# Patient Record
Sex: MALE | Race: WHITE | NOT HISPANIC OR LATINO | Employment: OTHER | URBAN - METROPOLITAN AREA
[De-identification: names, ages, dates, MRNs, and addresses within clinical notes are randomized per-mention and may not be internally consistent; named-entity substitution may affect disease eponyms.]

---

## 2017-10-02 RX ORDER — METFORMIN HYDROCHLORIDE 750 MG/1
TABLET, EXTENDED RELEASE ORAL
Qty: 180 TABLET | Refills: 0 | Status: SHIPPED | OUTPATIENT
Start: 2017-10-02 | End: 2017-12-28 | Stop reason: SDUPTHER

## 2017-10-02 RX ORDER — CLOPIDOGREL BISULFATE 75 MG/1
TABLET ORAL
Qty: 90 TABLET | Refills: 0 | Status: SHIPPED | OUTPATIENT
Start: 2017-10-02 | End: 2017-12-28 | Stop reason: SDUPTHER

## 2017-10-02 RX ORDER — METOPROLOL TARTRATE 25 MG/1
TABLET, FILM COATED ORAL
Qty: 180 TABLET | Refills: 0 | Status: SHIPPED | OUTPATIENT
Start: 2017-10-02 | End: 2017-12-28 | Stop reason: SDUPTHER

## 2017-10-02 RX ORDER — ATORVASTATIN CALCIUM 10 MG/1
TABLET, FILM COATED ORAL
Qty: 90 TABLET | Refills: 0 | Status: SHIPPED | OUTPATIENT
Start: 2017-10-02 | End: 2017-12-28 | Stop reason: SDUPTHER

## 2017-10-02 RX ORDER — LOSARTAN POTASSIUM AND HYDROCHLOROTHIAZIDE 12.5; 1 MG/1; MG/1
TABLET ORAL
Qty: 90 TABLET | Refills: 0 | Status: SHIPPED | OUTPATIENT
Start: 2017-10-02 | End: 2017-12-28 | Stop reason: SDUPTHER

## 2017-10-11 RX ORDER — CITALOPRAM 40 MG/1
TABLET, FILM COATED ORAL
Qty: 90 TABLET | Refills: 0 | Status: SHIPPED | OUTPATIENT
Start: 2017-10-11 | End: 2017-12-28 | Stop reason: SDUPTHER

## 2017-11-16 ENCOUNTER — TELEPHONE (OUTPATIENT)
Dept: PRIMARY CARE CLINIC | Facility: CLINIC | Age: 57
End: 2017-11-16

## 2017-11-16 PROBLEM — I25.810 CORONARY ARTERY DISEASE INVOLVING AUTOLOGOUS VEIN CORONARY BYPASS GRAFT WITHOUT ANGINA PECTORIS: Chronic | Status: ACTIVE | Noted: 2017-11-16

## 2017-11-16 PROBLEM — F32.9 MAJOR DEPRESSIVE DISORDER: Chronic | Status: ACTIVE | Noted: 2017-11-16

## 2017-11-16 PROBLEM — I10 ESSENTIAL HYPERTENSION: Chronic | Status: ACTIVE | Noted: 2017-11-16

## 2017-11-16 PROBLEM — C18.1 CANCER OF APPENDIX: Chronic | Status: ACTIVE | Noted: 2017-11-16

## 2017-11-16 PROBLEM — E11.9 NON-INSULIN DEPENDENT TYPE 2 DIABETES MELLITUS: Chronic | Status: ACTIVE | Noted: 2017-11-16

## 2017-11-16 PROBLEM — E78.00 HYPERCHOLESTEREMIA: Chronic | Status: ACTIVE | Noted: 2017-11-16

## 2017-11-16 NOTE — TELEPHONE ENCOUNTER
Received an IM from Adelita Linder in the call center stating patient found another doctor to see today.

## 2017-11-16 NOTE — TELEPHONE ENCOUNTER
----- Message from Rodrigo Beltrán sent at 11/16/2017  8:56 AM CST -----  Contact: Wife - Rachel Jefferson  States that the patient may have the Shingles and he has been diagnosed with cancer of the appendix, but he is off the chemo for right now.  But he is on a medication, steroids.   The patient would like to be seen today.  Can you please call Rachel back at [phone].  Thank you

## 2017-11-16 NOTE — TELEPHONE ENCOUNTER
Received IM from Adelita Linder in call center stating patient found another doctor to see today.  (dr Tellez)

## 2017-11-16 NOTE — TELEPHONE ENCOUNTER
----- Message from Ryan Hall sent at 11/16/2017 12:07 PM CST -----  Contact: Wife, Rachel Ramos want to speak with a nurse regaridng scheduling appointment today for possible shingles please call back at [phone]

## 2017-11-21 ENCOUNTER — ANESTHESIA (OUTPATIENT)
Dept: SURGERY | Facility: HOSPITAL | Age: 57
DRG: 327 | End: 2017-11-21
Payer: COMMERCIAL

## 2017-11-21 ENCOUNTER — HOSPITAL ENCOUNTER (INPATIENT)
Facility: HOSPITAL | Age: 57
LOS: 8 days | Discharge: HOME HEALTH | DRG: 327 | End: 2017-11-29
Attending: SURGERY | Admitting: SURGERY
Payer: COMMERCIAL

## 2017-11-21 ENCOUNTER — SURGERY (OUTPATIENT)
Age: 57
End: 2017-11-21

## 2017-11-21 ENCOUNTER — ANESTHESIA EVENT (OUTPATIENT)
Dept: SURGERY | Facility: HOSPITAL | Age: 57
DRG: 327 | End: 2017-11-21
Payer: COMMERCIAL

## 2017-11-21 DIAGNOSIS — K66.8 FREE INTRAPERITONEAL AIR: ICD-10-CM

## 2017-11-21 DIAGNOSIS — K66.8 FREE INTRAPERITONEAL AIR: Primary | ICD-10-CM

## 2017-11-21 DIAGNOSIS — I51.7 CARDIOMEGALY: ICD-10-CM

## 2017-11-21 DIAGNOSIS — Z95.1 HX OF CABG: ICD-10-CM

## 2017-11-21 LAB
ABO + RH BLD: NORMAL
BLD GP AB SCN CELLS X3 SERPL QL: NORMAL

## 2017-11-21 PROCEDURE — D9220A PRA ANESTHESIA: Mod: CRNA,,, | Performed by: NURSE ANESTHETIST, CERTIFIED REGISTERED

## 2017-11-21 PROCEDURE — 96365 THER/PROPH/DIAG IV INF INIT: CPT

## 2017-11-21 PROCEDURE — 49020 DRAINAGE ABDOM ABSCESS OPEN: CPT | Mod: ,,, | Performed by: SURGERY

## 2017-11-21 PROCEDURE — 99285 EMERGENCY DEPT VISIT HI MDM: CPT | Mod: 25

## 2017-11-21 PROCEDURE — 43830 GSTRST OPEN WO CONSTJ TUBE: CPT | Mod: 59,,, | Performed by: SURGERY

## 2017-11-21 PROCEDURE — S0030 INJECTION, METRONIDAZOLE: HCPCS | Performed by: SURGERY

## 2017-11-21 PROCEDURE — 63600175 PHARM REV CODE 636 W HCPCS: Performed by: NURSE ANESTHETIST, CERTIFIED REGISTERED

## 2017-11-21 PROCEDURE — 99291 CRITICAL CARE FIRST HOUR: CPT | Mod: ,,, | Performed by: EMERGENCY MEDICINE

## 2017-11-21 PROCEDURE — 37000008 HC ANESTHESIA 1ST 15 MINUTES: Performed by: SURGERY

## 2017-11-21 PROCEDURE — 85025 COMPLETE CBC W/AUTO DIFF WBC: CPT | Mod: 91

## 2017-11-21 PROCEDURE — 0D1B0Z4 BYPASS ILEUM TO CUTANEOUS, OPEN APPROACH: ICD-10-PCS | Performed by: SURGERY

## 2017-11-21 PROCEDURE — 20000000 HC ICU ROOM

## 2017-11-21 PROCEDURE — 86850 RBC ANTIBODY SCREEN: CPT | Mod: 91

## 2017-11-21 PROCEDURE — 87075 CULTR BACTERIA EXCEPT BLOOD: CPT

## 2017-11-21 PROCEDURE — 0DH60UZ INSERTION OF FEEDING DEVICE INTO STOMACH, OPEN APPROACH: ICD-10-PCS | Performed by: SURGERY

## 2017-11-21 PROCEDURE — 87186 SC STD MICRODIL/AGAR DIL: CPT

## 2017-11-21 PROCEDURE — 96376 TX/PRO/DX INJ SAME DRUG ADON: CPT

## 2017-11-21 PROCEDURE — 63600175 PHARM REV CODE 636 W HCPCS: Performed by: SURGERY

## 2017-11-21 PROCEDURE — 87102 FUNGUS ISOLATION CULTURE: CPT

## 2017-11-21 PROCEDURE — 85730 THROMBOPLASTIN TIME PARTIAL: CPT | Mod: 91

## 2017-11-21 PROCEDURE — 86920 COMPATIBILITY TEST SPIN: CPT

## 2017-11-21 PROCEDURE — 99223 1ST HOSP IP/OBS HIGH 75: CPT | Mod: 57,,, | Performed by: SURGERY

## 2017-11-21 PROCEDURE — 83605 ASSAY OF LACTIC ACID: CPT | Mod: 91

## 2017-11-21 PROCEDURE — S0030 INJECTION, METRONIDAZOLE: HCPCS | Performed by: EMERGENCY MEDICINE

## 2017-11-21 PROCEDURE — D9220A PRA ANESTHESIA: Mod: ANES,,, | Performed by: ANESTHESIOLOGY

## 2017-11-21 PROCEDURE — 94002 VENT MGMT INPAT INIT DAY: CPT

## 2017-11-21 PROCEDURE — 80053 COMPREHEN METABOLIC PANEL: CPT | Mod: 91

## 2017-11-21 PROCEDURE — 93005 ELECTROCARDIOGRAM TRACING: CPT

## 2017-11-21 PROCEDURE — 97605 NEG PRS WND THER DME<=50SQCM: CPT | Mod: ,,, | Performed by: SURGERY

## 2017-11-21 PROCEDURE — 96375 TX/PRO/DX INJ NEW DRUG ADDON: CPT

## 2017-11-21 PROCEDURE — 87070 CULTURE OTHR SPECIMN AEROBIC: CPT

## 2017-11-21 PROCEDURE — 85610 PROTHROMBIN TIME: CPT | Mod: 91

## 2017-11-21 PROCEDURE — 83735 ASSAY OF MAGNESIUM: CPT | Mod: 91

## 2017-11-21 PROCEDURE — 63600175 PHARM REV CODE 636 W HCPCS: Performed by: EMERGENCY MEDICINE

## 2017-11-21 PROCEDURE — 84145 PROCALCITONIN (PCT): CPT | Mod: 91

## 2017-11-21 PROCEDURE — 36000708 HC OR TIME LEV III 1ST 15 MIN: Performed by: SURGERY

## 2017-11-21 PROCEDURE — 36000709 HC OR TIME LEV III EA ADD 15 MIN: Performed by: SURGERY

## 2017-11-21 PROCEDURE — 44310 ILEOSTOMY/JEJUNOSTOMY: CPT | Mod: 51,,, | Performed by: SURGERY

## 2017-11-21 PROCEDURE — 12000002 HC ACUTE/MED SURGE SEMI-PRIVATE ROOM

## 2017-11-21 PROCEDURE — 86900 BLOOD TYPING SEROLOGIC ABO: CPT | Mod: 91

## 2017-11-21 PROCEDURE — C1729 CATH, DRAINAGE: HCPCS | Performed by: SURGERY

## 2017-11-21 PROCEDURE — P9045 ALBUMIN (HUMAN), 5%, 250 ML: HCPCS | Performed by: NURSE ANESTHETIST, CERTIFIED REGISTERED

## 2017-11-21 PROCEDURE — 25000003 PHARM REV CODE 250: Performed by: SURGERY

## 2017-11-21 PROCEDURE — 87077 CULTURE AEROBIC IDENTIFY: CPT

## 2017-11-21 PROCEDURE — 84100 ASSAY OF PHOSPHORUS: CPT | Mod: 91

## 2017-11-21 PROCEDURE — 25000003 PHARM REV CODE 250: Performed by: NURSE ANESTHETIST, CERTIFIED REGISTERED

## 2017-11-21 PROCEDURE — 36620 INSERTION CATHETER ARTERY: CPT | Mod: 59,,, | Performed by: ANESTHESIOLOGY

## 2017-11-21 PROCEDURE — 87205 SMEAR GRAM STAIN: CPT

## 2017-11-21 PROCEDURE — 96360 HYDRATION IV INFUSION INIT: CPT | Mod: 59

## 2017-11-21 PROCEDURE — 25000003 PHARM REV CODE 250: Performed by: EMERGENCY MEDICINE

## 2017-11-21 PROCEDURE — 87076 CULTURE ANAEROBE IDENT EACH: CPT

## 2017-11-21 PROCEDURE — 37000009 HC ANESTHESIA EA ADD 15 MINS: Performed by: SURGERY

## 2017-11-21 RX ORDER — METRONIDAZOLE 500 MG/100ML
500 INJECTION, SOLUTION INTRAVENOUS
Status: DISCONTINUED | OUTPATIENT
Start: 2017-11-21 | End: 2017-11-22

## 2017-11-21 RX ORDER — SODIUM BICARBONATE 1 MEQ/ML
SYRINGE (ML) INTRAVENOUS
Status: DISCONTINUED | OUTPATIENT
Start: 2017-11-21 | End: 2017-11-21

## 2017-11-21 RX ORDER — FENTANYL CITRATE 50 UG/ML
INJECTION, SOLUTION INTRAMUSCULAR; INTRAVENOUS
Status: DISCONTINUED | OUTPATIENT
Start: 2017-11-21 | End: 2017-11-21

## 2017-11-21 RX ORDER — LIDOCAINE HCL/PF 100 MG/5ML
SYRINGE (ML) INTRAVENOUS
Status: DISCONTINUED | OUTPATIENT
Start: 2017-11-21 | End: 2017-11-21

## 2017-11-21 RX ORDER — ACETAMINOPHEN 10 MG/ML
INJECTION, SOLUTION INTRAVENOUS
Status: DISCONTINUED | OUTPATIENT
Start: 2017-11-21 | End: 2017-11-21

## 2017-11-21 RX ORDER — ONDANSETRON 2 MG/ML
INJECTION INTRAMUSCULAR; INTRAVENOUS
Status: DISPENSED
Start: 2017-11-21 | End: 2017-11-22

## 2017-11-21 RX ORDER — HYDROMORPHONE HYDROCHLORIDE 1 MG/ML
1 INJECTION, SOLUTION INTRAMUSCULAR; INTRAVENOUS; SUBCUTANEOUS
Status: COMPLETED | OUTPATIENT
Start: 2017-11-21 | End: 2017-11-21

## 2017-11-21 RX ORDER — PHENYLEPHRINE HYDROCHLORIDE 10 MG/ML
INJECTION INTRAVENOUS
Status: DISCONTINUED | OUTPATIENT
Start: 2017-11-21 | End: 2017-11-21

## 2017-11-21 RX ORDER — ONDANSETRON 2 MG/ML
INJECTION INTRAMUSCULAR; INTRAVENOUS
Status: DISCONTINUED | OUTPATIENT
Start: 2017-11-21 | End: 2017-11-21

## 2017-11-21 RX ORDER — GLUCAGON 1 MG
1 KIT INJECTION
Status: DISCONTINUED | OUTPATIENT
Start: 2017-11-22 | End: 2017-11-29 | Stop reason: HOSPADM

## 2017-11-21 RX ORDER — CHLORHEXIDINE GLUCONATE ORAL RINSE 1.2 MG/ML
15 SOLUTION DENTAL 2 TIMES DAILY
Status: DISCONTINUED | OUTPATIENT
Start: 2017-11-22 | End: 2017-11-22

## 2017-11-21 RX ORDER — PROPOFOL 10 MG/ML
VIAL (ML) INTRAVENOUS
Status: DISCONTINUED | OUTPATIENT
Start: 2017-11-21 | End: 2017-11-21

## 2017-11-21 RX ORDER — ROCURONIUM BROMIDE 10 MG/ML
INJECTION, SOLUTION INTRAVENOUS
Status: DISCONTINUED | OUTPATIENT
Start: 2017-11-21 | End: 2017-11-21

## 2017-11-21 RX ORDER — ONDANSETRON 4 MG/1
4 TABLET, ORALLY DISINTEGRATING ORAL
Status: DISCONTINUED | OUTPATIENT
Start: 2017-11-21 | End: 2017-11-21

## 2017-11-21 RX ORDER — ONDANSETRON 2 MG/ML
4 INJECTION INTRAMUSCULAR; INTRAVENOUS
Status: COMPLETED | OUTPATIENT
Start: 2017-11-21 | End: 2017-11-21

## 2017-11-21 RX ORDER — MAGNESIUM SULFATE HEPTAHYDRATE 40 MG/ML
4 INJECTION, SOLUTION INTRAVENOUS
Status: DISCONTINUED | OUTPATIENT
Start: 2017-11-22 | End: 2017-11-24

## 2017-11-21 RX ORDER — METRONIDAZOLE 500 MG/100ML
500 INJECTION, SOLUTION INTRAVENOUS
Status: DISCONTINUED | OUTPATIENT
Start: 2017-11-22 | End: 2017-11-25

## 2017-11-21 RX ORDER — MIDAZOLAM HYDROCHLORIDE 1 MG/ML
INJECTION, SOLUTION INTRAMUSCULAR; INTRAVENOUS
Status: DISCONTINUED | OUTPATIENT
Start: 2017-11-21 | End: 2017-11-21

## 2017-11-21 RX ORDER — PROPOFOL 10 MG/ML
5 INJECTION, EMULSION INTRAVENOUS CONTINUOUS
Status: DISCONTINUED | OUTPATIENT
Start: 2017-11-22 | End: 2017-11-22

## 2017-11-21 RX ORDER — ALBUMIN HUMAN 50 G/1000ML
SOLUTION INTRAVENOUS CONTINUOUS PRN
Status: DISCONTINUED | OUTPATIENT
Start: 2017-11-21 | End: 2017-11-21

## 2017-11-21 RX ORDER — SUCCINYLCHOLINE CHLORIDE 20 MG/ML
INJECTION INTRAMUSCULAR; INTRAVENOUS
Status: DISCONTINUED | OUTPATIENT
Start: 2017-11-21 | End: 2017-11-21

## 2017-11-21 RX ORDER — SODIUM CHLORIDE, SODIUM LACTATE, POTASSIUM CHLORIDE, CALCIUM CHLORIDE 600; 310; 30; 20 MG/100ML; MG/100ML; MG/100ML; MG/100ML
INJECTION, SOLUTION INTRAVENOUS CONTINUOUS
Status: DISCONTINUED | OUTPATIENT
Start: 2017-11-22 | End: 2017-11-23

## 2017-11-21 RX ORDER — HYDROMORPHONE HYDROCHLORIDE 2 MG/ML
INJECTION, SOLUTION INTRAMUSCULAR; INTRAVENOUS; SUBCUTANEOUS
Status: DISCONTINUED | OUTPATIENT
Start: 2017-11-21 | End: 2017-11-21

## 2017-11-21 RX ORDER — FAMOTIDINE 10 MG/ML
20 INJECTION INTRAVENOUS 2 TIMES DAILY
Status: DISCONTINUED | OUTPATIENT
Start: 2017-11-22 | End: 2017-11-22

## 2017-11-21 RX ORDER — ONDANSETRON 2 MG/ML
4 INJECTION INTRAMUSCULAR; INTRAVENOUS ONCE
Status: COMPLETED | OUTPATIENT
Start: 2017-11-21 | End: 2017-11-21

## 2017-11-21 RX ORDER — METRONIDAZOLE 500 MG/100ML
500 INJECTION, SOLUTION INTRAVENOUS
Status: COMPLETED | OUTPATIENT
Start: 2017-11-21 | End: 2017-11-21

## 2017-11-21 RX ORDER — VASOPRESSIN 20 [USP'U]/ML
INJECTION, SOLUTION INTRAMUSCULAR; SUBCUTANEOUS
Status: DISCONTINUED | OUTPATIENT
Start: 2017-11-21 | End: 2017-11-21

## 2017-11-21 RX ORDER — INSULIN ASPART 100 [IU]/ML
0-5 INJECTION, SOLUTION INTRAVENOUS; SUBCUTANEOUS EVERY 6 HOURS PRN
Status: DISCONTINUED | OUTPATIENT
Start: 2017-11-22 | End: 2017-11-29 | Stop reason: HOSPADM

## 2017-11-21 RX ORDER — MAGNESIUM SULFATE HEPTAHYDRATE 40 MG/ML
2 INJECTION, SOLUTION INTRAVENOUS
Status: DISCONTINUED | OUTPATIENT
Start: 2017-11-22 | End: 2017-11-24

## 2017-11-21 RX ORDER — FENTANYL CITRAT/DEXTROSE 5%/PF 100 MCG/10
25 PATIENT CONTROLLED ANALGESIA SYRINGE INTRAVENOUS CONTINUOUS
Status: DISCONTINUED | OUTPATIENT
Start: 2017-11-22 | End: 2017-11-22

## 2017-11-21 RX ADMIN — MIDAZOLAM HYDROCHLORIDE 1 MG: 1 INJECTION, SOLUTION INTRAMUSCULAR; INTRAVENOUS at 11:11

## 2017-11-21 RX ADMIN — SODIUM CHLORIDE, SODIUM LACTATE, POTASSIUM CHLORIDE, AND CALCIUM CHLORIDE 1000 ML: .6; .31; .03; .02 INJECTION, SOLUTION INTRAVENOUS at 07:11

## 2017-11-21 RX ADMIN — PHENYLEPHRINE HYDROCHLORIDE 300 MCG: 10 INJECTION INTRAVENOUS at 09:11

## 2017-11-21 RX ADMIN — ONDANSETRON 4 MG: 2 INJECTION INTRAMUSCULAR; INTRAVENOUS at 07:11

## 2017-11-21 RX ADMIN — ALBUMIN (HUMAN): 12.5 SOLUTION INTRAVENOUS at 10:11

## 2017-11-21 RX ADMIN — LIDOCAINE HYDROCHLORIDE 60 MG: 20 INJECTION, SOLUTION INTRAVENOUS at 09:11

## 2017-11-21 RX ADMIN — METRONIDAZOLE 500 MG: 500 INJECTION, SOLUTION INTRAVENOUS at 07:11

## 2017-11-21 RX ADMIN — CEFTRIAXONE 2 G: 2 INJECTION, SOLUTION INTRAVENOUS at 09:11

## 2017-11-21 RX ADMIN — VASOPRESSIN 2 UNITS: 20 INJECTION INTRAVENOUS at 10:11

## 2017-11-21 RX ADMIN — CALCIUM CHLORIDE 1 G: 100 INJECTION, SOLUTION INTRAVENOUS at 10:11

## 2017-11-21 RX ADMIN — ROCURONIUM BROMIDE 30 MG: 10 INJECTION, SOLUTION INTRAVENOUS at 09:11

## 2017-11-21 RX ADMIN — PHENYLEPHRINE HYDROCHLORIDE 200 MCG: 10 INJECTION INTRAVENOUS at 09:11

## 2017-11-21 RX ADMIN — FENTANYL CITRATE 50 MCG: 50 INJECTION, SOLUTION INTRAMUSCULAR; INTRAVENOUS at 09:11

## 2017-11-21 RX ADMIN — SUCCINYLCHOLINE CHLORIDE 140 MG: 20 INJECTION, SOLUTION INTRAMUSCULAR; INTRAVENOUS at 09:11

## 2017-11-21 RX ADMIN — MIDAZOLAM HYDROCHLORIDE 1 MG: 1 INJECTION, SOLUTION INTRAMUSCULAR; INTRAVENOUS at 09:11

## 2017-11-21 RX ADMIN — PROPOFOL 100 MG: 10 INJECTION, EMULSION INTRAVENOUS at 09:11

## 2017-11-21 RX ADMIN — SODIUM CHLORIDE, SODIUM GLUCONATE, SODIUM ACETATE, POTASSIUM CHLORIDE, MAGNESIUM CHLORIDE, SODIUM PHOSPHATE, DIBASIC, AND POTASSIUM PHOSPHATE: .53; .5; .37; .037; .03; .012; .00082 INJECTION, SOLUTION INTRAVENOUS at 09:11

## 2017-11-21 RX ADMIN — VASOPRESSIN 2 UNITS: 20 INJECTION INTRAVENOUS at 09:11

## 2017-11-21 RX ADMIN — ACETAMINOPHEN 1000 MG: 10 INJECTION, SOLUTION INTRAVENOUS at 10:11

## 2017-11-21 RX ADMIN — SODIUM CHLORIDE, SODIUM GLUCONATE, SODIUM ACETATE, POTASSIUM CHLORIDE, MAGNESIUM CHLORIDE, SODIUM PHOSPHATE, DIBASIC, AND POTASSIUM PHOSPHATE: .53; .5; .37; .037; .03; .012; .00082 INJECTION, SOLUTION INTRAVENOUS at 10:11

## 2017-11-21 RX ADMIN — ROCURONIUM BROMIDE 10 MG: 10 INJECTION, SOLUTION INTRAVENOUS at 09:11

## 2017-11-21 RX ADMIN — ONDANSETRON 4 MG: 2 INJECTION INTRAMUSCULAR; INTRAVENOUS at 11:11

## 2017-11-21 RX ADMIN — PHENYLEPHRINE HYDROCHLORIDE 100 MCG: 10 INJECTION INTRAVENOUS at 09:11

## 2017-11-21 RX ADMIN — VASOPRESSIN 2 UNITS: 20 INJECTION INTRAVENOUS at 11:11

## 2017-11-21 RX ADMIN — SODIUM BICARBONATE 50 MEQ: 84 INJECTION, SOLUTION INTRAVENOUS at 10:11

## 2017-11-21 RX ADMIN — ALBUMIN (HUMAN): 12.5 SOLUTION INTRAVENOUS at 09:11

## 2017-11-21 RX ADMIN — ROCURONIUM BROMIDE 20 MG: 10 INJECTION, SOLUTION INTRAVENOUS at 10:11

## 2017-11-21 RX ADMIN — VASOPRESSIN 4 UNITS: 20 INJECTION INTRAVENOUS at 09:11

## 2017-11-21 RX ADMIN — PHENYLEPHRINE HYDROCHLORIDE 200 MCG: 10 INJECTION INTRAVENOUS at 10:11

## 2017-11-21 RX ADMIN — EPHEDRINE SULFATE 5 MG: 50 INJECTION, SOLUTION INTRAMUSCULAR; INTRAVENOUS; SUBCUTANEOUS at 09:11

## 2017-11-21 RX ADMIN — HYDROMORPHONE HYDROCHLORIDE 1 MG: 1 INJECTION, SOLUTION INTRAMUSCULAR; INTRAVENOUS; SUBCUTANEOUS at 08:11

## 2017-11-21 RX ADMIN — HYDROMORPHONE HYDROCHLORIDE 0.4 MG: 2 INJECTION, SOLUTION INTRAMUSCULAR; INTRAVENOUS; SUBCUTANEOUS at 11:11

## 2017-11-21 RX ADMIN — METRONIDAZOLE 500 MG: 500 INJECTION, SOLUTION INTRAVENOUS at 09:11

## 2017-11-21 RX ADMIN — EPHEDRINE SULFATE 10 MG: 50 INJECTION, SOLUTION INTRAMUSCULAR; INTRAVENOUS; SUBCUTANEOUS at 09:11

## 2017-11-21 RX ADMIN — ONDANSETRON 4 MG: 2 INJECTION INTRAMUSCULAR; INTRAVENOUS at 08:11

## 2017-11-22 PROBLEM — B02.9 HERPES ZOSTER: Status: ACTIVE | Noted: 2017-11-22

## 2017-11-22 LAB
ALBUMIN SERPL BCP-MCNC: 2.3 G/DL
ALBUMIN SERPL BCP-MCNC: 2.4 G/DL
ALBUMIN SERPL BCP-MCNC: 2.4 G/DL
ALBUMIN SERPL BCP-MCNC: 2.5 G/DL
ALBUMIN SERPL BCP-MCNC: 2.5 G/DL
ALLENS TEST: ABNORMAL
ALP SERPL-CCNC: 38 U/L
ALP SERPL-CCNC: 43 U/L
ALP SERPL-CCNC: 45 U/L
ALT SERPL W/O P-5'-P-CCNC: 12 U/L
ALT SERPL W/O P-5'-P-CCNC: 15 U/L
ALT SERPL W/O P-5'-P-CCNC: 16 U/L
ANION GAP SERPL CALC-SCNC: 14 MMOL/L
ANION GAP SERPL CALC-SCNC: 14 MMOL/L
ANION GAP SERPL CALC-SCNC: 8 MMOL/L
ANISOCYTOSIS BLD QL SMEAR: SLIGHT
AORTIC VALVE REGURGITATION: NORMAL
APTT BLDCRRT: 29.3 SEC
AST SERPL-CCNC: 16 U/L
AST SERPL-CCNC: 17 U/L
AST SERPL-CCNC: 20 U/L
AST SERPL-CCNC: 20 U/L
AST SERPL-CCNC: 23 U/L
BASOPHILS # BLD AUTO: 0.01 K/UL
BASOPHILS # BLD AUTO: 0.02 K/UL
BASOPHILS # BLD AUTO: 0.05 K/UL
BASOPHILS NFR BLD: 0.2 %
BASOPHILS NFR BLD: 0.3 %
BASOPHILS NFR BLD: 0.3 %
BASOPHILS NFR BLD: 0.6 %
BASOPHILS NFR BLD: 0.6 %
BILIRUB SERPL-MCNC: 0.8 MG/DL
BILIRUB SERPL-MCNC: 1.3 MG/DL
BILIRUB SERPL-MCNC: 1.6 MG/DL
BUN SERPL-MCNC: 20 MG/DL
BUN SERPL-MCNC: 24 MG/DL
BUN SERPL-MCNC: 24 MG/DL
BUN SERPL-MCNC: 35 MG/DL
BUN SERPL-MCNC: 41 MG/DL
BURR CELLS BLD QL SMEAR: ABNORMAL
CALCIUM SERPL-MCNC: 7.5 MG/DL
CALCIUM SERPL-MCNC: 7.8 MG/DL
CALCIUM SERPL-MCNC: 7.8 MG/DL
CALCIUM SERPL-MCNC: 7.9 MG/DL
CALCIUM SERPL-MCNC: 7.9 MG/DL
CHLORIDE SERPL-SCNC: 103 MMOL/L
CHLORIDE SERPL-SCNC: 104 MMOL/L
CHLORIDE SERPL-SCNC: 104 MMOL/L
CHLORIDE UR-SCNC: 120 MMOL/L
CO2 SERPL-SCNC: 16 MMOL/L
CO2 SERPL-SCNC: 17 MMOL/L
CO2 SERPL-SCNC: 21 MMOL/L
CO2 SERPL-SCNC: 21 MMOL/L
CO2 SERPL-SCNC: 22 MMOL/L
CREAT SERPL-MCNC: 1 MG/DL
CREAT SERPL-MCNC: 1.5 MG/DL
CREAT SERPL-MCNC: 1.8 MG/DL
CREAT UR-MCNC: 73 MG/DL
DELSYS: ABNORMAL
DIFFERENTIAL METHOD: ABNORMAL
EOSINOPHIL # BLD AUTO: 0 K/UL
EOSINOPHIL NFR BLD: 0 %
EOSINOPHIL NFR BLD: 0.2 %
EOSINOPHIL NFR BLD: 0.5 %
EOSINOPHIL NFR BLD: 0.5 %
EOSINOPHIL NFR BLD: 0.8 %
ERYTHROCYTE [DISTWIDTH] IN BLOOD BY AUTOMATED COUNT: 18.5 %
ERYTHROCYTE [DISTWIDTH] IN BLOOD BY AUTOMATED COUNT: 18.6 %
ERYTHROCYTE [DISTWIDTH] IN BLOOD BY AUTOMATED COUNT: 18.6 %
ERYTHROCYTE [DISTWIDTH] IN BLOOD BY AUTOMATED COUNT: 18.7 %
ERYTHROCYTE [DISTWIDTH] IN BLOOD BY AUTOMATED COUNT: 18.7 %
ERYTHROCYTE [SEDIMENTATION RATE] IN BLOOD BY WESTERGREN METHOD: 15 MM/H
EST. GFR  (AFRICAN AMERICAN): 46.6 ML/MIN/1.73 M^2
EST. GFR  (AFRICAN AMERICAN): 58.1 ML/MIN/1.73 M^2
EST. GFR  (AFRICAN AMERICAN): >60 ML/MIN/1.73 M^2
EST. GFR  (NON AFRICAN AMERICAN): 40.3 ML/MIN/1.73 M^2
EST. GFR  (NON AFRICAN AMERICAN): 50.2 ML/MIN/1.73 M^2
EST. GFR  (NON AFRICAN AMERICAN): >60 ML/MIN/1.73 M^2
ESTIMATED PA SYSTOLIC PRESSURE: 33.64
FIO2: 100
FIO2: 100
FIO2: 60
FIO2: 60
FIO2: 80
FIO2: 90
GLUCOSE SERPL-MCNC: 115 MG/DL
GLUCOSE SERPL-MCNC: 129 MG/DL (ref 70–110)
GLUCOSE SERPL-MCNC: 132 MG/DL (ref 70–110)
GLUCOSE SERPL-MCNC: 137 MG/DL
GLUCOSE SERPL-MCNC: 138 MG/DL (ref 70–110)
GLUCOSE SERPL-MCNC: 189 MG/DL
GLUCOSE SERPL-MCNC: 192 MG/DL
GLUCOSE SERPL-MCNC: 192 MG/DL
GRAM STN SPEC: NORMAL
HCO3 UR-SCNC: 15.6 MMOL/L (ref 24–28)
HCO3 UR-SCNC: 17.3 MMOL/L (ref 24–28)
HCO3 UR-SCNC: 17.4 MMOL/L (ref 24–28)
HCO3 UR-SCNC: 18 MMOL/L (ref 24–28)
HCO3 UR-SCNC: 18.1 MMOL/L (ref 24–28)
HCO3 UR-SCNC: 18.7 MMOL/L (ref 24–28)
HCO3 UR-SCNC: 19.7 MMOL/L (ref 24–28)
HCO3 UR-SCNC: 20.4 MMOL/L (ref 24–28)
HCO3 UR-SCNC: 20.7 MMOL/L (ref 24–28)
HCT VFR BLD AUTO: 33.8 %
HCT VFR BLD AUTO: 33.9 %
HCT VFR BLD AUTO: 33.9 %
HCT VFR BLD AUTO: 36 %
HCT VFR BLD AUTO: 36.9 %
HCT VFR BLD CALC: 30 %PCV (ref 36–54)
HCT VFR BLD CALC: 34 %PCV (ref 36–54)
HCT VFR BLD CALC: 35 %PCV (ref 36–54)
HCT VFR BLD CALC: 35 %PCV (ref 36–54)
HGB BLD-MCNC: 11.3 G/DL
HGB BLD-MCNC: 11.3 G/DL
HGB BLD-MCNC: 11.4 G/DL
HGB BLD-MCNC: 11.7 G/DL
HGB BLD-MCNC: 12.1 G/DL
IMM GRANULOCYTES # BLD AUTO: 0.01 K/UL
IMM GRANULOCYTES # BLD AUTO: 0.01 K/UL
IMM GRANULOCYTES # BLD AUTO: 0.03 K/UL
IMM GRANULOCYTES # BLD AUTO: 0.03 K/UL
IMM GRANULOCYTES # BLD AUTO: 0.04 K/UL
IMM GRANULOCYTES NFR BLD AUTO: 0.1 %
IMM GRANULOCYTES NFR BLD AUTO: 0.2 %
IMM GRANULOCYTES NFR BLD AUTO: 0.8 %
IMM GRANULOCYTES NFR BLD AUTO: 0.8 %
IMM GRANULOCYTES NFR BLD AUTO: 1.1 %
INR PPP: 1.2
IP: 20
IT: 0.8
LACTATE SERPL-SCNC: 0.8 MMOL/L
LACTATE SERPL-SCNC: 0.9 MMOL/L
LACTATE SERPL-SCNC: 1.1 MMOL/L
LACTATE SERPL-SCNC: 1.1 MMOL/L
LACTATE SERPL-SCNC: 1.2 MMOL/L
LACTATE SERPL-SCNC: 1.4 MMOL/L
LDH SERPL L TO P-CCNC: 0.8 MMOL/L (ref 0.36–1.25)
LYMPHOCYTES # BLD AUTO: 1.1 K/UL
LYMPHOCYTES # BLD AUTO: 1.6 K/UL
LYMPHOCYTES NFR BLD: 18.9 %
LYMPHOCYTES NFR BLD: 23.9 %
LYMPHOCYTES NFR BLD: 29.9 %
LYMPHOCYTES NFR BLD: 29.9 %
LYMPHOCYTES NFR BLD: 31.8 %
MAGNESIUM SERPL-MCNC: 1.6 MG/DL
MAGNESIUM SERPL-MCNC: 1.7 MG/DL
MAGNESIUM SERPL-MCNC: 1.8 MG/DL
MCH RBC QN AUTO: 26.8 PG
MCH RBC QN AUTO: 27 PG
MCH RBC QN AUTO: 27.1 PG
MCHC RBC AUTO-ENTMCNC: 32.5 G/DL
MCHC RBC AUTO-ENTMCNC: 32.8 G/DL
MCHC RBC AUTO-ENTMCNC: 33.3 G/DL
MCHC RBC AUTO-ENTMCNC: 33.3 G/DL
MCHC RBC AUTO-ENTMCNC: 33.7 G/DL
MCV RBC AUTO: 80 FL
MCV RBC AUTO: 80 FL
MCV RBC AUTO: 81 FL
MCV RBC AUTO: 82 FL
MCV RBC AUTO: 82 FL
MODE: ABNORMAL
MONOCYTES # BLD AUTO: 0.4 K/UL
MONOCYTES # BLD AUTO: 0.6 K/UL
MONOCYTES # BLD AUTO: 0.9 K/UL
MONOCYTES NFR BLD: 10.2 %
MONOCYTES NFR BLD: 10.4 %
MONOCYTES NFR BLD: 10.4 %
MONOCYTES NFR BLD: 11.9 %
MONOCYTES NFR BLD: 12.4 %
NEUTROPHILS # BLD AUTO: 1.9 K/UL
NEUTROPHILS # BLD AUTO: 2.1 K/UL
NEUTROPHILS # BLD AUTO: 2.1 K/UL
NEUTROPHILS # BLD AUTO: 3 K/UL
NEUTROPHILS # BLD AUTO: 5.9 K/UL
NEUTROPHILS NFR BLD: 53.3 %
NEUTROPHILS NFR BLD: 58.1 %
NEUTROPHILS NFR BLD: 58.1 %
NEUTROPHILS NFR BLD: 63.6 %
NEUTROPHILS NFR BLD: 70.2 %
NRBC BLD-RTO: 0 /100 WBC
OVALOCYTES BLD QL SMEAR: ABNORMAL
OVALOCYTES BLD QL SMEAR: ABNORMAL
PCO2 BLDA: 29 MMHG (ref 35–45)
PCO2 BLDA: 36.3 MMHG (ref 35–45)
PCO2 BLDA: 36.6 MMHG (ref 35–45)
PCO2 BLDA: 38 MMHG (ref 35–45)
PCO2 BLDA: 38.9 MMHG (ref 35–45)
PCO2 BLDA: 39.4 MMHG (ref 35–45)
PCO2 BLDA: 41.9 MMHG (ref 35–45)
PCO2 BLDA: 43.4 MMHG (ref 35–45)
PCO2 BLDA: 46.7 MMHG (ref 35–45)
PEEP: 5
PEEP: 5
PEEP: 9
PH SMN: 7.22 [PH] (ref 7.35–7.45)
PH SMN: 7.23 [PH] (ref 7.35–7.45)
PH SMN: 7.23 [PH] (ref 7.35–7.45)
PH SMN: 7.29 [PH] (ref 7.35–7.45)
PH SMN: 7.32 [PH] (ref 7.35–7.45)
PH SMN: 7.34 [PH] (ref 7.35–7.45)
PH SMN: 7.37 [PH] (ref 7.35–7.45)
PHOSPHATE SERPL-MCNC: 2.2 MG/DL
PHOSPHATE SERPL-MCNC: 4.4 MG/DL
PHOSPHATE SERPL-MCNC: 4.8 MG/DL
PLATELET # BLD AUTO: 122 K/UL
PLATELET # BLD AUTO: 77 K/UL
PLATELET # BLD AUTO: 85 K/UL
PLATELET # BLD AUTO: 85 K/UL
PLATELET # BLD AUTO: 95 K/UL
PLATELET BLD QL SMEAR: ABNORMAL
PMV BLD AUTO: 10.1 FL
PMV BLD AUTO: 10.3 FL
PMV BLD AUTO: 9.4 FL
PMV BLD AUTO: 9.6 FL
PMV BLD AUTO: 9.6 FL
PO2 BLDA: 117 MMHG (ref 80–100)
PO2 BLDA: 122 MMHG (ref 80–100)
PO2 BLDA: 162 MMHG (ref 80–100)
PO2 BLDA: 173 MMHG (ref 80–100)
PO2 BLDA: 187 MMHG (ref 80–100)
PO2 BLDA: 347 MMHG (ref 80–100)
PO2 BLDA: 95 MMHG (ref 80–100)
PO2 BLDA: 95 MMHG (ref 80–100)
PO2 BLDA: 97 MMHG (ref 80–100)
POC BE: -10 MMOL/L
POC BE: -5 MMOL/L
POC BE: -6 MMOL/L
POC BE: -8 MMOL/L
POC BE: -8 MMOL/L
POC BE: -9 MMOL/L
POC BE: -9 MMOL/L
POC IONIZED CALCIUM: 0.95 MMOL/L (ref 1.06–1.42)
POC IONIZED CALCIUM: 1.13 MMOL/L (ref 1.06–1.42)
POC IONIZED CALCIUM: 1.13 MMOL/L (ref 1.06–1.42)
POC IONIZED CALCIUM: 1.17 MMOL/L (ref 1.06–1.42)
POC SATURATED O2: 100 % (ref 95–100)
POC SATURATED O2: 96 % (ref 95–100)
POC SATURATED O2: 97 % (ref 95–100)
POC SATURATED O2: 97 % (ref 95–100)
POC SATURATED O2: 98 % (ref 95–100)
POC SATURATED O2: 98 % (ref 95–100)
POC SATURATED O2: 99 % (ref 95–100)
POC TCO2: 16 MMOL/L (ref 23–27)
POC TCO2: 19 MMOL/L (ref 23–27)
POC TCO2: 20 MMOL/L (ref 23–27)
POC TCO2: 21 MMOL/L (ref 23–27)
POC TCO2: 22 MMOL/L (ref 23–27)
POC TCO2: 22 MMOL/L (ref 23–27)
POCT GLUCOSE: 127 MG/DL (ref 70–110)
POCT GLUCOSE: 194 MG/DL (ref 70–110)
POCT GLUCOSE: 225 MG/DL (ref 70–110)
POIKILOCYTOSIS BLD QL SMEAR: SLIGHT
POTASSIUM BLD-SCNC: 3.3 MMOL/L (ref 3.5–5.1)
POTASSIUM BLD-SCNC: 3.4 MMOL/L (ref 3.5–5.1)
POTASSIUM BLD-SCNC: 3.5 MMOL/L (ref 3.5–5.1)
POTASSIUM BLD-SCNC: 3.6 MMOL/L (ref 3.5–5.1)
POTASSIUM SERPL-SCNC: 3.5 MMOL/L
POTASSIUM SERPL-SCNC: 3.6 MMOL/L
POTASSIUM SERPL-SCNC: 3.6 MMOL/L
POTASSIUM SERPL-SCNC: 3.7 MMOL/L
POTASSIUM SERPL-SCNC: 3.8 MMOL/L
PROCALCITONIN SERPL IA-MCNC: 11.71 NG/ML
PROT SERPL-MCNC: 5 G/DL
PROT SERPL-MCNC: 5.1 G/DL
PROT SERPL-MCNC: 5.1 G/DL
PROT SERPL-MCNC: 5.2 G/DL
PROT SERPL-MCNC: 5.2 G/DL
PROTHROMBIN TIME: 12.9 SEC
RBC # BLD AUTO: 4.2 M/UL
RBC # BLD AUTO: 4.22 M/UL
RBC # BLD AUTO: 4.22 M/UL
RBC # BLD AUTO: 4.37 M/UL
RBC # BLD AUTO: 4.48 M/UL
RETIRED EF AND QEF - SEE NOTES: 55 (ref 55–65)
SAMPLE: ABNORMAL
SITE: ABNORMAL
SMUDGE CELLS BLD QL SMEAR: PRESENT
SODIUM BLD-SCNC: 134 MMOL/L (ref 136–145)
SODIUM BLD-SCNC: 135 MMOL/L (ref 136–145)
SODIUM BLD-SCNC: 135 MMOL/L (ref 136–145)
SODIUM BLD-SCNC: 136 MMOL/L (ref 136–145)
SODIUM SERPL-SCNC: 132 MMOL/L
SODIUM SERPL-SCNC: 132 MMOL/L
SODIUM SERPL-SCNC: 134 MMOL/L
SODIUM UR-SCNC: 109 MMOL/L
SP02: 100
SP02: 100
SP02: 99
TRICUSPID VALVE REGURGITATION: NORMAL
UUN UR-MCNC: 783 MG/DL
WBC # BLD AUTO: 3.55 K/UL
WBC # BLD AUTO: 3.64 K/UL
WBC # BLD AUTO: 3.64 K/UL
WBC # BLD AUTO: 4.72 K/UL
WBC # BLD AUTO: 8.35 K/UL

## 2017-11-22 PROCEDURE — 63600175 PHARM REV CODE 636 W HCPCS: Performed by: STUDENT IN AN ORGANIZED HEALTH CARE EDUCATION/TRAINING PROGRAM

## 2017-11-22 PROCEDURE — 84540 ASSAY OF URINE/UREA-N: CPT

## 2017-11-22 PROCEDURE — 27000221 HC OXYGEN, UP TO 24 HOURS

## 2017-11-22 PROCEDURE — 27000190 HC CPAP FULL FACE MASK W/VALVE

## 2017-11-22 PROCEDURE — 02HV33Z INSERTION OF INFUSION DEVICE INTO SUPERIOR VENA CAVA, PERCUTANEOUS APPROACH: ICD-10-PCS | Performed by: SURGERY

## 2017-11-22 PROCEDURE — 94002 VENT MGMT INPAT INIT DAY: CPT

## 2017-11-22 PROCEDURE — 83605 ASSAY OF LACTIC ACID: CPT

## 2017-11-22 PROCEDURE — 93306 TTE W/DOPPLER COMPLETE: CPT | Mod: 26,,, | Performed by: INTERNAL MEDICINE

## 2017-11-22 PROCEDURE — 37799 UNLISTED PX VASCULAR SURGERY: CPT

## 2017-11-22 PROCEDURE — 25000003 PHARM REV CODE 250: Performed by: STUDENT IN AN ORGANIZED HEALTH CARE EDUCATION/TRAINING PROGRAM

## 2017-11-22 PROCEDURE — 84100 ASSAY OF PHOSPHORUS: CPT

## 2017-11-22 PROCEDURE — 20000000 HC ICU ROOM

## 2017-11-22 PROCEDURE — 87300 AG DETECTION POLYVAL IF: CPT

## 2017-11-22 PROCEDURE — S0030 INJECTION, METRONIDAZOLE: HCPCS | Performed by: ANESTHESIOLOGY

## 2017-11-22 PROCEDURE — 93306 TTE W/DOPPLER COMPLETE: CPT

## 2017-11-22 PROCEDURE — 83735 ASSAY OF MAGNESIUM: CPT | Mod: 91

## 2017-11-22 PROCEDURE — 80053 COMPREHEN METABOLIC PANEL: CPT

## 2017-11-22 PROCEDURE — S0028 INJECTION, FAMOTIDINE, 20 MG: HCPCS | Performed by: ANESTHESIOLOGY

## 2017-11-22 PROCEDURE — 25000003 PHARM REV CODE 250

## 2017-11-22 PROCEDURE — 99900035 HC TECH TIME PER 15 MIN (STAT)

## 2017-11-22 PROCEDURE — 94761 N-INVAS EAR/PLS OXIMETRY MLT: CPT

## 2017-11-22 PROCEDURE — 36556 INSERT NON-TUNNEL CV CATH: CPT

## 2017-11-22 PROCEDURE — 94660 CPAP INITIATION&MGMT: CPT

## 2017-11-22 PROCEDURE — 82803 BLOOD GASES ANY COMBINATION: CPT

## 2017-11-22 PROCEDURE — 84300 ASSAY OF URINE SODIUM: CPT

## 2017-11-22 PROCEDURE — 85025 COMPLETE CBC W/AUTO DIFF WBC: CPT | Mod: 91

## 2017-11-22 PROCEDURE — 25000003 PHARM REV CODE 250: Performed by: ANESTHESIOLOGY

## 2017-11-22 PROCEDURE — 94003 VENT MGMT INPAT SUBQ DAY: CPT

## 2017-11-22 PROCEDURE — 63600175 PHARM REV CODE 636 W HCPCS: Performed by: ANESTHESIOLOGY

## 2017-11-22 PROCEDURE — 82436 ASSAY OF URINE CHLORIDE: CPT

## 2017-11-22 PROCEDURE — P9045 ALBUMIN (HUMAN), 5%, 250 ML: HCPCS | Performed by: ANESTHESIOLOGY

## 2017-11-22 PROCEDURE — 87290 VARICELLA ZOSTER AG IF: CPT

## 2017-11-22 PROCEDURE — 82570 ASSAY OF URINE CREATININE: CPT

## 2017-11-22 RX ORDER — HYDROMORPHONE HCL IN 0.9% NACL 6 MG/30 ML
PATIENT CONTROLLED ANALGESIA SYRINGE INTRAVENOUS CONTINUOUS
Status: DISCONTINUED | OUTPATIENT
Start: 2017-11-22 | End: 2017-11-24

## 2017-11-22 RX ORDER — NALOXONE HCL 0.4 MG/ML
0.02 VIAL (ML) INJECTION
Status: DISCONTINUED | OUTPATIENT
Start: 2017-11-22 | End: 2017-11-24

## 2017-11-22 RX ORDER — NOREPINEPHRINE BITARTRATE/D5W 4MG/250ML
0.05 PLASTIC BAG, INJECTION (ML) INTRAVENOUS CONTINUOUS
Status: DISCONTINUED | OUTPATIENT
Start: 2017-11-22 | End: 2017-11-23

## 2017-11-22 RX ORDER — FLUCONAZOLE 2 MG/ML
200 INJECTION, SOLUTION INTRAVENOUS
Status: DISCONTINUED | OUTPATIENT
Start: 2017-11-22 | End: 2017-11-25

## 2017-11-22 RX ORDER — ACYCLOVIR 200 MG/1
400 CAPSULE ORAL
Status: DISCONTINUED | OUTPATIENT
Start: 2017-11-22 | End: 2017-11-22

## 2017-11-22 RX ORDER — NAPROXEN SODIUM 220 MG/1
81 TABLET, FILM COATED ORAL DAILY
Status: DISCONTINUED | OUTPATIENT
Start: 2017-11-23 | End: 2017-11-29 | Stop reason: HOSPADM

## 2017-11-22 RX ORDER — PHENYLEPHRINE HCL IN 0.9% NACL 1 MG/10 ML
SYRINGE (ML) INTRAVENOUS
Status: COMPLETED
Start: 2017-11-22 | End: 2017-11-22

## 2017-11-22 RX ORDER — ALBUMIN HUMAN 50 G/1000ML
500 SOLUTION INTRAVENOUS ONCE
Status: COMPLETED | OUTPATIENT
Start: 2017-11-22 | End: 2017-11-22

## 2017-11-22 RX ORDER — FLUCONAZOLE 2 MG/ML
200 INJECTION, SOLUTION INTRAVENOUS
Status: DISCONTINUED | OUTPATIENT
Start: 2017-11-22 | End: 2017-11-22

## 2017-11-22 RX ORDER — VASOPRESSIN 20 [USP'U]/ML
INJECTION, SOLUTION INTRAMUSCULAR; SUBCUTANEOUS
Status: DISPENSED
Start: 2017-11-22 | End: 2017-11-22

## 2017-11-22 RX ORDER — HEPARIN SODIUM 5000 [USP'U]/ML
5000 INJECTION, SOLUTION INTRAVENOUS; SUBCUTANEOUS EVERY 8 HOURS
Status: DISCONTINUED | OUTPATIENT
Start: 2017-11-22 | End: 2017-11-27

## 2017-11-22 RX ORDER — METOPROLOL TARTRATE 25 MG/1
25 TABLET, FILM COATED ORAL 2 TIMES DAILY
Status: DISCONTINUED | OUTPATIENT
Start: 2017-11-22 | End: 2017-11-29 | Stop reason: HOSPADM

## 2017-11-22 RX ORDER — NOREPINEPHRINE BITARTRATE/D5W 4MG/250ML
PLASTIC BAG, INJECTION (ML) INTRAVENOUS
Status: DISPENSED
Start: 2017-11-22 | End: 2017-11-22

## 2017-11-22 RX ADMIN — ACYCLOVIR SODIUM 710 MG: 50 INJECTION, SOLUTION INTRAVENOUS at 10:11

## 2017-11-22 RX ADMIN — ALBUMIN (HUMAN) 500 ML: 12.5 SOLUTION INTRAVENOUS at 05:11

## 2017-11-22 RX ADMIN — METRONIDAZOLE 500 MG: 500 INJECTION, SOLUTION INTRAVENOUS at 12:11

## 2017-11-22 RX ADMIN — Medication 0.02 MCG/KG/MIN: at 07:11

## 2017-11-22 RX ADMIN — METRONIDAZOLE 500 MG: 500 INJECTION, SOLUTION INTRAVENOUS at 03:11

## 2017-11-22 RX ADMIN — HEPARIN SODIUM 5000 UNITS: 5000 INJECTION, SOLUTION INTRAVENOUS; SUBCUTANEOUS at 10:11

## 2017-11-22 RX ADMIN — POTASSIUM PHOSPHATE, MONOBASIC AND POTASSIUM PHOSPHATE, DIBASIC 20 MMOL: 224; 236 INJECTION, SOLUTION, CONCENTRATE INTRAVENOUS at 06:11

## 2017-11-22 RX ADMIN — CEFTRIAXONE SODIUM 1 G: 1 INJECTION, POWDER, FOR SOLUTION INTRAMUSCULAR; INTRAVENOUS at 05:11

## 2017-11-22 RX ADMIN — METOPROLOL TARTRATE 25 MG: 25 TABLET ORAL at 10:11

## 2017-11-22 RX ADMIN — HYDROCORTISONE SODIUM SUCCINATE 100 MG: 100 INJECTION, POWDER, FOR SOLUTION INTRAMUSCULAR; INTRAVENOUS at 10:11

## 2017-11-22 RX ADMIN — SODIUM CHLORIDE, SODIUM LACTATE, POTASSIUM CHLORIDE, AND CALCIUM CHLORIDE: .6; .31; .03; .02 INJECTION, SOLUTION INTRAVENOUS at 10:11

## 2017-11-22 RX ADMIN — FAMOTIDINE 20 MG: 10 INJECTION, SOLUTION INTRAVENOUS at 09:11

## 2017-11-22 RX ADMIN — INSULIN ASPART 2 UNITS: 100 INJECTION, SOLUTION INTRAVENOUS; SUBCUTANEOUS at 06:11

## 2017-11-22 RX ADMIN — CHLORHEXIDINE GLUCONATE 15 ML: 1.2 RINSE ORAL at 09:11

## 2017-11-22 RX ADMIN — SODIUM CHLORIDE, SODIUM LACTATE, POTASSIUM CHLORIDE, AND CALCIUM CHLORIDE 1000 ML: .6; .31; .03; .02 INJECTION, SOLUTION INTRAVENOUS at 03:11

## 2017-11-22 RX ADMIN — SODIUM CHLORIDE, SODIUM LACTATE, POTASSIUM CHLORIDE, AND CALCIUM CHLORIDE 1000 ML: .6; .31; .03; .02 INJECTION, SOLUTION INTRAVENOUS at 01:11

## 2017-11-22 RX ADMIN — PROPOFOL 10 MCG/KG/MIN: 10 INJECTION, EMULSION INTRAVENOUS at 12:11

## 2017-11-22 RX ADMIN — SODIUM CHLORIDE 1000 ML: 0.9 INJECTION, SOLUTION INTRAVENOUS at 03:11

## 2017-11-22 RX ADMIN — HEPARIN SODIUM 5000 UNITS: 5000 INJECTION, SOLUTION INTRAVENOUS; SUBCUTANEOUS at 02:11

## 2017-11-22 RX ADMIN — FLUCONAZOLE 200 MG: 2 INJECTION, SOLUTION INTRAVENOUS at 09:11

## 2017-11-22 RX ADMIN — ACYCLOVIR SODIUM 710 MG: 50 INJECTION, SOLUTION INTRAVENOUS at 02:11

## 2017-11-22 RX ADMIN — VASOPRESSIN 0.04 UNITS/MIN: 20 INJECTION INTRAVENOUS at 03:11

## 2017-11-22 RX ADMIN — ALBUMIN (HUMAN) 500 ML: 12.5 SOLUTION INTRAVENOUS at 02:11

## 2017-11-22 RX ADMIN — CEFTRIAXONE SODIUM 2 G: 2 INJECTION, POWDER, FOR SOLUTION INTRAMUSCULAR; INTRAVENOUS at 05:11

## 2017-11-22 RX ADMIN — Medication 1 MG: at 01:11

## 2017-11-22 RX ADMIN — Medication 25 MCG/HR: at 12:11

## 2017-11-22 RX ADMIN — Medication 0.1 MCG/KG/MIN: at 01:11

## 2017-11-22 RX ADMIN — METRONIDAZOLE 500 MG: 500 INJECTION, SOLUTION INTRAVENOUS at 09:11

## 2017-11-22 RX ADMIN — SODIUM CHLORIDE, SODIUM LACTATE, POTASSIUM CHLORIDE, AND CALCIUM CHLORIDE: .6; .31; .03; .02 INJECTION, SOLUTION INTRAVENOUS at 12:11

## 2017-11-22 RX ADMIN — Medication: at 02:11

## 2017-11-22 RX ADMIN — SODIUM CHLORIDE, SODIUM LACTATE, POTASSIUM CHLORIDE, AND CALCIUM CHLORIDE 1000 ML: .6; .31; .03; .02 INJECTION, SOLUTION INTRAVENOUS at 02:11

## 2017-11-22 RX ADMIN — SODIUM CHLORIDE, SODIUM LACTATE, POTASSIUM CHLORIDE, AND CALCIUM CHLORIDE 1000 ML: .6; .31; .03; .02 INJECTION, SOLUTION INTRAVENOUS at 04:11

## 2017-11-22 RX ADMIN — MAGNESIUM SULFATE IN WATER 2 G: 40 INJECTION, SOLUTION INTRAVENOUS at 03:11

## 2017-11-22 NOTE — CONSULTS
History & Physical  Surgery      SUBJECTIVE:     Chief Complaint/Reason for Admission: free intraperitoneal air    History of Present Illness: Cristo Jefferson is a 57 y.o. 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  Pt then received adjuvant chemo/XRT with last treatment in March 2017.  He states he developed abdominal pain with associated N/V Saturday.  Pain has steadily worsened since then.  Minimal PO intake.  Hypotensive on arrival to [facility] ED.  Improved with 2L NS administration.  Creatinine 3.1.  Lactate 2.7.  CT abdomen/pelvis with intraabdominal free air.      Current Facility-Administered Medications on File Prior to Encounter   Medication    [COMPLETED] fentaNYL injection 75 mcg    [COMPLETED] fentaNYL injection 75 mcg    [COMPLETED] omnipaque 300 iohexol 30 mL    [COMPLETED] ondansetron injection 4 mg    [COMPLETED] ondansetron injection 4 mg    [COMPLETED] piperacillin-tazobactam 3.375 g in dextrose 5 % 50 mL IVPB (ready to mix system)    [COMPLETED] sodium chloride 0.9% bolus 2,000 mL     Current Outpatient Prescriptions on File Prior to Encounter   Medication Sig    acyclovir (ZOVIRAX) 400 MG tablet Take 1 tablet (400 mg total) by mouth 5 (five) times daily.    aspirin 81 MG Chew Take 81 mg by mouth once daily.    atorvastatin (LIPITOR) 10 MG tablet TAKE 1 TABLET BY MOUTH EVERY DAY    citalopram (CELEXA) 40 MG tablet TAKE 1 TABLET BY MOUTH ONCE DAILY    clopidogrel (PLAVIX) 75 mg tablet TAKE 1 TABLET BY MOUTH EVERY DAY    co-enzyme Q-10 30 mg capsule Take 300 mg by mouth once daily.    fish oil-omega-3 fatty acids 300-1,000 mg capsule Take 2 g by mouth once daily.    gabapentin (NEURONTIN) 100 MG capsule Take 1 capsule (100 mg total) by mouth 3 (three) times daily.    hydrOXYzine pamoate (VISTARIL) 25 MG Cap Take 1 capsule (25 mg total) by mouth every 8 (eight) hours as needed.    losartan-hydrochlorothiazide 100-12.5 mg (HYZAAR) 100-12.5 mg Tab TAKE 1  TABLET BY MOUTH EVERY DAY    meloxicam (MOBIC) 15 MG tablet TK 1 T PO ONCE D    metformin (GLUCOPHAGE-XR) 750 MG 24 hr tablet TAKE 1 TABLET BY MOUTH TWICE DAILY    metoprolol tartrate (LOPRESSOR) 25 MG tablet TAKE 1 TABLET BY MOUTH TWICE DAILY    MULTIVIT WITH MINERALS/LUTEIN (MULTIVITAMIN 50 PLUS ORAL) Take by mouth.    predniSONE (DELTASONE) 5 MG tablet     vitamin D 1000 units Tab Take 2,000 Units by mouth once daily.       Review of patient's allergies indicates:   Allergen Reactions    Morphine        Past Medical History:   Diagnosis Date    Arthritis     Cancer 2016    appendix, completed chemo    Depression     Diabetes mellitus type I     Hyperlipidemia     Hypertension      Past Surgical History:   Procedure Laterality Date    APPENDECTOMY      open heart       RIGHT COLECTOMY  2016     Family History   Problem Relation Age of Onset    Heart disease Mother     Heart disease Father     Cancer Father      prostate    Cancer Sister      breast , lymphoma     Social History   Substance Use Topics    Smoking status: Current Some Day Smoker     Types: Cigarettes, Pipe    Smokeless tobacco: Current User    Alcohol use No        Review of Systems   Constitutional: Positive for fatigue. Negative for chills and fever.   HENT: Negative for congestion and rhinorrhea.    Eyes: Negative for visual disturbance.   Respiratory: Negative for cough and shortness of breath.    Cardiovascular: Negative for chest pain.   Gastrointestinal: Positive for abdominal distention, abdominal pain, nausea and vomiting. Negative for constipation and diarrhea.   Endocrine: Negative for polyuria.   Genitourinary: Negative for dysuria.   Musculoskeletal: Negative for arthralgias and myalgias.   Skin: Negative for wound.   Neurological: Negative for seizures, weakness, light-headedness and headaches.   Psychiatric/Behavioral: Negative for agitation.     OBJECTIVE:     Vital Signs (Most Recent)  Temp: 99.3 °F (37.4 °C)  (11/21/17 2038)  Pulse: 99 (11/21/17 1946)  Resp: 20 (11/21/17 1916)  BP: (!) 111/58 (11/21/17 1946)  SpO2: (!) 87 % (11/21/17 1946)    Physical Exam   Constitutional: He is oriented to person, place, and time. He appears well-developed and well-nourished. No distress.   HENT:   Head: Normocephalic and atraumatic.   Eyes: Conjunctivae and EOM are normal. Pupils are equal, round, and reactive to light.   Neck: Normal range of motion. Neck supple. No thyromegaly present.   Cardiovascular: Normal rate, regular rhythm and normal heart sounds.    Pulmonary/Chest: Effort normal and breath sounds normal. No respiratory distress. He has no wheezes.   Abdominal: Soft. He exhibits distension. There is tenderness. There is guarding.   Diffuse abdominal TTP.  Well healed RLQ surgical scar.  Decreased bowel sounds.   Musculoskeletal: Normal range of motion. He exhibits no edema.   Neurological: He is alert and oriented to person, place, and time.   Skin: Skin is warm and dry.   Psychiatric: He has a normal mood and affect.     Laboratory  CBC:   Recent Labs  Lab 11/21/17  1440   WBC 9.00   RBC 5.18   HGB 14.2   HCT 43.1   *   MCV 83   MCH 27.3   MCHC 32.9     CMP:   Recent Labs  Lab 11/21/17  1440   *   CALCIUM 9.2   ALBUMIN 3.4*   PROT 8.6*   *   K 4.4   CO2 26   CL 94*   BUN 48*   CREATININE 3.1*   ALKPHOS 72   ALT 31   AST 28   BILITOT 1.1     Coagulation:   Recent Labs  Lab 11/21/17  1532   LABPROT 10.7   INR 1.1   APTT 29.9       Diagnostic Results:  CT: Reviewed  Free intraperitoneal air  ASSESSMENT/PLAN:     A/P:  56 yo M with h/o R appendiceal cancer s/p R hemicolectomy and adjuvant chemo/XRT now with free intraperitoneal air likely 2/2 bowel obstruction with perforation    Admit to General Surgery, Dr. Riley  NPO, IV fluids  Rocephin/Flagyl  To OR for ex-lap tonight  The procedure was described in detail to the patient and all questions were answered.  The risks and benefits of the procedure were  discussed and the patient wishes to proceed with surgery.    Joaquín Means  General Surgery, PGY-5  Pager # [phone]

## 2017-11-22 NOTE — PLAN OF CARE
Pt afebrile throughout shift. Pt extubated this AM at 1145 to bipap and pt is currently on 5L n/c with O2 sats=91%. Pt on dilaudid PCA for pain control. Vaso and levo weaned off. Pt received a total of 1L NS bolus for low UO and pressure. Pt free from fall or injury throughout shift. Pt refused bath multiple times throughout shift. Pt and spouse updated on current condition and plan of care. All questions answered with verbalization of understanding.    Skin note: pt seen by derm for shingles. Pt refused turns after education multiple times throughout shift. SCDs applied to pt.

## 2017-11-22 NOTE — PLAN OF CARE
Problem: Patient Care Overview  Goal: Plan of Care Review  Reviewed plan of care with patient and wife. Pt on vent settings: AC 80% FiO2 and 9 of PEEP. Awake and alert, following commands. Pt nods head appropriately. Levo titrated for Map >65. Vaso infusing at 0.04 units/hr. Fentanyl infusing at 50mcg/hr and propofol infusing at 30 mcg/min. LR infusing at 125 mL/hr. Pt received 4L NS and 1L albumin. UO adequate. No new skin breakdown noted. VSS at this time, will continue to monitor.

## 2017-11-22 NOTE — BRIEF OP NOTE
[facility]  Brief Operative Note    SUMMARY     Surgery Date: 11/21/2017     Surgeon(s) and Role:     * Eber Riley MD - Primary     * Reynold Means MD - Resident - Assisting    Pre-op Diagnosis:  Free intraperitoneal air [K66.8]    Post-op Diagnosis:  Post-Op Diagnosis Codes:     * Free intraperitoneal air [K66.8]    Procedure(s) (LRB):  EXPLORATORY-LAPAROTOMY (N/A)  INCISION AND DRAINAGE (I & D)- Intra-Abdominal abcess (N/A)  INCISION AND DRAINAGE (I & D)-Abdominal-Pelvic Abcess (N/A)  Loop Ileostomy (N/A)  INSERTION-TUBE-GASTROSTOMY (Right)    Anesthesia: General    Description of the findings of the procedure: pelvic and interloop abscesses drained.  Small bowel run from ligament of Treitz to ileocolic anastomosis with no abnormalities noted.  Sigmoid colon found to be inflamed and indurated but no definitive perforation or ongoing leak of enteric contents noted.  Diverting loop ileostomy performed.  Two drains placed in the pelvis.  Tube gastrostomy inserted.    Estimated Blood Loss: 150cc         Specimens:   Specimen (12h ago through future)    None

## 2017-11-22 NOTE — ANESTHESIA PREPROCEDURE EVALUATION
11/21/2017  Cristo Jefferson is a 57 y.o., male with Pmhx of HTN, HLD, DM, CAD s/p CABG, depression appendiceal cancer s/p hemicolectomy and chemo found to have perforation who presents for:    Pre-operative evaluation for Procedure(s) (LRB):  EXPLORATORY-LAPAROTOMY (N/A)    NPO since 2pm liquid and yesterday for solids      Diagnostic Studies:  11/21/17 CXR  FINDINGS: Comparison to the previous October 2016 exam.  The heart size is normal .  Port-A-Cath is redemonstrated on the left..  Normal mediastinum.  No pulmonary infiltrate, pleural effusion, congestion or other abnormality.    No  significant bony findings.    CT abdomen pelvis pending    11/21/17 EKG:  Normal sinus rhythm  Possible Left atrial enlargement  Right bundle branch block    Access:   NG tube in R nares       Peripheral IV - Single Lumen 11/21/17 1440 Right Antecubital (Active)   Site Assessment Clean;Dry;Intact;No redness;No swelling 11/21/2017  3:17 PM   Line Status Blood return noted;Flushed 11/21/2017  3:17 PM   Dressing Status Clean;Dry;Intact 11/21/2017  3:17 PM   Dressing Intervention New dressing 11/21/2017  3:17 PM   Number of days: 0            Peripheral IV - Single Lumen 11/21/17 1440 Left Hand (Active)   Site Assessment Clean;Dry;Intact;No redness;No swelling 11/21/2017  3:20 PM   Line Status No blood return;Flushed 11/21/2017  3:20 PM   Dressing Status Clean;Dry;Intact 11/21/2017  3:20 PM   Dressing Intervention New dressing 11/21/2017  3:20 PM   Number of days: 0       Patient Active Problem List   Diagnosis    Non-insulin dependent type 2 diabetes mellitus    Essential hypertension    Major depressive disorder    Coronary artery disease involving autologous vein coronary bypass graft without angina pectoris    Hypercholesteremia    Cancer of appendix       Review of patient's allergies indicates:   Allergen Reactions     Morphine         No current facility-administered medications on file prior to encounter.      Current Outpatient Prescriptions on File Prior to Encounter   Medication Sig Dispense Refill    acyclovir (ZOVIRAX) 400 MG tablet Take 1 tablet (400 mg total) by mouth 5 (five) times daily. 50 tablet 1    aspirin 81 MG Chew Take 81 mg by mouth once daily.      atorvastatin (LIPITOR) 10 MG tablet TAKE 1 TABLET BY MOUTH EVERY DAY 90 tablet 0    citalopram (CELEXA) 40 MG tablet TAKE 1 TABLET BY MOUTH ONCE DAILY 90 tablet 0    clopidogrel (PLAVIX) 75 mg tablet TAKE 1 TABLET BY MOUTH EVERY DAY 90 tablet 0    co-enzyme Q-10 30 mg capsule Take 300 mg by mouth once daily.      fish oil-omega-3 fatty acids 300-1,000 mg capsule Take 2 g by mouth once daily.      gabapentin (NEURONTIN) 100 MG capsule Take 1 capsule (100 mg total) by mouth 3 (three) times daily. 90 capsule 2    hydrOXYzine pamoate (VISTARIL) 25 MG Cap Take 1 capsule (25 mg total) by mouth every 8 (eight) hours as needed. 40 capsule 0    losartan-hydrochlorothiazide 100-12.5 mg (HYZAAR) 100-12.5 mg Tab TAKE 1 TABLET BY MOUTH EVERY DAY 90 tablet 0    meloxicam (MOBIC) 15 MG tablet TK 1 T PO ONCE D  1    metformin (GLUCOPHAGE-XR) 750 MG 24 hr tablet TAKE 1 TABLET BY MOUTH TWICE DAILY 180 tablet 0    metoprolol tartrate (LOPRESSOR) 25 MG tablet TAKE 1 TABLET BY MOUTH TWICE DAILY 180 tablet 0    MULTIVIT WITH MINERALS/LUTEIN (MULTIVITAMIN 50 PLUS ORAL) Take by mouth.      predniSONE (DELTASONE) 5 MG tablet       vitamin D 1000 units Tab Take 2,000 Units by mouth once daily.         Past Surgical History:   Procedure Laterality Date    APPENDECTOMY      open heart       RIGHT COLECTOMY  2016       Social History     Social History    Marital status:      Spouse name: N/A    Number of children: N/A    Years of education: N/A     Occupational History    Not on file.     Social History Main Topics    Smoking status: Current Some Day Smoker      Types: Cigarettes, Pipe    Smokeless tobacco: Current User    Alcohol use No    Drug use: No    Sexual activity: Yes     Partners: Female     Birth control/ protection: None     Other Topics Concern    Not on file     Social History Narrative    No narrative on file         Vital Signs Range (Last 24H):  Temp:  [36.8 °C (98.2 °F)-37 °C (98.6 °F)]   Pulse:  []   Resp:  [18-34]   BP: ()/(35-69)   SpO2:  [87 %-99 %]       CBC:   Recent Labs      11/21/17   1440   WBC  9.00   RBC  5.18   HGB  14.2   HCT  43.1   PLT  139*   MCV  83   MCH  27.3   MCHC  32.9       CMP:   Recent Labs      11/21/17   1440  11/21/17   1532   NA  134*   --    K  4.4   --    CL  94*   --    CO2  26   --    BUN  48*   --    CREATININE  3.1*   --    GLU  126*   --    MG   --   2.6*   PHOS   --   6.6*   CALCIUM  9.2   --    ALBUMIN  3.4*   --    PROT  8.6*   --    ALKPHOS  72   --    ALT  31   --    AST  28   --    BILITOT  1.1   --        INR  Recent Labs      11/21/17   1532   INR  1.1   APTT  29.9       Anesthesia Evaluation    I have reviewed the Patient Summary Reports.     I have reviewed the Medications.     Review of Systems  Anesthesia Hx:  No problems with previous Anesthesia Denies Hx of Anesthetic complications  History of prior surgery of interest to airway management or planning:  Denies Personal Hx of Anesthesia complications.   Social:  Non-Smoker, No Alcohol Use    Hematology/Oncology:  Hematology Normal   Oncology Normal     EENT/Dental:EENT/Dental Normal   Cardiovascular:   Exercise tolerance: good Hypertension CAD  CABG/stent  ECG has been reviewed.    Pulmonary:  Pulmonary Normal    Renal/:  Renal/ Normal     Hepatic/GI:  Hepatic/GI Normal    Musculoskeletal:  Musculoskeletal Normal    Neurological:  Neurology Normal    Endocrine:   Diabetes    Psych:   Psychiatric History          Physical Exam  General:  Well nourished    Airway/Jaw/Neck:  Airway Findings: Mouth Opening: Normal Tongue: Normal  General  Airway Assessment: Adult  Oropharynx Findings: Normal Mallampati: II  TM Distance: Normal, at least 6 cm  Jaw/Neck Findings:  Neck ROM: Normal ROM  Neck Findings: Normal    Eyes/Ears/Nose:  EYES/EARS/NOSE FINDINGS: Normal   Dental:  Dental Findings: Periodontal disease, Severe   Chest/Lungs:  Chest/Lungs Findings: Normal Respiratory Rate     Heart/Vascular:  Heart Findings: Rate: Normal        Mental Status:  Mental Status Findings:  Cooperative, Alert and Oriented         Anesthesia Plan  Type of Anesthesia, risks & benefits discussed:  Anesthesia Type:  general, MAC  Patient's Preference:   Intra-op Monitoring Plan: standard ASA monitors  Intra-op Monitoring Plan Comments:   Post Op Pain Control Plan: per primary service following discharge from PACU  Post Op Pain Control Plan Comments:   Induction:   IV  Beta Blocker:  Patient is on a Beta-Blocker and has received one dose within the past 24 hours (No further documentation required).       Informed Consent: Patient understands risks and agrees with Anesthesia plan.  Questions answered. Anesthesia consent signed with patient.  ASA Score: 3  emergent   Day of Surgery Review of History & Physical:    H&P update referred to the surgeon.         Ready For Surgery From Anesthesia Perspective.

## 2017-11-22 NOTE — H&P
History & Physical  Surgical Intensive Care    SUBJECTIVE:     Chief Complaint/Reason for Admission: Pneumoperitoneum     POD 0 s/p ex lap     History of Present Illness:  Patient is a 57 y.o. male with PMHx DM II, HTN, CAD s/p CABG and appendiceal CA s/p appendectomy and subsequent R hemicolectomy and adjuvant chemo/XRT with last treatment in March 2017 who [place] [facility] [place] with complains of abdominal pain found to be hypotensive on arrival with a Creatinine 3.1,  Lactate 2.7 and  CT abdomen/pelvis which demonstrated intraabdominal free air.    Patient was taken to OR for emergent exlap. Received 2 L crystalloid and 500 albumin . Required bumps intermittently of vasopressin and phenylephrine but did not require any gtts intraop.    Patient arrived to SICU intubated and sedated.     Intraop findings significant for no obvious sign of perforation but brown liquid throughout. Abdomen washed out and loop ileostomy brought up in RLQ. 2 drains left and midline wound vac placed. NGT unable to be passed; surgical G tube done intraop.    PTA Medications   Medication Sig    acyclovir (ZOVIRAX) 400 MG tablet Take 1 tablet (400 mg total) by mouth 5 (five) times daily.    aspirin 81 MG Chew Take 81 mg by mouth once daily.    atorvastatin (LIPITOR) 10 MG tablet TAKE 1 TABLET BY MOUTH EVERY DAY    citalopram (CELEXA) 40 MG tablet TAKE 1 TABLET BY MOUTH ONCE DAILY    clopidogrel (PLAVIX) 75 mg tablet TAKE 1 TABLET BY MOUTH EVERY DAY    co-enzyme Q-10 30 mg capsule Take 300 mg by mouth once daily.    fish oil-omega-3 fatty acids 300-1,000 mg capsule Take 2 g by mouth once daily.    gabapentin (NEURONTIN) 100 MG capsule Take 1 capsule (100 mg total) by mouth 3 (three) times daily.    hydrOXYzine pamoate (VISTARIL) 25 MG Cap Take 1 capsule (25 mg total) by mouth every 8 (eight) hours as needed.    losartan-hydrochlorothiazide 100-12.5 mg (HYZAAR) 100-12.5 mg Tab TAKE 1 TABLET BY MOUTH EVERY DAY    meloxicam  (MOBIC) 15 MG tablet TK 1 T PO ONCE D    metformin (GLUCOPHAGE-XR) 750 MG 24 hr tablet TAKE 1 TABLET BY MOUTH TWICE DAILY    metoprolol tartrate (LOPRESSOR) 25 MG tablet TAKE 1 TABLET BY MOUTH TWICE DAILY    MULTIVIT WITH MINERALS/LUTEIN (MULTIVITAMIN 50 PLUS ORAL) Take by mouth.    predniSONE (DELTASONE) 5 MG tablet     vitamin D 1000 units Tab Take 2,000 Units by mouth once daily.       Review of patient's allergies indicates:   Allergen Reactions    Morphine        Past Medical History:   Diagnosis Date    Arthritis     Cancer 2016    appendix, completed chemo    Depression     Diabetes mellitus type I     Hyperlipidemia     Hypertension      Past Surgical History:   Procedure Laterality Date    APPENDECTOMY      open heart       RIGHT COLECTOMY  2016     Family History   Problem Relation Age of Onset    Heart disease Mother     Heart disease Father     Cancer Father      prostate    Cancer Sister      breast , lymphoma     Social History   Substance Use Topics    Smoking status: Current Some Day Smoker     Types: Cigarettes, Pipe    Smokeless tobacco: Current User    Alcohol use No        Review of Systems:  Review of systems not obtained due to patient factors patient intubated and sedated.    OBJECTIVE:     Vital Signs (Most Recent)  Temp: 99.3 °F (37.4 °C) (11/21/17 2038)  Pulse: 99 (11/21/17 1946)  Resp: 20 (11/21/17 1916)  BP: (!) 111/58 (11/21/17 1946)  SpO2: (!) 87 % (11/21/17 1946)  Ventilator Data (Last 24H):          Hemodynamic Parameters (Last 24H):       Physical Exam:  General: intubated and sedated   HENT: NC/AT.Conjunctivae/corneas clear. PERRL, ETT in place; NGT  Neck: no adenopathy, no carotid bruit, no JVD  Back: symmetric, no curvature. ROM normal. No CVA tenderness.  Lungs: ventilatory breath sounds  CV: RRR, S1 and S2 Normal. No chest wall tenderness  Abdomen: drains x 2 with brown output; abdominal wound vac in place; ostomy pink  Extremities: WWP, intact distal  pulses. no cyanosis or edema  Skin: Skin color, texture, turgor normal. No rashes or lesions  Lymph nodes: Cervical, supraclavicular, and axillary nodes normal.  Neurologic: intubated and sedated       Lines/Drains:       Peripheral IV - Single Lumen 11/21/17 1440 Right Antecubital (Active)   Site Assessment Clean;Dry;Intact;No redness;No swelling 11/21/2017  3:17 PM   Line Status Blood return noted;Flushed 11/21/2017  3:17 PM   Dressing Status Clean;Dry;Intact 11/21/2017  3:17 PM   Dressing Intervention New dressing 11/21/2017  3:17 PM   Number of days: 0            Peripheral IV - Single Lumen 11/21/17 1440 Left Hand (Active)   Site Assessment Clean;Dry;Intact;No redness;No swelling 11/21/2017  3:20 PM   Line Status No blood return;Flushed 11/21/2017  3:20 PM   Dressing Status Clean;Dry;Intact 11/21/2017  3:20 PM   Dressing Intervention New dressing 11/21/2017  3:20 PM   Number of days: 0       Laboratory  Lab Results   Component Value Date    WBC 9.00 11/21/2017    HGB 14.2 11/21/2017    HCT 43.1 11/21/2017    MCV 83 11/21/2017     (L) 11/21/2017     CMP  Sodium   Date Value Ref Range Status   11/21/2017 134 (L) 136 - 144 mmol/L Final     Potassium   Date Value Ref Range Status   11/21/2017 4.4 3.6 - 5.1 mmol/L Final     Chloride   Date Value Ref Range Status   11/21/2017 94 (L) 101 - 111 mmol/L Final     CO2   Date Value Ref Range Status   11/21/2017 26 21 - 27 mmol/L Final     Glucose   Date Value Ref Range Status   11/21/2017 126 (H) 74 - 118 mg/dL Final     BUN, Bld   Date Value Ref Range Status   11/21/2017 48 (H) 8 - 26 mg/dL Final     Creatinine   Date Value Ref Range Status   11/21/2017 3.1 (H) 0.6 - 1.2 mg/dL Final     Calcium   Date Value Ref Range Status   11/21/2017 9.2 8.6 - 10.0 mg/dL Final     Total Protein   Date Value Ref Range Status   11/21/2017 8.6 (H) 6.5 - 8.1 g/dL Final     Albumin   Date Value Ref Range Status   11/21/2017 3.4 (L) 3.5 - 5.0 g/dL Final     Total Bilirubin   Date  Value Ref Range Status   11/21/2017 1.1 mg/dL Final     Comment:     For infants and newborns, interpretation of results should be based  on gestational age, weight and in agreement with clinical  observations.  Premature Infant recommended reference ranges:  Up to 24 hours.............<8.0 mg/dL  Up to 48 hours............<12.0 mg/dL  3-5 days..................<15.0 mg/dL  6-29 days.................<15.0 mg/dL       Alkaline Phosphatase   Date Value Ref Range Status   11/21/2017 72 38 - 126 U/L Final     AST   Date Value Ref Range Status   11/21/2017 28 15 - 41 U/L Final     ALT   Date Value Ref Range Status   11/21/2017 31 17 - 63 U/L Final     Anion Gap   Date Value Ref Range Status   11/21/2017 14 8 - 16 mmol/L Final     eGFR if    Date Value Ref Range Status   11/21/2017 24.1 (A) >60 mL/min/1.73 m^2 Final     eGFR if non    Date Value Ref Range Status   11/21/2017 20.9 (A) >60 mL/min/1.73 m^2 Final     Comment:     Calculation used to obtain the estimated glomerular filtration  rate (eGFR) is the CKD-EPI equation.        ABG  No results for input(s): PH, PO2, PCO2, HCO3, BE in the last 168 hours.      Chest X-Ray:  11/21  Comparison to the previous October 2016 exam.  The heart size is normal .  Port-A-Cath is redemonstrated on the left..  Normal mediastinum.  No pulmonary infiltrate, pleural effusion, congestion or other abnormality.    No  significant bony findings.    Diagnostic Results:  CT Abd/Pel 11/21-intraperitoneal free air     ASSESSMENT/PLAN:     Plan:  Neuro:   - intubated and sedated  - sedation vacations for neuro assessment    Pulmonary:   - intubated  Oxygen Concentration (%):  [100] 100  Resp Rate Total:  [16 br/min] 16 br/min      Recent Labs  Lab 11/22/17  0007   PH 7.227*   PCO2 43.4   PO2 187*   HCO3 18.0*   POCSATURATED 99   BE -10       - wean vent as tolerated; sbt in AM if requirements minimal  - VAP ppx      Cardiac:  - hx of CABG; home meds asa. plavix  and metoprolol   -defer restart of asa and plavix to sx  - requiring vaso and levo on and off while fluid resuscitation underway  - no Echo in system; will obtain in AM   - flotrac and cvp monitoring    -cvp 0   -CI 4.2    Renal:   - BUN/Cr 48/3.1 on arrival; no prior labs available for baseline assessment    - post op 41/1.8  - weaver in place   - urine lytes sent, likely pre renal   - strict Is/Os     Infectious Disease:   - cont flagyl and ceftriaxone per primary team  - cxs sent  - monitor lactic acid q 4   - 2.7-->0.9    Hematology/Oncology:   - H/H 14/43 preop  - post op 12/36  - transfuse as needed     Endocrine:  - hx of DMII on oral agents   - POCT glucose     Fluids/Electrolytes/Nutrition/GI:   - mIVF 125  - replace lytes prn  - npo  - g tube to gravity     Pain Management:   Fentanyl gtt     Dispo:  Admit to sicu  Wean vent, sbt in AM  Central line  Trend lactic acid    Terra Mcnally MD  CA-1

## 2017-11-22 NOTE — H&P
General Surgery    Please see consult note    Joaquín Means  General Surgery, PGY-5  Pager # [phone]

## 2017-11-22 NOTE — HPI
57 y.o. male with PMHx RA (on immunosuppressants), DM II, HTN, CAD s/p CABG and appendiceal CA s/p appendectomy and subsequent R hemicolectomy and adjuvant chemo/XRT with last treatment in March 2017 who was [place] [facility] [place] with complains of abdominal pain found to be hypotensive on arrival with CT abdomen/pelvis demonstrating intraabdominal free air.  Patient is s/p emergent exlap. Intraop findings significant for no obvious sign of perforation but brown liquid throughout. Abdomen washed out and loop ileostomy brought up in RLQ.     Dermatology consulted for rash on R thigh and lower back. Patient's wife reports lesions started as small red flat spots that became small blisters clustered in groups. Associated symptoms including burning, pain, and itching. Rash does not cross midline. Patient was diagnosed with shingles on 11/16 by a primary care physician and given acyclovir 400 mg fives times per day, gabapentin, and hydroxyzine. Wife's patient states he has been taking medications as prescribed until yesterday due to hospitalization.   Pt has a history of chicken pox when he was younger. Denies h/o genital herpes or genital sores. He does report an occasional fever blister on his lips. He has never had shingles previously but his wife reports that he takes immunosuppressant medications for his RA.

## 2017-11-22 NOTE — PROGRESS NOTES
Pt arrived to SICU room 6027 intubated with a 7.5 ETT secured at 23 cm. Pt placed on  with the documented settings. ABG to be drawn. Will continue to monitor.

## 2017-11-22 NOTE — TRANSFER OF CARE
"Anesthesia Transfer of Care Note    Patient: Cristo Jefferson    Procedure(s) Performed: Procedure(s) (LRB):  EXPLORATORY-LAPAROTOMY (N/A)  INCISION AND DRAINAGE (I & D)- Intra-Abdominal abcess (N/A)  INCISION AND DRAINAGE (I & D)-Abdominal-Pelvic Abcess (N/A)  Loop Ileostomy (N/A)  INSERTION-TUBE-GASTROSTOMY (Right)    Patient location: ICU    Anesthesia Type: general    Transport from OR: Upon arrival to PACU/ICU, patient attached to ventilator and auscultated to confirm bilateral breath sounds and adequate TV. Transported from OR intubated on 100% O2 by AMBU with adequate controlled ventilation. Continuous ECG monitoring in transport. Continuous SpO2 monitoring in transport. Continuos invasive BP monitoring in transport    Post pain: adequate analgesia    Post assessment: no apparent anesthetic complications    Post vital signs: stable    Level of consciousness: sedated    Nausea/Vomiting: no nausea/vomiting    Complications: none    Transfer of care protocol was followed      Last vitals:   Visit Vitals  BP (!) 111/58   Pulse 99   Temp 37.4 °C (99.3 °F) (Oral)   Resp 20   Ht 5' 9" (1.753 m)   Wt 100 kg (220 lb 7.4 oz)   SpO2 (!) 87%   BMI 32.56 kg/m²     "

## 2017-11-22 NOTE — CONSULTS
[facility]  Dermatology  Consult Note    Patient Name: Cristo Jefferson  MRN: [id number]  Admission Date: 11/21/2017  Hospital Length of Stay: 1 days  Attending Physician: Eber Riley MD  Primary Care Provider: Vince Perez MD     Inpatient consult to Dermatology  Consult performed by: YOSHI REIS  Consult ordered by: WILLI BURROWS        Subjective:     Principal Problem:Free intraperitoneal air    HPI:  57 y.o. male with PMHx RA (on immunosuppressants), DM II, HTN, CAD s/p CABG and appendiceal CA s/p appendectomy and subsequent R hemicolectomy and adjuvant chemo/XRT with last treatment in March 2017 who was transferred to [facility] [place] 11/21 with complains of abdominal pain found to be hypotensive on arrival with CT abdomen/pelvis demonstrating intraabdominal free air.  Patient is s/p emergent exlap. Intraop findings significant for no obvious sign of perforation but brown liquid throughout. Abdomen washed out and loop ileostomy brought up in RLQ.     Dermatology consulted for rash on R thigh and lower back. Patient's wife reports lesions started as small red flat spots that became small blisters clustered in groups. Associated symptoms including burning, pain, and itching. Rash does not cross midline. Patient was diagnosed with shingles on 11/16 by a primary care physician and given acyclovir 400 mg fives times per day, gabapentin, and hydroxyzine. Wife's patient states he has been taking medications as prescribed until yesterday due to hospitalization.   Pt has a history of chicken pox when he was younger. Denies h/o genital herpes or genital sores. He does report an occasional fever blister on his lips. He has never had shingles previously but his wife reports that he takes immunosuppressant medications for his RA.      Past Medical History:   Diagnosis Date    Arthritis     Cancer 2016    appendix, completed chemo    Depression     Diabetes mellitus type I     Hyperlipidemia      Hypertension        Past Surgical History:   Procedure Laterality Date    APPENDECTOMY      open heart       RIGHT COLECTOMY  2016     Family History     Problem Relation (Age of Onset)    Cancer Father, Sister    Heart disease Mother, Father        Social History Main Topics    Smoking status: Current Some Day Smoker     Types: Cigarettes, Pipe    Smokeless tobacco: Current User    Alcohol use No    Drug use: No    Sexual activity: Yes     Partners: Female     Birth control/ protection: None       Review of patient's allergies indicates:   Allergen Reactions    Morphine        Medications:  Continuous Infusions:   fentanyl 100 mcg/hr (11/22/17 1200)    lactated ringers 125 mL/hr at 11/22/17 1200    norepinephrine bitartrate-D5W Stopped (11/22/17 1100)    propofol Stopped (11/22/17 1000)    vasopressin (PITRESSIN) infusion Stopped (11/22/17 1200)     Scheduled Meds:   acyclovir  400 mg Oral 5x Daily    cefTRIAXone (ROCEPHIN) IVPB  2 g Intravenous Q24H    chlorhexidine  15 mL Mouth/Throat BID    famotidine (PF)  20 mg Intravenous BID    heparin (porcine)  5,000 Units Subcutaneous Q8H    metronidazole  500 mg Intravenous Q8H    norepinephrine bitartrate-D5W        vasopressin         PRN Meds:calcium gluconate IVPB, calcium gluconate IVPB, calcium gluconate IVPB, dextrose 50%, glucagon (human recombinant), insulin aspart, magnesium sulfate IVPB, magnesium sulfate IVPB    Review of Systems   Constitutional: Negative for fever.   HENT: Negative for mouth sores.    Skin: Positive for itching and rash.     Objective:     Vital Signs (Most Recent):  Temp: 98.3 °F (36.8 °C) (11/22/17 1100)  Pulse: 90 (11/22/17 1215)  Resp: (!) 24 (11/22/17 1215)  BP: 121/71 (11/22/17 1200)  SpO2: 100 % (11/22/17 1215) Vital Signs (24h Range):  Temp:  [97.8 °F (36.6 °C)-99.3 °F (37.4 °C)] 98.3 °F (36.8 °C)  Pulse:  [] 90  Resp:  [0-34] 24  SpO2:  [87 %-100 %] 100 %  BP: ()/(35-77) 121/71  Arterial Line  BP: ()/(40-65) 119/60     Weight: 103.6 kg (228 lb 6.3 oz)  Body mass index is 33.73 kg/m².    Physical Exam   Constitutional: He appears well-developed and well-nourished. He is obese.  No distress.   Neurological: He is alert.   Skin:   Areas Examined (abnormalities noted in diagram):   Head / Face Inspection Performed  Neck Inspection Performed  Genitals / Buttocks / Groin Inspection Performed  RUE Inspected  LUE Inspection Performed  RLE Inspected  LLE Inspection Performed           Right thigh     Significant Labs: All pertinent labs within the past 24 hours have been reviewed.    Assessment/Plan:     Herpes zoster    Clinically consistent with herpes zoster  - Performed DFA for VZV and HSV  - Recommend acyclovir 800mg PO 5x/day x 7 days. CrCl >25, so does not need renal dosing. If patient is unable to tolerate PO, consider IV acyclovir.               Thank you for your consult. I will follow-up with patient. Please contact us if you have any additional questions.    Charline Rosas MD  Dermatology  [facility]

## 2017-11-22 NOTE — PROGRESS NOTES
Pt extubated per MD order. Pt extubated to Bipap 10/5 60%. Pt tolerated extubation well. Will conitnue to monitor.

## 2017-11-22 NOTE — ANESTHESIA PROCEDURE NOTES
Arterial    Diagnosis: free air    Patient location during procedure: done in OR  Procedure start time: 11/21/2017 9:49 PM  Timeout: 11/21/2017 9:49 PM  Procedure end time: 11/21/2017 9:49 PM  Staffing  Anesthesiologist: FLORES BLUNT  Performed: anesthesiologist   Anesthesiologist was present at the time of the procedure.  Preanesthetic Checklist  Completed: patient identified, site marked, surgical consent, pre-op evaluation, timeout performed, IV checked, risks and benefits discussed, monitors and equipment checked and anesthesia consent givenArterial  Skin Prep: chlorhexidine gluconate  Local Infiltration: none  Orientation: left  Location: radial  Catheter Size: 20 G  Catheter placement by Anatomical landmarks. Heme positive aspiration all ports.Insertion Attempts: 1  Assessment  Dressing: secured with tape and tegaderm  Patient: Tolerated well

## 2017-11-22 NOTE — ASSESSMENT & PLAN NOTE
Clinically consistent with herpes zoster  - Performed DFA for VZV and HSV  - Recommend acyclovir 800mg PO 5x/day x 7 days. CrCl >25, so does not need renal dosing. If patient is unable to tolerate PO, consider IV acyclovir.

## 2017-11-22 NOTE — SUBJECTIVE & OBJECTIVE
Past Medical History:   Diagnosis Date    Arthritis     Cancer 2016    appendix, completed chemo    Depression     Diabetes mellitus type I     Hyperlipidemia     Hypertension        Past Surgical History:   Procedure Laterality Date    APPENDECTOMY      open heart       RIGHT COLECTOMY  2016     Family History     Problem Relation (Age of Onset)    Cancer Father, Sister    Heart disease Mother, Father        Social History Main Topics    Smoking status: Current Some Day Smoker     Types: Cigarettes, Pipe    Smokeless tobacco: Current User    Alcohol use No    Drug use: No    Sexual activity: Yes     Partners: Female     Birth control/ protection: None       Review of patient's allergies indicates:   Allergen Reactions    Morphine        Medications:  Continuous Infusions:   fentanyl 100 mcg/hr (11/22/17 1200)    lactated ringers 125 mL/hr at 11/22/17 1200    norepinephrine bitartrate-D5W Stopped (11/22/17 1100)    propofol Stopped (11/22/17 1000)    vasopressin (PITRESSIN) infusion Stopped (11/22/17 1200)     Scheduled Meds:   acyclovir  400 mg Oral 5x Daily    cefTRIAXone (ROCEPHIN) IVPB  2 g Intravenous Q24H    chlorhexidine  15 mL Mouth/Throat BID    famotidine (PF)  20 mg Intravenous BID    heparin (porcine)  5,000 Units Subcutaneous Q8H    metronidazole  500 mg Intravenous Q8H    norepinephrine bitartrate-D5W        vasopressin         PRN Meds:calcium gluconate IVPB, calcium gluconate IVPB, calcium gluconate IVPB, dextrose 50%, glucagon (human recombinant), insulin aspart, magnesium sulfate IVPB, magnesium sulfate IVPB    Review of Systems   Constitutional: Negative for fever.   HENT: Negative for mouth sores.    Skin: Positive for itching and rash.     Objective:     Vital Signs (Most Recent):  Temp: 98.3 °F (36.8 °C) (11/22/17 1100)  Pulse: 90 (11/22/17 1215)  Resp: (!) 24 (11/22/17 1215)  BP: 121/71 (11/22/17 1200)  SpO2: 100 % (11/22/17 1215) Vital Signs (24h Range):  Temp:   [97.8 °F (36.6 °C)-99.3 °F (37.4 °C)] 98.3 °F (36.8 °C)  Pulse:  [] 90  Resp:  [0-34] 24  SpO2:  [87 %-100 %] 100 %  BP: ()/(35-77) 121/71  Arterial Line BP: ()/(40-65) 119/60     Weight: 103.6 kg (228 lb 6.3 oz)  Body mass index is 33.73 kg/m².    Physical Exam   Constitutional: He appears well-developed and well-nourished. He is obese.  No distress.   Neurological: He is alert.   Skin:   Areas Examined (abnormalities noted in diagram):   Head / Face Inspection Performed  Neck Inspection Performed  Genitals / Buttocks / Groin Inspection Performed  RUE Inspected  LUE Inspection Performed  RLE Inspected  LLE Inspection Performed           Right thigh     Significant Labs: All pertinent labs within the past 24 hours have been reviewed.

## 2017-11-22 NOTE — ANESTHESIA POSTPROCEDURE EVALUATION
"Anesthesia Post Evaluation    Patient: rCisto Jefferson    Procedure(s) Performed: Procedure(s) (LRB):  EXPLORATORY-LAPAROTOMY (N/A)  INCISION AND DRAINAGE (I & D)- Intra-Abdominal abcess (N/A)  INCISION AND DRAINAGE (I & D)-Abdominal-Pelvic Abcess (N/A)  Loop Ileostomy (N/A)  INSERTION-TUBE-GASTROSTOMY (Right)    Final Anesthesia Type: general  Patient location during evaluation: ICU  Patient participation: No - Unable to Participate, Intubation  Level of consciousness: sedated  Post-procedure vital signs: reviewed and stable  Pain management: adequate  Airway patency: patent  PONV status at discharge: No PONV  Anesthetic complications: no      Cardiovascular status: blood pressure returned to baseline  Respiratory status: ETT and intubated  Hydration status: euvolemic  Follow-up not needed.        Visit Vitals  BP (!) 111/58   Pulse 99   Temp 37.4 °C (99.3 °F) (Oral)   Resp 20   Ht 5' 9" (1.753 m)   Wt 100 kg (220 lb 7.4 oz)   SpO2 (!) 87%   BMI 32.56 kg/m²       Pain/Rebecca Score: Pain Rating Prior to Med Admin: 10 (11/21/2017  8:44 PM)      "

## 2017-11-22 NOTE — ED PROVIDER NOTES
Encounter Date: 11/21/2017    SCRIBE #1 NOTE: I, Sahara Sandra, am scribing for, and in the presence of,  Dr. Gregory. I have scribed the following portions of the note - the Resident attestation.       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 The pain became severe periumbilical. Patient presented to OSH and was found to have perforation in bowel. Patient transferred for surgery consultation at [facility]. He has had labile blood pressures, s/p 2 L NS, has received Zosyn first dose, NPO with NG tube to suction. Currently he complains of pain and nausea. No active emesis at this time. Furthermore, reports subjective fevers during this last week.           Review of patient's allergies indicates:   Allergen Reactions    Morphine      Past Medical History:   Diagnosis Date    Arthritis     Cancer 2016    appendix, completed chemo    Depression     Diabetes mellitus type I     Hyperlipidemia     Hypertension      Past Surgical History:   Procedure Laterality Date    APPENDECTOMY      open heart       RIGHT COLECTOMY  2016     Family History   Problem Relation Age of Onset    Heart disease Mother     Heart disease Father     Cancer Father      prostate    Cancer Sister      breast , lymphoma     Social History   Substance Use Topics    Smoking status: Current Some Day Smoker     Types: Cigarettes, Pipe    Smokeless tobacco: Current User    Alcohol use No     Review of Systems   Constitutional: Positive for appetite change, chills and fever.   HENT: Negative for congestion, dental problem, facial swelling, hearing loss and trouble swallowing.    Eyes: Negative.    Respiratory: Positive for shortness of breath. Negative for cough and chest  tightness.    Cardiovascular: Negative for chest pain and leg swelling.   Gastrointestinal: Positive for abdominal distention, abdominal pain, diarrhea, nausea and vomiting.   Genitourinary: Negative.    Neurological: Negative.    Psychiatric/Behavioral: Negative for agitation, behavioral problems and confusion.       Physical Exam     Initial Vitals [11/21/17 1916]   BP Pulse Resp Temp SpO2   (!) 116/57 100 20 98.2 °F (36.8 °C) 98 %      MAP       76.67         Physical Exam    Nursing note and vitals reviewed.  Constitutional: He appears well-developed and well-nourished. He appears distressed.   Uncomfortable, sick appearing   HENT:   Head: Normocephalic and atraumatic.   Eyes: EOM are normal. Pupils are equal, round, and reactive to light. Right eye exhibits no discharge. Left eye exhibits no discharge.   Neck: Normal range of motion. Neck supple.   Cardiovascular: Normal rate, regular rhythm and normal heart sounds. Exam reveals no friction rub.    No murmur heard.  Pulmonary/Chest: Breath sounds normal. No respiratory distress. He has no wheezes. He has no rales.   Abdominal: He exhibits distension. There is tenderness. There is rebound and guarding.   Musculoskeletal: Normal range of motion. He exhibits no edema or tenderness.   Neurological: He is alert and oriented to person, place, and time. He has normal strength. No cranial nerve deficit or sensory deficit.   Skin: Skin is warm. Capillary refill takes less than 2 seconds. No abscess noted. No erythema.   Psychiatric: He has a normal mood and affect.         ED Course   Procedures  Labs Reviewed - No data to display          Medical Decision Making:   History:   Old Medical Records: I decided to obtain old medical records.       APC / Resident Notes:   [place] MDM;  57 year old M with sig Pmhx of appendiceal abdominal cancer, HTN, C AD with stent placement who presents from OSH with worsening abdominal pain and found to have abdominal  perforation.  Emergently evaluated in the ER due to hypotension and continued pain.    Concern for: Progressing intra-abdominal leak, perforation, abscess formation, hemorrhagic vs more likely septic shock.    Plan: We have consulted surgery emergently. Further IV fluids bolus 1L LR given with improvement. NG to intermittent suction placed. We have increased abx coverage with metronidazole at this time.    Gregory Myers MD  [employer] EM PGY-4  7:50 PM    HOIV Update:  Surgery will admit to operating room directly.          Scribe Attestation:   Scribe #1: I performed the above scribed service and the documentation accurately describes the services I performed. I attest to the accuracy of the note.    Attending Attestation:   Physician Attestation Statement for Resident:  As the supervising MD   Physician Attestation Statement: I have personally seen and examined this patient.   I agree with the above history. -: 57 year old patient presented   As the supervising MD I agree with the above PE.   -: On my interview SBP 80, Hr110 saO2 91%RA     in moderate distress, secondary to pain.  HEENT; NG Tube right nare  Cardio; Tachycardic  Abdomen distended    As the supervising MD I agree with the above treatment, course, plan, and disposition.   -: Labs and imaging studies independently reviewed and interpreted: Nl wbc of 9, elevated creatinine 3.0.  CT abd with free air under diaphragm.    Surgery was consulted. Abx and Morphine were ordered. Further plan for consult team.     Critical care time 31 min    Time spent at the bedside, reviewing test results, discussing the case with staff and/or consult(s), documenting the medical record and time spent with family members discussing treatment and management decisions.    The time involved in the performance of separately reportable procedures was not counted toward critical care time.  I have reviewed the following: old records at this facility.          Physician Attestation for  Scribe:      Comments: I, Dr. Roel Gregory, personally performed the services described in this documentation. All medical record entries made by the scribe were at my direction and in my presence.  I have reviewed the chart and agree that the record reflects my personal performance and is accurate and complete. Roel Gregory MD.  9:06 PM 11/21/2017              ED Course      Clinical Impression:   The primary encounter diagnosis was Free intraperitoneal air. A diagnosis of Free intraperitoneal air was also pertinent to this visit.    Disposition:   Disposition: Admitted  Condition: Critical                        Roel Gregory MD  11/21/17 6507

## 2017-11-22 NOTE — ED NOTES
Pt is AAOx3. Pt is comfortable. Behavior is age appropriate and pleasant. Skin is warm, dry, and intact. Mucous membranes are pink and moist. Airway is patent. Patient has an 18 Italian gastric tube to left nare.  Respirations are full, even, and non labored. Breath sounds are present and clear throughout all lung fields.  Abdomen is distended, and non tender x 4 quads. Normoactive bowel sounds present x 4 quadrants. Pulses are palpable in bilateral radial arteries. Capillary refill is brisk and  < 3 seconds in bilateral fingers. S1, S2 heart tones are present. No neuro deficits noted. Pt's identity confirmed and armband applied. Pt updated on plan of care. Pt awaiting ED physician. No needs verbalized. Pain at this time is 05/10.  Will continue to monitor. Pt requesting water. I explained to pt that he is NPO

## 2017-11-22 NOTE — PLAN OF CARE
Patient is a 57 year old male admitted 11/21/2017 in transfer from [facility] Perforated Bowel found on CT.  Patient to OR (Class A) and underwent Exploratory Laparotomy, Abdominal Washout, Loop Ileostomy, RACHEL Drain X2 placed, WV placement, G tube insertion 11/21/2017.  Patient to ICU postop intubated, on pressors, sedated.  Patient now extubated.  Noted: patient was currently being treated for shingles and has severe RA, on prednisone 5mg X3 daily.  Patient is expected to discharge home with needs to be determined (Medicaid) when medically stable.    Patient lives in a one story home with his wife and 15 year old son.  Patient's wife will drive him home, care for him and obtain anything he needs after discharge.  Patient's wife given Soft Tissue [employer]enerationsEvomail Packet with understanding verbalized.  Will continue to follow for needs.    Vince Perez MD  [address] [facility] [address] / [place] [address] [phone]      [facility] [address] [place] [address] [address] AT [facility] [address] [facility] [place]  [address] [place] [address]  Phone: [phone] Fax: [phone]      Extended Emergency Contact Information  Primary Emergency Contact: JeffersonRachel  Address: [address] [place] [address] of [facility]  Home Phone: [phone]  Mobile Phone: [phone]  Relation: Spouse       11/22/17 1453   Discharge Assessment   Assessment Type Discharge Planning Assessment   Confirmed/corrected address and phone number on facesheet? Yes   Assessment information obtained from? Other  (wife)   Expected Length of Stay (days) 7   Communicated expected length of stay with patient/caregiver yes   Prior to hospitilization cognitive status: Alert/Oriented   Prior to hospitalization functional status: Independent   Current cognitive status: Alert/Oriented   Current Functional Status: Independent;Assistive Equipment;Needs Assistance   Lives With  spouse;child(kavon), dependent   Able to Return to Prior Arrangements yes   Is patient able to care for self after discharge? Yes   Who are your caregiver(s) and their phone number(s)? wife   Patient's perception of discharge disposition home or selfcare   Equipment Currently Used at Home none   Do you have any problems affording any of your prescribed medications? No   Is the patient taking medications as prescribed? yes   Does the patient have transportation home? Yes   Transportation Available family or friend will provide   Discharge Plan A Home with family   Discharge Plan B Home with family;Home Health   Patient/Family In Agreement With Plan yes

## 2017-11-23 LAB
ALBUMIN SERPL BCP-MCNC: 2.1 G/DL
ALBUMIN SERPL BCP-MCNC: 2.3 G/DL
ALP SERPL-CCNC: 46 U/L
ALP SERPL-CCNC: 46 U/L
ALT SERPL W/O P-5'-P-CCNC: 17 U/L
ALT SERPL W/O P-5'-P-CCNC: 18 U/L
ANION GAP SERPL CALC-SCNC: 9 MMOL/L
ANION GAP SERPL CALC-SCNC: 9 MMOL/L
ANISOCYTOSIS BLD QL SMEAR: SLIGHT
ANISOCYTOSIS BLD QL SMEAR: SLIGHT
AST SERPL-CCNC: 27 U/L
AST SERPL-CCNC: 28 U/L
BASOPHILS # BLD AUTO: 0.02 K/UL
BASOPHILS # BLD AUTO: 0.03 K/UL
BASOPHILS NFR BLD: 0.6 %
BASOPHILS NFR BLD: 0.8 %
BILIRUB SERPL-MCNC: 1.5 MG/DL
BILIRUB SERPL-MCNC: 1.5 MG/DL
BUN SERPL-MCNC: 16 MG/DL
BUN SERPL-MCNC: 18 MG/DL
BURR CELLS BLD QL SMEAR: ABNORMAL
BURR CELLS BLD QL SMEAR: ABNORMAL
CALCIUM SERPL-MCNC: 7.7 MG/DL
CALCIUM SERPL-MCNC: 8.2 MG/DL
CHLORIDE SERPL-SCNC: 102 MMOL/L
CHLORIDE SERPL-SCNC: 103 MMOL/L
CO2 SERPL-SCNC: 23 MMOL/L
CO2 SERPL-SCNC: 23 MMOL/L
CREAT SERPL-MCNC: 0.8 MG/DL
CREAT SERPL-MCNC: 0.9 MG/DL
DACRYOCYTES BLD QL SMEAR: ABNORMAL
DIFFERENTIAL METHOD: ABNORMAL
DIFFERENTIAL METHOD: ABNORMAL
DOHLE BOD BLD QL SMEAR: PRESENT
EOSINOPHIL # BLD AUTO: 0 K/UL
EOSINOPHIL # BLD AUTO: 0 K/UL
EOSINOPHIL NFR BLD: 0.3 %
EOSINOPHIL NFR BLD: 0.5 %
ERYTHROCYTE [DISTWIDTH] IN BLOOD BY AUTOMATED COUNT: 18.4 %
ERYTHROCYTE [DISTWIDTH] IN BLOOD BY AUTOMATED COUNT: 18.5 %
EST. GFR  (AFRICAN AMERICAN): >60 ML/MIN/1.73 M^2
EST. GFR  (AFRICAN AMERICAN): >60 ML/MIN/1.73 M^2
EST. GFR  (NON AFRICAN AMERICAN): >60 ML/MIN/1.73 M^2
EST. GFR  (NON AFRICAN AMERICAN): >60 ML/MIN/1.73 M^2
GLUCOSE SERPL-MCNC: 129 MG/DL
GLUCOSE SERPL-MCNC: 133 MG/DL
HCT VFR BLD AUTO: 33.3 %
HCT VFR BLD AUTO: 33.6 %
HGB BLD-MCNC: 10.8 G/DL
HGB BLD-MCNC: 11.1 G/DL
HYPOCHROMIA BLD QL SMEAR: ABNORMAL
IMM GRANULOCYTES # BLD AUTO: 0.09 K/UL
IMM GRANULOCYTES # BLD AUTO: 0.1 K/UL
IMM GRANULOCYTES NFR BLD AUTO: 2.4 %
IMM GRANULOCYTES NFR BLD AUTO: 2.8 %
LACTATE SERPL-SCNC: 1.1 MMOL/L
LYMPHOCYTES # BLD AUTO: 0.6 K/UL
LYMPHOCYTES # BLD AUTO: 0.6 K/UL
LYMPHOCYTES NFR BLD: 15.2 %
LYMPHOCYTES NFR BLD: 16.5 %
MAGNESIUM SERPL-MCNC: 1.9 MG/DL
MAGNESIUM SERPL-MCNC: 2 MG/DL
MCH RBC QN AUTO: 27.2 PG
MCH RBC QN AUTO: 27.5 PG
MCHC RBC AUTO-ENTMCNC: 32.4 G/DL
MCHC RBC AUTO-ENTMCNC: 33 G/DL
MCV RBC AUTO: 83 FL
MCV RBC AUTO: 84 FL
MONOCYTES # BLD AUTO: 0.4 K/UL
MONOCYTES # BLD AUTO: 0.4 K/UL
MONOCYTES NFR BLD: 11.5 %
MONOCYTES NFR BLD: 12.2 %
NEUTROPHILS # BLD AUTO: 2.5 K/UL
NEUTROPHILS # BLD AUTO: 2.6 K/UL
NEUTROPHILS NFR BLD: 68.3 %
NEUTROPHILS NFR BLD: 68.9 %
NRBC BLD-RTO: 0 /100 WBC
NRBC BLD-RTO: 0 /100 WBC
OVALOCYTES BLD QL SMEAR: ABNORMAL
OVALOCYTES BLD QL SMEAR: ABNORMAL
PHOSPHATE SERPL-MCNC: 2.5 MG/DL
PHOSPHATE SERPL-MCNC: 2.9 MG/DL
PLATELET # BLD AUTO: 76 K/UL
PLATELET # BLD AUTO: 85 K/UL
PLATELET BLD QL SMEAR: ABNORMAL
PMV BLD AUTO: 9.5 FL
PMV BLD AUTO: 9.9 FL
POCT GLUCOSE: 112 MG/DL (ref 70–110)
POCT GLUCOSE: 124 MG/DL (ref 70–110)
POCT GLUCOSE: 130 MG/DL (ref 70–110)
POCT GLUCOSE: 137 MG/DL (ref 70–110)
POCT GLUCOSE: 143 MG/DL (ref 70–110)
POCT GLUCOSE: 157 MG/DL (ref 70–110)
POIKILOCYTOSIS BLD QL SMEAR: SLIGHT
POIKILOCYTOSIS BLD QL SMEAR: SLIGHT
POLYCHROMASIA BLD QL SMEAR: ABNORMAL
POTASSIUM SERPL-SCNC: 3.9 MMOL/L
POTASSIUM SERPL-SCNC: 4.2 MMOL/L
PROT SERPL-MCNC: 5.3 G/DL
PROT SERPL-MCNC: 5.4 G/DL
RBC # BLD AUTO: 3.97 M/UL
RBC # BLD AUTO: 4.04 M/UL
SMUDGE CELLS BLD QL SMEAR: PRESENT
SODIUM SERPL-SCNC: 134 MMOL/L
SODIUM SERPL-SCNC: 135 MMOL/L
TOXIC GRANULES BLD QL SMEAR: PRESENT
WBC # BLD AUTO: 3.61 K/UL
WBC # BLD AUTO: 3.75 K/UL

## 2017-11-23 PROCEDURE — 20000000 HC ICU ROOM

## 2017-11-23 PROCEDURE — 83735 ASSAY OF MAGNESIUM: CPT | Mod: 91

## 2017-11-23 PROCEDURE — 25000003 PHARM REV CODE 250: Performed by: STUDENT IN AN ORGANIZED HEALTH CARE EDUCATION/TRAINING PROGRAM

## 2017-11-23 PROCEDURE — 80053 COMPREHEN METABOLIC PANEL: CPT

## 2017-11-23 PROCEDURE — 25000003 PHARM REV CODE 250: Performed by: ANESTHESIOLOGY

## 2017-11-23 PROCEDURE — 63600175 PHARM REV CODE 636 W HCPCS: Performed by: STUDENT IN AN ORGANIZED HEALTH CARE EDUCATION/TRAINING PROGRAM

## 2017-11-23 PROCEDURE — 83735 ASSAY OF MAGNESIUM: CPT

## 2017-11-23 PROCEDURE — 25000242 PHARM REV CODE 250 ALT 637 W/ HCPCS: Performed by: STUDENT IN AN ORGANIZED HEALTH CARE EDUCATION/TRAINING PROGRAM

## 2017-11-23 PROCEDURE — 27000221 HC OXYGEN, UP TO 24 HOURS

## 2017-11-23 PROCEDURE — 84100 ASSAY OF PHOSPHORUS: CPT | Mod: 91

## 2017-11-23 PROCEDURE — 25000003 PHARM REV CODE 250

## 2017-11-23 PROCEDURE — 84100 ASSAY OF PHOSPHORUS: CPT

## 2017-11-23 PROCEDURE — 94640 AIRWAY INHALATION TREATMENT: CPT

## 2017-11-23 PROCEDURE — S0030 INJECTION, METRONIDAZOLE: HCPCS | Performed by: ANESTHESIOLOGY

## 2017-11-23 PROCEDURE — 83605 ASSAY OF LACTIC ACID: CPT

## 2017-11-23 PROCEDURE — 85025 COMPLETE CBC W/AUTO DIFF WBC: CPT

## 2017-11-23 PROCEDURE — 94761 N-INVAS EAR/PLS OXIMETRY MLT: CPT

## 2017-11-23 RX ORDER — ATORVASTATIN CALCIUM 10 MG/1
10 TABLET, FILM COATED ORAL DAILY
Status: DISCONTINUED | OUTPATIENT
Start: 2017-11-23 | End: 2017-11-29 | Stop reason: HOSPADM

## 2017-11-23 RX ORDER — CLOPIDOGREL BISULFATE 75 MG/1
75 TABLET ORAL DAILY
Status: DISCONTINUED | OUTPATIENT
Start: 2017-11-23 | End: 2017-11-29 | Stop reason: HOSPADM

## 2017-11-23 RX ORDER — HYDROCODONE BITARTRATE AND ACETAMINOPHEN 5; 325 MG/1; MG/1
1 TABLET ORAL EVERY 6 HOURS PRN
Status: DISCONTINUED | OUTPATIENT
Start: 2017-11-23 | End: 2017-11-24

## 2017-11-23 RX ORDER — FAMOTIDINE 20 MG/1
20 TABLET, FILM COATED ORAL 2 TIMES DAILY
Status: DISCONTINUED | OUTPATIENT
Start: 2017-11-23 | End: 2017-11-24

## 2017-11-23 RX ORDER — DEXTROSE MONOHYDRATE, SODIUM CHLORIDE, AND POTASSIUM CHLORIDE 50; 1.49; 4.5 G/1000ML; G/1000ML; G/1000ML
INJECTION, SOLUTION INTRAVENOUS CONTINUOUS
Status: DISCONTINUED | OUTPATIENT
Start: 2017-11-23 | End: 2017-11-26

## 2017-11-23 RX ORDER — HYDROXYZINE PAMOATE 25 MG/1
25 CAPSULE ORAL EVERY 8 HOURS PRN
Status: DISCONTINUED | OUTPATIENT
Start: 2017-11-23 | End: 2017-11-29 | Stop reason: HOSPADM

## 2017-11-23 RX ORDER — CITALOPRAM 10 MG/1
40 TABLET ORAL DAILY
Status: DISCONTINUED | OUTPATIENT
Start: 2017-11-23 | End: 2017-11-29 | Stop reason: HOSPADM

## 2017-11-23 RX ORDER — IPRATROPIUM BROMIDE AND ALBUTEROL SULFATE 2.5; .5 MG/3ML; MG/3ML
3 SOLUTION RESPIRATORY (INHALATION) EVERY 4 HOURS PRN
Status: DISCONTINUED | OUTPATIENT
Start: 2017-11-23 | End: 2017-11-29 | Stop reason: HOSPADM

## 2017-11-23 RX ORDER — LOSARTAN POTASSIUM AND HYDROCHLOROTHIAZIDE 12.5; 1 MG/1; MG/1
1 TABLET ORAL DAILY
Status: DISCONTINUED | OUTPATIENT
Start: 2017-11-23 | End: 2017-11-29 | Stop reason: HOSPADM

## 2017-11-23 RX ORDER — GABAPENTIN 100 MG/1
100 CAPSULE ORAL 3 TIMES DAILY
Status: DISCONTINUED | OUTPATIENT
Start: 2017-11-23 | End: 2017-11-29 | Stop reason: HOSPADM

## 2017-11-23 RX ADMIN — ACYCLOVIR SODIUM 710 MG: 50 INJECTION, SOLUTION INTRAVENOUS at 11:11

## 2017-11-23 RX ADMIN — METRONIDAZOLE 500 MG: 500 INJECTION, SOLUTION INTRAVENOUS at 05:11

## 2017-11-23 RX ADMIN — IPRATROPIUM BROMIDE AND ALBUTEROL SULFATE 3 ML: .5; 3 SOLUTION RESPIRATORY (INHALATION) at 08:11

## 2017-11-23 RX ADMIN — Medication: at 08:11

## 2017-11-23 RX ADMIN — FLUCONAZOLE 200 MG: 2 INJECTION, SOLUTION INTRAVENOUS at 08:11

## 2017-11-23 RX ADMIN — DEXTROSE MONOHYDRATE, SODIUM CHLORIDE, AND POTASSIUM CHLORIDE: 50; 4.5; 1.49 INJECTION, SOLUTION INTRAVENOUS at 09:11

## 2017-11-23 RX ADMIN — LOSARTAN POTASSIUM AND HYDROCHLOROTHIAZIDE 1 TABLET: 12.5; 1 TABLET ORAL at 09:11

## 2017-11-23 RX ADMIN — ACYCLOVIR SODIUM 710 MG: 50 INJECTION, SOLUTION INTRAVENOUS at 02:11

## 2017-11-23 RX ADMIN — HEPARIN SODIUM 5000 UNITS: 5000 INJECTION, SOLUTION INTRAVENOUS; SUBCUTANEOUS at 05:11

## 2017-11-23 RX ADMIN — HEPARIN SODIUM 5000 UNITS: 5000 INJECTION, SOLUTION INTRAVENOUS; SUBCUTANEOUS at 02:11

## 2017-11-23 RX ADMIN — ATORVASTATIN CALCIUM 10 MG: 10 TABLET, FILM COATED ORAL at 09:11

## 2017-11-23 RX ADMIN — CLOPIDOGREL 75 MG: 75 TABLET, FILM COATED ORAL at 09:11

## 2017-11-23 RX ADMIN — FAMOTIDINE 20 MG: 20 TABLET, FILM COATED ORAL at 09:11

## 2017-11-23 RX ADMIN — HYDROCORTISONE SODIUM SUCCINATE 100 MG: 100 INJECTION, POWDER, FOR SOLUTION INTRAMUSCULAR; INTRAVENOUS at 09:11

## 2017-11-23 RX ADMIN — HYDROCODONE BITARTRATE AND ACETAMINOPHEN 1 TABLET: 5; 325 TABLET ORAL at 09:11

## 2017-11-23 RX ADMIN — Medication: at 01:11

## 2017-11-23 RX ADMIN — CITALOPRAM HYDROBROMIDE 40 MG: 20 TABLET ORAL at 09:11

## 2017-11-23 RX ADMIN — HYDROCORTISONE SODIUM SUCCINATE 100 MG: 100 INJECTION, POWDER, FOR SOLUTION INTRAMUSCULAR; INTRAVENOUS at 02:11

## 2017-11-23 RX ADMIN — METOPROLOL TARTRATE 25 MG: 25 TABLET ORAL at 09:11

## 2017-11-23 RX ADMIN — METRONIDAZOLE 500 MG: 500 INJECTION, SOLUTION INTRAVENOUS at 02:11

## 2017-11-23 RX ADMIN — GABAPENTIN 100 MG: 100 CAPSULE ORAL at 02:11

## 2017-11-23 RX ADMIN — HEPARIN SODIUM 5000 UNITS: 5000 INJECTION, SOLUTION INTRAVENOUS; SUBCUTANEOUS at 09:11

## 2017-11-23 RX ADMIN — CEFTRIAXONE SODIUM 2 G: 2 INJECTION, POWDER, FOR SOLUTION INTRAMUSCULAR; INTRAVENOUS at 05:11

## 2017-11-23 RX ADMIN — ASPIRIN 81 MG CHEWABLE TABLET 81 MG: 81 TABLET CHEWABLE at 09:11

## 2017-11-23 RX ADMIN — GABAPENTIN 100 MG: 100 CAPSULE ORAL at 09:11

## 2017-11-23 RX ADMIN — METRONIDAZOLE 500 MG: 500 INJECTION, SOLUTION INTRAVENOUS at 08:11

## 2017-11-23 RX ADMIN — HYDROCORTISONE SODIUM SUCCINATE 100 MG: 100 INJECTION, POWDER, FOR SOLUTION INTRAMUSCULAR; INTRAVENOUS at 05:11

## 2017-11-23 RX ADMIN — ACYCLOVIR SODIUM 710 MG: 50 INJECTION, SOLUTION INTRAVENOUS at 08:11

## 2017-11-23 RX ADMIN — IPRATROPIUM BROMIDE AND ALBUTEROL SULFATE 3 ML: .5; 3 SOLUTION RESPIRATORY (INHALATION) at 04:11

## 2017-11-23 NOTE — SUBJECTIVE & OBJECTIVE
Interval History: No acute events overnight. Extubated and doing well. Abdomen feels better able to touch it without significant pain. Denies nausea or emesis. Some gas in ostomy bag.    Medications:  Continuous Infusions:   dextrose 5 % and 0.45 % NaCl with KCl 20 mEq 80 mL/hr at 11/23/17 0926    hydromorphone in 0.9 % NaCl 6 mg/30 ml       Scheduled Meds:   acyclovir  10 mg/kg (Ideal) Intravenous Q8H    aspirin  81 mg Oral Daily    atorvastatin  10 mg Oral Daily    cefTRIAXone (ROCEPHIN) IVPB  2 g Intravenous Q24H    citalopram  40 mg Oral Daily    clopidogrel  75 mg Oral Daily    fluconazole (DIFLUCAN) IVPB  200 mg Intravenous Q24H    gabapentin  100 mg Oral TID    heparin (porcine)  5,000 Units Subcutaneous Q8H    hydrocortisone sodium succinate  100 mg Intravenous Q8H    losartan-hydrochlorothiazide 100-12.5 mg  1 tablet Oral Daily    metoprolol tartrate  25 mg Oral BID    metronidazole  500 mg Intravenous Q8H     PRN Meds:albuterol-ipratropium 2.5mg-0.5mg/3mL, calcium gluconate IVPB, calcium gluconate IVPB, calcium gluconate IVPB, dextrose 50%, glucagon (human recombinant), insulin aspart, magnesium sulfate IVPB, magnesium sulfate IVPB, naloxone     Review of patient's allergies indicates:   Allergen Reactions    Morphine      Objective:     Vital Signs (Most Recent):  Temp: 97.8 °F (36.6 °C) (11/23/17 0700)  Pulse: (!) 115 (11/23/17 0930)  Resp: (!) 29 (11/23/17 0930)  BP: 109/67 (11/23/17 0930)  SpO2: 95 % (11/23/17 0930) Vital Signs (24h Range):  Temp:  [97.8 °F (36.6 °C)-99 °F (37.2 °C)] 97.8 °F (36.6 °C)  Pulse:  [] 115  Resp:  [12-34] 29  SpO2:  [90 %-100 %] 95 %  BP: ()/(54-82) 109/67  Arterial Line BP: ()/(41-61) 109/53     Weight: 103.6 kg (228 lb 6.3 oz)  Body mass index is 33.73 kg/m².    Intake/Output - Last 3 Shifts       11/21 0700 - 11/22 0659 11/22 0700 - 11/23 0659 11/23 0700 - 11/24 0659    I.V. (mL/kg) 5411 (52.2) 4481 (43.3)     Other  85     IV Piggyback  3000 1000     Total Intake(mL/kg) 8411 (81.2) 5566 (53.7)     Urine (mL/kg/hr) 1250 2475 (1) 350 (1.2)    Drains 140 190 (0.1)     Other 20 250 (0.1) 100 (0.3)    Stool  325 (0.1)     Total Output 1410 3240 450    Net +7001 +2326 -450                 Physical Exam   Constitutional: He is oriented to person, place, and time. He appears well-developed and well-nourished. No distress.   HENT:   Head: Normocephalic and atraumatic.   Eyes: EOM are normal. No scleral icterus.   Neck: Normal range of motion. Neck supple.   Cardiovascular: Normal rate and regular rhythm.    Pulmonary/Chest: Effort normal and breath sounds normal.   Abdominal: Soft. He exhibits distension. He exhibits no mass. There is tenderness. There is no guarding.   Skin vac in place; two abdominal drains with serous output. Gtube in place to gravity.    Ostomy is flat, patient with purplish color; gas in bag.    Abdomen is less tender today   Musculoskeletal: Normal range of motion.   Lymphadenopathy:     He has no cervical adenopathy.   Neurological: He is alert and oriented to person, place, and time.   Skin: Skin is warm and dry.   Herpetic lesions noted on R anterior thigh   Psychiatric: He has a normal mood and affect. His behavior is normal.       Significant Labs:  CBC:     Recent Labs  Lab 11/23/17  0611   WBC 3.61*   RBC 3.97*   HGB 10.8*   HCT 33.3*   PLT 85*   MCV 84   MCH 27.2   MCHC 32.4     CMP:     Recent Labs  Lab 11/23/17  0611   *   CALCIUM 8.2*   ALBUMIN 2.1*   PROT 5.3*   *   K 3.9   CO2 23      BUN 16   CREATININE 0.8   ALKPHOS 46*   ALT 17   AST 28   BILITOT 1.5*       Significant Diagnostics:  I have reviewed all pertinent imaging results/findings within the past 24 hours.

## 2017-11-23 NOTE — PROGRESS NOTES
[facility]  General Surgery  Progress Note    Subjective:     History of Present Illness:  Cristo Jefferson is a 57 y.o. 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  Pt then received adjuvant chemo/XRT with last treatment in March 2017.  He states he developed abdominal pain with associated N/V Saturday.  Pain has steadily worsened since then.  Minimal PO intake.  [place]ot[place] [facility] [place]  Improved with 2L NS administration.  Creatinine 3.1.  Lactate 2.7.  CT abdomen/pelvis with intraabdominal free air.    Post-Op Info:  Procedure(s) (LRB):  EXPLORATORY-LAPAROTOMY (N/A)  INCISION AND DRAINAGE (I & D)- Intra-Abdominal abcess (N/A)  INCISION AND DRAINAGE (I & D)-Abdominal-Pelvic Abcess (N/A)  Loop Ileostomy (N/A)  INSERTION-TUBE-GASTROSTOMY (Right)   1 Day Post-Op     Interval History: Went to OR yesterday evening for free air. No area of perforation noted but had significant amount of pus intra-abdominally. Gtube place and abdominal drains placed. Diverting ileostomy done for suspicion of diverticular perforation. Feels well today. Thirsty. No nausea. Abdomen is tender. Complaining of pain.    Medications:  Continuous Infusions:   hydromorphone in 0.9 % NaCl 6 mg/30 ml      lactated ringers 125 mL/hr at 11/22/17 1800    norepinephrine bitartrate-D5W Stopped (11/22/17 1100)    vasopressin (PITRESSIN) infusion Stopped (11/22/17 1200)     Scheduled Meds:   acyclovir  10 mg/kg (Ideal) Intravenous Q8H    [START ON 11/23/2017] aspirin  81 mg Oral Daily    cefTRIAXone (ROCEPHIN) IVPB  2 g Intravenous Q24H    fluconazole (DIFLUCAN) IVPB  200 mg Intravenous Q24H    heparin (porcine)  5,000 Units Subcutaneous Q8H    hydrocortisone sodium succinate  100 mg Intravenous Q8H    metoprolol tartrate  25 mg Oral BID    metronidazole  500 mg Intravenous Q8H    potassium phosphate IVPB  20 mmol Intravenous Once     PRN Meds:calcium gluconate IVPB, calcium  gluconate IVPB, calcium gluconate IVPB, dextrose 50%, glucagon (human recombinant), insulin aspart, magnesium sulfate IVPB, magnesium sulfate IVPB, naloxone     Review of patient's allergies indicates:   Allergen Reactions    Morphine      Objective:     Vital Signs (Most Recent):  Temp: 98.8 °F (37.1 °C) (11/22/17 1500)  Pulse: 98 (11/22/17 1815)  Resp: 18 (11/22/17 1815)  BP: (!) 96/55 (11/22/17 1800)  SpO2: (!) 93 % (11/22/17 1815) Vital Signs (24h Range):  Temp:  [97.8 °F (36.6 °C)-99.3 °F (37.4 °C)] 98.8 °F (37.1 °C)  Pulse:  [] 98  Resp:  [0-30] 18  SpO2:  [87 %-100 %] 93 %  BP: ()/(44-77) 96/55  Arterial Line BP: ()/(40-65) 106/50     Weight: 103.6 kg (228 lb 6.3 oz)  Body mass index is 33.73 kg/m².    Intake/Output - Last 3 Shifts       11/20 0700 - 11/21 0659 11/21 0700 - 11/22 0659 11/22 0700 - 11/23 0659    I.V. (mL/kg)  5411 (52.2) 1706 (16.5)    IV Piggyback  3000 1000    Total Intake(mL/kg)  8411 (81.2) 2706 (26.1)    Urine (mL/kg/hr)  1250 1150 (1)    Drains  140 105 (0.1)    Other  20 250 (0.2)    Stool   125 (0.1)    Total Output   1410 1630    Net   +7001 +1076                 Physical Exam   Constitutional: He is oriented to person, place, and time. He appears well-developed and well-nourished. No distress.   HENT:   Head: Normocephalic and atraumatic.   Eyes: EOM are normal. No scleral icterus.   Neck: Normal range of motion. Neck supple.   Cardiovascular: Normal rate and regular rhythm.    Pulmonary/Chest: Effort normal and breath sounds normal.   Abdominal: Soft. He exhibits distension. He exhibits no mass. There is tenderness. There is no guarding.   Skin vac in place; two abdominal drains with serous output. Gtube in place to gravity.   Musculoskeletal: Normal range of motion.   Lymphadenopathy:     He has no cervical adenopathy.   Neurological: He is alert and oriented to person, place, and time.   Skin: Skin is warm and dry.   Herpetic lesions noted on R anterior thigh    Psychiatric: He has a normal mood and affect. His behavior is normal.       Significant Labs:  CBC:   Recent Labs  Lab 11/22/17  1711   WBC 3.55*   RBC 4.20*   HGB 11.4*   HCT 33.8*   PLT 77*   MCV 81*   MCH 27.1   MCHC 33.7     CMP:   Recent Labs  Lab 11/22/17  1711   *   CALCIUM 7.8*   ALBUMIN 2.3*   PROT 5.1*   *   K 3.7   CO2 22*      BUN 20   CREATININE 1.0   ALKPHOS 45*   ALT 15   AST 23   BILITOT 1.6*       Significant Diagnostics:  I have reviewed all pertinent imaging results/findings within the past 24 hours.    Assessment/Plan:     * Free intraperitoneal air    56 yo M with a history of DM, HTN, HLD and arthritis who presented with free air s/p ex-lap with diverting loop ileostomy and Gtube placement    - Extubated and doing well  - Aggressive respiratory toilet with acappella and IS  - Broad spectrum antibiotics along with diflucan; await intra-op culture results  - PCA for pain control  - Keep NPO; ok to have home meds  - Will have on IV stress dose steroids for now  - Hold plavix  - Continue weaver  - Daily labs  - SCDs, DVT and GI prophylaxis  - PT/OT  - Dispo: continue ICU care        Herpes zoster    - Dermatology consulted; patient started on acyclovir            Kevin Wakefield MD  General Surgery  [facility]

## 2017-11-23 NOTE — SUBJECTIVE & OBJECTIVE
Interval History: Went to OR yesterday evening for free air. No area of perforation noted but had significant amount of pus intra-abdominally. Gtube place and abdominal drains placed. Diverting ileostomy done for suspicion of diverticular perforation. Feels well today. Thirsty. No nausea. Abdomen is tender. Complaining of pain.    Medications:  Continuous Infusions:   hydromorphone in 0.9 % NaCl 6 mg/30 ml      lactated ringers 125 mL/hr at 11/22/17 1800    norepinephrine bitartrate-D5W Stopped (11/22/17 1100)    vasopressin (PITRESSIN) infusion Stopped (11/22/17 1200)     Scheduled Meds:   acyclovir  10 mg/kg (Ideal) Intravenous Q8H    [START ON 11/23/2017] aspirin  81 mg Oral Daily    cefTRIAXone (ROCEPHIN) IVPB  2 g Intravenous Q24H    fluconazole (DIFLUCAN) IVPB  200 mg Intravenous Q24H    heparin (porcine)  5,000 Units Subcutaneous Q8H    hydrocortisone sodium succinate  100 mg Intravenous Q8H    metoprolol tartrate  25 mg Oral BID    metronidazole  500 mg Intravenous Q8H    potassium phosphate IVPB  20 mmol Intravenous Once     PRN Meds:calcium gluconate IVPB, calcium gluconate IVPB, calcium gluconate IVPB, dextrose 50%, glucagon (human recombinant), insulin aspart, magnesium sulfate IVPB, magnesium sulfate IVPB, naloxone     Review of patient's allergies indicates:   Allergen Reactions    Morphine      Objective:     Vital Signs (Most Recent):  Temp: 98.8 °F (37.1 °C) (11/22/17 1500)  Pulse: 98 (11/22/17 1815)  Resp: 18 (11/22/17 1815)  BP: (!) 96/55 (11/22/17 1800)  SpO2: (!) 93 % (11/22/17 1815) Vital Signs (24h Range):  Temp:  [97.8 °F (36.6 °C)-99.3 °F (37.4 °C)] 98.8 °F (37.1 °C)  Pulse:  [] 98  Resp:  [0-30] 18  SpO2:  [87 %-100 %] 93 %  BP: ()/(44-77) 96/55  Arterial Line BP: ()/(40-65) 106/50     Weight: 103.6 kg (228 lb 6.3 oz)  Body mass index is 33.73 kg/m².    Intake/Output - Last 3 Shifts       11/20 0700 - 11/21 0659 11/21 0700 - 11/22 0659 11/22 0700 - 11/23 0659     I.V. (mL/kg)  5411 (52.2) 1706 (16.5)    IV Piggyback  3000 1000    Total Intake(mL/kg)  8411 (81.2) 2706 (26.1)    Urine (mL/kg/hr)  1250 1150 (1)    Drains  140 105 (0.1)    Other  20 250 (0.2)    Stool   125 (0.1)    Total Output   1410 1630    Net   +7001 +1076                 Physical Exam   Constitutional: He is oriented to person, place, and time. He appears well-developed and well-nourished. No distress.   HENT:   Head: Normocephalic and atraumatic.   Eyes: EOM are normal. No scleral icterus.   Neck: Normal range of motion. Neck supple.   Cardiovascular: Normal rate and regular rhythm.    Pulmonary/Chest: Effort normal and breath sounds normal.   Abdominal: Soft. He exhibits distension. He exhibits no mass. There is tenderness. There is no guarding.   Skin vac in place; two abdominal drains with serous output. Gtube in place to gravity.   Musculoskeletal: Normal range of motion.   Lymphadenopathy:     He has no cervical adenopathy.   Neurological: He is alert and oriented to person, place, and time.   Skin: Skin is warm and dry.   Herpetic lesions noted on R anterior thigh   Psychiatric: He has a normal mood and affect. His behavior is normal.       Significant Labs:  CBC:   Recent Labs  Lab 11/22/17  1711   WBC 3.55*   RBC 4.20*   HGB 11.4*   HCT 33.8*   PLT 77*   MCV 81*   MCH 27.1   MCHC 33.7     CMP:   Recent Labs  Lab 11/22/17  1711   *   CALCIUM 7.8*   ALBUMIN 2.3*   PROT 5.1*   *   K 3.7   CO2 22*      BUN 20   CREATININE 1.0   ALKPHOS 45*   ALT 15   AST 23   BILITOT 1.6*       Significant Diagnostics:  I have reviewed all pertinent imaging results/findings within the past 24 hours.

## 2017-11-23 NOTE — HPI
Cristo Jefferson is a 57 y.o. 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  Pt then received adjuvant chemo/XRT with last treatment in March 2017.  He states he developed abdominal pain with associated N/V Saturday.  Pain has steadily worsened since then.  Minimal PO intake.  [place] [facility] [place]  Improved with 2L NS administration.  Creatinine 3.1.  Lactate 2.7.  CT abdomen/pelvis with intraabdominal free air.

## 2017-11-23 NOTE — PROGRESS NOTES
[facility]  General Surgery  Progress Note    Subjective:     History of Present Illness:  Cristo Jefferson is a 57 y.o. 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  Pt then received adjuvant chemo/XRT with last treatment in March 2017.  He states he developed abdominal pain with associated N/V Saturday.  Pain has steadily worsened since then.  Minimal PO intake.  Hypotensive on [place] [facility] [place].  Improved with 2L NS administration.  Creatinine 3.1.  Lactate 2.7.  CT abdomen/pelvis with intraabdominal free air.    Post-Op Info:  Procedure(s) (LRB):  EXPLORATORY-LAPAROTOMY (N/A)  INCISION AND DRAINAGE (I & D)- Intra-Abdominal abcess (N/A)  INCISION AND DRAINAGE (I & D)-Abdominal-Pelvic Abcess (N/A)  Loop Ileostomy (N/A)  INSERTION-TUBE-GASTROSTOMY (Right)   2 Days Post-Op     Interval History: No acute events overnight. Extubated and doing well. Abdomen feels better able to touch it without significant pain. Denies nausea or emesis. Some gas in ostomy bag.    Medications:  Continuous Infusions:   dextrose 5 % and 0.45 % NaCl with KCl 20 mEq 80 mL/hr at 11/23/17 0926    hydromorphone in 0.9 % NaCl 6 mg/30 ml       Scheduled Meds:   acyclovir  10 mg/kg (Ideal) Intravenous Q8H    aspirin  81 mg Oral Daily    atorvastatin  10 mg Oral Daily    cefTRIAXone (ROCEPHIN) IVPB  2 g Intravenous Q24H    citalopram  40 mg Oral Daily    clopidogrel  75 mg Oral Daily    fluconazole (DIFLUCAN) IVPB  200 mg Intravenous Q24H    gabapentin  100 mg Oral TID    heparin (porcine)  5,000 Units Subcutaneous Q8H    hydrocortisone sodium succinate  100 mg Intravenous Q8H    losartan-hydrochlorothiazide 100-12.5 mg  1 tablet Oral Daily    metoprolol tartrate  25 mg Oral BID    metronidazole  500 mg Intravenous Q8H     PRN Meds:albuterol-ipratropium 2.5mg-0.5mg/3mL, calcium gluconate IVPB, calcium gluconate IVPB, calcium gluconate IVPB, dextrose 50%, glucagon (human recombinant),  insulin aspart, magnesium sulfate IVPB, magnesium sulfate IVPB, naloxone     Review of patient's allergies indicates:   Allergen Reactions    Morphine      Objective:     Vital Signs (Most Recent):  Temp: 97.8 °F (36.6 °C) (11/23/17 0700)  Pulse: (!) 115 (11/23/17 0930)  Resp: (!) 29 (11/23/17 0930)  BP: 109/67 (11/23/17 0930)  SpO2: 95 % (11/23/17 0930) Vital Signs (24h Range):  Temp:  [97.8 °F (36.6 °C)-99 °F (37.2 °C)] 97.8 °F (36.6 °C)  Pulse:  [] 115  Resp:  [12-34] 29  SpO2:  [90 %-100 %] 95 %  BP: ()/(54-82) 109/67  Arterial Line BP: ()/(41-61) 109/53     Weight: 103.6 kg (228 lb 6.3 oz)  Body mass index is 33.73 kg/m².    Intake/Output - Last 3 Shifts       11/21 0700 - 11/22 0659 11/22 0700 - 11/23 0659 11/23 0700 - 11/24 0659    I.V. (mL/kg) 5411 (52.2) 4481 (43.3)     Other  85     IV Piggyback 3000 1000     Total Intake(mL/kg) 8411 (81.2) 5566 (53.7)     Urine (mL/kg/hr) 1250 2475 (1) 350 (1.2)    Drains 140 190 (0.1)     Other 20 250 (0.1) 100 (0.3)    Stool  325 (0.1)     Total Output 1410 3240 450    Net +7001 +2326 -450                 Physical Exam   Constitutional: He is oriented to person, place, and time. He appears well-developed and well-nourished. No distress.   HENT:   Head: Normocephalic and atraumatic.   Eyes: EOM are normal. No scleral icterus.   Neck: Normal range of motion. Neck supple.   Cardiovascular: Normal rate and regular rhythm.    Pulmonary/Chest: Effort normal and breath sounds normal.   Abdominal: Soft. He exhibits distension. He exhibits no mass. There is tenderness. There is no guarding.   Skin vac in place; two abdominal drains with serous output. Gtube in place to gravity.    Ostomy is flat, patient with purplish color; gas in bag.    Abdomen is less tender today   Musculoskeletal: Normal range of motion.   Lymphadenopathy:     He has no cervical adenopathy.   Neurological: He is alert and oriented to person, place, and time.   Skin: Skin is warm and dry.    Herpetic lesions noted on R anterior thigh   Psychiatric: He has a normal mood and affect. His behavior is normal.       Significant Labs:  CBC:     Recent Labs  Lab 11/23/17  0611   WBC 3.61*   RBC 3.97*   HGB 10.8*   HCT 33.3*   PLT 85*   MCV 84   MCH 27.2   MCHC 32.4     CMP:     Recent Labs  Lab 11/23/17  0611   *   CALCIUM 8.2*   ALBUMIN 2.1*   PROT 5.3*   *   K 3.9   CO2 23      BUN 16   CREATININE 0.8   ALKPHOS 46*   ALT 17   AST 28   BILITOT 1.5*       Significant Diagnostics:  I have reviewed all pertinent imaging results/findings within the past 24 hours.    Assessment/Plan:     * Free intraperitoneal air    56 yo M with a history of DM, HTN, HLD and arthritis who presented with free air s/p ex-lap with diverting loop ileostomy and Gtube placement    - Discontinue weaver catheter  - Clamp Gtube; unclamp for nausea if prn anti-emetics are not effective  - Sips of clears  - Restart plavix  - Aggressive respiratory toilet with acappella and IS  - Broad spectrum antibiotics along with diflucan; await intra-op culture results  - Continue IV steroids than transition to oral prednisone tomorrow  - Daily labs  - SCDs, DVT and GI prophylaxis  - PT/OT; OOBTC  - Dispo: continue ICU care; likely transfer to floor tomorrow        Herpes zoster    - Dermatology consulted; patient started on IV acyclovir  - Transition to oral acyclovir            Kevin Wakefield MD  General Surgery  [facility]

## 2017-11-23 NOTE — ASSESSMENT & PLAN NOTE
56 yo M with a history of DM, HTN, HLD and arthritis who presented with free air s/p ex-lap with diverting loop ileostomy and Gtube placement    - Discontinue weaver catheter  - Clamp Gtube; unclamp for nausea if prn anti-emetics are not effective  - Sips of clears  - Restart plavix  - Aggressive respiratory toilet with acappella and IS  - Broad spectrum antibiotics along with diflucan; await intra-op culture results  - Continue IV steroids than transition to oral prednisone tomorrow  - Daily labs  - SCDs, DVT and GI prophylaxis  - PT/OT; OOBTC  - Dispo: continue ICU care; likely transfer to floor tomorrow

## 2017-11-23 NOTE — PROGRESS NOTES
Progress Note  Surgical Intensive Care    Admit Date: 11/21/2017  Post-operative Day: 2 Days Post-Op  Hospital Day: 3    SUBJECTIVE:     Follow-up For:  Procedure(s) (LRB):  EXPLORATORY-LAPAROTOMY (N/A)  INCISION AND DRAINAGE (I & D)- Intra-Abdominal abcess (N/A)  INCISION AND DRAINAGE (I & D)-Abdominal-Pelvic Abcess (N/A)  Loop Ileostomy (N/A)  INSERTION-TUBE-GASTROSTOMY (Right)    HPI:  Patient is a 57 y.o. male with PMHx DM II, HTN, CAD s/p CABG and appendiceal CA s/p appendectomy and subsequent R hemicolectomy and adjuvant chemo/XRT with last treatment in March 2017 who was transferred to [facility] 11[place]21 with complains of abdominal pain found to be hypotensive on arrival with a Creatinine 3.1,  Lactate 2.7 and  CT abdomen/pelvis which demonstrated intraabdominal free air.     Patient was taken to OR for emergent exlap. Received 2 L crystalloid and 500 albumin . Required bumps intermittently of vasopressin and phenylephrine but did not require any gtts intraop.     Patient arrived to SICU intubated and sedated.      Intraop findings significant for no obvious sign of perforation but brown liquid throughout. Abdomen washed out and loop ileostomy brought up in RLQ. 2 drains left and midline wound vac placed. NGT unable to be passed; surgical G tube done intraop.    Interval history:  NAEO. Extubated yesterday and oxygenating well on 5L NC. Required duo nebs ON. Pain controlled.      Continuous Infusions:   hydromorphone in 0.9 % NaCl 6 mg/30 ml      lactated ringers 125 mL/hr at 11/23/17 0600    norepinephrine bitartrate-D5W Stopped (11/22/17 2100)    vasopressin (PITRESSIN) infusion Stopped (11/22/17 1200)     Scheduled Meds:   acyclovir  10 mg/kg (Ideal) Intravenous Q8H    aspirin  81 mg Oral Daily    cefTRIAXone (ROCEPHIN) IVPB  2 g Intravenous Q24H    fluconazole (DIFLUCAN) IVPB  200 mg Intravenous Q24H    heparin (porcine)  5,000 Units Subcutaneous Q8H    hydrocortisone sodium succinate  100 mg Intravenous  Q8H    metoprolol tartrate  25 mg Oral BID    metronidazole  500 mg Intravenous Q8H     PRN Meds:albuterol-ipratropium 2.5mg-0.5mg/3mL, calcium gluconate IVPB, calcium gluconate IVPB, calcium gluconate IVPB, dextrose 50%, glucagon (human recombinant), insulin aspart, magnesium sulfate IVPB, magnesium sulfate IVPB, naloxone    Review of patient's allergies indicates:   Allergen Reactions    Morphine        OBJECTIVE:     Vital Signs (Most Recent)  Temp: 98.1 °F (36.7 °C) (11/23/17 0300)  Pulse: 69 (11/23/17 0600)  Resp: 19 (11/23/17 0600)  BP: (!) 102/54 (11/23/17 0600)  SpO2: 95 % (11/23/17 0600)    Vital Signs Range (Last 24H):  Temp:  [97.8 °F (36.6 °C)-99 °F (37.2 °C)]   Pulse:  []   Resp:  [5-34]   BP: ()/(54-82)   SpO2:  [90 %-100 %]   Arterial Line BP: ()/(41-61)     I & O (Last 24H):  Intake/Output Summary (Last 24 hours) at 11/23/17 0646  Last data filed at 11/23/17 0600   Gross per 24 hour   Intake             5566 ml   Output             3240 ml   Net             2326 ml     Ventilator Data (Last 24H):     Vent Mode: Spont  Oxygen Concentration (%):  [60-80] 60  Resp Rate Total:  [11 br/min-25 br/min] 20 br/min  Vt Set:  [0 mL] 0 mL  PEEP/CPAP:  [9 cmH20] 9 cmH20  Pressure Support:  [0 cmH20-10 cmH20] 10 cmH20  Mean Airway Pressure:  [12 jgI37-22 cmH20] 12 cmH20    Hemodynamic Parameters (Last 24H):  CO:  [5.6 L/min-8.6 L/min] 6.3 L/min  CI:  [2.9 L/min/m2-4.4 L/min/m2] 3.2 L/min/m2    Physical Exam:  General: AAOx3, comfortable   HENT: NC/AT.Conjunctivae/corneas clear. PERRL, ETT in place; NGT  Neck: no adenopathy, no carotid bruit, no JVD  Lungs: mild wheeze  CV: RRR, S1 and S2 Normal. No chest wall tenderness  Abdomen: drains x 2 with brown output; abdominal wound vac in place; ostomy pink  Extremities: WWP, intact distal pulses. no cyanosis or edema  Skin: Skin color, texture, turgor normal. No rashes or lesions  Lymph nodes: Cervical, supraclavicular, and axillary nodes  normal.  Neurologic: intact     Wound/Incision:  clean, dry, intact    Lines/Drains:       Percutaneous Central Line Insertion/Assessment - triple lumen  11/21/17 2330 (Active)   Dressing biopatch in place;dressing dry and intact;dressing reinforced 11/22/2017  7:15 PM   Securement secured w/ sutures 11/22/2017  7:15 PM   Additional Site Signs no erythema;no warmth;no edema;no pain;no palpable cord;no streak formation;no drainage 11/22/2017  7:15 PM   Distal Patency/Care infusing 11/22/2017  7:15 PM   Medial Patency/Care infusing 11/22/2017  7:15 PM   Proximal Patency/Care infusing 11/22/2017  7:15 PM   Waveform normal 11/22/2017  7:15 PM   Line Interventions line leveled/zeroed 11/22/2017  7:15 PM   Daily Line Review Performed 11/22/2017  7:15 PM   Number of days: 1            Peripheral IV - Single Lumen 11/21/17 1440 Right Antecubital (Active)   Site Assessment Clean;Dry;Intact;No redness;No swelling 11/22/2017  7:15 PM   Line Status Flushed;Saline locked 11/22/2017  7:15 PM   Dressing Status Clean;Dry;Intact 11/22/2017  7:15 PM   Dressing Intervention New dressing 11/21/2017  3:17 PM   Dressing Change Due 11/25/17 11/22/2017  7:15 PM   Site Change Due 11/25/17 11/22/2017  7:15 PM   Reason Not Rotated Not due 11/22/2017  7:15 PM   Number of days: 1            Peripheral IV - Single Lumen 11/21/17 1440 Left Hand (Active)   Site Assessment Clean;Dry;Intact;No redness;No swelling 11/22/2017  7:15 PM   Line Status Infusing 11/22/2017  7:15 PM   Dressing Status Clean;Dry;Intact 11/22/2017  7:15 PM   Dressing Intervention New dressing 11/21/2017  3:20 PM   Dressing Change Due 11/25/17 11/22/2017  7:15 PM   Site Change Due 11/25/17 11/22/2017  7:15 PM   Reason Not Rotated Not due 11/22/2017  7:15 PM   Number of days: 1            Closed/Suction Drain 11/21/17 2238 Left;Lateral Abdomen Bulb 19 Fr. (Active)   Site Description Healing 11/22/2017  7:15 PM   Dressing Type No dressing 11/22/2017  7:15 PM   Drainage Serous  "11/22/2017  7:15 PM   Status To bulb suction 11/22/2017  7:15 PM   Output (mL) 9 mL 11/23/2017  3:00 AM   Number of days: 1            Closed/Suction Drain 11/21/17 2239 Left;Medial Abdomen Bulb 19 Fr. (Active)   Site Description Healing 11/22/2017  7:15 PM   Dressing Type No dressing 11/22/2017  7:15 PM   Drainage Serous 11/22/2017  7:15 PM   Status To bulb suction 11/22/2017  7:15 PM   Output (mL) 6 mL 11/23/2017  3:00 AM   Number of days: 1            Colostomy 11/21/17 2302 Ascending RLQ (Active)   Stomal Appliance 1 piece 11/22/2017  7:15 PM   Stoma Appearance round;red;flush w/ skin 11/22/2017  7:15 PM   Site Assessment Intact 11/22/2017  7:15 PM   Peristomal Assessment COSMO 11/22/2017  7:15 PM   Stoma Function flatus;thin liquid;bright red;yellow;with mucous threads 11/22/2017  7:15 PM   Output (mL) 200 mL 11/22/2017 11:00 PM   Number of days: 1            Urethral Catheter 11/21/17 2135 Non-latex 16 Fr. (Active)   Site Assessment Clean;Intact 11/22/2017  7:15 PM   Collection Container Urimeter 11/22/2017  7:15 PM   Securement Method secured to top of thigh w/ adhesive device 11/22/2017  7:15 PM   Catheter Care Performed yes 11/22/2017  7:15 PM   Reason for Continuing Urinary Catheterization Post operative 11/22/2017  7:15 PM   CAUTI Prevention Bundle StatLock in place w 1" slack;Intact seal between catheter & drainage tubing;Drainage bag off the floor;Green sheeting clip in use;No dependent loops or kinks;Drainage bag not overfilled (<2/3 full);Drainage bag below bladder 11/22/2017  7:15 PM   Output (mL) 100 mL 11/23/2017  6:00 AM   Number of days: 1            Drain/Device  11/21/17 2244 Right upper abdomen other (see comments) (Active)   Insertion Site clean and dry 11/22/2017  7:15 PM   Drainage Characteristics/Odor brown;clots 11/22/2017  3:00 PM   Drainage Amount none 11/22/2017  7:15 PM   General Intake (mL) 85 11/22/2017 10:00 PM   General Output (mL) 125 11/22/2017  3:00 PM   Number of days: 1 "       Laboratory (Last 24H):  CBC:    Recent Labs  Lab 11/23/17  0611   WBC 3.61*   HGB 10.8*   HCT 33.3*   PLT 85*     CMP:    Recent Labs  Lab 11/23/17  0128   CALCIUM 7.7*   ALBUMIN 2.3*   PROT 5.4*   *   K 4.2   CO2 23      BUN 18   CREATININE 0.9   ALKPHOS 46*   ALT 18   AST 27   BILITOT 1.5*     ABGs:   Recent Labs  Lab 11/22/17  1124   PH 7.365   PCO2 36.3   HCO3 20.7*   POCSATURATED 97   BE -5       Chest X-Ray: I personally reviewed the films and findings are:, air trapping/emphysema, lymphadenopathy: mediastinal, pulmonary edema      ASSESSMENT/PLAN:     Plan:  Neuro:   - no acute issues  - pain control     Pulmonary:   - duo nebs q4 prn  - on 5L NC, wean as tolerated  - IS      Cardiac:  - hx of CABG; home meds asa. plavix and metoprolol              -defer restart of asa and plavix to sx  - currently off pressors  - Echo: nl EF      Renal:   - BUN/Cr WNL, COCO resolved  - weaver in place    - strict Is/Os      Infectious Disease:   - cont flagyl and ceftriaxone per primary team  - leukocytosis @ baseline  - cxs show slight worsening of haziness throughout  - LA WNL  - shingles, cont acyclovir     Hematology/Oncology:   - H/H 14/43 preop  - post op 12/36  - transfuse as needed      Endocrine:  - hx of DMII on oral agents   - POCT glucose      Fluids/Electrolytes/Nutrition/GI:   - mIVF 125  - replace lytes prn  - npo  - g tube to gravity, can clamp for med administration      Pain Management:   Fentanyl gtt      Dispo: SICU mgt, can likely SD to floor today pending primary assessment.    Will d/w staff.    David Sutton, CA-2  SICU

## 2017-11-23 NOTE — OP NOTE
DATE OF PROCEDURE:  11/21/2017    PRIMARY SURGEON:  Eber Riley M.D.    ASSISTANT SURGEON:  Reynold Means M.D. (RES).    PREOPERATIVE DIAGNOSIS:  Perforated hollow viscus with intraabdominal sepsis and   intraperitoneal free air.    POSTOPERATIVE DIAGNOSIS:  Perforated hollow viscus with intraabdominal sepsis   and intraperitoneal free air., which was secondary to suspected perforated   sigmoid diverticulitis.    PROCEDURES PERFORMED:  Exploratory laparotomy, abdominal washout, placement of   two #19 close suction drains around the sigmoid diverticulitis phlegmon;   diverting loop ileostomy in the right lower quadrant; placement of a 20-Iranian   Malecot open gastrostomy tube.    PROCEDURE IN DETAIL:  The patient underwent informed consent.  The history and   physical examination was reviewed and updated.  The patient was brought to the   Operating Room after perioperative resuscitation and broad-spectrum IV   antibiotics were administered.  Garcia catheter was in place.  A nasogastric tube   was placed.  The patient underwent general endotracheal anesthesia.  He was in   a supine position.  A vertical midline incision was made.  The abdomen was   entered.  A large amount of foul smelling fibrinopurulent exudative fluid was   drained.  Intraperitoneal cultures were taken.  The wound was fully opened   superiorly toward the xiphoid and inferiorly toward the suprapubic area to fully   expose the abdomen.  The patient had a history of appendiceal cancer, but there   was no evidence of carcinomatosis or malignant disease.  We were able to run   the small bowel.  The patient had had a prior ileocecectomy and we were able to   run all of the bowel from the terminal ileum at the ileocecectomy site   retrograde to the ligament of Treitz.  There were some interloop inflammatory   adhesions, which were easily taken down manually and with sharp dissection.    There was fibrinous exudate on the bowel wall, but there  was no evidence of   small bowel perforation as the bowel was run multiple times.  The bowel wall   itself was edematous and dilated from likely either obstruction or ileus from   the inflammatory process, but there was no pathology to the small bowel itself.    We inspected the stomach and the pyloric region and there was no evidence of   perforated ulcer.  There was no significant fibrinous exudate in the upper   abdomen.  Down in the pelvis, there was a significant phlegmon in the left lower   quadrant around the sigmoid colon and what appeared to be some longer standing   fibrinous exudate and necrosis of the epiploic appendages and with this   phlegmon, the patient likely had a perforated sigmoid diverticulitis.  There was   no definitive abscess seen on the CT scan and throughout the entire operation,   which was at least a couple of hours, there was no evidence of any gross fecal   spillage or contamination coming from the sigmoid colon.  Because there was no   gross fecal spillage or contamination after we had broken up all the interloop   abscesses of the small bowel as well as in the pelvis and the paracolic gutters   and we drained the upper quadrants subphrenic spaces as well as Morison's pouch,   we elected to not resect any of the sigmoid colon at this point given the   extensive inflammatory phlegmon in the area.  This was drained widely with two   #19 round Atul drain, closed suction drain systems, which were brought out   through left lower abdomen stab incisions.  These were anchored to the skin at   the exit site.  The nasogastric tube could not be positioned into the stomach   and therefore we elected after extensive manipulation by Anesthesia and myself   intraoperatively to try to get the NG tube into the stomach, we elected to   proceed with an open gastrostomy tube.  We used a 20-Croatian Malecot catheter,   which we brought into the abdomen through a right upper quadrant stab incision.    We  made a double pursestring on the anterior gastric wall with 2-0 silk   sutures.  We made an anterior gastrotomy and advanced the 20-Wolof Malecot   catheter so that its tip would be in the dependent portion of the posterior   fundus.  The gastrostomy tube was anchored to the skin with a 0 Prolene suture   and capped with a catheter plug.  The double pursestring on the anterior gastric   wall had been tied to secure the tube at the gastrotomy site.  We did not tack   the anterior gastric wall to the anterior abdominal wall due to mobility issues.    We wrapped the tube with omentum at the site where the tube entered the   stomach at the gastrostomy site.  At this point, we debated whether to close or   perform a diverting loop ileostomy.  Given the patient's presentation with   hypotension at the outside institution as well as history of immunosuppression   as he was on chronic steroid use, I elected to perform a diverting loop   ileostomy approximately 12 inches from the ileocecostomy anastomosis.  We   brought up a loop of terminal ileum.  We made a circular incision in the skin in   the right lower quadrant,  the muscle fibers of the rectus abdominis   muscle after a cruciate incision was made in the fascia to allow 2 fingers to   come in through the peritoneal cavity, we brought this loop up through the   ileostomy opening.  Before we sutured it, we brought the bowel back into the   abdomen.  The small bowel was significantly distended.  We therefore made the   ileotomy at the anticipated loop ileostomy site and we were able to place the   bowel guard suction catheter into the proximal intestinal loop and milk all of   the enteric contents down from the ligament of Treitz down to the ileotomy site   to fully decompress the bowel.  With the bowel decompressed, we then brought the   loop of ileum at the ileotomy site back out through the ileostomy site and this   was temporarily clamped with a Sontag  clamp while we closed the abdomen with   hemostasis assured.  All needle, instrument and sponge counts were correct.  The   abdomen was irrigated one last time.  The fascia was closed with a running   looped #1 PDS suture.  The skin would be left open along with the subcutaneous   tissue to either heal by secondary intention or place a wound VAC system.  We   would place a black sponge wound VAC system at the end of the case along the   midline abdominal wound.  In the meantime, we took a large red rubber catheter   and passed it through the mesentery of the loop ileostomy and cut it so that it   was anchored at either end of the ileostomy skin site to create a bridge over   which the loop ileostomy would pass.  With the catheter anchored to the skin   with nylon suture, we then matured the loop ileostomy.  The proximal lumen was   superior and the distal lumen was inferior.  We created a 3-point fixation   Nilda ileostomy maturing it with interrupted 3-0 Vicryl sutures using   approximately 12 sutures.  We then placed an ileostomy appliance over the stoma   of the loop ileostomy.  Once that was done, the wound VAC black sponge system   was placed on the midline wound using standard technique.  The nasogastric tube   was continued.  Estimated blood loss during the procedure was minimal.  All   needle, instrument and sponge counts were correct.  The patient was turned over   to Anesthesia for transfer to the Intensive Care Unit in a satisfactory   condition.  The patient was not on pressors and tolerated the procedure well.    No specimens were sent other than the intraperitoneal cultures that were taken   at the onset of the case.      JOSE/IN  dd: 11/23/2017 11:50:14 (CST)  td: 11/23/2017 12:47:11 (CST)  Doc ID   #[id number]  Job ID #[id number]    CC:

## 2017-11-23 NOTE — ASSESSMENT & PLAN NOTE
56 yo M with a history of DM, HTN, HLD and arthritis who presented with free air s/p ex-lap with diverting loop ileostomy and Gtube placement    - Extubated and doing well  - Aggressive respiratory toilet with acappella and IS  - Broad spectrum antibiotics along with diflucan; await intra-op culture results  - Keep NPO; ok to have home meds  - Will have on IV stress dose steroids for now  - Hold plavix  - Continue weaver  - Daily labs  - SCDs, DVT and GI prophylaxis  - PT/OT  - Dispo: continue ICU care

## 2017-11-23 NOTE — PLAN OF CARE
Problem: Patient Care Overview  Goal: Plan of Care Review  Outcome: Ongoing (interventions implemented as appropriate)  Pt remains free from falls/skin breakdown this shift. Pt currently AAOx4, follows commands appropriately, and O2 titrated to 3 L per NC; pt tolerated well and maintained O2 sats. LR gtt discontinued and D5 with 1/2 NS and 20 mEq of K started at 80 ml/hr. PCA pump still in use; pt states pain is still present but is being controlled appropriately. Clear liquid diet started today; pt tolerating new diet well. Garcia catheter was removed and pt was able to void spontaneously without any issues. RACHEL drains and wound vac remain intact with minimal output. Plan is for pt to receive transfer orders tomorrow to be stepped down. Will continue to monitor pt closely. See flowsheets for specific details.

## 2017-11-24 PROBLEM — B02.9 HERPES ZOSTER: Status: ACTIVE | Noted: 2017-11-24

## 2017-11-24 PROBLEM — K66.8 FREE INTRAPERITONEAL AIR: Status: ACTIVE | Noted: 2017-11-24

## 2017-11-24 LAB
ALBUMIN SERPL BCP-MCNC: 2.1 G/DL
ALP SERPL-CCNC: 50 U/L
ALT SERPL W/O P-5'-P-CCNC: 31 U/L
ANION GAP SERPL CALC-SCNC: 8 MMOL/L
ANISOCYTOSIS BLD QL SMEAR: SLIGHT
AST SERPL-CCNC: 66 U/L
BACTERIA SPEC AEROBE CULT: NORMAL
BASO STIPL BLD QL SMEAR: ABNORMAL
BASOPHILS # BLD AUTO: ABNORMAL K/UL
BASOPHILS NFR BLD: 0 %
BILIRUB SERPL-MCNC: 1.5 MG/DL
BUN SERPL-MCNC: 14 MG/DL
BURR CELLS BLD QL SMEAR: ABNORMAL
CALCIUM SERPL-MCNC: 8.4 MG/DL
CHLORIDE SERPL-SCNC: 100 MMOL/L
CO2 SERPL-SCNC: 28 MMOL/L
CREAT SERPL-MCNC: 0.7 MG/DL
DFA SPECIMEN: ABNORMAL
DFA SPECIMEN: NORMAL
DIFFERENTIAL METHOD: ABNORMAL
EOSINOPHIL # BLD AUTO: ABNORMAL K/UL
EOSINOPHIL NFR BLD: 0 %
ERYTHROCYTE [DISTWIDTH] IN BLOOD BY AUTOMATED COUNT: 18.4 %
EST. GFR  (AFRICAN AMERICAN): >60 ML/MIN/1.73 M^2
EST. GFR  (NON AFRICAN AMERICAN): >60 ML/MIN/1.73 M^2
GLUCOSE SERPL-MCNC: 160 MG/DL
HCT VFR BLD AUTO: 29.4 %
HGB BLD-MCNC: 9.8 G/DL
HSV AG, DFA: NEGATIVE
IMM GRANULOCYTES # BLD AUTO: ABNORMAL K/UL
IMM GRANULOCYTES NFR BLD AUTO: ABNORMAL %
LYMPHOCYTES # BLD AUTO: ABNORMAL K/UL
LYMPHOCYTES NFR BLD: 15 %
MAGNESIUM SERPL-MCNC: 1.8 MG/DL
MCH RBC QN AUTO: 27.4 PG
MCHC RBC AUTO-ENTMCNC: 33.3 G/DL
MCV RBC AUTO: 82 FL
MONOCYTES # BLD AUTO: ABNORMAL K/UL
MONOCYTES NFR BLD: 5 %
NEUTROPHILS NFR BLD: 79 %
NEUTS BAND NFR BLD MANUAL: 1 %
NRBC BLD-RTO: 0 /100 WBC
PHOSPHATE SERPL-MCNC: 2.1 MG/DL
PLATELET # BLD AUTO: 98 K/UL
PLATELET BLD QL SMEAR: ABNORMAL
PMV BLD AUTO: 10.3 FL
POCT GLUCOSE: 101 MG/DL (ref 70–110)
POCT GLUCOSE: 104 MG/DL (ref 70–110)
POCT GLUCOSE: 129 MG/DL (ref 70–110)
POIKILOCYTOSIS BLD QL SMEAR: SLIGHT
POLYCHROMASIA BLD QL SMEAR: ABNORMAL
POTASSIUM SERPL-SCNC: 3.4 MMOL/L
PROT SERPL-MCNC: 5.5 G/DL
RBC # BLD AUTO: 3.58 M/UL
SODIUM SERPL-SCNC: 136 MMOL/L
TOXIC GRANULES BLD QL SMEAR: PRESENT
VARICELLA ZOSTER VIRUS DFA: POSITIVE
WBC # BLD AUTO: 3.63 K/UL

## 2017-11-24 PROCEDURE — 20600001 HC STEP DOWN PRIVATE ROOM

## 2017-11-24 PROCEDURE — 63600175 PHARM REV CODE 636 W HCPCS: Performed by: STUDENT IN AN ORGANIZED HEALTH CARE EDUCATION/TRAINING PROGRAM

## 2017-11-24 PROCEDURE — 94770 HC EXHALED C02 TEST: CPT

## 2017-11-24 PROCEDURE — 63600175 PHARM REV CODE 636 W HCPCS: Performed by: ANESTHESIOLOGY

## 2017-11-24 PROCEDURE — 27000221 HC OXYGEN, UP TO 24 HOURS

## 2017-11-24 PROCEDURE — 99900035 HC TECH TIME PER 15 MIN (STAT)

## 2017-11-24 PROCEDURE — 25000003 PHARM REV CODE 250: Performed by: STUDENT IN AN ORGANIZED HEALTH CARE EDUCATION/TRAINING PROGRAM

## 2017-11-24 PROCEDURE — S0030 INJECTION, METRONIDAZOLE: HCPCS | Performed by: ANESTHESIOLOGY

## 2017-11-24 PROCEDURE — 94761 N-INVAS EAR/PLS OXIMETRY MLT: CPT

## 2017-11-24 PROCEDURE — 80053 COMPREHEN METABOLIC PANEL: CPT

## 2017-11-24 PROCEDURE — 25000003 PHARM REV CODE 250: Performed by: ANESTHESIOLOGY

## 2017-11-24 PROCEDURE — 83735 ASSAY OF MAGNESIUM: CPT

## 2017-11-24 PROCEDURE — 25000003 PHARM REV CODE 250

## 2017-11-24 PROCEDURE — 84100 ASSAY OF PHOSPHORUS: CPT

## 2017-11-24 RX ORDER — OXYCODONE AND ACETAMINOPHEN 5; 325 MG/1; MG/1
1 TABLET ORAL EVERY 4 HOURS PRN
Status: DISCONTINUED | OUTPATIENT
Start: 2017-11-24 | End: 2017-11-29 | Stop reason: HOSPADM

## 2017-11-24 RX ORDER — POTASSIUM CHLORIDE 20 MEQ/15ML
60 SOLUTION ORAL
Status: DISCONTINUED | OUTPATIENT
Start: 2017-11-24 | End: 2017-11-24

## 2017-11-24 RX ORDER — SODIUM,POTASSIUM PHOSPHATES 280-250MG
2 POWDER IN PACKET (EA) ORAL
Status: DISCONTINUED | OUTPATIENT
Start: 2017-11-24 | End: 2017-11-24

## 2017-11-24 RX ORDER — PANTOPRAZOLE SODIUM 40 MG/1
40 TABLET, DELAYED RELEASE ORAL DAILY
Status: DISCONTINUED | OUTPATIENT
Start: 2017-11-25 | End: 2017-11-29 | Stop reason: HOSPADM

## 2017-11-24 RX ORDER — HYDROMORPHONE HYDROCHLORIDE 1 MG/ML
0.5 INJECTION, SOLUTION INTRAMUSCULAR; INTRAVENOUS; SUBCUTANEOUS
Status: DISCONTINUED | OUTPATIENT
Start: 2017-11-24 | End: 2017-11-29 | Stop reason: HOSPADM

## 2017-11-24 RX ORDER — POTASSIUM CHLORIDE 20 MEQ/15ML
40 SOLUTION ORAL
Status: DISCONTINUED | OUTPATIENT
Start: 2017-11-24 | End: 2017-11-24

## 2017-11-24 RX ORDER — OXYCODONE AND ACETAMINOPHEN 10; 325 MG/1; MG/1
1 TABLET ORAL EVERY 4 HOURS PRN
Status: DISCONTINUED | OUTPATIENT
Start: 2017-11-24 | End: 2017-11-29 | Stop reason: HOSPADM

## 2017-11-24 RX ORDER — ACYCLOVIR 200 MG/1
800 CAPSULE ORAL
Status: DISCONTINUED | OUTPATIENT
Start: 2017-11-24 | End: 2017-11-29 | Stop reason: HOSPADM

## 2017-11-24 RX ORDER — PREDNISONE 5 MG/1
5 TABLET ORAL DAILY
Status: DISCONTINUED | OUTPATIENT
Start: 2017-11-24 | End: 2017-11-29 | Stop reason: HOSPADM

## 2017-11-24 RX ORDER — CIPROFLOXACIN 2 MG/ML
400 INJECTION, SOLUTION INTRAVENOUS
Status: DISCONTINUED | OUTPATIENT
Start: 2017-11-24 | End: 2017-11-25

## 2017-11-24 RX ADMIN — MAGNESIUM SULFATE IN WATER 2 G: 40 INJECTION, SOLUTION INTRAVENOUS at 06:11

## 2017-11-24 RX ADMIN — HEPARIN SODIUM 5000 UNITS: 5000 INJECTION, SOLUTION INTRAVENOUS; SUBCUTANEOUS at 09:11

## 2017-11-24 RX ADMIN — METRONIDAZOLE 500 MG: 500 INJECTION, SOLUTION INTRAVENOUS at 09:11

## 2017-11-24 RX ADMIN — CLOPIDOGREL 75 MG: 75 TABLET, FILM COATED ORAL at 09:11

## 2017-11-24 RX ADMIN — GABAPENTIN 100 MG: 100 CAPSULE ORAL at 01:11

## 2017-11-24 RX ADMIN — GABAPENTIN 100 MG: 100 CAPSULE ORAL at 06:11

## 2017-11-24 RX ADMIN — GABAPENTIN 100 MG: 100 CAPSULE ORAL at 09:11

## 2017-11-24 RX ADMIN — HEPARIN SODIUM 5000 UNITS: 5000 INJECTION, SOLUTION INTRAVENOUS; SUBCUTANEOUS at 05:11

## 2017-11-24 RX ADMIN — METOPROLOL TARTRATE 25 MG: 25 TABLET ORAL at 09:11

## 2017-11-24 RX ADMIN — FAMOTIDINE 20 MG: 20 TABLET, FILM COATED ORAL at 09:11

## 2017-11-24 RX ADMIN — METRONIDAZOLE 500 MG: 500 INJECTION, SOLUTION INTRAVENOUS at 01:11

## 2017-11-24 RX ADMIN — HYDROCORTISONE SODIUM SUCCINATE 100 MG: 100 INJECTION, POWDER, FOR SOLUTION INTRAMUSCULAR; INTRAVENOUS at 05:11

## 2017-11-24 RX ADMIN — ACYCLOVIR 800 MG: 200 CAPSULE ORAL at 05:11

## 2017-11-24 RX ADMIN — FLUCONAZOLE 200 MG: 2 INJECTION, SOLUTION INTRAVENOUS at 09:11

## 2017-11-24 RX ADMIN — OXYCODONE HYDROCHLORIDE AND ACETAMINOPHEN 1 TABLET: 10; 325 TABLET ORAL at 05:11

## 2017-11-24 RX ADMIN — DEXTROSE MONOHYDRATE, SODIUM CHLORIDE, AND POTASSIUM CHLORIDE: 50; 4.5; 1.49 INJECTION, SOLUTION INTRAVENOUS at 12:11

## 2017-11-24 RX ADMIN — ACYCLOVIR 800 MG: 200 CAPSULE ORAL at 01:11

## 2017-11-24 RX ADMIN — ACYCLOVIR SODIUM 710 MG: 50 INJECTION, SOLUTION INTRAVENOUS at 07:11

## 2017-11-24 RX ADMIN — POTASSIUM CHLORIDE 40 MEQ: 20 SOLUTION ORAL at 05:11

## 2017-11-24 RX ADMIN — DEXTROSE MONOHYDRATE, SODIUM CHLORIDE, AND POTASSIUM CHLORIDE: 50; 4.5; 1.49 INJECTION, SOLUTION INTRAVENOUS at 09:11

## 2017-11-24 RX ADMIN — ATORVASTATIN CALCIUM 10 MG: 10 TABLET, FILM COATED ORAL at 09:11

## 2017-11-24 RX ADMIN — METRONIDAZOLE 500 MG: 500 INJECTION, SOLUTION INTRAVENOUS at 05:11

## 2017-11-24 RX ADMIN — LOSARTAN POTASSIUM AND HYDROCHLOROTHIAZIDE 1 TABLET: 12.5; 1 TABLET ORAL at 09:11

## 2017-11-24 RX ADMIN — CITALOPRAM HYDROBROMIDE 40 MG: 20 TABLET ORAL at 09:11

## 2017-11-24 RX ADMIN — ACYCLOVIR 800 MG: 200 CAPSULE ORAL at 09:11

## 2017-11-24 RX ADMIN — PREDNISONE 5 MG: 5 TABLET ORAL at 10:11

## 2017-11-24 RX ADMIN — OXYCODONE HYDROCHLORIDE AND ACETAMINOPHEN 1 TABLET: 10; 325 TABLET ORAL at 09:11

## 2017-11-24 RX ADMIN — POTASSIUM CHLORIDE 40 MEQ: 1.5 SOLUTION ORAL at 10:11

## 2017-11-24 RX ADMIN — HEPARIN SODIUM 5000 UNITS: 5000 INJECTION, SOLUTION INTRAVENOUS; SUBCUTANEOUS at 01:11

## 2017-11-24 RX ADMIN — POTASSIUM & SODIUM PHOSPHATES POWDER PACK 280-160-250 MG 2 PACKET: 280-160-250 PACK at 05:11

## 2017-11-24 RX ADMIN — POTASSIUM & SODIUM PHOSPHATES POWDER PACK 280-160-250 MG 2 PACKET: 280-160-250 PACK at 10:11

## 2017-11-24 RX ADMIN — ASPIRIN 81 MG CHEWABLE TABLET 81 MG: 81 TABLET CHEWABLE at 09:11

## 2017-11-24 RX ADMIN — POTASSIUM CHLORIDE 40 MEQ: 20 SOLUTION ORAL at 10:11

## 2017-11-24 RX ADMIN — CIPROFLOXACIN 400 MG: 2 INJECTION, SOLUTION INTRAVENOUS at 01:11

## 2017-11-24 NOTE — PROGRESS NOTES
[facility]  General Surgery  Progress Note    Subjective:     History of Present Illness:  Cristo Jefferson is a 57 y.o. 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  Pt then received adjuvant chemo/XRT with last treatment in March 2017.  He states he developed abdominal pain with associated N/V Saturday.  Pain has steadily worsened since then.  Minimal PO intake.  Hypotensive on arrival to [facility] ED.  Improved with 2L NS administration.  Creatinine 3.1.  Lactate 2.7.  CT abdomen/pelvis with intraabdominal free air.    Post-Op Info:  Procedure(s) (LRB):  EXPLORATORY-LAPAROTOMY (N/A)  INCISION AND DRAINAGE (I & D)- Intra-Abdominal abcess (N/A)  INCISION AND DRAINAGE (I & D)-Abdominal-Pelvic Abcess (N/A)  Loop Ileostomy (N/A)  INSERTION-TUBE-GASTROSTOMY (Right)   3 Days Post-Op     Interval History: No acute events overnight. Tolerating clears. No nausea or emesis. Voiding without issues. Did not sit up in chair yesterday.    Medications:  Continuous Infusions:   dextrose 5 % and 0.45 % NaCl with KCl 20 mEq 80 mL/hr at 11/24/17 1000    hydromorphone in 0.9 % NaCl 6 mg/30 ml       Scheduled Meds:   acyclovir  10 mg/kg (Ideal) Intravenous Q8H    aspirin  81 mg Oral Daily    atorvastatin  10 mg Oral Daily    ciprofloxacin  400 mg Intravenous Q12H    citalopram  40 mg Oral Daily    clopidogrel  75 mg Oral Daily    famotidine  20 mg Oral BID    fluconazole (DIFLUCAN) IVPB  200 mg Intravenous Q24H    gabapentin  100 mg Oral TID    heparin (porcine)  5,000 Units Subcutaneous Q8H    losartan-hydrochlorothiazide 100-12.5 mg  1 tablet Oral Daily    metoprolol tartrate  25 mg Oral BID    metronidazole  500 mg Intravenous Q8H    predniSONE  5 mg Oral Daily     PRN Meds:albuterol-ipratropium 2.5mg-0.5mg/3mL, calcium gluconate IVPB, calcium gluconate IVPB, calcium gluconate IVPB, dextrose 50%, glucagon (human recombinant), hydrocodone-acetaminophen 5-325mg, hydrOXYzine  pamoate, insulin aspart, magnesium sulfate IVPB, magnesium sulfate IVPB, naloxone, potassium chloride 10%, potassium chloride 10%, potassium chloride 10%, potassium, sodium phosphates, potassium, sodium phosphates, potassium, sodium phosphates     Review of patient's allergies indicates:   Allergen Reactions    Morphine      Objective:     Vital Signs (Most Recent):  Temp: 97.8 °F (36.6 °C) (11/24/17 0800)  Pulse: 61 (11/24/17 1100)  Resp: (!) 23 (11/24/17 1100)  BP: 138/87 (11/24/17 1100)  SpO2: 95 % (11/24/17 1100) Vital Signs (24h Range):  Temp:  [97.8 °F (36.6 °C)-98.1 °F (36.7 °C)] 97.8 °F (36.6 °C)  Pulse:  [50-80] 61  Resp:  [11-60] 23  SpO2:  [87 %-97 %] 95 %  BP: (100-146)/(59-93) 138/87     Weight: 103.6 kg (228 lb 6.3 oz)  Body mass index is 33.73 kg/m².    Intake/Output - Last 3 Shifts       11/22 0700 - 11/23 0659 11/23 0700 - 11/24 0659 11/24 0700 - 11/25 0659    P.O.  120     I.V. (mL/kg) 4481 (43.3) 3550 (34.3) 565 (5.5)    Other 85 75     IV Piggyback 1000      Total Intake(mL/kg) 5566 (53.7) 3745 (36.1) 565 (5.5)    Urine (mL/kg/hr) 2475 (1) 2815 (1.1) 175 (0.4)    Drains 190 (0.1) 44 (0)     Other 250 (0.1) 100 (0)     Stool 325 (0.1) 175 (0.1) 600 (1.2)    Total Output 3240 3134 775    Net +2326 +611 -210                 Physical Exam   Constitutional: He is oriented to person, place, and time. He appears well-developed and well-nourished. No distress.   HENT:   Head: Normocephalic and atraumatic.   Eyes: EOM are normal. No scleral icterus.   Neck: Normal range of motion. Neck supple.   Cardiovascular: Normal rate and regular rhythm.    Pulmonary/Chest: Effort normal and breath sounds normal.   Abdominal: Soft. He exhibits distension. He exhibits no mass. There is tenderness. There is no guarding.   Skin vac in place; two abdominal drains with serous output. Gtube in place to gravity.    Ostomy is flat, patient with purplish color; gas in bag.    Abdomen is less tender today   Musculoskeletal:  Normal range of motion.   Lymphadenopathy:     He has no cervical adenopathy.   Neurological: He is alert and oriented to person, place, and time.   Skin: Skin is warm and dry.   Herpetic lesions noted on R anterior thigh   Psychiatric: He has a normal mood and affect. His behavior is normal.       Significant Labs:  CBC:     Recent Labs  Lab 11/24/17 0257   WBC 3.63*   RBC 3.58*   HGB 9.8*   HCT 29.4*   PLT 98*   MCV 82   MCH 27.4   MCHC 33.3     CMP:     Recent Labs  Lab 11/24/17 0257   *   CALCIUM 8.4*   ALBUMIN 2.1*   PROT 5.5*      K 3.4*   CO2 28      BUN 14   CREATININE 0.7   ALKPHOS 50*   ALT 31   AST 66*   BILITOT 1.5*       Significant Diagnostics:  I have reviewed all pertinent imaging results/findings within the past 24 hours.    Assessment/Plan:     * Free intraperitoneal air    58 yo M with a history of DM, HTN, HLD and arthritis who presented with free air s/p ex-lap with diverting loop ileostomy and Gtube placement    - Advance to full liquid diet  - Clamp Gtube; unclamp for nausea if prn anti-emetics are not effective  - Continue home medications  - Aggressive respiratory toilet with acappella and IS  - Broad spectrum antibiotics along with diflucan; so far cultures with GNR  - Transition to oral prednisone  - Daily labs  - SCDs, DVT and GI prophylaxis  - PT/OT; OOBTC  - Dispo: transfer to floor today        Herpes zoster    - Dermatology consulted; patient started on IV acyclovir  - Transition to oral acyclovir            Kevin Wakefield MD  General Surgery  [facility]

## 2017-11-24 NOTE — SUBJECTIVE & OBJECTIVE
Interval History: No acute events overnight. Tolerating clears. No nausea or emesis. Voiding without issues. Did not sit up in chair yesterday.    Medications:  Continuous Infusions:   dextrose 5 % and 0.45 % NaCl with KCl 20 mEq 80 mL/hr at 11/24/17 1000    hydromorphone in 0.9 % NaCl 6 mg/30 ml       Scheduled Meds:   acyclovir  10 mg/kg (Ideal) Intravenous Q8H    aspirin  81 mg Oral Daily    atorvastatin  10 mg Oral Daily    ciprofloxacin  400 mg Intravenous Q12H    citalopram  40 mg Oral Daily    clopidogrel  75 mg Oral Daily    famotidine  20 mg Oral BID    fluconazole (DIFLUCAN) IVPB  200 mg Intravenous Q24H    gabapentin  100 mg Oral TID    heparin (porcine)  5,000 Units Subcutaneous Q8H    losartan-hydrochlorothiazide 100-12.5 mg  1 tablet Oral Daily    metoprolol tartrate  25 mg Oral BID    metronidazole  500 mg Intravenous Q8H    predniSONE  5 mg Oral Daily     PRN Meds:albuterol-ipratropium 2.5mg-0.5mg/3mL, calcium gluconate IVPB, calcium gluconate IVPB, calcium gluconate IVPB, dextrose 50%, glucagon (human recombinant), hydrocodone-acetaminophen 5-325mg, hydrOXYzine pamoate, insulin aspart, magnesium sulfate IVPB, magnesium sulfate IVPB, naloxone, potassium chloride 10%, potassium chloride 10%, potassium chloride 10%, potassium, sodium phosphates, potassium, sodium phosphates, potassium, sodium phosphates     Review of patient's allergies indicates:   Allergen Reactions    Morphine      Objective:     Vital Signs (Most Recent):  Temp: 97.8 °F (36.6 °C) (11/24/17 0800)  Pulse: 61 (11/24/17 1100)  Resp: (!) 23 (11/24/17 1100)  BP: 138/87 (11/24/17 1100)  SpO2: 95 % (11/24/17 1100) Vital Signs (24h Range):  Temp:  [97.8 °F (36.6 °C)-98.1 °F (36.7 °C)] 97.8 °F (36.6 °C)  Pulse:  [50-80] 61  Resp:  [11-60] 23  SpO2:  [87 %-97 %] 95 %  BP: (100-146)/(59-93) 138/87     Weight: 103.6 kg (228 lb 6.3 oz)  Body mass index is 33.73 kg/m².    Intake/Output - Last 3 Shifts       11/22 0700 - 11/23  0659 11/23 0700 - 11/24 0659 11/24 0700 - 11/25 0659    P.O.  120     I.V. (mL/kg) 4481 (43.3) 3550 (34.3) 565 (5.5)    Other 85 75     IV Piggyback 1000      Total Intake(mL/kg) 5566 (53.7) 3745 (36.1) 565 (5.5)    Urine (mL/kg/hr) 2475 (1) 2815 (1.1) 175 (0.4)    Drains 190 (0.1) 44 (0)     Other 250 (0.1) 100 (0)     Stool 325 (0.1) 175 (0.1) 600 (1.2)    Total Output 3240 3134 775    Net +2326 +611 -210                 Physical Exam   Constitutional: He is oriented to person, place, and time. He appears well-developed and well-nourished. No distress.   HENT:   Head: Normocephalic and atraumatic.   Eyes: EOM are normal. No scleral icterus.   Neck: Normal range of motion. Neck supple.   Cardiovascular: Normal rate and regular rhythm.    Pulmonary/Chest: Effort normal and breath sounds normal.   Abdominal: Soft. He exhibits distension. He exhibits no mass. There is tenderness. There is no guarding.   Skin vac in place; two abdominal drains with serous output. Gtube in place to gravity.    Ostomy is flat, patient with purplish color; gas in bag.    Abdomen is less tender today   Musculoskeletal: Normal range of motion.   Lymphadenopathy:     He has no cervical adenopathy.   Neurological: He is alert and oriented to person, place, and time.   Skin: Skin is warm and dry.   Herpetic lesions noted on R anterior thigh   Psychiatric: He has a normal mood and affect. His behavior is normal.       Significant Labs:  CBC:     Recent Labs  Lab 11/24/17 0257   WBC 3.63*   RBC 3.58*   HGB 9.8*   HCT 29.4*   PLT 98*   MCV 82   MCH 27.4   MCHC 33.3     CMP:     Recent Labs  Lab 11/24/17 0257   *   CALCIUM 8.4*   ALBUMIN 2.1*   PROT 5.5*      K 3.4*   CO2 28      BUN 14   CREATININE 0.7   ALKPHOS 50*   ALT 31   AST 66*   BILITOT 1.5*       Significant Diagnostics:  I have reviewed all pertinent imaging results/findings within the past 24 hours.

## 2017-11-24 NOTE — PROGRESS NOTES
[facility]  Dermatology  Progress Note    Patient Name: Cristo Jefferson  MRN: [id number]  Admission Date: 11/21/2017  Hospital Length of Stay: 3 days  Attending Physician: Eber Riley MD  Primary Care Provider: Vince Perez MD     Subjective:     Principal Problem:Free intraperitoneal air    Interval History: Patient reports his rash is doing better. No other skin complaints.     Medications:  Continuous Infusions:   dextrose 5 % and 0.45 % NaCl with KCl 20 mEq 80 mL/hr at 11/24/17 1000    hydromorphone in 0.9 % NaCl 6 mg/30 ml       Scheduled Meds:   acyclovir  800 mg Oral 5x Daily    aspirin  81 mg Oral Daily    atorvastatin  10 mg Oral Daily    ciprofloxacin  400 mg Intravenous Q12H    citalopram  40 mg Oral Daily    clopidogrel  75 mg Oral Daily    famotidine  20 mg Oral BID    fluconazole (DIFLUCAN) IVPB  200 mg Intravenous Q24H    gabapentin  100 mg Oral TID    heparin (porcine)  5,000 Units Subcutaneous Q8H    losartan-hydrochlorothiazide 100-12.5 mg  1 tablet Oral Daily    metoprolol tartrate  25 mg Oral BID    metronidazole  500 mg Intravenous Q8H    predniSONE  5 mg Oral Daily     PRN Meds:albuterol-ipratropium 2.5mg-0.5mg/3mL, dextrose 50%, glucagon (human recombinant), hydrOXYzine pamoate, insulin aspart, naloxone    Review of Systems   Constitutional: Negative for fever.   Skin: Positive for rash. Negative for itching.     Objective:     Vital Signs (Most Recent):  Temp: 97.5 °F (36.4 °C) (11/24/17 1152)  Pulse: (!) 58 (11/24/17 1152)  Resp: 16 (11/24/17 1152)  BP: 118/76 (11/24/17 1152)  SpO2: (!) 94 % (11/24/17 1152) Vital Signs (24h Range):  Temp:  [97.5 °F (36.4 °C)-98.1 °F (36.7 °C)] 97.5 °F (36.4 °C)  Pulse:  [50-80] 58  Resp:  [11-33] 16  SpO2:  [87 %-97 %] 94 %  BP: (100-138)/(59-93) 118/76     Weight: 103.6 kg (228 lb 6.3 oz)  Body mass index is 33.73 kg/m².    Physical Exam   Constitutional: He appears well-developed and well-nourished. He is obese.   No distress.   Neurological: He is alert.   Skin:   Areas Examined (abnormalities noted in diagram):   Head / Face Inspection Performed  Neck Inspection Performed  RLE Inspected           Significant Labs: All pertinent labs within the past 24 hours have been reviewed.    DFA VZV positive   DFA HSV negative    Assessment/Plan:     Herpes zoster    - DFA for VZV positive, confirming the diagnosis of herpes zoster  - Continue acyclovir 800mg PO 5x/day x 7 days            Thank you for your consult. I will sign off. Please contact us if you have any additional questions.    Charline Rosas MD  Dermatology  [facility]

## 2017-11-24 NOTE — SUBJECTIVE & OBJECTIVE
Subjective:     Principal Problem:Free intraperitoneal air    Interval History: Patient reports his rash is doing better. No other skin complaints.     Medications:  Continuous Infusions:   dextrose 5 % and 0.45 % NaCl with KCl 20 mEq 80 mL/hr at 11/24/17 1000    hydromorphone in 0.9 % NaCl 6 mg/30 ml       Scheduled Meds:   acyclovir  800 mg Oral 5x Daily    aspirin  81 mg Oral Daily    atorvastatin  10 mg Oral Daily    ciprofloxacin  400 mg Intravenous Q12H    citalopram  40 mg Oral Daily    clopidogrel  75 mg Oral Daily    famotidine  20 mg Oral BID    fluconazole (DIFLUCAN) IVPB  200 mg Intravenous Q24H    gabapentin  100 mg Oral TID    heparin (porcine)  5,000 Units Subcutaneous Q8H    losartan-hydrochlorothiazide 100-12.5 mg  1 tablet Oral Daily    metoprolol tartrate  25 mg Oral BID    metronidazole  500 mg Intravenous Q8H    predniSONE  5 mg Oral Daily     PRN Meds:albuterol-ipratropium 2.5mg-0.5mg/3mL, dextrose 50%, glucagon (human recombinant), hydrOXYzine pamoate, insulin aspart, naloxone    Review of Systems   Constitutional: Negative for fever.   Skin: Positive for rash. Negative for itching.     Objective:     Vital Signs (Most Recent):  Temp: 97.5 °F (36.4 °C) (11/24/17 1152)  Pulse: (!) 58 (11/24/17 1152)  Resp: 16 (11/24/17 1152)  BP: 118/76 (11/24/17 1152)  SpO2: (!) 94 % (11/24/17 1152) Vital Signs (24h Range):  Temp:  [97.5 °F (36.4 °C)-98.1 °F (36.7 °C)] 97.5 °F (36.4 °C)  Pulse:  [50-80] 58  Resp:  [11-33] 16  SpO2:  [87 %-97 %] 94 %  BP: (100-138)/(59-93) 118/76     Weight: 103.6 kg (228 lb 6.3 oz)  Body mass index is 33.73 kg/m².    Physical Exam   Constitutional: He appears well-developed and well-nourished. He is obese.  No distress.   Neurological: He is alert.   Skin:   Areas Examined (abnormalities noted in diagram):   Head / Face Inspection Performed  Neck Inspection Performed  RLE Inspected           Significant Labs: All pertinent labs within the past 24 hours have  been reviewed.    DFA VZV positive   DFA HSV negative

## 2017-11-24 NOTE — PLAN OF CARE
Problem: Patient Care Overview  Goal: Individualization & Mutuality  Dx: ex lap with diverting loop ileostomy     Hx: DM, CAD, CABG, HTN, appendiceal CA, hemicolectomy, shingles     11/21: Transferred to ED from [facility] for surgical evaluation; ex lap  11/22 (night shift): transferred to SICU, 4L NS 1L albumin, levo and vaso started  11/22 (day shift): extubated, levo and vaso off, PCA, 1L NS bolus, 2D echo EF 55%    MAP >65  Accucheck Q6             Outcome: Ongoing (interventions implemented as appropriate)  Patient AAOx4. Remains on dilaudid PCA and maint gtts. Complains of abdominal pain. On 5L nasal cannula, sats= 93%. Wound vac to abdomen with no output this shift. RACHEL's to left abdomen, very minimal serous drainage. Tolerating PEG clamped. Tolerating clears and meds PO. Ileostomy with approx 200cc green stool. Patient and wife at bedside updated on plan of care. Possible step down today. See chart and doc flow sheets for more details.

## 2017-11-24 NOTE — PROGRESS NOTES
Progress Note  Surgical Intensive Care    Admit Date: 11/21/2017  Post-operative Day: 3 Days Post-Op  Hospital Day: 4    SUBJECTIVE:     Follow-up For:  Procedure(s) (LRB):  EXPLORATORY-LAPAROTOMY (N/A)  INCISION AND DRAINAGE (I & D)- Intra-Abdominal abcess (N/A)  INCISION AND DRAINAGE (I & D)-Abdominal-Pelvic Abcess (N/A)  Loop Ileostomy (N/A)  INSERTION-TUBE-GASTROSTOMY (Right)    HPI:  Patient is a 57 y.o. male with PMHx DM II, HTN, CAD s/p CABG and appendiceal CA s/p appendectomy and subsequent R hemicolectomy and adjuvant chemo/XRT with last treatment in March 2017 [place] [facility] [place] complains of abdominal pain found to be hypotensive on arrival with a Creatinine 3.1,  Lactate 2.7 and  CT abdomen/pelvis which demonstrated intraabdominal free air.     Patient was taken to OR for emergent exlap. Received 2 L crystalloid and 500 albumin . Required bumps intermittently of vasopressin and phenylephrine but did not require any gtts intraop.     Patient arrived to SICU intubated and sedated.      Intraop findings significant for no obvious sign of perforation but brown liquid throughout. Abdomen washed out and loop ileostomy brought up in RLQ. 2 drains left and midline wound vac placed. NGT unable to be passed; surgical G tube done intraop.    Interval history:  Oxygenating well on 3L NC. Garcia dc'd yesterday. Pain controlled.      Continuous Infusions:   dextrose 5 % and 0.45 % NaCl with KCl 20 mEq 80 mL/hr at 11/24/17 1000    hydromorphone in 0.9 % NaCl 6 mg/30 ml       Scheduled Meds:   acyclovir  10 mg/kg (Ideal) Intravenous Q8H    aspirin  81 mg Oral Daily    atorvastatin  10 mg Oral Daily    ciprofloxacin  400 mg Intravenous Q12H    citalopram  40 mg Oral Daily    clopidogrel  75 mg Oral Daily    famotidine  20 mg Oral BID    fluconazole (DIFLUCAN) IVPB  200 mg Intravenous Q24H    gabapentin  100 mg Oral TID    heparin (porcine)  5,000 Units Subcutaneous Q8H     losartan-hydrochlorothiazide 100-12.5 mg  1 tablet Oral Daily    metoprolol tartrate  25 mg Oral BID    metronidazole  500 mg Intravenous Q8H    predniSONE  5 mg Oral Daily     PRN Meds:albuterol-ipratropium 2.5mg-0.5mg/3mL, calcium gluconate IVPB, calcium gluconate IVPB, calcium gluconate IVPB, dextrose 50%, glucagon (human recombinant), hydrocodone-acetaminophen 5-325mg, hydrOXYzine pamoate, insulin aspart, magnesium sulfate IVPB, magnesium sulfate IVPB, naloxone, potassium chloride 10%, potassium chloride 10%, potassium chloride 10%, potassium, sodium phosphates, potassium, sodium phosphates, potassium, sodium phosphates    Review of patient's allergies indicates:   Allergen Reactions    Morphine        OBJECTIVE:     Vital Signs (Most Recent)  Temp: 97.8 °F (36.6 °C) (11/24/17 0800)  Pulse: 61 (11/24/17 1100)  Resp: (!) 23 (11/24/17 1100)  BP: 138/87 (11/24/17 1100)  SpO2: 95 % (11/24/17 1100)    Vital Signs Range (Last 24H):  Temp:  [97.8 °F (36.6 °C)-98.1 °F (36.7 °C)]   Pulse:  [50-80]   Resp:  [11-60]   BP: (100-146)/(59-93)   SpO2:  [87 %-97 %]     I & O (Last 24H):    Intake/Output Summary (Last 24 hours) at 11/24/17 1142  Last data filed at 11/24/17 1000   Gross per 24 hour   Intake             4310 ml   Output             3259 ml   Net             1051 ml     Ventilator Data (Last 24H):          Hemodynamic Parameters (Last 24H):       Physical Exam:  General: AAOx3, comfortable   HENT: NC/AT.Conjunctivae/corneas clear. PERRL, ETT in place; NGT  Neck: no adenopathy, no carotid bruit, no JVD  Lungs: mild wheeze  CV: RRR, S1 and S2 Normal. No chest wall tenderness  Abdomen: drains x 2 with brown output; abdominal wound vac in place; ostomy pink  Extremities: WWP, intact distal pulses. no cyanosis or edema  Skin: Skin color, texture, turgor normal. No rashes or lesions  Lymph nodes: Cervical, supraclavicular, and axillary nodes normal.  Neurologic: intact     Wound/Incision:  clean, dry,  intact    Lines/Drains:       Percutaneous Central Line Insertion/Assessment - triple lumen  11/21/17 2330 (Active)   Dressing biopatch in place;dressing dry and intact;dressing reinforced 11/22/2017  7:15 PM   Securement secured w/ sutures 11/22/2017  7:15 PM   Additional Site Signs no erythema;no warmth;no edema;no pain;no palpable cord;no streak formation;no drainage 11/22/2017  7:15 PM   Distal Patency/Care infusing 11/22/2017  7:15 PM   Medial Patency/Care infusing 11/22/2017  7:15 PM   Proximal Patency/Care infusing 11/22/2017  7:15 PM   Waveform normal 11/22/2017  7:15 PM   Line Interventions line leveled/zeroed 11/22/2017  7:15 PM   Daily Line Review Performed 11/22/2017  7:15 PM   Number of days: 1            Peripheral IV - Single Lumen 11/21/17 1440 Right Antecubital (Active)   Site Assessment Clean;Dry;Intact;No redness;No swelling 11/22/2017  7:15 PM   Line Status Flushed;Saline locked 11/22/2017  7:15 PM   Dressing Status Clean;Dry;Intact 11/22/2017  7:15 PM   Dressing Intervention New dressing 11/21/2017  3:17 PM   Dressing Change Due 11/25/17 11/22/2017  7:15 PM   Site Change Due 11/25/17 11/22/2017  7:15 PM   Reason Not Rotated Not due 11/22/2017  7:15 PM   Number of days: 1            Peripheral IV - Single Lumen 11/21/17 1440 Left Hand (Active)   Site Assessment Clean;Dry;Intact;No redness;No swelling 11/22/2017  7:15 PM   Line Status Infusing 11/22/2017  7:15 PM   Dressing Status Clean;Dry;Intact 11/22/2017  7:15 PM   Dressing Intervention New dressing 11/21/2017  3:20 PM   Dressing Change Due 11/25/17 11/22/2017  7:15 PM   Site Change Due 11/25/17 11/22/2017  7:15 PM   Reason Not Rotated Not due 11/22/2017  7:15 PM   Number of days: 1            Closed/Suction Drain 11/21/17 2238 Left;Lateral Abdomen Bulb 19 Fr. (Active)   Site Description Healing 11/22/2017  7:15 PM   Dressing Type No dressing 11/22/2017  7:15 PM   Drainage Serous 11/22/2017  7:15 PM   Status To bulb suction 11/22/2017  7:15 PM  "  Output (mL) 9 mL 11/23/2017  3:00 AM   Number of days: 1            Closed/Suction Drain 11/21/17 2239 Left;Medial Abdomen Bulb 19 Fr. (Active)   Site Description Healing 11/22/2017  7:15 PM   Dressing Type No dressing 11/22/2017  7:15 PM   Drainage Serous 11/22/2017  7:15 PM   Status To bulb suction 11/22/2017  7:15 PM   Output (mL) 6 mL 11/23/2017  3:00 AM   Number of days: 1            Colostomy 11/21/17 2302 Ascending RLQ (Active)   Stomal Appliance 1 piece 11/22/2017  7:15 PM   Stoma Appearance round;red;flush w/ skin 11/22/2017  7:15 PM   Site Assessment Intact 11/22/2017  7:15 PM   Peristomal Assessment COSMO 11/22/2017  7:15 PM   Stoma Function flatus;thin liquid;bright red;yellow;with mucous threads 11/22/2017  7:15 PM   Output (mL) 200 mL 11/22/2017 11:00 PM   Number of days: 1            Urethral Catheter 11/21/17 2135 Non-latex 16 Fr. (Active)   Site Assessment Clean;Intact 11/22/2017  7:15 PM   Collection Container Urimeter 11/22/2017  7:15 PM   Securement Method secured to top of thigh w/ adhesive device 11/22/2017  7:15 PM   Catheter Care Performed yes 11/22/2017  7:15 PM   Reason for Continuing Urinary Catheterization Post operative 11/22/2017  7:15 PM   CAUTI Prevention Bundle StatLock in place w 1" slack;Intact seal between catheter & drainage tubing;Drainage bag off the floor;Green sheeting clip in use;No dependent loops or kinks;Drainage bag not overfilled (<2/3 full);Drainage bag below bladder 11/22/2017  7:15 PM   Output (mL) 100 mL 11/23/2017  6:00 AM   Number of days: 1            Drain/Device  11/21/17 2244 Right upper abdomen other (see comments) (Active)   Insertion Site clean and dry 11/22/2017  7:15 PM   Drainage Characteristics/Odor brown;clots 11/22/2017  3:00 PM   Drainage Amount none 11/22/2017  7:15 PM   General Intake (mL) 85 11/22/2017 10:00 PM   General Output (mL) 125 11/22/2017  3:00 PM   Number of days: 1       Laboratory (Last 24H):  CBC:    Recent Labs  Lab 11/24/17  0257 "   WBC 3.63*   HGB 9.8*   HCT 29.4*   PLT 98*     CMP:    Recent Labs  Lab 11/24/17  0257   CALCIUM 8.4*   ALBUMIN 2.1*   PROT 5.5*      K 3.4*   CO2 28      BUN 14   CREATININE 0.7   ALKPHOS 50*   ALT 31   AST 66*   BILITOT 1.5*     ABGs:     Recent Labs  Lab 11/22/17  1124   PH 7.365   PCO2 36.3   HCO3 20.7*   POCSATURATED 97   BE -5       Chest X-Ray: I personally reviewed the films and findings are:, air trapping/emphysema, lymphadenopathy: mediastinal, pulmonary edema      ASSESSMENT/PLAN:     Plan:  Neuro:   - no acute issues  - pain control     Pulmonary:   - duo nebs q4 prn  - on 3L NC, wean as tolerated  - IS      Cardiac:  - hx of CABG; home meds asa. plavix and metoprolol  -defer restart of asa and plavix to sx   - currently off pressors  - Echo: nl EF      Renal:   - BUN/Cr WNL, COCO resolved  - weaver in place    - strict Is/Os      Infectious Disease:   - cont flagyl and ceftriaxone per primary team  - WBC 3.6  - LA WNL  - shingles, cont acyclovir transition to PO today     Hematology/Oncology:   - H/H 14/43 preop  - post op 9.5/29.4  - transfuse as needed      Endocrine:  - hx of DMII on oral agents   - POCT glucose      Fluids/Electrolytes/Nutrition/GI:   - mIVF 80  - replace lytes prn  - npo  - g tube to gravity, can clamp for med administration      Pain Management:   Fentanyl gtt      Dispo: SICU mgt, plan to stepdown to floor today.     Will d/w staff.    Jaswant Head MD  General Surgery [facility]  [phone]

## 2017-11-24 NOTE — ASSESSMENT & PLAN NOTE
56 yo M with a history of DM, HTN, HLD and arthritis who presented with free air s/p ex-lap with diverting loop ileostomy and Gtube placement    - Advance to full liquid diet  - Clamp Gtube; unclamp for nausea if prn anti-emetics are not effective  - Continue home medications  - Aggressive respiratory toilet with acappella and IS  - Broad spectrum antibiotics along with diflucan; so far cultures with GNR  - Transition to oral prednisone  - Daily labs  - SCDs, DVT and GI prophylaxis  - PT/OT; OOBTC  - Dispo: transfer to floor today

## 2017-11-24 NOTE — PLAN OF CARE
Problem: Patient Care Overview  Goal: Plan of Care Review  Outcome: Ongoing (interventions implemented as appropriate)  Plan of care reviewed with pt. Pt AAOx's4, vital signs stable. Vital signs stable. Currently on 2l per nasal canula. Tolerating a full liquid diet well.Ostomy intact. Wound Vac intact. Pt complains of pain unrelieved with prn meds. Bed in low and locked position with call light in reach. No acute events at this time, WCTM.

## 2017-11-24 NOTE — NURSING TRANSFER
Nursing Transfer Note      11/24/2017     Transfer To: [facility] FRom:SICU    Transfer via stretcher    Transfer with  to O2    Transported by RN    Medicines sent:     Chart send with patient: Yes    Notified:     Patient reassessed at:  (11/24/17, 1155)    Upon arrival to floor:

## 2017-11-24 NOTE — ASSESSMENT & PLAN NOTE
- DFA for VZV positive, confirming the diagnosis of herpes zoster  - Continue acyclovir 800mg PO 5x/day x 7 days

## 2017-11-24 NOTE — PROGRESS NOTES
Pt transferred from [id number] to [id number]. Wife notified. Transferred wearing mask, with dilaudid PCA and maintenance fluid. Cardiac monitoring maintained. Pt oriented to room, call bell in reach. VSS.

## 2017-11-25 LAB
ALBUMIN SERPL BCP-MCNC: 1.9 G/DL
ALP SERPL-CCNC: 71 U/L
ALT SERPL W/O P-5'-P-CCNC: 31 U/L
ANION GAP SERPL CALC-SCNC: 6 MMOL/L
AST SERPL-CCNC: 47 U/L
BASOPHILS # BLD AUTO: 0.01 K/UL
BASOPHILS NFR BLD: 0.2 %
BILIRUB SERPL-MCNC: 0.7 MG/DL
BLD PROD TYP BPU: NORMAL
BLD PROD TYP BPU: NORMAL
BLOOD UNIT EXPIRATION DATE: NORMAL
BLOOD UNIT EXPIRATION DATE: NORMAL
BLOOD UNIT TYPE CODE: 9500
BLOOD UNIT TYPE CODE: 9500
BLOOD UNIT TYPE: NORMAL
BLOOD UNIT TYPE: NORMAL
BUN SERPL-MCNC: 14 MG/DL
CALCIUM SERPL-MCNC: 8.2 MG/DL
CHLORIDE SERPL-SCNC: 103 MMOL/L
CO2 SERPL-SCNC: 30 MMOL/L
CODING SYSTEM: NORMAL
CODING SYSTEM: NORMAL
CREAT SERPL-MCNC: 0.7 MG/DL
DIFFERENTIAL METHOD: ABNORMAL
DISPENSE STATUS: NORMAL
DISPENSE STATUS: NORMAL
EOSINOPHIL # BLD AUTO: 0.1 K/UL
EOSINOPHIL NFR BLD: 1.5 %
ERYTHROCYTE [DISTWIDTH] IN BLOOD BY AUTOMATED COUNT: 18.8 %
EST. GFR  (AFRICAN AMERICAN): >60 ML/MIN/1.73 M^2
EST. GFR  (NON AFRICAN AMERICAN): >60 ML/MIN/1.73 M^2
GLUCOSE SERPL-MCNC: 195 MG/DL
HCT VFR BLD AUTO: 32.5 %
HGB BLD-MCNC: 10.7 G/DL
IMM GRANULOCYTES # BLD AUTO: 0.02 K/UL
IMM GRANULOCYTES NFR BLD AUTO: 0.4 %
LYMPHOCYTES # BLD AUTO: 1.5 K/UL
LYMPHOCYTES NFR BLD: 31.9 %
MAGNESIUM SERPL-MCNC: 1.5 MG/DL
MCH RBC QN AUTO: 27.8 PG
MCHC RBC AUTO-ENTMCNC: 32.9 G/DL
MCV RBC AUTO: 84 FL
MONOCYTES # BLD AUTO: 0.4 K/UL
MONOCYTES NFR BLD: 8.5 %
NEUTROPHILS # BLD AUTO: 2.7 K/UL
NEUTROPHILS NFR BLD: 57.5 %
NRBC BLD-RTO: 0 /100 WBC
PHOSPHATE SERPL-MCNC: 1.5 MG/DL
PLATELET # BLD AUTO: 113 K/UL
PMV BLD AUTO: 10.5 FL
POCT GLUCOSE: 124 MG/DL (ref 70–110)
POCT GLUCOSE: 136 MG/DL (ref 70–110)
POCT GLUCOSE: 137 MG/DL (ref 70–110)
POCT GLUCOSE: 149 MG/DL (ref 70–110)
POTASSIUM SERPL-SCNC: 4 MMOL/L
PROT SERPL-MCNC: 5.3 G/DL
RBC # BLD AUTO: 3.85 M/UL
SODIUM SERPL-SCNC: 139 MMOL/L
TRANS ERYTHROCYTES VOL PATIENT: NORMAL ML
TRANS ERYTHROCYTES VOL PATIENT: NORMAL ML
WBC # BLD AUTO: 4.7 K/UL

## 2017-11-25 PROCEDURE — 97530 THERAPEUTIC ACTIVITIES: CPT

## 2017-11-25 PROCEDURE — 20600001 HC STEP DOWN PRIVATE ROOM

## 2017-11-25 PROCEDURE — 25000003 PHARM REV CODE 250: Performed by: ANESTHESIOLOGY

## 2017-11-25 PROCEDURE — 83735 ASSAY OF MAGNESIUM: CPT

## 2017-11-25 PROCEDURE — 85025 COMPLETE CBC W/AUTO DIFF WBC: CPT

## 2017-11-25 PROCEDURE — 25000003 PHARM REV CODE 250: Performed by: STUDENT IN AN ORGANIZED HEALTH CARE EDUCATION/TRAINING PROGRAM

## 2017-11-25 PROCEDURE — G8980 MOBILITY D/C STATUS: HCPCS | Mod: CK

## 2017-11-25 PROCEDURE — 36415 COLL VENOUS BLD VENIPUNCTURE: CPT

## 2017-11-25 PROCEDURE — 80053 COMPREHEN METABOLIC PANEL: CPT

## 2017-11-25 PROCEDURE — 84100 ASSAY OF PHOSPHORUS: CPT

## 2017-11-25 PROCEDURE — 63600175 PHARM REV CODE 636 W HCPCS: Performed by: STUDENT IN AN ORGANIZED HEALTH CARE EDUCATION/TRAINING PROGRAM

## 2017-11-25 PROCEDURE — G8978 MOBILITY CURRENT STATUS: HCPCS | Mod: CK

## 2017-11-25 PROCEDURE — S0030 INJECTION, METRONIDAZOLE: HCPCS | Performed by: ANESTHESIOLOGY

## 2017-11-25 PROCEDURE — 97161 PT EVAL LOW COMPLEX 20 MIN: CPT

## 2017-11-25 PROCEDURE — G8979 MOBILITY GOAL STATUS: HCPCS | Mod: CJ

## 2017-11-25 RX ORDER — METRONIDAZOLE 500 MG/1
500 TABLET ORAL EVERY 8 HOURS
Status: DISCONTINUED | OUTPATIENT
Start: 2017-11-25 | End: 2017-11-29 | Stop reason: HOSPADM

## 2017-11-25 RX ORDER — CIPROFLOXACIN 500 MG/1
500 TABLET ORAL EVERY 12 HOURS
Status: DISCONTINUED | OUTPATIENT
Start: 2017-11-25 | End: 2017-11-29 | Stop reason: HOSPADM

## 2017-11-25 RX ADMIN — ACYCLOVIR 800 MG: 200 CAPSULE ORAL at 02:11

## 2017-11-25 RX ADMIN — CLOPIDOGREL 75 MG: 75 TABLET, FILM COATED ORAL at 10:11

## 2017-11-25 RX ADMIN — HYDROMORPHONE HYDROCHLORIDE 0.5 MG: 1 INJECTION, SOLUTION INTRAMUSCULAR; INTRAVENOUS; SUBCUTANEOUS at 07:11

## 2017-11-25 RX ADMIN — METRONIDAZOLE 500 MG: 500 TABLET ORAL at 10:11

## 2017-11-25 RX ADMIN — GABAPENTIN 100 MG: 100 CAPSULE ORAL at 10:11

## 2017-11-25 RX ADMIN — OXYCODONE HYDROCHLORIDE AND ACETAMINOPHEN 1 TABLET: 10; 325 TABLET ORAL at 08:11

## 2017-11-25 RX ADMIN — PREDNISONE 5 MG: 5 TABLET ORAL at 10:11

## 2017-11-25 RX ADMIN — GABAPENTIN 100 MG: 100 CAPSULE ORAL at 01:11

## 2017-11-25 RX ADMIN — ACYCLOVIR 800 MG: 200 CAPSULE ORAL at 05:11

## 2017-11-25 RX ADMIN — PANTOPRAZOLE SODIUM 40 MG: 40 TABLET, DELAYED RELEASE ORAL at 10:11

## 2017-11-25 RX ADMIN — METOPROLOL TARTRATE 25 MG: 25 TABLET ORAL at 10:11

## 2017-11-25 RX ADMIN — OXYCODONE HYDROCHLORIDE AND ACETAMINOPHEN 1 TABLET: 10; 325 TABLET ORAL at 10:11

## 2017-11-25 RX ADMIN — HYDROMORPHONE HYDROCHLORIDE 0.5 MG: 1 INJECTION, SOLUTION INTRAMUSCULAR; INTRAVENOUS; SUBCUTANEOUS at 10:11

## 2017-11-25 RX ADMIN — HEPARIN SODIUM 5000 UNITS: 5000 INJECTION, SOLUTION INTRAVENOUS; SUBCUTANEOUS at 05:11

## 2017-11-25 RX ADMIN — ACYCLOVIR 800 MG: 200 CAPSULE ORAL at 10:11

## 2017-11-25 RX ADMIN — ATORVASTATIN CALCIUM 10 MG: 10 TABLET, FILM COATED ORAL at 10:11

## 2017-11-25 RX ADMIN — ASPIRIN 81 MG CHEWABLE TABLET 81 MG: 81 TABLET CHEWABLE at 10:11

## 2017-11-25 RX ADMIN — LOSARTAN POTASSIUM AND HYDROCHLOROTHIAZIDE 1 TABLET: 12.5; 1 TABLET ORAL at 10:11

## 2017-11-25 RX ADMIN — OXYCODONE HYDROCHLORIDE AND ACETAMINOPHEN 1 TABLET: 10; 325 TABLET ORAL at 06:11

## 2017-11-25 RX ADMIN — METRONIDAZOLE 500 MG: 500 INJECTION, SOLUTION INTRAVENOUS at 05:11

## 2017-11-25 RX ADMIN — HEPARIN SODIUM 5000 UNITS: 5000 INJECTION, SOLUTION INTRAVENOUS; SUBCUTANEOUS at 01:11

## 2017-11-25 RX ADMIN — METRONIDAZOLE 500 MG: 500 TABLET ORAL at 01:11

## 2017-11-25 RX ADMIN — CIPROFLOXACIN HYDROCHLORIDE 500 MG: 500 TABLET, FILM COATED ORAL at 06:11

## 2017-11-25 RX ADMIN — ACYCLOVIR 800 MG: 200 CAPSULE ORAL at 06:11

## 2017-11-25 RX ADMIN — GABAPENTIN 100 MG: 100 CAPSULE ORAL at 05:11

## 2017-11-25 RX ADMIN — DEXTROSE MONOHYDRATE, SODIUM CHLORIDE, AND POTASSIUM CHLORIDE: 50; 4.5; 1.49 INJECTION, SOLUTION INTRAVENOUS at 11:11

## 2017-11-25 RX ADMIN — HEPARIN SODIUM 5000 UNITS: 5000 INJECTION, SOLUTION INTRAVENOUS; SUBCUTANEOUS at 10:11

## 2017-11-25 RX ADMIN — CITALOPRAM HYDROBROMIDE 40 MG: 20 TABLET ORAL at 10:11

## 2017-11-25 RX ADMIN — OXYCODONE HYDROCHLORIDE AND ACETAMINOPHEN 1 TABLET: 10; 325 TABLET ORAL at 01:11

## 2017-11-25 RX ADMIN — CIPROFLOXACIN 400 MG: 2 INJECTION, SOLUTION INTRAVENOUS at 12:11

## 2017-11-25 RX ADMIN — HYDROMORPHONE HYDROCHLORIDE 0.5 MG: 1 INJECTION, SOLUTION INTRAMUSCULAR; INTRAVENOUS; SUBCUTANEOUS at 04:11

## 2017-11-25 NOTE — PLAN OF CARE
Problem: Physical Therapy Goal  Goal: Physical Therapy Goal  Goals to be met by: 2017     Patient will increase functional independence with mobility by performin. Supine to sit with Stand-by Assistance  2. Sit to supine with Stand-by Assistance  3. Sit to stand transfer with Stand-by Assistance  4. Bed to chair transfer with Stand-by Assistance with/without AD  5. Gait  x 150 feet with Stand-by Assistance with/without AD   6. Ascend/descend 3 stair with right Handrail Stand-by Assistance with/without AD  7. Lower extremity exercise program x15 reps per handout, with independence    Outcome: Ongoing (interventions implemented as appropriate)  Goals set

## 2017-11-25 NOTE — PROGRESS NOTES
[facility]  General Surgery  Progress Note    Subjective:     History of Present Illness:  Cristo Jefferson is a 57 y.o. 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  Pt then received adjuvant chemo/XRT with last treatment in March 2017.  He states he developed abdominal pain with associated N/V Saturday.  Pain has steadily worsened since then.  Minimal PO intake.  Hypotensive on arrival to [facility] [place] ED.  Improved with 2L NS administration.  Creatinine 3.1.  Lactate 2.7.  CT abdomen/pelvis with intraabdominal free air.    Post-Op Info:  Procedure(s) (LRB):  EXPLORATORY-LAPAROTOMY (N/A)  INCISION AND DRAINAGE (I & D)- Intra-Abdominal abcess (N/A)  INCISION AND DRAINAGE (I & D)-Abdominal-Pelvic Abcess (N/A)  Loop Ileostomy (N/A)  INSERTION-TUBE-GASTROSTOMY (Right)   4 Days Post-Op     Interval History: Transferred to floor yesterday and doing well. Tolerating full liquid diet. Having good ostomy output. No nausea or emesis. Drains are clear. Wants to sit up and ambulate today.    Medications:  Continuous Infusions:   dextrose 5 % and 0.45 % NaCl with KCl 20 mEq 80 mL/hr at 11/24/17 2142     Scheduled Meds:   acyclovir  800 mg Oral 5x Daily    aspirin  81 mg Oral Daily    atorvastatin  10 mg Oral Daily    ciprofloxacin  400 mg Intravenous Q12H    citalopram  40 mg Oral Daily    clopidogrel  75 mg Oral Daily    fluconazole (DIFLUCAN) IVPB  200 mg Intravenous Q24H    gabapentin  100 mg Oral TID    heparin (porcine)  5,000 Units Subcutaneous Q8H    losartan-hydrochlorothiazide 100-12.5 mg  1 tablet Oral Daily    metoprolol tartrate  25 mg Oral BID    metronidazole  500 mg Intravenous Q8H    pantoprazole  40 mg Oral Daily    predniSONE  5 mg Oral Daily     PRN Meds:albuterol-ipratropium 2.5mg-0.5mg/3mL, dextrose 50%, glucagon (human recombinant), HYDROmorphone, hydrOXYzine pamoate, insulin aspart, oxyCODONE-acetaminophen, oxyCODONE-acetaminophen     Review of  patient's allergies indicates:   Allergen Reactions    Morphine      Objective:     Vital Signs (Most Recent):  Temp: 97.7 °F (36.5 °C) (11/25/17 0411)  Pulse: 60 (11/25/17 0700)  Resp: 18 (11/25/17 0411)  BP: (!) 141/81 (11/25/17 0551)  SpO2: 95 % (11/25/17 0411) Vital Signs (24h Range):  Temp:  [97 °F (36.1 °C)-97.8 °F (36.6 °C)] 97.7 °F (36.5 °C)  Pulse:  [47-70] 60  Resp:  [16-23] 18  SpO2:  [92 %-95 %] 95 %  BP: (118-158)/(76-90) 141/81     Weight: 103.6 kg (228 lb 6.3 oz)  Body mass index is 33.73 kg/m².    Intake/Output - Last 3 Shifts       11/23 0700 - 11/24 0659 11/24 0700 - 11/25 0659 11/25 0700 - 11/26 0659    P.O. 120 270     I.V. (mL/kg) 3550 (34.3) 2009 (19.4)     Other 75      IV Piggyback  300     Total Intake(mL/kg) 3745 (36.1) 2579 (24.9)     Urine (mL/kg/hr) 2815 (1.1) 1075 (0.4) 500 (1.4)    Drains 44 (0) 5 (0)     Other 100 (0) 50 (0)     Stool 175 (0.1) 1100 (0.4)     Total Output 3134 2230 500    Net +611 +349 -500           Stool Occurrence  0 x           Physical Exam   Constitutional: He is oriented to person, place, and time. He appears well-developed and well-nourished. No distress.   HENT:   Head: Normocephalic and atraumatic.   Cardiovascular: Normal rate and regular rhythm.    Pulmonary/Chest: Effort normal and breath sounds normal.   Abdominal: Soft. He exhibits distension. He exhibits no mass. There is tenderness. There is no guarding.   Skin vac in place; two abdominal drains with serous output. Gtube in place to gravity.    Ostomy bag with bilious output and some succus.    Only tender at incision site.   Musculoskeletal: Normal range of motion.   Neurological: He is alert and oriented to person, place, and time.   Skin: Skin is warm and dry.   Herpetic lesions noted on R anterior thigh   Psychiatric: He has a normal mood and affect. His behavior is normal.       Significant Labs:  CBC:     Recent Labs  Lab 11/25/17  0506   WBC 4.70   RBC 3.85*   HGB 10.7*   HCT 32.5*   *    MCV 84   MCH 27.8   MCHC 32.9     CMP:     Recent Labs  Lab 11/25/17  0506   *   CALCIUM 8.2*   ALBUMIN 1.9*   PROT 5.3*      K 4.0   CO2 30*      BUN 14   CREATININE 0.7   ALKPHOS 71   ALT 31   AST 47*   BILITOT 0.7       Significant Diagnostics:  I have reviewed all pertinent imaging results/findings within the past 24 hours.    Assessment/Plan:     * Free intraperitoneal air    58 yo M with a history of DM, HTN, HLD and arthritis who presented with free air s/p ex-lap with diverting loop ileostomy and Gtube placement    - Advance to ADA diet  - Clamp Gtube; unclamp for nausea if prn anti-emetics are not effective  - Continue home medications  - Aggressive respiratory toilet with acappella and IS  - Cultures growing E coli only sensitive to many antibiotics; will stop diflucan and transition to po antibiotics  - Continue oral prednisone  - Skin vac changed today  - Daily labs  - SCDs, DVT and GI prophylaxis  - PT/OT; OOBTC  - Dispo: likely home with home health early next week        Herpes zoster    - Dermatology signed off; recommend po acyclovir 5x daily for 7 days (day 4)              Kevin Wakefield MD  General Surgery  [facility]

## 2017-11-25 NOTE — SUBJECTIVE & OBJECTIVE
Interval History: Transferred to floor yesterday and doing well. Tolerating full liquid diet. Having good ostomy output. No nausea or emesis. Drains are clear. Wants to sit up and ambulate today.    Medications:  Continuous Infusions:   dextrose 5 % and 0.45 % NaCl with KCl 20 mEq 80 mL/hr at 11/24/17 2142     Scheduled Meds:   acyclovir  800 mg Oral 5x Daily    aspirin  81 mg Oral Daily    atorvastatin  10 mg Oral Daily    ciprofloxacin  400 mg Intravenous Q12H    citalopram  40 mg Oral Daily    clopidogrel  75 mg Oral Daily    fluconazole (DIFLUCAN) IVPB  200 mg Intravenous Q24H    gabapentin  100 mg Oral TID    heparin (porcine)  5,000 Units Subcutaneous Q8H    losartan-hydrochlorothiazide 100-12.5 mg  1 tablet Oral Daily    metoprolol tartrate  25 mg Oral BID    metronidazole  500 mg Intravenous Q8H    pantoprazole  40 mg Oral Daily    predniSONE  5 mg Oral Daily     PRN Meds:albuterol-ipratropium 2.5mg-0.5mg/3mL, dextrose 50%, glucagon (human recombinant), HYDROmorphone, hydrOXYzine pamoate, insulin aspart, oxyCODONE-acetaminophen, oxyCODONE-acetaminophen     Review of patient's allergies indicates:   Allergen Reactions    Morphine      Objective:     Vital Signs (Most Recent):  Temp: 97.7 °F (36.5 °C) (11/25/17 0411)  Pulse: 60 (11/25/17 0700)  Resp: 18 (11/25/17 0411)  BP: (!) 141/81 (11/25/17 0551)  SpO2: 95 % (11/25/17 0411) Vital Signs (24h Range):  Temp:  [97 °F (36.1 °C)-97.8 °F (36.6 °C)] 97.7 °F (36.5 °C)  Pulse:  [47-70] 60  Resp:  [16-23] 18  SpO2:  [92 %-95 %] 95 %  BP: (118-158)/(76-90) 141/81     Weight: 103.6 kg (228 lb 6.3 oz)  Body mass index is 33.73 kg/m².    Intake/Output - Last 3 Shifts       11/23 0700 - 11/24 0659 11/24 0700 - 11/25 0659 11/25 0700 - 11/26 0659    P.O. 120 270     I.V. (mL/kg) 3550 (34.3) 2009 (19.4)     Other 75      IV Piggyback  300     Total Intake(mL/kg) 3745 (36.1) 2579 (24.9)     Urine (mL/kg/hr) 2815 (1.1) 1075 (0.4) 500 (1.4)    Drains 44 (0) 5  (0)     Other 100 (0) 50 (0)     Stool 175 (0.1) 1100 (0.4)     Total Output 3134 2230 500    Net +611 +349 -500           Stool Occurrence  0 x           Physical Exam   Constitutional: He is oriented to person, place, and time. He appears well-developed and well-nourished. No distress.   HENT:   Head: Normocephalic and atraumatic.   Cardiovascular: Normal rate and regular rhythm.    Pulmonary/Chest: Effort normal and breath sounds normal.   Abdominal: Soft. He exhibits distension. He exhibits no mass. There is tenderness. There is no guarding.   Skin vac in place; two abdominal drains with serous output. Gtube in place to gravity.    Ostomy bag with bilious output and some succus.    Only tender at incision site.   Musculoskeletal: Normal range of motion.   Neurological: He is alert and oriented to person, place, and time.   Skin: Skin is warm and dry.   Herpetic lesions noted on R anterior thigh   Psychiatric: He has a normal mood and affect. His behavior is normal.       Significant Labs:  CBC:     Recent Labs  Lab 11/25/17  0506   WBC 4.70   RBC 3.85*   HGB 10.7*   HCT 32.5*   *   MCV 84   MCH 27.8   MCHC 32.9     CMP:     Recent Labs  Lab 11/25/17  0506   *   CALCIUM 8.2*   ALBUMIN 1.9*   PROT 5.3*      K 4.0   CO2 30*      BUN 14   CREATININE 0.7   ALKPHOS 71   ALT 31   AST 47*   BILITOT 0.7       Significant Diagnostics:  I have reviewed all pertinent imaging results/findings within the past 24 hours.

## 2017-11-25 NOTE — PT/OT/SLP EVAL
Physical Therapy  Evaluation    Cristo Jefferson   MRN: [id number]   Admitting Diagnosis: Free intraperitoneal air    PT Received On: 11/25/17  PT Start Time: 1538     PT Stop Time: 1600    PT Total Time (min): 22 min       Billable Minutes:  Evaluation 14 and Therapeutic Activity 8    Diagnosis: Free intraperitoneal air  S/p EXPLORATORY-LAPAROTOMY    Past Medical History:   Diagnosis Date    Arthritis     Cancer 2016    appendix, completed chemo    Depression     Diabetes mellitus type I     Hyperlipidemia     Hypertension       Past Surgical History:   Procedure Laterality Date    APPENDECTOMY      open heart       RIGHT COLECTOMY  2016       Referring physician: Kevin Wakefield MD  Date referred to PT: 11/24/2017    General Precautions: Standard, contact, fall (shingles)  Orthopedic Precautions: N/A   Braces:              Patient History:  Lives With: spouse, child(kavon), dependent (son(15 y.o.))  Living Arrangements: house  Home Accessibility: stairs to enter home  Number of Stairs to Enter Home: 3  Stair Railings at Home: outside, present on right side  Living Environment Comment: Pt.'s spouse available to assist  Equipment Currently Used at Home: crutches      Previous Level of Function:  Ambulation Skills: independent  Transfer Skills: independent  ADL Skills: independent  Work/Leisure Activity: independent    Subjective:  Communicated with nursing prior to session.    Chief Complaint: abd. pain  Patient goals: to go ohme    Pain/Comfort  Pain Rating 1: 8/10  Location - Orientation 1: generalized  Location 1: abdomen  Pain Addressed 1: Pre-medicate for activity, Reposition, Cessation of Activity  Pain Rating Post-Intervention 1: 8/10      Objective:   Patient found with: RACHEL drain, peripheral IV, wound vac, oxygen     Cognitive Exam:  Oriented to: Person, Place, Time and Situation    Follows Commands/attention: Follows multistep  commands  Communication: clear/fluent  Safety awareness/insight to  disability: intact    Physical Exam:  Postural examination/scapula alignment: No postural abnormalities identified    Skin integrity: Visible skin intact  Edema: None noted     Sensation:   Intact    Upper Extremity Range of Motion:  Right Upper Extremity: WFL  Left Upper Extremity: WFL    Upper Extremity Strength:  Right Upper Extremity: WFL  Left Upper Extremity: WFL    Lower Extremity Range of Motion:  Right Lower Extremity: WFL  Left Lower Extremity: WFL    Lower Extremity Strength:  Right Lower Extremity: WFL  Left Lower Extremity: WFL     Fine motor coordination:  Intact    Gross motor coordination: WFL    Functional Mobility:  Bed Mobility:  Rolling/Turning to Left: Minimum assistance  Scooting/Bridging: Contact Guard Assistance  Supine to Sit: Minimum Assistance, With assist of 2    Transfers:  Sit <> Stand Assistance: Contact Guard Assistance  Sit <> Stand Assistive Device: No Assistive Device  Bed <> Chair Technique: Stand Pivot  Bed <> Chair Assistance: Contact Guard Assistance  Bed <> Chair Assistive Device: No Assistive Device    Gait:   Gait Distance: 10' and 80'  Assistance 1: Contact Guard Assistance  Gait Assistive Device:  (IV pole)  Gait Pattern: 3-point gait  Gait Deviation(s): decreased kacey, decreased stride length    Stairs:      Balance:   Static Sit: FAIR+: Able to take MINIMAL challenges from all directions  Dynamic Sit: FAIR+: Maintains balance through MINIMAL excursions of active trunk motion  Static Stand: FAIR+: Takes MINIMAL challenges from all directions  Dynamic stand: FAIR: Needs CONTACT GUARD during gait    Therapeutic Activities and Exercises:  Assisted pt. to/from bathroom, and pt. performed static standing on his own to urinate. Discussed (B) LE therex while seated in chair and PT POC.     AM-PAC 6 CLICK MOBILITY  How much help from another person does this patient currently need?   1 = Unable, Total/Dependent Assistance  2 = A lot, Maximum/Moderate Assistance  3 = A little,  Minimum/Contact Guard/Supervision  4 = None, Modified [place]/Independent    Turning over in bed (including adjusting bedclothes, sheets and blankets)?: 3  Sitting down on and standing up from a chair with arms (e.g., wheelchair, bedside commode, etc.): 3  Moving from lying on back to sitting on the side of the bed?: 3  Moving to and from a bed to a chair (including a wheelchair)?: 3  Need to walk in hospital room?: 3  Climbing 3-5 steps with a railing?: 2  Total Score: 17     AM-PAC Raw Score CMS G-Code Modifier Level of Impairment Assistance   6 % Total / Unable   7 - 9 CM 80 - 100% Maximal Assist   10 - 14 CL 60 - 80% Moderate Assist   15 - 19 CK 40 - 60% Moderate Assist   20 - 22 CJ 20 - 40% Minimal Assist   23 CI 1-20% SBA / CGA   24 CH 0% Independent/ Mod I     Patient left up in chair with all lines intact and call button in reach.    Assessment:   Cristo Jefferson is a 57 y.o. male with a medical diagnosis of Free intraperitoneal air and presents with abd. pain. Pt. cooperative and tolerated treatment fairly well. Pt. would benefit from continued PT to increase strength/endurance and improve functional mobility.    Rehab identified problem list/impairments: Rehab identified problem list/impairments: weakness, impaired endurance, impaired functional mobilty, gait instability, impaired balance, pain    Rehab potential is good.    Activity tolerance: Fair    Discharge recommendations: Discharge Facility/Level Of Care Needs: home health PT     Barriers to discharge: Barriers to Discharge: Inaccessible home environment (3 SHANN[place])    Equipment recommendations: Equipment Needed After Discharge:  (TBD)     GOALS:    Physical Therapy Goals        Problem: Physical Therapy Goal    Goal Priority Disciplines Outcome Goal Variances Interventions   Physical Therapy Goal     PT/OT, PT Ongoing (interventions implemented as appropriate)     Description:  Goals to be met by: 12/9/2017     Patient will increase  functional independence with mobility by performin. Supine to sit with Stand-by Assistance  2. Sit to supine with Stand-by Assistance  3. Sit to stand transfer with Stand-by Assistance  4. Bed to chair transfer with Stand-by Assistance with/without AD  5. Gait  x 150 feet with Stand-by Assistance with/without AD   6. Ascend/descend 3 stair with right Handrail Stand-by Assistance with/without AD  7. Lower extremity exercise program x15 reps per handout, with independence                      PLAN:    Patient to be seen 4 x/week to address the above listed problems via gait training, therapeutic activities, therapeutic exercises  Plan of Care expires: 17  Plan of Care reviewed with: patient, spouse    Functional Assessment Tool Used: AM-PAC  Score: 17  Functional Limitation: Mobility: Walking and moving around  Mobility: Walking and Moving Around Current Status (): CK  Mobility: Walking and Moving Around Goal Status (): XAVIER Shah, PT  2017

## 2017-11-25 NOTE — ASSESSMENT & PLAN NOTE
58 yo M with a history of DM, HTN, HLD and arthritis who presented with free air s/p ex-lap with diverting loop ileostomy and Gtube placement    - Advance to ADA diet  - Clamp Gtube; unclamp for nausea if prn anti-emetics are not effective  - Continue home medications  - Aggressive respiratory toilet with acappella and IS  - Cultures growing E coli only sensitive to many antibiotics; will stop diflucan and transition to po antibiotics  - Continue oral prednisone  - Daily labs  - SCDs, DVT and GI prophylaxis  - PT/OT; OOBTC  - Dispo: likely home with home health early next week

## 2017-11-25 NOTE — PLAN OF CARE
Problem: Patient Care Overview  Goal: Plan of Care Review  Outcome: Ongoing (interventions implemented as appropriate)  POC reviewed with patient and spouse. Pt AAOx4, VSS, Afebrile, SpO2> 92% on 2L NC, NSR - sinus bradycardia on tele. Pt complains of intermittent pain, well controlled with PRN meds. ML with wound vac, to continuous therapy @ 125 mmHg, output monitored. Blood glucose monitored q6h. Left abdominal RACHEL drains x 2 CDI, output monitored. Gtube CDI, clamped, output monitored. Pt tolerating a full liquid diet, no complaints of nausea/vomiting. Pt urinating adequately per urinal, output monitored. Fluids continued. RUQ colostomy CDI, output monitored. Pt up with assist, remains free from falls and injuries, will continue to monitor.

## 2017-11-26 LAB
ALBUMIN SERPL BCP-MCNC: 2 G/DL
ALP SERPL-CCNC: 103 U/L
ALT SERPL W/O P-5'-P-CCNC: 29 U/L
ANION GAP SERPL CALC-SCNC: 10 MMOL/L
AST SERPL-CCNC: 38 U/L
BASOPHILS # BLD AUTO: 0.03 K/UL
BASOPHILS NFR BLD: 0.6 %
BILIRUB SERPL-MCNC: 0.7 MG/DL
BUN SERPL-MCNC: 12 MG/DL
CALCIUM SERPL-MCNC: 8.1 MG/DL
CHLORIDE SERPL-SCNC: 103 MMOL/L
CO2 SERPL-SCNC: 29 MMOL/L
CREAT SERPL-MCNC: 0.7 MG/DL
DIFFERENTIAL METHOD: ABNORMAL
EOSINOPHIL # BLD AUTO: 0.1 K/UL
EOSINOPHIL NFR BLD: 1.9 %
ERYTHROCYTE [DISTWIDTH] IN BLOOD BY AUTOMATED COUNT: 19 %
EST. GFR  (AFRICAN AMERICAN): >60 ML/MIN/1.73 M^2
EST. GFR  (NON AFRICAN AMERICAN): >60 ML/MIN/1.73 M^2
GLUCOSE SERPL-MCNC: 110 MG/DL
HCT VFR BLD AUTO: 35.7 %
HGB BLD-MCNC: 11.3 G/DL
IMM GRANULOCYTES # BLD AUTO: 0.09 K/UL
IMM GRANULOCYTES NFR BLD AUTO: 1.7 %
LYMPHOCYTES # BLD AUTO: 2 K/UL
LYMPHOCYTES NFR BLD: 37.6 %
MAGNESIUM SERPL-MCNC: 1.2 MG/DL
MCH RBC QN AUTO: 27 PG
MCHC RBC AUTO-ENTMCNC: 31.7 G/DL
MCV RBC AUTO: 85 FL
MONOCYTES # BLD AUTO: 0.4 K/UL
MONOCYTES NFR BLD: 8.3 %
NEUTROPHILS # BLD AUTO: 2.7 K/UL
NEUTROPHILS NFR BLD: 49.9 %
NRBC BLD-RTO: 0 /100 WBC
PHOSPHATE SERPL-MCNC: 1.5 MG/DL
PLATELET # BLD AUTO: 137 K/UL
PMV BLD AUTO: 10.2 FL
POCT GLUCOSE: 121 MG/DL (ref 70–110)
POCT GLUCOSE: 122 MG/DL (ref 70–110)
POCT GLUCOSE: 133 MG/DL (ref 70–110)
POTASSIUM SERPL-SCNC: 3.4 MMOL/L
PROT SERPL-MCNC: 5.3 G/DL
RBC # BLD AUTO: 4.19 M/UL
SODIUM SERPL-SCNC: 142 MMOL/L
WBC # BLD AUTO: 5.32 K/UL

## 2017-11-26 PROCEDURE — 25000003 PHARM REV CODE 250: Performed by: STUDENT IN AN ORGANIZED HEALTH CARE EDUCATION/TRAINING PROGRAM

## 2017-11-26 PROCEDURE — 83735 ASSAY OF MAGNESIUM: CPT

## 2017-11-26 PROCEDURE — 63600175 PHARM REV CODE 636 W HCPCS: Performed by: STUDENT IN AN ORGANIZED HEALTH CARE EDUCATION/TRAINING PROGRAM

## 2017-11-26 PROCEDURE — 36415 COLL VENOUS BLD VENIPUNCTURE: CPT

## 2017-11-26 PROCEDURE — 20600001 HC STEP DOWN PRIVATE ROOM

## 2017-11-26 PROCEDURE — 84100 ASSAY OF PHOSPHORUS: CPT

## 2017-11-26 PROCEDURE — 80053 COMPREHEN METABOLIC PANEL: CPT

## 2017-11-26 PROCEDURE — 85025 COMPLETE CBC W/AUTO DIFF WBC: CPT

## 2017-11-26 RX ORDER — LANOLIN ALCOHOL/MO/W.PET/CERES
800 CREAM (GRAM) TOPICAL ONCE
Status: COMPLETED | OUTPATIENT
Start: 2017-11-26 | End: 2017-11-26

## 2017-11-26 RX ADMIN — PANTOPRAZOLE SODIUM 40 MG: 40 TABLET, DELAYED RELEASE ORAL at 08:11

## 2017-11-26 RX ADMIN — CLOPIDOGREL 75 MG: 75 TABLET, FILM COATED ORAL at 08:11

## 2017-11-26 RX ADMIN — CIPROFLOXACIN HYDROCHLORIDE 500 MG: 500 TABLET, FILM COATED ORAL at 08:11

## 2017-11-26 RX ADMIN — CITALOPRAM HYDROBROMIDE 40 MG: 20 TABLET ORAL at 08:11

## 2017-11-26 RX ADMIN — ACYCLOVIR 800 MG: 200 CAPSULE ORAL at 07:11

## 2017-11-26 RX ADMIN — HEPARIN SODIUM 5000 UNITS: 5000 INJECTION, SOLUTION INTRAVENOUS; SUBCUTANEOUS at 02:11

## 2017-11-26 RX ADMIN — HEPARIN SODIUM 5000 UNITS: 5000 INJECTION, SOLUTION INTRAVENOUS; SUBCUTANEOUS at 10:11

## 2017-11-26 RX ADMIN — GABAPENTIN 100 MG: 100 CAPSULE ORAL at 10:11

## 2017-11-26 RX ADMIN — HYDROMORPHONE HYDROCHLORIDE 0.5 MG: 1 INJECTION, SOLUTION INTRAMUSCULAR; INTRAVENOUS; SUBCUTANEOUS at 07:11

## 2017-11-26 RX ADMIN — ACYCLOVIR 800 MG: 200 CAPSULE ORAL at 10:11

## 2017-11-26 RX ADMIN — ACYCLOVIR 800 MG: 200 CAPSULE ORAL at 05:11

## 2017-11-26 RX ADMIN — LOSARTAN POTASSIUM AND HYDROCHLOROTHIAZIDE 1 TABLET: 12.5; 1 TABLET ORAL at 08:11

## 2017-11-26 RX ADMIN — METRONIDAZOLE 500 MG: 500 TABLET ORAL at 10:11

## 2017-11-26 RX ADMIN — HYDROMORPHONE HYDROCHLORIDE 0.5 MG: 1 INJECTION, SOLUTION INTRAMUSCULAR; INTRAVENOUS; SUBCUTANEOUS at 12:11

## 2017-11-26 RX ADMIN — OXYCODONE HYDROCHLORIDE AND ACETAMINOPHEN 1 TABLET: 10; 325 TABLET ORAL at 03:11

## 2017-11-26 RX ADMIN — DEXTROSE MONOHYDRATE, SODIUM CHLORIDE, AND POTASSIUM CHLORIDE: 50; 4.5; 1.49 INJECTION, SOLUTION INTRAVENOUS at 01:11

## 2017-11-26 RX ADMIN — MAGNESIUM OXIDE TAB 400 MG (241.3 MG ELEMENTAL MG) 800 MG: 400 (241.3 MG) TAB at 08:11

## 2017-11-26 RX ADMIN — OXYCODONE HYDROCHLORIDE AND ACETAMINOPHEN 1 TABLET: 10; 325 TABLET ORAL at 08:11

## 2017-11-26 RX ADMIN — ASPIRIN 81 MG CHEWABLE TABLET 81 MG: 81 TABLET CHEWABLE at 08:11

## 2017-11-26 RX ADMIN — GABAPENTIN 100 MG: 100 CAPSULE ORAL at 07:11

## 2017-11-26 RX ADMIN — HEPARIN SODIUM 5000 UNITS: 5000 INJECTION, SOLUTION INTRAVENOUS; SUBCUTANEOUS at 07:11

## 2017-11-26 RX ADMIN — OXYCODONE HYDROCHLORIDE AND ACETAMINOPHEN 1 TABLET: 10; 325 TABLET ORAL at 06:11

## 2017-11-26 RX ADMIN — OXYCODONE HYDROCHLORIDE AND ACETAMINOPHEN 1 TABLET: 10; 325 TABLET ORAL at 10:11

## 2017-11-26 RX ADMIN — METOPROLOL TARTRATE 25 MG: 25 TABLET ORAL at 08:11

## 2017-11-26 RX ADMIN — METRONIDAZOLE 500 MG: 500 TABLET ORAL at 02:11

## 2017-11-26 RX ADMIN — ACYCLOVIR 800 MG: 200 CAPSULE ORAL at 02:11

## 2017-11-26 RX ADMIN — METRONIDAZOLE 500 MG: 500 TABLET ORAL at 07:11

## 2017-11-26 RX ADMIN — DEXTROSE MONOHYDRATE, SODIUM CHLORIDE, AND POTASSIUM CHLORIDE: 50; 4.5; 1.49 INJECTION, SOLUTION INTRAVENOUS at 12:11

## 2017-11-26 RX ADMIN — METOPROLOL TARTRATE 25 MG: 25 TABLET ORAL at 09:11

## 2017-11-26 RX ADMIN — ATORVASTATIN CALCIUM 10 MG: 10 TABLET, FILM COATED ORAL at 08:11

## 2017-11-26 RX ADMIN — GABAPENTIN 100 MG: 100 CAPSULE ORAL at 02:11

## 2017-11-26 RX ADMIN — HYDROMORPHONE HYDROCHLORIDE 0.5 MG: 1 INJECTION, SOLUTION INTRAMUSCULAR; INTRAVENOUS; SUBCUTANEOUS at 11:11

## 2017-11-26 RX ADMIN — PREDNISONE 5 MG: 5 TABLET ORAL at 08:11

## 2017-11-26 RX ADMIN — OXYCODONE HYDROCHLORIDE AND ACETAMINOPHEN 1 TABLET: 10; 325 TABLET ORAL at 02:11

## 2017-11-26 RX ADMIN — ACYCLOVIR 800 MG: 200 CAPSULE ORAL at 09:11

## 2017-11-26 NOTE — PT/OT/SLP PROGRESS
Occupational Therapy      Cristo Jefferson  MRN: [id number]    Attempted to see patient in PM as per new OT evaluation and treatment orders, however patient politely refusing therapy at this time as he has just received pain medication and was fatigued/groggy. Pt asking OT to return tomorrow. Will follow up tomorrow as pt is available and willing.     Summer Sarmiento, OT  11/26/2017

## 2017-11-26 NOTE — PLAN OF CARE
Problem: Patient Care Overview  Goal: Plan of Care Review  Outcome: Ongoing (interventions implemented as appropriate)  Plan of care discussed with pt. Pt verbalizes understanding. Pt tolerating 2000 diabetic diet. Pain managed with PRN pain medications. Pt on telemetry, NSR. Pt ambulated in [place] once with PT/OT, tolerated well. Pt positions independently. VS stable. Pt remains free of falls and injury. No acute events this shift. Will continue to monitor.

## 2017-11-26 NOTE — PLAN OF CARE
Problem: Patient Care Overview  Goal: Plan of Care Review  Outcome: Ongoing (interventions implemented as appropriate)  No acute events overnight. Plan of care reviewed with patient and patient's spouse, both verbalized understanding. Alert and oriented when awake, slept between care. Some hypertension at 0400, SBP right at 160mmHg. Patient was repositioning himself in bed right before BP was taken. Otherwise vitals stable, weaned to 2L O2 via nasal cannula. Telemetry monitoring, bradycardic at rest, otherwise normal sinus rhythm. Ileostomy +flatus and thick brown output. Urinating adequate amounts in urinal. Pain controlled on current regimen. Tolerating diabetic diet. no complaints of nausea overnight, G-tube remained clamped. Blood glucose monitored, no SSI coverage needed. Wound vac in place to midline abdominal incision, continuous therapy at 125 mmHg. RACHEL drains x 2 to LLQ, minimal serous output. Refusing TEDs/SCDs despite education. Receiving pharmacological DVT prophylaxis. Up with stand-by assist, ambulates well and repositions self in bed. Instructed to call for help as needed, call light in reach and spouse at bedside. Bed in lowest position and brake set. Frequent rounds made for patient's safety. Contact precautions maintained. White board in patient's room updated accordingly. Continuing to monitor.

## 2017-11-26 NOTE — PLAN OF CARE
"Problem: Patient Care Overview  Goal: Plan of Care Review  Outcome: Ongoing (interventions implemented as appropriate)  Plan of care discussed with pt. Pt verbalizes understanding. Pt tolerating diabetic diet. Pt reports "my pain only get down to a 6 out of 10 after the meds". Pt on telemetry, NSR. Pt ambulated in room, tolerated well. Pt positions independently. Changed pt's Ilesotomy bag, pt not involved in the bag change, but wife was willing to learn. VS stable. Pt remains free of falls and injury. No acute events this shift. Will continue to monitor.      "

## 2017-11-26 NOTE — ASSESSMENT & PLAN NOTE
56 yo M with a history of DM, HTN, HLD and arthritis who presented with free air s/p ex-lap with diverting loop ileostomy and Gtube placement    - Continue ADA diet  - Clamp Gtube; unclamp for nausea if prn anti-emetics are not effective  - Continue home medications  - Aggressive respiratory toilet with acappella and IS  - Cultures growing E coli only sensitive to many antibiotics; continue cipro and flagyl  - Continue oral prednisone  - Daily labs  - SCDs, DVT and GI prophylaxis  - PT/OT; OOBTC  - Dispo: likely home with home health early this coming week

## 2017-11-26 NOTE — PROGRESS NOTES
[facility]  General Surgery  Progress Note    Subjective:     History of Present Illness:  Cristo Jefferson is a 57 y.o. 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  Pt then received adjuvant chemo/XRT with last treatment in March 2017.  He states he developed abdominal pain with associated N/V Saturday.  Pain has steadily worsened since then.  Minimal PO intake.  Hypotensive on arrival to [facility] ED.  Improved with 2L NS administration.  Creatinine 3.1.  Lactate 2.7.  CT abdomen/pelvis with intraabdominal free air.    Post-Op Info:  Procedure(s) (LRB):  EXPLORATORY-LAPAROTOMY (N/A)  INCISION AND DRAINAGE (I & D)- Intra-Abdominal abcess (N/A)  INCISION AND DRAINAGE (I & D)-Abdominal-Pelvic Abcess (N/A)  Loop Ileostomy (N/A)  INSERTION-TUBE-GASTROSTOMY (Right)   5 Days Post-Op     Interval History: No acute events overnight. Worked with PT yesterday and sat up in the chair for several hours yesterday and this morning. Only complaining of back pain today. Voiding without issues.     Medications:  Continuous Infusions:   dextrose 5 % and 0.45 % NaCl with KCl 20 mEq 80 mL/hr at 11/26/17 1305     Scheduled Meds:   acyclovir  800 mg Oral 5x Daily    aspirin  81 mg Oral Daily    atorvastatin  10 mg Oral Daily    ciprofloxacin HCl  500 mg Oral Q12H    citalopram  40 mg Oral Daily    clopidogrel  75 mg Oral Daily    gabapentin  100 mg Oral TID    heparin (porcine)  5,000 Units Subcutaneous Q8H    losartan-hydrochlorothiazide 100-12.5 mg  1 tablet Oral Daily    metoprolol tartrate  25 mg Oral BID    metroNIDAZOLE  500 mg Oral Q8H    pantoprazole  40 mg Oral Daily    predniSONE  5 mg Oral Daily     PRN Meds:albuterol-ipratropium 2.5mg-0.5mg/3mL, dextrose 50%, glucagon (human recombinant), HYDROmorphone, hydrOXYzine pamoate, insulin aspart, oxyCODONE-acetaminophen, oxyCODONE-acetaminophen     Review of patient's allergies indicates:   Allergen Reactions    Morphine       Objective:     Vital Signs (Most Recent):  Temp: 97.7 °F (36.5 °C) (11/26/17 0951)  Pulse: 60 (11/26/17 1500)  Resp: 18 (11/26/17 0951)  BP: (!) 166/72 (11/26/17 0951)  SpO2: 95 % (11/26/17 0951) Vital Signs (24h Range):  Temp:  [97.4 °F (36.3 °C)-98.2 °F (36.8 °C)] 97.7 °F (36.5 °C)  Pulse:  [57-70] 60  Resp:  [18-22] 18  SpO2:  [91 %-95 %] 95 %  BP: (109-166)/(68-94) 166/72     Weight: 103.6 kg (228 lb 6.3 oz)  Body mass index is 33.73 kg/m².    Intake/Output - Last 3 Shifts       11/24 0700 - 11/25 0659 11/25 0700 - 11/26 0659 11/26 0700 - 11/27 0659    P.O. 270 640     I.V. (mL/kg) 2009 (19.4) 1916 (18.5)     Other   20    IV Piggyback 300      Total Intake(mL/kg) 2579 (24.9) 2556 (24.7) 20 (0.2)    Urine (mL/kg/hr) 1075 (0.4) 950 (0.4) 550 (0.6)    Drains 5 (0) 60 (0) 60 (0.1)    Other 50 (0)      Stool 1100 (0.4) 1070 (0.4) 225 (0.2)    Total Output 2230 2080 835    Net +349 +476 -815           Stool Occurrence 0 x            Physical Exam   Constitutional: He is oriented to person, place, and time. He appears well-developed and well-nourished. No distress.   HENT:   Head: Normocephalic and atraumatic.   Cardiovascular: Normal rate and regular rhythm.    Pulmonary/Chest: Effort normal and breath sounds normal.   Abdominal: Soft. He exhibits distension. He exhibits no mass. There is tenderness. There is no guarding.   Skin vac in place; there is old blood pooled at the superior portion of the black sponge on the incision. Two abdominal drains with serous output. Gtube in place but clamped.    Ostomy bag with succus.    Only tender at incision site.   Musculoskeletal: Normal range of motion.   Neurological: He is alert and oriented to person, place, and time.   Skin: Skin is warm and dry.   Herpetic lesions noted on R anterior thigh   Psychiatric: He has a normal mood and affect. His behavior is normal.       Significant Labs:  CBC:     Recent Labs  Lab 11/26/17  0456   WBC 5.32   RBC 4.19*   HGB 11.3*   HCT  35.7*   *   MCV 85   MCH 27.0   MCHC 31.7*     CMP:     Recent Labs  Lab 11/26/17  0456      CALCIUM 8.1*   ALBUMIN 2.0*   PROT 5.3*      K 3.4*   CO2 29      BUN 12   CREATININE 0.7   ALKPHOS 103   ALT 29   AST 38   BILITOT 0.7       Significant Diagnostics:  None    Assessment/Plan:     * Free intraperitoneal air    58 yo M with a history of DM, HTN, HLD and arthritis who presented with free air s/p ex-lap with diverting loop ileostomy and Gtube placement    - Continue ADA diet  - Clamp Gtube; unclamp for nausea if prn anti-emetics are not effective  - Continue home medications  - Aggressive respiratory toilet with acappella and IS  - Cultures growing E coli only sensitive to many antibiotics; continue cipro and flagyl  - Continue oral prednisone  - Daily labs  - SCDs, DVT and GI prophylaxis  - PT/OT; OOBTC  - Dispo: likely home with home health early this coming week        Herpes zoster    - Dermatology signed off; recommend po acyclovir 5x daily for 7 days (day 5)              Kevin Wakefield MD  General Surgery  [facility]

## 2017-11-26 NOTE — SUBJECTIVE & OBJECTIVE
Interval History: No acute events overnight. Worked with PT yesterday and sat up in the chair for several hours yesterday and this morning. Only complaining of back pain today. Voiding without issues.     Medications:  Continuous Infusions:   dextrose 5 % and 0.45 % NaCl with KCl 20 mEq 80 mL/hr at 11/26/17 1305     Scheduled Meds:   acyclovir  800 mg Oral 5x Daily    aspirin  81 mg Oral Daily    atorvastatin  10 mg Oral Daily    ciprofloxacin HCl  500 mg Oral Q12H    citalopram  40 mg Oral Daily    clopidogrel  75 mg Oral Daily    gabapentin  100 mg Oral TID    heparin (porcine)  5,000 Units Subcutaneous Q8H    losartan-hydrochlorothiazide 100-12.5 mg  1 tablet Oral Daily    metoprolol tartrate  25 mg Oral BID    metroNIDAZOLE  500 mg Oral Q8H    pantoprazole  40 mg Oral Daily    predniSONE  5 mg Oral Daily     PRN Meds:albuterol-ipratropium 2.5mg-0.5mg/3mL, dextrose 50%, glucagon (human recombinant), HYDROmorphone, hydrOXYzine pamoate, insulin aspart, oxyCODONE-acetaminophen, oxyCODONE-acetaminophen     Review of patient's allergies indicates:   Allergen Reactions    Morphine      Objective:     Vital Signs (Most Recent):  Temp: 97.7 °F (36.5 °C) (11/26/17 0951)  Pulse: 60 (11/26/17 1500)  Resp: 18 (11/26/17 0951)  BP: (!) 166/72 (11/26/17 0951)  SpO2: 95 % (11/26/17 0951) Vital Signs (24h Range):  Temp:  [97.4 °F (36.3 °C)-98.2 °F (36.8 °C)] 97.7 °F (36.5 °C)  Pulse:  [57-70] 60  Resp:  [18-22] 18  SpO2:  [91 %-95 %] 95 %  BP: (109-166)/(68-94) 166/72     Weight: 103.6 kg (228 lb 6.3 oz)  Body mass index is 33.73 kg/m².    Intake/Output - Last 3 Shifts       11/24 0700 - 11/25 0659 11/25 0700 - 11/26 0659 11/26 0700 - 11/27 0659    P.O. 270 640     I.V. (mL/kg) 2009 (19.4) 1916 (18.5)     Other   20    IV Piggyback 300      Total Intake(mL/kg) 2579 (24.9) 2556 (24.7) 20 (0.2)    Urine (mL/kg/hr) 1075 (0.4) 950 (0.4) 550 (0.6)    Drains 5 (0) 60 (0) 60 (0.1)    Other 50 (0)      Stool 1100 (0.4)  1070 (0.4) 225 (0.2)    Total Output 2230 2080 835    Net +349 +476 -815           Stool Occurrence 0 x            Physical Exam   Constitutional: He is oriented to person, place, and time. He appears well-developed and well-nourished. No distress.   HENT:   Head: Normocephalic and atraumatic.   Cardiovascular: Normal rate and regular rhythm.    Pulmonary/Chest: Effort normal and breath sounds normal.   Abdominal: Soft. He exhibits distension. He exhibits no mass. There is tenderness. There is no guarding.   Skin vac in place; there is old blood pooled at the superior portion of the black sponge on the incision. Two abdominal drains with serous output. Gtube in place but clamped.    Ostomy bag with succus.    Only tender at incision site.   Musculoskeletal: Normal range of motion.   Neurological: He is alert and oriented to person, place, and time.   Skin: Skin is warm and dry.   Herpetic lesions noted on R anterior thigh   Psychiatric: He has a normal mood and affect. His behavior is normal.       Significant Labs:  CBC:     Recent Labs  Lab 11/26/17 0456   WBC 5.32   RBC 4.19*   HGB 11.3*   HCT 35.7*   *   MCV 85   MCH 27.0   MCHC 31.7*     CMP:     Recent Labs  Lab 11/26/17 0456      CALCIUM 8.1*   ALBUMIN 2.0*   PROT 5.3*      K 3.4*   CO2 29      BUN 12   CREATININE 0.7   ALKPHOS 103   ALT 29   AST 38   BILITOT 0.7       Significant Diagnostics:  None

## 2017-11-27 LAB
ALBUMIN SERPL BCP-MCNC: 2 G/DL
ALP SERPL-CCNC: 100 U/L
ALT SERPL W/O P-5'-P-CCNC: 24 U/L
ANION GAP SERPL CALC-SCNC: 11 MMOL/L
AST SERPL-CCNC: 25 U/L
BACTERIA SPEC ANAEROBE CULT: NORMAL
BASOPHILS # BLD AUTO: 0.03 K/UL
BASOPHILS NFR BLD: 0.4 %
BILIRUB SERPL-MCNC: 0.7 MG/DL
BUN SERPL-MCNC: 9 MG/DL
CALCIUM SERPL-MCNC: 8.1 MG/DL
CHLORIDE SERPL-SCNC: 100 MMOL/L
CO2 SERPL-SCNC: 29 MMOL/L
CREAT SERPL-MCNC: 0.7 MG/DL
DIFFERENTIAL METHOD: ABNORMAL
EOSINOPHIL # BLD AUTO: 0.1 K/UL
EOSINOPHIL NFR BLD: 1.2 %
ERYTHROCYTE [DISTWIDTH] IN BLOOD BY AUTOMATED COUNT: 19.1 %
EST. GFR  (AFRICAN AMERICAN): >60 ML/MIN/1.73 M^2
EST. GFR  (NON AFRICAN AMERICAN): >60 ML/MIN/1.73 M^2
GLUCOSE SERPL-MCNC: 99 MG/DL
HCT VFR BLD AUTO: 34.2 %
HGB BLD-MCNC: 11.2 G/DL
IMM GRANULOCYTES # BLD AUTO: 0.25 K/UL
IMM GRANULOCYTES NFR BLD AUTO: 3.4 %
LYMPHOCYTES # BLD AUTO: 2.2 K/UL
LYMPHOCYTES NFR BLD: 29.1 %
MAGNESIUM SERPL-MCNC: 1.1 MG/DL
MCH RBC QN AUTO: 27.3 PG
MCHC RBC AUTO-ENTMCNC: 32.7 G/DL
MCV RBC AUTO: 83 FL
MONOCYTES # BLD AUTO: 0.5 K/UL
MONOCYTES NFR BLD: 6.6 %
NEUTROPHILS # BLD AUTO: 4.4 K/UL
NEUTROPHILS NFR BLD: 59.3 %
NRBC BLD-RTO: 0 /100 WBC
PHOSPHATE SERPL-MCNC: 1.9 MG/DL
PLATELET # BLD AUTO: 133 K/UL
PLATELET BLD QL SMEAR: ABNORMAL
PMV BLD AUTO: 10.3 FL
POCT GLUCOSE: 104 MG/DL (ref 70–110)
POCT GLUCOSE: 104 MG/DL (ref 70–110)
POCT GLUCOSE: 137 MG/DL (ref 70–110)
POCT GLUCOSE: 86 MG/DL (ref 70–110)
POCT GLUCOSE: 97 MG/DL (ref 70–110)
POTASSIUM SERPL-SCNC: 3.3 MMOL/L
PROT SERPL-MCNC: 5.4 G/DL
RBC # BLD AUTO: 4.11 M/UL
SODIUM SERPL-SCNC: 140 MMOL/L
WBC # BLD AUTO: 7.43 K/UL

## 2017-11-27 PROCEDURE — 83735 ASSAY OF MAGNESIUM: CPT

## 2017-11-27 PROCEDURE — 85025 COMPLETE CBC W/AUTO DIFF WBC: CPT

## 2017-11-27 PROCEDURE — 25000003 PHARM REV CODE 250: Performed by: STUDENT IN AN ORGANIZED HEALTH CARE EDUCATION/TRAINING PROGRAM

## 2017-11-27 PROCEDURE — 20600001 HC STEP DOWN PRIVATE ROOM

## 2017-11-27 PROCEDURE — 36415 COLL VENOUS BLD VENIPUNCTURE: CPT

## 2017-11-27 PROCEDURE — 84100 ASSAY OF PHOSPHORUS: CPT

## 2017-11-27 PROCEDURE — 63600175 PHARM REV CODE 636 W HCPCS: Performed by: STUDENT IN AN ORGANIZED HEALTH CARE EDUCATION/TRAINING PROGRAM

## 2017-11-27 PROCEDURE — 80053 COMPREHEN METABOLIC PANEL: CPT

## 2017-11-27 RX ORDER — HYDROMORPHONE HYDROCHLORIDE 1 MG/ML
1 INJECTION, SOLUTION INTRAMUSCULAR; INTRAVENOUS; SUBCUTANEOUS ONCE
Status: COMPLETED | OUTPATIENT
Start: 2017-11-27 | End: 2017-11-27

## 2017-11-27 RX ORDER — LANOLIN ALCOHOL/MO/W.PET/CERES
800 CREAM (GRAM) TOPICAL ONCE
Status: COMPLETED | OUTPATIENT
Start: 2017-11-27 | End: 2017-11-27

## 2017-11-27 RX ORDER — CIPROFLOXACIN 500 MG/1
500 TABLET ORAL EVERY 12 HOURS
Qty: 14 TABLET | Refills: 0 | Status: SHIPPED | OUTPATIENT
Start: 2017-11-27 | End: 2017-12-04

## 2017-11-27 RX ORDER — OXYCODONE AND ACETAMINOPHEN 5; 325 MG/1; MG/1
1 TABLET ORAL EVERY 4 HOURS PRN
Qty: 42 TABLET | Refills: 0 | Status: SHIPPED | OUTPATIENT
Start: 2017-11-27 | End: 2018-03-15

## 2017-11-27 RX ORDER — LANOLIN ALCOHOL/MO/W.PET/CERES
800 CREAM (GRAM) TOPICAL ONCE
Status: DISCONTINUED | OUTPATIENT
Start: 2017-11-27 | End: 2017-11-27

## 2017-11-27 RX ORDER — ENOXAPARIN SODIUM 100 MG/ML
40 INJECTION SUBCUTANEOUS EVERY 24 HOURS
Status: DISCONTINUED | OUTPATIENT
Start: 2017-11-28 | End: 2017-11-29 | Stop reason: HOSPADM

## 2017-11-27 RX ORDER — METRONIDAZOLE 500 MG/1
500 TABLET ORAL EVERY 8 HOURS
Qty: 21 TABLET | Refills: 0 | Status: SHIPPED | OUTPATIENT
Start: 2017-11-27 | End: 2017-12-04

## 2017-11-27 RX ORDER — DOCUSATE SODIUM 100 MG/1
100 CAPSULE, LIQUID FILLED ORAL 2 TIMES DAILY
Refills: 0 | COMMUNITY
Start: 2017-11-27 | End: 2018-03-15

## 2017-11-27 RX ORDER — SILVER NITRATE 38.21; 12.74 MG/1; MG/1
1 STICK TOPICAL ONCE
Status: DISCONTINUED | OUTPATIENT
Start: 2017-11-27 | End: 2017-11-29

## 2017-11-27 RX ADMIN — METRONIDAZOLE 500 MG: 500 TABLET ORAL at 03:11

## 2017-11-27 RX ADMIN — MAGNESIUM SULFATE HEPTAHYDRATE 3 G: 500 INJECTION, SOLUTION INTRAMUSCULAR; INTRAVENOUS at 10:11

## 2017-11-27 RX ADMIN — PREDNISONE 5 MG: 5 TABLET ORAL at 10:11

## 2017-11-27 RX ADMIN — HEPARIN SODIUM 5000 UNITS: 5000 INJECTION, SOLUTION INTRAVENOUS; SUBCUTANEOUS at 05:11

## 2017-11-27 RX ADMIN — CIPROFLOXACIN HYDROCHLORIDE 500 MG: 500 TABLET, FILM COATED ORAL at 08:11

## 2017-11-27 RX ADMIN — OXYCODONE HYDROCHLORIDE AND ACETAMINOPHEN 1 TABLET: 10; 325 TABLET ORAL at 03:11

## 2017-11-27 RX ADMIN — ACYCLOVIR 800 MG: 200 CAPSULE ORAL at 05:11

## 2017-11-27 RX ADMIN — GABAPENTIN 100 MG: 100 CAPSULE ORAL at 11:11

## 2017-11-27 RX ADMIN — LOSARTAN POTASSIUM AND HYDROCHLOROTHIAZIDE 1 TABLET: 12.5; 1 TABLET ORAL at 10:11

## 2017-11-27 RX ADMIN — PANTOPRAZOLE SODIUM 40 MG: 40 TABLET, DELAYED RELEASE ORAL at 10:11

## 2017-11-27 RX ADMIN — ASPIRIN 81 MG CHEWABLE TABLET 81 MG: 81 TABLET CHEWABLE at 10:11

## 2017-11-27 RX ADMIN — CIPROFLOXACIN HYDROCHLORIDE 500 MG: 500 TABLET, FILM COATED ORAL at 10:11

## 2017-11-27 RX ADMIN — ACYCLOVIR 800 MG: 200 CAPSULE ORAL at 08:11

## 2017-11-27 RX ADMIN — OXYCODONE HYDROCHLORIDE AND ACETAMINOPHEN 1 TABLET: 10; 325 TABLET ORAL at 10:11

## 2017-11-27 RX ADMIN — MAGNESIUM OXIDE TAB 400 MG (241.3 MG ELEMENTAL MG) 800 MG: 400 (241.3 MG) TAB at 11:11

## 2017-11-27 RX ADMIN — OXYCODONE HYDROCHLORIDE AND ACETAMINOPHEN 1 TABLET: 10; 325 TABLET ORAL at 08:11

## 2017-11-27 RX ADMIN — ACYCLOVIR 800 MG: 200 CAPSULE ORAL at 11:11

## 2017-11-27 RX ADMIN — HYDROMORPHONE HYDROCHLORIDE 1 MG: 1 INJECTION, SOLUTION INTRAMUSCULAR; INTRAVENOUS; SUBCUTANEOUS at 08:11

## 2017-11-27 RX ADMIN — ATORVASTATIN CALCIUM 10 MG: 10 TABLET, FILM COATED ORAL at 10:11

## 2017-11-27 RX ADMIN — METOPROLOL TARTRATE 25 MG: 25 TABLET ORAL at 10:11

## 2017-11-27 RX ADMIN — METOPROLOL TARTRATE 25 MG: 25 TABLET ORAL at 08:11

## 2017-11-27 RX ADMIN — GABAPENTIN 100 MG: 100 CAPSULE ORAL at 05:11

## 2017-11-27 RX ADMIN — GABAPENTIN 100 MG: 100 CAPSULE ORAL at 03:11

## 2017-11-27 RX ADMIN — HYDROMORPHONE HYDROCHLORIDE 0.5 MG: 1 INJECTION, SOLUTION INTRAMUSCULAR; INTRAVENOUS; SUBCUTANEOUS at 02:11

## 2017-11-27 RX ADMIN — CLOPIDOGREL 75 MG: 75 TABLET, FILM COATED ORAL at 10:11

## 2017-11-27 RX ADMIN — ACYCLOVIR 800 MG: 200 CAPSULE ORAL at 10:11

## 2017-11-27 RX ADMIN — POTASSIUM PHOSPHATE, MONOBASIC AND POTASSIUM PHOSPHATE, DIBASIC 30 MMOL: 224; 236 INJECTION, SOLUTION, CONCENTRATE INTRAVENOUS at 10:11

## 2017-11-27 RX ADMIN — ACYCLOVIR 800 MG: 200 CAPSULE ORAL at 03:11

## 2017-11-27 RX ADMIN — METRONIDAZOLE 500 MG: 500 TABLET ORAL at 05:11

## 2017-11-27 RX ADMIN — HYDROMORPHONE HYDROCHLORIDE 0.5 MG: 1 INJECTION, SOLUTION INTRAMUSCULAR; INTRAVENOUS; SUBCUTANEOUS at 06:11

## 2017-11-27 RX ADMIN — CITALOPRAM HYDROBROMIDE 40 MG: 20 TABLET ORAL at 10:11

## 2017-11-27 RX ADMIN — METRONIDAZOLE 500 MG: 500 TABLET ORAL at 11:11

## 2017-11-27 NOTE — ASSESSMENT & PLAN NOTE
56 yo M with a history of DM, HTN, HLD and arthritis who presented with free air s/p ex-lap with diverting loop ileostomy and Gtube placement    - Continue ADA diet  - Clamp Gtube; unclamp for nausea if prn anti-emetics are not effective  - Continue home medications  - Wound vac change today   - Aggressive respiratory toilet with acappella and IS  - Cultures growing E coli only sensitive to many antibiotics; continue cipro and flagyl  - Continue oral prednisone  - Daily labs  - Needs ostomy education  - SCDs, DVT and GI prophylaxis  - PT/OT; OOBTC    - Dispo: likely home with home health this week

## 2017-11-27 NOTE — SUBJECTIVE & OBJECTIVE
Interval History: No issues overnight. Pain controlled. G tube remains clamped and tolerating regular diet without N/V.     Medications:  Continuous Infusions:   Scheduled Meds:   acyclovir  800 mg Oral 5x Daily    aspirin  81 mg Oral Daily    atorvastatin  10 mg Oral Daily    ciprofloxacin HCl  500 mg Oral Q12H    citalopram  40 mg Oral Daily    clopidogrel  75 mg Oral Daily    gabapentin  100 mg Oral TID    heparin (porcine)  5,000 Units Subcutaneous Q8H    losartan-hydrochlorothiazide 100-12.5 mg  1 tablet Oral Daily    magnesium oxide  800 mg Oral Once    magnesium sulfate IVPB  3 g Intravenous Once    metoprolol tartrate  25 mg Oral BID    metroNIDAZOLE  500 mg Oral Q8H    pantoprazole  40 mg Oral Daily    predniSONE  5 mg Oral Daily     PRN Meds:albuterol-ipratropium 2.5mg-0.5mg/3mL, dextrose 50%, glucagon (human recombinant), HYDROmorphone, hydrOXYzine pamoate, insulin aspart, oxyCODONE-acetaminophen, oxyCODONE-acetaminophen     Review of patient's allergies indicates:   Allergen Reactions    Morphine      Objective:     Vital Signs (Most Recent):  Temp: 98 °F (36.7 °C) (11/27/17 0351)  Pulse: 68 (11/27/17 0700)  Resp: 20 (11/27/17 0351)  BP: (!) 151/89 (11/27/17 0351)  SpO2: (!) 94 % (11/27/17 0351) Vital Signs (24h Range):  Temp:  [96.8 °F (36 °C)-98 °F (36.7 °C)] 98 °F (36.7 °C)  Pulse:  [56-70] 68  Resp:  [18-20] 20  SpO2:  [91 %-95 %] 94 %  BP: (117-166)/(69-89) 151/89     Weight: 103.6 kg (228 lb 6.3 oz)  Body mass index is 33.73 kg/m².    Intake/Output - Last 3 Shifts       11/25 0700 - 11/26 0659 11/26 0700 - 11/27 0659 11/27 0700 - 11/28 0659    P.O. 640 2760     I.V. (mL/kg) 1916 (18.5) 1200 (11.6)     Other  60     IV Piggyback       Total Intake(mL/kg) 2556 (24.7) 4020 (38.8)     Urine (mL/kg/hr) 950 (0.4) 1425 (0.6)     Drains 60 (0) 85 (0)     Other  0 (0)     Stool 1070 (0.4) 775 (0.3)     Total Output 2080 2285      Net +476 +1735                   Physical Exam    Constitutional: He is oriented to person, place, and time. He appears well-developed and well-nourished. No distress.   HENT:   Head: Normocephalic and atraumatic.   Cardiovascular: Normal rate and regular rhythm.    Pulmonary/Chest: Effort normal and breath sounds normal.   Abdominal: Soft. He exhibits distension. He exhibits no mass. There is tenderness. There is no guarding.   Skin vac in place; there is old blood pooled at the superior portion of the black sponge on the incision. Two abdominal drains with serous output. Gtube in place but clamped.  Ostomy bag with output.   Musculoskeletal: Normal range of motion.   Neurological: He is alert and oriented to person, place, and time.   Skin: Skin is warm and dry.   Herpetic lesions noted on R anterior thigh   Psychiatric: He has a normal mood and affect. His behavior is normal.       Significant Labs:  CBC:   Recent Labs  Lab 11/26/17 0456 11/27/17  0542   WBC 5.32 7.43   RBC 4.19* 4.11*   HGB 11.3* 11.2*   HCT 35.7* 34.2*   *  --    MCV 85 83   MCH 27.0 27.3   MCHC 31.7* 32.7     BMP:   Recent Labs  Lab 11/26/17 0456 11/27/17  0542     --      --    K 3.4*  --      --    CO2 29  --    BUN 12  --    CREATININE 0.7  --    CALCIUM 8.1*  --    MG 1.2* 1.1*     LFTs:   Recent Labs  Lab 11/26/17 0456   ALT 29   AST 38   ALKPHOS 103   BILITOT 0.7   PROT 5.3*   ALBUMIN 2.0*       Significant Diagnostics:  I have reviewed all pertinent imaging results/findings within the past 24 hours.

## 2017-11-27 NOTE — PROGRESS NOTES
[facility]  General Surgery  Progress Note    Subjective:     History of Present Illness:  Cristo Jefferson is a 57 y.o. 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  Pt then received adjuvant chemo/XRT with last treatment in March 2017.  He states he developed abdominal pain with associated N/V Saturday.  Pain has steadily worsened since then.  Minimal PO intake.  Hypotensive on arrival to [facility] ED.  Improved with 2L NS administration.  Creatinine 3.1.  Lactate 2.7.  CT abdomen/pelvis with intraabdominal free air.    Post-Op Info:  Procedure(s) (LRB):  EXPLORATORY-LAPAROTOMY (N/A)  INCISION AND DRAINAGE (I & D)- Intra-Abdominal abcess (N/A)  INCISION AND DRAINAGE (I & D)-Abdominal-Pelvic Abcess (N/A)  Loop Ileostomy (N/A)  INSERTION-TUBE-GASTROSTOMY (Right)   6 Days Post-Op     Interval History: No issues overnight. Pain controlled. G tube remains clamped and tolerating regular diet without N/V.     Medications:  Continuous Infusions:   Scheduled Meds:   acyclovir  800 mg Oral 5x Daily    aspirin  81 mg Oral Daily    atorvastatin  10 mg Oral Daily    ciprofloxacin HCl  500 mg Oral Q12H    citalopram  40 mg Oral Daily    clopidogrel  75 mg Oral Daily    gabapentin  100 mg Oral TID    heparin (porcine)  5,000 Units Subcutaneous Q8H    losartan-hydrochlorothiazide 100-12.5 mg  1 tablet Oral Daily    magnesium oxide  800 mg Oral Once    magnesium sulfate IVPB  3 g Intravenous Once    metoprolol tartrate  25 mg Oral BID    metroNIDAZOLE  500 mg Oral Q8H    pantoprazole  40 mg Oral Daily    predniSONE  5 mg Oral Daily     PRN Meds:albuterol-ipratropium 2.5mg-0.5mg/3mL, dextrose 50%, glucagon (human recombinant), HYDROmorphone, hydrOXYzine pamoate, insulin aspart, oxyCODONE-acetaminophen, oxyCODONE-acetaminophen     Review of patient's allergies indicates:   Allergen Reactions    Morphine      Objective:     Vital Signs (Most Recent):  Temp: 98 °F (36.7 °C)  (11/27/17 0351)  Pulse: 68 (11/27/17 0700)  Resp: 20 (11/27/17 0351)  BP: (!) 151/89 (11/27/17 0351)  SpO2: (!) 94 % (11/27/17 0351) Vital Signs (24h Range):  Temp:  [96.8 °F (36 °C)-98 °F (36.7 °C)] 98 °F (36.7 °C)  Pulse:  [56-70] 68  Resp:  [18-20] 20  SpO2:  [91 %-95 %] 94 %  BP: (117-166)/(69-89) 151/89     Weight: 103.6 kg (228 lb 6.3 oz)  Body mass index is 33.73 kg/m².    Intake/Output - Last 3 Shifts       11/25 0700 - 11/26 0659 11/26 0700 - 11/27 0659 11/27 0700 - 11/28 0659    P.O. 640 2760     I.V. (mL/kg) 1916 (18.5) 1200 (11.6)     Other  60     IV Piggyback       Total Intake(mL/kg) 2556 (24.7) 4020 (38.8)     Urine (mL/kg/hr) 950 (0.4) 1425 (0.6)     Drains 60 (0) 85 (0)     Other  0 (0)     Stool 1070 (0.4) 775 (0.3)     Total Output 2080 2285      Net +476 +1735                   Physical Exam   Constitutional: He is oriented to person, place, and time. He appears well-developed and well-nourished. No distress.   HENT:   Head: Normocephalic and atraumatic.   Cardiovascular: Normal rate and regular rhythm.    Pulmonary/Chest: Effort normal and breath sounds normal.   Abdominal: Soft. He exhibits distension. He exhibits no mass. There is tenderness. There is no guarding.   Skin vac in place; there is old blood pooled at the superior portion of the black sponge on the incision. Two abdominal drains with serous output. Gtube in place but clamped.  Ostomy bag with output.   Musculoskeletal: Normal range of motion.   Neurological: He is alert and oriented to person, place, and time.   Skin: Skin is warm and dry.   Herpetic lesions noted on R anterior thigh   Psychiatric: He has a normal mood and affect. His behavior is normal.       Significant Labs:  CBC:   Recent Labs  Lab 11/26/17 0456 11/27/17  0542   WBC 5.32 7.43   RBC 4.19* 4.11*   HGB 11.3* 11.2*   HCT 35.7* 34.2*   *  --    MCV 85 83   MCH 27.0 27.3   MCHC 31.7* 32.7     BMP:   Recent Labs  Lab 11/26/17 0456 11/27/17  0542      --      --    K 3.4*  --      --    CO2 29  --    BUN 12  --    CREATININE 0.7  --    CALCIUM 8.1*  --    MG 1.2* 1.1*     LFTs:   Recent Labs  Lab 11/26/17  0456   ALT 29   AST 38   ALKPHOS 103   BILITOT 0.7   PROT 5.3*   ALBUMIN 2.0*       Significant Diagnostics:  I have reviewed all pertinent imaging results/findings within the past 24 hours.    Assessment/Plan:     * Free intraperitoneal air    58 yo M with a history of DM, HTN, HLD and arthritis who presented with free air s/p ex-lap with diverting loop ileostomy and Gtube placement    - Continue ADA diet  - Clamp Gtube; unclamp for nausea if prn anti-emetics are not effective  - Continue home medications  - Wound vac change today   - Aggressive respiratory toilet with acappella and IS  - Cultures growing E coli only sensitive to many antibiotics; continue cipro and flagyl  - Continue oral prednisone  - Daily labs  - Needs ostomy education  - SCDs, DVT and GI prophylaxis  - PT/OT; OOBTC    - Dispo: likely home with home health this week        Herpes zoster    - Dermatology signed off; recommend po acyclovir 5x daily for 7 days (day 6)              Bob Villegas MD  General Surgery  [facility]

## 2017-11-27 NOTE — PLAN OF CARE
Patient improving and is expected to discharge home with home keaton, PT/OT, wound vac, ostomy care/supplies +/- 11/29/2017.  Will continue to follow for needs.       11/27/17 1251   Discharge Reassessment   Assessment Type Discharge Planning Reassessment   Discharge Plan A Home with family;Home Health   Discharge Plan B Skilled Nursing Facility

## 2017-11-27 NOTE — PT/OT/SLP PROGRESS
Occupational Therapy      Cristo Jefferson  MRN: [id number]    Patient not seen today secondary to pt refusal of OT services this AM and upon 2nd attempt pt was found being cleaned 2/2 wound bag leaking. Pt's wife reports that wound vac will be replaced later today. Will follow-up tomorrow for OT eval.    Tanya Rodríguez, OT  11/27/2017

## 2017-11-27 NOTE — PROGRESS NOTES
Consult received on patient's ileostomy education. Upon entering room, wound vac leaking, large blood clot noted to superior portion of wound, wound vac dressing removed, bleeding noted, pressure held for longer than 5minutes with no success, Dr. Wakefield came to bedside. Two application of silver nitrate applied, bleeding resolved, surgicel applied on top. aquacel ag and melgisorb ag applied to wound and covered with telfa. Discussed with Dr. Wakefield, plan on reapplying wound vac tomorrow.  Educated patient and wife on ileostomy care: how often, removing pouch, measuring/cutting/applying pouch. Educated on diet and hydration. Answered patient's and wife's questions. Liquid green output noted. Harman in place at this time. Discussed with dr. Wakefield about harman removal, Dr. Wakefield to discuss with Dr. Riley. Plan on following up tomorrow for additional lesson. Nursing to continue care.     11/27/17 1445       Incision/Site 11/21/17 2321 abdomen   Date First Assessed/Time First Assessed: 11/21/17 2321   Location: abdomen   Wound Image    Incision WDL ex   Appearance dehisced;moist;pink;granulating   Periwound Area intact   Drainage Characteristics/Odor bleeding controlled   Drainage Amount scant   Wound Length (cm) 27   Wound Width (cm) 4   Depth (cm) 0.5   Wound Edges open   Cleansed W/ sterile normal saline   Dressing Ag dressings

## 2017-11-27 NOTE — PLAN OF CARE
Problem: Patient Care Overview  Goal: Plan of Care Review  Outcome: Ongoing (interventions implemented as appropriate)  Plan of care reviewed with pt and wife. Pt and wife verbalized understanding. AAOx4. VSS on 2L nasal cannula. Pain managed with prn pain medication. Tolerating diet. Denied n/v. JPx2 intact. Wound vac intact @125. Ileostomy intact, emptied as needed. G tube flushed. On tele - NSR. CBG q6.  Remained free from falls or injuries. Call light within reach. Will call for assistance. No distress noted. Will continue to monitor.

## 2017-11-27 NOTE — PLAN OF CARE
[facility]    HOME HEALTH ORDERS  FACE TO FACE ENCOUNTER    Patient Name: Cristo Jefferson  YOB: 1960    PCP: Vince Perez MD   PCP Address: [address] / [place] [address]  PCP Phone Number: [phone]  PCP Fax: [phone]    Encounter Date: 11/27/2017    Admit to Home Health    Diagnoses:  Active Hospital Problems    Diagnosis  POA    *Free intraperitoneal air [K66.8]  Yes     Added automatically from request for surgery [id number]      Herpes zoster [B02.9]  Yes      Resolved Hospital Problems    Diagnosis Date Resolved POA   No resolved problems to display.       No future appointments.        I have seen and examined this patient face to face today. My clinical findings that support the need for the home health skilled services and home bound status are the following:  Weakness/numbness causing balance and gait disturbance due to Surgery making it taxing to leave home.  Medical restrictions requiring assistance of another human to leave home due to  Post surgery monitoring, Wound care needs and Newly placed G-tube/ostomy.    Allergies:  Review of patient's allergies indicates:   Allergen Reactions    Morphine        Diet: regular diet    Activities: activity as tolerated and no heavy lifting, pushing, pulling with the implant side for 2 months    Nursing:   SN to complete comprehensive assessment including routine vital signs. Instruct on disease process and s/s of complications to report to MD. Review/verify medication list sent home with the patient at time of discharge  and instruct patient/caregiver as needed. Frequency may be adjusted depending on start of care date.    Notify MD if SBP > 160 or < 90; DBP > 90 or < 50; HR > 120 or < 50; Temp > 101      CONSULTS:    Physical Therapy to evaluate and treat. Evaluate for home safety and equipment needs; Establish/upgrade home exercise program. Perform / instruct on therapeutic exercises, gait  training, transfer training, and Range of Motion.  Occupational Therapy to evaluate and treat. Evaluate home environment for safety and equipment needs. Perform/Instruct on transfers, ADL training, ROM, and therapeutic exercises.  Aide to provide assistance with personal care, ADLs, and vital signs.    MISCELLANEOUS CARE:  Colostomy Care:  Instruct patient/caregiver to empty bag when full and PRN. and Monitor skin integrity.    WOUND CARE ORDERS  yes:  Surgical Wound:  Location: Abdomen, midline    Consult ET nurse        Apply the following to wound:   Patient to change wet to dry dressing to midline incision 1-2 per day and as needed.  Nurse to assess wound 2-3 times per week for signs of infection and proper healing       Medications: Review discharge medications with patient and family and provide education.    Current Discharge Medication List      START taking these medications    Details   ciprofloxacin HCl (CIPRO) 500 MG tablet Take 1 tablet (500 mg total) by mouth every 12 (twelve) hours.  Qty: 14 tablet, Refills: 0      docusate sodium (COLACE) 100 MG capsule Take 1 capsule (100 mg total) by mouth 2 (two) times daily.  Refills: 0      metroNIDAZOLE (FLAGYL) 500 MG tablet Take 1 tablet (500 mg total) by mouth every 8 (eight) hours.  Qty: 21 tablet, Refills: 0      oxyCODONE-acetaminophen (PERCOCET) 5-325 mg per tablet Take 1 tablet by mouth every 4 (four) hours as needed.  Qty: 42 tablet, Refills: 0         CONTINUE these medications which have NOT CHANGED    Details   aspirin 81 MG Chew Take 81 mg by mouth once daily.      atorvastatin (LIPITOR) 10 MG tablet TAKE 1 TABLET BY MOUTH EVERY DAY  Qty: 90 tablet, Refills: 0      citalopram (CELEXA) 40 MG tablet TAKE 1 TABLET BY MOUTH ONCE DAILY  Qty: 90 tablet, Refills: 0      clopidogrel (PLAVIX) 75 mg tablet TAKE 1 TABLET BY MOUTH EVERY DAY  Qty: 90 tablet, Refills: 0      co-enzyme Q-10 30 mg capsule Take 300 mg by mouth once daily.      fish oil-omega-3  fatty acids 300-1,000 mg capsule Take 2 g by mouth once daily.      gabapentin (NEURONTIN) 100 MG capsule Take 1 capsule (100 mg total) by mouth 3 (three) times daily.  Qty: 90 capsule, Refills: 2    Associated Diagnoses: Herpes zoster without complication      hydrOXYzine pamoate (VISTARIL) 25 MG Cap Take 1 capsule (25 mg total) by mouth every 8 (eight) hours as needed.  Qty: 40 capsule, Refills: 0    Associated Diagnoses: Herpes zoster without complication      losartan-hydrochlorothiazide 100-12.5 mg (HYZAAR) 100-12.5 mg Tab TAKE 1 TABLET BY MOUTH EVERY DAY  Qty: 90 tablet, Refills: 0      meloxicam (MOBIC) 15 MG tablet TK 1 T PO ONCE D  Refills: 1      metformin (GLUCOPHAGE-XR) 750 MG 24 hr tablet TAKE 1 TABLET BY MOUTH TWICE DAILY  Qty: 180 tablet, Refills: 0      metoprolol tartrate (LOPRESSOR) 25 MG tablet TAKE 1 TABLET BY MOUTH TWICE DAILY  Qty: 180 tablet, Refills: 0      MULTIVIT WITH MINERALS/LUTEIN (MULTIVITAMIN 50 PLUS ORAL) Take by mouth.      predniSONE (DELTASONE) 5 MG tablet       vitamin D 1000 units Tab Take 2,000 Units by mouth once daily.         STOP taking these medications       acyclovir (ZOVIRAX) 400 MG tablet Comments:   Reason for Stopping:               I certify that this patient is confined to his home and needs intermittent skilled nursing care, physical therapy and occupational therapy.

## 2017-11-28 LAB
ALBUMIN SERPL BCP-MCNC: 2 G/DL
ALP SERPL-CCNC: 98 U/L
ALT SERPL W/O P-5'-P-CCNC: 18 U/L
ANION GAP SERPL CALC-SCNC: 7 MMOL/L
AST SERPL-CCNC: 22 U/L
BASOPHILS # BLD AUTO: 0.03 K/UL
BASOPHILS NFR BLD: 0.4 %
BILIRUB SERPL-MCNC: 0.5 MG/DL
BUN SERPL-MCNC: 8 MG/DL
CALCIUM SERPL-MCNC: 8 MG/DL
CHLORIDE SERPL-SCNC: 98 MMOL/L
CO2 SERPL-SCNC: 33 MMOL/L
CREAT SERPL-MCNC: 0.7 MG/DL
DIFFERENTIAL METHOD: ABNORMAL
EOSINOPHIL # BLD AUTO: 0.1 K/UL
EOSINOPHIL NFR BLD: 1.4 %
ERYTHROCYTE [DISTWIDTH] IN BLOOD BY AUTOMATED COUNT: 19.3 %
EST. GFR  (AFRICAN AMERICAN): >60 ML/MIN/1.73 M^2
EST. GFR  (NON AFRICAN AMERICAN): >60 ML/MIN/1.73 M^2
GLUCOSE SERPL-MCNC: 97 MG/DL
HCT VFR BLD AUTO: 32.1 %
HGB BLD-MCNC: 10.6 G/DL
IMM GRANULOCYTES # BLD AUTO: 0.19 K/UL
IMM GRANULOCYTES NFR BLD AUTO: 2.6 %
LYMPHOCYTES # BLD AUTO: 2.1 K/UL
LYMPHOCYTES NFR BLD: 28.6 %
MAGNESIUM SERPL-MCNC: 1.6 MG/DL
MCH RBC QN AUTO: 27.8 PG
MCHC RBC AUTO-ENTMCNC: 33 G/DL
MCV RBC AUTO: 84 FL
MONOCYTES # BLD AUTO: 0.6 K/UL
MONOCYTES NFR BLD: 7.9 %
NEUTROPHILS # BLD AUTO: 4.2 K/UL
NEUTROPHILS NFR BLD: 59.1 %
NRBC BLD-RTO: 0 /100 WBC
PHOSPHATE SERPL-MCNC: 3.2 MG/DL
PLATELET # BLD AUTO: 150 K/UL
PMV BLD AUTO: 9.9 FL
POCT GLUCOSE: 114 MG/DL (ref 70–110)
POCT GLUCOSE: 120 MG/DL (ref 70–110)
POCT GLUCOSE: 67 MG/DL (ref 70–110)
POCT GLUCOSE: 92 MG/DL (ref 70–110)
POTASSIUM SERPL-SCNC: 3.6 MMOL/L
PROT SERPL-MCNC: 5.4 G/DL
RBC # BLD AUTO: 3.81 M/UL
SODIUM SERPL-SCNC: 138 MMOL/L
WBC # BLD AUTO: 7.17 K/UL

## 2017-11-28 PROCEDURE — 25000003 PHARM REV CODE 250: Performed by: STUDENT IN AN ORGANIZED HEALTH CARE EDUCATION/TRAINING PROGRAM

## 2017-11-28 PROCEDURE — 36415 COLL VENOUS BLD VENIPUNCTURE: CPT

## 2017-11-28 PROCEDURE — 84100 ASSAY OF PHOSPHORUS: CPT

## 2017-11-28 PROCEDURE — 83735 ASSAY OF MAGNESIUM: CPT

## 2017-11-28 PROCEDURE — 25000003 PHARM REV CODE 250: Performed by: ANESTHESIOLOGY

## 2017-11-28 PROCEDURE — 20600001 HC STEP DOWN PRIVATE ROOM

## 2017-11-28 PROCEDURE — 97165 OT EVAL LOW COMPLEX 30 MIN: CPT

## 2017-11-28 PROCEDURE — 85025 COMPLETE CBC W/AUTO DIFF WBC: CPT

## 2017-11-28 PROCEDURE — 80053 COMPREHEN METABOLIC PANEL: CPT

## 2017-11-28 PROCEDURE — 63600175 PHARM REV CODE 636 W HCPCS: Performed by: STUDENT IN AN ORGANIZED HEALTH CARE EDUCATION/TRAINING PROGRAM

## 2017-11-28 RX ADMIN — METOPROLOL TARTRATE 25 MG: 25 TABLET ORAL at 08:11

## 2017-11-28 RX ADMIN — PREDNISONE 5 MG: 5 TABLET ORAL at 09:11

## 2017-11-28 RX ADMIN — ACYCLOVIR 800 MG: 200 CAPSULE ORAL at 03:11

## 2017-11-28 RX ADMIN — OXYCODONE HYDROCHLORIDE AND ACETAMINOPHEN 1 TABLET: 10; 325 TABLET ORAL at 10:11

## 2017-11-28 RX ADMIN — HYDROMORPHONE HYDROCHLORIDE 0.5 MG: 1 INJECTION, SOLUTION INTRAMUSCULAR; INTRAVENOUS; SUBCUTANEOUS at 06:11

## 2017-11-28 RX ADMIN — HYDROMORPHONE HYDROCHLORIDE 0.5 MG: 1 INJECTION, SOLUTION INTRAMUSCULAR; INTRAVENOUS; SUBCUTANEOUS at 07:11

## 2017-11-28 RX ADMIN — HYDROMORPHONE HYDROCHLORIDE 0.5 MG: 1 INJECTION, SOLUTION INTRAMUSCULAR; INTRAVENOUS; SUBCUTANEOUS at 03:11

## 2017-11-28 RX ADMIN — METRONIDAZOLE 500 MG: 500 TABLET ORAL at 10:11

## 2017-11-28 RX ADMIN — HYDROMORPHONE HYDROCHLORIDE 0.5 MG: 1 INJECTION, SOLUTION INTRAMUSCULAR; INTRAVENOUS; SUBCUTANEOUS at 09:11

## 2017-11-28 RX ADMIN — ACYCLOVIR 800 MG: 200 CAPSULE ORAL at 09:11

## 2017-11-28 RX ADMIN — ENOXAPARIN SODIUM 40 MG: 100 INJECTION SUBCUTANEOUS at 06:11

## 2017-11-28 RX ADMIN — CIPROFLOXACIN HYDROCHLORIDE 500 MG: 500 TABLET, FILM COATED ORAL at 08:11

## 2017-11-28 RX ADMIN — LOSARTAN POTASSIUM AND HYDROCHLOROTHIAZIDE 1 TABLET: 12.5; 1 TABLET ORAL at 09:11

## 2017-11-28 RX ADMIN — GABAPENTIN 100 MG: 100 CAPSULE ORAL at 03:11

## 2017-11-28 RX ADMIN — OXYCODONE HYDROCHLORIDE AND ACETAMINOPHEN 1 TABLET: 10; 325 TABLET ORAL at 06:11

## 2017-11-28 RX ADMIN — PANTOPRAZOLE SODIUM 40 MG: 40 TABLET, DELAYED RELEASE ORAL at 09:11

## 2017-11-28 RX ADMIN — GABAPENTIN 100 MG: 100 CAPSULE ORAL at 05:11

## 2017-11-28 RX ADMIN — CIPROFLOXACIN HYDROCHLORIDE 500 MG: 500 TABLET, FILM COATED ORAL at 09:11

## 2017-11-28 RX ADMIN — OXYCODONE HYDROCHLORIDE AND ACETAMINOPHEN 1 TABLET: 10; 325 TABLET ORAL at 02:11

## 2017-11-28 RX ADMIN — HYDROMORPHONE HYDROCHLORIDE 0.5 MG: 1 INJECTION, SOLUTION INTRAMUSCULAR; INTRAVENOUS; SUBCUTANEOUS at 02:11

## 2017-11-28 RX ADMIN — CITALOPRAM HYDROBROMIDE 40 MG: 20 TABLET ORAL at 09:11

## 2017-11-28 RX ADMIN — METOPROLOL TARTRATE 25 MG: 25 TABLET ORAL at 09:11

## 2017-11-28 RX ADMIN — ACYCLOVIR 800 MG: 200 CAPSULE ORAL at 06:11

## 2017-11-28 RX ADMIN — OXYCODONE HYDROCHLORIDE AND ACETAMINOPHEN 1 TABLET: 10; 325 TABLET ORAL at 11:11

## 2017-11-28 RX ADMIN — METRONIDAZOLE 500 MG: 500 TABLET ORAL at 05:11

## 2017-11-28 RX ADMIN — GABAPENTIN 100 MG: 100 CAPSULE ORAL at 09:11

## 2017-11-28 RX ADMIN — CLOPIDOGREL 75 MG: 75 TABLET, FILM COATED ORAL at 09:11

## 2017-11-28 RX ADMIN — DEXTROSE MONOHYDRATE 12.5 G: 25 INJECTION, SOLUTION INTRAVENOUS at 05:11

## 2017-11-28 RX ADMIN — OXYCODONE HYDROCHLORIDE AND ACETAMINOPHEN 1 TABLET: 10; 325 TABLET ORAL at 08:11

## 2017-11-28 RX ADMIN — OXYCODONE HYDROCHLORIDE AND ACETAMINOPHEN 1 TABLET: 10; 325 TABLET ORAL at 04:11

## 2017-11-28 RX ADMIN — ATORVASTATIN CALCIUM 10 MG: 10 TABLET, FILM COATED ORAL at 09:11

## 2017-11-28 RX ADMIN — METRONIDAZOLE 500 MG: 500 TABLET ORAL at 03:11

## 2017-11-28 RX ADMIN — HYDROMORPHONE HYDROCHLORIDE 0.5 MG: 1 INJECTION, SOLUTION INTRAMUSCULAR; INTRAVENOUS; SUBCUTANEOUS at 11:11

## 2017-11-28 NOTE — PLAN OF CARE
Problem: Patient Care Overview  Goal: Plan of Care Review  Outcome: Ongoing (interventions implemented as appropriate)  POC reviewed with patient who verbalized understanding. AAOX4. Pt O2 sats stable on RA. G tube intact, patent, and clamped.  Pt tolerating diet well with no nausea. X2 JPs intact and patent with minimal output. Right ileostomy intact and patent. Left AC IV intact and patent. Right FA IV intact and patent.  Pt walked in room and sat in chair. Pt voiding in urinal with concentrated and dark urine. Pt on tele running NSR. BG being monitored Q6. ML incision with telfa dry and intact.  Pain being controlled with PRN pain medication. Pt remained free from falls throughout the shift. VSS. Will continue to monitor.

## 2017-11-28 NOTE — PLAN OF CARE
Problem: Patient Care Overview  Goal: Plan of Care Review  Outcome: Ongoing (interventions implemented as appropriate)  Plan of care reviewed with pt and wife. Pt and wife verbalized understanding. AAOx4. VSS on 2L nasal cannula. Pain managed with prn pain medication. Tolerating diet. Denied n/v. JPx2 intact. Ileostomy intact, emptied as needed. G tube flushed. On tele - NSR. CBG q6.  Remained free from falls or injuries. Call light within reach. Will call for assistance. No distress noted. Will continue to monitor.

## 2017-11-28 NOTE — PLAN OF CARE
Problem: Patient Care Overview  Goal: Plan of Care Review  Outcome: Ongoing (interventions implemented as appropriate)  Plan of care discussed with pt. Pt verbalizes understanding. Pt tolerating diabetic diet. Wound dressing on abdominal wound was leaking. Paged Dr Cheng. Wound care removed and changed dressing, and d/c wound vac. No c/o nausea. Pain managed with PRN pain medications. Pt on telemetry, NSR. Pt sat up in chair, tolerated well.  Pt positions independently. VS stable. Pt remains free of falls and injury. Will continue to monitor.

## 2017-11-28 NOTE — PLAN OF CARE
Problem: Occupational Therapy Goal  Goal: Occupational Therapy Goal  Outcome: Outcome(s) achieved Date Met: 11/28/17  OT evaluation completed and pt d/c'ed from acute OT 11/28/2017 as pt with no further acute OT needs at this time. Pt would benefit from HHOT upon d/c to maximize independence and safety at home with self-care and functional mobility.

## 2017-11-28 NOTE — PT/OT/SLP PROGRESS
"Physical Therapy      Cristo Jefferson  MRN: [id number]    Patient not seen today secondary to unwilling to participate due to reporting "I walked earlier and I just got back in bed, I can't walk right now  . Will follow-up next scheduled treatment per PT POC.    Austin Hendricks, PTA 11/28/2017      "

## 2017-11-28 NOTE — PT/OT/SLP EVAL
Occupational Therapy  Evaluation & Discharge     Cristo Jefferson   MRN: [id number]   Admitting Diagnosis: Free intraperitoneal air    OT Date of Treatment: 11/28/17   OT Start Time: 0811  OT Stop Time: 0851  OT Total Time (min): 40 min    Billable Minutes:  Evaluation 40 minutes    Diagnosis: Free intraperitoneal air   S/p exploratory laparotomy    Past Medical History:   Diagnosis Date    Arthritis     Cancer 2016    appendix, completed chemo    Depression     Diabetes mellitus type I     Hyperlipidemia     Hypertension       Past Surgical History:   Procedure Laterality Date    APPENDECTOMY      open heart       RIGHT COLECTOMY  2016       Referring physician: Kevin Wakefield MD  Date referred to OT: 11/24/2017    Do you have any cultural, spiritual, Worship conflicts, given your current situation?: none stated      HISTORY:  Living Environment: pt lives with spouse and 15 y.o son [facility] a [facility] with 3[facility] and with rails on the R side. Pt's spouse will be able to assist as needed upon d/c.   Prior Level of Function: Pt was independent for bed mobility and functional mobility. Pt required intermittent assistance with tasks requiring FMC and , LB dressing, and grooming 2/2 RA diagnosis.   Social History: Pt is a father and a . He enjoys hunting and spending time with his dog.   Equipment Currently Used at Home: none   Equipment Owned But Not Used at Home: crutches     SUBJECTIVE:  Communicated with nursing prior to session. As per nursing, pt ok to be seen for therapy at this time. Pt agreeable to OT session.  Pt found supine in bed with spouse in the room upon entry to room.    Chief Complaint: fatigue   Patient/Family stated goals: return home and to PLOF  Pain/Comfort:   Pain Rating and Location Pre-Activity: 0/10    Pain Addressed: n/a   Pain Rating and Location Post-Session: 0/10    OBJECTIVE:  General Precautions: standard, fall, contact (shingles)  Orthopedic Precautions: none   Braces: none      Patient found with: peripheral IV clamped     Occupational Performance:    Bed Mobility:  Scooting: min A    -with HHA from spouse to scoot hips anteriorly approx 6 inches to attain EOB seated position   Supine>Sit: min A   -pt requiring HHA to attain upright trunk positioning    -pt seated EOB for approx 6 minutes to assess ROM/MMT and to discuss home set-up and PLOF  Sit>Supine: NT   -pt left seated UIC    Functional Mobility/Transfers:  Sit>Stand: supervision   -x3 trials from EOB with bed in lowest position without the use of any AD  Stand>Sit: supervision   -to sit onto bed (x2 trials) and bedside chair (x1 trial) with min vc's for reach back for surface prior to sitting   Bed>Chair: supervision   -via functional mobility without the use of any AD    Pt engaging in functional mobility approx 325 ft with supervision and without the use of any AD without any noted LOB. Pt requiring 3 standing rest breaks during mobility and expressed fatigue upon completion.     Activities of Daily Living:  Feeding: independent   -pt placing sausage in mouth  UE Dressing: setup assistance   -to don back gown as a robe in standing position without any noted LOB  LE Dressing: SBA   -pt able to access BLE's in prep for donning socks, however pt requiring SBA for dynamic standing task  Grooming: independent   -to wash hands while standing at the sink with ability to cross midline to access paper towels   Toileting: supervision   -pt attempting to urinate x2 times while standing in bathroom with spouse (sposes reporting she was just there for safety in case patient needed her)  Bathing: Activity did not occur    Cognitive:  Oriented to: person, place, time, and situation   Follows Commands/attention: multi-step  Communication: clear/fluent  Memory: intact   Safety awareness/insight to disability: intact   Coping skills/emotional control: Appropriate to situation    Visual/perceptual: intact     Physical Exam:  Balance:   -pt  "independent with static sitting at EOB   -pt independent with dynamic sitting at EOB   -pt requiring sup with static standing   -pt requiring SBA for dynamic standing   Musculoskeletal:   Postural examination/scapula alignment: intact, no deficits noted    Skin integrity: intact    Edema: none noted    Sensation: pt reporting paraesthesia in RLE from prior vascular surgery, however able to feel light touch   Upper Extremity Range of Motion:    Right Upper Extremity: WFL    Left Upper Extremity: WFL   Upper Extremity Strength:    Right Upper Extremity: WFL    Left Upper Extremity: WFL    Strength: WFL   Fine Motor Control: intact finger opposition on b/l hands    Gross Motor Coordination: WFL    Pt left seated UIC with spouse in room and call bell within reach at end of session.    Additional:  Communicated role of OT/POC (d/c from OT)  Updated communication board  Educated on: importance of OOB mobility, safety with functional mobility, increasing functional activity tolerance   Donned extra gown for patient privacy   RN notified post-OT session    AM-PAC 6 CLICK ADL  How much help from another person does this patient currently need?  1 = Unable, Total/Dependent Assistance  2 = A lot, Maximum/Moderate Assistance  3 = A little, Minimum/Contact Guard/Supervision  4 = None, Modified [place]/Independent    Putting on and taking off regular lower body clothing? : 3  Bathing (including washing, rinsing, drying)?: 3  Toileting, which includes using toilet, bedpan, or urinal? : 4  Putting on and taking off regular upper body clothing?: 4  Taking care of personal grooming such as brushing teeth?: 4  Eating meals?: 4  Total Score: 22    AM-PAC Raw Score CMS "G-Code Modifier Level of Impairment Assistance   6 % Total / Unable   7 - 9 CM 80 - 100% Maximal Assist   10-14 CL 60 - 80% Moderate Assist   15 - 19 CK 40 - 60% Moderate Assist   20 - 22 CJ 20 - 40% Minimal Assist   23 CI 1-20% SBA / CGA   24 CH 0% " Independent/ Mod I     Assessment:  Cristo Jefferson is a 57 y.o. male with a medical diagnosis of Free intraperitoneal air s/p exploratory laparotomy. Pt with good motivation to engage in therapy at this time and with good insight into condition. Pt with decreased functional activity tolerance as evidenced by patient requiring additional rest breaks during functional mobility. Pt with no further acute skilled OT needs at this time, however pt would benefit from HHOT upon d/c.     Rehab identified problem list/impairments: Rehab identified problem list/impairments: impaired endurance    Activity tolerance: Good    Discharge recommendations: Discharge Facility/Level Of Care Needs: home health OT     Barriers to discharge: Barriers to Discharge: None    Equipment recommendations: none     GOALS:    Occupational Therapy Goals     Not on file          Multidisciplinary Problems (Resolved)        Problem: Occupational Therapy Goal    Goal Priority Disciplines Outcome Interventions   Occupational Therapy Goal   (Resolved)     OT, PT/OT Outcome(s) achieved                    PLAN:  Patient d/c'ed from OT 11/28/2017  Plan of Care reviewed with: patient, spouse     Summer Sarmiento, OT  11/28/2017

## 2017-11-28 NOTE — PROGRESS NOTES
Patient seen for follow-up wound and ostomy care. Discussed with Dr. Kevin Wakefield, no wound vac to midline abdomen at this time and leave ostomy bridge in place, bridge will be removed at follow up in clinic. Recommend dressing midline abdominal wound with Mepilex AG and cover with telfa island every Tuesday/friday and follow up appointment with Shelia HUMPHRIES for ostomy.   Educated patient and wife on ileostomy education: how often, removing pouch, cleaning peristomal skin, measuring/cutting/applying pouch, and emptying pouch. Reviewed diet and hydration. Answered patient's and wife's questions. Patient stoma red, flush with skin measured approx [id number], red rubber bridge in place. Peristomal skin intact. Abdomen distended. Liquid brown output. Peristomal skin cleaned, coloplast two piece pouch applied. Patient tolerated well. Extra ostomy supplies and wound care supplies provided. Educated wife on wound care, wife verbalized understanding. [employer] contacted for ostomy samples. No additional questions noted at this time. Nursing to continue care.

## 2017-11-28 NOTE — PLAN OF CARE
TRAMAINE faxed ([phone]) Pt's face sheet, H&P, Op Note and completed wound vac form to [facility] and asked that request be expedited. TRAMAINE also sent home health orders to [facility] via [facility]. Pt still on track to discharge home tomorrow with home health and home wound vac.     Anastasia Fay LCSW

## 2017-11-28 NOTE — ASSESSMENT & PLAN NOTE
58 yo M with a history of DM, HTN, HLD and arthritis who presented with free air s/p ex-lap with diverting loop ileostomy and Gtube placement    - Continue ADA diet  - Clamp Gtube; unclamp for nausea if prn anti-emetics are not effective  - Continue home medications  - Wet to dry till DC, set up for home wound vac  - Aggressive respiratory toilet with acappella and IS  - Cultures growing E coli, continue cipro and flagyl  - Continue oral prednisone  - Daily labs  - Needs ostomy education  - SCDs, DVT and GI prophylaxis  - PT/OT; OOBTC    - Dispo: likely home with home health and home wound vac once set up.  Will remove ostomy bar and drains in clinic in 2 weeks

## 2017-11-28 NOTE — SUBJECTIVE & OBJECTIVE
Interval History: No events overnight. Wet to dry dressing in midline.  Ostomy functioning well.  Pain controlled.  Ambulating    Medications:  Continuous Infusions:   Scheduled Meds:   acyclovir  800 mg Oral 5x Daily    aspirin  81 mg Oral Daily    atorvastatin  10 mg Oral Daily    ciprofloxacin HCl  500 mg Oral Q12H    citalopram  40 mg Oral Daily    clopidogrel  75 mg Oral Daily    enoxaparin  40 mg Subcutaneous Daily    gabapentin  100 mg Oral TID    losartan-hydrochlorothiazide 100-12.5 mg  1 tablet Oral Daily    metoprolol tartrate  25 mg Oral BID    metroNIDAZOLE  500 mg Oral Q8H    pantoprazole  40 mg Oral Daily    predniSONE  5 mg Oral Daily    silver nitrate applicators  1 applicator Topical (Top) Once     PRN Meds:albuterol-ipratropium 2.5mg-0.5mg/3mL, dextrose 50%, glucagon (human recombinant), HYDROmorphone, hydrOXYzine pamoate, insulin aspart, oxyCODONE-acetaminophen, oxyCODONE-acetaminophen     Review of patient's allergies indicates:   Allergen Reactions    Morphine      Objective:     Vital Signs (Most Recent):  Temp: 98.1 °F (36.7 °C) (11/28/17 0731)  Pulse: 62 (11/28/17 0731)  Resp: 18 (11/28/17 0731)  BP: 130/76 (11/28/17 0731)  SpO2: (!) 92 % (11/28/17 0731) Vital Signs (24h Range):  Temp:  [97.7 °F (36.5 °C)-98.4 °F (36.9 °C)] 98.1 °F (36.7 °C)  Pulse:  [60-73] 62  Resp:  [17-20] 18  SpO2:  [91 %-95 %] 92 %  BP: (130-165)/(70-86) 130/76     Weight: 103.6 kg (228 lb 6.3 oz)  Body mass index is 33.73 kg/m².    Intake/Output - Last 3 Shifts       11/26 0700 - 11/27 0659 11/27 0700 - 11/28 0659 11/28 0700 - 11/29 0659    P.O. 2760 1100     I.V. (mL/kg) 1200 (11.6) 620.8 (6)     Other 60 40     IV Piggyback  500     Total Intake(mL/kg) 4020 (38.8) 2260.8 (21.8)     Urine (mL/kg/hr) 1425 (0.6) 1150 (0.5)     Drains 85 (0) 57 (0)     Other 0 (0)      Stool 775 (0.3) 875 (0.4)     Total Output 2285 2082      Net +1735 +178.8                   Physical Exam   Gen: NAD  CV: RRR  Pulm:  Unlabored  GI: Soft, ATTP, Ostomy functioning    Significant Labs:  CBC:   Recent Labs  Lab 11/28/17 0430   WBC 7.17   RBC 3.81*   HGB 10.6*   HCT 32.1*      MCV 84   MCH 27.8   MCHC 33.0     CMP:   Recent Labs  Lab 11/28/17 0430   GLU 97   CALCIUM 8.0*   ALBUMIN 2.0*   PROT 5.4*      K 3.6   CO2 33*   CL 98   BUN 8   CREATININE 0.7   ALKPHOS 98   ALT 18   AST 22   BILITOT 0.5

## 2017-11-28 NOTE — PROGRESS NOTES
[facility]  General Surgery  Progress Note    Subjective:     History of Present Illness:  Cristo Jefferson is a 57 y.o. 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  Pt then received adjuvant chemo/XRT with last treatment in March 2017.  He states he developed abdominal pain with associated N/V Saturday.  Pain has steadily worsened since then.  Minimal PO intake.  Hypotensive on arrival to [facility] [place] ED.  Improved with 2L NS administration.  Creatinine 3.1.  Lactate 2.7.  CT abdomen/pelvis with intraabdominal free air.    Post-Op Info:  Procedure(s) (LRB):  EXPLORATORY-LAPAROTOMY (N/A)  INCISION AND DRAINAGE (I & D)- Intra-Abdominal abcess (N/A)  INCISION AND DRAINAGE (I & D)-Abdominal-Pelvic Abcess (N/A)  Loop Ileostomy (N/A)  INSERTION-TUBE-GASTROSTOMY (Right)   7 Days Post-Op     Interval History: No events overnight. Wet to dry dressing in midline.  Ostomy functioning well.  Pain controlled.  Ambulating    Medications:  Continuous Infusions:   Scheduled Meds:   acyclovir  800 mg Oral 5x Daily    aspirin  81 mg Oral Daily    atorvastatin  10 mg Oral Daily    ciprofloxacin HCl  500 mg Oral Q12H    citalopram  40 mg Oral Daily    clopidogrel  75 mg Oral Daily    enoxaparin  40 mg Subcutaneous Daily    gabapentin  100 mg Oral TID    losartan-hydrochlorothiazide 100-12.5 mg  1 tablet Oral Daily    metoprolol tartrate  25 mg Oral BID    metroNIDAZOLE  500 mg Oral Q8H    pantoprazole  40 mg Oral Daily    predniSONE  5 mg Oral Daily    silver nitrate applicators  1 applicator Topical (Top) Once     PRN Meds:albuterol-ipratropium 2.5mg-0.5mg/3mL, dextrose 50%, glucagon (human recombinant), HYDROmorphone, hydrOXYzine pamoate, insulin aspart, oxyCODONE-acetaminophen, oxyCODONE-acetaminophen     Review of patient's allergies indicates:   Allergen Reactions    Morphine      Objective:     Vital Signs (Most Recent):  Temp: 98.1 °F (36.7 °C) (11/28/17 0731)  Pulse: 62  (11/28/17 0731)  Resp: 18 (11/28/17 0731)  BP: 130/76 (11/28/17 0731)  SpO2: (!) 92 % (11/28/17 0731) Vital Signs (24h Range):  Temp:  [97.7 °F (36.5 °C)-98.4 °F (36.9 °C)] 98.1 °F (36.7 °C)  Pulse:  [60-73] 62  Resp:  [17-20] 18  SpO2:  [91 %-95 %] 92 %  BP: (130-165)/(70-86) 130/76     Weight: 103.6 kg (228 lb 6.3 oz)  Body mass index is 33.73 kg/m².    Intake/Output - Last 3 Shifts       11/26 0700 - 11/27 0659 11/27 0700 - 11/28 0659 11/28 0700 - 11/29 0659    P.O. 2760 1100     I.V. (mL/kg) 1200 (11.6) 620.8 (6)     Other 60 40     IV Piggyback  500     Total Intake(mL/kg) 4020 (38.8) 2260.8 (21.8)     Urine (mL/kg/hr) 1425 (0.6) 1150 (0.5)     Drains 85 (0) 57 (0)     Other 0 (0)      Stool 775 (0.3) 875 (0.4)     Total Output 2285 2082      Net +1735 +178.8                   Physical Exam   Gen: NAD  CV: RRR  Pulm: Unlabored  GI: Soft, ATTP, Ostomy functioning    Significant Labs:  CBC:   Recent Labs  Lab 11/28/17  0430   WBC 7.17   RBC 3.81*   HGB 10.6*   HCT 32.1*      MCV 84   MCH 27.8   MCHC 33.0     CMP:   Recent Labs  Lab 11/28/17  0430   GLU 97   CALCIUM 8.0*   ALBUMIN 2.0*   PROT 5.4*      K 3.6   CO2 33*   CL 98   BUN 8   CREATININE 0.7   ALKPHOS 98   ALT 18   AST 22   BILITOT 0.5           Assessment/Plan:     * Free intraperitoneal air    56 yo M with a history of DM, HTN, HLD and arthritis who presented with free air s/p ex-lap with diverting loop ileostomy and Gtube placement    - Continue ADA diet  - Clamp Gtube; unclamp for nausea if prn anti-emetics are not effective  - Continue home medications  - Wet to dry till DC, set up for home wound vac  - Aggressive respiratory toilet with acappella and IS  - Cultures growing E coli, continue cipro and flagyl  - Continue oral prednisone  - Daily labs  - Needs ostomy education  - SCDs, DVT and GI prophylaxis  - PT/OT; OOBTC    - Dispo: likely home with home health and home wound vac once set up.  Will remove ostomy bar and drains in clinic  in 2 weeks        Herpes zoster    - Dermatology signed off; recommend po acyclovir 5x daily for 7 days (day 6)              Darrell Velarde MD  General Surgery  [facility]

## 2017-11-29 VITALS
WEIGHT: 228.38 LBS | HEART RATE: 57 BPM | RESPIRATION RATE: 18 BRPM | HEIGHT: 69 IN | SYSTOLIC BLOOD PRESSURE: 142 MMHG | BODY MASS INDEX: 33.83 KG/M2 | DIASTOLIC BLOOD PRESSURE: 78 MMHG | TEMPERATURE: 98 F | OXYGEN SATURATION: 95 %

## 2017-11-29 LAB
ALBUMIN SERPL BCP-MCNC: 2 G/DL
ALP SERPL-CCNC: 99 U/L
ALT SERPL W/O P-5'-P-CCNC: 13 U/L
ANION GAP SERPL CALC-SCNC: 8 MMOL/L
AST SERPL-CCNC: 18 U/L
BASOPHILS # BLD AUTO: 0.03 K/UL
BASOPHILS NFR BLD: 0.4 %
BILIRUB SERPL-MCNC: 0.4 MG/DL
BUN SERPL-MCNC: 7 MG/DL
CALCIUM SERPL-MCNC: 7.9 MG/DL
CHLORIDE SERPL-SCNC: 100 MMOL/L
CO2 SERPL-SCNC: 31 MMOL/L
CREAT SERPL-MCNC: 0.7 MG/DL
DIFFERENTIAL METHOD: ABNORMAL
EOSINOPHIL # BLD AUTO: 0.1 K/UL
EOSINOPHIL NFR BLD: 1.3 %
ERYTHROCYTE [DISTWIDTH] IN BLOOD BY AUTOMATED COUNT: 19 %
EST. GFR  (AFRICAN AMERICAN): >60 ML/MIN/1.73 M^2
EST. GFR  (NON AFRICAN AMERICAN): >60 ML/MIN/1.73 M^2
GLUCOSE SERPL-MCNC: 88 MG/DL
HCT VFR BLD AUTO: 32.7 %
HGB BLD-MCNC: 10.3 G/DL
IMM GRANULOCYTES # BLD AUTO: 0.22 K/UL
IMM GRANULOCYTES NFR BLD AUTO: 2.9 %
LYMPHOCYTES # BLD AUTO: 2.6 K/UL
LYMPHOCYTES NFR BLD: 35 %
MAGNESIUM SERPL-MCNC: 1.5 MG/DL
MCH RBC QN AUTO: 27.1 PG
MCHC RBC AUTO-ENTMCNC: 31.5 G/DL
MCV RBC AUTO: 86 FL
MONOCYTES # BLD AUTO: 0.7 K/UL
MONOCYTES NFR BLD: 9.3 %
NEUTROPHILS # BLD AUTO: 3.8 K/UL
NEUTROPHILS NFR BLD: 51.1 %
NRBC BLD-RTO: 0 /100 WBC
PHOSPHATE SERPL-MCNC: 2.4 MG/DL
PLATELET # BLD AUTO: 166 K/UL
PMV BLD AUTO: 10.3 FL
POCT GLUCOSE: 139 MG/DL (ref 70–110)
POCT GLUCOSE: 87 MG/DL (ref 70–110)
POTASSIUM SERPL-SCNC: 3.8 MMOL/L
PROT SERPL-MCNC: 5.6 G/DL
RBC # BLD AUTO: 3.8 M/UL
SODIUM SERPL-SCNC: 139 MMOL/L
WBC # BLD AUTO: 7.52 K/UL

## 2017-11-29 PROCEDURE — 63600175 PHARM REV CODE 636 W HCPCS: Performed by: STUDENT IN AN ORGANIZED HEALTH CARE EDUCATION/TRAINING PROGRAM

## 2017-11-29 PROCEDURE — 25000003 PHARM REV CODE 250: Performed by: STUDENT IN AN ORGANIZED HEALTH CARE EDUCATION/TRAINING PROGRAM

## 2017-11-29 PROCEDURE — 84100 ASSAY OF PHOSPHORUS: CPT

## 2017-11-29 PROCEDURE — 85025 COMPLETE CBC W/AUTO DIFF WBC: CPT

## 2017-11-29 PROCEDURE — 83735 ASSAY OF MAGNESIUM: CPT

## 2017-11-29 PROCEDURE — 36415 COLL VENOUS BLD VENIPUNCTURE: CPT

## 2017-11-29 PROCEDURE — 80053 COMPREHEN METABOLIC PANEL: CPT

## 2017-11-29 RX ORDER — LANOLIN ALCOHOL/MO/W.PET/CERES
800 CREAM (GRAM) TOPICAL ONCE
Status: COMPLETED | OUTPATIENT
Start: 2017-11-29 | End: 2017-11-29

## 2017-11-29 RX ADMIN — MAGNESIUM OXIDE TAB 400 MG (241.3 MG ELEMENTAL MG) 800 MG: 400 (241.3 MG) TAB at 08:11

## 2017-11-29 RX ADMIN — OXYCODONE HYDROCHLORIDE AND ACETAMINOPHEN 1 TABLET: 10; 325 TABLET ORAL at 10:11

## 2017-11-29 RX ADMIN — LOSARTAN POTASSIUM AND HYDROCHLOROTHIAZIDE 1 TABLET: 12.5; 1 TABLET ORAL at 08:11

## 2017-11-29 RX ADMIN — ACYCLOVIR 800 MG: 200 CAPSULE ORAL at 10:11

## 2017-11-29 RX ADMIN — HYDROMORPHONE HYDROCHLORIDE 0.5 MG: 1 INJECTION, SOLUTION INTRAMUSCULAR; INTRAVENOUS; SUBCUTANEOUS at 08:11

## 2017-11-29 RX ADMIN — GABAPENTIN 100 MG: 100 CAPSULE ORAL at 05:11

## 2017-11-29 RX ADMIN — CITALOPRAM HYDROBROMIDE 40 MG: 20 TABLET ORAL at 08:11

## 2017-11-29 RX ADMIN — HYDROMORPHONE HYDROCHLORIDE 0.5 MG: 1 INJECTION, SOLUTION INTRAMUSCULAR; INTRAVENOUS; SUBCUTANEOUS at 05:11

## 2017-11-29 RX ADMIN — CIPROFLOXACIN HYDROCHLORIDE 500 MG: 500 TABLET, FILM COATED ORAL at 08:11

## 2017-11-29 RX ADMIN — PANTOPRAZOLE SODIUM 40 MG: 40 TABLET, DELAYED RELEASE ORAL at 08:11

## 2017-11-29 RX ADMIN — OXYCODONE HYDROCHLORIDE AND ACETAMINOPHEN 1 TABLET: 10; 325 TABLET ORAL at 04:11

## 2017-11-29 RX ADMIN — HYDROMORPHONE HYDROCHLORIDE 0.5 MG: 1 INJECTION, SOLUTION INTRAMUSCULAR; INTRAVENOUS; SUBCUTANEOUS at 01:11

## 2017-11-29 RX ADMIN — ACYCLOVIR 800 MG: 200 CAPSULE ORAL at 05:11

## 2017-11-29 RX ADMIN — HYDROMORPHONE HYDROCHLORIDE 0.5 MG: 1 INJECTION, SOLUTION INTRAMUSCULAR; INTRAVENOUS; SUBCUTANEOUS at 12:11

## 2017-11-29 RX ADMIN — CLOPIDOGREL 75 MG: 75 TABLET, FILM COATED ORAL at 08:11

## 2017-11-29 RX ADMIN — PREDNISONE 5 MG: 5 TABLET ORAL at 08:11

## 2017-11-29 RX ADMIN — ATORVASTATIN CALCIUM 10 MG: 10 TABLET, FILM COATED ORAL at 08:11

## 2017-11-29 RX ADMIN — METRONIDAZOLE 500 MG: 500 TABLET ORAL at 05:11

## 2017-11-29 RX ADMIN — METOPROLOL TARTRATE 25 MG: 25 TABLET ORAL at 08:11

## 2017-11-29 NOTE — SUBJECTIVE & OBJECTIVE
Interval History: No events. Ostomy functioning.  No complaints    Medications:  Continuous Infusions:   Scheduled Meds:   acyclovir  800 mg Oral 5x Daily    aspirin  81 mg Oral Daily    atorvastatin  10 mg Oral Daily    ciprofloxacin HCl  500 mg Oral Q12H    citalopram  40 mg Oral Daily    clopidogrel  75 mg Oral Daily    enoxaparin  40 mg Subcutaneous Daily    gabapentin  100 mg Oral TID    losartan-hydrochlorothiazide 100-12.5 mg  1 tablet Oral Daily    metoprolol tartrate  25 mg Oral BID    metroNIDAZOLE  500 mg Oral Q8H    pantoprazole  40 mg Oral Daily    predniSONE  5 mg Oral Daily     PRN Meds:albuterol-ipratropium 2.5mg-0.5mg/3mL, dextrose 50%, glucagon (human recombinant), HYDROmorphone, hydrOXYzine pamoate, insulin aspart, oxyCODONE-acetaminophen, oxyCODONE-acetaminophen     Review of patient's allergies indicates:   Allergen Reactions    Morphine      Objective:     Vital Signs (Most Recent):  Temp: 98 °F (36.7 °C) (11/29/17 0724)  Pulse: 60 (11/29/17 0728)  Resp: 18 (11/29/17 0724)  BP: (!) 142/78 (11/29/17 0724)  SpO2: 95 % (11/29/17 0724) Vital Signs (24h Range):  Temp:  [96.9 °F (36.1 °C)-98.4 °F (36.9 °C)] 98 °F (36.7 °C)  Pulse:  [53-67] 60  Resp:  [14-18] 18  SpO2:  [90 %-96 %] 95 %  BP: (118-145)/(73-84) 142/78     Weight: 103.6 kg (228 lb 6.3 oz)  Body mass index is 33.73 kg/m².    Intake/Output - Last 3 Shifts       11/27 0700 - 11/28 0659 11/28 0700 - 11/29 0659 11/29 0700 - 11/30 0659    P.O. 1100 1200     I.V. (mL/kg) 620.8 (6)      Other 40 60     IV Piggyback 500      Total Intake(mL/kg) 2260.8 (21.8) 1260 (12.2)     Urine (mL/kg/hr) 1150 (0.5) 200 (0.1)     Drains 57 (0) 50 (0)     Other       Stool 875 (0.4) 825 (0.3)     Total Output 2082 1075      Net +178.8 +185             Urine Occurrence  5 x     Stool Occurrence  0 x           Physical Exam   Gen: NAD  CV: RRR  Pulm:Unlabored  GI:Soft, ATTP, ostomy functioning    Significant Labs:  CBC:   Recent Labs  Lab  11/29/17  0401   WBC 7.52   RBC 3.80*   HGB 10.3*   HCT 32.7*      MCV 86   MCH 27.1   MCHC 31.5*     CMP:   Recent Labs  Lab 11/29/17 0401   GLU 88   CALCIUM 7.9*   ALBUMIN 2.0*   PROT 5.6*      K 3.8   CO2 31*      BUN 7   CREATININE 0.7   ALKPHOS 99   ALT 13   AST 18   BILITOT 0.4       Significant Diagnostics:  n/a

## 2017-11-29 NOTE — PLAN OF CARE
Patient discharged home today with [facility] [place].  Patient refused [employer] Wound VAC last night when attempted delivery.  [employer] rep did not leave VAC at bedside.  Patient discharged home with wet to dry dressing changes BID, new ostomy, aysha drains.  Spoke with patient's wife, stated she is comfortable with everything and all teachings.  Notified Tran with [facility] of discharge.    Future Appointments  Date Time Provider Department Center   12/14/2017 2:30 PM ANA LUISA Sadler [facility] ENTERO [facility]   12/14/2017 3:45 PM Eber Riley MD [facility] GENSUR [facility]   12/19/2017 12:15 PM Vince Perez MD [facility]          11/29/17 1334   Final Note   Assessment Type Final Discharge Note   Discharge Disposition [facility]   Hospital Follow Up  Appt(s) scheduled? Yes   Right Care Referral Info   Post Acute Recommendation Home-care

## 2017-11-29 NOTE — PROGRESS NOTES
[facility]  General Surgery  Progress Note    Subjective:     History of Present Illness:  Cristo Jefferson is a 57 y.o. 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  Pt then received adjuvant chemo/XRT with last treatment in March 2017.  He states he developed abdominal pain with associated N/V Saturday.  Pain has steadily worsened since then.  Minimal PO intake.  Hypotensive on arrival to [facility] ED.  Improved with 2L NS administration.  Creatinine 3.1.  Lactate 2.7.  CT abdomen/pelvis with intraabdominal free air.    Post-Op Info:  Procedure(s) (LRB):  EXPLORATORY-LAPAROTOMY (N/A)  INCISION AND DRAINAGE (I & D)- Intra-Abdominal abcess (N/A)  INCISION AND DRAINAGE (I & D)-Abdominal-Pelvic Abcess (N/A)  Loop Ileostomy (N/A)  INSERTION-TUBE-GASTROSTOMY (Right)   8 Days Post-Op     Interval History: No events. Ostomy functioning.  No complaints    Medications:  Continuous Infusions:   Scheduled Meds:   acyclovir  800 mg Oral 5x Daily    aspirin  81 mg Oral Daily    atorvastatin  10 mg Oral Daily    ciprofloxacin HCl  500 mg Oral Q12H    citalopram  40 mg Oral Daily    clopidogrel  75 mg Oral Daily    enoxaparin  40 mg Subcutaneous Daily    gabapentin  100 mg Oral TID    losartan-hydrochlorothiazide 100-12.5 mg  1 tablet Oral Daily    metoprolol tartrate  25 mg Oral BID    metroNIDAZOLE  500 mg Oral Q8H    pantoprazole  40 mg Oral Daily    predniSONE  5 mg Oral Daily     PRN Meds:albuterol-ipratropium 2.5mg-0.5mg/3mL, dextrose 50%, glucagon (human recombinant), HYDROmorphone, hydrOXYzine pamoate, insulin aspart, oxyCODONE-acetaminophen, oxyCODONE-acetaminophen     Review of patient's allergies indicates:   Allergen Reactions    Morphine      Objective:     Vital Signs (Most Recent):  Temp: 98 °F (36.7 °C) (11/29/17 0724)  Pulse: 60 (11/29/17 0728)  Resp: 18 (11/29/17 0724)  BP: (!) 142/78 (11/29/17 0724)  SpO2: 95 % (11/29/17 0724) Vital Signs (24h  Range):  Temp:  [96.9 °F (36.1 °C)-98.4 °F (36.9 °C)] 98 °F (36.7 °C)  Pulse:  [53-67] 60  Resp:  [14-18] 18  SpO2:  [90 %-96 %] 95 %  BP: (118-145)/(73-84) 142/78     Weight: 103.6 kg (228 lb 6.3 oz)  Body mass index is 33.73 kg/m².    Intake/Output - Last 3 Shifts       11/27 0700 - 11/28 0659 11/28 0700 - 11/29 0659 11/29 0700 - 11/30 0659    P.O. 1100 1200     I.V. (mL/kg) 620.8 (6)      Other 40 60     IV Piggyback 500      Total Intake(mL/kg) 2260.8 (21.8) 1260 (12.2)     Urine (mL/kg/hr) 1150 (0.5) 200 (0.1)     Drains 57 (0) 50 (0)     Other       Stool 875 (0.4) 825 (0.3)     Total Output 2082 1075      Net +178.8 +185             Urine Occurrence  5 x     Stool Occurrence  0 x           Physical Exam   Gen: NAD  CV: RRR  Pulm:Unlabored  GI:Soft, ATTP, ostomy functioning    Significant Labs:  CBC:   Recent Labs  Lab 11/29/17  0401   WBC 7.52   RBC 3.80*   HGB 10.3*   HCT 32.7*      MCV 86   MCH 27.1   MCHC 31.5*     CMP:   Recent Labs  Lab 11/29/17  0401   GLU 88   CALCIUM 7.9*   ALBUMIN 2.0*   PROT 5.6*      K 3.8   CO2 31*      BUN 7   CREATININE 0.7   ALKPHOS 99   ALT 13   AST 18   BILITOT 0.4       Significant Diagnostics:  n/a    Assessment/Plan:     * Free intraperitoneal air    58 yo M with a history of DM, HTN, HLD and arthritis who presented with free air s/p ex-lap with diverting loop ileostomy and Gtube placement    - Continue ADA diet  - Clamp Gtube; unclamp for nausea if prn anti-emetics are not effective  - Continue home medications  - Wet to dry to midline  - Aggressive respiratory toilet with acappella and IS  - Cultures growing E coli, continue cipro and flagyl  - Continue oral prednisone  - Daily labs  - Needs ostomy education  - SCDs, DVT and GI prophylaxis  - PT/OT; OOBTC    - Dispo:DC home.  Will remove ostomy bar and drains in clinic in 2 weeks        Herpes zoster    - Dermatology signed off; recommend po acyclovir 5x daily for 7 days (day 6)              Darrell HINDS  MD Prachi  General Surgery  [facility]

## 2017-11-29 NOTE — DISCHARGE SUMMARY
[facility]  General Surgery  Discharge Summary      Patient Name: Cristo Jefferson  MRN: [id number]  Admission Date: 11/21/2017  Hospital Length of Stay: 8 days  Discharge Date and Time:  11/29/2017 10:42 AM  Attending Physician: Eber Riley MD   Discharging Provider: Darrell Velarde MD  Primary Care Provider: Vince Perez MD     HPI: 56 y/o M who presented with free air and perforated viscus.  Pt had an exploratory laparotomy with diversion and G tube placement.  Postoperatively he did well.  He was kept on abx for abdominal contamination, slowly progressed on his diet, and ultimately DCed on on regular diet, oral abx, home medications, with home health for ostomy, drain, and wound care    Procedure(s) (LRB):  EXPLORATORY-LAPAROTOMY (N/A)  INCISION AND DRAINAGE (I & D)- Intra-Abdominal abcess (N/A)  INCISION AND DRAINAGE (I & D)-Abdominal-Pelvic Abcess (N/A)  Loop Ileostomy (N/A)  INSERTION-TUBE-GASTROSTOMY (Right)     Hospital Course: As above    Consults:   Consults         Status Ordering Provider     Inpatient consult to Dermatology  Once     Provider:  (Not yet assigned)    Completed WILLI BURROWS     Inpatient consult to General surgery  Once     Provider:  (Not yet assigned)    Completed ELLEN BARNES          Significant Diagnostic Studies: Labs:   BMP:   Recent Labs  Lab 11/28/17  0430 11/29/17  0401   GLU 97 88    139   K 3.6 3.8   CL 98 100   CO2 33* 31*   BUN 8 7   CREATININE 0.7 0.7   CALCIUM 8.0* 7.9*   MG 1.6 1.5*    and CBC   Recent Labs  Lab 11/28/17  0430 11/29/17  0401   WBC 7.17 7.52   HGB 10.6* 10.3*   HCT 32.1* 32.7*    166       Pending Diagnostic Studies:     Procedure Component Value Units Date/Time    CBC auto differential [[id number]] Collected:  11/21/17 2323    Order Status:  Sent Lab Status:  In process Updated:  11/21/17 2323    Specimen:  Blood from Blood     Comprehensive metabolic panel [[id number]] Collected:  11/21/17 2323    Order  Status:  Sent Lab Status:  In process Updated:  11/21/17 2323    Specimen:  Blood from Blood     Hemoglobin A1c if not done in past 6 weeks [[id number]] Collected:  11/21/17 2336    Order Status:  Sent Lab Status:  In process Updated:  11/21/17 2336    Specimen:  Blood from Blood     Lactic acid, plasma [[id number]] Collected:  11/21/17 2323    Order Status:  Sent Lab Status:  In process Updated:  11/21/17 2323    Specimen:  Blood from Blood     Magnesium [[id number]] Collected:  11/21/17 2323    Order Status:  Sent Lab Status:  In process Updated:  11/21/17 2323    Specimen:  Blood from Blood     Phosphorus [[id number]] Collected:  11/21/17 2323    Order Status:  Sent Lab Status:  In process Updated:  11/21/17 2323    Specimen:  Blood from Blood         Final Active Diagnoses:    Diagnosis Date Noted POA    PRINCIPAL PROBLEM:  Free intraperitoneal air [K66.8] 11/24/2017 Yes    Herpes zoster [B02.9] 11/24/2017 Yes      Problems Resolved During this Admission:    Diagnosis Date Noted Date Resolved POA      Discharged Condition: good    Disposition: Home or Self Care    Follow Up:  Follow-up Information     Eber Riley MD. Schedule an appointment as soon as possible for a visit in 2 weeks.    Specialty:  General Surgery  Why:  For wound re-check  Contact information:  [address]aqueline M[address]en [address]onz[address]  [facility] [address]  [phone]                 Patient Instructions:     Diet general     Lifting restrictions   Order Comments: No more than 10 lbs     Call MD for:  increased confusion or weakness     Call MD for:  persistent dizziness, light-headedness, or visual disturbances     Call MD for:  worsening rash     Call MD for:  severe persistent headache     Call MD for:  difficulty breathing or increased cough     Call MD for:  redness, tenderness, or signs of infection (pain, swelling, redness, odor or green/yellow discharge around incision site)     Call MD for:  severe uncontrolled pain     Call MD for:  persistent nausea  and vomiting or diarrhea     Call MD for:  temperature >100.4     Change dressing (specify)   Order Comments: Dressing change: to midline 1-2 times per day with wet to dry dressings.  Change ostomy appliance as needed and empty PRN       Medications:  Reconciled Home Medications:   Current Discharge Medication List      START taking these medications    Details   ciprofloxacin HCl (CIPRO) 500 MG tablet Take 1 tablet (500 mg total) by mouth every 12 (twelve) hours.  Qty: 14 tablet, Refills: 0      docusate sodium (COLACE) 100 MG capsule Take 1 capsule (100 mg total) by mouth 2 (two) times daily.  Refills: 0      metroNIDAZOLE (FLAGYL) 500 MG tablet Take 1 tablet (500 mg total) by mouth every 8 (eight) hours.  Qty: 21 tablet, Refills: 0      oxyCODONE-acetaminophen (PERCOCET) 5-325 mg per tablet Take 1 tablet by mouth every 4 (four) hours as needed.  Qty: 42 tablet, Refills: 0         CONTINUE these medications which have NOT CHANGED    Details   aspirin 81 MG Chew Take 81 mg by mouth once daily.      atorvastatin (LIPITOR) 10 MG tablet TAKE 1 TABLET BY MOUTH EVERY DAY  Qty: 90 tablet, Refills: 0      citalopram (CELEXA) 40 MG tablet TAKE 1 TABLET BY MOUTH ONCE DAILY  Qty: 90 tablet, Refills: 0      clopidogrel (PLAVIX) 75 mg tablet TAKE 1 TABLET BY MOUTH EVERY DAY  Qty: 90 tablet, Refills: 0      co-enzyme Q-10 30 mg capsule Take 300 mg by mouth once daily.      fish oil-omega-3 fatty acids 300-1,000 mg capsule Take 2 g by mouth once daily.      gabapentin (NEURONTIN) 100 MG capsule Take 1 capsule (100 mg total) by mouth 3 (three) times daily.  Qty: 90 capsule, Refills: 2    Associated Diagnoses: Herpes zoster without complication      hydrOXYzine pamoate (VISTARIL) 25 MG Cap Take 1 capsule (25 mg total) by mouth every 8 (eight) hours as needed.  Qty: 40 capsule, Refills: 0    Associated Diagnoses: Herpes zoster without complication      losartan-hydrochlorothiazide 100-12.5 mg (HYZAAR) 100-12.5 mg Tab TAKE 1 TABLET  BY MOUTH EVERY DAY  Qty: 90 tablet, Refills: 0      meloxicam (MOBIC) 15 MG tablet TK 1 T PO ONCE D  Refills: 1      metformin (GLUCOPHAGE-XR) 750 MG 24 hr tablet TAKE 1 TABLET BY MOUTH TWICE DAILY  Qty: 180 tablet, Refills: 0      metoprolol tartrate (LOPRESSOR) 25 MG tablet TAKE 1 TABLET BY MOUTH TWICE DAILY  Qty: 180 tablet, Refills: 0      MULTIVIT WITH MINERALS/LUTEIN (MULTIVITAMIN 50 PLUS ORAL) Take by mouth.      predniSONE (DELTASONE) 5 MG tablet       vitamin D 1000 units Tab Take 2,000 Units by mouth once daily.         STOP taking these medications       acyclovir (ZOVIRAX) 400 MG tablet Comments:   Reason for Stopping:               Darrell Velarde MD  General Surgery  [facility]

## 2017-11-29 NOTE — PT/OT/SLP PROGRESS
Physical Therapy      Cristo Jefferson  MRN: [id number]     Patient not seen today secondary to hospital discharge with complete discharge summary to follow.      Austin Hendricks, PTA   11/29/2017

## 2017-11-29 NOTE — ASSESSMENT & PLAN NOTE
56 yo M with a history of DM, HTN, HLD and arthritis who presented with free air s/p ex-lap with diverting loop ileostomy and Gtube placement    - Continue ADA diet  - Clamp Gtube; unclamp for nausea if prn anti-emetics are not effective  - Continue home medications  - Wet to dry to midline  - Aggressive respiratory toilet with acappella and IS  - Cultures growing E coli, continue cipro and flagyl  - Continue oral prednisone  - Daily labs  - Needs ostomy education  - SCDs, DVT and GI prophylaxis  - PT/OT; OOBTC    - Dispo:DC home.  Will remove ostomy bar and drains in clinic in 2 weeks

## 2017-11-29 NOTE — PROGRESS NOTES
Discharge instructions given to Pt and family. Pt and family verbalized understanding. Pain prescription given to Pt. G tube, RACHEL drain, and ileostomy education given to Pt and family. They verbalized understanding. G tube, RACHEL drain, and ileostomy supplies given to Pt. Right FA Iv removed. Left FA Iv removed. Ileostomy intact and patent. G tube intact and patent. X2 JPs intact and patent. Pt denied transport. Pt wheeled out by spouse in wheelchair.

## 2017-11-30 ENCOUNTER — TELEPHONE (OUTPATIENT)
Dept: PRIMARY CARE CLINIC | Facility: CLINIC | Age: 57
End: 2017-11-30

## 2017-11-30 NOTE — PT/OT/SLP PROGRESS
Physical Therapy   Discharge    Cristo Jefferson   MRN: [id number]     Hospital discharge with PT eval only completed; therefore, no goals met. Pt. discharged home with home health.    Yfn Shah, PT  11/29/2017

## 2017-11-30 NOTE — TELEPHONE ENCOUNTER
----- Message from Ryan Hall sent at 11/30/2017 10:50 AM CST -----  Contact: Wife, rohini Ramos want to speak with a nurse regarding patient taking mucinex DM please call back at [phone]

## 2017-12-04 ENCOUNTER — TELEPHONE (OUTPATIENT)
Dept: SURGERY | Facility: CLINIC | Age: 57
End: 2017-12-04

## 2017-12-04 NOTE — TELEPHONE ENCOUNTER
----- Message from Daryl Lazar sent at 12/4/2017  9:21 AM CST -----  Contact: Odell//[facility]  Caller states that that pt has been complaining about stomach cramps and is taking a long time to void;Caller would like to know if the pt can get something called in to the pharmacy for the cramps;caller also states that some of the new medications have interactions with the pts other medications//please call back at [phone]//thank you

## 2017-12-05 ENCOUNTER — TELEPHONE (OUTPATIENT)
Dept: PRIMARY CARE CLINIC | Facility: CLINIC | Age: 57
End: 2017-12-05

## 2017-12-05 ENCOUNTER — TELEPHONE (OUTPATIENT)
Dept: SURGERY | Facility: CLINIC | Age: 57
End: 2017-12-05

## 2017-12-05 DIAGNOSIS — K66.8 PERITONEAL CAVITY FREE AIR: ICD-10-CM

## 2017-12-05 DIAGNOSIS — R10.9 ABDOMINAL CRAMPING: Primary | ICD-10-CM

## 2017-12-05 DIAGNOSIS — C18.1 CANCER OF APPENDIX: ICD-10-CM

## 2017-12-05 NOTE — TELEPHONE ENCOUNTER
----- Message from Shelton Santos sent at 12/5/2017  1:18 PM CST -----  Contact: Lacie- wife  Caller said the RACHEL tube #1 isn't clear anymore. Caller said it is cloudy and wants to know if this is normal. Please call regarding this at [phone]

## 2017-12-05 NOTE — TELEPHONE ENCOUNTER
Spoke with pt's wife stated that his RACHEL drain  Is cloudy , per Dr Riley  Schedule pt an appointment tomorrow at noon

## 2017-12-05 NOTE — TELEPHONE ENCOUNTER
Wife notified of appointment on Thursday at 10:15. I also notified her that if the pain gets any worse, pt might need to go to the Er. She states understanding

## 2017-12-05 NOTE — TELEPHONE ENCOUNTER
----- Message from Dian Mcgee sent at 12/5/2017 11:50 AM CST -----  Contact: wife   Pt wife states pt surgeon told them that pt needs to be seen as soon as possible for urinate/pain in stomach and kidney/weak/has drainage tubes,very urgent. Urine is orange...[phone] (home)

## 2017-12-06 ENCOUNTER — HOSPITAL ENCOUNTER (OUTPATIENT)
Dept: RADIOLOGY | Facility: HOSPITAL | Age: 57
Discharge: HOME | End: 2017-12-06
Attending: SURGERY
Payer: COMMERCIAL

## 2017-12-06 ENCOUNTER — HOSPITAL ENCOUNTER (INPATIENT)
Facility: HOSPITAL | Age: 57
LOS: 2 days | Discharge: HOME | DRG: 684 | End: 2017-12-08
Attending: SURGERY | Admitting: SURGERY
Payer: COMMERCIAL

## 2017-12-06 ENCOUNTER — OFFICE VISIT (OUTPATIENT)
Dept: SURGERY | Facility: CLINIC | Age: 57
End: 2017-12-06
Payer: COMMERCIAL

## 2017-12-06 VITALS
SYSTOLIC BLOOD PRESSURE: 93 MMHG | HEIGHT: 69 IN | DIASTOLIC BLOOD PRESSURE: 61 MMHG | WEIGHT: 202.69 LBS | BODY MASS INDEX: 30.02 KG/M2 | RESPIRATION RATE: 20 BRPM | HEART RATE: 59 BPM

## 2017-12-06 DIAGNOSIS — R10.9 ABDOMINAL CRAMPING: ICD-10-CM

## 2017-12-06 DIAGNOSIS — K66.8 PERITONEAL CAVITY FREE AIR: ICD-10-CM

## 2017-12-06 DIAGNOSIS — C18.1 CANCER OF APPENDIX: ICD-10-CM

## 2017-12-06 DIAGNOSIS — E86.0 DEHYDRATION: Primary | ICD-10-CM

## 2017-12-06 DIAGNOSIS — E86.0 DEHYDRATION: ICD-10-CM

## 2017-12-06 PROBLEM — Z86.79 HISTORY OF ABDOMINAL AORTIC ANEURYSM (AAA): Chronic | Status: ACTIVE | Noted: 2017-12-06

## 2017-12-06 PROBLEM — Z95.828 HISTORY OF ENDOVASCULAR STENT GRAFT FOR ABDOMINAL AORTIC ANEURYSM (AAA): Status: ACTIVE | Noted: 2017-12-06

## 2017-12-06 LAB
ANION GAP SERPL CALC-SCNC: 17 MMOL/L
BUN SERPL-MCNC: 82 MG/DL
CALCIUM SERPL-MCNC: 8.9 MG/DL
CHLORIDE SERPL-SCNC: 96 MMOL/L
CO2 SERPL-SCNC: 14 MMOL/L
CREAT SERPL-MCNC: 4.6 MG/DL
EST. GFR  (AFRICAN AMERICAN): 15 ML/MIN/1.73 M^2
EST. GFR  (NON AFRICAN AMERICAN): 13 ML/MIN/1.73 M^2
ESTIMATED AVG GLUCOSE: 114 MG/DL
GLUCOSE SERPL-MCNC: 198 MG/DL
HBA1C MFR BLD HPLC: 5.6 %
POCT GLUCOSE: 170 MG/DL (ref 70–110)
POTASSIUM SERPL-SCNC: 4.9 MMOL/L
POTASSIUM SERPL-SCNC: 4.9 MMOL/L
SODIUM SERPL-SCNC: 127 MMOL/L

## 2017-12-06 PROCEDURE — 83036 HEMOGLOBIN GLYCOSYLATED A1C: CPT

## 2017-12-06 PROCEDURE — 99222 1ST HOSP IP/OBS MODERATE 55: CPT | Mod: ,,, | Performed by: SURGERY

## 2017-12-06 PROCEDURE — 74177 CT ABD & PELVIS W/CONTRAST: CPT | Mod: 26,,, | Performed by: RADIOLOGY

## 2017-12-06 PROCEDURE — 74177 CT ABD & PELVIS W/CONTRAST: CPT | Mod: TC

## 2017-12-06 PROCEDURE — 36415 COLL VENOUS BLD VENIPUNCTURE: CPT

## 2017-12-06 PROCEDURE — 80048 BASIC METABOLIC PNL TOTAL CA: CPT

## 2017-12-06 PROCEDURE — 99024 POSTOP FOLLOW-UP VISIT: CPT | Mod: S$GLB,,, | Performed by: SURGERY

## 2017-12-06 PROCEDURE — S0030 INJECTION, METRONIDAZOLE: HCPCS | Performed by: STUDENT IN AN ORGANIZED HEALTH CARE EDUCATION/TRAINING PROGRAM

## 2017-12-06 PROCEDURE — 25500020 PHARM REV CODE 255: Performed by: SURGERY

## 2017-12-06 PROCEDURE — 63600175 PHARM REV CODE 636 W HCPCS: Performed by: STUDENT IN AN ORGANIZED HEALTH CARE EDUCATION/TRAINING PROGRAM

## 2017-12-06 PROCEDURE — 25000003 PHARM REV CODE 250: Performed by: STUDENT IN AN ORGANIZED HEALTH CARE EDUCATION/TRAINING PROGRAM

## 2017-12-06 PROCEDURE — 11000001 HC ACUTE MED/SURG PRIVATE ROOM

## 2017-12-06 PROCEDURE — 99999 PR PBB SHADOW E&M-EST. PATIENT-LVL III: CPT | Mod: PBBFAC,,, | Performed by: SURGERY

## 2017-12-06 PROCEDURE — 87040 BLOOD CULTURE FOR BACTERIA: CPT

## 2017-12-06 RX ORDER — NAPROXEN SODIUM 220 MG/1
81 TABLET, FILM COATED ORAL DAILY
Status: DISCONTINUED | OUTPATIENT
Start: 2017-12-07 | End: 2017-12-07

## 2017-12-06 RX ORDER — IBUPROFEN 200 MG
16 TABLET ORAL
Status: DISCONTINUED | OUTPATIENT
Start: 2017-12-06 | End: 2017-12-08 | Stop reason: HOSPADM

## 2017-12-06 RX ORDER — HYDRALAZINE HYDROCHLORIDE 25 MG/1
25 TABLET, FILM COATED ORAL EVERY 8 HOURS PRN
Status: DISCONTINUED | OUTPATIENT
Start: 2017-12-06 | End: 2017-12-08 | Stop reason: HOSPADM

## 2017-12-06 RX ORDER — SODIUM CHLORIDE 9 MG/ML
INJECTION, SOLUTION INTRAVENOUS CONTINUOUS
Status: DISCONTINUED | OUTPATIENT
Start: 2017-12-06 | End: 2017-12-08 | Stop reason: HOSPADM

## 2017-12-06 RX ORDER — CIPROFLOXACIN 2 MG/ML
200 INJECTION, SOLUTION INTRAVENOUS
Status: DISCONTINUED | OUTPATIENT
Start: 2017-12-06 | End: 2017-12-08

## 2017-12-06 RX ORDER — DIPHENHYDRAMINE HYDROCHLORIDE 50 MG/ML
25 INJECTION INTRAMUSCULAR; INTRAVENOUS EVERY 4 HOURS PRN
Status: DISCONTINUED | OUTPATIENT
Start: 2017-12-06 | End: 2017-12-08 | Stop reason: HOSPADM

## 2017-12-06 RX ORDER — SODIUM CHLORIDE 0.9 % (FLUSH) 0.9 %
3 SYRINGE (ML) INJECTION
Status: DISCONTINUED | OUTPATIENT
Start: 2017-12-06 | End: 2017-12-08 | Stop reason: HOSPADM

## 2017-12-06 RX ORDER — ACETAMINOPHEN 325 MG/1
650 TABLET ORAL EVERY 8 HOURS PRN
Status: DISCONTINUED | OUTPATIENT
Start: 2017-12-06 | End: 2017-12-07

## 2017-12-06 RX ORDER — CLOPIDOGREL BISULFATE 75 MG/1
75 TABLET ORAL DAILY
Status: DISCONTINUED | OUTPATIENT
Start: 2017-12-07 | End: 2017-12-08 | Stop reason: HOSPADM

## 2017-12-06 RX ORDER — INSULIN ASPART 100 [IU]/ML
0-5 INJECTION, SOLUTION INTRAVENOUS; SUBCUTANEOUS
Status: DISCONTINUED | OUTPATIENT
Start: 2017-12-06 | End: 2017-12-08 | Stop reason: HOSPADM

## 2017-12-06 RX ORDER — METOPROLOL TARTRATE 25 MG/1
25 TABLET, FILM COATED ORAL 2 TIMES DAILY
Status: DISCONTINUED | OUTPATIENT
Start: 2017-12-06 | End: 2017-12-08 | Stop reason: HOSPADM

## 2017-12-06 RX ORDER — CITALOPRAM 40 MG/1
40 TABLET, FILM COATED ORAL DAILY
Status: DISCONTINUED | OUTPATIENT
Start: 2017-12-07 | End: 2017-12-08 | Stop reason: HOSPADM

## 2017-12-06 RX ORDER — ONDANSETRON 8 MG/1
8 TABLET, ORALLY DISINTEGRATING ORAL EVERY 8 HOURS PRN
Status: DISCONTINUED | OUTPATIENT
Start: 2017-12-06 | End: 2017-12-08 | Stop reason: HOSPADM

## 2017-12-06 RX ORDER — METRONIDAZOLE 500 MG/100ML
500 INJECTION, SOLUTION INTRAVENOUS
Status: DISCONTINUED | OUTPATIENT
Start: 2017-12-06 | End: 2017-12-08

## 2017-12-06 RX ORDER — FERROUS SULFATE 325(65) MG
325 TABLET, DELAYED RELEASE (ENTERIC COATED) ORAL 2 TIMES DAILY
COMMUNITY

## 2017-12-06 RX ORDER — IBUPROFEN 200 MG
24 TABLET ORAL
Status: DISCONTINUED | OUTPATIENT
Start: 2017-12-06 | End: 2017-12-08 | Stop reason: HOSPADM

## 2017-12-06 RX ORDER — GLUCAGON 1 MG
1 KIT INJECTION
Status: DISCONTINUED | OUTPATIENT
Start: 2017-12-06 | End: 2017-12-08 | Stop reason: HOSPADM

## 2017-12-06 RX ORDER — FERROUS SULFATE 325(65) MG
325 TABLET, DELAYED RELEASE (ENTERIC COATED) ORAL 2 TIMES DAILY
Status: DISCONTINUED | OUTPATIENT
Start: 2017-12-06 | End: 2017-12-08 | Stop reason: HOSPADM

## 2017-12-06 RX ORDER — RAMELTEON 8 MG/1
8 TABLET ORAL NIGHTLY PRN
Status: DISCONTINUED | OUTPATIENT
Start: 2017-12-06 | End: 2017-12-07

## 2017-12-06 RX ORDER — OXYCODONE AND ACETAMINOPHEN 5; 325 MG/1; MG/1
1 TABLET ORAL EVERY 4 HOURS PRN
Status: DISCONTINUED | OUTPATIENT
Start: 2017-12-06 | End: 2017-12-08 | Stop reason: HOSPADM

## 2017-12-06 RX ADMIN — IOHEXOL 100 ML: 350 INJECTION, SOLUTION INTRAVENOUS at 12:12

## 2017-12-06 RX ADMIN — IOHEXOL 15 ML: 350 INJECTION, SOLUTION INTRAVENOUS at 10:12

## 2017-12-06 RX ADMIN — SODIUM CHLORIDE 1000 ML: 0.9 INJECTION, SOLUTION INTRAVENOUS at 04:12

## 2017-12-06 RX ADMIN — CIPROFLOXACIN 200 MG: 2 INJECTION INTRAVENOUS at 05:12

## 2017-12-06 RX ADMIN — SODIUM CHLORIDE: 0.9 INJECTION, SOLUTION INTRAVENOUS at 04:12

## 2017-12-06 RX ADMIN — SODIUM CHLORIDE 1000 ML: 0.9 INJECTION, SOLUTION INTRAVENOUS at 07:12

## 2017-12-06 RX ADMIN — FERROUS SULFATE TAB EC 325 MG (65 MG FE EQUIVALENT) 325 MG: 325 (65 FE) TABLET DELAYED RESPONSE at 10:12

## 2017-12-06 RX ADMIN — OXYCODONE HYDROCHLORIDE AND ACETAMINOPHEN 1 TABLET: 5; 325 TABLET ORAL at 05:12

## 2017-12-06 RX ADMIN — METRONIDAZOLE 500 MG: 500 INJECTION, SOLUTION INTRAVENOUS at 11:12

## 2017-12-06 RX ADMIN — METRONIDAZOLE 500 MG: 500 INJECTION, SOLUTION INTRAVENOUS at 07:12

## 2017-12-06 RX ADMIN — SODIUM CHLORIDE 1000 ML: 0.9 INJECTION, SOLUTION INTRAVENOUS at 10:12

## 2017-12-06 NOTE — CONSULTS
"[facility]  Vascular Surgery  Consult Note    Inpatient consult to Vascular Surgery  Consult performed by: JAYE BECKETT  Consult ordered by: LEOPOLDO BARAHONA  Reason for consult: Endoleak on CT abdomen and pelvis  Assessment/Recommendations: Cristo Jefferson is a 57 y.o. 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     Of note, when speaking with the patient regarding this, they were aware of endoleak, specifically stating "Oh you mean the endoleak?" when we reported to them an abnormality on CT scan. They stated the patient's vascular surgeon was aware of the finding and had planned to watch it and follow up in the future with repeat imaging.    We are consulted for the findings on CT scan concerning for Type II vs. III endoleak.   Given the stability of the aneurysmal sac on exam from previous CT on 11/21/17, and the fact the patient was aware of endoleak, the likelihood of this being a type III endoleak is very low and we favor a type II endoleak, which can be observed.     This is a known endoleak. Patient has follow up with vascular surgeon. Recommend compliance with this appointment.   Of note, given patients multiple underlying conditions, we stress the need to prevent bacteremia at all costs to prevent graft infection.  We will signoff at this time.  Thank you for the consult.         Subjective:     Chief Complaint/Reason for Admission: Dehydration, COCO, purulence in drain    History of Present Illness: Cristo Jefferson is a 57 y.o. male with a history of appendiceal cancer s/p ileocecectomy and chemo, HTN, arthritis, HLD, DMII, AAA s/p EVAR in February/March this year (pt cannot exactly recall), who recently was admitted to " "surgery and had an exploratory laparotomy performed for free air, found to have perforated diverticulitis. Pt was washed out, drains placed, and diverting ileostomy performed. Pt progressed postoperatively and was discharged. Pt presents to clinic with decreased PO, high ileostomy output, and purulent output from one of the drains that had been previously noted to be serosanguinous. We are consulted for finding on CT AP performed on admission showing Type II vs. III endoleak. Of note, when we told the patient and wife about abnormality on CT concerning hx of EVAR they asked us "oh you mean the endoleak?" So, this is apparently well known to them and their vascular surgeon who had been planning, per the patient's report, to watch this and re-image the patient in the future. The patient denies worsening abdominal pain, lower extremity pain/numbness, or any other symptoms related to vascular compromise.     Prescriptions Prior to Admission   Medication Sig Dispense Refill Last Dose    aspirin 81 MG Chew Take 81 mg by mouth once daily.   12/6/2017 at 0530    citalopram (CELEXA) 40 MG tablet TAKE 1 TABLET BY MOUTH ONCE DAILY 90 tablet 0 12/5/2017 at 2200    clopidogrel (PLAVIX) 75 mg tablet TAKE 1 TABLET BY MOUTH EVERY DAY 90 tablet 0 12/6/2017 at 0530    co-enzyme Q-10 30 mg capsule Take 300 mg by mouth once daily.   12/6/2017 at 0530    docusate sodium (COLACE) 100 MG capsule Take 1 capsule (100 mg total) by mouth 2 (two) times daily.  0 Past Month at Unknown time    ferrous sulfate 325 (65 FE) MG EC tablet Take 325 mg by mouth 2 (two) times daily.   12/6/2017 at 0530    fish oil-omega-3 fatty acids 300-1,000 mg capsule Take 2 g by mouth once daily.   12/6/2017 at 0530    gabapentin (NEURONTIN) 100 MG capsule Take 1 capsule (100 mg total) by mouth 3 (three) times daily. 90 capsule 2 12/6/2017 at 0330    hydrOXYzine pamoate (VISTARIL) 25 MG Cap Take 1 capsule (25 mg total) by mouth every 8 (eight) hours as " needed. 40 capsule 0 12/6/2017 at 0330    losartan-hydrochlorothiazide 100-12.5 mg (HYZAAR) 100-12.5 mg Tab TAKE 1 TABLET BY MOUTH EVERY DAY 90 tablet 0 12/6/2017 at 0530    meloxicam (MOBIC) 15 MG tablet TK 1 T PO ONCE D  1 12/6/2017 at 0530    metformin (GLUCOPHAGE-XR) 750 MG 24 hr tablet TAKE 1 TABLET BY MOUTH TWICE DAILY 180 tablet 0 12/5/2017 at 2230    metoprolol tartrate (LOPRESSOR) 25 MG tablet TAKE 1 TABLET BY MOUTH TWICE DAILY 180 tablet 0 12/6/2017 at 0530    MULTIVIT WITH MINERALS/LUTEIN (MULTIVITAMIN 50 PLUS ORAL) Take by mouth.   12/6/2017 at 0530    oxyCODONE-acetaminophen (PERCOCET) 5-325 mg per tablet Take 1 tablet by mouth every 4 (four) hours as needed. 42 tablet 0 12/6/2017 at 0640    vitamin D 1000 units Tab Take 2,000 Units by mouth once daily.   12/6/2017 at 0530    atorvastatin (LIPITOR) 10 MG tablet TAKE 1 TABLET BY MOUTH EVERY DAY 90 tablet 0 11/20/2017    predniSONE (DELTASONE) 5 MG tablet     at 0530       Review of patient's allergies indicates:   Allergen Reactions    Morphine        Past Medical History:   Diagnosis Date    Arthritis     Cancer 2016    appendix, completed chemo    Depression     Diabetes mellitus type I     Hyperlipidemia     Hypertension      Past Surgical History:   Procedure Laterality Date    APPENDECTOMY      open heart       RIGHT COLECTOMY  2016     Family History     Problem Relation (Age of Onset)    Cancer Father, Sister    Heart disease Mother, Father        Social History Main Topics    Smoking status: Current Some Day Smoker     Types: Cigarettes, Pipe    Smokeless tobacco: Current User    Alcohol use No    Drug use: No    Sexual activity: Yes     Partners: Female     Birth control/ protection: None     Review of Systems   Constitutional: Positive for appetite change and fatigue. Negative for fever.   HENT: Negative.    Eyes: Negative.    Respiratory: Negative for chest tightness and shortness of breath.    Cardiovascular: Negative  for chest pain.   Gastrointestinal: Positive for nausea. Negative for abdominal pain.        Anorexia   Musculoskeletal: Negative for back pain.   Skin: Negative.    Psychiatric/Behavioral: Negative for agitation and behavioral problems.     Objective:     Vital Signs (Most Recent):  Temp: (!) 95.5 °F (35.3 °C) (12/06/17 1604)  Pulse: (!) 56 (12/06/17 1604)  Resp: 19 (12/06/17 1604)  BP: (!) 98/58 (12/06/17 1604)  SpO2: (!) 94 % (12/06/17 1604) Vital Signs (24h Range):  Temp:  [95.5 °F (35.3 °C)] 95.5 °F (35.3 °C)  Pulse:  [56-59] 56  Resp:  [19-20] 19  SpO2:  [94 %] 94 %  BP: (93-98)/(58-61) 98/58     Weight: 90.7 kg (200 lb)  Body mass index is 29.53 kg/m².         Physical Exam   Constitutional: He is oriented to person, place, and time. He appears distressed.   HENT:   Head: Normocephalic and atraumatic.   Eyes: Pupils are equal, round, and reactive to light.   Cardiovascular: Intact distal pulses and normal pulses.    Pulses:       Radial pulses are 2+ on the right side, and 2+ on the left side.        Femoral pulses are 2+ on the right side, and 2+ on the left side.       Dorsalis pedis pulses are 2+ on the right side, and 2+ on the left side.        Posterior tibial pulses are 2+ on the right side, and 2+ on the left side.   Pulmonary/Chest: Effort normal. No respiratory distress.   Abdominal: Soft. There is tenderness (attp).   RLQ ileostomy with good output in ostomy appliance   Neurological: He is alert and oriented to person, place, and time. No cranial nerve deficit.       Significant Labs:  CBC:     Recent Labs  Lab 12/06/17  0940   WBC 14.80*   RBC 5.49   HGB 14.7   HCT 46.0   *   MCV 84   MCH 26.8*   MCHC 32.0     CMP:     Recent Labs  Lab 12/06/17  0940   *   CALCIUM 10.9*   ALBUMIN 3.4*   PROT 9.5*   *   K 6.2*   CO2 21*   CL 93*   BUN 75*   CREATININE 5.0*   ALKPHOS 106   ALT 15   AST 22   BILITOT 0.4       Significant Diagnostics:  CT: Ct Abdomen Pelvis With Contrast    Result  Date: 12/6/2017   Fusiform infrarenal aortic aneurysm, status post stenting, with findings concerning for a type II endoleak. No concentration or excretion of contrast from the kidneys, concerning for ATN. Postsurgical changes in keeping with right-sided hemicolectomy, with right lower colostomy.  There has also been interval placement of a PEG tube.  PEG tube tip is noted to be within the gastric fundus, and the stomach is not located along the wall of the abdominal There are no identifiable fluid collections or pneumoperitoneum.  The screws are identified fluid collections within the abdomen appear to be mostly resolved with 2 surgical drains, which demonstrate a small amount of air surrounding them. Findings concerning for hepatic cirrhosis, with an arterial enhancing lesion within hepatic segment VI, which is nonspecific.  Recommend further characterization with dedicated MRI. This report has been flagged in the [facility] medical record. ______________________________________ Electronically signed by resident: GIANCARLO WISE MD Date:     12/06/17 Time:    16:52 As the supervising and teaching physician, I personally reviewed the images and resident's interpretation and I agree with the findings.     Assessment/Plan:     Active Diagnoses:    Diagnosis Date Noted POA    Dehydration [E86.0] 12/06/2017 Yes      Problems Resolved During this Admission:    Diagnosis Date Noted Date Resolved POA       Thank you for your consult. I will sign off. Please contact us if you have any additional questions.    Yfn Carmona MD  Vascular Surgery  [facility]

## 2017-12-06 NOTE — H&P
[facility]  General Surgery  History & Physical    Patient Name: Cristo Jefferson  MRN: [id number]  Admission Date: 12/6/2017  Attending Physician: Eber Riley MD   Primary Care Provider: Vince Perez MD    Patient information was obtained from patient and ER records.     Subjective:     Chief Complaint/Reason for Admission: Dehydration, COCO    History of Present Illness:  Patient is a 57 y.o. male presents after recent DC for perforated diverticulitis where he underwent diverting ostomy, resection, and wide local drainage.  Pt has had poor PO intake over the past couple days, feeling lethargic, with high ostomy output (2L), and serous drainage in his RACHEL that changed to purulent.  Pt has had mild hypothermia over this same time and low urine output.  Labs in clinic showed an COCO and CT without drainable collection.    Current Facility-Administered Medications on File Prior to Encounter   Medication    [COMPLETED] omnipaque 350 iohexol 100 mL    [COMPLETED] omnipaque 350 iohexol 15 mL    [COMPLETED] omnipaque 350 iohexol 15 mL     Current Outpatient Prescriptions on File Prior to Encounter   Medication Sig    aspirin 81 MG Chew Take 81 mg by mouth once daily.    atorvastatin (LIPITOR) 10 MG tablet TAKE 1 TABLET BY MOUTH EVERY DAY    citalopram (CELEXA) 40 MG tablet TAKE 1 TABLET BY MOUTH ONCE DAILY    clopidogrel (PLAVIX) 75 mg tablet TAKE 1 TABLET BY MOUTH EVERY DAY    co-enzyme Q-10 30 mg capsule Take 300 mg by mouth once daily.    docusate sodium (COLACE) 100 MG capsule Take 1 capsule (100 mg total) by mouth 2 (two) times daily.    fish oil-omega-3 fatty acids 300-1,000 mg capsule Take 2 g by mouth once daily.    gabapentin (NEURONTIN) 100 MG capsule Take 1 capsule (100 mg total) by mouth 3 (three) times daily.    hydrOXYzine pamoate (VISTARIL) 25 MG Cap Take 1 capsule (25 mg total) by mouth every 8 (eight) hours as needed.    losartan-hydrochlorothiazide 100-12.5 mg (HYZAAR)  100-12.5 mg Tab TAKE 1 TABLET BY MOUTH EVERY DAY    meloxicam (MOBIC) 15 MG tablet TK 1 T PO ONCE D    metformin (GLUCOPHAGE-XR) 750 MG 24 hr tablet TAKE 1 TABLET BY MOUTH TWICE DAILY    metoprolol tartrate (LOPRESSOR) 25 MG tablet TAKE 1 TABLET BY MOUTH TWICE DAILY    MULTIVIT WITH MINERALS/LUTEIN (MULTIVITAMIN 50 PLUS ORAL) Take by mouth.    oxyCODONE-acetaminophen (PERCOCET) 5-325 mg per tablet Take 1 tablet by mouth every 4 (four) hours as needed.    predniSONE (DELTASONE) 5 MG tablet     vitamin D 1000 units Tab Take 2,000 Units by mouth once daily.       Review of patient's allergies indicates:   Allergen Reactions    Morphine        Past Medical History:   Diagnosis Date    Arthritis     Cancer 2016    appendix, completed chemo    Depression     Diabetes mellitus type I     Hyperlipidemia     Hypertension      Past Surgical History:   Procedure Laterality Date    APPENDECTOMY      open heart       RIGHT COLECTOMY  2016     Family History     Problem Relation (Age of Onset)    Cancer Father, Sister    Heart disease Mother, Father        Social History Main Topics    Smoking status: Current Some Day Smoker     Types: Cigarettes, Pipe    Smokeless tobacco: Current User    Alcohol use No    Drug use: No    Sexual activity: Yes     Partners: Female     Birth control/ protection: None     Review of Systems   10 point ROS negative except as above    Objective:     Vital Signs (Most Recent):  Temp: (!) 95.5 °F (35.3 °C) (12/06/17 1604)  Pulse: (!) 56 (12/06/17 1604)  Resp: 19 (12/06/17 1604)  BP: (!) 98/58 (12/06/17 1604)  SpO2: (!) 94 % (12/06/17 1604) Vital Signs (24h Range):  Temp:  [95.5 °F (35.3 °C)] 95.5 °F (35.3 °C)  Pulse:  [56-59] 56  Resp:  [19-20] 19  SpO2:  [94 %] 94 %  BP: (93-98)/(58-61) 98/58     Weight: 90.7 kg (200 lb)  Body mass index is 29.53 kg/m².    Physical Exam   Gen: NAD  CV: RRR  Pulm: CTAB  GI: Soft, NT, ND.  Midline incision healing slowly.  1 drain s/s, 1 purulent.   Ostomy viable  Skin: No rashes  Vasc: 2+ femoral and DP pulses b/l  Neuro: Non-focal    Significant Labs:  CBC:   Recent Labs  Lab 12/06/17  0940   WBC 14.80*   RBC 5.49   HGB 14.7   HCT 46.0   *   MCV 84   MCH 26.8*   MCHC 32.0     BMP:   Recent Labs  Lab 12/06/17  0940   *   *   K 6.2*   CL 93*   CO2 21*   BUN 75*   CREATININE 5.0*   CALCIUM 10.9*       Significant Diagnostics:  CT: No drainable collection.  Type 3 endoleak    Assessment/Plan:     Active Diagnoses:    Diagnosis Date Noted POA    Dehydration [E86.0] 12/06/2017 Yes      Problems Resolved During this Admission:    Diagnosis Date Noted Date Resolved POA       58 y/o M s/o Ex lap for perforated diverticulitis with loop ileostomy, readmitted for leukocytosis, hypothermia, COCO likely from dehydration from high ostomy output and low PO intake    Admit to surgery  IV bolus and NS infusion overnight  Trend labs, including K  Blood cultures, UA, UC, CXR and empiric abx for purulent drainage and elevated WBC  Reg diet  OOB  Wound care consult      VTE Risk Mitigation         Ordered     Medium Risk of VTE  Once      12/06/17 1610     Place sequential compression device  Until discontinued      12/06/17 1610          Darrell Velarde MD  General Surgery  [facility]

## 2017-12-07 LAB
ANION GAP SERPL CALC-SCNC: 12 MMOL/L
ANISOCYTOSIS BLD QL SMEAR: SLIGHT
BACTERIA #/AREA URNS AUTO: NORMAL /HPF
BASOPHILS # BLD AUTO: 0.09 K/UL
BASOPHILS NFR BLD: 1 %
BILIRUB UR QL STRIP: NEGATIVE
BUN SERPL-MCNC: 72 MG/DL
CALCIUM SERPL-MCNC: 8.1 MG/DL
CHLORIDE SERPL-SCNC: 105 MMOL/L
CLARITY UR REFRACT.AUTO: ABNORMAL
CO2 SERPL-SCNC: 16 MMOL/L
COLOR UR AUTO: YELLOW
CREAT SERPL-MCNC: 2.8 MG/DL
DIFFERENTIAL METHOD: ABNORMAL
EOSINOPHIL # BLD AUTO: 0.1 K/UL
EOSINOPHIL NFR BLD: 1.5 %
ERYTHROCYTE [DISTWIDTH] IN BLOOD BY AUTOMATED COUNT: 17.6 %
EST. GFR  (AFRICAN AMERICAN): 27.3 ML/MIN/1.73 M^2
EST. GFR  (NON AFRICAN AMERICAN): 23.6 ML/MIN/1.73 M^2
GLUCOSE SERPL-MCNC: 96 MG/DL
GLUCOSE UR QL STRIP: NEGATIVE
HCT VFR BLD AUTO: 32.6 %
HGB BLD-MCNC: 10.7 G/DL
HGB UR QL STRIP: ABNORMAL
HYPOCHROMIA BLD QL SMEAR: ABNORMAL
IMM GRANULOCYTES # BLD AUTO: 0.08 K/UL
IMM GRANULOCYTES NFR BLD AUTO: 0.9 %
KETONES UR QL STRIP: NEGATIVE
LEUKOCYTE ESTERASE UR QL STRIP: ABNORMAL
LYMPHOCYTES # BLD AUTO: 2.7 K/UL
LYMPHOCYTES NFR BLD: 31.5 %
MAGNESIUM SERPL-MCNC: 1.6 MG/DL
MCH RBC QN AUTO: 27.1 PG
MCHC RBC AUTO-ENTMCNC: 32.8 G/DL
MCV RBC AUTO: 83 FL
MICROSCOPIC COMMENT: NORMAL
MONOCYTES # BLD AUTO: 1.1 K/UL
MONOCYTES NFR BLD: 12.5 %
NEUTROPHILS # BLD AUTO: 4.5 K/UL
NEUTROPHILS NFR BLD: 52.6 %
NITRITE UR QL STRIP: NEGATIVE
NRBC BLD-RTO: 0 /100 WBC
OVALOCYTES BLD QL SMEAR: ABNORMAL
PH UR STRIP: 5 [PH] (ref 5–8)
PHOSPHATE SERPL-MCNC: 5.8 MG/DL
PLATELET # BLD AUTO: 294 K/UL
PLATELET BLD QL SMEAR: ABNORMAL
PMV BLD AUTO: 10 FL
POCT GLUCOSE: 109 MG/DL (ref 70–110)
POCT GLUCOSE: 121 MG/DL (ref 70–110)
POCT GLUCOSE: 146 MG/DL (ref 70–110)
POCT GLUCOSE: 89 MG/DL (ref 70–110)
POIKILOCYTOSIS BLD QL SMEAR: SLIGHT
POLYCHROMASIA BLD QL SMEAR: ABNORMAL
POTASSIUM SERPL-SCNC: 4 MMOL/L
POTASSIUM SERPL-SCNC: 4.6 MMOL/L
PROT UR QL STRIP: NEGATIVE
RBC # BLD AUTO: 3.95 M/UL
RBC #/AREA URNS AUTO: 1 /HPF (ref 0–4)
SODIUM SERPL-SCNC: 133 MMOL/L
SP GR UR STRIP: 1.02 (ref 1–1.03)
URATE CRY UR QL COMP ASSIST: NORMAL
URN SPEC COLLECT METH UR: ABNORMAL
UROBILINOGEN UR STRIP-ACNC: NEGATIVE EU/DL
WBC # BLD AUTO: 8.59 K/UL
WBC #/AREA URNS AUTO: 1 /HPF (ref 0–5)

## 2017-12-07 PROCEDURE — 84100 ASSAY OF PHOSPHORUS: CPT

## 2017-12-07 PROCEDURE — 63600175 PHARM REV CODE 636 W HCPCS: Performed by: STUDENT IN AN ORGANIZED HEALTH CARE EDUCATION/TRAINING PROGRAM

## 2017-12-07 PROCEDURE — 83735 ASSAY OF MAGNESIUM: CPT

## 2017-12-07 PROCEDURE — 11000001 HC ACUTE MED/SURG PRIVATE ROOM

## 2017-12-07 PROCEDURE — 36415 COLL VENOUS BLD VENIPUNCTURE: CPT

## 2017-12-07 PROCEDURE — 81001 URINALYSIS AUTO W/SCOPE: CPT

## 2017-12-07 PROCEDURE — 25000003 PHARM REV CODE 250: Performed by: STUDENT IN AN ORGANIZED HEALTH CARE EDUCATION/TRAINING PROGRAM

## 2017-12-07 PROCEDURE — S0030 INJECTION, METRONIDAZOLE: HCPCS | Performed by: STUDENT IN AN ORGANIZED HEALTH CARE EDUCATION/TRAINING PROGRAM

## 2017-12-07 PROCEDURE — 87086 URINE CULTURE/COLONY COUNT: CPT

## 2017-12-07 PROCEDURE — 84132 ASSAY OF SERUM POTASSIUM: CPT

## 2017-12-07 PROCEDURE — 80048 BASIC METABOLIC PNL TOTAL CA: CPT

## 2017-12-07 PROCEDURE — 85025 COMPLETE CBC W/AUTO DIFF WBC: CPT

## 2017-12-07 RX ORDER — SODIUM BICARBONATE 650 MG/1
2 TABLET ORAL 2 TIMES DAILY
Status: DISCONTINUED | OUTPATIENT
Start: 2017-12-07 | End: 2017-12-08 | Stop reason: HOSPADM

## 2017-12-07 RX ORDER — ASPIRIN 81 MG/1
81 TABLET ORAL DAILY
Status: DISCONTINUED | OUTPATIENT
Start: 2017-12-07 | End: 2017-12-08 | Stop reason: HOSPADM

## 2017-12-07 RX ADMIN — FERROUS SULFATE TAB EC 325 MG (65 MG FE EQUIVALENT) 325 MG: 325 (65 FE) TABLET DELAYED RESPONSE at 08:12

## 2017-12-07 RX ADMIN — SODIUM CHLORIDE 1000 ML: 0.9 INJECTION, SOLUTION INTRAVENOUS at 02:12

## 2017-12-07 RX ADMIN — CLOPIDOGREL 75 MG: 75 TABLET, FILM COATED ORAL at 08:12

## 2017-12-07 RX ADMIN — FERROUS SULFATE TAB EC 325 MG (65 MG FE EQUIVALENT) 325 MG: 325 (65 FE) TABLET DELAYED RESPONSE at 09:12

## 2017-12-07 RX ADMIN — SODIUM BICARBONATE 650 MG TABLET 1300 MG: at 08:12

## 2017-12-07 RX ADMIN — METRONIDAZOLE 500 MG: 500 INJECTION, SOLUTION INTRAVENOUS at 08:12

## 2017-12-07 RX ADMIN — CIPROFLOXACIN 200 MG: 2 INJECTION INTRAVENOUS at 03:12

## 2017-12-07 RX ADMIN — OXYCODONE HYDROCHLORIDE AND ACETAMINOPHEN 1 TABLET: 5; 325 TABLET ORAL at 09:12

## 2017-12-07 RX ADMIN — ASPIRIN 81 MG: 81 TABLET, COATED ORAL at 09:12

## 2017-12-07 RX ADMIN — CIPROFLOXACIN 200 MG: 2 INJECTION INTRAVENOUS at 05:12

## 2017-12-07 RX ADMIN — OXYCODONE HYDROCHLORIDE AND ACETAMINOPHEN 1 TABLET: 5; 325 TABLET ORAL at 04:12

## 2017-12-07 RX ADMIN — SODIUM BICARBONATE 650 MG TABLET 1300 MG: at 09:12

## 2017-12-07 RX ADMIN — CITALOPRAM HYDROBROMIDE 40 MG: 40 TABLET ORAL at 08:12

## 2017-12-07 RX ADMIN — METRONIDAZOLE 500 MG: 500 INJECTION, SOLUTION INTRAVENOUS at 03:12

## 2017-12-07 RX ADMIN — OXYCODONE HYDROCHLORIDE AND ACETAMINOPHEN 1 TABLET: 5; 325 TABLET ORAL at 12:12

## 2017-12-07 RX ADMIN — OXYCODONE HYDROCHLORIDE AND ACETAMINOPHEN 1 TABLET: 5; 325 TABLET ORAL at 07:12

## 2017-12-07 RX ADMIN — SODIUM CHLORIDE, POTASSIUM CHLORIDE, SODIUM LACTATE AND CALCIUM CHLORIDE 1000 ML: 600; 310; 30; 20 INJECTION, SOLUTION INTRAVENOUS at 12:12

## 2017-12-07 NOTE — PROGRESS NOTES
Consulted to see for right lower quadrant ostomy   Peristomal skin quite denuded, red and noted ulceration in skin from support harman which has been in place since surgery November 21st.   Discussed with Dr Darrell Velarde regarding the medical device related pressure ulcers at 3 and 9 o'clock to peristomal skin and agree we can remove the support harman as it is imbedding in the patients skin and causing a great deal of pain. Feel this will make pouching a bit easier to handle without having to cut around the harman.   Abdominal wound red , and granular. Will dress with Aquacel AG on MWF basis.  Spent a great deal of time with patient and his wife providing education on wound and ostomy care.     12/07/17 1130   Pain/Comfort Assessments   Pain Assessment Performed Yes   Acceptable Comfort Level 2       Number Scale   Presence of Pain complains of pain/discomfort   Location abdomen   Pain Rating: Rest 7   Cognitive   Level of Consciousness (AVPU) alert   Oxygen Therapy   SpO2 95 %   O2 Device (Oxygen Therapy) room air   ECG   Pulse 61       Cardiac/Telemetry Details / Alarms   Cardiac/Telemetry Monitor On Yes   Cardiac/Telemetry Audible Yes   Cardiac/Telemetry Alarms Set Yes       Ileostomy 11/21/17 2302 Loop RLQ   Placement Date/Time: 11/21/17 2302   Present Prior to Hospital Arrival?: No  Inserted by: MD  Ileostomy Type: Loop  Location: RLQ   Wound Image    Stoma Appearance round;protruding above skin level;red;moist   Stoma Size (in) 40   Appliance 1-piece   Accessories/Skin Care cleansed w/ sterile normal saline;skin barrier ring;skin sealant;skin barrier powder   Treatment Bag change;Harman removal   Stoma Function brown;mushy   Peristomal Assessment Erythema;Denuded;Ulcerated;Other (Comment)   Tolerance other (see comments)  (wife performs all aspects of pouch change)       Incision/Site 11/21/17 2321 abdomen   Date First Assessed/Time First Assessed: 11/21/17 2321   Location: abdomen   Wound Image    Incision WDL ex    Dressing Appearance dry;intact   Appearance reddened;moist;granulating   Periwound Area normal skin tone   Drainage Characteristics/Odor serous   Drainage Amount scant   Wound Length (cm) 24.5   Wound Width (cm) 4.5   Depth (cm) 1   Wound Edges open   Cleansed W/ sterile normal saline   Dressing hydrofiber;Ag dressings;foam   Safety Interventions   Patient Rounds bed in low position;bed wheels locked;call light in reach;clutter free environment maintained;ID band on;placement of personal items at bedside;toileting offered;visualized patient   Safety Promotion/Fall Prevention assistive device/personal item within reach;commode/urinal/bedpan at bedside;Fall Risk reviewed with patient/family;high risk medications identified;medications reviewed;nonskid shoes/socks when out of bed;side rails raised x 2;instructed to call staff for mobility   Daily Care   Activity Type activity adjusted per tolerance;activity clustered for rest period   Activity Assistance Provided assistance, 1 person   Positioning   Body Position positioned/repositioned independently   Head of Bed (HOB) HOB at 45 degrees   Vitals   BP (!) 97/52     Recommendations:  1. Remove support kelley  2. Begin treating ulcerations around stoma with stoma powder and no sting skin prep  3. Utilize barrier ring and flat pouch   4. Begin Aquacel AG to midline MWF    Vandana Rogers RN CWON  [id number]

## 2017-12-07 NOTE — PROGRESS NOTES
Dr. Byrd  Notified  Of  Pt's  B/p  Of  83/50  And  That  The pt  Is  Presently  Getting    A  Ns  Iv  Bolus    And  That  The  Pt's  Potassium  Is  4.9   .  Dr. Byrd  Ordered  Iv  Insulin  To  Be  Held  And  Iv  destrose  To  Be   Held   @  This  Time.

## 2017-12-07 NOTE — PLAN OF CARE
Problem: Patient Care Overview  Goal: Plan of Care Review  Patient progressing towards goals. Pain controlled  With  medsFamily at bedside. Blood glucose monitored. Bed in low position and call bell in reach.

## 2017-12-07 NOTE — PROGRESS NOTES
[facility]  General Surgery  Progress Note    Subjective:     History of Present Illness:  No notes on file    Post-Op Info:  * No surgery found *         Interval History: No events.  Pt feeling much improved with hydration overnight    Medications:  Continuous Infusions:   sodium chloride 0.9% 125 mL/hr at 12/07/17 0113     Scheduled Meds:   aspirin  81 mg Oral Daily    ciprofloxacin  200 mg Intravenous Q12H    citalopram  40 mg Oral Daily    clopidogrel  75 mg Oral Daily    ferrous sulfate  325 mg Oral BID    metoprolol tartrate  25 mg Oral BID    metronidazole  500 mg Intravenous Q8H    sodium bicarbonate  2 tablet Oral BID     PRN Meds:dextrose 50%, dextrose 50%, diphenhydrAMINE, glucagon (human recombinant), glucose, glucose, hydrALAZINE, insulin aspart, ondansetron, oxyCODONE-acetaminophen, sodium chloride 0.9%     Review of patient's allergies indicates:   Allergen Reactions    Morphine Rash     Objective:     Vital Signs (Most Recent):  Temp: 97.1 °F (36.2 °C) (12/07/17 0839)  Pulse: 77 (12/07/17 0839)  Resp: 14 (12/07/17 0839)  BP: 112/75 (12/07/17 0839)  SpO2: (!) 93 % (12/07/17 0839) Vital Signs (24h Range):  Temp:  [95.5 °F (35.3 °C)-97.4 °F (36.3 °C)] 97.1 °F (36.2 °C)  Pulse:  [51-77] 77  Resp:  [14-20] 14  SpO2:  [92 %-94 %] 93 %  BP: ()/(50-75) 112/75     Weight: 90.7 kg (200 lb)  Body mass index is 29.53 kg/m².    Intake/Output - Last 3 Shifts       12/05 0700 - 12/06 0659 12/06 0700 - 12/07 0659 12/07 0700 - 12/08 0659    P.O.  350     I.V. (mL/kg)  1000 (11)     Other  275     IV Piggyback  2200     Total Intake(mL/kg)  3825 (42.2)     Urine (mL/kg/hr)  1175 300 (0.9)    Drains  25     Stool  675 225 (0.7)    Total Output   1875 525    Net   +1950 -525           Urine Occurrence  1 x           Physical Exam   Gen: NAD  CV: RRR  Pulm: Unlabored  GI: Soft, NT, 2 drain purulent    Significant Labs:  CBC:   Recent Labs  Lab 12/07/17  0442   WBC 8.59   RBC 3.95*    HGB 10.7*   HCT 32.6*      MCV 83   MCH 27.1   MCHC 32.8     CMP:   Recent Labs  Lab 12/06/17  0940  12/07/17  0442   *  < > 96   CALCIUM 10.9*  < > 8.1*   ALBUMIN 3.4*  --   --    PROT 9.5*  --   --    *  < > 133*   K 6.2*  < > 4.0   CO2 21*  < > 16*   CL 93*  < > 105   BUN 75*  < > 72*   CREATININE 5.0*  < > 2.8*   ALKPHOS 106  --   --    ALT 15  --   --    AST 22  --   --    BILITOT 0.4  --   --    < > = values in this interval not displayed.        Assessment/Plan:     * Dehydration    Doing well with hydration overnight  Feels improved  Potassium and Cr improving  Ostomy output low- will follow and possibly start anti-kinetic if high output  Trend labs  PT/OT  Continue Reg diet  SSI        Essential hypertension    Avoid nephrotoxic agents in favor of PRNs            Darrell Velarde MD  General Surgery  [facility]

## 2017-12-07 NOTE — ASSESSMENT & PLAN NOTE
Doing well with hydration overnight  Feels improved  Potassium and Cr improving  Ostomy output low- will follow and possibly start anti-kinetic if high output  Trend labs  PT/OT  Continue Reg diet  SSI

## 2017-12-07 NOTE — PROGRESS NOTES
Subjective:      Chief Complaint/Reason for Admission: Dehydration, COCO     History of Present Illness:  Patient is a 57 y.o. male presents after recent DC for perforated diverticulitis where he underwent diverting ostomy, resection, and wide local drainage.  Pt has had poor PO intake over the past couple days, feeling lethargic, with high ostomy output (2L), and serous drainage in his RACHEL that changed to purulent.  Pt has had mild hypothermia over this same time and low urine output.  Labs in clinic showed an COCO and CT without drainable collection.         Current Facility-Administered Medications on File Prior to Encounter   Medication    [COMPLETED] omnipaque 350 iohexol 100 mL    [COMPLETED] omnipaque 350 iohexol 15 mL    [COMPLETED] omnipaque 350 iohexol 15 mL           Current Outpatient Prescriptions on File Prior to Encounter   Medication Sig    aspirin 81 MG Chew Take 81 mg by mouth once daily.    atorvastatin (LIPITOR) 10 MG tablet TAKE 1 TABLET BY MOUTH EVERY DAY    citalopram (CELEXA) 40 MG tablet TAKE 1 TABLET BY MOUTH ONCE DAILY    clopidogrel (PLAVIX) 75 mg tablet TAKE 1 TABLET BY MOUTH EVERY DAY    co-enzyme Q-10 30 mg capsule Take 300 mg by mouth once daily.    docusate sodium (COLACE) 100 MG capsule Take 1 capsule (100 mg total) by mouth 2 (two) times daily.    fish oil-omega-3 fatty acids 300-1,000 mg capsule Take 2 g by mouth once daily.    gabapentin (NEURONTIN) 100 MG capsule Take 1 capsule (100 mg total) by mouth 3 (three) times daily.    hydrOXYzine pamoate (VISTARIL) 25 MG Cap Take 1 capsule (25 mg total) by mouth every 8 (eight) hours as needed.    losartan-hydrochlorothiazide 100-12.5 mg (HYZAAR) 100-12.5 mg Tab TAKE 1 TABLET BY MOUTH EVERY DAY    meloxicam (MOBIC) 15 MG tablet TK 1 T PO ONCE D    metformin (GLUCOPHAGE-XR) 750 MG 24 hr tablet TAKE 1 TABLET BY MOUTH TWICE DAILY    metoprolol tartrate (LOPRESSOR) 25 MG tablet TAKE 1 TABLET BY MOUTH TWICE DAILY    MULTIVIT  WITH MINERALS/LUTEIN (MULTIVITAMIN 50 PLUS ORAL) Take by mouth.    oxyCODONE-acetaminophen (PERCOCET) 5-325 mg per tablet Take 1 tablet by mouth every 4 (four) hours as needed.    predniSONE (DELTASONE) 5 MG tablet      vitamin D 1000 units Tab Take 2,000 Units by mouth once daily.              Review of patient's allergies indicates:   Allergen Reactions    Morphine                Past Medical History:   Diagnosis Date    Arthritis      Cancer 2016     appendix, completed chemo    Depression      Diabetes mellitus type I      Hyperlipidemia      Hypertension              Past Surgical History:   Procedure Laterality Date    APPENDECTOMY        open heart         RIGHT COLECTOMY   2016           Family History      Problem Relation (Age of Onset)     Cancer Father, Sister     Heart disease Mother, Father                Social History Main Topics    Smoking status: Current Some Day Smoker       Types: Cigarettes, Pipe    Smokeless tobacco: Current User    Alcohol use No    Drug use: No    Sexual activity: Yes       Partners: Female       Birth control/ protection: None      Review of Systems   10 point ROS negative except as above     Objective:      Vital Signs (Most Recent):  Temp: (!) 95.5 °F (35.3 °C) (12/06/17 1604)  Pulse: (!) 56 (12/06/17 1604)  Resp: 19 (12/06/17 1604)  BP: (!) 98/58 (12/06/17 1604)  SpO2: (!) 94 % (12/06/17 1604) Vital Signs (24h Range):  Temp:  [95.5 °F (35.3 °C)] 95.5 °F (35.3 °C)  Pulse:  [56-59] 56  Resp:  [19-20] 19  SpO2:  [94 %] 94 %  BP: (93-98)/(58-61) 98/58      Weight: 90.7 kg (200 lb)  Body mass index is 29.53 kg/m².     Physical Exam   Gen: NAD  CV: RRR  Pulm: CTAB  GI: Soft, NT, ND.  Midline incision healing slowly.  1 drain s/s, 1 purulent.  Ostomy viable  Skin: No rashes  Vasc: 2+ femoral and DP pulses b/l  Neuro: Non-focal     Significant Labs:  CBC:   Recent Labs  Lab 12/06/17  0940   WBC 14.80*   RBC 5.49   HGB 14.7   HCT 46.0   *   MCV 84   MCH  26.8*   MCHC 32.0      BMP:   Recent Labs  Lab 12/06/17  0940   *   *   K 6.2*   CL 93*   CO2 21*   BUN 75*   CREATININE 5.0*   CALCIUM 10.9*         Significant Diagnostics:  CT: No drainable collection.  Type 3 endoleak     Assessment/Plan:             Active Diagnoses:     Diagnosis Date Noted POA    Dehydration [E86.0] 12/06/2017 Yes       Problems Resolved During this Admission:     Diagnosis Date Noted Date Resolved POA         56 y/o M s/o Ex lap for perforated diverticulitis with loop ileostomy, readmitted for leukocytosis, hypothermia, COCO likely from dehydration from high ostomy output and low PO intake     Admit to surgery  IV bolus and NS infusion overnight  Trend labs, including K  Blood cultures, UA, UC, CXR and empiric abx for purulent drainage and elevated WBC  Reg diet  OOB  Wound care consult      I have personally taken the history and examined this patient and agree with the resident's note as stated above.  Pt admitted for dehydration with acute increase in BUN/Cr to 75 and 5  Ill hydrate aggressively with NS.  Likely etiology is fluid losses from the ileostomy.  CT reviewed.  No obvious source of uncontrolled sepsis or fluid collection.  Maintain IV abx and pelvic drains.  Midline wound is granulating well.  Wound dressing changed in clinic.  Ileostomy pink and patent with minimal leakage per pt and wife around appliance.    Will consult vascular for possible type 3 endo-leak after discussion with radiology about CT findings.

## 2017-12-07 NOTE — SUBJECTIVE & OBJECTIVE
Interval History: No events.  Pt feeling much improved with hydration overnight    Medications:  Continuous Infusions:   sodium chloride 0.9% 125 mL/hr at 12/07/17 0113     Scheduled Meds:   aspirin  81 mg Oral Daily    ciprofloxacin  200 mg Intravenous Q12H    citalopram  40 mg Oral Daily    clopidogrel  75 mg Oral Daily    ferrous sulfate  325 mg Oral BID    metoprolol tartrate  25 mg Oral BID    metronidazole  500 mg Intravenous Q8H    sodium bicarbonate  2 tablet Oral BID     PRN Meds:dextrose 50%, dextrose 50%, diphenhydrAMINE, glucagon (human recombinant), glucose, glucose, hydrALAZINE, insulin aspart, ondansetron, oxyCODONE-acetaminophen, sodium chloride 0.9%     Review of patient's allergies indicates:   Allergen Reactions    Morphine Rash     Objective:     Vital Signs (Most Recent):  Temp: 97.1 °F (36.2 °C) (12/07/17 0839)  Pulse: 77 (12/07/17 0839)  Resp: 14 (12/07/17 0839)  BP: 112/75 (12/07/17 0839)  SpO2: (!) 93 % (12/07/17 0839) Vital Signs (24h Range):  Temp:  [95.5 °F (35.3 °C)-97.4 °F (36.3 °C)] 97.1 °F (36.2 °C)  Pulse:  [51-77] 77  Resp:  [14-20] 14  SpO2:  [92 %-94 %] 93 %  BP: ()/(50-75) 112/75     Weight: 90.7 kg (200 lb)  Body mass index is 29.53 kg/m².    Intake/Output - Last 3 Shifts       12/05 0700 - 12/06 0659 12/06 0700 - 12/07 0659 12/07 0700 - 12/08 0659    P.O.  350     I.V. (mL/kg)  1000 (11)     Other  275     IV Piggyback  2200     Total Intake(mL/kg)  3825 (42.2)     Urine (mL/kg/hr)  1175 300 (0.9)    Drains  25     Stool  675 225 (0.7)    Total Output   1875 525    Net   +1950 -525           Urine Occurrence  1 x           Physical Exam   Gen: NAD  CV: RRR  Pulm: Unlabored  GI: Soft, NT, 2 drain purulent    Significant Labs:  CBC:   Recent Labs  Lab 12/07/17  0442   WBC 8.59   RBC 3.95*   HGB 10.7*   HCT 32.6*      MCV 83   MCH 27.1   MCHC 32.8     CMP:   Recent Labs  Lab 12/06/17  0940  12/07/17  0442   *  < > 96   CALCIUM 10.9*  < > 8.1*    ALBUMIN 3.4*  --   --    PROT 9.5*  --   --    *  < > 133*   K 6.2*  < > 4.0   CO2 21*  < > 16*   CL 93*  < > 105   BUN 75*  < > 72*   CREATININE 5.0*  < > 2.8*   ALKPHOS 106  --   --    ALT 15  --   --    AST 22  --   --    BILITOT 0.4  --   --    < > = values in this interval not displayed.

## 2017-12-07 NOTE — PLAN OF CARE
Patient is a 57 year old male re-admitted from home through Dr Riley's clinic with dehydration and COCO.  Patient discharged home 11/29/2017 with [facility] and is expected to resume services on discharge +/- 12/8/2017.    Patient lives in a one story home with his 15 year old son and wife, who will drive him home, care for him and obtain anything he needs after discharge.  Will continue to follow for needs.    PCP  Vince Perez MD  [address] / [place] [address] [phone]      [place] [address] [place], [place] - [address] AT [facility] [place] [facility] [address] [facility] [place]  [address] [place] [address]  Phone: [phone] Fax: [phone]      Extended Emergency Contact Information  Primary Emergency Contact: Rachel Jefferson  Address: [address] [place] [address]  Home Phone: [phone]  Mobile Phone: [phone]  Relation: Spouse       12/07/17 1131   Readmission Questionnaire   At the time of your discharge, did someone talk to you about what your health problems were? Yes   At the time of discharge, did someone talk to you about what to watch out for regarding worsening of your health problem? Yes   At the time of discharge, did someone talk to you about what to do if you experienced worsening of your health problem? Yes   At the time of discharge, did someone talk to you about which medication to take when you left the hospital and which ones to stop taking? Yes   At the time of discharge, did someone talk to you about when and where to follow up with a doctor after you left the hospital? Yes   What do you believe caused you to be sick enough to be re-admitted? dehydration, COCO   How often do you need to have someone help you when you read instructions, pamphlets, or other written material from your doctor or pharmacy? Sometimes   Do you have problems taking your medications as  prescribed? No   Do you have any problems affording any of  your prescribed medications? No   Do you have problems obtaining/receiving your medications? No   Does the patient have transportation to healthcare appointments? Yes   Lives With spouse;child(kavon), dependent   Living Arrangements house   Does the patient have family/friends to help with healtcare needs after discharge? yes   Who are your caregiver(s) and their phone number(s)? wife   Does your caregiver provide all the help you need? Yes   Are you currently feeling confused? No   Are you currently having problems thinking? No   Are you currently having memory problems? No

## 2017-12-07 NOTE — PLAN OF CARE
Patient is a 57 year old male re-admitted from home through Dr Riley's clinic with dehydration and COCO.  Patient discharged home 11/29/2017 with [facility] and is expected to resume services on discharge +/- 12/8/2017.     Patient lives in a one story home with his 15 year old son and wife, who will drive him home, care for him and obtain anything he needs after discharge.  Will continue to follow for needs.     PCP  Vince Perez MD  [address] / [place] [address] [phone]        [employer] [address] [place], [place] - [address] AT [facility] [facility]  [address] [place] [address]  Phone: [phone] Fax: [phone]        Extended Emergency Contact Information  Primary Emergency Contact: Rachel Jefferson  Address: [address] [place] [address] [id number] [place] [address]  Home Phone: [phone]  Mobile Phone: [phone]  Relation: Spouse       12/07/17 1137   Discharge Assessment   Assessment Type Discharge Planning Assessment   Confirmed/corrected address and phone number on facesheet? Yes   Assessment information obtained from? Patient;Medical Record   Expected Length of Stay (days) 3   Communicated expected length of stay with patient/caregiver yes   Prior to hospitilization cognitive status: Alert/Oriented   Prior to hospitalization functional status: Independent   Current cognitive status: Alert/Oriented   Current Functional Status: Independent   Lives With spouse;child(kavon), dependent   Able to Return to Prior Arrangements yes   Is patient able to care for self after discharge? Yes   Who are your caregiver(s) and their phone number(s)? wife   Patient's perception of discharge disposition home or selfcare;home health   Equipment Currently Used at Home colostomy/ostomy supplies   Do you have any problems affording any of your prescribed medications? No   Is the patient taking  medications as prescribed? yes   Does the patient have transportation home? Yes   Transportation Available family or friend will provide   Discharge Plan A Home with family;Home Health   Discharge Plan B Home with family   Patient/Family In Agreement With Plan yes

## 2017-12-08 VITALS
OXYGEN SATURATION: 96 % | RESPIRATION RATE: 16 BRPM | TEMPERATURE: 97 F | HEART RATE: 67 BPM | WEIGHT: 200 LBS | BODY MASS INDEX: 29.62 KG/M2 | HEIGHT: 69 IN | DIASTOLIC BLOOD PRESSURE: 69 MMHG | SYSTOLIC BLOOD PRESSURE: 126 MMHG

## 2017-12-08 LAB
ANION GAP SERPL CALC-SCNC: 6 MMOL/L
BACTERIA UR CULT: NORMAL
BASOPHILS # BLD AUTO: 0.07 K/UL
BASOPHILS NFR BLD: 1.1 %
BUN SERPL-MCNC: 32 MG/DL
CALCIUM SERPL-MCNC: 8.6 MG/DL
CHLORIDE SERPL-SCNC: 108 MMOL/L
CO2 SERPL-SCNC: 22 MMOL/L
CREAT SERPL-MCNC: 1 MG/DL
DIFFERENTIAL METHOD: ABNORMAL
EOSINOPHIL # BLD AUTO: 0.2 K/UL
EOSINOPHIL NFR BLD: 2.3 %
ERYTHROCYTE [DISTWIDTH] IN BLOOD BY AUTOMATED COUNT: 17.2 %
EST. GFR  (AFRICAN AMERICAN): >60 ML/MIN/1.73 M^2
EST. GFR  (NON AFRICAN AMERICAN): >60 ML/MIN/1.73 M^2
GLUCOSE SERPL-MCNC: 86 MG/DL
HCT VFR BLD AUTO: 34.2 %
HGB BLD-MCNC: 11 G/DL
IMM GRANULOCYTES # BLD AUTO: 0.04 K/UL
IMM GRANULOCYTES NFR BLD AUTO: 0.6 %
LYMPHOCYTES # BLD AUTO: 2.2 K/UL
LYMPHOCYTES NFR BLD: 34.8 %
MAGNESIUM SERPL-MCNC: 1.7 MG/DL
MCH RBC QN AUTO: 26.3 PG
MCHC RBC AUTO-ENTMCNC: 32.2 G/DL
MCV RBC AUTO: 82 FL
MONOCYTES # BLD AUTO: 0.8 K/UL
MONOCYTES NFR BLD: 11.7 %
NEUTROPHILS # BLD AUTO: 3.2 K/UL
NEUTROPHILS NFR BLD: 49.5 %
NRBC BLD-RTO: 0 /100 WBC
PHOSPHATE SERPL-MCNC: 2.4 MG/DL
PLATELET # BLD AUTO: 234 K/UL
PMV BLD AUTO: 10.2 FL
POCT GLUCOSE: 92 MG/DL (ref 70–110)
POTASSIUM SERPL-SCNC: 3.6 MMOL/L
RBC # BLD AUTO: 4.19 M/UL
SODIUM SERPL-SCNC: 136 MMOL/L
WBC # BLD AUTO: 6.41 K/UL

## 2017-12-08 PROCEDURE — 84100 ASSAY OF PHOSPHORUS: CPT

## 2017-12-08 PROCEDURE — 85025 COMPLETE CBC W/AUTO DIFF WBC: CPT

## 2017-12-08 PROCEDURE — 83735 ASSAY OF MAGNESIUM: CPT

## 2017-12-08 PROCEDURE — 36415 COLL VENOUS BLD VENIPUNCTURE: CPT

## 2017-12-08 PROCEDURE — 25000003 PHARM REV CODE 250: Performed by: STUDENT IN AN ORGANIZED HEALTH CARE EDUCATION/TRAINING PROGRAM

## 2017-12-08 PROCEDURE — 63600175 PHARM REV CODE 636 W HCPCS: Performed by: STUDENT IN AN ORGANIZED HEALTH CARE EDUCATION/TRAINING PROGRAM

## 2017-12-08 PROCEDURE — S0030 INJECTION, METRONIDAZOLE: HCPCS | Performed by: STUDENT IN AN ORGANIZED HEALTH CARE EDUCATION/TRAINING PROGRAM

## 2017-12-08 PROCEDURE — 80048 BASIC METABOLIC PNL TOTAL CA: CPT

## 2017-12-08 RX ORDER — METRONIDAZOLE 500 MG/1
500 TABLET ORAL EVERY 8 HOURS
Qty: 30 TABLET | Refills: 0 | Status: SHIPPED | OUTPATIENT
Start: 2017-12-08 | End: 2017-12-18

## 2017-12-08 RX ORDER — CIPROFLOXACIN 500 MG/1
500 TABLET ORAL EVERY 12 HOURS
Qty: 20 TABLET | Refills: 0 | Status: SHIPPED | OUTPATIENT
Start: 2017-12-08 | End: 2017-12-18

## 2017-12-08 RX ORDER — ACETAMINOPHEN 325 MG/1
650 TABLET ORAL EVERY 6 HOURS PRN
Status: DISCONTINUED | OUTPATIENT
Start: 2017-12-08 | End: 2017-12-08 | Stop reason: HOSPADM

## 2017-12-08 RX ORDER — CIPROFLOXACIN 500 MG/1
500 TABLET ORAL EVERY 12 HOURS
Status: DISCONTINUED | OUTPATIENT
Start: 2017-12-08 | End: 2017-12-08 | Stop reason: HOSPADM

## 2017-12-08 RX ORDER — METRONIDAZOLE 500 MG/1
500 TABLET ORAL EVERY 8 HOURS
Status: DISCONTINUED | OUTPATIENT
Start: 2017-12-08 | End: 2017-12-08 | Stop reason: HOSPADM

## 2017-12-08 RX ORDER — OXYCODONE AND ACETAMINOPHEN 5; 325 MG/1; MG/1
1-2 TABLET ORAL EVERY 4 HOURS PRN
Qty: 21 TABLET | Refills: 0 | Status: SHIPPED | OUTPATIENT
Start: 2017-12-08 | End: 2018-01-02 | Stop reason: SDUPTHER

## 2017-12-08 RX ORDER — LANOLIN ALCOHOL/MO/W.PET/CERES
400 CREAM (GRAM) TOPICAL ONCE
Status: COMPLETED | OUTPATIENT
Start: 2017-12-08 | End: 2017-12-08

## 2017-12-08 RX ADMIN — OXYCODONE HYDROCHLORIDE AND ACETAMINOPHEN 1 TABLET: 5; 325 TABLET ORAL at 05:12

## 2017-12-08 RX ADMIN — ACETAMINOPHEN 650 MG: 325 TABLET ORAL at 08:12

## 2017-12-08 RX ADMIN — MAGNESIUM OXIDE TAB 400 MG (241.3 MG ELEMENTAL MG) 400 MG: 400 (241.3 MG) TAB at 08:12

## 2017-12-08 RX ADMIN — OXYCODONE HYDROCHLORIDE AND ACETAMINOPHEN 1 TABLET: 5; 325 TABLET ORAL at 12:12

## 2017-12-08 RX ADMIN — CITALOPRAM HYDROBROMIDE 40 MG: 40 TABLET ORAL at 08:12

## 2017-12-08 RX ADMIN — ASPIRIN 81 MG: 81 TABLET, COATED ORAL at 08:12

## 2017-12-08 RX ADMIN — CLOPIDOGREL 75 MG: 75 TABLET, FILM COATED ORAL at 08:12

## 2017-12-08 RX ADMIN — CIPROFLOXACIN 200 MG: 2 INJECTION INTRAVENOUS at 05:12

## 2017-12-08 RX ADMIN — SODIUM BICARBONATE 650 MG TABLET 1300 MG: at 08:12

## 2017-12-08 RX ADMIN — CIPROFLOXACIN HYDROCHLORIDE 500 MG: 500 TABLET, FILM COATED ORAL at 08:12

## 2017-12-08 RX ADMIN — OXYCODONE HYDROCHLORIDE AND ACETAMINOPHEN 1 TABLET: 5; 325 TABLET ORAL at 09:12

## 2017-12-08 RX ADMIN — METRONIDAZOLE 500 MG: 500 TABLET ORAL at 02:12

## 2017-12-08 RX ADMIN — FERROUS SULFATE TAB EC 325 MG (65 MG FE EQUIVALENT) 325 MG: 325 (65 FE) TABLET DELAYED RESPONSE at 08:12

## 2017-12-08 RX ADMIN — METRONIDAZOLE 500 MG: 500 INJECTION, SOLUTION INTRAVENOUS at 12:12

## 2017-12-08 RX ADMIN — OXYCODONE HYDROCHLORIDE AND ACETAMINOPHEN 1 TABLET: 5; 325 TABLET ORAL at 02:12

## 2017-12-08 RX ADMIN — SODIUM CHLORIDE: 0.9 INJECTION, SOLUTION INTRAVENOUS at 03:12

## 2017-12-08 RX ADMIN — DIBASIC SODIUM PHOSPHATE, MONOBASIC POTASSIUM PHOSPHATE AND MONOBASIC SODIUM PHOSPHATE 2 TABLET: 852; 155; 130 TABLET ORAL at 11:12

## 2017-12-08 NOTE — SUBJECTIVE & OBJECTIVE
Interval History: No events.  Labs normalizing.  Feeling improved    Medications:  Continuous Infusions:   sodium chloride 0.9% 125 mL/hr at 12/08/17 0345     Scheduled Meds:   aspirin  81 mg Oral Daily    ciprofloxacin  200 mg Intravenous Q12H    citalopram  40 mg Oral Daily    clopidogrel  75 mg Oral Daily    ferrous sulfate  325 mg Oral BID    metoprolol tartrate  25 mg Oral BID    metronidazole  500 mg Intravenous Q8H    sodium bicarbonate  2 tablet Oral BID     PRN Meds:dextrose 50%, dextrose 50%, diphenhydrAMINE, glucagon (human recombinant), glucose, glucose, hydrALAZINE, insulin aspart, ondansetron, oxyCODONE-acetaminophen, sodium chloride 0.9%     Review of patient's allergies indicates:   Allergen Reactions    Morphine Rash     Objective:     Vital Signs (Most Recent):  Temp: 96.9 °F (36.1 °C) (12/08/17 0444)  Pulse: 60 (12/08/17 0444)  Resp: 18 (12/08/17 0444)  BP: 119/68 (12/08/17 0444)  SpO2: 95 % (12/08/17 0444) Vital Signs (24h Range):  Temp:  [96.3 °F (35.7 °C)-97.2 °F (36.2 °C)] 96.9 °F (36.1 °C)  Pulse:  [59-77] 60  Resp:  [14-18] 18  SpO2:  [93 %-97 %] 95 %  BP: ()/(52-76) 119/68     Weight: 90.7 kg (200 lb)  Body mass index is 29.53 kg/m².    Intake/Output - Last 3 Shifts       12/06 0700 - 12/07 0659 12/07 0700 - 12/08 0659    P.O. 350 850    I.V. (mL/kg) 1000 (11) 1816 (20)    Other 275 400    IV Piggyback 2200     Total Intake(mL/kg) 3825 (42.2) 3066 (33.8)    Urine (mL/kg/hr) 1175 2625 (1.2)    Drains 25 25 (0)    Stool 675 725 (0.3)    Total Output 1875 3375    Net +1950 -309          Urine Occurrence 1 x     Stool Occurrence  1 x          Physical Exam   Gen: NAD  CV: RRR  Pulm; unlabored  GI: soft, NT, ND, one drain with purulent output    Significant Labs:  CBC:   Recent Labs  Lab 12/08/17  0416   WBC 6.41   RBC 4.19*   HGB 11.0*   HCT 34.2*      MCV 82   MCH 26.3*   MCHC 32.2     CMP:   Recent Labs  Lab 12/06/17  0940  12/08/17  0416   *  < > 86   CALCIUM  10.9*  < > 8.6*   ALBUMIN 3.4*  --   --    PROT 9.5*  --   --    *  < > 136   K 6.2*  < > 3.6   CO2 21*  < > 22*   CL 93*  < > 108   BUN 75*  < > 32*   CREATININE 5.0*  < > 1.0   ALKPHOS 106  --   --    ALT 15  --   --    AST 22  --   --    BILITOT 0.4  --   --    < > = values in this interval not displayed.

## 2017-12-08 NOTE — PLAN OF CARE
[facility]    HOME HEALTH ORDERS  FACE TO FACE ENCOUNTER    Patient Name: Cristo Jefferson  YOB: 1960    PCP: Vince Perez MD   PCP Address: [address] / [place] [address]  PCP Phone Number: [phone]  PCP Fax: [phone]    Encounter Date: 12/08/2017    Admit to Home Health    Diagnoses:  Active Hospital Problems    Diagnosis  POA    *Dehydration [E86.0]  Yes    Essential hypertension [I10]  Yes     Chronic      Resolved Hospital Problems    Diagnosis Date Resolved POA   No resolved problems to display.       Future Appointments  Date Time Provider Department Center   12/14/2017 2:30 PM ANA LUISA Sadler [facility] ENTERO [facility]   12/14/2017 3:45 PM Eber Riley MD [facility] GENSUR [facility]   12/19/2017 12:15 PM Vince Perez MD [facility]           I have seen and examined this patient face to face today. My clinical findings that support the need for the home health skilled services and home bound status are the following:  Weakness/numbness causing balance and gait disturbance due to Surgery making it taxing to leave home.    Allergies:  Review of patient's allergies indicates:   Allergen Reactions    Morphine Rash       Diet: regular diet    Activities: activity as tolerated.  No lifting more than 10 lbs    Nursing:   SN to complete comprehensive assessment including routine vital signs. Instruct on disease process and s/s of complications to report to MD. Review/verify medication list sent home with the patient at time of discharge  and instruct patient/caregiver as needed. Frequency may be adjusted depending on start of care date.    Notify MD if SBP > 160 or < 90; DBP > 90 or < 50; HR > 120 or < 50; Temp > 101; Other:        CONSULTS:    Physical Therapy to evaluate and treat. Evaluate for home safety and equipment needs; Establish/upgrade home exercise program. Perform / instruct on therapeutic exercises, gait training,  transfer training, and Range of Motion.  Aide to provide assistance with personal care, ADLs, and vital signs.    MISCELLANEOUS CARE:  Colostomy Care:  Instruct patient/caregiver to empty bag when full and PRN., Change and clean site every 48 hours and Monitor skin integrity.    WOUND CARE ORDERS  Wet to dry to midline wound.  Routine RACHEL drain care.  Please record all output      Medications: Review discharge medications with patient and family and provide education.      Current Discharge Medication List      START taking these medications    Details   ciprofloxacin HCl (CIPRO) 500 MG tablet Take 1 tablet (500 mg total) by mouth every 12 (twelve) hours.  Qty: 20 tablet, Refills: 0      metroNIDAZOLE (FLAGYL) 500 MG tablet Take 1 tablet (500 mg total) by mouth every 8 (eight) hours.  Qty: 30 tablet, Refills: 0         CONTINUE these medications which have NOT CHANGED    Details   aspirin 81 MG Chew Take 81 mg by mouth once daily.      citalopram (CELEXA) 40 MG tablet TAKE 1 TABLET BY MOUTH ONCE DAILY  Qty: 90 tablet, Refills: 0      clopidogrel (PLAVIX) 75 mg tablet TAKE 1 TABLET BY MOUTH EVERY DAY  Qty: 90 tablet, Refills: 0      co-enzyme Q-10 30 mg capsule Take 300 mg by mouth once daily.      docusate sodium (COLACE) 100 MG capsule Take 1 capsule (100 mg total) by mouth 2 (two) times daily.  Refills: 0      ferrous sulfate 325 (65 FE) MG EC tablet Take 325 mg by mouth 2 (two) times daily.      fish oil-omega-3 fatty acids 300-1,000 mg capsule Take 2 g by mouth once daily.      gabapentin (NEURONTIN) 100 MG capsule Take 1 capsule (100 mg total) by mouth 3 (three) times daily.  Qty: 90 capsule, Refills: 2    Associated Diagnoses: Herpes zoster without complication      hydrOXYzine pamoate (VISTARIL) 25 MG Cap Take 1 capsule (25 mg total) by mouth every 8 (eight) hours as needed.  Qty: 40 capsule, Refills: 0    Associated Diagnoses: Herpes zoster without complication      losartan-hydrochlorothiazide 100-12.5 mg  (HYZAAR) 100-12.5 mg Tab TAKE 1 TABLET BY MOUTH EVERY DAY  Qty: 90 tablet, Refills: 0      meloxicam (MOBIC) 15 MG tablet TK 1 T PO ONCE D  Refills: 1      metformin (GLUCOPHAGE-XR) 750 MG 24 hr tablet TAKE 1 TABLET BY MOUTH TWICE DAILY  Qty: 180 tablet, Refills: 0      metoprolol tartrate (LOPRESSOR) 25 MG tablet TAKE 1 TABLET BY MOUTH TWICE DAILY  Qty: 180 tablet, Refills: 0      MULTIVIT WITH MINERALS/LUTEIN (MULTIVITAMIN 50 PLUS ORAL) Take by mouth.      oxyCODONE-acetaminophen (PERCOCET) 5-325 mg per tablet Take 1 tablet by mouth every 4 (four) hours as needed.  Qty: 42 tablet, Refills: 0      vitamin D 1000 units Tab Take 2,000 Units by mouth once daily.      atorvastatin (LIPITOR) 10 MG tablet TAKE 1 TABLET BY MOUTH EVERY DAY  Qty: 90 tablet, Refills: 0      predniSONE (DELTASONE) 5 MG tablet              I certify that this patient is confined to his home and needs intermittent skilled nursing care and physical therapy.

## 2017-12-08 NOTE — PLAN OF CARE
TRAMAINE learned from Surgery Resident that Pt is anticipated to discharge home over the weekend. Pt was current with [facility] office prior to this admission. Home Health orders were requested and received. TRAMAINE sent all necessary referral information to this agency in [facility] and notified them of Pt's anticipated weekend discharge.     Pt to discharge home over the weekend with resumption of his home health services and family support.     Anastasia Fay LCSW

## 2017-12-08 NOTE — PROGRESS NOTES
[facility]  General Surgery  Progress Note    Subjective:     History of Present Illness:  No notes on file    Post-Op Info:  * No surgery found *         Interval History: No events.  Labs normalizing.  Feeling improved    Medications:  Continuous Infusions:   sodium chloride 0.9% 125 mL/hr at 12/08/17 0345     Scheduled Meds:   aspirin  81 mg Oral Daily    ciprofloxacin  200 mg Intravenous Q12H    citalopram  40 mg Oral Daily    clopidogrel  75 mg Oral Daily    ferrous sulfate  325 mg Oral BID    metoprolol tartrate  25 mg Oral BID    metronidazole  500 mg Intravenous Q8H    sodium bicarbonate  2 tablet Oral BID     PRN Meds:dextrose 50%, dextrose 50%, diphenhydrAMINE, glucagon (human recombinant), glucose, glucose, hydrALAZINE, insulin aspart, ondansetron, oxyCODONE-acetaminophen, sodium chloride 0.9%     Review of patient's allergies indicates:   Allergen Reactions    Morphine Rash     Objective:     Vital Signs (Most Recent):  Temp: 96.9 °F (36.1 °C) (12/08/17 0444)  Pulse: 60 (12/08/17 0444)  Resp: 18 (12/08/17 0444)  BP: 119/68 (12/08/17 0444)  SpO2: 95 % (12/08/17 0444) Vital Signs (24h Range):  Temp:  [96.3 °F (35.7 °C)-97.2 °F (36.2 °C)] 96.9 °F (36.1 °C)  Pulse:  [59-77] 60  Resp:  [14-18] 18  SpO2:  [93 %-97 %] 95 %  BP: ()/(52-76) 119/68     Weight: 90.7 kg (200 lb)  Body mass index is 29.53 kg/m².    Intake/Output - Last 3 Shifts       12/06 0700 - 12/07 0659 12/07 0700 - 12/08 0659    P.O. 350 850    I.V. (mL/kg) 1000 (11) 1816 (20)    Other 275 400    IV Piggyback 2200     Total Intake(mL/kg) 3825 (42.2) 3066 (33.8)    Urine (mL/kg/hr) 1175 2625 (1.2)    Drains 25 25 (0)    Stool 675 725 (0.3)    Total Output 1875 3375    Net +1950 -309          Urine Occurrence 1 x     Stool Occurrence  1 x          Physical Exam   Gen: NAD  CV: RRR  Pulm; unlabored  GI: soft, NT, ND, one drain with purulent output    Significant Labs:  CBC:   Recent Labs  Lab 12/08/17  0416   WBC  6.41   RBC 4.19*   HGB 11.0*   HCT 34.2*      MCV 82   MCH 26.3*   MCHC 32.2     CMP:   Recent Labs  Lab 12/06/17  0940  12/08/17  0416   *  < > 86   CALCIUM 10.9*  < > 8.6*   ALBUMIN 3.4*  --   --    PROT 9.5*  --   --    *  < > 136   K 6.2*  < > 3.6   CO2 21*  < > 22*   CL 93*  < > 108   BUN 75*  < > 32*   CREATININE 5.0*  < > 1.0   ALKPHOS 106  --   --    ALT 15  --   --    AST 22  --   --    BILITOT 0.4  --   --    < > = values in this interval not displayed.        Assessment/Plan:     * Dehydration    Doing well, labs essentially normal  Feels improved  Potassium and Cr normal  Ostomy output low- will follow and possibly start anti-kinetic if high output  Trend labs  PT/OT  Continue Reg diet  SSI  Cut IV fluid to 1/2 IVM.  Encourage PO.    Cultures negative to date.  Transition to PO abx given purulent output        Essential hypertension    Avoid nephrotoxic agents in favor of PRNs            Darrell Velarde MD  General Surgery  [facility]

## 2017-12-08 NOTE — DISCHARGE SUMMARY
[facility]  General Surgery  Discharge Summary      Patient Name: Cristo Jefferson  MRN: [id number]  Admission Date: 12/6/2017  Hospital Length of Stay: 2 days  Discharge Date and Time:  12/08/2017 2:25 PM  Attending Physician: Eber Riley MD   Discharging Provider: Darrell Velarde MD  Primary Care Provider: Vince Perez MD     HPI: Pt represented for dehydration and COCO.  Resolved with fluids.  DC home on oral abx for persistent purulent draiange related to colon perforation    * No surgery found *     Hospital Course: As above    Consults:   Consults         Status Ordering Provider     Inpatient consult to Vascular Surgery  Once     Provider:  (Not yet assigned)    Completed LEOPOLDO BARAHONA          Significant Diagnostic Studies: Labs:   BMP:   Recent Labs  Lab 12/06/17  1950 12/07/17  0022 12/07/17  0442 12/08/17  0416   *  --  96 86   *  --  133* 136   K 4.9  4.9 4.6 4.0 3.6   CL 96  --  105 108   CO2 14*  --  16* 22*   BUN 82*  --  72* 32*   CREATININE 4.6*  --  2.8* 1.0   CALCIUM 8.9  --  8.1* 8.6*   MG  --   --  1.6 1.7    and CBC   Recent Labs  Lab 12/07/17  0442 12/08/17  0416   WBC 8.59 6.41   HGB 10.7* 11.0*   HCT 32.6* 34.2*    234       Pending Diagnostic Studies:     None        Final Active Diagnoses:    Diagnosis Date Noted POA    PRINCIPAL PROBLEM:  Dehydration [E86.0] 12/06/2017 Yes    Essential hypertension [I10] 11/16/2017 Yes     Chronic      Problems Resolved During this Admission:    Diagnosis Date Noted Date Resolved POA      Discharged Condition: good    Disposition: Home or Self Care    Follow Up:  Follow-up Information     [facility].    Specialty:  Home Health Services  Why:  Home Health  Contact information:  [address] [place] [address]  [place] [address]  [phone]                 Patient Instructions:     Diet general     Lifting restrictions   Order Comments: No more than 10 lbs     Call MD for:  increased confusion or  weakness     Call MD for:  persistent dizziness, light-headedness, or visual disturbances     Call MD for:  worsening rash     Call MD for:  severe persistent headache     Call MD for:  difficulty breathing or increased cough     Call MD for:  redness, tenderness, or signs of infection (pain, swelling, redness, odor or green/yellow discharge around incision site)     Call MD for:  severe uncontrolled pain     Call MD for:  persistent nausea and vomiting or diarrhea     Call MD for:  temperature >100.4     Change dressing (specify)   Order Comments: Dressing change: Wet to dry 1-2 times daily.  Ok to shower.  Change ostomy as needed.  Empty drain and record output as needed       Medications:  Reconciled Home Medications:   Current Discharge Medication List      START taking these medications    Details   ciprofloxacin HCl (CIPRO) 500 MG tablet Take 1 tablet (500 mg total) by mouth every 12 (twelve) hours.  Qty: 20 tablet, Refills: 0      metroNIDAZOLE (FLAGYL) 500 MG tablet Take 1 tablet (500 mg total) by mouth every 8 (eight) hours.  Qty: 30 tablet, Refills: 0         CONTINUE these medications which have NOT CHANGED    Details   aspirin 81 MG Chew Take 81 mg by mouth once daily.      citalopram (CELEXA) 40 MG tablet TAKE 1 TABLET BY MOUTH ONCE DAILY  Qty: 90 tablet, Refills: 0      clopidogrel (PLAVIX) 75 mg tablet TAKE 1 TABLET BY MOUTH EVERY DAY  Qty: 90 tablet, Refills: 0      co-enzyme Q-10 30 mg capsule Take 300 mg by mouth once daily.      docusate sodium (COLACE) 100 MG capsule Take 1 capsule (100 mg total) by mouth 2 (two) times daily.  Refills: 0      ferrous sulfate 325 (65 FE) MG EC tablet Take 325 mg by mouth 2 (two) times daily.      fish oil-omega-3 fatty acids 300-1,000 mg capsule Take 2 g by mouth once daily.      gabapentin (NEURONTIN) 100 MG capsule Take 1 capsule (100 mg total) by mouth 3 (three) times daily.  Qty: 90 capsule, Refills: 2    Associated Diagnoses: Herpes zoster without  complication      hydrOXYzine pamoate (VISTARIL) 25 MG Cap Take 1 capsule (25 mg total) by mouth every 8 (eight) hours as needed.  Qty: 40 capsule, Refills: 0    Associated Diagnoses: Herpes zoster without complication      losartan-hydrochlorothiazide 100-12.5 mg (HYZAAR) 100-12.5 mg Tab TAKE 1 TABLET BY MOUTH EVERY DAY  Qty: 90 tablet, Refills: 0      meloxicam (MOBIC) 15 MG tablet TK 1 T PO ONCE D  Refills: 1      metformin (GLUCOPHAGE-XR) 750 MG 24 hr tablet TAKE 1 TABLET BY MOUTH TWICE DAILY  Qty: 180 tablet, Refills: 0      metoprolol tartrate (LOPRESSOR) 25 MG tablet TAKE 1 TABLET BY MOUTH TWICE DAILY  Qty: 180 tablet, Refills: 0      MULTIVIT WITH MINERALS/LUTEIN (MULTIVITAMIN 50 PLUS ORAL) Take by mouth.      oxyCODONE-acetaminophen (PERCOCET) 5-325 mg per tablet Take 1 tablet by mouth every 4 (four) hours as needed.  Qty: 42 tablet, Refills: 0      vitamin D 1000 units Tab Take 2,000 Units by mouth once daily.      atorvastatin (LIPITOR) 10 MG tablet TAKE 1 TABLET BY MOUTH EVERY DAY  Qty: 90 tablet, Refills: 0      predniSONE (DELTASONE) 5 MG tablet              Darrell Velarde MD  General Surgery  [facility]

## 2017-12-08 NOTE — PLAN OF CARE
Patient discharging home today with [facility].  Called Tran with [facility] to inform.      Future Appointments  Date Time Provider Department Center   12/14/2017 2:30 PM ANA LUISA Sadler [facility] ENTERO [facility]   12/14/2017 3:45 PM Eber Riley MD [facility] FLAVIA [place] [facility]   12/19/2017 12:15 PM Vince Perez MD [facility]          12/08/17 1431   Final Note   Assessment Type Final Discharge Note   Discharge Disposition [facility]   Hospital Follow Up  Appt(s) scheduled? Yes   Right [facility] Referral Info   Post Acute Recommendation Home-care

## 2017-12-08 NOTE — PLAN OF CARE
Problem: Patient Care Overview  Goal: Plan of Care Review  Outcome: Ongoing (interventions implemented as appropriate)  Pt in no acute distress. Pain well-controlled with oral pain med. VSS; BPs WNL. No c/o n/v this shift. Wife remains at bedside and very active in patient's care. Call light within reach. Q2H rounding, fall precautions in place. Will continue to monitor.

## 2017-12-08 NOTE — ASSESSMENT & PLAN NOTE
Doing well, labs essentially normal  Feels improved  Potassium and Cr normal  Ostomy output low- will follow and possibly start anti-kinetic if high output  Trend labs  PT/OT  Continue Reg diet  SSI  Cut IV fluid to 1/2 IVM.  Encourage PO.    Cultures negative to date.  Transition to PO abx given purulent output

## 2017-12-11 ENCOUNTER — TELEPHONE (OUTPATIENT)
Dept: SURGERY | Facility: CLINIC | Age: 57
End: 2017-12-11

## 2017-12-11 ENCOUNTER — PATIENT OUTREACH (OUTPATIENT)
Dept: ADMINISTRATIVE | Facility: CLINIC | Age: 57
End: 2017-12-11

## 2017-12-11 LAB
BACTERIA BLD CULT: NORMAL
BACTERIA BLD CULT: NORMAL

## 2017-12-11 NOTE — TELEPHONE ENCOUNTER
----- Message from Daryl Lazar sent at 12/11/2017  2:46 PM CST -----  Contact: Rachel//  Caller states that (s)he needs to speak with nurse in ref to some issues with the pts hydration and putting him on a drip;caller also has some questions about the pts appts being moon//please call back at [phone]//thank you

## 2017-12-11 NOTE — TELEPHONE ENCOUNTER
Spoke to pt's wife who was worried about her  becoming dehydrated again, I will speak to Dr Riley about starting home IV fluids

## 2017-12-11 NOTE — PATIENT INSTRUCTIONS
Dehydration (Adult)  Dehydration occurs when your body loses too much fluid. This may be the result of prolonged vomiting or diarrhea, excessive sweating, or a high fever. It may also happen if you dont drink enough fluid when youre sick or out in the heat. Misuse of diuretics (water pills) can also be a cause.  Symptoms include thirst and decreased urine output. You may also feel dizzy, weak, fatigued, or very drowsy. The diet described below is usually enough to treat dehydration. In some cases, you may need medicine.  Home care  · Drink at least 12 8-ounce glasses of fluid every day to resolve the dehydration. Fluid may include water; orange juice; lemonade; apple, grape, or cranberry juice; clear fruit drinks; electrolyte replacement and sports drinks; and teas and coffee without caffeine. If you have been diagnosed with a kidney disease, ask your doctor how much and what types of fluids you should drink to prevent dehydration. If you have kidney disease, fluid can build up in the body. This can be dangerous to your health.  · If you have a fever, muscle aches, or a headache as a result of a cold or flu, you may take acetaminophen or ibuprofen, unless another medicine was prescribed. If you have chronic liver or kidney disease, or have ever had a stomach ulcer or gastrointestinal bleeding, talk with your health care provider before using these medicines. Don't take aspirin if you are younger than 18 and have a fever. Aspirin raises the chance for severe liver injury.  Follow-up care  Follow up with your health care provider, or as advised.  When to seek medical advice  Call your health care provider right away if any of these occur:  · Continued vomiting  · Frequent diarrhea (more than 5 times a day); blood (red or black color) or mucus in diarrhea  · Blood in vomit or stool  · Swollen abdomen or increasing abdominal pain  · Weakness, dizziness, or fainting  · Unusual drowsiness or confusion  · Reduced urine  output or extreme thirst  · Fever of 100.4°F (34°C) or higher  Date Last Reviewed: 5/31/2015  © 9909-3105 [employer]. [address] [place] [address], [place] [address]. All rights reserved. This information is not intended as a substitute for professional medical care. Always follow your healthcare professional's instructions.

## 2017-12-12 ENCOUNTER — TELEPHONE (OUTPATIENT)
Dept: SURGERY | Facility: CLINIC | Age: 57
End: 2017-12-12

## 2017-12-12 DIAGNOSIS — E86.0 DEHYDRATION: ICD-10-CM

## 2017-12-12 DIAGNOSIS — C18.1 CARCINOMA OF APPENDIX: Primary | ICD-10-CM

## 2017-12-12 NOTE — TELEPHONE ENCOUNTER
Returned andie's call,  The no longer carry NS I  Will continue to look for another company that has availability

## 2017-12-12 NOTE — TELEPHONE ENCOUNTER
----- Message from Tanya Sequeira sent at 12/12/2017  2:20 PM CST -----  Contact: Kalpana/ Home health   Kalpana States that she needs a call returned by a nurse in reference to to the orders to start Iv and if it was set up with an infusion company, Please call Kalpana  @ [phone].                                  Thanks :)

## 2017-12-12 NOTE — TELEPHONE ENCOUNTER
----- Message from Daryl Lazar sent at 12/12/2017  3:09 PM CST -----  Contact: Palak//[employer]  Caller states that (s)he needs to speak with nurse in ref to the referral for normal saline for the pt//please call back at [phone]//thank you

## 2017-12-12 NOTE — TELEPHONE ENCOUNTER
----- Message from Luiz Hussein sent at 12/12/2017 11:18 AM CST -----  Contact: cayden/[facility]  [phone]//caller states that she needs to speak with nurse in ref to pt output being mother than his intake and she also has questions in ref to IV hydration at home//please call//thank you//latasha states that if she cant be reached to please contact pts wife  [phone]

## 2017-12-19 ENCOUNTER — OFFICE VISIT (OUTPATIENT)
Dept: WOUND CARE | Facility: CLINIC | Age: 57
End: 2017-12-19
Payer: COMMERCIAL

## 2017-12-19 ENCOUNTER — OFFICE VISIT (OUTPATIENT)
Dept: SURGERY | Facility: CLINIC | Age: 57
End: 2017-12-19
Payer: COMMERCIAL

## 2017-12-19 ENCOUNTER — TELEPHONE (OUTPATIENT)
Dept: SURGERY | Facility: CLINIC | Age: 57
End: 2017-12-19

## 2017-12-19 VITALS
WEIGHT: 204.25 LBS | DIASTOLIC BLOOD PRESSURE: 69 MMHG | SYSTOLIC BLOOD PRESSURE: 103 MMHG | HEART RATE: 51 BPM | TEMPERATURE: 98 F | HEIGHT: 69 IN | BODY MASS INDEX: 30.25 KG/M2

## 2017-12-19 DIAGNOSIS — L24.9 IRRITANT CONTACT DERMATITIS DUE TO ILEOSTOMY: ICD-10-CM

## 2017-12-19 DIAGNOSIS — K57.20 PERFORATION OF SIGMOID COLON DUE TO DIVERTICULITIS: ICD-10-CM

## 2017-12-19 DIAGNOSIS — E86.0 DEHYDRATION: Primary | ICD-10-CM

## 2017-12-19 DIAGNOSIS — Z43.2 ATTENTION TO ILEOSTOMY: Primary | ICD-10-CM

## 2017-12-19 DIAGNOSIS — K66.8 FREE INTRAPERITONEAL AIR: ICD-10-CM

## 2017-12-19 DIAGNOSIS — K94.19 IRRITANT CONTACT DERMATITIS DUE TO ILEOSTOMY: ICD-10-CM

## 2017-12-19 PROCEDURE — 99024 POSTOP FOLLOW-UP VISIT: CPT | Mod: S$GLB,,, | Performed by: SURGERY

## 2017-12-19 PROCEDURE — 99999 PR PBB SHADOW E&M-EST. PATIENT-LVL I: CPT | Mod: PBBFAC,,, | Performed by: CLINICAL NURSE SPECIALIST

## 2017-12-19 PROCEDURE — 99999 PR PBB SHADOW E&M-EST. PATIENT-LVL III: CPT | Mod: PBBFAC,,, | Performed by: SURGERY

## 2017-12-19 PROCEDURE — 99203 OFFICE O/P NEW LOW 30 MIN: CPT | Mod: S$GLB,,, | Performed by: CLINICAL NURSE SPECIALIST

## 2017-12-19 RX ORDER — CIPROFLOXACIN 500 MG/1
500 TABLET ORAL EVERY 12 HOURS
Qty: 28 TABLET | Refills: 1 | Status: SHIPPED | OUTPATIENT
Start: 2017-12-19 | End: 2018-01-02

## 2017-12-19 RX ORDER — OXYCODONE AND ACETAMINOPHEN 5; 325 MG/1; MG/1
1 TABLET ORAL EVERY 6 HOURS PRN
Qty: 40 TABLET | Refills: 0 | Status: SHIPPED | OUTPATIENT
Start: 2017-12-19 | End: 2017-12-29

## 2017-12-19 RX ORDER — DICYCLOMINE HYDROCHLORIDE 20 MG/1
TABLET ORAL
COMMUNITY
Start: 2017-12-04 | End: 2018-03-15

## 2017-12-19 RX ORDER — METRONIDAZOLE 500 MG/1
500 TABLET ORAL EVERY 12 HOURS
Qty: 28 TABLET | Refills: 1 | Status: SHIPPED | OUTPATIENT
Start: 2017-12-19 | End: 2018-01-02

## 2017-12-19 NOTE — LETTER
[facility]  [address]  [facility] [address]  Phone: [phone]  Fax: [phone] December 19, 2017      Vince Perez MD  [address]  [address]  [place] [address]    Patient: Cristo Jefferson   MR Number: [id number]   YOB: 1960   Date of Visit: 12/19/2017     Dear Dr. Perez:    Thank you for referring Cristo Jefferson to me for evaluation. Attached you will find relevant portions of my assessment and plan of care.    Cristo Jefferson is a 57-year-old  male, well known to me, who presents with his wife for another postoperative visit.  He underwent exploratory laparotomy on 11/21/2017 emergently for a perforated hollow viscus, which was due to a perforated sigmoid diverticulitis with a diffuse peritonitis due to the extensive phlegmon and acute inflammatory response in the left lower quadrant sigmoid region.  We performed a diverting ileostomy, abdominal washout and a closed suction drainage of the left lower quadrant pelvic region.  He has done well postoperatively and has been discharged home, requiring one admission for acute dehydration with a creatinine bump of up to 5, which immediately resolved and normalized with IV hydration.  He has since been sent back home with supplemental crystalloid IV fluids, receiving 3 liters of lactated Ringer's daily at home via his left subclavian vein Port-A-Cath, which he has in place from his prior abdominal malignancy.  He denies any fevers.  He states he is feeling better on a daily basis, but he still has some pressure sensation in the pelvis.      The ileostomy is pink and patent and working quite well and still putting out greater than 3 liters a day with the recorded outputs of the drain and the left lower quadrant still has purulent output approximately 20 mL a day.  His gastrostomy tube in the right upper quadrant is in place. The ileostomy is pink and patent with good seal without excoriation of  the surrounding skin.  His midline abdominal wound is granulating nicely with 100% healthy granulation tissue without fibrinous exudate and this is being treated with local wound care rather than a wound VAC per patient request.  The patient finishes his Cipro today and his Flagyl tomorrow.  Since he still has purulent drainage coming from the drain, we will represcribe 2 more weeks of Cipro and Flagyl each 500 mg twice daily.  We will keep the drain in place.  I will order a CBC, comprehensive metabolic panel, magnesium, calcium, phosphorous today and represcribe the Percocet 5/325 mg one p.o. q. 6 hours p.r.n. pain, dispensed #40.  This was done in a written format today.  We also electronically prescribed a Cipro and Flagyl at the [facility] on  [address] in the [facility].  He will come back to see me again in 2 weeks with another repeat CBC, CMP and a CT scan of the abdomen and pelvis with oral and IV contrast.  We will maintain and continue the IV fluids with 3 liters of lactated Ringer's daily roughly at a rate of 125 mL per hour.  He states he is feeling well hydrated and overall continues to improve on a daily basis.  Follow up in 2 weeks as outlined above.    ADDENDUM:  The white blood cell count from today was 13.7.  Patient will continue his oral antibiotics and drainage as outlined above and repeat the CT scan in 2 weeks following the New Year's holiday.    If you have questions, please do not hesitate to call me. I look forward to following Cristo Jefferson along with you.    Sincerely,        Eber Riley MD  Medical Director, [facility]  Section of General and Oncologic Surgery  [facility]    RLC/hcr    CC  [facility]

## 2017-12-19 NOTE — PROGRESS NOTES
Cristo Jefferson is a 57-year-old  male, well known to me, who presents   with his wife for another postoperative visit.  He underwent exploratory   laparotomy on 11/21/2017 emergently for a perforated hollow viscus, which was   due to a perforated sigmoid diverticulitis with a diffuse peritonitis due to the   extensive phlegmon and acute inflammatory response in the left lower quadrant   sigmoid region.  We performed a diverting ileostomy, abdominal washout and a   closed suction drainage of the left lower quadrant pelvic region.  He has done   well postoperatively and has been discharged home, requiring one admission for   acute dehydration with a creatinine bump of up to 5, which immediately resolved   and normalized with IV hydration.  He has since been sent back home with   supplemental crystalloid IV fluids, receiving 3 liters of lactated Ringer's   daily at home via his left subclavian vein Port-A-Cath, which he has in place   from his prior abdominal malignancy.  He denies any fevers.  He states he is   feeling better on a daily basis, but he still has some pressure sensation in the   pelvis.  The ileostomy is pink and patent and working quite well and still   putting out greater than 3 liters a day with the recorded outputs of the drain   and the left lower quadrant still has purulent output approximately 20 mL a day.    His gastrostomy tube in the right upper quadrant is in place.  The ileostomy   is pink and patent with good seal without excoriation of the surrounding skin.    His midline abdominal wound is granulating nicely with 100% healthy granulation   tissue without fibrinous exudate and this is being treated with local wound care   rather than a wound VAC per patient request.  The patient finishes his Cipro   today and his Flagyl tomorrow.  Since he still has purulent drainage coming from   the drain, we will represcribe 2 more weeks of Cipro and Flagyl each 500 mg   twice daily.  We will  keep the drain in place.  I will order a CBC,   comprehensive metabolic panel, magnesium, calcium, phosphorous today and   represcribe the Percocet 5/325 mg one p.o. q. 6 hours p.r.n. pain, dispensed   #40.  This was done in a written format today.  We also electronically   prescribed a Cipro and Flagyl at the [facility] on [address] in the [facility].  He will come back to see me again in 2 weeks with another repeat   CBC, CMP and a CT scan of the abdomen and pelvis with oral and IV contrast.  We   will maintain and continue the IV fluids with 3 liters of lactated Ringer's   daily roughly at a rate of 125 mL per hour.  He states he is feeling well   hydrated and overall continues to improve on a daily basis.  Follow up in 2   weeks as outlined above.    ADDENDUM:  The white blood cell count from today was 13.7.  Patient will   continue his oral antibiotics and drainage as outlined above and repeat the CT   scan in 2 weeks following the New Year's holiday.      RLC/IN  dd: 12/19/2017 12:58:01 (CST)  td: 12/20/2017 07:20:29 (CST)  Doc ID   #[id number]  Job ID #[id number]    CC:     Job # [id number].  Given degree of fluid losses from loop ileostomy, we will likely continue his IVF with 3 L of LR per day until the time of ileostomy reversal.

## 2017-12-19 NOTE — TELEPHONE ENCOUNTER
----- Message from Shelton Santos sent at 12/19/2017 12:46 PM CST -----  Contact: Solange with [facility]  Solange needs to clarify some orders and is asking for a call back. Solange can be reached at [phone]

## 2017-12-19 NOTE — PROGRESS NOTES
Subjective:       Patient ID: Cristo Jefferson is a 57 y.o. male.    Chief Complaint: No chief complaint on file.    This is a new pt to enterostomal therapy clinic, he had emergency surgery on 11/21 and now has temp ileostomy, has gtube, and open midline as well as RACHEL drain,  They come today , wife is caregiver, has fluids in progress, having some issues iwht the skin around stoma and not getting correct supplies either.       Review of Systems   Constitutional: Positive for activity change. Negative for chills and fever.   Respiratory: Negative for cough and shortness of breath.    Cardiovascular: Negative for chest pain and leg swelling.   Gastrointestinal: Negative for abdominal distention.   Genitourinary: Negative for dysuria and hematuria.   Musculoskeletal: Positive for arthralgias. Negative for back pain.   Skin: Positive for wound. Negative for color change and rash.   Neurological: Positive for weakness. Negative for headaches.       Objective:      Physical Exam   Constitutional: He is oriented to person, place, and time. He appears well-developed and well-nourished.   Pulmonary/Chest: Effort normal. No respiratory distress. He has no wheezes.   Abdominal: Soft.   Right sided loop ileostomy, budded , denuded skin around stoma,    Musculoskeletal: Normal range of motion. He exhibits no edema.   Neurological: He is alert and oriented to person, place, and time.   Skin: Skin is warm and dry. No erythema.   Psychiatric: He has a normal mood and affect.       Ileo output is high and liquidly, has fluids in progress, discussed issues with skin and ways to help heal and get better seal,  Will try convex wafer and 2 piece this gives freedom to switch to bedside bag if output is like water at night,   Showed wife how to do this   Applied new image  Cut to fit convex 37240 and 25790 with uro pouch to change to if needed, added a belt   [facility] IS SEEING HIM FOR WOUND CARE AND ORDERED 3 BOXES OF [facility] THAT WILL NOT  WORK WELL FOR HIM  Assessment:       1. Attention to ileostomy    2. Irritant contact dermatitis due to ileostomy        Plan:       Changed his system to convex 2 piece for now      Samples to be requested from [facility]  Called [facility] to get correct supplies ordered for him  I have reviewed the plan of care with the patient and/ or caregiver and they express understanding. I spent over 50% of this 30 minute visit in face to face counseling.

## 2017-12-19 NOTE — Clinical Note
Vandana f/u with me again in 2 weeks with CT scan of Abdomen and Pelvis with Oral and IV contrast as well as with another repeat CBC, CMP, Mg, Phos, Ca

## 2017-12-21 LAB — FUNGUS SPEC CULT: NORMAL

## 2017-12-27 ENCOUNTER — TELEPHONE (OUTPATIENT)
Dept: SURGERY | Facility: CLINIC | Age: 57
End: 2017-12-27

## 2017-12-27 NOTE — TELEPHONE ENCOUNTER
----- Message from Tasha Jeffrey sent at 12/27/2017  2:52 PM CST -----  Contact: Pt.'s    Caller states she would like to speak with nurse  in reference to Post Op Appt. Please call Pt.'s wife  Back @ [phone] Thank You :)

## 2017-12-29 ENCOUNTER — TELEPHONE (OUTPATIENT)
Dept: SURGERY | Facility: CLINIC | Age: 57
End: 2017-12-29

## 2017-12-29 NOTE — TELEPHONE ENCOUNTER
----- Message from Tanya Sequeira sent at 12/29/2017  2:00 PM CST -----  Contact: Jane/ [facility]   Jane States that they need a call returned by a nurse in reference to his policy kicking in on Monday and needing clinicals sent over to them, so natalie can continue shipping supplies.                      Please call Jane @ [phone] option 3, fax [phone] . Thanks :)

## 2018-01-02 ENCOUNTER — OFFICE VISIT (OUTPATIENT)
Dept: SURGERY | Facility: CLINIC | Age: 58
End: 2018-01-02
Payer: MEDICARE

## 2018-01-02 ENCOUNTER — HOSPITAL ENCOUNTER (OUTPATIENT)
Dept: RADIOLOGY | Facility: HOSPITAL | Age: 58
Discharge: HOME | End: 2018-01-02
Attending: SURGERY
Payer: MEDICARE

## 2018-01-02 VITALS
BODY MASS INDEX: 31.81 KG/M2 | HEIGHT: 69 IN | HEART RATE: 66 BPM | TEMPERATURE: 98 F | SYSTOLIC BLOOD PRESSURE: 121 MMHG | DIASTOLIC BLOOD PRESSURE: 79 MMHG | WEIGHT: 214.75 LBS

## 2018-01-02 DIAGNOSIS — K66.8 FREE INTRAPERITONEAL AIR: ICD-10-CM

## 2018-01-02 DIAGNOSIS — E86.0 DEHYDRATION: ICD-10-CM

## 2018-01-02 DIAGNOSIS — C18.1 CANCER OF APPENDIX: Primary | Chronic | ICD-10-CM

## 2018-01-02 DIAGNOSIS — K57.20 PERFORATION OF SIGMOID COLON DUE TO DIVERTICULITIS: ICD-10-CM

## 2018-01-02 PROCEDURE — 25500020 PHARM REV CODE 255: Performed by: SURGERY

## 2018-01-02 PROCEDURE — 74177 CT ABD & PELVIS W/CONTRAST: CPT | Mod: TC

## 2018-01-02 PROCEDURE — 99999 PR PBB SHADOW E&M-EST. PATIENT-LVL III: CPT | Mod: PBBFAC,,, | Performed by: SURGERY

## 2018-01-02 PROCEDURE — 74177 CT ABD & PELVIS W/CONTRAST: CPT | Mod: 26,,, | Performed by: RADIOLOGY

## 2018-01-02 PROCEDURE — 99024 POSTOP FOLLOW-UP VISIT: CPT | Mod: S$GLB,,, | Performed by: SURGERY

## 2018-01-02 PROCEDURE — 99499 UNLISTED E&M SERVICE: CPT | Mod: S$GLB,,, | Performed by: SURGERY

## 2018-01-02 RX ORDER — METOPROLOL TARTRATE 25 MG/1
TABLET, FILM COATED ORAL
Qty: 180 TABLET | Refills: 0 | Status: ON HOLD | OUTPATIENT
Start: 2018-01-02 | End: 2018-05-10 | Stop reason: HOSPADM

## 2018-01-02 RX ORDER — CITALOPRAM 40 MG/1
TABLET, FILM COATED ORAL
Qty: 90 TABLET | Refills: 0 | Status: ON HOLD | OUTPATIENT
Start: 2018-01-02 | End: 2018-04-06 | Stop reason: SDUPTHER

## 2018-01-02 RX ORDER — ATORVASTATIN CALCIUM 10 MG/1
TABLET, FILM COATED ORAL
Qty: 90 TABLET | Refills: 0 | Status: ON HOLD | OUTPATIENT
Start: 2018-01-02 | End: 2018-04-06 | Stop reason: SDUPTHER

## 2018-01-02 RX ORDER — MELOXICAM 15 MG/1
TABLET ORAL
Qty: 90 TABLET | Refills: 0 | Status: ON HOLD | OUTPATIENT
Start: 2018-01-02 | End: 2018-04-06 | Stop reason: SDUPTHER

## 2018-01-02 RX ORDER — METFORMIN HYDROCHLORIDE 750 MG/1
TABLET, EXTENDED RELEASE ORAL
Qty: 180 TABLET | Refills: 0 | Status: ON HOLD | OUTPATIENT
Start: 2018-01-02 | End: 2018-04-06 | Stop reason: SDUPTHER

## 2018-01-02 RX ORDER — CLOPIDOGREL BISULFATE 75 MG/1
TABLET ORAL
Qty: 90 TABLET | Refills: 0 | Status: ON HOLD | OUTPATIENT
Start: 2018-01-02 | End: 2018-04-06 | Stop reason: SDUPTHER

## 2018-01-02 RX ORDER — LOSARTAN POTASSIUM AND HYDROCHLOROTHIAZIDE 12.5; 1 MG/1; MG/1
TABLET ORAL
Qty: 90 TABLET | Refills: 0 | Status: ON HOLD | OUTPATIENT
Start: 2018-01-02 | End: 2018-04-06 | Stop reason: SDUPTHER

## 2018-01-02 RX ADMIN — IOHEXOL 100 ML: 350 INJECTION, SOLUTION INTRAVENOUS at 10:01

## 2018-01-02 RX ADMIN — IOHEXOL 15 ML: 350 INJECTION, SOLUTION INTRAVENOUS at 08:01

## 2018-01-02 RX ADMIN — IOHEXOL 15 ML: 350 INJECTION, SOLUTION INTRAVENOUS at 09:01

## 2018-01-02 NOTE — LETTER
[facility]  [address]  [facility] [address]  Phone: [phone]  Fax: [phone] January 2, 2018      Vince Perez MD  [address]  [place] [address]    Patient: Cristo Jefferson   MR Number: [id number]   YOB: 1960   Date of Visit: 1/2/2018     Dear Dr. Perez:    Thank you for referring Cristo Jefferson to me for evaluation. Attached you will find relevant portions of my assessment and plan of care.    Cristo Jefferson is a 57-year-old  male, well known to me, who presents with his wife for another postop visit.  Approximately 6 weeks ago on 11/21/2017, I performed an exploratory laparotomy for a perforated diverticulitis with significant left lower quadrant and pelvic phlegmon from the diverticular disease.  I performed abdominal washout, drainage of the left lower quadrant and pelvis, placement of gastrostomy tube and creation of diverting ileostomy.  The patient has done quite well.  He still has approximately 3 liters or so of output from the ileostomy.  He is getting 3 liters of Lactated Ringer's administered daily through his Port-A-Cath through the left anterior chest wall.  He had labs done today including a CBC and CMP, which were all within normal limits with a normal white count.  He denies any fevers, chills or night sweats.  His midline wound continues to granulate well and is reepithelializing nicely from the periphery.  He continues local wound care there.  The gastrostomy tube is in place and he is not using it or draining it.  The close suction Atul drain in the left lower quadrant is draining less than 5 mL daily.  It has now become serous and no longer purulent.      I reviewed his CT images in clinic.  The report was not available, but I did not see any persistent fluid collection or phlegmon in the left lower quadrant and pelvis.  I therefore pulled the drain.  We reviewed his normal labs and he has one more dose  of antibiotics, which he will complete today and then discontinue them.  We will not prescribe any more refills on his antibiotics.  With the drain removed, we will have him evaluated for closure and reversal of the ileostomy. I would like to do this at approximately the 3-month postop interval.  In the meantime, he needs an air contrast barium enema in approximately 2 to 3 weeks to assess for any strictures, narrowing or occult disease in the rectosigmoid region.  I would also like to have him evaluated by the GI Service for elective colonoscopy as we get close to time of ileostomy reversal to make certain that there is no disease that would require resection in the sigmoid region.  The air contrast barium enema will be ordered in approximately 2 to 3 weeks.  He will follow up with me approximately a week after the air contrast barium enema and we will set him up to see GI for elective colonoscopy prior to anticipated ileostomy closure and reversal, likely toward the end of February or early March at a 3-month postop interval from the original surgery.      He will continue Home Health care with 3 liters of Lactated Ringer's per [facility] since he has recently changed his insurance.  We will continue this until the ileostomy's closure to prevent and reduce risk of repeat episode of ATN and dehydration, which had required a previous readmission approximately a week after the initial discharge when he presented with a creatinine of 5, which has subsequently normalized and maintained normal creatinine level with intravenous crystalloids through Home Health as outlined above.    If you have questions, please do not hesitate to call me. I look forward to following Cristo Jefferson along with you.    Sincerely,      Eber Riley MD  Medical Director, [facility]  Section of General and Oncologic Surgery  [facility]    RLC/hcr

## 2018-01-02 NOTE — Clinical Note
Please order an ACBE (air contrast barium enema) in 2-3 weeks to evaluate his sigmoid colon for occult stricture or masses.  Needs GI referral for colonoscopy.  F/U with me again about a week after the ACBE.

## 2018-01-02 NOTE — PROGRESS NOTES
Cristo Jefferson is a 57-year-old  male, well known to me, who presents   with his wife for another postop visit.  Approximately 6 weeks ago on   11/21/2017, I performed an exploratory laparotomy for a perforated   diverticulitis with significant left lower quadrant and pelvic phlegmon from the   diverticular disease.  I performed abdominal washout, drainage of the left   lower quadrant and pelvis, placement of gastrostomy tube and creation of   diverting ileostomy.  The patient has done quite well.  He still has   approximately 3 liters or so of output from the ileostomy.  He is getting 3   liters of Lactated Ringer's administered daily through his Port-A-Cath through   the left anterior chest wall.  He had labs done today including a CBC and CMP,   which were all within normal limits with a normal white count.  He denies any   fevers, chills or night sweats.  His midline wound continues to granulate well   and is reepithelializing nicely from the periphery.  He continues local wound   care there.  The gastrostomy tube is in place and he is not using it or draining   it.  The close suction Atul drain in the left lower quadrant is draining less   than 5 mL daily.  It has now become serous and no longer purulent.  I reviewed   his CT images in clinic.  The report was not available, but I did not see any   persistent fluid collection or phlegmon in the left lower quadrant and pelvis.    I therefore pulled the drain.  We reviewed his normal labs and he has one more   dose of antibiotics, which he will complete today and then discontinue them.  We   will not prescribe any more refills on his antibiotics.  With the drain   removed, we will have him evaluated for closure and reversal of the ileostomy.    I would like to do this at approximately the 3-month postop interval.  In the   meantime, he needs an air contrast barium enema in approximately 2 to 3 weeks to   assess for any strictures, narrowing or occult  disease in the rectosigmoid   region.  I would also like to have him evaluated by the GI Service for elective   colonoscopy as we get close to time of ileostomy reversal to make certain that   there is no disease that would require resection in the sigmoid region.  The air   contrast barium enema will be ordered in approximately 2 to 3 weeks.  He will   follow up with me approximately a week after the air contrast barium enema and   we will set him up to see GI for elective colonoscopy prior to anticipated   ileostomy closure and reversal, likely toward the end of February or early March   at a 3-month postop interval from the original surgery.  He will continue Home   Health care with 3 liters of Lactated Ringer's per [facility]   Care since he has recently changed his insurance.  We will continue this until   the ileostomy's closure to prevent and reduce risk of repeat episode of ATN and   dehydration, which had required a previous readmission approximately a week   after the initial discharge when he presented with a creatinine of 5, which has   subsequently normalized and maintained normal creatinine level with intravenous   crystalloids through Home Health as outlined above.      JOSE/IN  dd: 01/02/2018 17:20:30 (CST)  td: 01/03/2018 09:51:08 (CST)  Doc ID   #[id number]  Job ID #[id number]    CC:     Job # [id number].

## 2018-01-03 ENCOUNTER — TELEPHONE (OUTPATIENT)
Dept: SURGERY | Facility: CLINIC | Age: 58
End: 2018-01-03

## 2018-01-03 DIAGNOSIS — K57.20 PERFORATION OF SIGMOID COLON DUE TO DIVERTICULITIS: Primary | ICD-10-CM

## 2018-01-08 DIAGNOSIS — K57.20 PERFORATION OF SIGMOID COLON DUE TO DIVERTICULITIS: Primary | ICD-10-CM

## 2018-01-15 ENCOUNTER — TELEPHONE (OUTPATIENT)
Dept: RADIOLOGY | Facility: HOSPITAL | Age: 58
End: 2018-01-15

## 2018-01-19 ENCOUNTER — TELEPHONE (OUTPATIENT)
Dept: ENDOSCOPY | Facility: HOSPITAL | Age: 58
End: 2018-01-19

## 2018-01-19 NOTE — TELEPHONE ENCOUNTER
This pt needs to be scheduled for a colonoscopy, not booked yet. He is on Plavix and we need to know if he can hold med 5 days prior to procedure. Please message back with response for documentation.

## 2018-01-19 NOTE — TELEPHONE ENCOUNTER
I haven't seen this patient in ~1 year. You need to check with his cardiologist if Ok to hold Plavix.

## 2018-01-23 ENCOUNTER — HOSPITAL ENCOUNTER (OUTPATIENT)
Dept: RADIOLOGY | Facility: HOSPITAL | Age: 58
Discharge: HOME | End: 2018-01-23
Attending: SURGERY
Payer: MEDICARE

## 2018-01-23 DIAGNOSIS — K57.20 PERFORATION OF SIGMOID COLON DUE TO DIVERTICULITIS: ICD-10-CM

## 2018-01-23 PROCEDURE — 25500020 PHARM REV CODE 255: Performed by: SURGERY

## 2018-01-23 PROCEDURE — 25000003 PHARM REV CODE 250: Performed by: SURGERY

## 2018-01-23 PROCEDURE — 74270 X-RAY XM COLON 1CNTRST STD: CPT | Mod: 26,,, | Performed by: RADIOLOGY

## 2018-01-23 PROCEDURE — 74270 X-RAY XM COLON 1CNTRST STD: CPT | Mod: TC

## 2018-01-23 RX ORDER — LIDOCAINE HYDROCHLORIDE 20 MG/ML
JELLY TOPICAL ONCE
Status: COMPLETED | OUTPATIENT
Start: 2018-01-23 | End: 2018-01-23

## 2018-01-23 RX ORDER — CHLORHEXIDINE/GLYCERIN/HE-CELL
JELLY (GRAM) TOPICAL ONCE
Status: COMPLETED | OUTPATIENT
Start: 2018-01-23 | End: 2018-01-23

## 2018-01-23 RX ADMIN — DIATRIZOATE MEGLUMINE AND DIATRIZOATE SODIUM 240 ML: 600; 100 SOLUTION ORAL; RECTAL at 11:01

## 2018-01-23 RX ADMIN — Medication: at 11:01

## 2018-01-23 RX ADMIN — LIDOCAINE HYDROCHLORIDE: 20 JELLY TOPICAL at 11:01

## 2018-01-25 ENCOUNTER — TELEPHONE (OUTPATIENT)
Dept: SURGERY | Facility: CLINIC | Age: 58
End: 2018-01-25

## 2018-01-25 NOTE — TELEPHONE ENCOUNTER
Spoke with pt's wife, pt will tape down G tube, and will call if she feel that the suture needs to be replaced

## 2018-01-25 NOTE — TELEPHONE ENCOUNTER
----- Message from Daryl Lazar sent at 1/25/2018  3:10 PM CST -----  Caller states that one of the pts stitches broke and the g tube slid out about an inch;caller states that a nurse is coming 2mrw but she would like to inform the office//please call back at [phone]//thank you

## 2018-01-29 ENCOUNTER — TELEPHONE (OUTPATIENT)
Dept: WOUND CARE | Facility: CLINIC | Age: 58
End: 2018-01-29

## 2018-01-29 ENCOUNTER — TELEPHONE (OUTPATIENT)
Dept: SURGERY | Facility: CLINIC | Age: 58
End: 2018-01-29

## 2018-01-29 DIAGNOSIS — Z43.2 ATTENTION TO ILEOSTOMY: Primary | ICD-10-CM

## 2018-01-29 NOTE — TELEPHONE ENCOUNTER
----- Message from Shelton Santos sent at 1/29/2018  2:55 PM CST -----  Contact: Rachel- Wife  Yenifer,    Caller said she spoke with Dr. Riley himself and said that he was sending the prescription for IV fluids the same day. Caller said the pharmacy never received the order and he will be running out soon. Please call regarding  this at [phone]

## 2018-01-29 NOTE — TELEPHONE ENCOUNTER
Sent a new script to [facility] ( new insurance this year) spoke with wife about pouching issues, this was a 15 min call.

## 2018-01-29 NOTE — TELEPHONE ENCOUNTER
----- Message from Sabrina Ortiz sent at 1/26/2018  3:08 PM CST -----  Contact: Pt wife:[phone]  Pt wife called and states the pt needs new orders for ostomy supplies from [facility] and pt wife states she would like to get saline because it cleans very well.

## 2018-01-30 ENCOUNTER — TELEPHONE (OUTPATIENT)
Dept: SURGERY | Facility: CLINIC | Age: 58
End: 2018-01-30

## 2018-01-30 ENCOUNTER — TELEPHONE (OUTPATIENT)
Dept: ENDOSCOPY | Facility: HOSPITAL | Age: 58
End: 2018-01-30

## 2018-01-30 DIAGNOSIS — K57.20 PERFORATION OF SIGMOID COLON DUE TO DIVERTICULITIS: Primary | ICD-10-CM

## 2018-01-30 RX ORDER — POLYETHYLENE GLYCOL 3350, SODIUM SULFATE ANHYDROUS, SODIUM BICARBONATE, SODIUM CHLORIDE, POTASSIUM CHLORIDE 236; 22.74; 6.74; 5.86; 2.97 G/4L; G/4L; G/4L; G/4L; G/4L
4 POWDER, FOR SOLUTION ORAL ONCE
Qty: 4000 ML | Refills: 0 | Status: SHIPPED | OUTPATIENT
Start: 2018-01-30 | End: 2018-01-30

## 2018-01-30 NOTE — TELEPHONE ENCOUNTER
----- Message from Daryl Lazar sent at 1/30/2018 10:42 AM CST -----  Contact: Negar//[facility]  Caller states that (s)he needs to speak with nurse in ref to getting home health orders for the pt//please call back at [phone]//thank you

## 2018-02-01 ENCOUNTER — TELEPHONE (OUTPATIENT)
Dept: SURGERY | Facility: CLINIC | Age: 58
End: 2018-02-01

## 2018-02-01 NOTE — TELEPHONE ENCOUNTER
----- Message from Shelton Santos sent at 2/1/2018 10:26 AM CST -----  Contact: Lacie- wife  Yenifer,    Caller said the colonoscopy was set up for 2/20/18. Caller just wants to make sure that's enough time. Please call regarding this at [phone]

## 2018-02-08 ENCOUNTER — TELEPHONE (OUTPATIENT)
Dept: ENDOSCOPY | Facility: HOSPITAL | Age: 58
End: 2018-02-08

## 2018-02-08 NOTE — TELEPHONE ENCOUNTER
See telephone encounter dated 1/30/18/pt on Plavix/neither PCP or cardiologist will clear him to hold med without being evaluated/wife refusing to make appointment/per Dr Riley, pt to remain on Plavix and proceed with colonoscopy with no biposies. Spoke with pt's wife on 2/8/18, she stated she will be holding Plavix 5 days prior to colonoscopy and has done so in past without complication in case he does need biposies or removals to be done. I told her I was not able to advise her to do that due to our protocol on holding blood thinners.

## 2018-02-19 ENCOUNTER — TELEPHONE (OUTPATIENT)
Dept: SURGERY | Facility: CLINIC | Age: 58
End: 2018-02-19

## 2018-02-19 NOTE — TELEPHONE ENCOUNTER
Returned call , spoke with wife,   Called in RX for continuation for LR to [facility] infusion to 4/1/2018

## 2018-02-19 NOTE — TELEPHONE ENCOUNTER
----- Message from Tanya Sequeira sent at 2/19/2018 11:00 AM CST -----  Contact: Rachel Ramos States that she needs a call returned by a nurse in reference to needing a new prescription  for his fluids , Please call Rachel @ [phone]   . Thanks :)

## 2018-03-06 ENCOUNTER — HOSPITAL ENCOUNTER (OUTPATIENT)
Facility: HOSPITAL | Age: 58
Discharge: HOME | End: 2018-03-06
Attending: COLON & RECTAL SURGERY | Admitting: COLON & RECTAL SURGERY
Payer: MEDICARE

## 2018-03-06 ENCOUNTER — SURGERY (OUTPATIENT)
Age: 58
End: 2018-03-06

## 2018-03-06 ENCOUNTER — ANESTHESIA (OUTPATIENT)
Dept: ENDOSCOPY | Facility: HOSPITAL | Age: 58
End: 2018-03-06
Payer: MEDICARE

## 2018-03-06 ENCOUNTER — ANESTHESIA EVENT (OUTPATIENT)
Dept: ENDOSCOPY | Facility: HOSPITAL | Age: 58
End: 2018-03-06
Payer: MEDICARE

## 2018-03-06 VITALS
BODY MASS INDEX: 32.58 KG/M2 | SYSTOLIC BLOOD PRESSURE: 111 MMHG | RESPIRATION RATE: 16 BRPM | WEIGHT: 220 LBS | HEART RATE: 54 BPM | TEMPERATURE: 98 F | HEIGHT: 69 IN | OXYGEN SATURATION: 94 % | DIASTOLIC BLOOD PRESSURE: 72 MMHG

## 2018-03-06 DIAGNOSIS — Z12.11 SCREENING FOR COLON CANCER: ICD-10-CM

## 2018-03-06 LAB — POCT GLUCOSE: 104 MG/DL (ref 70–110)

## 2018-03-06 PROCEDURE — 63600175 PHARM REV CODE 636 W HCPCS: Performed by: NURSE ANESTHETIST, CERTIFIED REGISTERED

## 2018-03-06 PROCEDURE — 27201012 HC FORCEPS, HOT/COLD, DISP: Performed by: COLON & RECTAL SURGERY

## 2018-03-06 PROCEDURE — 88305 TISSUE EXAM BY PATHOLOGIST: CPT | Mod: 26,,, | Performed by: PATHOLOGY

## 2018-03-06 PROCEDURE — 45380 COLONOSCOPY AND BIOPSY: CPT | Mod: PT,GC,, | Performed by: COLON & RECTAL SURGERY

## 2018-03-06 PROCEDURE — 88305 TISSUE EXAM BY PATHOLOGIST: CPT | Performed by: PATHOLOGY

## 2018-03-06 PROCEDURE — 37000009 HC ANESTHESIA EA ADD 15 MINS: Performed by: COLON & RECTAL SURGERY

## 2018-03-06 PROCEDURE — D9220A PRA ANESTHESIA: Mod: CRNA,,, | Performed by: NURSE ANESTHETIST, CERTIFIED REGISTERED

## 2018-03-06 PROCEDURE — 45380 COLONOSCOPY AND BIOPSY: CPT | Performed by: COLON & RECTAL SURGERY

## 2018-03-06 PROCEDURE — 25000003 PHARM REV CODE 250: Performed by: NURSE PRACTITIONER

## 2018-03-06 PROCEDURE — D9220A PRA ANESTHESIA: Mod: ANES,,, | Performed by: ANESTHESIOLOGY

## 2018-03-06 PROCEDURE — 82962 GLUCOSE BLOOD TEST: CPT | Performed by: COLON & RECTAL SURGERY

## 2018-03-06 PROCEDURE — 37000008 HC ANESTHESIA 1ST 15 MINUTES: Performed by: COLON & RECTAL SURGERY

## 2018-03-06 RX ORDER — PROPOFOL 10 MG/ML
VIAL (ML) INTRAVENOUS
Status: DISCONTINUED | OUTPATIENT
Start: 2018-03-06 | End: 2018-03-06

## 2018-03-06 RX ORDER — PROPOFOL 10 MG/ML
VIAL (ML) INTRAVENOUS CONTINUOUS PRN
Status: DISCONTINUED | OUTPATIENT
Start: 2018-03-06 | End: 2018-03-06

## 2018-03-06 RX ORDER — LIDOCAINE HCL/PF 100 MG/5ML
SYRINGE (ML) INTRAVENOUS
Status: DISCONTINUED | OUTPATIENT
Start: 2018-03-06 | End: 2018-03-06

## 2018-03-06 RX ORDER — SODIUM CHLORIDE 9 MG/ML
INJECTION, SOLUTION INTRAVENOUS CONTINUOUS
Status: DISCONTINUED | OUTPATIENT
Start: 2018-03-06 | End: 2018-03-06 | Stop reason: HOSPADM

## 2018-03-06 RX ADMIN — SODIUM CHLORIDE: 0.9 INJECTION, SOLUTION INTRAVENOUS at 11:03

## 2018-03-06 RX ADMIN — PROPOFOL 80 MG: 10 INJECTION, EMULSION INTRAVENOUS at 11:03

## 2018-03-06 RX ADMIN — PROPOFOL 250 MCG/KG/MIN: 10 INJECTION, EMULSION INTRAVENOUS at 11:03

## 2018-03-06 RX ADMIN — LIDOCAINE HYDROCHLORIDE 100 MG: 20 INJECTION, SOLUTION INTRAVENOUS at 11:03

## 2018-03-06 NOTE — ANESTHESIA POSTPROCEDURE EVALUATION
"Anesthesia Post Evaluation    Patient: Cristo Jefferson    Procedure(s) Performed: Procedure(s) (LRB):  COLONOSCOPY (N/A)    Final Anesthesia Type: general  Patient location during evaluation: PACU  Patient participation: Yes- Able to Participate  Level of consciousness: awake and alert and oriented  Post-procedure vital signs: reviewed and stable  Pain management: adequate  Airway patency: patent  PONV status at discharge: No PONV  Anesthetic complications: no      Cardiovascular status: stable  Respiratory status: unassisted, spontaneous ventilation and room air  Hydration status: euvolemic  Follow-up not needed.        Visit Vitals  BP (!) 91/59   Pulse 65   Temp 36.7 °C (98.1 °F)   Resp 20   Ht 5' 9" (1.753 m)   Wt 99.8 kg (220 lb)   SpO2 96%   BMI 32.49 kg/m²       Pain/Rebecca Score: Pain Assessment Performed: Yes (3/6/2018 10:45 AM)  Presence of Pain: denies (3/6/2018 12:15 PM)      "

## 2018-03-06 NOTE — PROVATION PATIENT INSTRUCTIONS
Discharge Summary/Instructions after an Endoscopic Procedure  Patient Name: Cristo Jefferson  Patient MRN: [id number]  Patient YOB: 1960  Tuesday, March 06, 2018  Yfn Martinez MD  RESTRICTIONS:  During your procedure today, you received medications for sedation.  These   medications may affect your judgment, balance and coordination.  Therefore,   for 24 hours, you have the following restrictions:   - DO NOT drive a car, operate machinery, make legal/financial decisions,   sign important papers or drink alcohol.    ACTIVITY:  The following day: return to full activity including work, except no heavy   lifting, straining or running for 3 days if polyps were removed.  DIET:  Eat and drink normally unless instructed otherwise.     TREATMENT FOR COMMON SIDE EFFECTS:  - Mild abdominal pain, nausea, belching, bloating or excessive gas:  rest,   eat lightly and use a heating pad.  - Sore Throat: treat with throat lozenges and/or gargle with warm salt   water.  - Because air was used during the procedure, expelling large amounts of air   from your rectum or belching is normal.  - If a bowel prep was taken, you may not have a bowel movement for 1-3 days.    This is normal.  SYMPTOMS TO WATCH FOR AND REPORT TO YOUR PHYSICIAN:  1. Abdominal pain or bloating, other than gas cramps.  2. Chest pain.  3. Back pain.  4. Signs of infection such as: chills or fever occurring within 24 hours   after the procedure.  5. Rectal bleeding, which would show as bright red, maroon, or black stools.   (A tablespoon of blood from the rectum is not serious, especially if   hemorrhoids are present.)  6. Vomiting.  7. Weakness or dizziness.  GO DIRECTLY TO THE NEAREST EMERGENCY ROOM IF YOU HAVE ANY OF THE FOLLOWING:      Difficulty breathing  Chills and/or fever over 101 F   Persistent vomiting and/or vomiting blood   Severe abdominal pain   Severe chest pain   Black, tarry stools   Bleeding- more than one tablespoon   Any other symptom or  condition that you feel may need urgent attention  Your doctor recommends these additional instructions:  If any biopsies were taken, your doctors clinic will contact you in 1 to 2   weeks with any results.  You are being discharged to home.   We are waiting for your pathology results.   Restart Plavix at normal dose today  For questions, problems or results please call your physician - Yfn Martinez MD at Work:  [phone].  [facility], EMERGENCY ROOM PHONE NUMBER: [phone]  IF A COMPLICATION OR EMERGENCY SITUATION ARISES AND YOU ARE UNABLE TO REACH   YOUR PHYSICIAN - GO DIRECTLY TO THE EMERGENCY ROOM.  Yfn Martinez MD  3/6/2018 12:09:40 PM  This report has been verified and signed electronically.

## 2018-03-06 NOTE — DISCHARGE INSTRUCTIONS
Colonoscopy     A camera attached to a flexible tube with a viewing lens is used to take video pictures.     Colonoscopy is a test to view the inside of your lower digestive tract (colon and rectum). Sometimes it can show the last part of the small intestine (ileum). During the test, small pieces of tissue may be removed for testing. This is called a biopsy. Small growths, such as polyps, may also be removed.   Why is colonoscopy done?  The test is done to help look for colon cancer. And it can help find the source of abdominal pain, bleeding, and changes in bowel habits. It may be needed once a year, depending on factors such as your:  · Age  · Health history  · Family health history  · Symptoms  · Results from any prior colonoscopy  Risks and possible complications  These include:  · Bleeding               · A puncture or tear in the colon   · Risks of anesthesia  · A cancer lesion not being seen  Getting ready   To prepare for the test:  · Talk with your healthcare provider about the risks of the test (see below). Also ask your healthcare provider about alternatives to the test.  · Tell your healthcare provider about any medicines you take. Also tell him or her about any health conditions you may have.  · Make sure your rectum and colon are empty for the test. Follow the diet and bowel prep instructions exactly. If you dont, the test may need to be rescheduled.  · Plan for a friend or family member to drive you home after the test.     Colonoscopy provides an inside view of the entire colon.     You may discuss the results with your doctor right away or at a future visit.  During the test   The test is usually done in the hospital on an outpatient basis. This means you go home the same day. The procedure takes about 30 minutes. During that time:  · You are given relaxing (sedating) medicine through an IV line. You may be drowsy, or fully asleep.  · The healthcare provider will first give you a physical exam to  check for anal and rectal problems.  · Then the anus is lubricated and the scope inserted.  · If you are awake, you may have a feeling similar to needing to have a bowel movement. You may also feel pressure as air is pumped into the colon. Its OK to pass gas during the procedure.  · Biopsy, polyp removal, or other treatments may be done during the test.  After the test   You may have gas right after the test. It can help to try to pass it to help prevent later bloating. Your healthcare provider may discuss the results with you right away. Or you may need to schedule a follow-up visit to talk about the results. After the test, you can go back to your normal eating and other activities. You may be tired from the sedation and need to rest for a few hours.  Date Last Reviewed: 11/1/2016 © 2000-2017 [employer]. [address] [place] [address], [place] [address]. All rights reserved. This information is not intended as a substitute for professional medical care. Always follow your healthcare professional's instructions.

## 2018-03-06 NOTE — ANESTHESIA PREPROCEDURE EVALUATION
03/06/2018  Cristo Jefferson is a 57 y.o., male.  Patient Active Problem List   Diagnosis    Non-insulin dependent type 2 diabetes mellitus    Essential hypertension    Major depressive disorder    Coronary artery disease involving autologous vein coronary bypass graft without angina pectoris    Hypercholesteremia    Cancer of appendix    Free intraperitoneal air    Herpes zoster    Dehydration    History of abdominal aortic aneurysm (AAA)    S/P EVAR for abdominal aortic aneurysm (AAA) in February/March 2017    Perforation of sigmoid colon due to diverticulitis    Screening for colon cancer     Echo 11/2017    CONCLUSIONS     1 - Eccentric hypertrophy.     2 - Normal left ventricular systolic function (EF 55-60%).     3 - Mildly depressed right ventricular systolic function .     4 - Biatrial enlargement.     5 - Mild tricuspid regurgitation.     6 - The estimated PA systolic pressure is greater than 34 mmHg.             This document has been electronically    SIGNED BY: Star Ma MD On: 11/22/2017 11:30  Anesthesia Evaluation    I have reviewed the Patient Summary Reports.    I have reviewed the Nursing Notes.   I have reviewed the Medications.     Review of Systems      Physical Exam  General:  Well nourished    Airway/Jaw/Neck:  Airway Findings: Mouth Opening: Normal General Airway Assessment: Adult  Mallampati: II  Improves to II with phonation.  Jaw/Neck Findings:  Limited Ability to Prognath  Neck ROM: Normal ROM     Eyes/Ears/Nose:  Eyes/Ears/Nose Findings:    Dental:  Dental Findings: In tact   Chest/Lungs:  Chest/Lungs Findings: Clear to auscultation, Normal Respiratory Rate     Heart/Vascular:  Heart Findings: Rate: Normal  Rhythm: Regular Rhythm  Sounds: Normal     Abdomen:  Abdomen Findings:  Normal     Musculoskeletal:  Musculoskeletal Findings:    Skin:  Skin Findings:     Mental  Status:  Mental Status Findings:  Cooperative, Alert and Oriented         Anesthesia Plan  Type of Anesthesia, risks & benefits discussed:  Anesthesia Type:  general, MAC  Patient's Preference:   Intra-op Monitoring Plan:   Intra-op Monitoring Plan Comments:   Post Op Pain Control Plan:   Post Op Pain Control Plan Comments:   Induction:   IV  Beta Blocker:  Patient is not currently on a Beta-Blocker (No further documentation required).       Informed Consent: Patient understands risks and agrees with Anesthesia plan.  Questions answered. Anesthesia consent signed with patient.  ASA Score: 3     Day of Surgery Review of History & Physical:    H&P update referred to the surgeon.         Ready For Surgery From Anesthesia Perspective.

## 2018-03-06 NOTE — TRANSFER OF CARE
"Anesthesia Transfer of Care Note    Patient: Cristo Jefferson    Procedure(s) Performed: Procedure(s) (LRB):  COLONOSCOPY (N/A)    Patient location: GI    Anesthesia Type: general    Transport from OR: Transported from OR on room air with adequate spontaneous ventilation    Post pain: adequate analgesia    Post assessment: no apparent anesthetic complications and tolerated procedure well    Post vital signs: stable    Level of consciousness: lethargic    Nausea/Vomiting: no nausea/vomiting    Complications: none    Transfer of care protocol was followed      Last vitals:   Visit Vitals  /88 (BP Location: Left arm, Patient Position: Lying)   Pulse (!) 59   Temp 36.3 °C (97.3 °F) (Temporal)   Resp 18   Ht 5' 9" (1.753 m)   Wt 99.8 kg (220 lb)   SpO2 96%   BMI 32.49 kg/m²     "

## 2018-03-13 ENCOUNTER — TELEPHONE (OUTPATIENT)
Dept: SURGERY | Facility: CLINIC | Age: 58
End: 2018-03-13

## 2018-03-13 ENCOUNTER — TELEPHONE (OUTPATIENT)
Dept: ENDOSCOPY | Facility: HOSPITAL | Age: 58
End: 2018-03-13

## 2018-03-13 NOTE — TELEPHONE ENCOUNTER
----- Message from Vinita Wong sent at 3/13/2018  1:51 PM CDT -----  Contact: wife - rohini lynch -  or [phone]  Martinez - returning your call re his scopes - please call wife - rohini lynch Les [phone] or [phone]

## 2018-03-13 NOTE — TELEPHONE ENCOUNTER
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 Martinez.

## 2018-03-15 ENCOUNTER — OFFICE VISIT (OUTPATIENT)
Dept: SURGERY | Facility: CLINIC | Age: 58
End: 2018-03-15
Payer: MEDICARE

## 2018-03-15 VITALS — SYSTOLIC BLOOD PRESSURE: 126 MMHG | TEMPERATURE: 99 F | HEIGHT: 69 IN | DIASTOLIC BLOOD PRESSURE: 74 MMHG

## 2018-03-15 DIAGNOSIS — Z93.2 ILEOSTOMY PRESENT: ICD-10-CM

## 2018-03-15 DIAGNOSIS — Z12.11 SCREENING FOR COLON CANCER: Primary | ICD-10-CM

## 2018-03-15 PROCEDURE — 99499 UNLISTED E&M SERVICE: CPT | Mod: S$GLB,,, | Performed by: SURGERY

## 2018-03-15 PROCEDURE — 99999 PR PBB SHADOW E&M-EST. PATIENT-LVL II: CPT | Mod: PBBFAC,,, | Performed by: SURGERY

## 2018-03-15 PROCEDURE — 99214 OFFICE O/P EST MOD 30 MIN: CPT | Mod: S$GLB,,, | Performed by: SURGERY

## 2018-03-15 PROCEDURE — 3078F DIAST BP <80 MM HG: CPT | Mod: CPTII,S$GLB,, | Performed by: SURGERY

## 2018-03-15 PROCEDURE — 3074F SYST BP LT 130 MM HG: CPT | Mod: CPTII,S$GLB,, | Performed by: SURGERY

## 2018-03-15 NOTE — PROGRESS NOTES
Subjective:       Patient ID: Cristo Jefferson is a 57 y.o. male.    Chief Complaint: No chief complaint on file.    HPI: Cristo Jefferson is a 57 y.o. male with history of arthritis (on steroids), DM, HTN, HLD, appendiceal cancer, and diverticulitis who presents for follow up.  He underwent exploratory laparotomy with DLI in November. He has recovered well.  No acute issues other than some mild pain at G tube site.  He also has significant output from ileostomy, up to 3 liters in a day; he is receiving IV infusion at home to help stay hydrated.  He is ready to have his ileostomy closed.  No fever, chills, nausea, vomiting, constipation, bloating, chest pain, shortness of breath.  Activity level and appetite good.  Colonoscopy last week was unremarkable except for some mild colitis of the transverse colon, which would not preclude him from having his ileostomy closed.    Past Medical History:   Diagnosis Date    Arthritis     Cancer 2016    appendix, completed chemo    Depression     Diabetes mellitus type I     Hyperlipidemia     Hypertension     On home O2      Past Surgical History:   Procedure Laterality Date    APPENDECTOMY      COLONOSCOPY N/A 3/6/2018    Procedure: COLONOSCOPY;  Surgeon: Yfn Martinez MD;  Location: [facility] ([address] [place] [address]);  Service: Endoscopy;  Laterality: N/A;   See telephone encounter dated 1/30/18/pt on Plavix/neither PCP or cardiologist will clear him to hold med without being evaluated/wife refusing to make appointment/per Dr Riley, pt to remain on Plavix and proceed with colonoscopy with no biposies. Spoke with wife on 2/8/1    EVAR for AAA      S/P EVAR for AAA in 2017    open heart       RIGHT COLECTOMY  2016     Family History   Problem Relation Age of Onset    Heart disease Mother     Heart disease Father     Cancer Father      prostate    Cancer Sister      breast , lymphoma     Social History     Social History    Marital status:      Spouse name: N/A    Number  "of children: N/A    Years of education: N/A     Social History Main Topics    Smoking status: Current Some Day Smoker     Types: Cigarettes, Pipe    Smokeless tobacco: Current User    Alcohol use No    Drug use: No    Sexual activity: Yes     Partners: Female     Birth control/ protection: None     Other Topics Concern    None     Social History Narrative    None       Current Outpatient Prescriptions   Medication Sig Dispense Refill    aspirin 81 MG Chew Take 81 mg by mouth once daily.      atorvastatin (LIPITOR) 10 MG tablet TAKE 1 TABLET BY MOUTH EVERY DAY 90 tablet 0    citalopram (CELEXA) 40 MG tablet TAKE 1 TABLET BY MOUTH ONCE DAILY 90 tablet 0    clopidogrel (PLAVIX) 75 mg tablet TAKE 1 TABLET BY MOUTH EVERY DAY 90 tablet 0    ferrous sulfate 325 (65 FE) MG EC tablet Take 325 mg by mouth 2 (two) times daily.      losartan-hydrochlorothiazide 100-12.5 mg (HYZAAR) 100-12.5 mg Tab TAKE 1 TABLET BY MOUTH EVERY DAY 90 tablet 0    meloxicam (MOBIC) 15 MG tablet TAKE 1 TABLET BY MOUTH ONCE DAILY 90 tablet 0    metFORMIN (GLUCOPHAGE-XR) 750 MG 24 hr tablet TAKE 1 TABLET BY MOUTH TWICE DAILY 180 tablet 0    metoprolol tartrate (LOPRESSOR) 25 MG tablet TAKE 1 TABLET BY MOUTH TWICE DAILY 180 tablet 0    MULTIVIT WITH MINERALS/LUTEIN (MULTIVITAMIN 50 PLUS ORAL) Take by mouth.      predniSONE (DELTASONE) 5 MG tablet 5 mg 2 (two) times daily.       vitamin D 1000 units Tab Take 2,000 Units by mouth once daily.       No current facility-administered medications for this visit.      Review of patient's allergies indicates:   Allergen Reactions    Morphine Rash       Review of Systems    Negative except as per HPI    Objective:      Vitals:    03/15/18 1014   BP: 126/74   BP Location: Left arm   Patient Position: Sitting   BP Method: Medium (Automatic)   Temp: 98.6 °F (37 °C)   TempSrc: Oral   Height: 5' 9" (1.753 m)     Physical Exam   Constitutional: He is oriented to person, place, and time. He " appears well-developed and well-nourished. No distress.   HENT:   Head: Normocephalic and atraumatic.   Eyes: EOM are normal. No scleral icterus.   Neck: Normal range of motion.   Cardiovascular: Normal rate and regular rhythm.    Pulmonary/Chest: Effort normal. No respiratory distress.   Abdominal: Soft. He exhibits no distension.   G tube in place with mild tenderness at insertion site; no s/s of infection   Musculoskeletal: Normal range of motion. He exhibits no edema.   Neurological: He is alert and oriented to person, place, and time.   Nursing note and vitals reviewed.      Lab Review: None new to review  Diagnostics Review: Colonoscopy: see chart     Assessment:       Cristo Jefferson is a 57 y.o. male with history of diverticulitis with diverting loop ileostomy    Plan:       -To OR 4/4/18 for exploration of abdomen and closure of ileostomy  -Consents signed  -Hold plavix for 7 days pre-op  -CBC, CMP prior to surgery    CMissy Grace MD  PGY-4  Pager: [phone]  .I have personally taken the history and examined this patient and agree with the resident's note as stated above.  Colonoscopy WNL on 3-6-18, and patient ready to proceed with ileostomy closure.  Consented for closure of loop ileostomy on 4-4-18.  Will keep current G-tube tasia-operatively prn and will remove it post-op in clinic if not needed in there tasia-op period.  G-tube stitch has come free and tube pulled out slightly so that mushroom tip is now likely anteriorly in stomach rather than in original posterior aspect of fundus.  Pt reassured that this is ok.

## 2018-03-19 DIAGNOSIS — Z01.818 PREOP TESTING: Primary | ICD-10-CM

## 2018-03-19 DIAGNOSIS — K57.92 DIVERTICULITIS: Primary | ICD-10-CM

## 2018-03-23 ENCOUNTER — TELEPHONE (OUTPATIENT)
Dept: SURGERY | Facility: CLINIC | Age: 58
End: 2018-03-23

## 2018-03-23 NOTE — TELEPHONE ENCOUNTER
Spoke with Negar from [facility] regarding patient's HH order for LR, order is through 4-1-18, patient's procedure 4-4-18, pt and family would like to know if LR order will continue through surgery date, patient scheduled for Pre-op 3/26/18, will speak with provider regarding HH orders for LR, appointments confirmed at this time

## 2018-03-23 NOTE — TELEPHONE ENCOUNTER
----- Message from Tasha Jeffrey sent at 3/23/2018 11:05 AM CDT -----  Contact:  [facility] [phone]   Caller states she would like to speak with nurse in reference to home health orders and appt. As well please call  [facility] back @ [phone] Thank You :)

## 2018-03-24 PROBLEM — Z93.2 ILEOSTOMY PRESENT: Status: ACTIVE | Noted: 2018-03-24

## 2018-03-26 ENCOUNTER — ANESTHESIA EVENT (OUTPATIENT)
Dept: SURGERY | Facility: HOSPITAL | Age: 58
DRG: 330 | End: 2018-03-26
Payer: MEDICARE

## 2018-03-26 ENCOUNTER — HOSPITAL ENCOUNTER (OUTPATIENT)
Dept: PREADMISSION TESTING | Facility: HOSPITAL | Age: 58
Discharge: HOME | End: 2018-03-26
Attending: ANESTHESIOLOGY
Payer: MEDICARE

## 2018-03-26 VITALS
TEMPERATURE: 98 F | OXYGEN SATURATION: 97 % | HEART RATE: 60 BPM | DIASTOLIC BLOOD PRESSURE: 73 MMHG | WEIGHT: 226 LBS | RESPIRATION RATE: 16 BRPM | SYSTOLIC BLOOD PRESSURE: 119 MMHG | HEIGHT: 69 IN | BODY MASS INDEX: 33.47 KG/M2

## 2018-03-26 DIAGNOSIS — E11.9 NON-INSULIN DEPENDENT TYPE 2 DIABETES MELLITUS: Primary | ICD-10-CM

## 2018-03-26 RX ORDER — ACETAMINOPHEN 500 MG
500 TABLET ORAL 2 TIMES DAILY PRN
Status: ON HOLD | COMMUNITY
End: 2018-05-10 | Stop reason: HOSPADM

## 2018-03-26 NOTE — DISCHARGE INSTRUCTIONS
Your surgery has been scheduled for:__________________________________________    You should report to:  ____[facility], located on the [place] side of the first floor of the           [facility] ([phone])  ____[facility], located on the [facility] side of the            Second floor of the [facility] ([phone])  ____3rd Floor [facility] located on the [facility] side of the [facility] [phone]  Please Note   - Tell your doctor if you take Aspirin, products containing Aspirin, herbal medications  or blood thinners, such as Coumadin, Ticlid, or Plavix.  (Consult your provider regarding holding or stopping before surgery).  - Arrange for someone to drive you home following surgery.  You will not be allowed to leave the surgical facility alone or drive yourself home following sedation and anesthesia.  Before Surgery  - Stop taking all herbal medications 14days prior to surgery  - No Motrin/Advil (Ibuprofen) 7 days before surgery  - No Aleve (Naproxen) 7 days before surgery  - Stop Taking Asprin, products containing Asprin _____days before surgery  - Stop taking blood thinners_______days before surgery  - Refrain from drinking alcoholic beverages for 24hours before and after surgery  - Stop or limit smoking _________days before surgery  Night before Surgery  - DO NOT EAT OR DRINK ANYTHING AFTER MIDNIGHT, INCLUDING GUM, HARD CANDY, MINTS, OR CHEWING TOBACCO.  - Take a shower or bath (shower is recommended).  Bathe with Hibiclens soap or an antibacterial soap from the neck down.  If not supplied by your surgeon, hibiclens soap will need to be purchased over the counter in pharmacy.  Rinse soap off thoroughly.  - Shampoo your hair with your regular shampoo  The Day of Surgery  - Take another bath or shower with hibiclens or any antibacterial soap, to reduce the chance of infection.  - Take heart and blood  pressure medications with a small sip of water, as advised by the perioperative team.  - Do not take fluid pills  - You may brush your teeth and rinse your mouth, but do not swall any additional water.   - Do not apply perfumes, powder, body lotions or deodorant on the day of surgery.  - Nail polish should be removed.  - Do not wear makeup or moisturizer  - Wear comfortable clothes, such as a button front shirt and loose fitting pants.  - Leave all jewelry, including body piercings, and valuables at home.    - Bring any devices you will neeed after surgery such as crutches or canes.  - If you have sleep apnea, please bring your CPAP machine  In the event that your physical condition changes including the onset of a cold or respiratory illness, or if you have to delay or cancel your surgery, please notify your surgeon.  Anesthesia: General Anesthesia     You are watched continuously during your procedure by your anesthesia provider.     Youre due to have surgery. During surgery, youll be given medicine called anesthesia or anesthetic. This will keep you comfortable and pain-free. Your anesthesia provider will use general anesthesia.  What is general anesthesia?  General anesthesia puts you into a state like deep sleep. It goes into the bloodstream (IV anesthetics), into the lungs (gas anesthetics), or both. You feel nothing during the procedure. You will not remember it. During the procedure, the anesthesia provider monitors you continuously. He or she checks your heart rate and rhythm, blood pressure, breathing, and blood oxygen.  · IV anesthetics. IV anesthetics are given through an IV line in your arm. Theyre often given first. This is so you are asleep before a gas anesthetic is started. Some kinds of IV anesthetics relieve pain. Others relax you. Your doctor will decide which kind is best in your case.  · Gas anesthetics. Gas anesthetics are breathed into the lungs. They are often used to keep you asleep.  They can be given through a facemask or a tube placed in your larynx or trachea (breathing tube).  ? If you have a facemask, your anesthesia provider will most likely place it over your nose and mouth while youre still awake. Youll breathe oxygen through the mask as your IV anesthetic is started. Gas anesthetic may be added through the mask.  ? If you have a tube in the larynx or trachea, it will be inserted into your throat after youre asleep.  Anesthesia tools and medicines  You will likely have:  · IV anesthetics. These are put into an IV line into your bloodstream.  · Gas anesthetics. You breathe these anesthetics into your lungs, where they pass into your bloodstream.  · Pulse oximeter. This is a small clip that is attached to the end of your finger. This measures your blood oxygen level.  · Electrocardiography leads (electrodes). These are small sticky pads that are placed on your chest. They record your heart rate and rhythm.  · Blood pressure cuff. This reads your blood pressure.  Risks and possible complications  General anesthesia has some risks. These include:  · Breathing problems  · Nausea and vomiting  · Sore throat or hoarseness (usually temporary)  · Allergic reaction to the anesthetic  · Irregular heartbeat (rare)  · Cardiac arrest (rare)   Anesthesia safety  · Follow all instructions you are given for how long not to eat or drink before your procedure.  · Be sure your doctor knows what medicines and drugs you take. This includes over-the-counter medicines, herbs, supplements, alcohol or other drugs. You will be asked when those were last taken.  · Have an adult family member or friend drive you home after the procedure.  · For the first 24 hours after your surgery:  ? Do not drive or use heavy equipment.  ? Do not make important decisions or sign legal documents. If important decisions or signing legal documents is necessary during the first 24 hours after surgery, have a trusted family member  or spouse act on your behalf.  ? Avoid alcohol.  ? Have a responsible adult stay with you. He or she can watch for problems and help keep you safe.  Date Last Reviewed: 12/1/2016  © 8927-5951 [employer]. [address] [place] [address], [place] [address]. All rights reserved. This information is not intended as a substitute for professional medical care. Always follow your healthcare professional's instructions

## 2018-03-26 NOTE — ANESTHESIA PREPROCEDURE EVALUATION
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  Has hx of right hemicolectomy and G tube placement.      Echo 11/2017    CONCLUSIONS     1 - Eccentric hypertrophy.     2 - Normal left ventricular systolic function (EF 55-60%).     3 - Mildly depressed right ventricular systolic function .     4 - Biatrial enlargement.     5 - Mild tricuspid regurgitation.     6 - The estimated PA systolic pressure is greater than 34 mmHg.         Anesthesia Evaluation      I have reviewed the Medications.   Steroids Taken In Past Year: Prednisone    Review of Systems  Anesthesia Hx:  No problems with previous Anesthesia  History of prior surgery of interest to airway management or planning: heart surgery. Previous anesthesia: General 3/6/18: colonoscopy with general anesthesia.  Airway issues documented on chart review include easy direct laryngoscopy  Denies Family Hx of Anesthesia complications.   Denies Personal Hx of Anesthesia complications.   Social:  Former Smoker  Tobacco Use:  (quit 11/2017)Former smoker of pipe, cigarette for 30+ years, quit smoking within the year Denies Alcohol Use.   Hematology/Oncology:         -- Transfusion: -- Transfusion Hx (no complications) (after heart surgery in 2004): --  Cancer in past history: s/p chemotherapy and s/p surgery   Oncology Comments: S/P Appendiceal cancer: last chemo 3/2017     EENT/Dental:   Eyes: Visual Impairment , Is Legally Blind  Ears General/Symptom(s) Hearing Impairment: Hard of hearing left ear only  Jaw Problems:  Stiffness Arthritis in jaw  Cardiovascular:   Exercise tolerance: poor Hypertension CAD    Denies Angina.  Functional Capacity low / < 4 METS, sedentary lifestyle, but was able to walk from garage  to POC without CP/SOB  Coronary Artery Disease: S/P Percutaneous Coronary Intervention (PCI) (x 4) coronary stent, unknown type, stent, unknown type was 2005 ( 2 done each a week apart) S/P Aorto-Coronary Bypass Graft Surgery (CABG): (4) CABG was 2004  Denies Deep Venous Thrombosis (DVT)  Abdominal Aortic Aneurysm (S/P EVAR 2017), s/p surgical repair  Hypertension , Recent typical clinic B/P of 119/73 @ POC visit    Pulmonary:  Denies Asthma.  Denies Chronic Obstructive Pulmonary Disease (COPD).  Possible Obstructive Sleep Apnea , (STOP/BANG) Symptoms S - Snoring (loud), P - Pressure being treated for high BP, A - Age > 50 and G - Gender (Male > Female)    Renal/:   renal calculi  Denies Kidney Function/Disease    Hepatic/GI:   S/P ileostomy secondary to diverticulitis; colectomy-right    G-tube Bowel Conditions:  Diverticulitis (Hx perforated colon)  Liver Disease, Fatty Liver    Musculoskeletal:   Arthritis  Rheumatoid arthritis Joint Disease:  Arthritis, Rheumatoid Arthritis  Spine Disorders: (pt. reports occasional neck pain)    Neurological:  Rheumatoid Arthritis  Denies Seizure Disorder  Denies CVA - Cerebrovasular Accident  Denies TIA - Transient Ischemic Attack    Endocrine:   Diabetes  Diabetes , controlled by oral hypoglycemics. Typical AM glucose range:  , most recent HgA1c value was 5.6 on 12/2017.  Denies Thyroid Disease  Metabolic Disorders, Hyperlipoproteinemia  Psych:   depression  Depression and Major Depression, Treated.          Physical Exam  General:  Obesity    Airway/Jaw/Neck:  Airway Findings: Mouth Opening: Small, but > 3cm Mallampati: III  Jaw/Neck Findings:  Neck ROM: Extension Painful, Extension Decreased, Mild      Dental:  Dental Findings: In tact   Chest/Lungs:  Chest/Lungs Findings: Clear to auscultation, Normal Respiratory Rate     Heart/Vascular:  Heart Findings: Rate: Bradycardia  Rhythm: Regular Rhythm  Vascular Findings: Normal       Mental Status:  Mental Status  Findings:  Cooperative, Alert and Oriented         Anesthesia Plan  Type of Anesthesia, risks & benefits discussed:  Anesthesia Type:  general  Patient's Preference:   Intra-op Monitoring Plan: standard ASA monitors  Intra-op Monitoring Plan Comments:   Post Op Pain Control Plan: multimodal analgesia  Post Op Pain Control Plan Comments:   Induction:   IV  Beta Blocker:  Patient is on a Beta-Blocker and has received one dose within the past 24 hours (No further documentation required).       Informed Consent: Patient understands risks and agrees with Anesthesia plan.  Questions answered. Anesthesia consent signed with patient.  ASA Score: 4     Day of Surgery Review of History & Physical:    H&P update referred to the surgeon.         Ready For Surgery From Anesthesia Perspective.     Pt. does not need a cardiac clearance for surgery per Dr. Bob; recent anesthesia in 11/2017 @ [facility]    Plavix will be held x 7 days per Dr. Riley      OK to continue Aspirin per Dr. Riley    Outside Echo and Stress test from outside cardiologist; to be scanned in media      Shane Toribio RN

## 2018-03-27 ENCOUNTER — TELEPHONE (OUTPATIENT)
Dept: PREADMISSION TESTING | Facility: HOSPITAL | Age: 58
End: 2018-03-27

## 2018-03-27 NOTE — TELEPHONE ENCOUNTER
----- Message from Yenifer Davis RN sent at 3/27/2018 12:48 PM CDT -----  Regarding: RE: Aspirin OK  Yes Dr Riley is fine with him taking asprin   ----- Message -----  From: Shane Toribio RN  Sent: 3/26/2018   5:06 PM  To: [facility] Staff  Subject: Aspirin OK                                       Mr. Jefferson is scheduled with Dr. Riley on Wednesday April 4th for loop ileostomy takedown. Pt. will hold Plavix x 7 days before surgery. Can the pt. stay on Aspirin 81mg for surgery?     Thanks,  Shane Toribio RN  [facility]  Ext. [phone]

## 2018-04-02 ENCOUNTER — TELEPHONE (OUTPATIENT)
Dept: SURGERY | Facility: CLINIC | Age: 58
End: 2018-04-02

## 2018-04-02 NOTE — TELEPHONE ENCOUNTER
Return call to Lakshmi. Informed that I am still waiting to hear back from Dr Riley concerning pt's IV fluid orders. Will contact Lakshmi back when Dr Riley answers questions.

## 2018-04-02 NOTE — TELEPHONE ENCOUNTER
----- Message from Tasha Jeffrey sent at 4/2/2018  2:28 PM CDT -----  Contact: [facility] [phone]   Caller states that she needs a call returned in reference to getting new prescription on extended therapy for SULY please call  [facility] back @ [phone]  Thanks :)

## 2018-04-04 ENCOUNTER — HOSPITAL ENCOUNTER (INPATIENT)
Facility: HOSPITAL | Age: 58
LOS: 5 days | Discharge: HOME | DRG: 330 | End: 2018-04-09
Attending: SURGERY | Admitting: SURGERY
Payer: MEDICARE

## 2018-04-04 ENCOUNTER — ANESTHESIA (OUTPATIENT)
Dept: SURGERY | Facility: HOSPITAL | Age: 58
DRG: 330 | End: 2018-04-04
Payer: MEDICARE

## 2018-04-04 DIAGNOSIS — E11.9 NON-INSULIN DEPENDENT TYPE 2 DIABETES MELLITUS: Chronic | ICD-10-CM

## 2018-04-04 DIAGNOSIS — Z98.890 HISTORY OF ILEOSTOMY: Primary | ICD-10-CM

## 2018-04-04 LAB
ABO + RH BLD: NORMAL
BLD GP AB SCN CELLS X3 SERPL QL: NORMAL
POCT GLUCOSE: 110 MG/DL (ref 70–110)
POCT GLUCOSE: 156 MG/DL (ref 70–110)

## 2018-04-04 PROCEDURE — 88305 TISSUE EXAM BY PATHOLOGIST: CPT | Performed by: PATHOLOGY

## 2018-04-04 PROCEDURE — 63600175 PHARM REV CODE 636 W HCPCS: Performed by: NURSE ANESTHETIST, CERTIFIED REGISTERED

## 2018-04-04 PROCEDURE — 86850 RBC ANTIBODY SCREEN: CPT

## 2018-04-04 PROCEDURE — 63600175 PHARM REV CODE 636 W HCPCS: Performed by: SURGERY

## 2018-04-04 PROCEDURE — 63600175 PHARM REV CODE 636 W HCPCS: Performed by: ANESTHESIOLOGY

## 2018-04-04 PROCEDURE — 25000003 PHARM REV CODE 250: Performed by: NURSE ANESTHETIST, CERTIFIED REGISTERED

## 2018-04-04 PROCEDURE — 99900035 HC TECH TIME PER 15 MIN (STAT)

## 2018-04-04 PROCEDURE — 88302 TISSUE EXAM BY PATHOLOGIST: CPT | Mod: 26,,, | Performed by: PATHOLOGY

## 2018-04-04 PROCEDURE — 82962 GLUCOSE BLOOD TEST: CPT | Performed by: SURGERY

## 2018-04-04 PROCEDURE — C1765 ADHESION BARRIER: HCPCS | Performed by: SURGERY

## 2018-04-04 PROCEDURE — 11000001 HC ACUTE MED/SURG PRIVATE ROOM

## 2018-04-04 PROCEDURE — 25000003 PHARM REV CODE 250: Performed by: SURGERY

## 2018-04-04 PROCEDURE — 36000707: Performed by: SURGERY

## 2018-04-04 PROCEDURE — 37000009 HC ANESTHESIA EA ADD 15 MINS: Performed by: SURGERY

## 2018-04-04 PROCEDURE — 0DBB0ZZ EXCISION OF ILEUM, OPEN APPROACH: ICD-10-PCS | Performed by: SURGERY

## 2018-04-04 PROCEDURE — 71000039 HC RECOVERY, EACH ADD'L HOUR: Performed by: SURGERY

## 2018-04-04 PROCEDURE — 94761 N-INVAS EAR/PLS OXIMETRY MLT: CPT

## 2018-04-04 PROCEDURE — 44625 REPAIR BOWEL OPENING: CPT | Mod: ,,, | Performed by: SURGERY

## 2018-04-04 PROCEDURE — C9290 INJ, BUPIVACAINE LIPOSOME: HCPCS | Performed by: SURGERY

## 2018-04-04 PROCEDURE — D9220A PRA ANESTHESIA: Mod: CRNA,,, | Performed by: NURSE ANESTHETIST, CERTIFIED REGISTERED

## 2018-04-04 PROCEDURE — 37000008 HC ANESTHESIA 1ST 15 MINUTES: Performed by: SURGERY

## 2018-04-04 PROCEDURE — 27201423 OPTIME MED/SURG SUP & DEVICES STERILE SUPPLY: Performed by: SURGERY

## 2018-04-04 PROCEDURE — 36000706: Performed by: SURGERY

## 2018-04-04 PROCEDURE — D9220A PRA ANESTHESIA: Mod: ANES,,, | Performed by: ANESTHESIOLOGY

## 2018-04-04 PROCEDURE — 88305 TISSUE EXAM BY PATHOLOGIST: CPT | Mod: 26,,, | Performed by: PATHOLOGY

## 2018-04-04 PROCEDURE — 94770 HC EXHALED C02 TEST: CPT

## 2018-04-04 PROCEDURE — 27100025 HC TUBING, SET FLUID WARMER: Performed by: NURSE ANESTHETIST, CERTIFIED REGISTERED

## 2018-04-04 PROCEDURE — 71000033 HC RECOVERY, INTIAL HOUR: Performed by: SURGERY

## 2018-04-04 PROCEDURE — 63600175 PHARM REV CODE 636 W HCPCS

## 2018-04-04 DEVICE — MEMBRANE SEPRAFILM 5 X 6: Type: IMPLANTABLE DEVICE | Site: ABDOMEN | Status: FUNCTIONAL

## 2018-04-04 RX ORDER — NALOXONE HCL 0.4 MG/ML
0.02 VIAL (ML) INJECTION
Status: DISCONTINUED | OUTPATIENT
Start: 2018-04-04 | End: 2018-04-07

## 2018-04-04 RX ORDER — HYDROMORPHONE HCL IN 0.9% NACL 6 MG/30 ML
PATIENT CONTROLLED ANALGESIA SYRINGE INTRAVENOUS CONTINUOUS
Status: DISCONTINUED | OUTPATIENT
Start: 2018-04-04 | End: 2018-04-07

## 2018-04-04 RX ORDER — ROCURONIUM BROMIDE 10 MG/ML
INJECTION, SOLUTION INTRAVENOUS
Status: DISCONTINUED | OUTPATIENT
Start: 2018-04-04 | End: 2018-04-04

## 2018-04-04 RX ORDER — LIDOCAINE HCL/PF 100 MG/5ML
SYRINGE (ML) INTRAVENOUS
Status: DISCONTINUED | OUTPATIENT
Start: 2018-04-04 | End: 2018-04-04

## 2018-04-04 RX ORDER — LIDOCAINE HYDROCHLORIDE 10 MG/ML
1 INJECTION, SOLUTION EPIDURAL; INFILTRATION; INTRACAUDAL; PERINEURAL ONCE
Status: DISCONTINUED | OUTPATIENT
Start: 2018-04-04 | End: 2018-04-04

## 2018-04-04 RX ORDER — IPRATROPIUM BROMIDE AND ALBUTEROL SULFATE 2.5; .5 MG/3ML; MG/3ML
3 SOLUTION RESPIRATORY (INHALATION) EVERY 4 HOURS PRN
Status: DISCONTINUED | OUTPATIENT
Start: 2018-04-04 | End: 2018-04-09 | Stop reason: HOSPADM

## 2018-04-04 RX ORDER — HYDROMORPHONE HCL IN 0.9% NACL 6 MG/30 ML
PATIENT CONTROLLED ANALGESIA SYRINGE INTRAVENOUS CONTINUOUS
Status: DISCONTINUED | OUTPATIENT
Start: 2018-04-04 | End: 2018-04-04

## 2018-04-04 RX ORDER — DIPHENHYDRAMINE HYDROCHLORIDE 50 MG/ML
25 INJECTION INTRAMUSCULAR; INTRAVENOUS EVERY 4 HOURS PRN
Status: DISCONTINUED | OUTPATIENT
Start: 2018-04-04 | End: 2018-04-09 | Stop reason: HOSPADM

## 2018-04-04 RX ORDER — FENTANYL CITRATE 50 UG/ML
25 INJECTION, SOLUTION INTRAMUSCULAR; INTRAVENOUS EVERY 5 MIN PRN
Status: DISCONTINUED | OUTPATIENT
Start: 2018-04-04 | End: 2018-04-04 | Stop reason: HOSPADM

## 2018-04-04 RX ORDER — ONDANSETRON 8 MG/1
8 TABLET, ORALLY DISINTEGRATING ORAL EVERY 8 HOURS PRN
Status: DISCONTINUED | OUTPATIENT
Start: 2018-04-04 | End: 2018-04-09 | Stop reason: HOSPADM

## 2018-04-04 RX ORDER — DEXAMETHASONE SODIUM PHOSPHATE 4 MG/ML
INJECTION, SOLUTION INTRA-ARTICULAR; INTRALESIONAL; INTRAMUSCULAR; INTRAVENOUS; SOFT TISSUE
Status: DISCONTINUED | OUTPATIENT
Start: 2018-04-04 | End: 2018-04-04

## 2018-04-04 RX ORDER — SODIUM CHLORIDE 0.9 % (FLUSH) 0.9 %
3 SYRINGE (ML) INJECTION
Status: DISCONTINUED | OUTPATIENT
Start: 2018-04-04 | End: 2018-04-09 | Stop reason: HOSPADM

## 2018-04-04 RX ORDER — DEXTROSE MONOHYDRATE, SODIUM CHLORIDE, AND POTASSIUM CHLORIDE 50; 1.49; 4.5 G/1000ML; G/1000ML; G/1000ML
INJECTION, SOLUTION INTRAVENOUS CONTINUOUS
Status: DISCONTINUED | OUTPATIENT
Start: 2018-04-04 | End: 2018-04-08

## 2018-04-04 RX ORDER — SUCCINYLCHOLINE CHLORIDE 20 MG/ML
INJECTION INTRAMUSCULAR; INTRAVENOUS
Status: DISCONTINUED | OUTPATIENT
Start: 2018-04-04 | End: 2018-04-04

## 2018-04-04 RX ORDER — KETAMINE HCL IN 0.9 % NACL 50 MG/5 ML
SYRINGE (ML) INTRAVENOUS
Status: DISCONTINUED | OUTPATIENT
Start: 2018-04-04 | End: 2018-04-04

## 2018-04-04 RX ORDER — CEFAZOLIN SODIUM 1 G/3ML
2 INJECTION, POWDER, FOR SOLUTION INTRAMUSCULAR; INTRAVENOUS
Status: COMPLETED | OUTPATIENT
Start: 2018-04-04 | End: 2018-04-04

## 2018-04-04 RX ORDER — MIDAZOLAM HYDROCHLORIDE 5 MG/ML
INJECTION INTRAMUSCULAR; INTRAVENOUS
Status: DISCONTINUED | OUTPATIENT
Start: 2018-04-04 | End: 2018-04-04

## 2018-04-04 RX ORDER — PHENYLEPHRINE HYDROCHLORIDE 10 MG/ML
INJECTION INTRAVENOUS
Status: DISCONTINUED | OUTPATIENT
Start: 2018-04-04 | End: 2018-04-04

## 2018-04-04 RX ORDER — GLUCAGON 1 MG
1 KIT INJECTION
Status: DISCONTINUED | OUTPATIENT
Start: 2018-04-04 | End: 2018-04-09 | Stop reason: HOSPADM

## 2018-04-04 RX ORDER — CITALOPRAM 40 MG/1
40 TABLET, FILM COATED ORAL DAILY
Status: DISCONTINUED | OUTPATIENT
Start: 2018-04-05 | End: 2018-04-09 | Stop reason: HOSPADM

## 2018-04-04 RX ORDER — METOPROLOL TARTRATE 25 MG/1
25 TABLET, FILM COATED ORAL 2 TIMES DAILY
Status: DISCONTINUED | OUTPATIENT
Start: 2018-04-04 | End: 2018-04-09 | Stop reason: HOSPADM

## 2018-04-04 RX ORDER — ACETAMINOPHEN 10 MG/ML
INJECTION, SOLUTION INTRAVENOUS
Status: DISCONTINUED | OUTPATIENT
Start: 2018-04-04 | End: 2018-04-04

## 2018-04-04 RX ORDER — PROPOFOL 10 MG/ML
VIAL (ML) INTRAVENOUS
Status: DISCONTINUED | OUTPATIENT
Start: 2018-04-04 | End: 2018-04-04

## 2018-04-04 RX ORDER — ONDANSETRON HYDROCHLORIDE 2 MG/ML
INJECTION, SOLUTION INTRAMUSCULAR; INTRAVENOUS
Status: DISCONTINUED | OUTPATIENT
Start: 2018-04-04 | End: 2018-04-04

## 2018-04-04 RX ORDER — INSULIN ASPART 100 [IU]/ML
1-10 INJECTION, SOLUTION INTRAVENOUS; SUBCUTANEOUS EVERY 6 HOURS PRN
Status: DISCONTINUED | OUTPATIENT
Start: 2018-04-04 | End: 2018-04-09 | Stop reason: HOSPADM

## 2018-04-04 RX ORDER — GLYCOPYRROLATE 0.2 MG/ML
INJECTION INTRAMUSCULAR; INTRAVENOUS
Status: DISCONTINUED | OUTPATIENT
Start: 2018-04-04 | End: 2018-04-04

## 2018-04-04 RX ORDER — ENOXAPARIN SODIUM 100 MG/ML
40 INJECTION SUBCUTANEOUS EVERY 24 HOURS
Status: DISCONTINUED | OUTPATIENT
Start: 2018-04-05 | End: 2018-04-09 | Stop reason: HOSPADM

## 2018-04-04 RX ORDER — FENTANYL CITRATE 50 UG/ML
INJECTION, SOLUTION INTRAMUSCULAR; INTRAVENOUS
Status: DISCONTINUED | OUTPATIENT
Start: 2018-04-04 | End: 2018-04-04

## 2018-04-04 RX ORDER — NEOSTIGMINE METHYLSULFATE 1 MG/ML
INJECTION, SOLUTION INTRAVENOUS
Status: DISCONTINUED | OUTPATIENT
Start: 2018-04-04 | End: 2018-04-04

## 2018-04-04 RX ORDER — HYDROMORPHONE HCL IN 0.9% NACL 6 MG/30 ML
PATIENT CONTROLLED ANALGESIA SYRINGE INTRAVENOUS
Status: COMPLETED
Start: 2018-04-04 | End: 2018-04-04

## 2018-04-04 RX ORDER — ATORVASTATIN CALCIUM 10 MG/1
10 TABLET, FILM COATED ORAL DAILY
Status: DISCONTINUED | OUTPATIENT
Start: 2018-04-05 | End: 2018-04-09 | Stop reason: HOSPADM

## 2018-04-04 RX ORDER — SODIUM CHLORIDE 9 MG/ML
INJECTION, SOLUTION INTRAVENOUS CONTINUOUS PRN
Status: DISCONTINUED | OUTPATIENT
Start: 2018-04-04 | End: 2018-04-04

## 2018-04-04 RX ADMIN — Medication 25 MG: at 03:04

## 2018-04-04 RX ADMIN — Medication: at 05:04

## 2018-04-04 RX ADMIN — ROCURONIUM BROMIDE 30 MG: 10 INJECTION, SOLUTION INTRAVENOUS at 02:04

## 2018-04-04 RX ADMIN — FENTANYL CITRATE 25 MCG: 50 INJECTION, SOLUTION INTRAMUSCULAR; INTRAVENOUS at 04:04

## 2018-04-04 RX ADMIN — ROCURONIUM BROMIDE 45 MG: 10 INJECTION, SOLUTION INTRAVENOUS at 01:04

## 2018-04-04 RX ADMIN — FENTANYL CITRATE 50 MCG: 50 INJECTION, SOLUTION INTRAMUSCULAR; INTRAVENOUS at 02:04

## 2018-04-04 RX ADMIN — FENTANYL CITRATE 100 MCG: 50 INJECTION, SOLUTION INTRAMUSCULAR; INTRAVENOUS at 01:04

## 2018-04-04 RX ADMIN — SODIUM CHLORIDE: 0.9 INJECTION, SOLUTION INTRAVENOUS at 11:04

## 2018-04-04 RX ADMIN — SUCCINYLCHOLINE CHLORIDE 140 MG: 20 INJECTION, SOLUTION INTRAMUSCULAR; INTRAVENOUS at 01:04

## 2018-04-04 RX ADMIN — SODIUM CHLORIDE, SODIUM GLUCONATE, SODIUM ACETATE, POTASSIUM CHLORIDE, MAGNESIUM CHLORIDE, SODIUM PHOSPHATE, DIBASIC, AND POTASSIUM PHOSPHATE: .53; .5; .37; .037; .03; .012; .00082 INJECTION, SOLUTION INTRAVENOUS at 01:04

## 2018-04-04 RX ADMIN — SODIUM CHLORIDE, SODIUM GLUCONATE, SODIUM ACETATE, POTASSIUM CHLORIDE, MAGNESIUM CHLORIDE, SODIUM PHOSPHATE, DIBASIC, AND POTASSIUM PHOSPHATE: .53; .5; .37; .037; .03; .012; .00082 INJECTION, SOLUTION INTRAVENOUS at 04:04

## 2018-04-04 RX ADMIN — ACETAMINOPHEN 1000 MG: 10 INJECTION, SOLUTION INTRAVENOUS at 02:04

## 2018-04-04 RX ADMIN — ROCURONIUM BROMIDE 5 MG: 10 INJECTION, SOLUTION INTRAVENOUS at 01:04

## 2018-04-04 RX ADMIN — INSULIN ASPART 2 UNITS: 100 INJECTION, SOLUTION INTRAVENOUS; SUBCUTANEOUS at 04:04

## 2018-04-04 RX ADMIN — ROCURONIUM BROMIDE 10 MG: 10 INJECTION, SOLUTION INTRAVENOUS at 02:04

## 2018-04-04 RX ADMIN — SODIUM CHLORIDE, SODIUM GLUCONATE, SODIUM ACETATE, POTASSIUM CHLORIDE, MAGNESIUM CHLORIDE, SODIUM PHOSPHATE, DIBASIC, AND POTASSIUM PHOSPHATE: .53; .5; .37; .037; .03; .012; .00082 INJECTION, SOLUTION INTRAVENOUS at 03:04

## 2018-04-04 RX ADMIN — LIDOCAINE HYDROCHLORIDE 100 MG: 20 INJECTION, SOLUTION INTRAVENOUS at 01:04

## 2018-04-04 RX ADMIN — CEFAZOLIN 2 G: 330 INJECTION, POWDER, FOR SOLUTION INTRAMUSCULAR; INTRAVENOUS at 01:04

## 2018-04-04 RX ADMIN — ONDANSETRON 4 MG: 2 INJECTION, SOLUTION INTRAMUSCULAR; INTRAVENOUS at 03:04

## 2018-04-04 RX ADMIN — DEXTROSE MONOHYDRATE, SODIUM CHLORIDE, AND POTASSIUM CHLORIDE: 50; 4.5; 1.49 INJECTION, SOLUTION INTRAVENOUS at 04:04

## 2018-04-04 RX ADMIN — GLYCOPYRROLATE 0.8 MG: 0.2 INJECTION, SOLUTION INTRAMUSCULAR; INTRAVENOUS at 03:04

## 2018-04-04 RX ADMIN — DEXTROSE MONOHYDRATE, SODIUM CHLORIDE, AND POTASSIUM CHLORIDE: 50; 4.5; 1.49 INJECTION, SOLUTION INTRAVENOUS at 10:04

## 2018-04-04 RX ADMIN — MIDAZOLAM 2 MG: 5 INJECTION INTRAMUSCULAR; INTRAVENOUS at 01:04

## 2018-04-04 RX ADMIN — METOPROLOL TARTRATE 25 MG: 25 TABLET ORAL at 10:04

## 2018-04-04 RX ADMIN — Medication: at 04:04

## 2018-04-04 RX ADMIN — LIDOCAINE HYDROCHLORIDE 50 MG: 20 INJECTION, SOLUTION INTRAVENOUS at 03:04

## 2018-04-04 RX ADMIN — Medication: at 08:04

## 2018-04-04 RX ADMIN — Medication 25 MG: at 02:04

## 2018-04-04 RX ADMIN — PHENYLEPHRINE HYDROCHLORIDE 100 MCG: 10 INJECTION INTRAVENOUS at 01:04

## 2018-04-04 RX ADMIN — PHENYLEPHRINE HYDROCHLORIDE 50 MCG: 10 INJECTION INTRAVENOUS at 03:04

## 2018-04-04 RX ADMIN — PROPOFOL 20 MG: 10 INJECTION, EMULSION INTRAVENOUS at 03:04

## 2018-04-04 RX ADMIN — PROPOFOL 40 MG: 10 INJECTION, EMULSION INTRAVENOUS at 02:04

## 2018-04-04 RX ADMIN — DEXAMETHASONE SODIUM PHOSPHATE 4 MG: 4 INJECTION, SOLUTION INTRAMUSCULAR; INTRAVENOUS at 01:04

## 2018-04-04 RX ADMIN — FENTANYL CITRATE 50 MCG: 50 INJECTION, SOLUTION INTRAMUSCULAR; INTRAVENOUS at 04:04

## 2018-04-04 RX ADMIN — NEOSTIGMINE METHYLSULFATE 5 MG: 1 INJECTION INTRAVENOUS at 03:04

## 2018-04-04 NOTE — H&P (VIEW-ONLY)
Subjective:       Patient ID: Cristo Jefferson is a 57 y.o. male.    Chief Complaint: No chief complaint on file.    HPI: Cristo Jefferson is a 57 y.o. male with history of arthritis (on steroids), DM, HTN, HLD, appendiceal cancer, and diverticulitis who presents for follow up.  He underwent exploratory laparotomy with DLI in November. He has recovered well.  No acute issues other than some mild pain at G tube site.  He also has significant output from ileostomy, up to 3 liters in a day; he is receiving IV infusion at home to help stay hydrated.  He is ready to have his ileostomy closed.  No fever, chills, nausea, vomiting, constipation, bloating, chest pain, shortness of breath.  Activity level and appetite good.  Colonoscopy last week was unremarkable except for some mild colitis of the transverse colon, which would not preclude him from having his ileostomy closed.    Past Medical History:   Diagnosis Date    Arthritis     Cancer 2016    appendix, completed chemo    Depression     Diabetes mellitus type I     Hyperlipidemia     Hypertension     On home O2      Past Surgical History:   Procedure Laterality Date    APPENDECTOMY      COLONOSCOPY N/A 3/6/2018    Procedure: COLONOSCOPY;  Surgeon: Yfn Martinez MD;  Location: [facility] ([address] [place] [address]);  Service: Endoscopy;  Laterality: N/A;   See telephone encounter dated 1/30/18/pt on Plavix/neither PCP or cardiologist will clear him to hold med without being evaluated/wife refusing to make appointment/per Dr Riley, pt to remain on Plavix and proceed with colonoscopy with no biposies. Spoke with wife on 2/8/1    EVAR for AAA      S/P EVAR for AAA in 2017    open heart       RIGHT COLECTOMY  2016     Family History   Problem Relation Age of Onset    Heart disease Mother     Heart disease Father     Cancer Father      prostate    Cancer Sister      breast , lymphoma     Social History     Social History    Marital status:      Spouse name: N/A    Number  "of children: N/A    Years of education: N/A     Social History Main Topics    Smoking status: Current Some Day Smoker     Types: Cigarettes, Pipe    Smokeless tobacco: Current User    Alcohol use No    Drug use: No    Sexual activity: Yes     Partners: Female     Birth control/ protection: None     Other Topics Concern    None     Social History Narrative    None       Current Outpatient Prescriptions   Medication Sig Dispense Refill    aspirin 81 MG Chew Take 81 mg by mouth once daily.      atorvastatin (LIPITOR) 10 MG tablet TAKE 1 TABLET BY MOUTH EVERY DAY 90 tablet 0    citalopram (CELEXA) 40 MG tablet TAKE 1 TABLET BY MOUTH ONCE DAILY 90 tablet 0    clopidogrel (PLAVIX) 75 mg tablet TAKE 1 TABLET BY MOUTH EVERY DAY 90 tablet 0    ferrous sulfate 325 (65 FE) MG EC tablet Take 325 mg by mouth 2 (two) times daily.      losartan-hydrochlorothiazide 100-12.5 mg (HYZAAR) 100-12.5 mg Tab TAKE 1 TABLET BY MOUTH EVERY DAY 90 tablet 0    meloxicam (MOBIC) 15 MG tablet TAKE 1 TABLET BY MOUTH ONCE DAILY 90 tablet 0    metFORMIN (GLUCOPHAGE-XR) 750 MG 24 hr tablet TAKE 1 TABLET BY MOUTH TWICE DAILY 180 tablet 0    metoprolol tartrate (LOPRESSOR) 25 MG tablet TAKE 1 TABLET BY MOUTH TWICE DAILY 180 tablet 0    MULTIVIT WITH MINERALS/LUTEIN (MULTIVITAMIN 50 PLUS ORAL) Take by mouth.      predniSONE (DELTASONE) 5 MG tablet 5 mg 2 (two) times daily.       vitamin D 1000 units Tab Take 2,000 Units by mouth once daily.       No current facility-administered medications for this visit.      Review of patient's allergies indicates:   Allergen Reactions    Morphine Rash       Review of Systems    Negative except as per HPI    Objective:      Vitals:    03/15/18 1014   BP: 126/74   BP Location: Left arm   Patient Position: Sitting   BP Method: Medium (Automatic)   Temp: 98.6 °F (37 °C)   TempSrc: Oral   Height: 5' 9" (1.753 m)     Physical Exam   Constitutional: He is oriented to person, place, and time. He " appears well-developed and well-nourished. No distress.   HENT:   Head: Normocephalic and atraumatic.   Eyes: EOM are normal. No scleral icterus.   Neck: Normal range of motion.   Cardiovascular: Normal rate and regular rhythm.    Pulmonary/Chest: Effort normal. No respiratory distress.   Abdominal: Soft. He exhibits no distension.   G tube in place with mild tenderness at insertion site; no s/s of infection   Musculoskeletal: Normal range of motion. He exhibits no edema.   Neurological: He is alert and oriented to person, place, and time.   Nursing note and vitals reviewed.      Lab Review: None new to review  Diagnostics Review: Colonoscopy: see chart     Assessment:       Cristo Jefferson is a 57 y.o. male with history of diverticulitis with diverting loop ileostomy    Plan:       -To OR 4/4/18 for exploration of abdomen and closure of ileostomy  -Consents signed  -Hold plavix for 7 days pre-op  -CBC, CMP prior to surgery    CMissy Grace MD  PGY-4  Pager: [phone]  .I have personally taken the history and examined this patient and agree with the resident's note as stated above.  Colonoscopy WNL on 3-6-18, and patient ready to proceed with ileostomy closure.  Consented for closure of loop ileostomy on 4-4-18.  Will keep current G-tube tasia-operatively prn and will remove it post-op in clinic if not needed in there tasia-op period.  G-tube stitch has come free and tube pulled out slightly so that mushroom tip is now likely anteriorly in stomach rather than in original posterior aspect of fundus.  Pt reassured that this is ok.

## 2018-04-04 NOTE — INTERVAL H&P NOTE
The patient has been examined and the H&P has been reviewed:    No acute interval changes.    Anesthesia/Surgery risks, benefits and alternative options discussed and understood by patient/family.          Active Hospital Problems    Diagnosis  POA    History of ileostomy [Z98.890]  Not Applicable      Resolved Hospital Problems    Diagnosis Date Resolved POA   No resolved problems to display.

## 2018-04-04 NOTE — NURSING TRANSFER
Nursing Transfer Note      4/4/2018     Transfer     Transfer via stretcher    Transfer with n/a    Transported by pct    Medicines sent: pca dilaudid, IVF's    Chart send with patient: yes    Notified: spouse    Patient reassessed at: 04/4/18 9632    Upon arrival to floor:

## 2018-04-04 NOTE — BRIEF OP NOTE
[facility]  Brief Operative Note    SUMMARY     Surgery Date: 4/4/2018     Surgeon(s) and Role:     * Eber Riley MD - Primary     * Jayesh White Jr., MD - Resident - Assisting    Pre-op Diagnosis:  Diverticulitis [K57.92]    Post-op Diagnosis:  Post-Op Diagnosis Codes:     * Diverticulitis [K57.92]    Procedure(s) (LRB):  EXPLORATORY-LAPAROTOMY loop ilestomy takedown (N/A)    Anesthesia: General    Description of Procedure: Exploratory laparotomy, scar revision, takedown loop ileostomy    Description of the findings of the procedure: Loop ileostomy closure    Estimated Blood Loss: 300 mL         Specimens:   Specimen (12h ago through future)    Start     Ordered    04/04/18 1532  Specimen to Pathology - Surgery  Once     Comments:  1) Midline Scar - Permanent2) Ileostomy - Permanent      04/04/18 0726

## 2018-04-04 NOTE — TRANSFER OF CARE
"Anesthesia Transfer of Care Note    Patient: Cristo Jefferson    Procedure(s) Performed: Procedure(s) (LRB):  EXPLORATORY-LAPAROTOMY loop ilestomy takedown (N/A)    Patient location: PACU    Anesthesia Type: general    Transport from OR: Transported from OR on 6-10 L/min O2 by face mask with adequate spontaneous ventilation    Post pain: adequate analgesia    Post assessment: no apparent anesthetic complications    Post vital signs: stable    Level of consciousness: awake and alert    Nausea/Vomiting: no nausea/vomiting    Complications: none    Transfer of care protocol was followed      Last vitals:   Visit Vitals  BP (!) 150/104 (BP Location: Left arm, Patient Position: Lying)   Pulse 65   Temp 36.6 °C (97.9 °F) (Temporal)   Resp 18   Ht 5' 9" (1.753 m)   Wt 99.8 kg (220 lb)   SpO2 100%   BMI 32.49 kg/m²     "

## 2018-04-05 LAB
ANION GAP SERPL CALC-SCNC: 8 MMOL/L
BASOPHILS # BLD AUTO: 0.03 K/UL
BASOPHILS NFR BLD: 0.4 %
BUN SERPL-MCNC: 12 MG/DL
CALCIUM SERPL-MCNC: 8.4 MG/DL
CHLORIDE SERPL-SCNC: 99 MMOL/L
CO2 SERPL-SCNC: 25 MMOL/L
CREAT SERPL-MCNC: 0.7 MG/DL
DIFFERENTIAL METHOD: ABNORMAL
EOSINOPHIL # BLD AUTO: 0 K/UL
EOSINOPHIL NFR BLD: 0.4 %
ERYTHROCYTE [DISTWIDTH] IN BLOOD BY AUTOMATED COUNT: 16.7 %
EST. GFR  (AFRICAN AMERICAN): >60 ML/MIN/1.73 M^2
EST. GFR  (NON AFRICAN AMERICAN): >60 ML/MIN/1.73 M^2
GLUCOSE SERPL-MCNC: 144 MG/DL
HCT VFR BLD AUTO: 32.9 %
HGB BLD-MCNC: 9.9 G/DL
IMM GRANULOCYTES # BLD AUTO: 0.05 K/UL
IMM GRANULOCYTES NFR BLD AUTO: 0.6 %
LYMPHOCYTES # BLD AUTO: 1.8 K/UL
LYMPHOCYTES NFR BLD: 22.7 %
MAGNESIUM SERPL-MCNC: 1.6 MG/DL
MCH RBC QN AUTO: 24.3 PG
MCHC RBC AUTO-ENTMCNC: 30.1 G/DL
MCV RBC AUTO: 81 FL
MONOCYTES # BLD AUTO: 1 K/UL
MONOCYTES NFR BLD: 11.8 %
NEUTROPHILS # BLD AUTO: 5.2 K/UL
NEUTROPHILS NFR BLD: 64.1 %
NRBC BLD-RTO: 0 /100 WBC
PHOSPHATE SERPL-MCNC: 3.1 MG/DL
PLATELET # BLD AUTO: 164 K/UL
PMV BLD AUTO: 10.7 FL
POCT GLUCOSE: 117 MG/DL (ref 70–110)
POCT GLUCOSE: 127 MG/DL (ref 70–110)
POCT GLUCOSE: 142 MG/DL (ref 70–110)
POTASSIUM SERPL-SCNC: 4.5 MMOL/L
RBC # BLD AUTO: 4.08 M/UL
SODIUM SERPL-SCNC: 132 MMOL/L
WBC # BLD AUTO: 8.05 K/UL

## 2018-04-05 PROCEDURE — 84100 ASSAY OF PHOSPHORUS: CPT

## 2018-04-05 PROCEDURE — 25000003 PHARM REV CODE 250: Performed by: STUDENT IN AN ORGANIZED HEALTH CARE EDUCATION/TRAINING PROGRAM

## 2018-04-05 PROCEDURE — 94761 N-INVAS EAR/PLS OXIMETRY MLT: CPT

## 2018-04-05 PROCEDURE — 94799 UNLISTED PULMONARY SVC/PX: CPT

## 2018-04-05 PROCEDURE — C9113 INJ PANTOPRAZOLE SODIUM, VIA: HCPCS | Performed by: SURGERY

## 2018-04-05 PROCEDURE — 63600175 PHARM REV CODE 636 W HCPCS: Performed by: STUDENT IN AN ORGANIZED HEALTH CARE EDUCATION/TRAINING PROGRAM

## 2018-04-05 PROCEDURE — 83735 ASSAY OF MAGNESIUM: CPT

## 2018-04-05 PROCEDURE — 36415 COLL VENOUS BLD VENIPUNCTURE: CPT

## 2018-04-05 PROCEDURE — 11000001 HC ACUTE MED/SURG PRIVATE ROOM

## 2018-04-05 PROCEDURE — 99900035 HC TECH TIME PER 15 MIN (STAT)

## 2018-04-05 PROCEDURE — 85025 COMPLETE CBC W/AUTO DIFF WBC: CPT

## 2018-04-05 PROCEDURE — 25000003 PHARM REV CODE 250: Performed by: SURGERY

## 2018-04-05 PROCEDURE — 63600175 PHARM REV CODE 636 W HCPCS: Performed by: SURGERY

## 2018-04-05 PROCEDURE — 80048 BASIC METABOLIC PNL TOTAL CA: CPT

## 2018-04-05 RX ORDER — ACETAMINOPHEN 10 MG/ML
1000 INJECTION, SOLUTION INTRAVENOUS EVERY 8 HOURS
Status: COMPLETED | OUTPATIENT
Start: 2018-04-05 | End: 2018-04-06

## 2018-04-05 RX ORDER — PREDNISONE 5 MG/1
5 TABLET ORAL 3 TIMES DAILY
Status: DISCONTINUED | OUTPATIENT
Start: 2018-04-05 | End: 2018-04-09 | Stop reason: HOSPADM

## 2018-04-05 RX ORDER — MELOXICAM 7.5 MG/1
15 TABLET ORAL DAILY
Status: DISCONTINUED | OUTPATIENT
Start: 2018-04-05 | End: 2018-04-09 | Stop reason: HOSPADM

## 2018-04-05 RX ADMIN — METOPROLOL TARTRATE 25 MG: 25 TABLET ORAL at 08:04

## 2018-04-05 RX ADMIN — ACETAMINOPHEN 1000 MG: 10 INJECTION, SOLUTION INTRAVENOUS at 09:04

## 2018-04-05 RX ADMIN — PREDNISONE 5 MG: 5 TABLET ORAL at 02:04

## 2018-04-05 RX ADMIN — ACETAMINOPHEN 1000 MG: 10 INJECTION, SOLUTION INTRAVENOUS at 12:04

## 2018-04-05 RX ADMIN — PROMETHAZINE HYDROCHLORIDE 12.5 MG: 25 INJECTION INTRAMUSCULAR; INTRAVENOUS at 02:04

## 2018-04-05 RX ADMIN — MELOXICAM 15 MG: 7.5 TABLET ORAL at 09:04

## 2018-04-05 RX ADMIN — ATORVASTATIN CALCIUM 10 MG: 10 TABLET, FILM COATED ORAL at 08:04

## 2018-04-05 RX ADMIN — CITALOPRAM HYDROBROMIDE 40 MG: 40 TABLET ORAL at 08:04

## 2018-04-05 RX ADMIN — METOPROLOL TARTRATE 25 MG: 25 TABLET ORAL at 09:04

## 2018-04-05 RX ADMIN — ONDANSETRON 8 MG: 8 TABLET, ORALLY DISINTEGRATING ORAL at 12:04

## 2018-04-05 RX ADMIN — Medication: at 04:04

## 2018-04-05 RX ADMIN — SODIUM CHLORIDE, SODIUM LACTATE, POTASSIUM CHLORIDE, AND CALCIUM CHLORIDE 1000 ML: .6; .31; .03; .02 INJECTION, SOLUTION INTRAVENOUS at 05:04

## 2018-04-05 RX ADMIN — ENOXAPARIN SODIUM 40 MG: 100 INJECTION SUBCUTANEOUS at 08:04

## 2018-04-05 RX ADMIN — PREDNISONE 5 MG: 5 TABLET ORAL at 09:04

## 2018-04-05 RX ADMIN — ONDANSETRON 8 MG: 8 TABLET, ORALLY DISINTEGRATING ORAL at 09:04

## 2018-04-05 RX ADMIN — Medication: at 02:04

## 2018-04-05 RX ADMIN — PANTOPRAZOLE SODIUM 40 MG: 40 INJECTION, POWDER, FOR SOLUTION INTRAVENOUS at 10:04

## 2018-04-05 RX ADMIN — Medication: at 09:04

## 2018-04-05 NOTE — SUBJECTIVE & OBJECTIVE
Interval History: No acute events overnight. Vitals stable.   Reports some nausea overnight- gtube was unclamped and zofran administered. Small episode of emesis.   NPO  Pain well controlled with PCA   Adequate UOP    Medications:  Continuous Infusions:   dextrose 5 % and 0.45 % NaCl with KCl 20 mEq 125 mL/hr at 04/04/18 2225    hydromorphone in 0.9 % NaCl 6 mg/30 ml       Scheduled Meds:   atorvastatin  10 mg Oral Daily    citalopram  40 mg Oral Daily    enoxaparin  40 mg Subcutaneous Daily    metoprolol tartrate  25 mg Oral BID    pantoprazole  40 mg Intravenous Daily     PRN Meds:albuterol-ipratropium 2.5mg-0.5mg/3mL, dextrose 50%, diphenhydrAMINE, glucagon (human recombinant), insulin aspart U-100, naloxone, ondansetron, sodium chloride 0.9%, sodium chloride 0.9%     Review of patient's allergies indicates:   Allergen Reactions    Morphine Rash     Objective:     Vital Signs (Most Recent):  Temp: 97.9 °F (36.6 °C) (04/05/18 0733)  Pulse: (!) 57 (04/05/18 0733)  Resp: 14 (04/05/18 0733)  BP: 135/73 (04/05/18 0733)  SpO2: (!) 94 % (04/05/18 0733) Vital Signs (24h Range):  Temp:  [96.1 °F (35.6 °C)-98.1 °F (36.7 °C)] 97.9 °F (36.6 °C)  Pulse:  [57-72] 57  Resp:  [10-20] 14  SpO2:  [92 %-100 %] 94 %  BP: (113-150)/() 135/73     Weight: 99.8 kg (220 lb)  Body mass index is 32.49 kg/m².    Intake/Output - Last 3 Shifts       04/03 0700 - 04/04 0659 04/04 0700 - 04/05 0659 04/05 0700 - 04/06 0659    P.O.  118     I.V. (mL/kg)  4435.2 (44.4)     Other  0     Total Intake(mL/kg)  4553.2 (45.6)     Urine (mL/kg/hr)  2085     Emesis/NG output  0     Other  105     Stool  0     Blood  300     Total Output   2490      Net   +2063.2             Urine Occurrence  0 x     Stool Occurrence  0 x     Emesis Occurrence  0 x           Physical Exam   Constitutional: He is oriented to person, place, and time. He appears well-developed and well-nourished. No distress.   HENT:   Head: Normocephalic and atraumatic.    Cardiovascular: Normal rate and regular rhythm.    Pulmonary/Chest: Effort normal. No respiratory distress.   Abdominal: Soft. He exhibits no distension. There is tenderness (appropriate incisional).   Midline incision covered with bandage.   Gtube in place- to gravity   Musculoskeletal: Normal range of motion.   Neurological: He is alert and oriented to person, place, and time.   Skin: Skin is warm and dry. He is not diaphoretic.       Significant Labs:  CBC:   Recent Labs  Lab 04/05/18 0438   WBC 8.05   RBC 4.08*   HGB 9.9*   HCT 32.9*      MCV 81*   MCH 24.3*   MCHC 30.1*     BMP:   Recent Labs  Lab 04/05/18  0438   *   *   K 4.5   CL 99   CO2 25   BUN 12   CREATININE 0.7   CALCIUM 8.4*   MG 1.6     CMP:   Recent Labs  Lab 04/05/18  0438   *   CALCIUM 8.4*   *   K 4.5   CO2 25   CL 99   BUN 12   CREATININE 0.7

## 2018-04-05 NOTE — PROGRESS NOTES
[facility]  General Surgery  Progress Note    Subjective:     History of Present Illness:  No notes on file    Post-Op Info:  Procedure(s) (LRB):  EXPLORATORY-LAPAROTOMY loop ilestomy takedown (N/A)   1 Day Post-Op     Interval History: No acute events overnight. Vitals stable.   Reports some nausea overnight- gtube was unclamped and zofran administered. Small episode of emesis.   NPO  Pain well controlled with PCA   Adequate UOP    Medications:  Continuous Infusions:   dextrose 5 % and 0.45 % NaCl with KCl 20 mEq 125 mL/hr at 04/04/18 2225    hydromorphone in 0.9 % NaCl 6 mg/30 ml       Scheduled Meds:   atorvastatin  10 mg Oral Daily    citalopram  40 mg Oral Daily    enoxaparin  40 mg Subcutaneous Daily    metoprolol tartrate  25 mg Oral BID    pantoprazole  40 mg Intravenous Daily     PRN Meds:albuterol-ipratropium 2.5mg-0.5mg/3mL, dextrose 50%, diphenhydrAMINE, glucagon (human recombinant), insulin aspart U-100, naloxone, ondansetron, sodium chloride 0.9%, sodium chloride 0.9%     Review of patient's allergies indicates:   Allergen Reactions    Morphine Rash     Objective:     Vital Signs (Most Recent):  Temp: 97.9 °F (36.6 °C) (04/05/18 0733)  Pulse: (!) 57 (04/05/18 0733)  Resp: 14 (04/05/18 0733)  BP: 135/73 (04/05/18 0733)  SpO2: (!) 94 % (04/05/18 0733) Vital Signs (24h Range):  Temp:  [96.1 °F (35.6 °C)-98.1 °F (36.7 °C)] 97.9 °F (36.6 °C)  Pulse:  [57-72] 57  Resp:  [10-20] 14  SpO2:  [92 %-100 %] 94 %  BP: (113-150)/() 135/73     Weight: 99.8 kg (220 lb)  Body mass index is 32.49 kg/m².    Intake/Output - Last 3 Shifts       04/03 0700 - 04/04 0659 04/04 0700 - 04/05 0659 04/05 0700 - 04/06 0659    P.O.  118     I.V. (mL/kg)  4435.2 (44.4)     Other  0     Total Intake(mL/kg)  4553.2 (45.6)     Urine (mL/kg/hr)  2085     Emesis/NG output  0     Other  105     Stool  0     Blood  300     Total Output   2490      Net   +2063.2             Urine Occurrence  0 x     Stool  Occurrence  0 x     Emesis Occurrence  0 x           Physical Exam   Constitutional: He is oriented to person, place, and time. He appears well-developed and well-nourished. No distress.   HENT:   Head: Normocephalic and atraumatic.   Cardiovascular: Normal rate and regular rhythm.    Pulmonary/Chest: Effort normal. No respiratory distress.   Abdominal: Soft. He exhibits no distension. There is tenderness (appropriate incisional).   Midline incision covered with bandage.   Gtube in place- to gravity   Musculoskeletal: Normal range of motion.   Neurological: He is alert and oriented to person, place, and time.   Skin: Skin is warm and dry. He is not diaphoretic.       Significant Labs:  CBC:   Recent Labs  Lab 04/05/18  0438   WBC 8.05   RBC 4.08*   HGB 9.9*   HCT 32.9*      MCV 81*   MCH 24.3*   MCHC 30.1*     BMP:   Recent Labs  Lab 04/05/18  0438   *   *   K 4.5   CL 99   CO2 25   BUN 12   CREATININE 0.7   CALCIUM 8.4*   MG 1.6     CMP:   Recent Labs  Lab 04/05/18  0438   *   CALCIUM 8.4*   *   K 4.5   CO2 25   CL 99   BUN 12   CREATININE 0.7     Assessment/Plan:     History of ileostomy    56 yo male s/p exploratory laparotomy with ileostomy take down 4/4/18.     -Will remain NPO due to nausea overnight.   -Gtube to gravity  -daily labs   -mIVF @ 125 cc/hr   -d/c weaver catheter  -PCA for pain control   -home meds restarted  -PRN antiemetics   -Encourage IS   -Encourage ambulation. Work with PT/OT            Nettie Diaz MD  General Surgery  [facility]

## 2018-04-05 NOTE — PROGRESS NOTES
Pt has not voided post weaver cath dcd. Pt has minimal urge at the moment. Pt bladder scanned revealing 110 in bladder. ivf infusing. MD notified advised to give pt more. Pt notiifed. Will continue to monitor.

## 2018-04-05 NOTE — PLAN OF CARE
Patient is a 57 year old male admitted from home and underwent EXPLORATORY-LAPAROTOMY loop ilestomy takedown 4/4/2018.  Patient is expected to discharge home with resumption of care with [facility] +/- 4/9/2018.    Patient lives in a one story home with his son and wife, Rachel, who will drive him home, care for him and obtain anything he needs after discharge.  Patient given [facility] Packet on last admit.  Will continue to follow for needs.    PCP  Vince Perez MD  [address] / [place] [id number] [phone]      [id number] - [place], [place] - [address]  [address] [facility] [address]  Phone: [phone] Fax: [phone]      Extended Emergency Contact Information  Primary Emergency Contact: Rachel Jefferson  Address: [address] [place] [address]  Home Phone: [phone]  Mobile Phone: [phone]  Relation: Spouse       04/05/18 0953   Discharge Assessment   Assessment Type Discharge Planning Assessment   Confirmed/corrected address and phone number on facesheet? Yes   Assessment information obtained from? Patient;Medical Record   Expected Length of Stay (days) 5   Prior to hospitilization cognitive status: Alert/Oriented   Prior to hospitalization functional status: Independent   Current cognitive status: Alert/Oriented   Current Functional Status: Independent;Needs Assistance   Lives With spouse;child(kavon), dependent   Able to Return to Prior Arrangements yes   Is patient able to care for self after discharge? Yes   Who are your caregiver(s) and their phone number(s)? wife   Patient's perception of discharge disposition home or selfcare;home health   Equipment Currently Used at Home colostomy/ostomy supplies   Do you have any problems affording any of your prescribed medications? No   Is the patient taking medications as prescribed? yes   Does the patient have transportation home? Yes    Transportation Available family or friend will provide   Discharge Plan A Home with family;Home Health   Discharge Plan B Home with family   Patient/Family In Agreement With Plan yes

## 2018-04-05 NOTE — ASSESSMENT & PLAN NOTE
56 yo male s/p exploratory laparotomy with ileostomy take down 4/4/18.     -Will remain NPO due to nausea overnight.   -Gtube to gravity  -daily labs   -mIVF @ 125 cc/hr   -d/c weaver catheter  -PCA for pain control   -home meds restarted  -PRN antiemetics   -Encourage IS   -Encourage ambulation. Work with PT/OT

## 2018-04-05 NOTE — NURSING
g-tube unclamped s/t nausea. Pt is requesting PRN phenergan. Surgery paged. No new orders. Will request from primary team

## 2018-04-05 NOTE — PROGRESS NOTES
Patient Consulted by CTTS:     The following was discussed by the Tobacco Treatment Specialist:  ? Relevance of Quitting  ? Risk to Health  ? Long Term Risk  ? Risk for Others  ? Rewards of Quitting  ? Motivation Intervention to Quit          Pt a former smoker. Quit in 2017, no education given.

## 2018-04-05 NOTE — NURSING
Received from [facility] awake, alert and oriented x4, vital signs stable, lungs clear with decreased bases, abdomen soft with hypoactive bowel sounds, midline abdominal incision with telfa island dsg intact, mod amts of old drainage noted, ruq dsg with telfa island in place. Garcia intact, patent draining clear yellow urine, ivf infusing to rt forearm #18, pca in progress with mod pain relief, lt port-a-cath clamped, will continue to monitor.

## 2018-04-05 NOTE — PLAN OF CARE
SW saw in CM, Carine Jett, admit assessment not that Pt would likely need to resume with [facility] upon discharge. However, Pt now has [facility], which is not in network with [facility]. SW spoke to Nohemy with [facility] who stated Pt had been discharged in January of this year. If Pt does require home health upon discharge, SW will need to send it to [facility], as per their process.     Anastasia Fay, ISAACW

## 2018-04-05 NOTE — PLAN OF CARE
Problem: Patient Care Overview  Goal: Plan of Care Review  Outcome: Ongoing (interventions implemented as appropriate)  Pt AAOX4, VSS. Surgical site CDI. No family at bedside. Pt is resting quietly now.  Pain managed with PCA. IS education complete. SCD's on and functioning. Fall precautions maintained. Will monitor

## 2018-04-06 LAB
ANION GAP SERPL CALC-SCNC: 8 MMOL/L
BASOPHILS # BLD AUTO: 0.03 K/UL
BASOPHILS NFR BLD: 0.4 %
BUN SERPL-MCNC: 8 MG/DL
CALCIUM SERPL-MCNC: 8.4 MG/DL
CHLORIDE SERPL-SCNC: 100 MMOL/L
CO2 SERPL-SCNC: 27 MMOL/L
CREAT SERPL-MCNC: 0.7 MG/DL
DIFFERENTIAL METHOD: ABNORMAL
EOSINOPHIL # BLD AUTO: 0.2 K/UL
EOSINOPHIL NFR BLD: 2.2 %
ERYTHROCYTE [DISTWIDTH] IN BLOOD BY AUTOMATED COUNT: 17 %
EST. GFR  (AFRICAN AMERICAN): >60 ML/MIN/1.73 M^2
EST. GFR  (NON AFRICAN AMERICAN): >60 ML/MIN/1.73 M^2
GLUCOSE SERPL-MCNC: 121 MG/DL
HCT VFR BLD AUTO: 32.2 %
HGB BLD-MCNC: 9.6 G/DL
IMM GRANULOCYTES # BLD AUTO: 0.03 K/UL
IMM GRANULOCYTES NFR BLD AUTO: 0.4 %
LYMPHOCYTES # BLD AUTO: 1.5 K/UL
LYMPHOCYTES NFR BLD: 19.1 %
MAGNESIUM SERPL-MCNC: 1.4 MG/DL
MCH RBC QN AUTO: 24.1 PG
MCHC RBC AUTO-ENTMCNC: 29.8 G/DL
MCV RBC AUTO: 81 FL
MONOCYTES # BLD AUTO: 0.8 K/UL
MONOCYTES NFR BLD: 9.8 %
NEUTROPHILS # BLD AUTO: 5.2 K/UL
NEUTROPHILS NFR BLD: 68.1 %
NRBC BLD-RTO: 0 /100 WBC
PHOSPHATE SERPL-MCNC: 3.5 MG/DL
PLATELET # BLD AUTO: 144 K/UL
PMV BLD AUTO: 10.6 FL
POCT GLUCOSE: 111 MG/DL (ref 70–110)
POCT GLUCOSE: 137 MG/DL (ref 70–110)
POCT GLUCOSE: 160 MG/DL (ref 70–110)
POTASSIUM SERPL-SCNC: 4.4 MMOL/L
RBC # BLD AUTO: 3.99 M/UL
SODIUM SERPL-SCNC: 135 MMOL/L
WBC # BLD AUTO: 7.63 K/UL

## 2018-04-06 PROCEDURE — 63600175 PHARM REV CODE 636 W HCPCS: Performed by: SURGERY

## 2018-04-06 PROCEDURE — 36415 COLL VENOUS BLD VENIPUNCTURE: CPT

## 2018-04-06 PROCEDURE — C9113 INJ PANTOPRAZOLE SODIUM, VIA: HCPCS | Performed by: SURGERY

## 2018-04-06 PROCEDURE — 94799 UNLISTED PULMONARY SVC/PX: CPT

## 2018-04-06 PROCEDURE — 85025 COMPLETE CBC W/AUTO DIFF WBC: CPT

## 2018-04-06 PROCEDURE — 80048 BASIC METABOLIC PNL TOTAL CA: CPT

## 2018-04-06 PROCEDURE — G8979 MOBILITY GOAL STATUS: HCPCS | Mod: CI

## 2018-04-06 PROCEDURE — 84100 ASSAY OF PHOSPHORUS: CPT

## 2018-04-06 PROCEDURE — 11000001 HC ACUTE MED/SURG PRIVATE ROOM

## 2018-04-06 PROCEDURE — 83735 ASSAY OF MAGNESIUM: CPT

## 2018-04-06 PROCEDURE — 97161 PT EVAL LOW COMPLEX 20 MIN: CPT

## 2018-04-06 PROCEDURE — G8978 MOBILITY CURRENT STATUS: HCPCS | Mod: CI

## 2018-04-06 PROCEDURE — 25000003 PHARM REV CODE 250: Performed by: SURGERY

## 2018-04-06 RX ORDER — METOPROLOL SUCCINATE 25 MG/1
TABLET, EXTENDED RELEASE ORAL
Qty: 180 TABLET | Refills: 0 | Status: ON HOLD | OUTPATIENT
Start: 2018-04-06 | End: 2018-05-10 | Stop reason: HOSPADM

## 2018-04-06 RX ORDER — ATORVASTATIN CALCIUM 10 MG/1
TABLET, FILM COATED ORAL
Qty: 90 TABLET | Refills: 0 | Status: SHIPPED | OUTPATIENT
Start: 2018-04-06 | End: 2018-07-08 | Stop reason: SDUPTHER

## 2018-04-06 RX ORDER — CLOPIDOGREL BISULFATE 75 MG/1
TABLET ORAL
Qty: 90 TABLET | Refills: 0 | Status: SHIPPED | OUTPATIENT
Start: 2018-04-06 | End: 2018-07-08 | Stop reason: SDUPTHER

## 2018-04-06 RX ORDER — CITALOPRAM 40 MG/1
TABLET, FILM COATED ORAL
Qty: 90 TABLET | Refills: 0 | Status: SHIPPED | OUTPATIENT
Start: 2018-04-06 | End: 2018-07-08 | Stop reason: SDUPTHER

## 2018-04-06 RX ORDER — METFORMIN HYDROCHLORIDE 750 MG/1
TABLET, EXTENDED RELEASE ORAL
Qty: 180 TABLET | Refills: 0 | Status: SHIPPED | OUTPATIENT
Start: 2018-04-06 | End: 2018-07-08 | Stop reason: SDUPTHER

## 2018-04-06 RX ORDER — LOSARTAN POTASSIUM AND HYDROCHLOROTHIAZIDE 12.5; 1 MG/1; MG/1
TABLET ORAL
Qty: 90 TABLET | Refills: 0 | Status: ON HOLD | OUTPATIENT
Start: 2018-04-06 | End: 2018-05-10 | Stop reason: HOSPADM

## 2018-04-06 RX ORDER — MELOXICAM 15 MG/1
TABLET ORAL
Qty: 90 TABLET | Refills: 0 | Status: SHIPPED | OUTPATIENT
Start: 2018-04-06 | End: 2018-07-08 | Stop reason: SDUPTHER

## 2018-04-06 RX ADMIN — PREDNISONE 5 MG: 5 TABLET ORAL at 08:04

## 2018-04-06 RX ADMIN — PREDNISONE 5 MG: 5 TABLET ORAL at 02:04

## 2018-04-06 RX ADMIN — INSULIN ASPART 2 UNITS: 100 INJECTION, SOLUTION INTRAVENOUS; SUBCUTANEOUS at 04:04

## 2018-04-06 RX ADMIN — CITALOPRAM HYDROBROMIDE 40 MG: 40 TABLET ORAL at 08:04

## 2018-04-06 RX ADMIN — Medication: at 10:04

## 2018-04-06 RX ADMIN — METOPROLOL TARTRATE 25 MG: 25 TABLET ORAL at 08:04

## 2018-04-06 RX ADMIN — Medication: at 05:04

## 2018-04-06 RX ADMIN — PANTOPRAZOLE SODIUM 40 MG: 40 INJECTION, POWDER, FOR SOLUTION INTRAVENOUS at 08:04

## 2018-04-06 RX ADMIN — MELOXICAM 15 MG: 7.5 TABLET ORAL at 08:04

## 2018-04-06 RX ADMIN — Medication: at 02:04

## 2018-04-06 RX ADMIN — ATORVASTATIN CALCIUM 10 MG: 10 TABLET, FILM COATED ORAL at 08:04

## 2018-04-06 RX ADMIN — ENOXAPARIN SODIUM 40 MG: 100 INJECTION SUBCUTANEOUS at 08:04

## 2018-04-06 RX ADMIN — ACETAMINOPHEN 1000 MG: 10 INJECTION, SOLUTION INTRAVENOUS at 05:04

## 2018-04-06 NOTE — PROGRESS NOTES
[facility]  General Surgery  Progress Note    Subjective:     History of Present Illness:  No notes on file    Post-Op Info:  Procedure(s) (LRB):  EXPLORATORY-LAPAROTOMY loop ilestomy takedown (N/A)   2 Days Post-Op     Interval History: No acute events overnight. Vitals stable.   Gtube unclamped this morning. No recent nausea or vomiting.  NPO.  Pain well controlled with PCA   Adequate UOP.  Ambulating well.    Medications:  Continuous Infusions:   dextrose 5 % and 0.45 % NaCl with KCl 20 mEq 125 mL/hr at 04/04/18 2225    hydromorphone in 0.9 % NaCl 6 mg/30 ml       Scheduled Meds:   atorvastatin  10 mg Oral Daily    citalopram  40 mg Oral Daily    enoxaparin  40 mg Subcutaneous Daily    meloxicam  15 mg Oral Daily    metoprolol tartrate  25 mg Oral BID    pantoprazole 40 mg in dextrose 5 % 100 mL infusion (ready to mix system)  40 mg Intravenous Daily    predniSONE  5 mg Oral TID     PRN Meds:albuterol-ipratropium 2.5mg-0.5mg/3mL, dextrose 50%, diphenhydrAMINE, glucagon (human recombinant), insulin aspart U-100, naloxone, ondansetron, promethazine (PHENERGAN) IVPB, sodium chloride 0.9%, sodium chloride 0.9%     Review of patient's allergies indicates:   Allergen Reactions    Morphine Rash     Objective:     Vital Signs (Most Recent):  Temp: 98.4 °F (36.9 °C) (04/06/18 1555)  Pulse: 83 (04/06/18 1555)  Resp: 18 (04/06/18 1555)  BP: 121/71 (04/06/18 1555)  SpO2: (!) 90 % (04/06/18 1555) Vital Signs (24h Range):  Temp:  [96.2 °F (35.7 °C)-98.5 °F (36.9 °C)] 98.4 °F (36.9 °C)  Pulse:  [69-91] 83  Resp:  [17-18] 18  SpO2:  [87 %-93 %] 90 %  BP: (104-121)/(64-75) 121/71     Weight: 99.8 kg (220 lb)  Body mass index is 32.49 kg/m².    Intake/Output - Last 3 Shifts       04/04 0700 - 04/05 0659 04/05 0700 - 04/06 0659 04/06 0700 - 04/07 0659    P.O. 118 0 360    I.V. (mL/kg) 4435.2 (44.4) 1000 (10) 1000 (10)    Other 0      Total Intake(mL/kg) 4553.2 (45.6) 1000 (10) 1360 (13.6)    Urine  (mL/kg/hr) 2085 1075 (0.4) 475 (0.5)    Emesis/NG output 0      Other 105 250 (0.1)     Stool 0      Blood 300      Total Output 2490 1325 475    Net +2063.2 -325 +885           Urine Occurrence 0 x      Stool Occurrence 0 x      Emesis Occurrence 0 x            Physical Exam   Constitutional: He is oriented to person, place, and time. He appears well-developed and well-nourished. No distress.   HENT:   Head: Normocephalic and atraumatic.   Cardiovascular: Normal rate and regular rhythm.    Pulmonary/Chest: Effort normal. No respiratory distress.   Abdominal: Soft. He exhibits no distension. There is tenderness (appropriate incisional).   Midline incision intact- staples in place. No erythema, drainage or bleeding.   Previous ileostomy site close with 2 sutures and pack with gauze  Gtube in place- to gravity   Musculoskeletal: Normal range of motion.   Neurological: He is alert and oriented to person, place, and time.   Skin: Skin is warm and dry. He is not diaphoretic.       Significant Labs:  CBC:     Recent Labs  Lab 04/06/18  0427   WBC 7.63   RBC 3.99*   HGB 9.6*   HCT 32.2*   *   MCV 81*   MCH 24.1*   MCHC 29.8*     BMP:     Recent Labs  Lab 04/06/18  0427   *   *   K 4.4      CO2 27   BUN 8   CREATININE 0.7   CALCIUM 8.4*   MG 1.4*     CMP:     Recent Labs  Lab 04/06/18  0427   *   CALCIUM 8.4*   *   K 4.4   CO2 27      BUN 8   CREATININE 0.7     Assessment/Plan:     History of ileostomy    58 yo male s/p exploratory laparotomy with ileostomy take down 4/4/18.     -Advance to CLD as tolerated  -Clamp Gtube   -daily labs   -mIVF decreased   -PCA for pain control - will d/c if tolerating diet   -home meds restarted  -PRN antiemetics   -Encourage IS   -Encourage ambulation. Work with PT/OT            Nettie Diaz MD  General Surgery  [facility]

## 2018-04-06 NOTE — PLAN OF CARE
Problem: Patient Care Overview  Goal: Plan of Care Review  Outcome: Ongoing (interventions implemented as appropriate)    Pt AAOX4, VSS. Surgical site CDI. g-tube unclamped to gravity. Family at bedside. Pt is resting quietly.  Pain managed with PCA. IS education complete. SCD's on and functioning. Fall precautions maintained. Will monitor

## 2018-04-06 NOTE — SUBJECTIVE & OBJECTIVE
Interval History: No acute events overnight. Vitals stable.   Gtube unclamped this morning. No recent nausea or vomiting.  NPO.  Pain well controlled with PCA   Adequate UOP.  Ambulating well.    Medications:  Continuous Infusions:   dextrose 5 % and 0.45 % NaCl with KCl 20 mEq 125 mL/hr at 04/04/18 2225    hydromorphone in 0.9 % NaCl 6 mg/30 ml       Scheduled Meds:   atorvastatin  10 mg Oral Daily    citalopram  40 mg Oral Daily    enoxaparin  40 mg Subcutaneous Daily    meloxicam  15 mg Oral Daily    metoprolol tartrate  25 mg Oral BID    pantoprazole 40 mg in dextrose 5 % 100 mL infusion (ready to mix system)  40 mg Intravenous Daily    predniSONE  5 mg Oral TID     PRN Meds:albuterol-ipratropium 2.5mg-0.5mg/3mL, dextrose 50%, diphenhydrAMINE, glucagon (human recombinant), insulin aspart U-100, naloxone, ondansetron, promethazine (PHENERGAN) IVPB, sodium chloride 0.9%, sodium chloride 0.9%     Review of patient's allergies indicates:   Allergen Reactions    Morphine Rash     Objective:     Vital Signs (Most Recent):  Temp: 98.4 °F (36.9 °C) (04/06/18 1555)  Pulse: 83 (04/06/18 1555)  Resp: 18 (04/06/18 1555)  BP: 121/71 (04/06/18 1555)  SpO2: (!) 90 % (04/06/18 1555) Vital Signs (24h Range):  Temp:  [96.2 °F (35.7 °C)-98.5 °F (36.9 °C)] 98.4 °F (36.9 °C)  Pulse:  [69-91] 83  Resp:  [17-18] 18  SpO2:  [87 %-93 %] 90 %  BP: (104-121)/(64-75) 121/71     Weight: 99.8 kg (220 lb)  Body mass index is 32.49 kg/m².    Intake/Output - Last 3 Shifts       04/04 0700 - 04/05 0659 04/05 0700 - 04/06 0659 04/06 0700 - 04/07 0659    P.O. 118 0 360    I.V. (mL/kg) 4435.2 (44.4) 1000 (10) 1000 (10)    Other 0      Total Intake(mL/kg) 4553.2 (45.6) 1000 (10) 1360 (13.6)    Urine (mL/kg/hr) 2085 1075 (0.4) 475 (0.5)    Emesis/NG output 0      Other 105 250 (0.1)     Stool 0      Blood 300      Total Output 2490 1325 475    Net +2063.2 -325 +885           Urine Occurrence 0 x      Stool Occurrence 0 x      Emesis  Occurrence 0 x            Physical Exam   Constitutional: He is oriented to person, place, and time. He appears well-developed and well-nourished. No distress.   HENT:   Head: Normocephalic and atraumatic.   Cardiovascular: Normal rate and regular rhythm.    Pulmonary/Chest: Effort normal. No respiratory distress.   Abdominal: Soft. He exhibits no distension. There is tenderness (appropriate incisional).   Midline incision intact- staples in place. No erythema, drainage or bleeding.   Previous ileostomy site close with 2 sutures and pack with gauze  Gtube in place- to gravity   Musculoskeletal: Normal range of motion.   Neurological: He is alert and oriented to person, place, and time.   Skin: Skin is warm and dry. He is not diaphoretic.       Significant Labs:  CBC:     Recent Labs  Lab 04/06/18 0427   WBC 7.63   RBC 3.99*   HGB 9.6*   HCT 32.2*   *   MCV 81*   MCH 24.1*   MCHC 29.8*     BMP:     Recent Labs  Lab 04/06/18 0427   *   *   K 4.4      CO2 27   BUN 8   CREATININE 0.7   CALCIUM 8.4*   MG 1.4*     CMP:     Recent Labs  Lab 04/06/18 0427   *   CALCIUM 8.4*   *   K 4.4   CO2 27      BUN 8   CREATININE 0.7

## 2018-04-06 NOTE — PLAN OF CARE
Problem: Physical Therapy Goal  Goal: Physical Therapy Goal  Goals to be met by: 18     Patient will increase functional independence with mobility by performin. Supine to sit with [place]  2. Sit to supine with [place]  3. Rolling to Left and Right with [place].  4. Sit to stand transfer with [place]  5. Gait  x 300 feet with Supervision using least restrictive device.   6. Ascend/descend 3 stair with right Handrails Supervision using least restrictive device.   7. Lower extremity exercise program x15-20 reps per handout, with supervision     Outcome: Ongoing (interventions implemented as appropriate)  Goals set

## 2018-04-06 NOTE — PT/OT/SLP EVAL
"Physical Therapy Evaluation    Patient Name:  Cristo Jefferson   MRN:  [id number]    Recommendations:     Discharge Recommendations:  home   Discharge Equipment Recommendations: cane, straight   Barriers to discharge: None    Assessment:     Cristo Jefferson is a 57 y.o. male admitted with a medical diagnosis of <principal problem not specified>.  He presents with the following impairments/functional limitations:  weakness, impaired endurance, impaired self care skills, impaired functional mobilty, gait instability, pain.  Pt tolerated treatment session well.  Pt performs bed mobility with PT SBA. Pt performs sit to stand from EOB with SBA from PT. Pt ambulates 180 feet for activity tolerance training with use of IV pole for support of left hand side and SBA from PT. Pt performs stand to sit with SBA from PT.  Pt's spouse present for entirety of treatment session.  Pt and pt's spouse educated on continuation of PT services while still in hospital as well as possible discharge with single point cane assistive device for ambulation at home.  Pt will continue to benefit from skilled PT intervention to address the above stated impairments while still under hospital care.      Rehab Prognosis:  Good; patient would benefit from acute skilled PT services to address these deficits and reach maximum level of function.      Recent Surgery: Procedure(s) (LRB):  EXPLORATORY-LAPAROTOMY loop ilestomy takedown (N/A) 2 Days Post-Op    Plan:     During this hospitalization, patient to be seen 4 x/week to address the above listed problems via gait training, therapeutic activities, therapeutic exercises  · Plan of Care Expires:      Plan of Care Reviewed with: patient, spouse    Subjective     Communicated with RN prior to session.  Patient found supine in bed resting with pt spouse present in room upon PT entry to room, agreeable to evaluation.      Chief Complaint: generalized abdominal  Patient comments/goals: "my abdomen " "hurts"  Pain/Comfort:  · Pain Rating 1: 5/10  · Location - Orientation 1: generalized  · Location 1: abdomen  · Pain Addressed 1: Reposition, Pre-medicate for activity, Distraction  · Pain Rating Post-Intervention 1: 7/10    Patients cultural, spiritual, Cheondoism conflicts given the current situation: Patient has no barriers to learning. Patient verbalizes understanding of his/her program and goals and demonstrates them correctly. No cultural, spiritual or educational needs identified.    Living Environment:  Pt reports that he lives in single level home with wife.  He reports that there are three steps to enter the house and that there is a unilateral handrail on the right side.  Pt reports that his wife will be able to take care of him when he comes home from the hospital and that he will not need home health services at this time.  Prior to admission, patients level of function was independent-supervision.  Patient has the following equipment: colostomy/ostomy supplies.  DME owned (not currently used): none.  Upon discharge, patient will have assistance from wife.    Objective:     Patient found with: weaver catheter, PCA, peripheral IV     General Precautions: Standard, fall   Orthopedic Precautions:N/A   Braces: N/A     Exams:  · Cognitive Exam:  Patient is oriented to Person, Place, Time and Situation and follows 100% of step by step commands   · Postural Exam:  Patient presented with the following abnormalities:    · -       Rounded shoulders  · -       Forward head  · LUE ROM: WFL  · LUE Strength: WFL  · RLE ROM: WFL  · RLE Strength: WFL    Functional Mobility:  · Bed Mobility:     · Rolling Left:  SBA  · Supine to Sit: SBA  · Transfers:     · Sit to Stand:  SBA with no AD  · Gait: Pt ambulated total of 180 feet with use of IV pole for support of the left hand side and SBA from PT.  Pt ambulates with decreased kacey and with forward head and rounded shoulders posture    AM-PAC 6 CLICK MOBILITY  Total " Score:23       Therapeutic Activities and Exercises:   Pt performs bed mobility with PT supervision. Pt performs sit to stand from EOB with supervision from PT. Pt ambulates 180 feet for activity tolerance training with use of IV pole for support of left hand side and SBA from PT. Pt performs stand to sit with supervision from Pt. Pt and pt's spouse educated about discharge planning, plan of care and goals.    Patient left supine with pt spouse present.    GOALS:    Physical Therapy Goals        Problem: Physical Therapy Goal    Goal Priority Disciplines Outcome Goal Variances Interventions   Physical Therapy Goal     PT/OT, PT      Description:  Goals to be met by: 18     Patient will increase functional independence with mobility by performin. Supine to sit with [place]  2. Sit to supine with [place]  3. Rolling to Left and Right with [place].  4. Sit to stand transfer with [place]  5. Gait  x 300 feet with Supervision using least restrictive device.   6. Ascend/descend 3 stair with right Handrails Supervision using least restrictive device.   7. Lower extremity exercise program x15-20 reps per handout, with supervision                      History:     Past Medical History:   Diagnosis Date    Arthritis     Cancer 2016    appendix, completed chemo    Depression     Diabetes mellitus type I     Hyperlipidemia     Hypertension     On home O2        Past Surgical History:   Procedure Laterality Date    APPENDECTOMY      COLONOSCOPY N/A 3/6/2018    Procedure: COLONOSCOPY;  Surgeon: Yfn Martinez MD;  Location: [facility] ([address] [place] [address]);  Service: Endoscopy;  Laterality: N/A;   See telephone encounter dated 18/pt on Plavix/neither PCP or cardiologist will clear him to hold med without being evaluated/wife refusing to make appointment/per Dr Riley, pt to remain on Plavix and proceed with colonoscopy with no biposies. Spoke with wife on     EVAR for AAA      S/P EVAR for  AAA in 2017    open heart       RIGHT COLECTOMY  2016       Clinical Decision Making:     History  Co-morbidities and personal factors that may impact the plan of care Examination  Body Structures and Functions, activity limitations and participation restrictions that may impact the plan of care Clinical Presentation   Decision Making/ Complexity Score   Co-morbidities:   [] Time since onset of injury / illness / exacerbation  [] Status of current condition  []Patient's cognitive status and safety concerns    [x] Multiple Medical Problems (see med hx)  Personal Factors:   [] Patient's age  [] Prior Level of function   [] Patient's home situation (environment and family support)  [] Patient's level of motivation  [] Expected progression of patient      HISTORY:(criteria)    [] 74992 - no personal factors/history    [x] 86455 - has 1-2 personal factor/comorbidity     [] 51017 - has >3 personal factor/comorbidity     Body Regions:  [] Objective examination findings  [] Head     []  Neck  [x] Trunk   [] Upper Extremity  [] Lower Extremity    Body Systems:  [] For communication ability, affect, cognition, language, and learning style: the assessment of the ability to make needs known, consciousness, orientation (person, place, and time), expected emotional /behavioral responses, and learning preferences (eg, learning barriers, education  needs)  [] For the neuromuscular system: a general assessment of gross coordinated movement (eg, balance, gait, locomotion, transfers, and transitions) and motor function  (motor control and motor learning)  [x] For the musculoskeletal system: the assessment of gross symmetry, gross range of motion, gross strength, height, and weight  [] For the integumentary system: the assessment of pliability(texture), presence of scar formation, skin color, and skin integrity  [] For cardiovascular/pulmonary system: the assessment of heart rate, respiratory rate, blood pressure, and edema      Activity limitations:    [] Patient's cognitive status and saf ety concerns          [] Status of current condition      [] Weight bearing restriction  [] Cardiopulmunary Restriction    Participation Restrictions:   [] Goals and goal agreement with the patient     [] Rehab potential (prognosis) and probable outcome      Examination of Body System: (criteria)    [x] 24218 - addressing 1-2 elements    [] 95474 - addressing a total of 3 or more elements     [] 12001 -  Addressing a total of 4 or more elements         Clinical Presentation: (criteria)  Stable - 77776     On examination of body system using standardized tests and measures patient presents with 1-2 elements from any of the following: body structures and functions, activity limitations, and/or participation restrictions.  Leading to a clinical presentation that is considered stable and/or uncomplicated                              Clinical Decision Making  (Eval Complexity):  Low- 54017     Time Tracking:     PT Received On: 04/06/18  PT Start Time: 0106     PT Stop Time: 0133  PT Total Time (min): 27 min     Billable Minutes: Evaluation 27      ALFREDITO Leavitt  04/06/2018

## 2018-04-06 NOTE — ASSESSMENT & PLAN NOTE
58 yo male s/p exploratory laparotomy with ileostomy take down 4/4/18.     -Advance to CLD as tolerated  -Clamp Gtube   -daily labs   -mIVF decreased   -PCA for pain control - will d/c if tolerating diet   -home meds restarted  -PRN antiemetics   -Encourage IS   -Encourage ambulation. Work with PT/OT

## 2018-04-06 NOTE — OP NOTE
DATE OF PROCEDURE:  04/04/2018.    PRIMARY SURGEON:  Eber Riley M.D.    ASSISTANT SURGEON:  James White Jr., M.D. (RES).    PREOPERATIVE DIAGNOSIS:  Presence of loop ileostomy in the right lower quadrant   from prior history of perforated complicated diverticulitis.    POSTOPERATIVE DIAGNOSIS:  Presence of loop ileostomy in the right lower quadrant   from prior history of perforated complicated diverticulitis.    PROCEDURES:  Exploratory laparotomy, lysis of adhesions, and takedown of loop   ileostomy with stapled side-to-side functional end-to-end enteroenterostomy with   resection of the loop ileostomy with primary closure of the fascial defect in   the right lower quadrant ostomy site.    PROCEDURE IN DETAIL:  The patient underwent informed consent.  The history and   physical examination was reviewed and updated.  The colonoscopy results and   contrast barium enema results were reviewed, indicating normal sigmoid to allow   for takedown and closure of the loop ileostomy.  The patient was brought to the   Operating Room.  He underwent a general endotracheal anesthesia.  The loop   ileostomy was closed with each end closed with a running locking Prolene suture.    The abdomen was prepped and draped in a sterile fashion.  Midline abdominal   incision was made to excise the prior scar.  The abdomen was then entered in the   midline in the periumbilical region.  No enterotomies were made.  Adhesions   were taken down sharply with Metzenbaum scissors with dissection predominantly   to the right side toward the loop ileostomy.  The loop ileostomy was taken down   from its fascial adhesions.  We also made a skin ellipse transversely at the   right lower quadrant to take the ileostomy site down from the skin and perform   our dissection down into the subcutaneous tissue.  The loop ileostomy was freed   from its fascial attachments from both the ileostomy site and from within the   abdomen.  The loop  ileostomy was delivered into the abdominal cavity.  We then   performed a functional end-to-end side-to-side stapled anastomosis by making an   enterotomy proximally and distally at the loop ileostomy site.  The blue load of   the DARRYN-75 linear stapler was introduced and the staple line was fired 75 mm in   length along the antimesenteric border of the proximal and distal limb of the   loop ileostomy.  We then took down the mesentery between clamps, dividing them   after clamping with Nohemy clamps proximally and distally and tying the mesentery   with heavy silk ties.  The anastomosis was completed by resecting the loop   ileostomy and the two enterotomy sites at the enteroenterostomy with a reload of   the DARRYN blue linear stapler, thus completing the anastomosis in a side-to-side   fashion in a functional end-to-end.  The mesenteric defect was reapproximated   with a 3-0 Vicryl suture.  The anastomosis was returned into the abdominal   cavity.  The wounds were irrigated.  There was no evidence of any significant   bleeding.  Surgical fibrillar was placed along the ileostomy takedown site and   abdominal cavity where the adhesions had been lysed along the peritoneal cavity.    We lysed adhesions superiorly and inferiorly as well as to the left to make   sure there was room for appropriate fascial closure.  The fascia at the right   lower quadrant ileostomy takedown site was mobilized until good healthy fascia   was seen.  It was reapproximated with a series of interrupted heavy #1   figure-of-eight Prolene sutures.  The fascia at the midline was then closed with   a running looped #1 PDS suture.  The midline incision was reapproximated   loosely with staples to permit for intermittent drainage if need be, but loosely   approximate with staples.  The skin at the ileostomy takedown site was also   loosely reapproximated with a couple of interrupted nylon vertical mattress   sutures with gauze mel placed between the  two sutures to permit drainage from   the wound as needed.  The wounds were covered with dry sterile dressings.  We   had also placed a layer of Seprafilm within the abdominal cavity just prior to   fascial closure.  He tolerated the procedure well.  Estimated blood loss was   minimal.  All needle, instrument, and sponge counts were correct.  No drains   were placed.  The gastrostomy tube was left in place for postop use if need be.    The patient was turned over to Anesthesia for extubation and transport to the   Recovery Area in a satisfactory condition.  The specimen included the resection   of the loop ileostomy, which was sent to Pathology for permanent sectioning.      RLC/HN  dd: 04/06/2018 17:04:00 (CDT)  td: 04/06/2018 17:24:16 (CDT)  Doc ID   #[id number]  Job ID #[id number]    CC:

## 2018-04-07 LAB
ANION GAP SERPL CALC-SCNC: 8 MMOL/L
BASOPHILS # BLD AUTO: 0.03 K/UL
BASOPHILS NFR BLD: 0.4 %
BUN SERPL-MCNC: 5 MG/DL
CALCIUM SERPL-MCNC: 8.4 MG/DL
CHLORIDE SERPL-SCNC: 96 MMOL/L
CO2 SERPL-SCNC: 27 MMOL/L
CREAT SERPL-MCNC: 0.7 MG/DL
DIFFERENTIAL METHOD: ABNORMAL
EOSINOPHIL # BLD AUTO: 0.1 K/UL
EOSINOPHIL NFR BLD: 1.3 %
ERYTHROCYTE [DISTWIDTH] IN BLOOD BY AUTOMATED COUNT: 16.9 %
EST. GFR  (AFRICAN AMERICAN): >60 ML/MIN/1.73 M^2
EST. GFR  (NON AFRICAN AMERICAN): >60 ML/MIN/1.73 M^2
GLUCOSE SERPL-MCNC: 127 MG/DL
HCT VFR BLD AUTO: 30.6 %
HGB BLD-MCNC: 9.5 G/DL
IMM GRANULOCYTES # BLD AUTO: 0.03 K/UL
IMM GRANULOCYTES NFR BLD AUTO: 0.4 %
LYMPHOCYTES # BLD AUTO: 1.5 K/UL
LYMPHOCYTES NFR BLD: 20 %
MAGNESIUM SERPL-MCNC: 1.4 MG/DL
MCH RBC QN AUTO: 24.6 PG
MCHC RBC AUTO-ENTMCNC: 31 G/DL
MCV RBC AUTO: 79 FL
MONOCYTES # BLD AUTO: 0.6 K/UL
MONOCYTES NFR BLD: 8.3 %
NEUTROPHILS # BLD AUTO: 5.2 K/UL
NEUTROPHILS NFR BLD: 69.6 %
NRBC BLD-RTO: 0 /100 WBC
PHOSPHATE SERPL-MCNC: 2.9 MG/DL
PLATELET # BLD AUTO: 157 K/UL
PMV BLD AUTO: 9.9 FL
POCT GLUCOSE: 131 MG/DL (ref 70–110)
POCT GLUCOSE: 136 MG/DL (ref 70–110)
POTASSIUM SERPL-SCNC: 4.1 MMOL/L
RBC # BLD AUTO: 3.86 M/UL
SODIUM SERPL-SCNC: 131 MMOL/L
WBC # BLD AUTO: 7.46 K/UL

## 2018-04-07 PROCEDURE — 11000001 HC ACUTE MED/SURG PRIVATE ROOM

## 2018-04-07 PROCEDURE — 80048 BASIC METABOLIC PNL TOTAL CA: CPT

## 2018-04-07 PROCEDURE — 85025 COMPLETE CBC W/AUTO DIFF WBC: CPT

## 2018-04-07 PROCEDURE — 63600175 PHARM REV CODE 636 W HCPCS: Performed by: SURGERY

## 2018-04-07 PROCEDURE — 25000003 PHARM REV CODE 250: Performed by: STUDENT IN AN ORGANIZED HEALTH CARE EDUCATION/TRAINING PROGRAM

## 2018-04-07 PROCEDURE — C9113 INJ PANTOPRAZOLE SODIUM, VIA: HCPCS | Performed by: SURGERY

## 2018-04-07 PROCEDURE — 84100 ASSAY OF PHOSPHORUS: CPT

## 2018-04-07 PROCEDURE — 25000003 PHARM REV CODE 250: Performed by: SURGERY

## 2018-04-07 PROCEDURE — 36415 COLL VENOUS BLD VENIPUNCTURE: CPT

## 2018-04-07 PROCEDURE — 83735 ASSAY OF MAGNESIUM: CPT

## 2018-04-07 RX ORDER — OXYCODONE AND ACETAMINOPHEN 10; 325 MG/1; MG/1
1 TABLET ORAL EVERY 6 HOURS PRN
Status: DISCONTINUED | OUTPATIENT
Start: 2018-04-07 | End: 2018-04-07

## 2018-04-07 RX ORDER — ACETAMINOPHEN 325 MG/1
650 TABLET ORAL EVERY 6 HOURS PRN
Status: DISCONTINUED | OUTPATIENT
Start: 2018-04-07 | End: 2018-04-09 | Stop reason: HOSPADM

## 2018-04-07 RX ORDER — MAGNESIUM SULFATE HEPTAHYDRATE 40 MG/ML
2 INJECTION, SOLUTION INTRAVENOUS ONCE
Status: COMPLETED | OUTPATIENT
Start: 2018-04-07 | End: 2018-04-07

## 2018-04-07 RX ORDER — HYDROCODONE BITARTRATE AND ACETAMINOPHEN 10; 325 MG/1; MG/1
1 TABLET ORAL EVERY 6 HOURS PRN
Status: DISCONTINUED | OUTPATIENT
Start: 2018-04-07 | End: 2018-04-09 | Stop reason: HOSPADM

## 2018-04-07 RX ORDER — HYDROCODONE BITARTRATE AND ACETAMINOPHEN 7.5; 325 MG/1; MG/1
2 TABLET ORAL EVERY 6 HOURS PRN
Status: DISCONTINUED | OUTPATIENT
Start: 2018-04-07 | End: 2018-04-09 | Stop reason: HOSPADM

## 2018-04-07 RX ORDER — OXYCODONE AND ACETAMINOPHEN 5; 325 MG/1; MG/1
1 TABLET ORAL EVERY 6 HOURS PRN
Status: DISCONTINUED | OUTPATIENT
Start: 2018-04-07 | End: 2018-04-07

## 2018-04-07 RX ADMIN — METOPROLOL TARTRATE 25 MG: 25 TABLET ORAL at 08:04

## 2018-04-07 RX ADMIN — PREDNISONE 5 MG: 5 TABLET ORAL at 09:04

## 2018-04-07 RX ADMIN — CITALOPRAM HYDROBROMIDE 40 MG: 40 TABLET ORAL at 08:04

## 2018-04-07 RX ADMIN — PANTOPRAZOLE SODIUM 40 MG: 40 INJECTION, POWDER, FOR SOLUTION INTRAVENOUS at 08:04

## 2018-04-07 RX ADMIN — MAGNESIUM SULFATE IN WATER 2 G: 40 INJECTION, SOLUTION INTRAVENOUS at 08:04

## 2018-04-07 RX ADMIN — DEXTROSE MONOHYDRATE, SODIUM CHLORIDE, AND POTASSIUM CHLORIDE: 50; 4.5; 1.49 INJECTION, SOLUTION INTRAVENOUS at 12:04

## 2018-04-07 RX ADMIN — ENOXAPARIN SODIUM 40 MG: 100 INJECTION SUBCUTANEOUS at 08:04

## 2018-04-07 RX ADMIN — METOPROLOL TARTRATE 25 MG: 25 TABLET ORAL at 09:04

## 2018-04-07 RX ADMIN — Medication: at 04:04

## 2018-04-07 RX ADMIN — PREDNISONE 5 MG: 5 TABLET ORAL at 02:04

## 2018-04-07 RX ADMIN — MELOXICAM 15 MG: 7.5 TABLET ORAL at 08:04

## 2018-04-07 RX ADMIN — HYDROCODONE BITARTRATE AND ACETAMINOPHEN 2 TABLET: 7.5; 325 TABLET ORAL at 07:04

## 2018-04-07 RX ADMIN — DEXTROSE MONOHYDRATE, SODIUM CHLORIDE, AND POTASSIUM CHLORIDE: 50; 4.5; 1.49 INJECTION, SOLUTION INTRAVENOUS at 09:04

## 2018-04-07 RX ADMIN — SODIUM CHLORIDE 1000 ML: 0.9 INJECTION, SOLUTION INTRAVENOUS at 08:04

## 2018-04-07 RX ADMIN — PREDNISONE 5 MG: 5 TABLET ORAL at 08:04

## 2018-04-07 RX ADMIN — ATORVASTATIN CALCIUM 10 MG: 10 TABLET, FILM COATED ORAL at 08:04

## 2018-04-07 RX ADMIN — HYDROCODONE BITARTRATE AND ACETAMINOPHEN 2 TABLET: 7.5; 325 TABLET ORAL at 01:04

## 2018-04-07 NOTE — SUBJECTIVE & OBJECTIVE
Interval History: No acute events overnight. Vitals stable.   Gtube clamped with no nausea or vomiting.  Tolerating clear liquids.  Pain well controlled with PCA   Adequate UOP.  Ambulating well.    Medications:  Continuous Infusions:   dextrose 5 % and 0.45 % NaCl with KCl 20 mEq 50 mL/hr at 04/07/18 0001     Scheduled Meds:   atorvastatin  10 mg Oral Daily    citalopram  40 mg Oral Daily    enoxaparin  40 mg Subcutaneous Daily    meloxicam  15 mg Oral Daily    metoprolol tartrate  25 mg Oral BID    pantoprazole 40 mg in dextrose 5 % 100 mL infusion (ready to mix system)  40 mg Intravenous Daily    predniSONE  5 mg Oral TID     PRN Meds:acetaminophen, albuterol-ipratropium 2.5mg-0.5mg/3mL, dextrose 50%, diphenhydrAMINE, glucagon (human recombinant), hydrocodone-acetaminophen 10-325mg, hydrocodone-acetaminophen 7.5-325mg, insulin aspart U-100, ondansetron, promethazine (PHENERGAN) IVPB, sodium chloride 0.9%, sodium chloride 0.9%     Review of patient's allergies indicates:   Allergen Reactions    Morphine Rash     Objective:     Vital Signs (Most Recent):  Temp: 98.2 °F (36.8 °C) (04/07/18 1118)  Pulse: 78 (04/07/18 1118)  Resp: 15 (04/07/18 1118)  BP: 113/63 (04/07/18 1118)  SpO2: (!) 94 % (04/07/18 1118) Vital Signs (24h Range):  Temp:  [97.5 °F (36.4 °C)-99.3 °F (37.4 °C)] 98.2 °F (36.8 °C)  Pulse:  [78-93] 78  Resp:  [15-18] 15  SpO2:  [90 %-95 %] 94 %  BP: (111-125)/(63-75) 113/63     Weight: 99.8 kg (220 lb)  Body mass index is 32.49 kg/m².    Intake/Output - Last 3 Shifts       04/05 0700 - 04/06 0659 04/06 0700 - 04/07 0659 04/07 0700 - 04/08 0659    P.O. 0 460     I.V. (mL/kg) 1000 (10) 1600 (16)     Other       Total Intake(mL/kg) 1000 (10) 2060 (20.6)     Urine (mL/kg/hr) 1075 (0.4) 475 (0.2)     Emesis/NG output       Other 250 (0.1)      Stool       Blood       Total Output 1325 475      Net -325 +1585             Urine Occurrence  2 x     Stool Occurrence  0 x     Emesis Occurrence  0 x            Physical Exam   Constitutional: He is oriented to person, place, and time. He appears well-developed and well-nourished. No distress.   HENT:   Head: Normocephalic and atraumatic.   Cardiovascular: Normal rate and regular rhythm.    Pulmonary/Chest: Effort normal. No respiratory distress.   Abdominal: Soft. He exhibits no distension. There is tenderness (appropriate incisional).   Midline incision intact- staples in place. No erythema, drainage or bleeding.   Previous ileostomy site close with 2 sutures and pack with gauze  Gtube in place- to gravity   Musculoskeletal: Normal range of motion.   Neurological: He is alert and oriented to person, place, and time.   Skin: Skin is warm and dry. He is not diaphoretic.       Significant Labs:  CBC:     Recent Labs  Lab 04/07/18 0423   WBC 7.46   RBC 3.86*   HGB 9.5*   HCT 30.6*      MCV 79*   MCH 24.6*   MCHC 31.0*     BMP:     Recent Labs  Lab 04/07/18 0423   *   *   K 4.1   CL 96   CO2 27   BUN 5*   CREATININE 0.7   CALCIUM 8.4*   MG 1.4*     CMP:     Recent Labs  Lab 04/07/18 0423   *   CALCIUM 8.4*   *   K 4.1   CO2 27   CL 96   BUN 5*   CREATININE 0.7

## 2018-04-07 NOTE — PLAN OF CARE
Problem: Patient Care Overview  Goal: Plan of Care Review  Outcome: Ongoing (interventions implemented as appropriate)  Pt AAO x 4 no issues overnight. Dx CDI some warmness noted around incision site. Q 2 hour monitoring for pain and safety. Wife at bedside. Call light in reach.

## 2018-04-07 NOTE — ANESTHESIA POSTPROCEDURE EVALUATION
"Anesthesia Post Evaluation    Patient: Cristo Jefferson    Procedure(s) Performed: Procedure(s) (LRB):  EXPLORATORY-LAPAROTOMY loop ilestomy takedown (N/A)    Final Anesthesia Type: general  Patient location during evaluation: PACU  Patient participation: No - Unable to Participate, Other Reason (see comments)  Level of consciousness: awake  Post-procedure vital signs: reviewed and stable  Pain management: adequate  Airway patency: patent  PONV status at discharge: No PONV  Anesthetic complications: no      Cardiovascular status: stable  Respiratory status: unassisted  Hydration status: euvolemic  Follow-up not needed.        Visit Vitals  /64 (BP Location: Left arm, Patient Position: Lying)   Pulse 84   Temp 36.7 °C (98 °F) (Oral)   Resp 16   Ht 5' 9" (1.753 m)   Wt 99.8 kg (220 lb)   SpO2 (!) 92%   BMI 32.49 kg/m²       Pain/Rebecca Score: Pain Assessment Performed: Yes (4/7/2018  6:00 AM)  Presence of Pain: non-verbal indicators absent (4/7/2018  6:00 AM)  Pain Rating Prior to Med Admin: 0 (4/7/2018  6:00 AM)  Pain Rating Post Med Admin: 0 (4/7/2018  6:00 AM)      "

## 2018-04-07 NOTE — PROGRESS NOTES
[facility]  General Surgery  Progress Note    Subjective:     History of Present Illness:  No notes on file    Post-Op Info:  Procedure(s) (LRB):  EXPLORATORY-LAPAROTOMY loop ilestomy takedown (N/A)   3 Days Post-Op     Interval History: No acute events overnight. Vitals stable.   Gtube clamped with no nausea or vomiting.  Tolerating clear liquids.  Pain well controlled with PCA   Adequate UOP.  Ambulating well.    Medications:  Continuous Infusions:   dextrose 5 % and 0.45 % NaCl with KCl 20 mEq 50 mL/hr at 04/07/18 0001     Scheduled Meds:   atorvastatin  10 mg Oral Daily    citalopram  40 mg Oral Daily    enoxaparin  40 mg Subcutaneous Daily    meloxicam  15 mg Oral Daily    metoprolol tartrate  25 mg Oral BID    pantoprazole 40 mg in dextrose 5 % 100 mL infusion (ready to mix system)  40 mg Intravenous Daily    predniSONE  5 mg Oral TID     PRN Meds:acetaminophen, albuterol-ipratropium 2.5mg-0.5mg/3mL, dextrose 50%, diphenhydrAMINE, glucagon (human recombinant), hydrocodone-acetaminophen 10-325mg, hydrocodone-acetaminophen 7.5-325mg, insulin aspart U-100, ondansetron, promethazine (PHENERGAN) IVPB, sodium chloride 0.9%, sodium chloride 0.9%     Review of patient's allergies indicates:   Allergen Reactions    Morphine Rash     Objective:     Vital Signs (Most Recent):  Temp: 98.2 °F (36.8 °C) (04/07/18 1118)  Pulse: 78 (04/07/18 1118)  Resp: 15 (04/07/18 1118)  BP: 113/63 (04/07/18 1118)  SpO2: (!) 94 % (04/07/18 1118) Vital Signs (24h Range):  Temp:  [97.5 °F (36.4 °C)-99.3 °F (37.4 °C)] 98.2 °F (36.8 °C)  Pulse:  [78-93] 78  Resp:  [15-18] 15  SpO2:  [90 %-95 %] 94 %  BP: (111-125)/(63-75) 113/63     Weight: 99.8 kg (220 lb)  Body mass index is 32.49 kg/m².    Intake/Output - Last 3 Shifts       04/05 0700 - 04/06 0659 04/06 0700 - 04/07 0659 04/07 0700 - 04/08 0659    P.O. 0 460     I.V. (mL/kg) 1000 (10) 1600 (16)     Other       Total Intake(mL/kg) 1000 (10) 2060 (20.6)     Urine  (mL/kg/hr) 1075 (0.4) 475 (0.2)     Emesis/NG output       Other 250 (0.1)      Stool       Blood       Total Output 1325 475      Net -325 +1585             Urine Occurrence  2 x     Stool Occurrence  0 x     Emesis Occurrence  0 x           Physical Exam   Constitutional: He is oriented to person, place, and time. He appears well-developed and well-nourished. No distress.   HENT:   Head: Normocephalic and atraumatic.   Cardiovascular: Normal rate and regular rhythm.    Pulmonary/Chest: Effort normal. No respiratory distress.   Abdominal: Soft. He exhibits no distension. There is tenderness (appropriate incisional).   Midline incision intact- staples in place. No erythema, drainage or bleeding.   Previous ileostomy site close with 2 sutures and pack with gauze  Gtube in place- to gravity   Musculoskeletal: Normal range of motion.   Neurological: He is alert and oriented to person, place, and time.   Skin: Skin is warm and dry. He is not diaphoretic.       Significant Labs:  CBC:     Recent Labs  Lab 04/07/18  0423   WBC 7.46   RBC 3.86*   HGB 9.5*   HCT 30.6*      MCV 79*   MCH 24.6*   MCHC 31.0*     BMP:     Recent Labs  Lab 04/07/18  0423   *   *   K 4.1   CL 96   CO2 27   BUN 5*   CREATININE 0.7   CALCIUM 8.4*   MG 1.4*     CMP:     Recent Labs  Lab 04/07/18  0423   *   CALCIUM 8.4*   *   K 4.1   CO2 27   CL 96   BUN 5*   CREATININE 0.7     Assessment/Plan:     History of ileostomy    56 yo male s/p exploratory laparotomy with ileostomy take down 4/4/18.     -Advance to regular diet as tolerated  -Clamp Gtube   -daily labs   -mIVF decreased   -transition to oral pain medication; patient preference is hydrocodone, he has an acquired opioid dependence so he will require more than average doses for pain control  -home meds restarted  -PRN antiemetics   -Encourage IS   -Encourage ambulation. Work with PT/OT            Jayesh Grace Jr., MD  General Surgery  [facility]  [place]

## 2018-04-07 NOTE — ASSESSMENT & PLAN NOTE
56 yo male s/p exploratory laparotomy with ileostomy take down 4/4/18.     -Advance to regular diet as tolerated  -Clamp Gtube   -daily labs   -mIVF decreased   -transition to oral pain medication; patient preference is hydrocodone, he has an acquired opioid dependence so he will require more than average doses for pain control  -home meds restarted  -PRN antiemetics   -Encourage IS   -Encourage ambulation. Work with PT/OT

## 2018-04-08 LAB
ANION GAP SERPL CALC-SCNC: 9 MMOL/L
BASOPHILS # BLD AUTO: 0.01 K/UL
BASOPHILS NFR BLD: 0.2 %
BUN SERPL-MCNC: 7 MG/DL
CALCIUM SERPL-MCNC: 8.6 MG/DL
CHLORIDE SERPL-SCNC: 99 MMOL/L
CO2 SERPL-SCNC: 26 MMOL/L
CREAT SERPL-MCNC: 0.9 MG/DL
DIFFERENTIAL METHOD: ABNORMAL
EOSINOPHIL # BLD AUTO: 0.1 K/UL
EOSINOPHIL NFR BLD: 2 %
ERYTHROCYTE [DISTWIDTH] IN BLOOD BY AUTOMATED COUNT: 17.2 %
EST. GFR  (AFRICAN AMERICAN): >60 ML/MIN/1.73 M^2
EST. GFR  (NON AFRICAN AMERICAN): >60 ML/MIN/1.73 M^2
GLUCOSE SERPL-MCNC: 171 MG/DL
HCT VFR BLD AUTO: 31.5 %
HGB BLD-MCNC: 9.7 G/DL
IMM GRANULOCYTES # BLD AUTO: 0.04 K/UL
IMM GRANULOCYTES NFR BLD AUTO: 0.6 %
LYMPHOCYTES # BLD AUTO: 1.1 K/UL
LYMPHOCYTES NFR BLD: 17.3 %
MAGNESIUM SERPL-MCNC: 1.9 MG/DL
MCH RBC QN AUTO: 24.4 PG
MCHC RBC AUTO-ENTMCNC: 30.8 G/DL
MCV RBC AUTO: 79 FL
MONOCYTES # BLD AUTO: 0.6 K/UL
MONOCYTES NFR BLD: 9.2 %
NEUTROPHILS # BLD AUTO: 4.6 K/UL
NEUTROPHILS NFR BLD: 70.7 %
NRBC BLD-RTO: 0 /100 WBC
PHOSPHATE SERPL-MCNC: 2.5 MG/DL
PLATELET # BLD AUTO: 197 K/UL
PMV BLD AUTO: 10.3 FL
POCT GLUCOSE: 128 MG/DL (ref 70–110)
POCT GLUCOSE: 135 MG/DL (ref 70–110)
POCT GLUCOSE: 159 MG/DL (ref 70–110)
POTASSIUM SERPL-SCNC: 4 MMOL/L
RBC # BLD AUTO: 3.97 M/UL
SODIUM SERPL-SCNC: 134 MMOL/L
WBC # BLD AUTO: 6.49 K/UL

## 2018-04-08 PROCEDURE — 83735 ASSAY OF MAGNESIUM: CPT

## 2018-04-08 PROCEDURE — 11000001 HC ACUTE MED/SURG PRIVATE ROOM

## 2018-04-08 PROCEDURE — C9113 INJ PANTOPRAZOLE SODIUM, VIA: HCPCS | Performed by: SURGERY

## 2018-04-08 PROCEDURE — 85025 COMPLETE CBC W/AUTO DIFF WBC: CPT

## 2018-04-08 PROCEDURE — 63600175 PHARM REV CODE 636 W HCPCS: Performed by: SURGERY

## 2018-04-08 PROCEDURE — 80048 BASIC METABOLIC PNL TOTAL CA: CPT

## 2018-04-08 PROCEDURE — 36415 COLL VENOUS BLD VENIPUNCTURE: CPT

## 2018-04-08 PROCEDURE — 25000003 PHARM REV CODE 250: Performed by: SURGERY

## 2018-04-08 PROCEDURE — 84100 ASSAY OF PHOSPHORUS: CPT

## 2018-04-08 RX ORDER — LOSARTAN POTASSIUM AND HYDROCHLOROTHIAZIDE 12.5; 1 MG/1; MG/1
1 TABLET ORAL DAILY
Status: DISCONTINUED | OUTPATIENT
Start: 2018-04-08 | End: 2018-04-08

## 2018-04-08 RX ORDER — CLOPIDOGREL BISULFATE 75 MG/1
75 TABLET ORAL DAILY
Status: DISCONTINUED | OUTPATIENT
Start: 2018-04-08 | End: 2018-04-09 | Stop reason: HOSPADM

## 2018-04-08 RX ADMIN — PANTOPRAZOLE SODIUM 40 MG: 40 INJECTION, POWDER, FOR SOLUTION INTRAVENOUS at 08:04

## 2018-04-08 RX ADMIN — HYDROCODONE BITARTRATE AND ACETAMINOPHEN 2 TABLET: 7.5; 325 TABLET ORAL at 06:04

## 2018-04-08 RX ADMIN — PREDNISONE 5 MG: 5 TABLET ORAL at 03:04

## 2018-04-08 RX ADMIN — HYDROCODONE BITARTRATE AND ACETAMINOPHEN 2 TABLET: 7.5; 325 TABLET ORAL at 10:04

## 2018-04-08 RX ADMIN — PREDNISONE 5 MG: 5 TABLET ORAL at 08:04

## 2018-04-08 RX ADMIN — ENOXAPARIN SODIUM 40 MG: 100 INJECTION SUBCUTANEOUS at 08:04

## 2018-04-08 RX ADMIN — HYDROCODONE BITARTRATE AND ACETAMINOPHEN 2 TABLET: 7.5; 325 TABLET ORAL at 03:04

## 2018-04-08 RX ADMIN — METOPROLOL TARTRATE 25 MG: 25 TABLET ORAL at 08:04

## 2018-04-08 RX ADMIN — MELOXICAM 15 MG: 7.5 TABLET ORAL at 08:04

## 2018-04-08 RX ADMIN — HYDROCODONE BITARTRATE AND ACETAMINOPHEN 1 TABLET: 10; 325 TABLET ORAL at 12:04

## 2018-04-08 RX ADMIN — CITALOPRAM HYDROBROMIDE 40 MG: 40 TABLET ORAL at 08:04

## 2018-04-08 RX ADMIN — ATORVASTATIN CALCIUM 10 MG: 10 TABLET, FILM COATED ORAL at 08:04

## 2018-04-08 RX ADMIN — HYDROCODONE BITARTRATE AND ACETAMINOPHEN 2 TABLET: 7.5; 325 TABLET ORAL at 12:04

## 2018-04-08 RX ADMIN — CLOPIDOGREL 75 MG: 75 TABLET, FILM COATED ORAL at 12:04

## 2018-04-08 NOTE — ASSESSMENT & PLAN NOTE
58 yo male s/p exploratory laparotomy with ileostomy take down 4/4/18.     -Continue with regular diet  -daily labs   -mIVF off  -continue oral pain regimen; he has an acquired opioid dependence so he will require more than average doses for pain control  -home meds restarted  -PRN antiemetics   -Encourage IS   -Encourage ambulation. Work with PT/OT

## 2018-04-08 NOTE — PROGRESS NOTES
[facility]  General Surgery  Progress Note    Subjective:     History of Present Illness:  No notes on file    Post-Op Info:  Procedure(s) (LRB):  EXPLORATORY-LAPAROTOMY loop ilestomy takedown (N/A)   4 Days Post-Op     Interval History: No acute events overnight. Vitals stable.  Afebrile.  Gtube clamped with no nausea or vomiting.  Tolerating regular diet  Pain well controlled with oral medications  Adequate UOP  Ambulating well independently    Medications:  Continuous Infusions:   dextrose 5 % and 0.45 % NaCl with KCl 20 mEq 50 mL/hr at 04/07/18 2132     Scheduled Meds:   atorvastatin  10 mg Oral Daily    citalopram  40 mg Oral Daily    enoxaparin  40 mg Subcutaneous Daily    meloxicam  15 mg Oral Daily    metoprolol tartrate  25 mg Oral BID    pantoprazole 40 mg in dextrose 5 % 100 mL infusion (ready to mix system)  40 mg Intravenous Daily    predniSONE  5 mg Oral TID     PRN Meds:acetaminophen, albuterol-ipratropium 2.5mg-0.5mg/3mL, dextrose 50%, diphenhydrAMINE, glucagon (human recombinant), hydrocodone-acetaminophen 10-325mg, hydrocodone-acetaminophen 7.5-325mg, insulin aspart U-100, ondansetron, promethazine (PHENERGAN) IVPB, sodium chloride 0.9%, sodium chloride 0.9%     Review of patient's allergies indicates:   Allergen Reactions    Morphine Rash     Objective:     Vital Signs (Most Recent):  Temp: 97.9 °F (36.6 °C) (04/08/18 0739)  Pulse: 67 (04/08/18 0739)  Resp: 18 (04/08/18 0739)  BP: 111/68 (04/08/18 0739)  SpO2: (!) 94 % (04/08/18 0739) Vital Signs (24h Range):  Temp:  [96.3 °F (35.7 °C)-98.2 °F (36.8 °C)] 97.9 °F (36.6 °C)  Pulse:  [67-83] 67  Resp:  [14-18] 18  SpO2:  [93 %-97 %] 94 %  BP: (105-121)/(63-72) 111/68     Weight: 99.8 kg (220 lb)  Body mass index is 32.49 kg/m².    Intake/Output - Last 3 Shifts       04/06 0700 - 04/07 0659 04/07 0700 - 04/08 0659 04/08 0700 - 04/09 0659    P.O. 460      I.V. (mL/kg) 1600 (16)      Total Intake(mL/kg) 2060 (20.6)       Urine (mL/kg/hr) 475 (0.2)      Other       Total Output 475        Net +1585               Urine Occurrence 2 x 8 x     Stool Occurrence 0 x 3 x     Emesis Occurrence 0 x 0 x           Physical Exam   Constitutional: He is oriented to person, place, and time. He appears well-developed and well-nourished. No distress.   HENT:   Head: Normocephalic and atraumatic.   Cardiovascular: Normal rate and regular rhythm.    Pulmonary/Chest: Effort normal. No respiratory distress.   Abdominal: Soft. He exhibits no distension. There is tenderness (appropriate incisional).   Midline incision intact- staples in place. No erythema, drainage or bleeding.   Previous ileostomy site close with 2 sutures and pack with gauze  Gtube in place- to gravity   Musculoskeletal: Normal range of motion.   Neurological: He is alert and oriented to person, place, and time.   Skin: Skin is warm and dry. He is not diaphoretic.       Significant Labs:  CBC:     Recent Labs  Lab 04/08/18  0409   WBC 6.49   RBC 3.97*   HGB 9.7*   HCT 31.5*      MCV 79*   MCH 24.4*   MCHC 30.8*     BMP:     Recent Labs  Lab 04/08/18  0408   *   *   K 4.0   CL 99   CO2 26   BUN 7   CREATININE 0.9   CALCIUM 8.6*   MG 1.9     CMP:     Recent Labs  Lab 04/08/18  0408   *   CALCIUM 8.6*   *   K 4.0   CO2 26   CL 99   BUN 7   CREATININE 0.9     Assessment/Plan:     History of ileostomy    56 yo male s/p exploratory laparotomy with ileostomy take down 4/4/18.     -Continue with regular diet  -daily labs   -mIVF off  -continue oral pain regimen; he has an acquired opioid dependence so he will require more than average doses for pain control  -home meds restarted for Diabetes as well as HTN  -PRN antiemetics   -Encourage IS   -Encourage ambulation. Work with PT/OT        Non-insulin dependent type 2 diabetes mellitus    -SSI  -Diabetic diet            Jayesh Grace Jr., MD  General Surgery  [facility]

## 2018-04-08 NOTE — SUBJECTIVE & OBJECTIVE
Interval History: No acute events overnight. Vitals stable.  Afebrile.  Gtube clamped with no nausea or vomiting.  Tolerating regular diet  Pain well controlled with oral medications  Adequate UOP  Ambulating well independently    Medications:  Continuous Infusions:   dextrose 5 % and 0.45 % NaCl with KCl 20 mEq 50 mL/hr at 04/07/18 2132     Scheduled Meds:   atorvastatin  10 mg Oral Daily    citalopram  40 mg Oral Daily    enoxaparin  40 mg Subcutaneous Daily    meloxicam  15 mg Oral Daily    metoprolol tartrate  25 mg Oral BID    pantoprazole 40 mg in dextrose 5 % 100 mL infusion (ready to mix system)  40 mg Intravenous Daily    predniSONE  5 mg Oral TID     PRN Meds:acetaminophen, albuterol-ipratropium 2.5mg-0.5mg/3mL, dextrose 50%, diphenhydrAMINE, glucagon (human recombinant), hydrocodone-acetaminophen 10-325mg, hydrocodone-acetaminophen 7.5-325mg, insulin aspart U-100, ondansetron, promethazine (PHENERGAN) IVPB, sodium chloride 0.9%, sodium chloride 0.9%     Review of patient's allergies indicates:   Allergen Reactions    Morphine Rash     Objective:     Vital Signs (Most Recent):  Temp: 97.9 °F (36.6 °C) (04/08/18 0739)  Pulse: 67 (04/08/18 0739)  Resp: 18 (04/08/18 0739)  BP: 111/68 (04/08/18 0739)  SpO2: (!) 94 % (04/08/18 0739) Vital Signs (24h Range):  Temp:  [96.3 °F (35.7 °C)-98.2 °F (36.8 °C)] 97.9 °F (36.6 °C)  Pulse:  [67-83] 67  Resp:  [14-18] 18  SpO2:  [93 %-97 %] 94 %  BP: (105-121)/(63-72) 111/68     Weight: 99.8 kg (220 lb)  Body mass index is 32.49 kg/m².    Intake/Output - Last 3 Shifts       04/06 0700 - 04/07 0659 04/07 0700 - 04/08 0659 04/08 0700 - 04/09 0659    P.O. 460      I.V. (mL/kg) 1600 (16)      Total Intake(mL/kg) 2060 (20.6)      Urine (mL/kg/hr) 475 (0.2)      Other       Total Output 475        Net +1585               Urine Occurrence 2 x 8 x     Stool Occurrence 0 x 3 x     Emesis Occurrence 0 x 0 x           Physical Exam   Constitutional: He is oriented to person,  place, and time. He appears well-developed and well-nourished. No distress.   HENT:   Head: Normocephalic and atraumatic.   Cardiovascular: Normal rate and regular rhythm.    Pulmonary/Chest: Effort normal. No respiratory distress.   Abdominal: Soft. He exhibits no distension. There is tenderness (appropriate incisional).   Midline incision intact- staples in place. No erythema, drainage or bleeding.   Previous ileostomy site close with 2 sutures and pack with gauze  Gtube in place- to gravity   Musculoskeletal: Normal range of motion.   Neurological: He is alert and oriented to person, place, and time.   Skin: Skin is warm and dry. He is not diaphoretic.       Significant Labs:  CBC:     Recent Labs  Lab 04/08/18  0409   WBC 6.49   RBC 3.97*   HGB 9.7*   HCT 31.5*      MCV 79*   MCH 24.4*   MCHC 30.8*     BMP:     Recent Labs  Lab 04/08/18  0408   *   *   K 4.0   CL 99   CO2 26   BUN 7   CREATININE 0.9   CALCIUM 8.6*   MG 1.9     CMP:     Recent Labs  Lab 04/08/18  0408   *   CALCIUM 8.6*   *   K 4.0   CO2 26   CL 99   BUN 7   CREATININE 0.9

## 2018-04-08 NOTE — ASSESSMENT & PLAN NOTE
58 yo male s/p exploratory laparotomy with ileostomy take down 4/4/18.     -Continue with regular diet  -daily labs   -continue oral pain regimen; he has an acquired opioid dependence so he will require more than average doses for pain control  -home meds restarted for Diabetes as well as HTN  -PRN antiemetics   -Encourage IS   -Encourage ambulation. Work with PT/OT  -May remove Gtube today, will discuss with staff.   -Possible discharge home today, will discuss with staff

## 2018-04-09 VITALS
HEIGHT: 69 IN | OXYGEN SATURATION: 95 % | WEIGHT: 220 LBS | TEMPERATURE: 98 F | DIASTOLIC BLOOD PRESSURE: 72 MMHG | RESPIRATION RATE: 18 BRPM | HEART RATE: 72 BPM | BODY MASS INDEX: 32.58 KG/M2 | SYSTOLIC BLOOD PRESSURE: 116 MMHG

## 2018-04-09 LAB
ANION GAP SERPL CALC-SCNC: 11 MMOL/L
BASOPHILS # BLD AUTO: 0.01 K/UL
BASOPHILS NFR BLD: 0.2 %
BUN SERPL-MCNC: 8 MG/DL
CALCIUM SERPL-MCNC: 8.3 MG/DL
CHLORIDE SERPL-SCNC: 103 MMOL/L
CO2 SERPL-SCNC: 25 MMOL/L
CREAT SERPL-MCNC: 0.7 MG/DL
DIFFERENTIAL METHOD: ABNORMAL
EOSINOPHIL # BLD AUTO: 0.1 K/UL
EOSINOPHIL NFR BLD: 1.6 %
ERYTHROCYTE [DISTWIDTH] IN BLOOD BY AUTOMATED COUNT: 17.3 %
EST. GFR  (AFRICAN AMERICAN): >60 ML/MIN/1.73 M^2
EST. GFR  (NON AFRICAN AMERICAN): >60 ML/MIN/1.73 M^2
GLUCOSE SERPL-MCNC: 133 MG/DL
HCT VFR BLD AUTO: 27 %
HGB BLD-MCNC: 8.3 G/DL
IMM GRANULOCYTES # BLD AUTO: 0.02 K/UL
IMM GRANULOCYTES NFR BLD AUTO: 0.4 %
LYMPHOCYTES # BLD AUTO: 1 K/UL
LYMPHOCYTES NFR BLD: 20 %
MAGNESIUM SERPL-MCNC: 1.7 MG/DL
MCH RBC QN AUTO: 24.1 PG
MCHC RBC AUTO-ENTMCNC: 30.7 G/DL
MCV RBC AUTO: 78 FL
MONOCYTES # BLD AUTO: 0.6 K/UL
MONOCYTES NFR BLD: 12.5 %
NEUTROPHILS # BLD AUTO: 3.3 K/UL
NEUTROPHILS NFR BLD: 65.3 %
NRBC BLD-RTO: 0 /100 WBC
PHOSPHATE SERPL-MCNC: 2.4 MG/DL
PLATELET # BLD AUTO: 146 K/UL
PMV BLD AUTO: 9.9 FL
POCT GLUCOSE: 119 MG/DL (ref 70–110)
POCT GLUCOSE: 147 MG/DL (ref 70–110)
POTASSIUM SERPL-SCNC: 3.8 MMOL/L
RBC # BLD AUTO: 3.45 M/UL
SODIUM SERPL-SCNC: 139 MMOL/L
WBC # BLD AUTO: 5.11 K/UL

## 2018-04-09 PROCEDURE — 84100 ASSAY OF PHOSPHORUS: CPT

## 2018-04-09 PROCEDURE — 85025 COMPLETE CBC W/AUTO DIFF WBC: CPT

## 2018-04-09 PROCEDURE — 80048 BASIC METABOLIC PNL TOTAL CA: CPT

## 2018-04-09 PROCEDURE — 94761 N-INVAS EAR/PLS OXIMETRY MLT: CPT

## 2018-04-09 PROCEDURE — 63600175 PHARM REV CODE 636 W HCPCS: Performed by: SURGERY

## 2018-04-09 PROCEDURE — C9113 INJ PANTOPRAZOLE SODIUM, VIA: HCPCS | Performed by: SURGERY

## 2018-04-09 PROCEDURE — 83735 ASSAY OF MAGNESIUM: CPT

## 2018-04-09 PROCEDURE — 36415 COLL VENOUS BLD VENIPUNCTURE: CPT

## 2018-04-09 PROCEDURE — 25000003 PHARM REV CODE 250: Performed by: SURGERY

## 2018-04-09 RX ORDER — HYDROCODONE BITARTRATE AND ACETAMINOPHEN 10; 325 MG/1; MG/1
1 TABLET ORAL EVERY 6 HOURS PRN
Qty: 60 TABLET | Refills: 0 | Status: SHIPPED | OUTPATIENT
Start: 2018-04-09 | End: 2018-04-09

## 2018-04-09 RX ORDER — DOCUSATE SODIUM 100 MG/1
100 CAPSULE, LIQUID FILLED ORAL 2 TIMES DAILY
Refills: 0 | COMMUNITY
Start: 2018-04-09 | End: 2019-03-28

## 2018-04-09 RX ORDER — HYDROCODONE BITARTRATE AND ACETAMINOPHEN 10; 325 MG/1; MG/1
1 TABLET ORAL EVERY 6 HOURS PRN
Qty: 60 TABLET | Refills: 0 | Status: SHIPPED | OUTPATIENT
Start: 2018-04-09 | End: 2018-04-24 | Stop reason: SDUPTHER

## 2018-04-09 RX ORDER — HEPARIN 100 UNIT/ML
10 SYRINGE INTRAVENOUS
Status: DISCONTINUED | OUTPATIENT
Start: 2018-04-09 | End: 2018-04-09 | Stop reason: HOSPADM

## 2018-04-09 RX ADMIN — PREDNISONE 5 MG: 5 TABLET ORAL at 02:04

## 2018-04-09 RX ADMIN — ENOXAPARIN SODIUM 40 MG: 100 INJECTION SUBCUTANEOUS at 08:04

## 2018-04-09 RX ADMIN — ATORVASTATIN CALCIUM 10 MG: 10 TABLET, FILM COATED ORAL at 08:04

## 2018-04-09 RX ADMIN — HYDROCODONE BITARTRATE AND ACETAMINOPHEN 2 TABLET: 7.5; 325 TABLET ORAL at 10:04

## 2018-04-09 RX ADMIN — HEPARIN SODIUM (PORCINE) LOCK FLUSH IV SOLN 100 UNIT/ML 10 UNITS: 100 SOLUTION at 03:04

## 2018-04-09 RX ADMIN — PANTOPRAZOLE SODIUM 40 MG: 40 INJECTION, POWDER, FOR SOLUTION INTRAVENOUS at 08:04

## 2018-04-09 RX ADMIN — MELOXICAM 15 MG: 7.5 TABLET ORAL at 08:04

## 2018-04-09 RX ADMIN — HYDROCODONE BITARTRATE AND ACETAMINOPHEN 2 TABLET: 7.5; 325 TABLET ORAL at 02:04

## 2018-04-09 RX ADMIN — CLOPIDOGREL 75 MG: 75 TABLET, FILM COATED ORAL at 08:04

## 2018-04-09 RX ADMIN — CITALOPRAM HYDROBROMIDE 40 MG: 40 TABLET ORAL at 08:04

## 2018-04-09 RX ADMIN — HYDROCODONE BITARTRATE AND ACETAMINOPHEN 2 TABLET: 7.5; 325 TABLET ORAL at 04:04

## 2018-04-09 RX ADMIN — METOPROLOL TARTRATE 25 MG: 25 TABLET ORAL at 08:04

## 2018-04-09 RX ADMIN — PREDNISONE 5 MG: 5 TABLET ORAL at 08:04

## 2018-04-09 NOTE — SUBJECTIVE & OBJECTIVE
Interval History: No acute events overnight. Vitals stable.  Afebrile.  Gtube clamped with no nausea or vomiting.  Continues to have diarrhea   Tolerating regular diet  Pain well controlled with oral medications  Adequate UOP  Ambulating well independently    Medications:  Continuous Infusions:    Scheduled Meds:   atorvastatin  10 mg Oral Daily    citalopram  40 mg Oral Daily    clopidogrel  75 mg Oral Daily    enoxaparin  40 mg Subcutaneous Daily    meloxicam  15 mg Oral Daily    metoprolol tartrate  25 mg Oral BID    pantoprazole 40 mg in dextrose 5 % 100 mL infusion (ready to mix system)  40 mg Intravenous Daily    predniSONE  5 mg Oral TID     PRN Meds:acetaminophen, albuterol-ipratropium 2.5mg-0.5mg/3mL, dextrose 50%, diphenhydrAMINE, glucagon (human recombinant), hydrocodone-acetaminophen 10-325mg, hydrocodone-acetaminophen 7.5-325mg, insulin aspart U-100, ondansetron, promethazine (PHENERGAN) IVPB, sodium chloride 0.9%, sodium chloride 0.9%     Review of patient's allergies indicates:   Allergen Reactions    Morphine Rash     Objective:     Vital Signs (Most Recent):  Temp: 97.7 °F (36.5 °C) (04/09/18 0432)  Pulse: 62 (04/09/18 0432)  Resp: 18 (04/09/18 0432)  BP: 123/68 (04/09/18 0432)  SpO2: 96 % (04/09/18 0432) Vital Signs (24h Range):  Temp:  [96 °F (35.6 °C)-97.9 °F (36.6 °C)] 97.7 °F (36.5 °C)  Pulse:  [60-75] 62  Resp:  [16-18] 18  SpO2:  [93 %-96 %] 96 %  BP: ()/(60-71) 123/68     Weight: 99.8 kg (220 lb)  Body mass index is 32.49 kg/m².    Intake/Output - Last 3 Shifts       04/07 0700 - 04/08 0659 04/08 0700 - 04/09 0659 04/09 0700 - 04/10 0659    P.O.       I.V. (mL/kg)       Total Intake(mL/kg)       Urine (mL/kg/hr)       Total Output          Net                 Urine Occurrence 8 x 3 x     Stool Occurrence 3 x      Emesis Occurrence 0 x            Physical Exam   Constitutional: He is oriented to person, place, and time. He appears well-developed and well-nourished. No distress.    HENT:   Head: Normocephalic and atraumatic.   Cardiovascular: Normal rate and regular rhythm.    Pulmonary/Chest: Effort normal. No respiratory distress.   Abdominal: Soft. He exhibits no distension. There is tenderness (appropriate incisional).   Midline incision intact- staples in place. No erythema, drainage or bleeding.   Previous ileostomy site close with 2 suture  Gtube in place- clamped   Musculoskeletal: Normal range of motion.   Neurological: He is alert and oriented to person, place, and time.   Skin: Skin is warm and dry. He is not diaphoretic.       Significant Labs:  CBC:     Recent Labs  Lab 04/09/18 0442   WBC 5.11   RBC 3.45*   HGB 8.3*   HCT 27.0*   *   MCV 78*   MCH 24.1*   MCHC 30.7*     BMP:     Recent Labs  Lab 04/09/18 0442   *      K 3.8      CO2 25   BUN 8   CREATININE 0.7   CALCIUM 8.3*   MG 1.7     CMP:     Recent Labs  Lab 04/09/18  0442   *   CALCIUM 8.3*      K 3.8   CO2 25      BUN 8   CREATININE 0.7

## 2018-04-09 NOTE — PLAN OF CARE
Patient discharging home today with no needs, stated they do not need home health.    Future Appointments  Date Time Provider Department Center   4/24/2018 9:45 AM Eber Riley MD [facility]          04/09/18 1144   Final Note   Assessment Type Final Discharge Note   Discharge Disposition Home   Hospital Follow Up  Appt(s) scheduled? Yes   Right Care Referral Info   Post Acute Recommendation No Care

## 2018-04-09 NOTE — HOSPITAL COURSE
He tolerated the procedure well.  By post-operative day #2, his pain was well controlled and he was ambulating independently.  He had some benign drainage from his midline incision with no erythema, purulent fluid, or leukocytosis to suggest infection.  He returned bowel function on post-op day #3, and his diet was advanced.  He was discharged home on regular diet on post-op day #5.

## 2018-04-09 NOTE — HPI
Patient with history of perforated diverticulitis s/p exploratory laparotomy with loop ileostomy placement and G tube placement presented for takedown of loop ileostomy.

## 2018-04-09 NOTE — PROGRESS NOTES
[facility]  General Surgery  Progress Note    Subjective:     History of Present Illness:  No notes on file    Post-Op Info:  Procedure(s) (LRB):  EXPLORATORY-LAPAROTOMY loop ilestomy takedown (N/A)   5 Days Post-Op     Interval History: No acute events overnight. Vitals stable.  Afebrile.  Gtube clamped with no nausea or vomiting.  Continues to have diarrhea   Tolerating regular diet  Pain well controlled with oral medications  Adequate UOP  Ambulating well independently    Medications:  Continuous Infusions:    Scheduled Meds:   atorvastatin  10 mg Oral Daily    citalopram  40 mg Oral Daily    clopidogrel  75 mg Oral Daily    enoxaparin  40 mg Subcutaneous Daily    meloxicam  15 mg Oral Daily    metoprolol tartrate  25 mg Oral BID    pantoprazole 40 mg in dextrose 5 % 100 mL infusion (ready to mix system)  40 mg Intravenous Daily    predniSONE  5 mg Oral TID     PRN Meds:acetaminophen, albuterol-ipratropium 2.5mg-0.5mg/3mL, dextrose 50%, diphenhydrAMINE, glucagon (human recombinant), hydrocodone-acetaminophen 10-325mg, hydrocodone-acetaminophen 7.5-325mg, insulin aspart U-100, ondansetron, promethazine (PHENERGAN) IVPB, sodium chloride 0.9%, sodium chloride 0.9%     Review of patient's allergies indicates:   Allergen Reactions    Morphine Rash     Objective:     Vital Signs (Most Recent):  Temp: 97.7 °F (36.5 °C) (04/09/18 0432)  Pulse: 62 (04/09/18 0432)  Resp: 18 (04/09/18 0432)  BP: 123/68 (04/09/18 0432)  SpO2: 96 % (04/09/18 0432) Vital Signs (24h Range):  Temp:  [96 °F (35.6 °C)-97.9 °F (36.6 °C)] 97.7 °F (36.5 °C)  Pulse:  [60-75] 62  Resp:  [16-18] 18  SpO2:  [93 %-96 %] 96 %  BP: ()/(60-71) 123/68     Weight: 99.8 kg (220 lb)  Body mass index is 32.49 kg/m².    Intake/Output - Last 3 Shifts       04/07 0700 - 04/08 0659 04/08 0700 - 04/09 0659 04/09 0700 - 04/10 0659    P.O.       I.V. (mL/kg)       Total Intake(mL/kg)       Urine (mL/kg/hr)       Total Output           Net                 Urine Occurrence 8 x 3 x     Stool Occurrence 3 x      Emesis Occurrence 0 x            Physical Exam   Constitutional: He is oriented to person, place, and time. He appears well-developed and well-nourished. No distress.   HENT:   Head: Normocephalic and atraumatic.   Cardiovascular: Normal rate and regular rhythm.    Pulmonary/Chest: Effort normal. No respiratory distress.   Abdominal: Soft. He exhibits no distension. There is tenderness (appropriate incisional).   Midline incision intact- staples in place. No erythema, drainage or bleeding.   Previous ileostomy site close with 2 suture  Gtube in place- clamped   Musculoskeletal: Normal range of motion.   Neurological: He is alert and oriented to person, place, and time.   Skin: Skin is warm and dry. He is not diaphoretic.       Significant Labs:  CBC:     Recent Labs  Lab 04/09/18  0442   WBC 5.11   RBC 3.45*   HGB 8.3*   HCT 27.0*   *   MCV 78*   MCH 24.1*   MCHC 30.7*     BMP:     Recent Labs  Lab 04/09/18  0442   *      K 3.8      CO2 25   BUN 8   CREATININE 0.7   CALCIUM 8.3*   MG 1.7     CMP:     Recent Labs  Lab 04/09/18  0442   *   CALCIUM 8.3*      K 3.8   CO2 25      BUN 8   CREATININE 0.7     Assessment/Plan:     History of ileostomy    58 yo male s/p exploratory laparotomy with ileostomy take down 4/4/18.     -Continue with regular diet  -daily labs   -continue oral pain regimen; he has an acquired opioid dependence so he will require more than average doses for pain control  -home meds restarted for Diabetes as well as HTN  -PRN antiemetics   -Encourage IS   -Encourage ambulation. Work with PT/OT  -May remove Gtube today, will discuss with staff.   -Possible discharge home today, will discuss with staff        Non-insulin dependent type 2 diabetes mellitus    -SSI  -Diabetic diet            Nettie Diaz MD  General Surgery  [facility]

## 2018-04-09 NOTE — PT/OT/SLP PROGRESS
"Physical Therapy      Patient Name:  Cristo Jefferson   MRN:  [id number]    Patient not seen today secondary to pt declined therapy reporting "I just got my pain medicine and I just want to rest now."  . Will follow-up next scheduled treatment per PT POC      Austin Hendricks, PTA   4/9/2018      "

## 2018-04-09 NOTE — DISCHARGE SUMMARY
[facility]  General Surgery  Discharge Summary      Patient Name: Cristo Jefferson  MRN: [id number]  Admission Date: 4/4/2018  Hospital Length of Stay: 5 days  Discharge Date and Time:  04/09/2018 10:54 AM  Attending Physician: Eber Riley MD   Discharging Provider: Jayesh Grace Jr., MD  Primary Care Provider: Vince Perez MD    HPI:   Patient with history of perforated diverticulitis s/p exploratory laparotomy with loop ileostomy placement and G tube placement presented for takedown of loop ileostomy.      Procedure(s) (LRB):  EXPLORATORY-LAPAROTOMY loop ilestomy takedown (N/A)      Indwelling Lines/Drains at time of discharge:   Lines/Drains/Airways     Central Venous Catheter Line                 Port A Cath Single Lumen 03/06/18 1030 other (see comments) 33 days          Drain                 Drain/Device  Right -- days              Hospital Course: He tolerated the procedure well.  By post-operative day #2, his pain was well controlled and he was ambulating independently.  He had some benign drainage from his midline incision with no erythema, purulent fluid, or leukocytosis to suggest infection.  He returned bowel function on post-op day #3, and his diet was advanced.  He was discharged home on regular diet on post-op day #5.      Consults:     Significant Diagnostic Studies: Labs:   BMP:   Recent Labs  Lab 04/08/18  0408 04/09/18  0442   * 133*   * 139   K 4.0 3.8   CL 99 103   CO2 26 25   BUN 7 8   CREATININE 0.9 0.7   CALCIUM 8.6* 8.3*   MG 1.9 1.7    and CBC   Recent Labs  Lab 04/08/18  0409 04/09/18  0442   WBC 6.49 5.11   HGB 9.7* 8.3*   HCT 31.5* 27.0*    146*       Pending Diagnostic Studies:     None        Final Active Diagnoses:    Diagnosis Date Noted POA    PRINCIPAL PROBLEM:  History of ileostomy [Z98.890] 04/04/2018 Not Applicable    Non-insulin dependent type 2 diabetes mellitus [E11.9] 11/16/2017 Yes     Chronic      Problems Resolved During  this Admission:    Diagnosis Date Noted Date Resolved POA      Discharged Condition: good    Disposition: Home or Self Care    Follow Up:  Follow-up Information     Schedule an appointment as soon as possible for a visit with Eber Riley MD.    Specialty:  General Surgery  Why:  post op  Contact information:  [address]  [facility] [address]  [phone]                 Patient Instructions:     Diet diabetic     Shower on day dressing removed (No bath)   Order Comments: Keep incisions clean and dry.  You may shower over the incisions; however, do not submerge incisions under water (for example pool, bath, lake, etc) for a total of 2 weeks after surgery.     Lifting restrictions   Order Comments: Do not lift anything heavier than 10 lbs for 6 weeks.     Other restrictions (specify):   Order Comments: Do not drive while taking medication for pain.     Notify your health care provider if you experience any of the following:  redness, tenderness, or signs of infection (pain, swelling, redness, odor or green/yellow discharge around incision site)     Notify your health care provider if you experience any of the following:  severe uncontrolled pain     Notify your health care provider if you experience any of the following:  persistent nausea and vomiting or diarrhea     Notify your health care provider if you experience any of the following:  temperature >100.4     Change dressing (specify)   Order Comments: No dressing is medically necessary; however, it may help control the drainage from your incisions.  If a dressing is to be utilized, it would be best to change it at least once per day.       Medications:  Reconciled Home Medications:      Medication List      START taking these medications    docusate sodium 100 MG capsule  Commonly known as:  COLACE  Take 1 capsule (100 mg total) by mouth 2 (two) times daily.     hydrocodone-acetaminophen 10-325mg  mg Tab  Commonly known as:  NORCO  Take 1  tablet by mouth every 6 (six) hours as needed.     metoprolol succinate 25 MG 24 hr tablet  Commonly known as:  TOPROL-XL  TAKE 1 TABLET BY MOUTH TWICE A DAY     paregoric 2 mg/5 mL Liqd  Take 5 mLs by mouth every 6 (six) hours as needed.        CHANGE how you take these medications    citalopram 40 MG tablet  Commonly known as:  CELEXA  TAKE 1 TABLET BY MOUTH EVERY DAY  What changed:  See the new instructions.     meloxicam 15 MG tablet  Commonly known as:  MOBIC  TAKE 1 TABLET BY MOUTH EVERY DAY  What changed:  See the new instructions.     metFORMIN 750 MG 24 hr tablet  Commonly known as:  GLUCOPHAGE-XR  TAKE 1 TABLET BY MOUTH TWICE A DAY  What changed:  See the new instructions.        CONTINUE taking these medications    aspirin 81 MG Chew     atorvastatin 10 MG tablet  Commonly known as:  LIPITOR  TAKE 1 TABLET BY MOUTH EVERY DAY     clopidogrel 75 mg tablet  Commonly known as:  PLAVIX  TAKE 1 TABLET BY MOUTH EVERY DAY     ferrous sulfate 325 (65 FE) MG EC tablet     losartan-hydrochlorothiazide 100-12.5 mg 100-12.5 mg Tab  Commonly known as:  HYZAAR  TAKE 1 TABLET BY MOUTH EVERY DAY     metoprolol tartrate 25 MG tablet  Commonly known as:  LOPRESSOR  TAKE 1 TABLET BY MOUTH TWICE DAILY     MULTIVITAMIN 50 PLUS ORAL     predniSONE 5 MG tablet  Commonly known as:  DELTASONE     TYLENOL EXTRA STRENGTH 500 MG tablet  Generic drug:  acetaminophen     vitamin D 1000 units Tab           Where to Get Your Medications      These medications were sent to [facility]/pharmacy #[id number] - [place], [place] - [address] [address]    Phone:  [phone]   · atorvastatin 10 MG tablet  · citalopram 40 MG tablet  · clopidogrel 75 mg tablet  · losartan-hydrochlorothiazide 100-12.5 mg 100-12.5 mg Tab  · meloxicam 15 MG tablet  · metFORMIN 750 MG 24 hr tablet  · metoprolol succinate 25 MG 24 hr tablet     You can get these medications from any pharmacy    Bring a paper prescription for each of these  medications  · hydrocodone-acetaminophen 10-325mg  mg Tab  · paregoric 2 mg/5 mL Liqd  You don't need a prescription for these medications  · docusate sodium 100 MG capsule       Time spent on the discharge of patient: 10 minutes    Jayesh Grace Jr., MD  General Surgery  [facility]

## 2018-04-10 ENCOUNTER — TELEPHONE (OUTPATIENT)
Dept: SURGERY | Facility: CLINIC | Age: 58
End: 2018-04-10

## 2018-04-10 NOTE — PHYSICIAN QUERY
PT Name: Cristo Jefferson  MR #: [id number]     Physician Query Form - Documentation Clarification      CDS/: Vinita Patel RN, CCDS               Contact information:  [email or web address]          This form is a permanent document in the medical record.     Query Date: April 10, 2018    By submitting this query, we are merely seeking further clarification of documentation. Please utilize your independent clinical judgment when addressing the question(s) below.    The Medical record reflects the following:    Supporting Clinical Findings Location in Medical Record   Past medical history:  Diabetes mellitus type I    Prescription Medications              Within last 14 days from 04/04/18   Metformin     Non-insulin dependent type 2 diabetes mellitus  -SSI  -Diabetic diet    Non-insulin dependent type 2 diabetes mellitus   Date Noted    11/16/2017  Chronic H & P (view-only)      Anesthesia info      General Surgery progress note 04/08        Discharge Summary                                                                                       Doctor, Please specify diagnosis or diagnoses associated with above clinical findings.  Due to conflicting documentation, please clarify the type of diabetes mellitus.     Provider Use Only    [  x ] Diabetes mellitus type I    [   ] Non-insulin dependent type 2 diabetes mellitus     [   ] Other (specify) __________                                                                                                                 [  ] Clinically undetermined

## 2018-04-10 NOTE — TELEPHONE ENCOUNTER
Returned call to [facility], was placed on hold for 20 min  Called again and left diagnosis code for rx

## 2018-04-10 NOTE — TELEPHONE ENCOUNTER
----- Message from Tasha Jeffrey sent at 4/10/2018  3:45 PM CDT -----  Contact:  [facility] [phone]   Caller states she would like to speak with nurse in reference to getting diagnosis code on prescription hydrocodone-acetaminophen 10-325mg (NORCO)  mg Tab please call le [facility] back @ [phone] Thank You :)

## 2018-04-11 ENCOUNTER — TELEPHONE (OUTPATIENT)
Dept: SURGERY | Facility: CLINIC | Age: 58
End: 2018-04-11

## 2018-04-11 NOTE — TELEPHONE ENCOUNTER
----- Message from Tasha Jeffrey sent at 4/11/2018  2:56 PM CDT -----  Contact:  [facility] [phone]   Pt.'s wife would like to speak with nurse in reference to getting diagnosis code for prescription this 2nd time calling please call Pt.'s wife  back [phone]   Thank You :)   ----- Message -----  From: Tasha Jeffrey  Sent: 4/10/2018   3:45 PM  To: Osvaldo IRIZARRY Staff    Caller states she would like to speak with nurse in reference to getting diagnosis code on prescription hydrocodone-acetaminophen 10-325mg (NORCO)  mg Tab please call  [facility] back @ [phone] Thank You :)

## 2018-04-11 NOTE — TELEPHONE ENCOUNTER
Spoke to wife, explained to her that I called [facility] yesterday and gave then the diagnosis code, I left it on the provider's  voicemail line, b/c I was left on hold for a very long time

## 2018-04-24 ENCOUNTER — OFFICE VISIT (OUTPATIENT)
Dept: SURGERY | Facility: CLINIC | Age: 58
End: 2018-04-24
Payer: MEDICARE

## 2018-04-24 VITALS
TEMPERATURE: 98 F | HEART RATE: 61 BPM | BODY MASS INDEX: 31.26 KG/M2 | SYSTOLIC BLOOD PRESSURE: 116 MMHG | HEIGHT: 69 IN | DIASTOLIC BLOOD PRESSURE: 78 MMHG | WEIGHT: 211.06 LBS

## 2018-04-24 DIAGNOSIS — Z98.890 HISTORY OF ILEOSTOMY: Primary | ICD-10-CM

## 2018-04-24 PROCEDURE — 99024 POSTOP FOLLOW-UP VISIT: CPT | Mod: S$GLB,,, | Performed by: SURGERY

## 2018-04-24 PROCEDURE — 99999 PR PBB SHADOW E&M-EST. PATIENT-LVL III: CPT | Mod: PBBFAC,,, | Performed by: SURGERY

## 2018-04-24 RX ORDER — HYDROCODONE BITARTRATE AND ACETAMINOPHEN 10; 325 MG/1; MG/1
1 TABLET ORAL EVERY 6 HOURS PRN
Qty: 30 TABLET | Refills: 0 | Status: ON HOLD | OUTPATIENT
Start: 2018-04-24 | End: 2018-05-11 | Stop reason: HOSPADM

## 2018-04-24 NOTE — LETTER
[facility]  [address]  [facility] [address]  Phone: [phone]  Fax: [phone] April 27, 2018      Vince Perez MD  [address]  [place] [address]    Patient: Cristo Jefferson   MR Number: [id number]   YOB: 1960   Date of Visit: 4/24/2018     Dear Dr. Perez:    Thank you for referring Cristo Jefferson to me for evaluation.     Mr. Jefferson is doing well after loop ileostomy takedown and closure on 4-4-18.  Pathology benign.  No subjective complaints.      Staples and sutures removed from midline wound and RLQ, respectively.  No clinical signs of infection.  No erythema of wound. Patient reports normal bowel movements on regular diet. Recommend high fiber diet with plenty of water intake. F/U with me prn at this point.    If you have questions, please do not hesitate to call me. I look forward to following Cristo Jefferson along with you.    Sincerely,    Eber Riley MD  Medical Director, [facility]  Section of General and Oncologic Surgery  [facility]    [facility]/hcr

## 2018-04-27 NOTE — PROGRESS NOTES
Doing well after loop ileostomy takedown and closure on 4-4-18.  Pathology benign  No subjective complaints.  Staples and sutures removed from midline wound and RLQ, respectively.  No clinical signs of infection.  No erythema of wound.  Pt reports normal bowel movements on regular diet.  Recommend high fiber diet with plenty of water intake.  F/U with me prn at this point.

## 2018-05-03 ENCOUNTER — ANESTHESIA EVENT (OUTPATIENT)
Dept: SURGERY | Facility: HOSPITAL | Age: 58
DRG: 853 | End: 2018-05-03
Payer: MEDICARE

## 2018-05-03 ENCOUNTER — HOSPITAL ENCOUNTER (INPATIENT)
Facility: HOSPITAL | Age: 58
LOS: 9 days | Discharge: HOME HEALTH | DRG: 853 | End: 2018-05-12
Attending: EMERGENCY MEDICINE | Admitting: SURGERY
Payer: MEDICARE

## 2018-05-03 ENCOUNTER — ANESTHESIA (OUTPATIENT)
Dept: SURGERY | Facility: HOSPITAL | Age: 58
DRG: 853 | End: 2018-05-03
Payer: MEDICARE

## 2018-05-03 DIAGNOSIS — Z01.89 ENCOUNTER FOR IMAGING STUDY TO CONFIRM NASOGASTRIC (NG) TUBE PLACEMENT: ICD-10-CM

## 2018-05-03 DIAGNOSIS — A41.9 SEPTIC SHOCK: Primary | ICD-10-CM

## 2018-05-03 DIAGNOSIS — R65.21 SEPTIC SHOCK: Primary | ICD-10-CM

## 2018-05-03 DIAGNOSIS — K63.1 PERFORATION OF SIGMOID COLON: ICD-10-CM

## 2018-05-03 DIAGNOSIS — R00.1 SINUS BRADYCARDIA: ICD-10-CM

## 2018-05-03 LAB
ABO + RH BLD: NORMAL
ALBUMIN SERPL BCP-MCNC: 1.6 G/DL
ALBUMIN SERPL BCP-MCNC: 1.6 G/DL
ALBUMIN SERPL BCP-MCNC: 2.2 G/DL
ALLENS TEST: ABNORMAL
ALLENS TEST: NORMAL
ALP SERPL-CCNC: 45 U/L
ALP SERPL-CCNC: 47 U/L
ALP SERPL-CCNC: 63 U/L
ALT SERPL W/O P-5'-P-CCNC: 10 U/L
ALT SERPL W/O P-5'-P-CCNC: 8 U/L
ALT SERPL W/O P-5'-P-CCNC: 8 U/L
ANION GAP SERPL CALC-SCNC: 13 MMOL/L
ANION GAP SERPL CALC-SCNC: 13 MMOL/L
ANION GAP SERPL CALC-SCNC: 5 MMOL/L
ANION GAP SERPL CALC-SCNC: 5 MMOL/L
ANION GAP SERPL CALC-SCNC: 6 MMOL/L
ANION GAP SERPL CALC-SCNC: 6 MMOL/L
ANION GAP SERPL CALC-SCNC: 9 MMOL/L
ANISOCYTOSIS BLD QL SMEAR: SLIGHT
APTT BLDCRRT: 23.9 SEC
APTT BLDCRRT: 26.5 SEC
APTT BLDCRRT: 26.9 SEC
APTT BLDCRRT: 27.7 SEC
AST SERPL-CCNC: 15 U/L
AST SERPL-CCNC: 16 U/L
AST SERPL-CCNC: 17 U/L
BASOPHILS # BLD AUTO: 0.04 K/UL
BASOPHILS # BLD AUTO: 0.06 K/UL
BASOPHILS NFR BLD: 0.2 %
BASOPHILS NFR BLD: 0.3 %
BASOPHILS NFR BLD: 0.4 %
BILIRUB SERPL-MCNC: 0.8 MG/DL
BILIRUB SERPL-MCNC: 2 MG/DL
BILIRUB SERPL-MCNC: 2.3 MG/DL
BLD GP AB SCN CELLS X3 SERPL QL: NORMAL
BLD PROD TYP BPU: NORMAL
BLOOD UNIT EXPIRATION DATE: NORMAL
BLOOD UNIT TYPE CODE: 6200
BLOOD UNIT TYPE CODE: 9500
BLOOD UNIT TYPE CODE: 9500
BLOOD UNIT TYPE: NORMAL
BUN SERPL-MCNC: 14 MG/DL
BUN SERPL-MCNC: 14 MG/DL
BUN SERPL-MCNC: 15 MG/DL
BUN SERPL-MCNC: 18 MG/DL
BUN SERPL-MCNC: 18 MG/DL
CA-I BLDV-SCNC: 0.97 MMOL/L
CALCIUM SERPL-MCNC: 6.7 MG/DL
CALCIUM SERPL-MCNC: 6.7 MG/DL
CALCIUM SERPL-MCNC: 7 MG/DL
CALCIUM SERPL-MCNC: 7.1 MG/DL
CALCIUM SERPL-MCNC: 7.1 MG/DL
CALCIUM SERPL-MCNC: 8 MG/DL
CALCIUM SERPL-MCNC: 8 MG/DL
CHLORIDE SERPL-SCNC: 100 MMOL/L
CHLORIDE SERPL-SCNC: 100 MMOL/L
CHLORIDE SERPL-SCNC: 102 MMOL/L
CO2 SERPL-SCNC: 18 MMOL/L
CO2 SERPL-SCNC: 18 MMOL/L
CO2 SERPL-SCNC: 24 MMOL/L
CO2 SERPL-SCNC: 25 MMOL/L
CO2 SERPL-SCNC: 25 MMOL/L
CODING SYSTEM: NORMAL
CREAT SERPL-MCNC: 0.7 MG/DL
CREAT SERPL-MCNC: 0.8 MG/DL
CREAT SERPL-MCNC: 1.1 MG/DL
CREAT SERPL-MCNC: 1.1 MG/DL
DELSYS: ABNORMAL
DELSYS: NORMAL
DIFFERENTIAL METHOD: ABNORMAL
DISPENSE STATUS: NORMAL
EOSINOPHIL # BLD AUTO: 0 K/UL
EOSINOPHIL # BLD AUTO: 0.1 K/UL
EOSINOPHIL # BLD AUTO: 0.1 K/UL
EOSINOPHIL NFR BLD: 0 %
EOSINOPHIL NFR BLD: 0.1 %
EOSINOPHIL NFR BLD: 0.1 %
EOSINOPHIL NFR BLD: 0.5 %
EOSINOPHIL NFR BLD: 0.6 %
ERYTHROCYTE [DISTWIDTH] IN BLOOD BY AUTOMATED COUNT: 17.2 %
ERYTHROCYTE [DISTWIDTH] IN BLOOD BY AUTOMATED COUNT: 17.5 %
ERYTHROCYTE [DISTWIDTH] IN BLOOD BY AUTOMATED COUNT: 17.9 %
ERYTHROCYTE [DISTWIDTH] IN BLOOD BY AUTOMATED COUNT: 18 %
ERYTHROCYTE [DISTWIDTH] IN BLOOD BY AUTOMATED COUNT: 18.1 %
ERYTHROCYTE [SEDIMENTATION RATE] IN BLOOD BY WESTERGREN METHOD: 10 MM/H
ERYTHROCYTE [SEDIMENTATION RATE] IN BLOOD BY WESTERGREN METHOD: 12 MM/H
ERYTHROCYTE [SEDIMENTATION RATE] IN BLOOD BY WESTERGREN METHOD: 16 MM/H
ERYTHROCYTE [SEDIMENTATION RATE] IN BLOOD BY WESTERGREN METHOD: 18 MM/H
ERYTHROCYTE [SEDIMENTATION RATE] IN BLOOD BY WESTERGREN METHOD: 18 MM/H
EST. GFR  (AFRICAN AMERICAN): >60 ML/MIN/1.73 M^2
EST. GFR  (NON AFRICAN AMERICAN): >60 ML/MIN/1.73 M^2
FIBRINOGEN PPP-MCNC: 409 MG/DL
FIO2: 100
FIO2: 40
FIO2: 60
GLUCOSE SERPL-MCNC: 175 MG/DL
GLUCOSE SERPL-MCNC: 175 MG/DL
GLUCOSE SERPL-MCNC: 176 MG/DL
GLUCOSE SERPL-MCNC: 176 MG/DL
GLUCOSE SERPL-MCNC: 235 MG/DL
GLUCOSE SERPL-MCNC: 235 MG/DL
GLUCOSE SERPL-MCNC: 239 MG/DL
HCO3 UR-SCNC: 22.9 MMOL/L (ref 24–28)
HCO3 UR-SCNC: 24.6 MMOL/L (ref 24–28)
HCO3 UR-SCNC: 24.8 MMOL/L (ref 24–28)
HCO3 UR-SCNC: 26.5 MMOL/L (ref 24–28)
HCO3 UR-SCNC: 31 MMOL/L (ref 24–28)
HCT VFR BLD AUTO: 21.2 %
HCT VFR BLD AUTO: 24.2 %
HCT VFR BLD AUTO: 24.7 %
HCT VFR BLD AUTO: 25.6 %
HCT VFR BLD AUTO: 33.8 %
HGB BLD-MCNC: 10 G/DL
HGB BLD-MCNC: 6.4 G/DL
HGB BLD-MCNC: 7.6 G/DL
HGB BLD-MCNC: 7.7 G/DL
HGB BLD-MCNC: 8.2 G/DL
HYPOCHROMIA BLD QL SMEAR: ABNORMAL
IMM GRANULOCYTES # BLD AUTO: 0.07 K/UL
IMM GRANULOCYTES # BLD AUTO: 0.15 K/UL
IMM GRANULOCYTES # BLD AUTO: 0.15 K/UL
IMM GRANULOCYTES # BLD AUTO: 0.17 K/UL
IMM GRANULOCYTES # BLD AUTO: 0.29 K/UL
IMM GRANULOCYTES NFR BLD AUTO: 0.6 %
IMM GRANULOCYTES NFR BLD AUTO: 0.8 %
IMM GRANULOCYTES NFR BLD AUTO: 0.8 %
IMM GRANULOCYTES NFR BLD AUTO: 0.9 %
IMM GRANULOCYTES NFR BLD AUTO: 1.3 %
INR PPP: 1.2
INR PPP: 1.3
INR PPP: 1.3
INR PPP: 1.4
INR PPP: 1.4
IP: 15
IT: 0.9
LACTATE SERPL-SCNC: 1.9 MMOL/L
LACTATE SERPL-SCNC: 2.2 MMOL/L
LACTATE SERPL-SCNC: 2.6 MMOL/L
LACTATE SERPL-SCNC: 3.5 MMOL/L
LACTATE SERPL-SCNC: 3.6 MMOL/L
LYMPHOCYTES # BLD AUTO: 1.6 K/UL
LYMPHOCYTES # BLD AUTO: 1.8 K/UL
LYMPHOCYTES # BLD AUTO: 1.8 K/UL
LYMPHOCYTES # BLD AUTO: 1.9 K/UL
LYMPHOCYTES # BLD AUTO: 2.3 K/UL
LYMPHOCYTES NFR BLD: 10.1 %
LYMPHOCYTES NFR BLD: 10.7 %
LYMPHOCYTES NFR BLD: 20.5 %
LYMPHOCYTES NFR BLD: 7.2 %
LYMPHOCYTES NFR BLD: 8.2 %
MAGNESIUM SERPL-MCNC: 1.7 MG/DL
MAGNESIUM SERPL-MCNC: 1.7 MG/DL
MAGNESIUM SERPL-MCNC: 1.8 MG/DL
MAGNESIUM SERPL-MCNC: 1.8 MG/DL
MAP: 7.6
MCH RBC QN AUTO: 23.4 PG
MCH RBC QN AUTO: 23.5 PG
MCH RBC QN AUTO: 23.8 PG
MCH RBC QN AUTO: 25.2 PG
MCH RBC QN AUTO: 25.5 PG
MCHC RBC AUTO-ENTMCNC: 29.6 G/DL
MCHC RBC AUTO-ENTMCNC: 30.2 G/DL
MCHC RBC AUTO-ENTMCNC: 30.8 G/DL
MCHC RBC AUTO-ENTMCNC: 31.8 G/DL
MCHC RBC AUTO-ENTMCNC: 32 G/DL
MCV RBC AUTO: 77 FL
MCV RBC AUTO: 78 FL
MCV RBC AUTO: 79 FL
MCV RBC AUTO: 79 FL
MCV RBC AUTO: 80 FL
MODE: ABNORMAL
MODE: NORMAL
MONOCYTES # BLD AUTO: 1 K/UL
MONOCYTES # BLD AUTO: 1.1 K/UL
MONOCYTES # BLD AUTO: 1.2 K/UL
MONOCYTES # BLD AUTO: 1.3 K/UL
MONOCYTES # BLD AUTO: 1.4 K/UL
MONOCYTES NFR BLD: 5.2 %
MONOCYTES NFR BLD: 5.6 %
MONOCYTES NFR BLD: 6.2 %
MONOCYTES NFR BLD: 7.8 %
MONOCYTES NFR BLD: 9.3 %
NEUTROPHILS # BLD AUTO: 13.7 K/UL
NEUTROPHILS # BLD AUTO: 14.6 K/UL
NEUTROPHILS # BLD AUTO: 19.1 K/UL
NEUTROPHILS # BLD AUTO: 19.5 K/UL
NEUTROPHILS # BLD AUTO: 7.8 K/UL
NEUTROPHILS NFR BLD: 68.6 %
NEUTROPHILS NFR BLD: 80.3 %
NEUTROPHILS NFR BLD: 82.7 %
NEUTROPHILS NFR BLD: 84.1 %
NEUTROPHILS NFR BLD: 86.5 %
NRBC BLD-RTO: 0 /100 WBC
PCO2 BLDA: 38.7 MMHG (ref 35–45)
PCO2 BLDA: 40.4 MMHG (ref 35–45)
PCO2 BLDA: 40.8 MMHG (ref 35–45)
PCO2 BLDA: 50.5 MMHG (ref 35–45)
PCO2 BLDA: 65.4 MMHG (ref 35–45)
PEEP: 5
PH SMN: 7.28 [PH] (ref 7.35–7.45)
PH SMN: 7.33 [PH] (ref 7.35–7.45)
PH SMN: 7.38 [PH] (ref 7.35–7.45)
PH SMN: 7.39 [PH] (ref 7.35–7.45)
PH SMN: 7.4 [PH] (ref 7.35–7.45)
PHOSPHATE SERPL-MCNC: 2.4 MG/DL
PHOSPHATE SERPL-MCNC: 3.6 MG/DL
PHOSPHATE SERPL-MCNC: 4.2 MG/DL
PHOSPHATE SERPL-MCNC: 4.8 MG/DL
PIP: 20
PLATELET # BLD AUTO: 132 K/UL
PLATELET # BLD AUTO: 134 K/UL
PLATELET # BLD AUTO: 158 K/UL
PLATELET # BLD AUTO: 173 K/UL
PLATELET # BLD AUTO: 209 K/UL
PLATELET BLD QL SMEAR: ABNORMAL
PMV BLD AUTO: 10.4 FL
PMV BLD AUTO: 10.5 FL
PMV BLD AUTO: 10.7 FL
PMV BLD AUTO: 10.7 FL
PMV BLD AUTO: 11.5 FL
PO2 BLDA: 108 MMHG (ref 80–100)
PO2 BLDA: 109 MMHG (ref 80–100)
PO2 BLDA: 217 MMHG (ref 80–100)
PO2 BLDA: 394 MMHG (ref 80–100)
PO2 BLDA: 86 MMHG (ref 80–100)
POC BE: -2 MMOL/L
POC BE: 0 MMOL/L
POC BE: 4 MMOL/L
POC SATURATED O2: 100 % (ref 95–100)
POC SATURATED O2: 100 % (ref 95–100)
POC SATURATED O2: 96 % (ref 95–100)
POC SATURATED O2: 98 % (ref 95–100)
POC SATURATED O2: 98 % (ref 95–100)
POC TCO2: 24 MMOL/L (ref 23–27)
POC TCO2: 26 MMOL/L (ref 23–27)
POC TCO2: 26 MMOL/L (ref 23–27)
POC TCO2: 28 MMOL/L (ref 23–27)
POC TCO2: 33 MMOL/L (ref 23–27)
POCT GLUCOSE: 175 MG/DL (ref 70–110)
POCT GLUCOSE: 191 MG/DL (ref 70–110)
POCT GLUCOSE: 229 MG/DL (ref 70–110)
POCT GLUCOSE: 243 MG/DL (ref 70–110)
POLYCHROMASIA BLD QL SMEAR: ABNORMAL
POTASSIUM SERPL-SCNC: 3.6 MMOL/L
POTASSIUM SERPL-SCNC: 3.6 MMOL/L
POTASSIUM SERPL-SCNC: 3.9 MMOL/L
POTASSIUM SERPL-SCNC: 4 MMOL/L
PROT SERPL-MCNC: 4 G/DL
PROT SERPL-MCNC: 4.1 G/DL
PROT SERPL-MCNC: 5.9 G/DL
PROTHROMBIN TIME: 12.2 SEC
PROTHROMBIN TIME: 13.2 SEC
PROTHROMBIN TIME: 13.4 SEC
PROTHROMBIN TIME: 13.7 SEC
PROTHROMBIN TIME: 14.2 SEC
PROVIDER CREDENTIALS: ABNORMAL
PROVIDER CREDENTIALS: ABNORMAL
PROVIDER NOTIFIED: ABNORMAL
PROVIDER NOTIFIED: ABNORMAL
RBC # BLD AUTO: 2.72 M/UL
RBC # BLD AUTO: 3.06 M/UL
RBC # BLD AUTO: 3.2 M/UL
RBC # BLD AUTO: 3.21 M/UL
RBC # BLD AUTO: 4.27 M/UL
SAMPLE: ABNORMAL
SAMPLE: NORMAL
SITE: ABNORMAL
SITE: NORMAL
SODIUM SERPL-SCNC: 131 MMOL/L
SODIUM SERPL-SCNC: 131 MMOL/L
SODIUM SERPL-SCNC: 132 MMOL/L
SODIUM SERPL-SCNC: 135 MMOL/L
SP02: 100
SP02: 98
SP02: 99
TRANS ERYTHROCYTES VOL PATIENT: NORMAL ML
TRANS ERYTHROCYTES VOL PATIENT: NORMAL ML
TRANS PLATPHERESIS VOL PATIENT: NORMAL ML
VT: 329
VT: 450
WBC # BLD AUTO: 11.42 K/UL
WBC # BLD AUTO: 17.11 K/UL
WBC # BLD AUTO: 17.7 K/UL
WBC # BLD AUTO: 22.51 K/UL
WBC # BLD AUTO: 22.73 K/UL

## 2018-05-03 PROCEDURE — P9035 PLATELET PHERES LEUKOREDUCED: HCPCS

## 2018-05-03 PROCEDURE — 86850 RBC ANTIBODY SCREEN: CPT

## 2018-05-03 PROCEDURE — 25000003 PHARM REV CODE 250: Performed by: STUDENT IN AN ORGANIZED HEALTH CARE EDUCATION/TRAINING PROGRAM

## 2018-05-03 PROCEDURE — 36000709 HC OR TIME LEV III EA ADD 15 MIN: Performed by: SURGERY

## 2018-05-03 PROCEDURE — 99900026 HC AIRWAY MAINTENANCE (STAT)

## 2018-05-03 PROCEDURE — 63600175 PHARM REV CODE 636 W HCPCS: Performed by: STUDENT IN AN ORGANIZED HEALTH CARE EDUCATION/TRAINING PROGRAM

## 2018-05-03 PROCEDURE — 25000003 PHARM REV CODE 250: Performed by: SURGERY

## 2018-05-03 PROCEDURE — C9113 INJ PANTOPRAZOLE SODIUM, VIA: HCPCS | Performed by: SURGERY

## 2018-05-03 PROCEDURE — 83735 ASSAY OF MAGNESIUM: CPT | Mod: 91

## 2018-05-03 PROCEDURE — 05HM33Z INSERTION OF INFUSION DEVICE INTO RIGHT INTERNAL JUGULAR VEIN, PERCUTANEOUS APPROACH: ICD-10-PCS | Performed by: RADIOLOGY

## 2018-05-03 PROCEDURE — 80048 BASIC METABOLIC PNL TOTAL CA: CPT

## 2018-05-03 PROCEDURE — 25000003 PHARM REV CODE 250: Performed by: NURSE ANESTHETIST, CERTIFIED REGISTERED

## 2018-05-03 PROCEDURE — 27201040 HC RC 50 FILTER

## 2018-05-03 PROCEDURE — 85610 PROTHROMBIN TIME: CPT | Mod: 91

## 2018-05-03 PROCEDURE — 63600175 PHARM REV CODE 636 W HCPCS: Performed by: NURSE ANESTHETIST, CERTIFIED REGISTERED

## 2018-05-03 PROCEDURE — 96365 THER/PROPH/DIAG IV INF INIT: CPT

## 2018-05-03 PROCEDURE — 0D1L0Z4 BYPASS TRANSVERSE COLON TO CUTANEOUS, OPEN APPROACH: ICD-10-PCS | Performed by: SURGERY

## 2018-05-03 PROCEDURE — P9016 RBC LEUKOCYTES REDUCED: HCPCS

## 2018-05-03 PROCEDURE — 25000003 PHARM REV CODE 250: Performed by: PHYSICIAN ASSISTANT

## 2018-05-03 PROCEDURE — 63600175 PHARM REV CODE 636 W HCPCS: Performed by: SURGERY

## 2018-05-03 PROCEDURE — 84100 ASSAY OF PHOSPHORUS: CPT | Mod: 91

## 2018-05-03 PROCEDURE — 36430 TRANSFUSION BLD/BLD COMPNT: CPT

## 2018-05-03 PROCEDURE — 37799 UNLISTED PX VASCULAR SURGERY: CPT

## 2018-05-03 PROCEDURE — 85025 COMPLETE CBC W/AUTO DIFF WBC: CPT

## 2018-05-03 PROCEDURE — 83605 ASSAY OF LACTIC ACID: CPT

## 2018-05-03 PROCEDURE — 36556 INSERT NON-TUNNEL CV CATH: CPT

## 2018-05-03 PROCEDURE — 36000708 HC OR TIME LEV III 1ST 15 MIN: Performed by: SURGERY

## 2018-05-03 PROCEDURE — 85730 THROMBOPLASTIN TIME PARTIAL: CPT | Mod: 91

## 2018-05-03 PROCEDURE — D9220A PRA ANESTHESIA: Mod: ANES,,, | Performed by: ANESTHESIOLOGY

## 2018-05-03 PROCEDURE — 0W9J0ZZ DRAINAGE OF PELVIC CAVITY, OPEN APPROACH: ICD-10-PCS | Performed by: SURGERY

## 2018-05-03 PROCEDURE — 99285 EMERGENCY DEPT VISIT HI MDM: CPT | Mod: ,,, | Performed by: PHYSICIAN ASSISTANT

## 2018-05-03 PROCEDURE — 27000221 HC OXYGEN, UP TO 24 HOURS

## 2018-05-03 PROCEDURE — 93010 ELECTROCARDIOGRAM REPORT: CPT | Mod: ,,, | Performed by: INTERNAL MEDICINE

## 2018-05-03 PROCEDURE — 85610 PROTHROMBIN TIME: CPT

## 2018-05-03 PROCEDURE — P9021 RED BLOOD CELLS UNIT: HCPCS

## 2018-05-03 PROCEDURE — 80053 COMPREHEN METABOLIC PANEL: CPT | Mod: 91

## 2018-05-03 PROCEDURE — 83735 ASSAY OF MAGNESIUM: CPT

## 2018-05-03 PROCEDURE — 82803 BLOOD GASES ANY COMBINATION: CPT

## 2018-05-03 PROCEDURE — 96374 THER/PROPH/DIAG INJ IV PUSH: CPT

## 2018-05-03 PROCEDURE — 37000008 HC ANESTHESIA 1ST 15 MINUTES: Performed by: SURGERY

## 2018-05-03 PROCEDURE — 86920 COMPATIBILITY TEST SPIN: CPT

## 2018-05-03 PROCEDURE — 85384 FIBRINOGEN ACTIVITY: CPT

## 2018-05-03 PROCEDURE — 36556 INSERT NON-TUNNEL CV CATH: CPT | Performed by: ANESTHESIOLOGY

## 2018-05-03 PROCEDURE — 94761 N-INVAS EAR/PLS OXIMETRY MLT: CPT

## 2018-05-03 PROCEDURE — S0030 INJECTION, METRONIDAZOLE: HCPCS | Performed by: SURGERY

## 2018-05-03 PROCEDURE — 84100 ASSAY OF PHOSPHORUS: CPT

## 2018-05-03 PROCEDURE — 49020 DRAINAGE ABDOM ABSCESS OPEN: CPT | Mod: 78,,, | Performed by: SURGERY

## 2018-05-03 PROCEDURE — 80053 COMPREHEN METABOLIC PANEL: CPT

## 2018-05-03 PROCEDURE — 44320 COLOSTOMY: CPT | Mod: 78,51,, | Performed by: SURGERY

## 2018-05-03 PROCEDURE — 93005 ELECTROCARDIOGRAM TRACING: CPT

## 2018-05-03 PROCEDURE — 99223 1ST HOSP IP/OBS HIGH 75: CPT | Mod: 57,AI,, | Performed by: SURGERY

## 2018-05-03 PROCEDURE — 99900035 HC TECH TIME PER 15 MIN (STAT)

## 2018-05-03 PROCEDURE — 96375 TX/PRO/DX INJ NEW DRUG ADDON: CPT

## 2018-05-03 PROCEDURE — 20000000 HC ICU ROOM

## 2018-05-03 PROCEDURE — 82330 ASSAY OF CALCIUM: CPT

## 2018-05-03 PROCEDURE — D9220A PRA ANESTHESIA: Mod: CRNA,,, | Performed by: NURSE ANESTHETIST, CERTIFIED REGISTERED

## 2018-05-03 PROCEDURE — 51702 INSERT TEMP BLADDER CATH: CPT

## 2018-05-03 PROCEDURE — 99285 EMERGENCY DEPT VISIT HI MDM: CPT

## 2018-05-03 PROCEDURE — 85730 THROMBOPLASTIN TIME PARTIAL: CPT

## 2018-05-03 PROCEDURE — 27200966 HC CLOSED SUCTION SYSTEM

## 2018-05-03 PROCEDURE — 83605 ASSAY OF LACTIC ACID: CPT | Mod: 91

## 2018-05-03 PROCEDURE — 94002 VENT MGMT INPAT INIT DAY: CPT

## 2018-05-03 PROCEDURE — 37000009 HC ANESTHESIA EA ADD 15 MINS: Performed by: SURGERY

## 2018-05-03 RX ORDER — MAGNESIUM SULFATE HEPTAHYDRATE 40 MG/ML
2 INJECTION, SOLUTION INTRAVENOUS
Status: DISCONTINUED | OUTPATIENT
Start: 2018-05-03 | End: 2018-05-07 | Stop reason: SDUPTHER

## 2018-05-03 RX ORDER — DEXMEDETOMIDINE HYDROCHLORIDE 4 UG/ML
0.2 INJECTION, SOLUTION INTRAVENOUS CONTINUOUS
Status: DISCONTINUED | OUTPATIENT
Start: 2018-05-03 | End: 2018-05-03

## 2018-05-03 RX ORDER — NOREPINEPHRINE BITARTRATE/D5W 4MG/250ML
0.05 PLASTIC BAG, INJECTION (ML) INTRAVENOUS CONTINUOUS
Status: DISCONTINUED | OUTPATIENT
Start: 2018-05-03 | End: 2018-05-06

## 2018-05-03 RX ORDER — HYDROCODONE BITARTRATE AND ACETAMINOPHEN 500; 5 MG/1; MG/1
TABLET ORAL
Status: DISCONTINUED | OUTPATIENT
Start: 2018-05-03 | End: 2018-05-04

## 2018-05-03 RX ORDER — MAGNESIUM SULFATE HEPTAHYDRATE 40 MG/ML
INJECTION, SOLUTION INTRAVENOUS
Status: DISCONTINUED | OUTPATIENT
Start: 2018-05-03 | End: 2018-05-03

## 2018-05-03 RX ORDER — MAGNESIUM SULFATE HEPTAHYDRATE 40 MG/ML
4 INJECTION, SOLUTION INTRAVENOUS ONCE
Status: COMPLETED | OUTPATIENT
Start: 2018-05-03 | End: 2018-05-03

## 2018-05-03 RX ORDER — SODIUM CHLORIDE, SODIUM LACTATE, POTASSIUM CHLORIDE, CALCIUM CHLORIDE 600; 310; 30; 20 MG/100ML; MG/100ML; MG/100ML; MG/100ML
INJECTION, SOLUTION INTRAVENOUS CONTINUOUS
Status: DISCONTINUED | OUTPATIENT
Start: 2018-05-03 | End: 2018-05-06

## 2018-05-03 RX ORDER — CEFEPIME HYDROCHLORIDE 2 G/1
2 INJECTION, POWDER, FOR SOLUTION INTRAVENOUS
Status: DISCONTINUED | OUTPATIENT
Start: 2018-05-03 | End: 2018-05-07

## 2018-05-03 RX ORDER — MIDAZOLAM HYDROCHLORIDE 1 MG/ML
INJECTION, SOLUTION INTRAMUSCULAR; INTRAVENOUS
Status: DISCONTINUED | OUTPATIENT
Start: 2018-05-03 | End: 2018-05-03

## 2018-05-03 RX ORDER — CEFEPIME HYDROCHLORIDE 2 G/1
2 INJECTION, POWDER, FOR SOLUTION INTRAVENOUS
Status: DISCONTINUED | OUTPATIENT
Start: 2018-05-03 | End: 2018-05-03

## 2018-05-03 RX ORDER — FENTANYL CITRATE-0.9 % NACL/PF 10 MCG/ML
25 PLASTIC BAG, INJECTION (ML) INTRAVENOUS CONTINUOUS
Status: DISCONTINUED | OUTPATIENT
Start: 2018-05-03 | End: 2018-05-06

## 2018-05-03 RX ORDER — ETOMIDATE 2 MG/ML
INJECTION INTRAVENOUS
Status: DISCONTINUED | OUTPATIENT
Start: 2018-05-03 | End: 2018-05-03

## 2018-05-03 RX ORDER — MAGNESIUM SULFATE HEPTAHYDRATE 40 MG/ML
4 INJECTION, SOLUTION INTRAVENOUS
Status: DISCONTINUED | OUTPATIENT
Start: 2018-05-03 | End: 2018-05-07 | Stop reason: SDUPTHER

## 2018-05-03 RX ORDER — VANCOMYCIN HYDROCHLORIDE
1500
Status: DISCONTINUED | OUTPATIENT
Start: 2018-05-03 | End: 2018-05-06

## 2018-05-03 RX ORDER — SUCCINYLCHOLINE CHLORIDE 20 MG/ML
INJECTION INTRAMUSCULAR; INTRAVENOUS
Status: DISCONTINUED | OUTPATIENT
Start: 2018-05-03 | End: 2018-05-03

## 2018-05-03 RX ORDER — DOPAMINE HYDROCHLORIDE 160 MG/100ML
5 INJECTION, SOLUTION INTRAVENOUS CONTINUOUS
Status: DISCONTINUED | OUTPATIENT
Start: 2018-05-03 | End: 2018-05-03

## 2018-05-03 RX ORDER — INSULIN ASPART 100 [IU]/ML
1-10 INJECTION, SOLUTION INTRAVENOUS; SUBCUTANEOUS EVERY 6 HOURS PRN
Status: DISCONTINUED | OUTPATIENT
Start: 2018-05-03 | End: 2018-05-08

## 2018-05-03 RX ORDER — METRONIDAZOLE 500 MG/100ML
500 INJECTION, SOLUTION INTRAVENOUS
Status: DISCONTINUED | OUTPATIENT
Start: 2018-05-03 | End: 2018-05-07

## 2018-05-03 RX ORDER — POTASSIUM CHLORIDE 29.8 MG/ML
40 INJECTION INTRAVENOUS ONCE
Status: COMPLETED | OUTPATIENT
Start: 2018-05-03 | End: 2018-05-03

## 2018-05-03 RX ORDER — POTASSIUM CHLORIDE 7.45 MG/ML
80 INJECTION INTRAVENOUS
Status: DISCONTINUED | OUTPATIENT
Start: 2018-05-03 | End: 2018-05-03

## 2018-05-03 RX ORDER — FENTANYL CITRATE 50 UG/ML
50 INJECTION, SOLUTION INTRAMUSCULAR; INTRAVENOUS ONCE
Status: COMPLETED | OUTPATIENT
Start: 2018-05-03 | End: 2018-05-03

## 2018-05-03 RX ORDER — GLUCAGON 1 MG
1 KIT INJECTION
Status: DISCONTINUED | OUTPATIENT
Start: 2018-05-03 | End: 2018-05-12 | Stop reason: HOSPADM

## 2018-05-03 RX ORDER — FENTANYL CITRATE 50 UG/ML
INJECTION, SOLUTION INTRAMUSCULAR; INTRAVENOUS
Status: DISCONTINUED | OUTPATIENT
Start: 2018-05-03 | End: 2018-05-03

## 2018-05-03 RX ORDER — NOREPINEPHRINE BITARTRATE/D5W 4MG/250ML
PLASTIC BAG, INJECTION (ML) INTRAVENOUS
Status: DISPENSED
Start: 2018-05-03 | End: 2018-05-03

## 2018-05-03 RX ORDER — ROCURONIUM BROMIDE 10 MG/ML
INJECTION, SOLUTION INTRAVENOUS
Status: DISCONTINUED | OUTPATIENT
Start: 2018-05-03 | End: 2018-05-03

## 2018-05-03 RX ORDER — FENTANYL CITRATE 50 UG/ML
INJECTION, SOLUTION INTRAMUSCULAR; INTRAVENOUS
Status: DISPENSED
Start: 2018-05-03 | End: 2018-05-03

## 2018-05-03 RX ORDER — PROPOFOL 10 MG/ML
10 INJECTION, EMULSION INTRAVENOUS CONTINUOUS
Status: DISCONTINUED | OUTPATIENT
Start: 2018-05-03 | End: 2018-05-06

## 2018-05-03 RX ORDER — POTASSIUM CHLORIDE 7.45 MG/ML
40 INJECTION INTRAVENOUS
Status: DISCONTINUED | OUTPATIENT
Start: 2018-05-03 | End: 2018-05-03

## 2018-05-03 RX ORDER — POTASSIUM CHLORIDE 7.45 MG/ML
60 INJECTION INTRAVENOUS
Status: DISCONTINUED | OUTPATIENT
Start: 2018-05-03 | End: 2018-05-03

## 2018-05-03 RX ORDER — LIDOCAINE HCL/PF 100 MG/5ML
SYRINGE (ML) INTRAVENOUS
Status: DISCONTINUED | OUTPATIENT
Start: 2018-05-03 | End: 2018-05-03

## 2018-05-03 RX ADMIN — SODIUM CHLORIDE, SODIUM LACTATE, POTASSIUM CHLORIDE, AND CALCIUM CHLORIDE: .6; .31; .03; .02 INJECTION, SOLUTION INTRAVENOUS at 07:05

## 2018-05-03 RX ADMIN — DEXTROSE 40 MG: 50 INJECTION, SOLUTION INTRAVENOUS at 06:05

## 2018-05-03 RX ADMIN — FENTANYL CITRATE 100 MCG: 50 INJECTION, SOLUTION INTRAMUSCULAR; INTRAVENOUS at 06:05

## 2018-05-03 RX ADMIN — Medication 25 MCG/HR: at 08:05

## 2018-05-03 RX ADMIN — SODIUM CHLORIDE, SODIUM GLUCONATE, SODIUM ACETATE, POTASSIUM CHLORIDE, MAGNESIUM CHLORIDE, SODIUM PHOSPHATE, DIBASIC, AND POTASSIUM PHOSPHATE: .53; .5; .37; .037; .03; .012; .00082 INJECTION, SOLUTION INTRAVENOUS at 06:05

## 2018-05-03 RX ADMIN — VASOPRESSIN 0.02 UNITS/MIN: 20 INJECTION INTRAVENOUS at 07:05

## 2018-05-03 RX ADMIN — HYDROCORTISONE SODIUM SUCCINATE 100 MG: 250 INJECTION, POWDER, FOR SOLUTION INTRAMUSCULAR; INTRAVENOUS at 03:05

## 2018-05-03 RX ADMIN — PROPOFOL 10 MCG/KG/MIN: 10 INJECTION, EMULSION INTRAVENOUS at 01:05

## 2018-05-03 RX ADMIN — FENTANYL CITRATE 50 MCG: 50 INJECTION, SOLUTION INTRAMUSCULAR; INTRAVENOUS at 06:05

## 2018-05-03 RX ADMIN — MIDAZOLAM HYDROCHLORIDE 2 MG: 1 INJECTION, SOLUTION INTRAMUSCULAR; INTRAVENOUS at 06:05

## 2018-05-03 RX ADMIN — ROCURONIUM BROMIDE 10 MG: 10 INJECTION, SOLUTION INTRAVENOUS at 06:05

## 2018-05-03 RX ADMIN — SODIUM CHLORIDE, SODIUM LACTATE, POTASSIUM CHLORIDE, AND CALCIUM CHLORIDE 1000 ML: .6; .31; .03; .02 INJECTION, SOLUTION INTRAVENOUS at 05:05

## 2018-05-03 RX ADMIN — Medication 0.2 MCG/KG/MIN: at 03:05

## 2018-05-03 RX ADMIN — ROCURONIUM BROMIDE 50 MG: 10 INJECTION, SOLUTION INTRAVENOUS at 07:05

## 2018-05-03 RX ADMIN — VANCOMYCIN HYDROCHLORIDE 1500 MG: 10 INJECTION, POWDER, LYOPHILIZED, FOR SOLUTION INTRAVENOUS at 11:05

## 2018-05-03 RX ADMIN — POTASSIUM CHLORIDE 40 MEQ: 400 INJECTION, SOLUTION INTRAVENOUS at 10:05

## 2018-05-03 RX ADMIN — PROPOFOL 10 MCG/KG/MIN: 10 INJECTION, EMULSION INTRAVENOUS at 12:05

## 2018-05-03 RX ADMIN — SUCCINYLCHOLINE CHLORIDE 120 MG: 20 INJECTION, SOLUTION INTRAMUSCULAR; INTRAVENOUS at 06:05

## 2018-05-03 RX ADMIN — SODIUM CHLORIDE, SODIUM LACTATE, POTASSIUM CHLORIDE, AND CALCIUM CHLORIDE 1000 ML: .6; .31; .03; .02 INJECTION, SOLUTION INTRAVENOUS at 09:05

## 2018-05-03 RX ADMIN — ROCURONIUM BROMIDE 40 MG: 10 INJECTION, SOLUTION INTRAVENOUS at 06:05

## 2018-05-03 RX ADMIN — MAGNESIUM SULFATE IN WATER 2 G: 40 INJECTION, SOLUTION INTRAVENOUS at 05:05

## 2018-05-03 RX ADMIN — HYDROCORTISONE SODIUM SUCCINATE 100 MG: 250 INJECTION, POWDER, FOR SOLUTION INTRAMUSCULAR; INTRAVENOUS at 10:05

## 2018-05-03 RX ADMIN — INSULIN ASPART 2 UNITS: 100 INJECTION, SOLUTION INTRAVENOUS; SUBCUTANEOUS at 08:05

## 2018-05-03 RX ADMIN — METRONIDAZOLE 500 MG: 500 INJECTION, SOLUTION INTRAVENOUS at 11:05

## 2018-05-03 RX ADMIN — SODIUM CHLORIDE, SODIUM LACTATE, POTASSIUM CHLORIDE, AND CALCIUM CHLORIDE 1000 ML: .6; .31; .03; .02 INJECTION, SOLUTION INTRAVENOUS at 11:05

## 2018-05-03 RX ADMIN — METRONIDAZOLE 500 MG: 500 INJECTION, SOLUTION INTRAVENOUS at 04:05

## 2018-05-03 RX ADMIN — VASOPRESSIN 2 UNITS: 20 INJECTION INTRAVENOUS at 07:05

## 2018-05-03 RX ADMIN — HYDROCORTISONE SODIUM SUCCINATE 100 MG: 250 INJECTION, POWDER, FOR SOLUTION INTRAMUSCULAR; INTRAVENOUS at 04:05

## 2018-05-03 RX ADMIN — PROPOFOL 30 MCG/KG/MIN: 10 INJECTION, EMULSION INTRAVENOUS at 08:05

## 2018-05-03 RX ADMIN — METRONIDAZOLE 500 MG: 500 INJECTION, SOLUTION INTRAVENOUS at 07:05

## 2018-05-03 RX ADMIN — DEXTROSE 40 MG: 50 INJECTION, SOLUTION INTRAVENOUS at 05:05

## 2018-05-03 RX ADMIN — Medication 0.6 MCG/KG/MIN: at 12:05

## 2018-05-03 RX ADMIN — Medication 0.18 MCG/KG/MIN: at 07:05

## 2018-05-03 RX ADMIN — CEFEPIME HYDROCHLORIDE 2 G: 2 INJECTION, POWDER, FOR SOLUTION INTRAVENOUS at 04:05

## 2018-05-03 RX ADMIN — FENTANYL CITRATE 50 MCG: 50 INJECTION, SOLUTION INTRAMUSCULAR; INTRAVENOUS at 08:05

## 2018-05-03 RX ADMIN — Medication 0.05 MCG/KG/MIN: at 03:05

## 2018-05-03 RX ADMIN — VASOPRESSIN 0.02 UNITS/MIN: 20 INJECTION INTRAVENOUS at 12:05

## 2018-05-03 RX ADMIN — SODIUM CHLORIDE, SODIUM GLUCONATE, SODIUM ACETATE, POTASSIUM CHLORIDE, MAGNESIUM CHLORIDE, SODIUM PHOSPHATE, DIBASIC, AND POTASSIUM PHOSPHATE: .53; .5; .37; .037; .03; .012; .00082 INJECTION, SOLUTION INTRAVENOUS at 07:05

## 2018-05-03 RX ADMIN — VASOPRESSIN 1 UNITS: 20 INJECTION INTRAVENOUS at 07:05

## 2018-05-03 RX ADMIN — CEFEPIME HYDROCHLORIDE 2 G: 2 INJECTION, POWDER, FOR SOLUTION INTRAVENOUS at 07:05

## 2018-05-03 RX ADMIN — MAGNESIUM SULFATE IN WATER 2 G: 40 INJECTION, SOLUTION INTRAVENOUS at 06:05

## 2018-05-03 RX ADMIN — SODIUM CHLORIDE, SODIUM LACTATE, POTASSIUM CHLORIDE, AND CALCIUM CHLORIDE 2000 ML: .6; .31; .03; .02 INJECTION, SOLUTION INTRAVENOUS at 03:05

## 2018-05-03 RX ADMIN — CALCIUM GLUCONATE 1000 MG: 98 INJECTION, SOLUTION INTRAVENOUS at 05:05

## 2018-05-03 RX ADMIN — CEFEPIME HYDROCHLORIDE 2 G: 2 INJECTION, POWDER, FOR SOLUTION INTRAVENOUS at 12:05

## 2018-05-03 RX ADMIN — MAGNESIUM SULFATE IN WATER 4 G: 40 INJECTION, SOLUTION INTRAVENOUS at 04:05

## 2018-05-03 RX ADMIN — Medication 0.6 MCG/KG/MIN: at 11:05

## 2018-05-03 RX ADMIN — MAGNESIUM SULFATE IN WATER 2 G: 40 INJECTION, SOLUTION INTRAVENOUS at 10:05

## 2018-05-03 RX ADMIN — LIDOCAINE HYDROCHLORIDE 40 MG: 20 INJECTION, SOLUTION INTRAVENOUS at 06:05

## 2018-05-03 RX ADMIN — ETOMIDATE 24 MG: 2 INJECTION, SOLUTION INTRAVENOUS at 06:05

## 2018-05-03 RX ADMIN — DEXMEDETOMIDINE HYDROCHLORIDE 0.4 MCG/KG/HR: 4 INJECTION, SOLUTION INTRAVENOUS at 09:05

## 2018-05-03 RX ADMIN — INSULIN ASPART 4 UNITS: 100 INJECTION, SOLUTION INTRAVENOUS; SUBCUTANEOUS at 03:05

## 2018-05-03 NOTE — PROCEDURES
"Cristo Jefferson is a 57 y.o. male patient.    Temp: 99.4 °F (37.4 °C) (05/03/18 0900)  Pulse: 71 (05/03/18 1015)  Resp: 19 (05/03/18 1015)  BP: (!) 81/53 (05/03/18 1015)  SpO2: 99 % (05/03/18 1015)  Weight: 96.2 kg (212 lb) (pt weighed himself this am) (05/03/18 0331)       Central Line  Date/Time: 5/3/2018 11:01 AM  Location procedure was performed: [facility]IT[facility] SURGERY  Performed by: MANNIE PRITCHETT  Consent Done: Yes  Time out: Immediately prior to procedure a "time out" was called to verify the correct patient, procedure, equipment, support staff and site/side marked as required.  Indications: med administration and vascular access  Preparation: skin prepped with ChloraPrep  Skin prep agent dried: skin prep agent completely dried prior to procedure  Sterile barriers: all five maximum sterile barriers used - cap, mask, sterile gown, sterile gloves, and large sterile sheet  Hand hygiene: hand hygiene performed prior to central venous catheter insertion  Location details: right internal jugular  Catheter type: triple lumen  Catheter size: 7.5 Fr  Ultrasound guidance: yes  Vessel Caliber: large, patentNeedle advanced into vessel with real time Ultrasound guidance.  Guidewire confirmed in vessel.  Image recorded and saved.  Sterile sheath used.  Manometry: Yes  Number of attempts: 1  Assessment: placement verified by x-ray and no pneumothorax on x-ray  Complications: none  Specimens: No  Implants: No  Post-procedure: line sutured  Complications: No          Mannie Pritchett  5/3/2018  "

## 2018-05-03 NOTE — OR NURSING
Rotbot time out completed with pre incision time out.  All DaVinci instruments inspected before case by Adria Cooks .

## 2018-05-03 NOTE — ASSESSMENT & PLAN NOTE
58yo M .POD0 s/p ex-lap, washout and end colostomy     Plan:    Neuro:   - Propofol and fentanyl gtt for sedation/pain control  - Responsive to commands off sedation    Pulmonary:   -Intubated  Vent Mode: A/C  Oxygen Concentration (%):  [40-60] 40  Resp Rate Total:  [17 br/min-26 br/min] 21 br/min  Vt Set:  [450 mL] 450 mL  PEEP/CPAP:  [5 cmH20] 5 cmH20  Pressure Support:  [0 cmH20] 0 cmH20  Mean Airway Pressure:  [9.8 otE25-59 cmH20] 11 cmH20  - Ventilator associated PNA ppx: Chlorhexidine  -ABGs prn    Recent Labs  Lab 05/03/18  1157   PH 7.379   PO2 109*   PCO2 38.7   HCO3 22.9*   BE -2   -Daily CXR    Cardiac:  -H/o CAD s/p 4 vessel CABG   -MAP goal >65  -On admission to ICU was without pressors, now on levo at 0.5 and vaso 0.04, continue to titrate for MAP goal  -hydrocortisone 100mg IV q8h given chronic steroid use    Renal:   -Garcia in place  -UOP adequate, continue to monitor  -Bun/Cr 15/0.8     Fluids/Electrolytes/Nutrition/GI:   -Nutritional status: NPO  -replace lytes PRN  -LR @ 125cc/hr; additional fluid boluses prn  -BMP q4  -GI ulcer prophylaxis    Hematology/Oncology:  -H/H with significant downward progression 6.4/21.2 from 7.6/24.7; transfuse 3 units pRBCs  -INR/Plts 1.4, platelets 209; getting 1 unit platelets given chronically on plavix  - CBC q4, continue to trend and transfuse as needed    Infectious Disease:   -Afebrile since return from OR  -WBC 22.73  -Cultures drawn in ED, will continue to follow  -Abx: Cefepime, Vanc, Flaygl    Endocrine:  -Glucose goal of 120-180  -SSI     Dispo:  -Continue care in the ICU setting  -Plan for take back to the OR tomorrow for ex-lap, washout and possible closure with Dr. Shelton

## 2018-05-03 NOTE — ANESTHESIA PREPROCEDURE EVALUATION
05/03/2018  [facility]  Anesthesia Pre-Operative Evaluation         Patient Name: Cristo Jefferson  YOB: 1960  MRN: [id number]    SUBJECTIVE:     Pre-operative evaluation for Procedure(s) (LRB):  EXPLORATORY LAPAROTOMY (N/A)  SIGMOIDECTOMY (N/A)  COLOSTOMY (N/A)  ILEOSTOMY (N/A)  LYSIS OF ADHESIONS (N/A)     05/03/2018    Cristo Jefferson is a 57 y.o. male w/ a significant PMHx of HTN, CAD s/p CABG and 4 stents (denies chest pain, dyspnea), AAA s/p repair in feb or march of 2017, DM2, previous colonic perforation now with recurrent perforation who presents for the above procedure.    NPO 2p yesterday    LDA:   Left AC 18g  Right forearm 20g PIV  Left subclavian port    Prev airway:   Easy mask with oral airway, grade 1 view on DL with Mac 3    Drips:   Levophed 0.05 mcg/kg/min    Patient Active Problem List   Diagnosis    Non-insulin dependent type 2 diabetes mellitus    Essential hypertension    Major depressive disorder    Coronary artery disease involving autologous vein coronary bypass graft without angina pectoris    Hypercholesteremia    Cancer of appendix    Free intraperitoneal air    Herpes zoster    Dehydration    History of abdominal aortic aneurysm (AAA)    S/P EVAR for abdominal aortic aneurysm (AAA) in February/March 2017    Perforation of sigmoid colon due to diverticulitis    Screening for colon cancer    Ileostomy present    History of ileostomy    Perforation of sigmoid colon    Septic shock       Review of patient's allergies indicates:   Allergen Reactions    Morphine Rash       Current Inpatient Medications:   ceFEPime (MAXIPIME) IVPB  2 g Intravenous Q12H    hydrocortisone sod succ (PF)  100 mg Intravenous Q8H    lactated ringers  2,000 mL Intravenous Once    magnesium sulfate IVPB  4 g Intravenous Once    metronidazole  500 mg  Intravenous Q8H    pantoprazole 40 mg in dextrose 5 % 100 mL infusion (ready to mix system)  40 mg Intravenous Daily    potassium phosphate IVPB  30 mmol Intravenous Once       No current facility-administered medications on file prior to encounter.      Current Outpatient Prescriptions on File Prior to Encounter   Medication Sig Dispense Refill    acetaminophen (TYLENOL EXTRA STRENGTH) 500 MG tablet Take 500 mg by mouth 2 (two) times daily as needed for Pain.      aspirin 81 MG Chew Take 81 mg by mouth once daily.      atorvastatin (LIPITOR) 10 MG tablet TAKE 1 TABLET BY MOUTH EVERY DAY 90 tablet 0    citalopram (CELEXA) 40 MG tablet TAKE 1 TABLET BY MOUTH EVERY DAY 90 tablet 0    clopidogrel (PLAVIX) 75 mg tablet TAKE 1 TABLET BY MOUTH EVERY DAY 90 tablet 0    docusate sodium (COLACE) 100 MG capsule Take 1 capsule (100 mg total) by mouth 2 (two) times daily.  0    ferrous sulfate 325 (65 FE) MG EC tablet Take 325 mg by mouth 2 (two) times daily.      hydrocodone-acetaminophen 10-325mg (NORCO)  mg Tab Take 1 tablet by mouth every 6 (six) hours as needed. 30 tablet 0    losartan-hydrochlorothiazide 100-12.5 mg (HYZAAR) 100-12.5 mg Tab TAKE 1 TABLET BY MOUTH EVERY DAY 90 tablet 0    meloxicam (MOBIC) 15 MG tablet TAKE 1 TABLET BY MOUTH EVERY DAY 90 tablet 0    metFORMIN (GLUCOPHAGE-XR) 750 MG 24 hr tablet TAKE 1 TABLET BY MOUTH TWICE A  tablet 0    metoprolol succinate (TOPROL-XL) 25 MG 24 hr tablet TAKE 1 TABLET BY MOUTH TWICE A  tablet 0    metoprolol tartrate (LOPRESSOR) 25 MG tablet TAKE 1 TABLET BY MOUTH TWICE DAILY 180 tablet 0    MULTIVIT WITH MINERALS/LUTEIN (MULTIVITAMIN 50 PLUS ORAL) Take by mouth.      predniSONE (DELTASONE) 5 MG tablet 5 mg 2 (two) times daily.       vitamin D 1000 units Tab Take 2,000 Units by mouth once daily.         Past Surgical History:   Procedure Laterality Date    APPENDECTOMY      COLONOSCOPY N/A 3/6/2018    Procedure: COLONOSCOPY;   Surgeon: Yfn Martinez MD;  Location: [facility] ENDO (4TH FLR);  Service: Endoscopy;  Laterality: N/A;   See telephone encounter dated 1/30/18/pt on Plavix/neither PCP or cardiologist will clear him to hold med without being evaluated/wife refusing to make appointment/per Dr Riley, pt to remain on Plavix and proceed with colonoscopy with no biposies. Spoke with wife on 2/8/1    EVAR for AAA      S/P EVAR for AAA in 2017    open heart       RIGHT COLECTOMY  2016       Social History     Social History    Marital status:      Spouse name: N/A    Number of children: N/A    Years of education: N/A     Occupational History    Not on file.     Social History Main Topics    Smoking status: Former Smoker     Types: Cigarettes, Pipe     Quit date: 11/30/2017    Smokeless tobacco: Former User    Alcohol use No    Drug use: No    Sexual activity: Yes     Partners: Female     Birth control/ protection: None     Other Topics Concern    Not on file     Social History Narrative    No narrative on file       OBJECTIVE:     Vital Signs Range (Last 24H):  Temp:  [36.8 °C (98.3 °F)-39.3 °C (102.7 °F)]   Pulse:  []   Resp:  [14-36]   BP: ()/(36-77)   SpO2:  [88 %-100 %]       CBC:   Recent Labs      05/02/18 2023 05/03/18 0329   WBC  6.30  17.70*   RBC  4.87  4.27*   HGB  11.4*  10.0*   HCT  35.7*  33.8*   PLT  137*  132*   MCV  74*  79*   MCH  23.3*  23.4*   MCHC  31.8*  29.6*       CMP:   Recent Labs      05/02/18 2023 05/02/18 2037 05/03/18   0329   NA  137   --   131*  131*   K  3.0*   --   4.0  4.0   CL  101   --   100  100   CO2  23   --   18*  18*   BUN  21*   --   18  18   CREATININE  1.1   --   1.1  1.1   GLU  165*   --   175*  175*   MG   --   1.1*  1.7   PHOS   --   1.4*  4.2   CALCIUM  8.6   --   8.0*  8.0*   ALBUMIN  3.0*   --   2.2*   PROT  7.5   --   5.9*   ALKPHOS  73   --   63   ALT  17   --   10   AST  29   --   17   BILITOT  0.6   --   0.8       INR:  Recent Labs       05/02/18 2037 05/03/18   0329   INR  1.2  1.2   APTT  23.9*  23.9       Diagnostic Studies: No relevant studies.    EKG:  Normal sinus rhythm  Right bundle branch block  Possible Inferior infarct (cited on or before 02-MAY-2018)  Abnormal ECG  When compared with ECG of 02-MAY-2018 21:45,  No significant change was found  Independent Interpretation    2D ECHO:  Results for orders placed or performed during the hospital encounter of 11/21/17   2D echo with color flow doppler   Result Value Ref Range    EF 55 55 - 65    Aortic Valve Regurgitation TRIVIAL     Est. PA Systolic Pressure 33.64     Tricuspid Valve Regurgitation MILD          ASSESSMENT/PLAN:         Anesthesia Evaluation    I have reviewed the Patient Summary Reports.     I have reviewed the Medications.     Review of Systems  Anesthesia Hx:  Denies Hx of Anesthetic complications  History of prior surgery of interest to airway management or planning: Denies Family Hx of Anesthesia complications.   Denies Personal Hx of Anesthesia complications.   Social:  Former Smoker    Hematology/Oncology:     Oncology Normal    -- Anemia:   EENT/Dental:EENT/Dental Normal   Cardiovascular:   Hypertension CAD asymptomatic CABG/stent   Denies Angina.        Pulmonary:  Pulmonary Normal  Denies COPD.  Denies Asthma.    Renal/:   renal calculi    Hepatic/GI:   GERD, well controlled    Neurological:  Neurology Normal  Denies CVA. Denies Seizures.    Endocrine:   Diabetes, type 2    Psych:   depression          Physical Exam  General:  Well nourished, Obesity    Airway/Jaw/Neck:  Airway Findings: Mouth Opening: Small, but > 3cm Tongue: Normal  General Airway Assessment: Adult  Mallampati: IV  Improves to III with phonation.  TM Distance: Normal, at least 6 cm         Dental:  Dental Findings: In tact   Chest/Lungs:  Chest/Lungs Clear    Heart/Vascular:  Heart Findings: Normal       Mental Status:  Mental Status Findings:  Cooperative, Alert and Oriented          Anesthesia Plan  Type of Anesthesia, risks & benefits discussed:  Anesthesia Type:  general  Patient's Preference:   Intra-op Monitoring Plan: standard ASA monitors and arterial line  Intra-op Monitoring Plan Comments:   Post Op Pain Control Plan: multimodal analgesia, IV/PO Opioids PRN and per primary service following discharge from PACU  Post Op Pain Control Plan Comments:   Induction:   IV  Beta Blocker:  Patient is on a Beta-Blocker and has received one dose within the past 24 hours (No further documentation required).       Informed Consent: Patient understands risks and agrees with Anesthesia plan.  Questions answered. Anesthesia consent signed with patient.  ASA Score: 4     Day of Surgery Review of History & Physical:    H&P update referred to the surgeon.         Ready For Surgery From Anesthesia Perspective.

## 2018-05-03 NOTE — ED TRIAGE NOTES
Pt arrived via ems from [facility] for treatment from a bowel perforation. Pt arrived with dopamine going at 11 mcg/kg/min. Pt on venti mask.

## 2018-05-03 NOTE — TRANSFER OF CARE
Anesthesia Transfer of Care Note    Patient: Cristo Jefferson    Procedure(s) Performed: Procedure(s) (LRB):  EXPLORATORY LAPAROTOMY Drainage of pelvic abscess (N/A)  COLOSTOMY (N/A)  LYSIS OF ADHESIONS (N/A)    Patient location: ICU    Anesthesia Type: general    Transport from OR: Transported from OR intubated on 100% O2 by AMBU with assisted ventilation. Upon arrival to PACU/ICU, patient attached to ventilator and auscultated to confirm bilateral breath sounds and adequate TV. Continuous ECG monitoring in transport. Continuous SpO2 monitoring in transport. Continuos invasive BP monitoring in transport    Post pain: adequate analgesia    Post assessment: no apparent anesthetic complications    Post vital signs: stable    Level of consciousness: sedated    Nausea/Vomiting: no nausea/vomiting    Complications: none    Transfer of care protocol was followed      Last vitals:   Visit Vitals  BP (!) 104/59   Pulse 105   Temp 37.4 °C (99.3 °F) (Oral)   Resp (!) 24   Wt 96.2 kg (212 lb)   SpO2 97%   BMI 31.31 kg/m²

## 2018-05-03 NOTE — HPI
Cristo Jefferson is a 56 yo M with PMH of RA on immunosuppressants (prednisone 12.5 mg daily), DM II, HTN, CAD s/p CABG (on plavix), appendiceal CA s/p appendectomy, R hemicolectomy and adjuvant chemo/XRT (3/2017), sigmoid diverticulitis with perforation s/p ex-lap, loop ileostomy 11/2017 and most recently ileostomy takedown (Dr. Riley) 4/4/18. Patient presented to [facility] yesterday afternoon with acute onset abdominal pain, tachycardia, hypotension and CT with sigmoid perforation with adjacent fluid collection and abscess 5 cm in size not appearing well-contained or walled-off. Febrile to 102.7 in ED and tachycardic in 110s and hypotensive on 0.1 of levophed. He was emergently taken to the OR for ex-lap where he was found to have recurrent colonic perforation, transverse colon was used for end colostomy and his abdomen was packed. He was transferred to the SICU with skin closure of abdomen only, intubated and on 0.07 of levo.

## 2018-05-03 NOTE — SUBJECTIVE & OBJECTIVE
Follow-up For: Procedure(s) (LRB):  EXPLORATORY LAPAROTOMY Drainage of pelvic abscess (N/A)  COLOSTOMY (N/A)  LYSIS OF ADHESIONS (N/A)    Post-Operative Day: Day of Surgery     Past Medical History:   Diagnosis Date    Arthritis     Cancer 2016    appendix, completed chemo    Depression     Diabetes mellitus type I     Hyperlipidemia     Hypertension     On home O2        Past Surgical History:   Procedure Laterality Date    APPENDECTOMY      COLONOSCOPY N/A 3/6/2018    Procedure: COLONOSCOPY;  Surgeon: Yfn Martinez MD;  Location: [facility] ([address] [place] [address]);  Service: Endoscopy;  Laterality: N/A;   See telephone encounter dated 1/30/18/pt on Plavix/neither PCP or cardiologist will clear him to hold med without being evaluated/wife refusing to make appointment/per Dr Riley, pt to remain on Plavix and proceed with colonoscopy with no biposies. Spoke with wife on 2/8/1    EVAR for AAA      S/P EVAR for AAA in 2017    open heart       RIGHT COLECTOMY  2016       Review of patient's allergies indicates:   Allergen Reactions    Morphine Rash       Family History     Problem Relation (Age of Onset)    Cancer Father, Sister    Heart disease Mother, Father        Social History Main Topics    Smoking status: Former Smoker     Types: Cigarettes, Pipe     Quit date: 11/30/2017    Smokeless tobacco: Former User    Alcohol use No    Drug use: No    Sexual activity: Yes     Partners: Female     Birth control/ protection: None      Review of Systems   Unable to perform ROS: Intubated     Objective:     Vital Signs (Most Recent):  Temp: 98.9 °F (37.2 °C) (05/03/18 0800)  Pulse: (!) 113 (05/03/18 0815)  Resp: 13 (05/03/18 0815)  BP: (!) 161/81 (05/03/18 0755)  SpO2: (!) 92 % (05/03/18 0815) Vital Signs (24h Range):  Temp:  [98.3 °F (36.8 °C)-102.7 °F (39.3 °C)] 98.9 °F (37.2 °C)  Pulse:  [] 113  Resp:  [12-36] 13  SpO2:  [88 %-100 %] 92 %  BP: ()/(36-81) 161/81  Arterial Line BP: (152-153)/(63-77) 152/63      Weight: 96.2 kg (212 lb) (pt weighed himself this am)  Body mass index is 31.31 kg/m².      Intake/Output Summary (Last 24 hours) at 05/03/18 0828  Last data filed at 05/03/18 0800   Gross per 24 hour   Intake             3400 ml   Output             2000 ml   Net             1400 ml       Physical Exam   Constitutional: He appears well-developed and well-nourished.   HENT:   Head: Normocephalic and atraumatic.   Nose: Nose normal.   Eyes: Conjunctivae are normal. Pupils are equal, round, and reactive to light. No scleral icterus.   Cardiovascular: Normal rate and regular rhythm.  Exam reveals no gallop and no friction rub.    No murmur heard.  Pulmonary/Chest:   Intubated  Mechanical breath sounds   Abdominal: Soft.   Midline incision c/d/i  Transverse colostomy pink   Musculoskeletal: Normal range of motion.   Neurological:   Sedated   Skin: Skin is warm and dry. No rash noted.       Vents:  Vent Mode: A/C (05/03/18 0815)  Ventilator Initiated: Yes (05/03/18 0815)  Set Rate: 16 bmp (05/03/18 0815)  Vt Set: 450 mL (05/03/18 0815)  Pressure Support: 0 cmH20 (05/03/18 0815)  PEEP/CPAP: 5 cmH20 (05/03/18 0815)  Oxygen Concentration (%): 60 (05/03/18 0815)  Peak Airway Pressure: 26 cmH2O (05/03/18 0815)  Plateau Pressure: 0 cmH20 (05/03/18 0815)  Total Ve: 8.12 mL (05/03/18 0815)  F/VT Ratio<105 (RSBI): (!) 25.79 (05/03/18 0815)    Lines/Drains/Airways     Central Venous Catheter Line                 Port A Cath Single Lumen 05/02/18 2105 left subclavian less than 1 day          Drain                 Drain/Device  Right -- days         Colostomy 05/03/18 0715 RUQ less than 1 day         Urethral Catheter 05/03/18 0615 Straight-tip;Non-latex 16 Fr. less than 1 day          Airway                 Airway - Non-Surgical 05/03/18 0611 Endotracheal Tube less than 1 day          Arterial Line                 Arterial Line 05/03/18 0615 Left Radial less than 1 day          Surgical Packing                 Surgical Packing  less than 1 day          Peripheral Intravenous Line                 Peripheral IV - Single Lumen 05/02/18 2024 Left Antecubital less than 1 day         Peripheral IV - Single Lumen 05/03/18 0118 Right Forearm less than 1 day         Peripheral IV - Single Lumen 05/03/18 0619 Right Hand less than 1 day                Significant Labs:    CBC/Anemia Profile:    Recent Labs  Lab 05/02/18 2023 05/03/18  0329   WBC 6.30 17.70*   HGB 11.4* 10.0*   HCT 35.7* 33.8*   * 132*   MCV 74* 79*   RDW 18.8* 18.1*        Chemistries:    Recent Labs  Lab 05/02/18 2023 05/02/18 2037 05/03/18 0329     --  131*  131*   K 3.0*  --  4.0  4.0     --  100  100   CO2 23  --  18*  18*   BUN 21*  --  18  18   CREATININE 1.1  --  1.1  1.1   CALCIUM 8.6  --  8.0*  8.0*   ALBUMIN 3.0*  --  2.2*   PROT 7.5  --  5.9*   BILITOT 0.6  --  0.8   ALKPHOS 73  --  63   ALT 17  --  10   AST 29  --  17   MG  --  1.1* 1.7   PHOS  --  1.4* 4.2       ABGs:   Recent Labs  Lab 05/03/18  0755   PH 7.284*   PCO2 65.4*   HCO3 31.0*   POCSATURATED 100   BE 4       Significant Imaging: I have reviewed and interpreted all pertinent imaging results/findings within the past 24 hours.

## 2018-05-03 NOTE — ANESTHESIA POSTPROCEDURE EVALUATION
Anesthesia Post Evaluation    Patient: Cristo Jefferson    Procedure(s) Performed: Procedure(s) (LRB):  EXPLORATORY LAPAROTOMY Drainage of pelvic abscess (N/A)  COLOSTOMY (N/A)  LYSIS OF ADHESIONS (N/A)    Final Anesthesia Type: general  Patient location during evaluation: ICU  Patient participation: No - Unable to Participate, Intubation  Level of consciousness: sedated  Post-procedure vital signs: reviewed and stable  Pain management: adequate  Airway patency: patent  PONV status at discharge: No PONV  Anesthetic complications: no      Cardiovascular status: blood pressure returned to baseline and hemodynamically stable  Respiratory status: ETT, intubated and ventilator  Hydration status: euvolemic  Follow-up not needed.        Visit Vitals  BP (!) 161/81   Pulse (!) 113   Temp 37.2 °C (98.9 °F) (Oral)   Resp 13   Wt 96.2 kg (212 lb)   SpO2 (!) 92%   BMI 31.31 kg/m²       Pain/Rebecca Score: Pain Assessment Performed: Yes (5/3/2018  8:00 AM)  Presence of Pain: non-verbal indicators absent (5/3/2018  8:00 AM)  Pain Rating Prior to Med Admin: 6 (5/3/2018  8:15 AM)

## 2018-05-03 NOTE — ANESTHESIA PREPROCEDURE EVALUATION
[facility]  Anesthesia Pre-Operative Evaluation         Patient Name: Cristo Jefferson  YOB: 1960  MRN: [id number]    SUBJECTIVE:     Pre-operative evaluation for Procedure(s) (LRB):  EXPLORATORY-LAPAROTOMY (N/A)     05/03/2018    Cristo Jefferson is a 57 y.o. male w/ a significant PMHx of HTN, CAD s/p CABG and 4 stents (denies chest pain, dyspnea), AAA s/p repair in feb or march of 2017, DM2, previous colonic perforation now with recurrent perforation who underwent exploratory laparotomy/drainage of pelvic abscess on 5/3/18. Patient now presents for the above procedure.    LDA:        Port A Cath Single Lumen 05/02/18 2105 left subclavian (Active)   Dressing Type Transparent 5/3/2018  8:00 AM   Dressing Status Biopatch in place;Clean;Dry;Intact 5/3/2018  8:00 AM   Dressing Intervention New dressing 5/3/2018  8:00 AM   Line Status Blood return noted;Flushed;Infusing 5/3/2018  8:00 AM   Flush Performed Yes 5/3/2018  8:00 AM   Daily Line Review Performed 5/3/2018  8:00 AM   Number of days: 0            Percutaneous Central Line Insertion/Assessment - triple lumen  05/03/18 1030 right internal jugular (Active)   Number of days: 0            Peripheral IV - Single Lumen 05/02/18 2024 Left Antecubital (Active)   Site Assessment Clean;Dry;Intact;No redness;No swelling 5/3/2018  8:00 AM   Line Status Blood return noted;Flushed;Infusing 5/3/2018  8:00 AM   Dressing Status Clean;Dry;Intact 5/3/2018  8:00 AM   Dressing Intervention New dressing 5/3/2018  8:00 AM   Dressing Change Due 05/06/18 5/3/2018  8:00 AM   Site Change Due 05/06/18 5/3/2018  8:00 AM   Reason Not Rotated Not due 5/3/2018  8:00 AM   Number of days: 0            Peripheral IV - Single Lumen 05/03/18 0118 Right Forearm (Active)   Site Assessment Clean;Dry;Intact;No redness;No swelling 5/3/2018  8:00 AM   Line Status Blood return noted;Flushed;Saline locked 5/3/2018  8:00 AM   Dressing Status Clean;Dry;Intact 5/3/2018  8:00 AM    Dressing Intervention New dressing 5/3/2018  8:00 AM   Dressing Change Due 05/07/18 5/3/2018  8:00 AM   Site Change Due 05/07/18 5/3/2018  8:00 AM   Reason Not Rotated Not due 5/3/2018  8:00 AM   Number of days: 0            Peripheral IV - Single Lumen 05/03/18 0619 Right Hand (Active)   Site Assessment Clean;Dry;Intact;No redness;No swelling 5/3/2018  8:00 AM   Line Status Flushed;Infusing 5/3/2018  8:00 AM   Dressing Status Clean;Dry;Intact 5/3/2018  8:00 AM   Dressing Intervention New dressing 5/3/2018  8:00 AM   Dressing Change Due 05/07/18 5/3/2018  8:00 AM   Site Change Due 05/07/18 5/3/2018  8:00 AM   Reason Not Rotated Not due 5/3/2018  8:00 AM   Number of days: 0            Arterial Line 05/03/18 0615 Left Radial (Active)   Site Assessment Clean;Dry;Intact;No redness;No swelling 5/3/2018  8:03 AM   Line Status Pulsatile blood flow 5/3/2018  8:03 AM   Art Line Waveform Appropriate;Square wave test performed 5/3/2018  8:03 AM   Arterial Line Interventions Zeroed and calibrated;Leveled;Connections checked and tightened;Flushed per protocol;Line pulled back 5/3/2018  8:03 AM   Color/Movement/Sensation Capillary refill less than 3 sec 5/3/2018  8:03 AM   Dressing Type Transparent 5/3/2018  8:03 AM   Dressing Status Clean;Dry;Intact 5/3/2018  8:03 AM   Dressing Intervention New dressing 5/3/2018  8:03 AM   Dressing Change Due 05/07/18 5/3/2018  8:03 AM   Number of days: 0            Colostomy 05/03/18 0715 RUQ (Active)   Stomal Appliance 1 piece;Clean;Dry;Intact 5/3/2018  8:03 AM   Stoma Appearance round;rosebud appearance;pink 5/3/2018  8:03 AM   Site Assessment Clean;Intact 5/3/2018  8:03 AM   Peristomal Assessment Clean;Intact 5/3/2018  8:03 AM   Accessories/Skin Care appliance belt 5/3/2018  8:03 AM   Tolerance no signs/symptoms of discomfort 5/3/2018  8:03 AM   Number of days: 0            Urethral Catheter 05/03/18 0615 Straight-tip;Non-latex 16 Fr. (Active)   Site Assessment Clean;Intact 5/3/2018  8:03  "AM   Collection Container Urimeter 5/3/2018  8:03 AM   Securement Method secured to top of thigh w/ adhesive device 5/3/2018  8:03 AM   Catheter Care Performed yes 5/3/2018  8:03 AM   Reason for Continuing Urinary Catheterization Critically ill in ICU requiring intensive monitoring 5/3/2018  8:03 AM   CAUTI Prevention Bundle StatLock in place w 1" slack;Intact seal between catheter & drainage tubing;Drainage bag off the floor;Green sheeting clip in use;No dependent loops or kinks;Drainage bag not overfilled (<2/3 full);Drainage bag below bladder 5/3/2018  8:03 AM   Output (mL) 175 mL 5/3/2018 10:00 AM   Number of days: 0            Drain/Device  Right (Active)   Number of days:        Prev airway:     Present Prior to Hospital Arrival?: No; Placement Date: 05/03/18; Placement Time: 0611; Method of Intubation: Direct laryngoscopy; Inserted by: CRNA; Airway Device: Endotracheal Tube; Mask Ventilation: Not Attempted; Intubated: Postinduction; Blade: Schaefer #2; Airway Device Size: 7.5; Style: Cuffed; Cuff Inflation: Minimal occlusive pressure; Inflation Amount: 8; Placement Verified By: Auscultation, Capnometry; Grade: Grade I; Complicating Factors: None; Intubation Findings: Positive EtCO2, Bilateral breath sounds, Atraumatic/Condition of teeth unchanged;  Depth of Insertion: 21; Securment: Teeth; Complications: None; Breath Sounds: Equal Bilateral; Insertion Attempts: 1    Drips:    dexmedetomidine (PRECEDEX) infusion 1 mcg/kg/hr (05/03/18 1000)    fentanyl 75 mcg/hr (05/03/18 1000)    lactated ringers 125 mL/hr at 05/03/18 0753    norepinephrine bitartrate-D5W 0.2 mcg/kg/min (05/03/18 1000)    vasopressin (PITRESSIN) infusion         Patient Active Problem List   Diagnosis    Non-insulin dependent type 2 diabetes mellitus    Essential hypertension    Major depressive disorder    Coronary artery disease involving autologous vein coronary bypass graft without angina pectoris    Hypercholesteremia    Cancer " of appendix    Free intraperitoneal air    Herpes zoster    Dehydration    History of abdominal aortic aneurysm (AAA)    S/P EVAR for abdominal aortic aneurysm (AAA) in February/March 2017    Perforation of sigmoid colon due to diverticulitis    Screening for colon cancer    Ileostomy present    History of ileostomy    Perforation of sigmoid colon    Septic shock       Review of patient's allergies indicates:   Allergen Reactions    Morphine Rash       Current Inpatient Medications:   ceFEPime (MAXIPIME) IVPB  2 g Intravenous Q8H    fentaNYL        hydrocortisone sod succ (PF)  100 mg Intravenous Q8H    metronidazole  500 mg Intravenous Q8H    pantoprazole 40 mg in dextrose 5 % 100 mL infusion (ready to mix system)  40 mg Intravenous Daily    vancomycin (VANCOCIN) IVPB  1,500 mg Intravenous Q12H       No current facility-administered medications on file prior to encounter.      Current Outpatient Prescriptions on File Prior to Encounter   Medication Sig Dispense Refill    acetaminophen (TYLENOL EXTRA STRENGTH) 500 MG tablet Take 500 mg by mouth 2 (two) times daily as needed for Pain.      aspirin 81 MG Chew Take 81 mg by mouth once daily.      atorvastatin (LIPITOR) 10 MG tablet TAKE 1 TABLET BY MOUTH EVERY DAY 90 tablet 0    citalopram (CELEXA) 40 MG tablet TAKE 1 TABLET BY MOUTH EVERY DAY 90 tablet 0    clopidogrel (PLAVIX) 75 mg tablet TAKE 1 TABLET BY MOUTH EVERY DAY 90 tablet 0    docusate sodium (COLACE) 100 MG capsule Take 1 capsule (100 mg total) by mouth 2 (two) times daily.  0    ferrous sulfate 325 (65 FE) MG EC tablet Take 325 mg by mouth 2 (two) times daily.      hydrocodone-acetaminophen 10-325mg (NORCO)  mg Tab Take 1 tablet by mouth every 6 (six) hours as needed. 30 tablet 0    losartan-hydrochlorothiazide 100-12.5 mg (HYZAAR) 100-12.5 mg Tab TAKE 1 TABLET BY MOUTH EVERY DAY 90 tablet 0    meloxicam (MOBIC) 15 MG tablet TAKE 1 TABLET BY MOUTH EVERY DAY 90 tablet 0     metFORMIN (GLUCOPHAGE-XR) 750 MG 24 hr tablet TAKE 1 TABLET BY MOUTH TWICE A  tablet 0    metoprolol succinate (TOPROL-XL) 25 MG 24 hr tablet TAKE 1 TABLET BY MOUTH TWICE A  tablet 0    metoprolol tartrate (LOPRESSOR) 25 MG tablet TAKE 1 TABLET BY MOUTH TWICE DAILY 180 tablet 0    MULTIVIT WITH MINERALS/LUTEIN (MULTIVITAMIN 50 PLUS ORAL) Take by mouth.      predniSONE (DELTASONE) 5 MG tablet 5 mg 2 (two) times daily.       vitamin D 1000 units Tab Take 2,000 Units by mouth once daily.         Past Surgical History:   Procedure Laterality Date    APPENDECTOMY      COLONOSCOPY N/A 3/6/2018    Procedure: COLONOSCOPY;  Surgeon: Yfn Martinez MD;  Location: [facility] ([address] [place] [address]);  Service: Endoscopy;  Laterality: N/A;   See telephone encounter dated 1/30/18/pt on Plavix/neither PCP or cardiologist will clear him to hold med without being evaluated/wife refusing to make appointment/per Dr Riley, pt to remain on Plavix and proceed with colonoscopy with no biposies. Spoke with wife on 2/8/1    EVAR for AAA      S/P EVAR for AAA in 2017    open heart       RIGHT COLECTOMY  2016       Social History     Social History    Marital status:      Spouse name: N/A    Number of children: N/A    Years of education: N/A     Occupational History    Not on file.     Social History Main Topics    Smoking status: Former Smoker     Types: Cigarettes, Pipe     Quit date: 11/30/2017    Smokeless tobacco: Former User    Alcohol use No    Drug use: No    Sexual activity: Yes     Partners: Female     Birth control/ protection: None     Other Topics Concern    Not on file     Social History Narrative    No narrative on file       OBJECTIVE:     Vital Signs Range (Last 24H):  Temp:  [36.8 °C (98.3 °F)-39.3 °C (102.7 °F)]   Pulse:  []   Resp:  [12-36]   BP: ()/(36-81)   SpO2:  [88 %-100 %]   Arterial Line BP: ()/(45-84)       CBC:   Recent Labs      05/03/18   0329  05/03/18   0751    WBC  17.70*  22.51*   RBC  4.27*  3.20*   HGB  10.0*  7.6*   HCT  33.8*  24.7*   PLT  132*  173   MCV  79*  77*   MCH  23.4*  23.8*   MCHC  29.6*  30.8*       CMP:   Recent Labs      05/02/18 2023 05/03/18   0329  05/03/18   0955   NA  137   --   131*  131*  135*   K  3.0*   --   4.0  4.0  3.9   CL  101   --   100  100  102   CO2  23   --   18*  18*  24   BUN  21*   --   18  18  15   CREATININE  1.1   --   1.1  1.1  0.8   GLU  165*   --   175*  175*  239*   MG   --    < >  1.7  1.8   PHOS   --    < >  4.2  4.8*   CALCIUM  8.6   --   8.0*  8.0*  7.0*   ALBUMIN  3.0*   --   2.2*   --    PROT  7.5   --   5.9*   --    ALKPHOS  73   --   63   --    ALT  17   --   10   --    AST  29   --   17   --    BILITOT  0.6   --   0.8   --     < > = values in this interval not displayed.       INR:  Recent Labs      05/02/18 2037 05/03/18 0329 05/03/18   0752   INR  1.2  1.2  1.3*   APTT  23.9*  23.9  27.7       Diagnostic Studies:     EKG: Pending.     2D ECHO:  Results for orders placed or performed during the hospital encounter of 11/21/17   2D echo with color flow doppler   Result Value Ref Range    EF 55 55 - 65    Aortic Valve Regurgitation TRIVIAL     Est. PA Systolic Pressure 33.64     Tricuspid Valve Regurgitation MILD          ASSESSMENT/PLAN:         Anesthesia Evaluation    I have reviewed the Patient Summary Reports.     I have reviewed the Medications.     Review of Systems  Anesthesia Hx:  Denies Hx of Anesthetic complications  History of prior surgery of interest to airway management or planning: Denies Family Hx of Anesthesia complications.   Denies Personal Hx of Anesthesia complications.   Social:  Former Smoker    Hematology/Oncology:     Oncology Normal    -- Anemia:   EENT/Dental:EENT/Dental Normal   Cardiovascular:   Hypertension CAD asymptomatic CABG/stent   Denies Angina.        Pulmonary:  Pulmonary Normal  Denies COPD.  Denies Asthma.    Renal/:   renal calculi    Hepatic/GI:   GERD,  well controlled    Neurological:  Neurology Normal  Denies CVA. Denies Seizures.    Endocrine:   Diabetes, type 2    Psych:   Psychiatric History depression          Physical Exam  General:  Well nourished, Obesity    Airway/Jaw/Neck:  Airway Findings: Mouth Opening: Small, but > 3cm Tongue: Normal  General Airway Assessment: Adult  Mallampati: IV  Improves to III with phonation.  TM Distance: Normal, at least 6 cm  Jaw/Neck Findings:  Neck ROM: Normal ROM  Neck Findings:  Girth Increased      Dental:  Dental Findings: In tact, Periodontal disease, Mild   Chest/Lungs:  Chest/Lungs Findings: Clear to auscultation, Normal Respiratory Rate     Heart/Vascular:  Heart Findings: Rate: Normal  Rhythm: Regular Rhythm  Sounds: Normal      Musculoskeletal:  Musculoskeletal Findings: Normal   Skin:  Skin Findings: Normal    Mental Status:  Mental Status Findings:  Cooperative, Alert and Oriented         Anesthesia Plan  Type of Anesthesia, risks & benefits discussed:  Anesthesia Type:  general  Patient's Preference:   Intra-op Monitoring Plan: standard ASA monitors and arterial line  Intra-op Monitoring Plan Comments:   Post Op Pain Control Plan: multimodal analgesia, IV/PO Opioids PRN and per primary service following discharge from PACU  Post Op Pain Control Plan Comments:   Induction:   IV  Beta Blocker:  Patient is not currently on a Beta-Blocker (No further documentation required).       Informed Consent: Patient understands risks and agrees with Anesthesia plan.  Questions answered. Anesthesia consent signed with patient.  ASA Score: 4     Day of Surgery Review of History & Physical:  There are no significant changes.  H&P update referred to the surgeon.         Ready For Surgery From Anesthesia Perspective.

## 2018-05-03 NOTE — OP NOTE
DATE OF PROCEDURE:  05/03/2018.    ATTENDING:  Star Bautista M.D.    ASSISTANT:  Estrella Samayoa M.D. (RES).    PREOPERATIVE DIAGNOSES:  1.  Recurrent perforation of sigmoid colon.  2.  Intraabdominal sepsis with septic shock.  3.  Chronic immunosuppression, on prednisone.  4.  History of coronary artery disease, on Plavix.    POSTOPERATIVE DIAGNOSES:  1.  Recurrent perforation of sigmoid colon.  2.  Intraabdominal sepsis with septic shock.  3.  Chronic immunosuppression, on prednisone.  4.  History of coronary artery disease, on Plavix.    PROCEDURES PERFORMED:  1.  Exploratory laparotomy.  2.  Lysis of adhesions.  3.  Drainage of pelvic abscess.  4.  End transverse colostomy.    ANESTHESIA:  General.    ESTIMATED BLOOD LOSS:  150 mL.    IV FLUIDS:  4000 mL of crystalloid.    URINE OUTPUT:  1000 mL of clear yellow urine.    DRAINS:  None.    SPECIMENS:  None.    INDICATIONS FOR PROCEDURE:  This is a very pleasant unfortunate 57-year-old male   who has a history of appendiceal cancer a number of years ago.  This was   ultimately treated with right hemicolectomy as well as adjuvant chemotherapy.    The patient went on to, last year, require loop ileostomy and abdominal washout   for perforated sigmoid colon with sepsis.  This was done in November.  The   patient went on to have further difficulties with severe dehydration and fluid   loss from his ileostomy and underwent a closure of his loop ileostomy one month   ago by Dr. Riley after having a normal colonoscopy and water-soluble enema.    Unfortunately, the patient re-presents to [facility] as a transfer after a 24-hour   history of abdominal pain with fevers.  The patient was found to be in septic   shock, on pressors, in the Emergency Room and tachycardic with CAT scan showing   a free perforation of the sigmoid and surrounding fluid.  The patient was   consented on risks and benefits and we proceeded to the Operating Room   emergently today as recommended.   The patient received multiple doses of   antibiotics including 2 g of IV cefepime and 500 mg of IV Flagyl in our ER as   well as 100 mg of IV hydrocortisone for stress-dose steroids.    OPERATIVE PROCEDURE IN DETAIL:  Following appropriate informed consent, the   patient was taken to the Operating Room where general anesthesia was induced   without complication.  The patient received 2 g of IV cefazolin and 500 mg of IV   Flagyl as well as 100 mg of IV hydrocortisone as stress steroids in the   Emergency Room prior to emergent surgery.  The abdomen was prepped and draped in   the standard sterile fashion with two Chlorhexidine preps.  Following a   WHO-appropriate timeout, a midline laparotomy incision was made directly over   the patient's prior incision.  Bovie electrocautery was used to divide through   the subcutaneous tissue to the level of the fascia, which opened up with minimal   difficulty with minimal electrocautery and was seen upon unraveling of his old   fascial sutures.  Upon entering the peritoneal cavity, we were met with a   moderate amount of green bilious succus.  This was within a thick, very   pronounced rind and was initially thought to be necrotic bowel underlying the   fascial wall; however, this was not actual enterotomy and was actually drainage   of his septic abdominal source.  We proceeded carefully with lysis of adhesions   with blunt and sharp as well as Bovie dissection.  The abdomen indeed was   essentially locked and, while we were able to essentially free up enough of the   pelvis to be happy with adequate drainage, we did not feel that pursuing a   further lysis of adhesions would benefit the patient in terms of significant   risk for enterotomy and future enterocutaneous fistula development.  We were   concerned though that the patient needed obvious diversion and the superior   portion of our incision was extended in order to attempt a colostomy given his   prior severe  difficulties with dehydration and ileostomy.  We were fortunately   able to encounter and mobilize a decent moderate portion of the transverse   colon.  This was mobilized to the mid left transverse colon and transected with   a 75 blue load DARRYN stapler.  This was brought up as a right-sided end colostomy.    A stoma was matured in the right upper quadrant for end colostomy, which was   created in the standard rosebud fashion with 3-0 Vicryls.  The stoma was nice   healthy and viable.  It should be noted that the patient was significantly   oozing related to his chronic use of Plavix for coronary artery disease and   history of bypass.  We did not feel it was safe to close the patient's abdomen   and elected to pack him instead as there was no definable source of bleeding, it   was just a diffuse ooze.  The abdomen was subsequently packed with nine lap   pads and his skin was closed with a running #2 nylon in a whipstitch fashion.    The patient was subsequently brought to the Surgical ICU, intubated with plans   to return to the Operating Room tomorrow for washout and closure of abdomen.    Otherwise, at the end of the case, the patient was making urine with over a   liter of clear yellow urine and received 4 liters of crystalloid.  He was on   0.07 of Levophed and requiring additional vasopressin bumps.  He otherwise was   stabilizing and brought to the ICU in stable, though critical, condition.  Dr. Bautista was present and scrubbed for the entire procedure and there were no   complications.    DICTATED BY:  Estrella Samayoa M.D. (RES).      SEB/BRAULIO  dd: 05/03/2018 08:27:52 (CDT)  td: 05/03/2018 10:07:44 (CDT)  Doc ID   #[id number]  Job ID #[id number]    CC:

## 2018-05-03 NOTE — ANESTHESIA PROCEDURE NOTES
Arterial    Diagnosis: bowel perforation    Patient location during procedure: done in OR  Procedure start time: 5/3/2018 6:15 AM  Timeout: 5/3/2018 6:15 AM  Procedure end time: 5/3/2018 6:20 AM  Staffing  Anesthesiologist: NELIA CORONA  Performed: anesthesiologist   Preanesthetic Checklist  Completed: patient identified, site marked, surgical consent, pre-op evaluation, timeout performed, IV checked, risks and benefits discussed, monitors and equipment checked and anesthesia consent givenArterial  Skin Prep: chlorhexidine gluconate  Local Infiltration: none  Orientation: left  Location: radial  Catheter Size: 20 G  Catheter placement by Anatomical landmarks. Heme positive aspiration all ports.Insertion Attempts: 2  Assessment  Dressing: secured with tape and tegaderm  Patient: Tolerated well

## 2018-05-03 NOTE — BRIEF OP NOTE
[facility]  Brief Operative Note    SUMMARY     Surgery Date:  5/3/2018     Surgeons:     * Star Bautista MD - Primary     * Estrella Samayoa MD - Resident - Assisting        Pre-op Diagnoses:    1.  Recurrent perforation of sigmoid colon.  2.  Septic shock.    Post-op Diagnoses:    1.  Recurrent perforation of sigmoid colon.  2.  Septic shock.    Procedures:  1.  Exploratory laparotomy.  2.  Lysis of adhesions.   3.  Drainage of pelvic abscess.  4.  End transverse colostomy.       Anesthesia:  General    Estimated Blood Loss:  150 mL    IV Fluids:  4000 mL of crystalloid.    Urine Output: 1000 mL clear yellow urine.    Drains:  None.    Specimens:  None.    Findings:  Essentially almost locked abdomen.  Immediately met with bilious enteric succus upon entering lower abdomen with surrounding thick rind which was felt to have been adequately drained for source control. After some lysis of adhesions, we were able to mobilize the mid-left transverse colon which was brought up as a right-sided end colostomy.  No complications, abdomen was left packed with 9 lap pads for diffuse oozing related to the patient being on Plavix.  Plan return to OR within the next 24 to 48 hours for washout and abdominal closure.

## 2018-05-03 NOTE — PROGRESS NOTES
Pt arrived to SICU Room 6067 from OR. Pt transferred via Anesthesia in hospital bed. Pt connected to ICU Monitor, IV pole, vent. RRT notified. SICU resident notified. ICU Charge notified. Goal to maintain MAP >65. Pt arouses to voice. Following all commands. Nods/gestures appropriately. Moving all extremities freely/equally. Plan of care reviewed with pt. See flowsheet for details.

## 2018-05-03 NOTE — ED PROVIDER NOTES
Encounter Date: 5/3/2018       History     Chief Complaint   Patient presents with    transfer     transfer from [facility]. Pt has perferated bowel     57-year-old male transferred to our facility from [facility] for acute surgical evaluation of a perforated colon.  Upon arrival to our facility the patient is on dopamine through a peripheral line, BPs in the 70s on arrival. Labs and CT showing signs of acute bowel perforation and pt is septic and Hypotensive.     HPI below written by MD at outside facility detailing pt Hx    This patient is 57-year-old male.  Presents to the emergency department complaining of onset about 2:00 this afternoon of nausea vomiting and diarrhea, after eating some crawfish.  This evening about 5 PM had a fever at home of 103.7.  Has diffuse abdominal cramps.  No hematemesis.  Has had some loose stool this afternoon 2 or 3 times, but he reports that loose stool and diarrhea have been a persistent problem for him for years.     He has an extensive abdominal surgical history.     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  After surgery, he went through chemotherapy for a little less than a year.  Appendectomy was performed in [place], and his oncology care was at [facility].  His last dose of chemotherapy was in February 2017.  On his last PET scan, they were told that he had multiple foci on the PET scan that showed hypermetabolic activity, but apparently the CT scan did not show masses, and biopsy was negative, according to his wife.     In February 2017, chemotherapy was stopped because he required repair of an abdominal aortic aneurysm.  The aneurysm had been surveilled for several years, but it increased in size from 3 cm to over 6 cm, so he underwent  endograft repair.  Subsequently, they were trying to have a repeat PET scan performed, but it was declined by his insurance company, so he did not have the follow-up at, and then he developed the next complication.     In November 2017, he developed a perforation of the colon, possibly secondary to to diverticulitis, developed a large abdominal abscess, became septic, and required an ileostomy and partial colectomy.  He was treated at [facility] for this problem.  Last month he had reversal of ileostomy, and has not had any complications until now.     He is immunosuppressed.  He has a history of rheumatoid arthritis, and is on daily prednisone.          Review of patient's allergies indicates:   Allergen Reactions    Morphine Rash     Past Medical History:   Diagnosis Date    Arthritis     Cancer 2016    appendix, completed chemo    Depression     Diabetes mellitus type I     Hyperlipidemia     Hypertension     On home O2      Past Surgical History:   Procedure Laterality Date    APPENDECTOMY      COLONOSCOPY N/A 3/6/2018    Procedure: COLONOSCOPY;  Surgeon: Yfn Martinez MD;  Location: [facility] ([address] [place] [address]);  Service: Endoscopy;  Laterality: N/A;   See telephone encounter dated 1/30/18/pt on Plavix/neither PCP or cardiologist will clear him to hold med without being evaluated/wife refusing to make appointment/per Dr Riley, pt to remain on Plavix and proceed with colonoscopy with no biposies. Spoke with wife on 2/8/1    EVAR for AAA      S/P EVAR for AAA in 2017    open heart       RIGHT COLECTOMY  2016     Family History   Problem Relation Age of Onset    Heart disease Mother     Heart disease Father     Cancer Father      prostate    Cancer Sister      breast , lymphoma     Social History   Substance Use Topics    Smoking status: Former Smoker     Types: Cigarettes, Pipe     Quit date: 11/30/2017    Smokeless tobacco: Former User    Alcohol use No     Review of Systems    Constitutional: Positive for chills, fatigue and fever.   HENT: Negative for sore throat.    Respiratory: Negative for shortness of breath.    Cardiovascular: Negative for chest pain.   Gastrointestinal: Positive for abdominal pain, nausea and vomiting.   Genitourinary: Negative for dysuria.   Musculoskeletal: Negative for back pain.   Skin: Negative for rash.   Neurological: Negative for weakness.   Hematological: Does not bruise/bleed easily.       Physical Exam     Initial Vitals   BP Pulse Resp Temp SpO2   05/03/18 0306 05/03/18 0306 05/03/18 0306 05/03/18 0311 05/03/18 0306   (!) 74/39 110 18 99.3 °F (37.4 °C) (!) 94 %      MAP       05/03/18 0306       50.67         Physical Exam    Constitutional: Vital signs are normal. He appears well-developed and well-nourished. He is not diaphoretic. No distress.   HENT:   Head: Normocephalic and atraumatic.   Right Ear: External ear normal.   Left Ear: External ear normal.   Eyes: Conjunctivae are normal.   Cardiovascular: Normal rate, regular rhythm and normal heart sounds. Exam reveals no gallop and no friction rub.    No murmur heard.  Pulmonary/Chest: Breath sounds normal. No respiratory distress. He has no wheezes. He has no rhonchi. He has no rales. He exhibits no tenderness.   Abdominal: Soft. Normal appearance and bowel sounds are normal. He exhibits no distension and no mass. There is tenderness. There is no rebound and no guarding.   Extensive scarring on the abdomen, Mild tenderness to the lower abdomen   Musculoskeletal: Normal range of motion.   Neurological: He is alert and oriented to person, place, and time.   Skin: Skin is warm and intact.   Psychiatric: He has a normal mood and affect. His speech is normal and behavior is normal. Cognition and memory are normal.         ED Course   Procedures  Labs Reviewed   CBC W/ AUTO DIFFERENTIAL - Abnormal; Notable for the following:        Result Value    WBC 17.70 (*)     RBC 4.27 (*)     Hemoglobin 10.0 (*)      Hematocrit 33.8 (*)     MCV 79 (*)     MCH 23.4 (*)     MCHC 29.6 (*)     RDW 18.1 (*)     Platelets 132 (*)     Immature Granulocytes 0.8 (*)     Gran # (ANC) 14.6 (*)     Immature Grans (Abs) 0.15 (*)     Gran% 82.7 (*)     Lymph% 10.1 (*)     All other components within normal limits   BASIC METABOLIC PANEL - Abnormal; Notable for the following:     Sodium 131 (*)     CO2 18 (*)     Glucose 175 (*)     Calcium 8.0 (*)     All other components within normal limits   LACTIC ACID, PLASMA - Abnormal; Notable for the following:     Lactate (Lactic Acid) 3.5 (*)     All other components within normal limits   COMPREHENSIVE METABOLIC PANEL - Abnormal; Notable for the following:     Sodium 131 (*)     CO2 18 (*)     Glucose 175 (*)     Calcium 8.0 (*)     Total Protein 5.9 (*)     Albumin 2.2 (*)     All other components within normal limits   CALCIUM, IONIZED - Abnormal; Notable for the following:     Calcium, Ion 0.97 (*)     All other components within normal limits   MAGNESIUM   PHOSPHORUS   PROTIME-INR   APTT   TYPE & SCREEN   PREPARE RBC SOFT             Medical Decision Making:   ED Management:  58 yo M with an abdominal perforation, Septic Shock.     Plan: ABX, Fluids, Pressors as needed,   Consult general Surgery  Pt will be taken Class A to the OR                       Clinical Impression:   The primary encounter diagnosis was Septic shock. Diagnoses of Encounter for imaging study to confirm nasogastric (NG) tube placement and Perforation of sigmoid colon were also pertinent to this visit.                           Agustin Morales PA-C  05/03/18 0584

## 2018-05-03 NOTE — H&P
[facility]  Critical Care - Surgery  History & Physical    Patient Name: Cristo Jefferson  MRN: [id number]  Admission Date: 5/3/2018  Code Status: Prior  Attending Physician: Star Bautista MD   Primary Care Provider: Vince Perez MD   Principal Problem: Perforation of sigmoid colon    Subjective:     HPI:  Cristo Jefferson is a 58 yo M with PMH of RA on immunosuppressants (prednisone 12.5 mg daily), DM II, HTN, CAD s/p CABG (on plavix), appendiceal CA s/p appendectomy, R hemicolectomy and adjuvant chemo/XRT (3/2017), sigmoid diverticulitis with perforation s/p ex-lap, loop ileostomy 11/2017 and most recently ileostomy takedown (Dr. Riley) 4/4/18. Patient presented to [facility] yesterday afternoon with acute onset abdominal pain, tachycardia, hypotension and CT with sigmoid perforation with adjacent fluid collection and abscess 5 cm in size not appearing well-contained or walled-off. Febrile to 102.7 in ED and tachycardic in 110s and hypotensive on 0.1 of levophed. He was emergently taken to the OR for ex-lap where he was found to have recurrent colonic perforation, transverse colon was used for end colostomy and his abdomen was packed. He was transferred to the SICU with skin closure of abdomen only, intubated and on 0.07 of levo.          Follow-up For: Procedure(s) (LRB):  EXPLORATORY LAPAROTOMY Drainage of pelvic abscess (N/A)  COLOSTOMY (N/A)  LYSIS OF ADHESIONS (N/A)    Post-Operative Day: Day of Surgery     Past Medical History:   Diagnosis Date    Arthritis     Cancer 2016    appendix, completed chemo    Depression     Diabetes mellitus type I     Hyperlipidemia     Hypertension     On home O2        Past Surgical History:   Procedure Laterality Date    APPENDECTOMY      COLONOSCOPY N/A 3/6/2018    Procedure: COLONOSCOPY;  Surgeon: Yfn Martinez MD;  Location: [facility] ([address] [place] [address];  Service: Endoscopy;  Laterality: N/A;   See telephone encounter dated 1/30/18/pt on Plavix/neither  PCP or cardiologist will clear him to hold med without being evaluated/wife refusing to make appointment/per Dr Riley, pt to remain on Plavix and proceed with colonoscopy with no biposies. Spoke with wife on 2/8/1    EVAR for AAA      S/P EVAR for AAA in 2017    open heart       RIGHT COLECTOMY  2016       Review of patient's allergies indicates:   Allergen Reactions    Morphine Rash       Family History     Problem Relation (Age of Onset)    Cancer Father, Sister    Heart disease Mother, Father        Social History Main Topics    Smoking status: Former Smoker     Types: Cigarettes, Pipe     Quit date: 11/30/2017    Smokeless tobacco: Former User    Alcohol use No    Drug use: No    Sexual activity: Yes     Partners: Female     Birth control/ protection: None      Review of Systems   Unable to perform ROS: Intubated     Objective:     Vital Signs (Most Recent):  Temp: 98.9 °F (37.2 °C) (05/03/18 0800)  Pulse: (!) 113 (05/03/18 0815)  Resp: 13 (05/03/18 0815)  BP: (!) 161/81 (05/03/18 0755)  SpO2: (!) 92 % (05/03/18 0815) Vital Signs (24h Range):  Temp:  [98.3 °F (36.8 °C)-102.7 °F (39.3 °C)] 98.9 °F (37.2 °C)  Pulse:  [] 113  Resp:  [12-36] 13  SpO2:  [88 %-100 %] 92 %  BP: ()/(36-81) 161/81  Arterial Line BP: (152-153)/(63-77) 152/63     Weight: 96.2 kg (212 lb) (pt weighed himself this am)  Body mass index is 31.31 kg/m².      Intake/Output Summary (Last 24 hours) at 05/03/18 0828  Last data filed at 05/03/18 0800   Gross per 24 hour   Intake             3400 ml   Output             2000 ml   Net             1400 ml       Physical Exam   Constitutional: He appears well-developed and well-nourished.   HENT:   Head: Normocephalic and atraumatic.   Nose: Nose normal.   Eyes: Conjunctivae are normal. Pupils are equal, round, and reactive to light. No scleral icterus.   Cardiovascular: Normal rate and regular rhythm.  Exam reveals no gallop and no friction rub.    No murmur  heard.  Pulmonary/Chest:   Intubated  Mechanical breath sounds   Abdominal: Soft.   Midline incision c/d/i  Transverse colostomy pink   Musculoskeletal: Normal range of motion.   Neurological:   Sedated   Skin: Skin is warm and dry. No rash noted.       Vents:  Vent Mode: A/C (05/03/18 0815)  Ventilator Initiated: Yes (05/03/18 0815)  Set Rate: 16 bmp (05/03/18 0815)  Vt Set: 450 mL (05/03/18 0815)  Pressure Support: 0 cmH20 (05/03/18 0815)  PEEP/CPAP: 5 cmH20 (05/03/18 0815)  Oxygen Concentration (%): 60 (05/03/18 0815)  Peak Airway Pressure: 26 cmH2O (05/03/18 0815)  Plateau Pressure: 0 cmH20 (05/03/18 0815)  Total Ve: 8.12 mL (05/03/18 0815)  F/VT Ratio<105 (RSBI): (!) 25.79 (05/03/18 0815)    Lines/Drains/Airways     Central Venous Catheter Line                 Port A Cath Single Lumen 05/02/18 2105 left subclavian less than 1 day          Drain                 Drain/Device  Right -- days         Colostomy 05/03/18 0715 RUQ less than 1 day         Urethral Catheter 05/03/18 0615 Straight-tip;Non-latex 16 Fr. less than 1 day          Airway                 Airway - Non-Surgical 05/03/18 0611 Endotracheal Tube less than 1 day          Arterial Line                 Arterial Line 05/03/18 0615 Left Radial less than 1 day          Surgical Packing                 Surgical Packing less than 1 day          Peripheral Intravenous Line                 Peripheral IV - Single Lumen 05/02/18 2024 Left Antecubital less than 1 day         Peripheral IV - Single Lumen 05/03/18 0118 Right Forearm less than 1 day         Peripheral IV - Single Lumen 05/03/18 0619 Right Hand less than 1 day                Significant Labs:    CBC/Anemia Profile:    Recent Labs  Lab 05/02/18 2023 05/03/18 0329   WBC 6.30 17.70*   HGB 11.4* 10.0*   HCT 35.7* 33.8*   * 132*   MCV 74* 79*   RDW 18.8* 18.1*        Chemistries:    Recent Labs  Lab 05/02/18 2023 05/02/18 2037 05/03/18  0329     --  131*  131*   K 3.0*  --  4.0  4.0      --  100  100   CO2 23  --  18*  18*   BUN 21*  --  18  18   CREATININE 1.1  --  1.1  1.1   CALCIUM 8.6  --  8.0*  8.0*   ALBUMIN 3.0*  --  2.2*   PROT 7.5  --  5.9*   BILITOT 0.6  --  0.8   ALKPHOS 73  --  63   ALT 17  --  10   AST 29  --  17   MG  --  1.1* 1.7   PHOS  --  1.4* 4.2       ABGs:   Recent Labs  Lab 05/03/18  0755   PH 7.284*   PCO2 65.4*   HCO3 31.0*   POCSATURATED 100   BE 4       Significant Imaging: I have reviewed and interpreted all pertinent imaging results/findings within the past 24 hours.    Assessment/Plan:     * Perforation of sigmoid colon    56yo M .POD0 s/p ex-lap, washout and end colostomy     Plan:    Neuro:   - Propofol and fentanyl gtt for sedation/pain control  - Responsive to commands off sedation    Pulmonary:   -Intubated  Vent Mode: A/C  Oxygen Concentration (%):  [40-60] 40  Resp Rate Total:  [17 br/min-26 br/min] 21 br/min  Vt Set:  [450 mL] 450 mL  PEEP/CPAP:  [5 cmH20] 5 cmH20  Pressure Support:  [0 cmH20] 0 cmH20  Mean Airway Pressure:  [9.8 izZ50-70 cmH20] 11 cmH20  - Ventilator associated PNA ppx: Chlorhexidine  -ABGs prn    Recent Labs  Lab 05/03/18  1157   PH 7.379   PO2 109*   PCO2 38.7   HCO3 22.9*   BE -2   -Daily CXR    Cardiac:  -H/o CAD s/p 4 vessel CABG   -MAP goal >65  -On admission to ICU was without pressors, now on levo at 0.5 and vaso 0.04, continue to titrate for MAP goal  -hydrocortisone 100mg IV q8h given chronic steroid use    Renal:   -Garcia in place  -UOP adequate, continue to monitor  -Bun/Cr 15/0.8     Fluids/Electrolytes/Nutrition/GI:   -Nutritional status: NPO  -replace lytes PRN  -LR @ 125cc/hr; additional fluid boluses prn  -BMP q4  -GI ulcer prophylaxis    Hematology/Oncology:  -H/H with significant downward progression 6.4/21.2 from 7.6/24.7; transfuse 3 units pRBCs  -INR/Plts 1.4, platelets 209; getting 1 unit platelets given chronically on plavix  - CBC q4, continue to trend and transfuse as needed    Infectious Disease:    -Afebrile since return from OR  -WBC 22.73  -Cultures drawn in ED, will continue to follow  -Abx: Cefepime, Vanc, Flaygl    Endocrine:  -Glucose goal of 120-180  -SSI     Dispo:  -Continue care in the ICU setting  -Plan for take back to the OR tomorrow for ex-lap, washout and possible closure with Dr. Shelton           Critical care was time spent personally by me on the following activities: development of treatment plan with patient or surrogate and bedside caregivers, discussions with consultants, evaluation of patient's response to treatment, examination of patient, ordering and performing treatments and interventions, ordering and review of laboratory studies, ordering and review of radiographic studies, pulse oximetry, re-evaluation of patient's condition.  This critical care time did not overlap with that of any other provider or involve time for any procedures.     Belén Byrd MD  Critical Care - Surgery  [facility]

## 2018-05-03 NOTE — H&P
GENERAL SURGERY  H&P       REASON FOR ADMISSION:  Septic shock with perforated viscus     SUBJECTIVE:     HISTORY OF PRESENT ILLNESS:  Cristo Jefferson is a 57 y.o. male who presents to [facility] on 5/3/2018 as transfer for septic shock in setting of recurrent sigmoid perforation.    Patient has a past medical history of RA on immunosuppressants including prednisone 12.5 mg daily and previously Orencia (abatacept) which was stopped in November, DM II, HTN, CAD s/p CABG and appendiceal CA s/p appendectomy and subsequent R hemicolectomy and adjuvant chemo/XRT with last treatment in March 2017 who was transferred to [facility] with complains of abdominal pain since about 2 PM yesterday after eating crawfish.  No nausea or vomiting.  Patient was found to be tachycardic and hypotensive and CT showed sigmoid perforation with adjacent fluid collection and abscess 5 cm in size not appearing well-contained or walled-off.  Patient was given IV Vancomycin and Zosyn and started on Dopamine gtt at OSH.  Febrile to 102.7 in ED and tachycardic in 110s and hypotensive on 0.1 of levophed.    The patient is recently s/p exploratory laparotomy and reversal of loop ileostomy on 4/4/2018 by Dr. Riley.  He was having a lot of difficulties with dehydration and was requiring daily LR infusions for fluid loss from ileostomy since it's creation in November.    The patient has previously undergone exploratory laparotomy and washout and drainage of sigmoid perforation with construction of loop ileostomy in November 2017 by Dr. Riley for sigmoid perforation from diverticulitis.  The patient went on to have colonoscopy and water soluble enema that looked good prior to reversal and he has overall been doing well up until yesterday which is when he started feeling bad.    He is on Plavix for history of CAD.    Currently on Prednisone 12.5 mg daily (5 mg in AM, 2.5 mg in afternoon, and 5 mg at night).       MEDICATIONS:  Home Medications:  Current  Facility-Administered Medications on File Prior to Encounter   Medication Dose Route Frequency Provider Last Rate Last Dose    [COMPLETED] acetaminophen tablet 650 mg  650 mg Oral ED 1 Time Boris Smith MD   650 mg at 05/02/18 2326    [COMPLETED] hydromorphone (PF) injection 1 mg  1 mg Intravenous ED 1 Time Boris Smith MD   1 mg at 05/02/18 2201    [COMPLETED] lactated ringers bolus 1,000 mL  1,000 mL Intravenous ED 1 Time Boris Smith MD   Stopped at 05/02/18 2234    [COMPLETED] lactated ringers bolus 1,000 mL  1,000 mL Intravenous ED 1 Time Boris Smith MD   Stopped at 05/02/18 2137    [COMPLETED] lactated ringers bolus 1,000 mL  1,000 mL Intravenous ED 1 Time Boris Smith MD   Stopped at 05/02/18 2108    [COMPLETED] lactated ringers bolus 1,000 mL  1,000 mL Intravenous ED 1 Time Boris Smith MD   Stopped at 05/03/18 0039    [COMPLETED] lidocaine HCL 2% (XYLOCAINE) 2 % jelly             [COMPLETED] lidocaine HCL 2% jelly 5 mL  5 mL Topical (Top) Once Boris Smith MD   5 mL at 05/02/18 2030    [COMPLETED] magnesium sulfate 2g in water 50mL IVPB (premix)  2 g Intravenous ED 1 Time Boris Smith MD   2 g at 05/03/18 0035    [COMPLETED] omnipaque 350 iohexol 100 mL  100 mL Intravenous ONCE PRN Boris Smith MD   100 mL at 05/02/18 2348    [COMPLETED] omnipaque oral solution 30 mL  30 mL Oral Once Boris Smith MD   30 mL at 05/02/18 2215    [COMPLETED] ondansetron injection 4 mg  4 mg Intravenous Once Boris Smith MD   8 mg at 05/03/18 0145    [COMPLETED] potassium phosphate 20 mmol in dextrose 5 % 500 mL infusion  20 mmol Intravenous Once Boris Smith  mL/hr at 05/02/18 2201 20 mmol at 05/02/18 2201    [COMPLETED] promethazine (PHENERGAN) 25 mg in dextrose 5 % 50 mL IVPB  25 mg Intravenous ED 1 Time Boris Smith  mL/hr at 05/03/18 0201 25 mg at 05/03/18 0201    [DISCONTINUED] ciprofloxacin  (CIPRO)400mg/200ml D5W IVPB 400 mg  400 mg Intravenous Q12H Boris Smith MD   Stopped at 05/02/18 2234    [DISCONTINUED] DOPamine 400 mg in dextrose 5 % 250 mL infusion (premix)  5 mcg/kg/min Intravenous Continuous Boris Smith MD 39.7 mL/hr at 05/03/18 0148 11 mcg/kg/min at 05/03/18 0148    [DISCONTINUED] norepinephrine 4 mg in dextrose 5 % 250 mL infusion  0.2 mcg/kg/min Intravenous Continuous Boris Smith MD        [DISCONTINUED] ondansetron injection 8 mg  8 mg Intramuscular ED 1 Time Boris Smith MD        [DISCONTINUED] pantoprazole injection 40 mg  40 mg Intravenous Daily Boris Smith MD        [DISCONTINUED] piperacillin-tazobactam 4.5 g in dextrose 5 % 100 mL IVPB (ready to mix system)  4.5 g Intravenous Q6H Boris Smith MD   Stopped at 05/03/18 0140    [DISCONTINUED] promethazine (PHENERGAN) 25 mg/mL injection             [DISCONTINUED] vancomycin 1 g in 0.9% sodium chloride 250 mL IVPB (ready to mix system)  1 g Intravenous Q12H Boris Smith MD   1 g at 05/02/18 2235     Current Outpatient Prescriptions on File Prior to Encounter   Medication Sig Dispense Refill    acetaminophen (TYLENOL EXTRA STRENGTH) 500 MG tablet Take 500 mg by mouth 2 (two) times daily as needed for Pain.      aspirin 81 MG Chew Take 81 mg by mouth once daily.      atorvastatin (LIPITOR) 10 MG tablet TAKE 1 TABLET BY MOUTH EVERY DAY 90 tablet 0    citalopram (CELEXA) 40 MG tablet TAKE 1 TABLET BY MOUTH EVERY DAY 90 tablet 0    clopidogrel (PLAVIX) 75 mg tablet TAKE 1 TABLET BY MOUTH EVERY DAY 90 tablet 0    docusate sodium (COLACE) 100 MG capsule Take 1 capsule (100 mg total) by mouth 2 (two) times daily.  0    ferrous sulfate 325 (65 FE) MG EC tablet Take 325 mg by mouth 2 (two) times daily.      hydrocodone-acetaminophen 10-325mg (NORCO)  mg Tab Take 1 tablet by mouth every 6 (six) hours as needed. 30 tablet 0    losartan-hydrochlorothiazide 100-12.5 mg  (HYZAAR) 100-12.5 mg Tab TAKE 1 TABLET BY MOUTH EVERY DAY 90 tablet 0    meloxicam (MOBIC) 15 MG tablet TAKE 1 TABLET BY MOUTH EVERY DAY 90 tablet 0    metFORMIN (GLUCOPHAGE-XR) 750 MG 24 hr tablet TAKE 1 TABLET BY MOUTH TWICE A  tablet 0    metoprolol succinate (TOPROL-XL) 25 MG 24 hr tablet TAKE 1 TABLET BY MOUTH TWICE A  tablet 0    metoprolol tartrate (LOPRESSOR) 25 MG tablet TAKE 1 TABLET BY MOUTH TWICE DAILY 180 tablet 0    MULTIVIT WITH MINERALS/LUTEIN (MULTIVITAMIN 50 PLUS ORAL) Take by mouth.      predniSONE (DELTASONE) 5 MG tablet 5 mg 2 (two) times daily.       vitamin D 1000 units Tab Take 2,000 Units by mouth once daily.       Inpatient Medications:   ceFEPime (MAXIPIME) IVPB  2 g Intravenous Q12H    hydrocortisone sod succ (PF)  100 mg Intravenous Q8H    lactated ringers  2,000 mL Intravenous Once    magnesium sulfate IVPB  4 g Intravenous Once    metronidazole  500 mg Intravenous Q8H    pantoprazole 40 mg in dextrose 5 % 100 mL infusion (ready to mix system)  40 mg Intravenous Daily    potassium phosphate IVPB  30 mmol Intravenous Once     Infusions:   DOPamine 3.5 mcg/kg/min (05/03/18 0400)    lactated ringers      norepinephrine bitartrate-D5W 0.1 mcg/kg/min (05/03/18 0350)     PRN Medications:    ALLERGIES:    Review of patient's allergies indicates:   Allergen Reactions    Morphine Rash       PAST MEDICAL HISTORY:    Past Medical History:   Diagnosis Date    Arthritis     Cancer 2016    appendix, completed chemo    Depression     Diabetes mellitus type I     Hyperlipidemia     Hypertension     On home O2        SURGICAL HISTORY:  Past Surgical History:   Procedure Laterality Date    APPENDECTOMY      COLONOSCOPY N/A 3/6/2018    Procedure: COLONOSCOPY;  Surgeon: Yfn Martinez MD;  Location: [facility] ([address] [place] [address]);  Service: Endoscopy;  Laterality: N/A;   See telephone encounter dated 1/30/18/pt on Plavix/neither PCP or cardiologist will clear him to hold med  without being evaluated/wife refusing to make appointment/per Dr Riley, pt to remain on Plavix and proceed with colonoscopy with no biposies. Spoke with wife on 2/8/1    EVAR for AAA      S/P EVAR for AAA in 2017    open heart       RIGHT COLECTOMY  2016       FAMILY HISTORY:  Family History   Problem Relation Age of Onset    Heart disease Mother     Heart disease Father     Cancer Father      prostate    Cancer Sister      breast , lymphoma       SOCIAL HISTORY:  Social History   Substance Use Topics    Smoking status: Former Smoker     Types: Cigarettes, Pipe     Quit date: 11/30/2017    Smokeless tobacco: Former User    Alcohol use No        REVIEW OF SYSTEMS:  A 10-point review of systems is negative except for the above mentioned in the HPI.    OBJECTIVE:     Most Recent Vitals:  Temp: 99.3 °F (37.4 °C) (05/03/18 0311)  Pulse: (!) 112 (05/03/18 0311)  Resp: 18 (05/03/18 0306)  BP: (!) 94/51 (05/03/18 0311)  SpO2: 96 % (05/03/18 0311)    24-Hour Vitals:  Temp:  [98.3 °F (36.8 °C)-102.7 °F (39.3 °C)]   Pulse:  []   Resp:  [14-36]   BP: ()/(36-77)   SpO2:  [88 %-97 %]     24-Hour I&O:No intake or output data in the 24 hours ending 05/03/18 0423    PHYSICAL EXAM:  Awake, appears unwell, obese, appears a little older than stated age.  Head normocephalic, atraumatic.  Trachea midline, neck supple.  Respirations unlabored with good inspiratory effort.  Heart regular rate and rhythm.  Abdomen soft, obese, nondistended, significantly tender to palpation in LLQ, recent well healed midline laparotomy scar and RLQ ileostomy site with only small 1 cm area of central midline laparotomy incision opening.      LABORATORY VALUES:    Recent Labs  Lab 05/02/18 2023 05/03/18 0329   WBC 6.30 17.70*   HGB 11.4* 10.0*   HCT 35.7* 33.8*   * 132*     Recent Labs  Lab 05/02/18 2023 05/02/18 2037 05/03/18 0329     --  131*  131*   K 3.0*  --  4.0  4.0     --  100  100   CO2 23  --  18*   18*   BUN 21*  --  18  18   CREATININE 1.1  --  1.1  1.1   *  --  175*  175*   CALCIUM 8.6  --  8.0*  8.0*   CAION  --   --  0.97*   MG  --  1.1* 1.7   PHOS  --  1.4* 4.2   AST 29  --  17   ALT 17  --  10   ALKPHOS 73  --  63   BILITOT 0.6  --  0.8   PROT 7.5  --  5.9*   ALBUMIN 3.0*  --  2.2*     Recent Labs  Lab 05/02/18 2023 05/02/18 2037 05/03/18  0329   INR  --  1.2 1.2   LACTATE 3.4*  --  3.5*   No results for input(s): PH, PCO2, PO2, HCO3 in the last 72 hours.    Recent Labs  Lab 05/02/18 2059   COLORU Yellow   APPEARANCEUA Clear   PHUR 6.0   SPECGRAV 1.020   OCCULTUA Negative   LEUKOCYTESUR Negative   NITRITE Negative   PROTEINUA Negative   GLUCUA Negative   KETONESU Negative   BILIRUBINUA Negative   UROBILINOGEN Negative     No results for input(s): LABURIN in the last 168 hours.No results for input(s): LABBLOO in the last 168 hours.  No results for input(s): LABURIN, LABBLOO in the last 168 hours.    DIAGNOSTIC STUDIES:  CT: Reviewed    ASSESSMENT:     Cristo Jefferson is a 57 y.o. male who is being admitted for intra-abdominal sepsis with septic shock secondary to recurrent perforation of sigmoid colon.      PLAN:  · OR as class A with Dr. Bautista.  · Surgical consent signed and bilateral potential stoma sites marked.  · IV Cefepime and Flagyl.  · Stress dose IV hydrocortisone 100 Q8.  · 2L bolus LR.  · NG tube placement.  · Trend labs.  · Prophylaxis:  Protonix 40 mg IV daily and SCDs -- hold Plavix which patient has been on for history of CAD and CABG.  · Discussed with Dr. Bautista.      Estrella Samayoa MD

## 2018-05-04 ENCOUNTER — ANESTHESIA (OUTPATIENT)
Dept: SURGERY | Facility: HOSPITAL | Age: 58
DRG: 853 | End: 2018-05-04
Payer: MEDICARE

## 2018-05-04 PROBLEM — Z01.89 ENCOUNTER FOR IMAGING STUDY TO CONFIRM NASOGASTRIC (NG) TUBE PLACEMENT: Status: ACTIVE | Noted: 2018-05-04

## 2018-05-04 LAB
ALBUMIN SERPL BCP-MCNC: 1.3 G/DL
ALBUMIN SERPL BCP-MCNC: 1.4 G/DL
ALBUMIN SERPL BCP-MCNC: 1.4 G/DL
ALBUMIN SERPL BCP-MCNC: 1.5 G/DL
ALBUMIN SERPL BCP-MCNC: 1.6 G/DL
ALBUMIN SERPL BCP-MCNC: 1.6 G/DL
ALLENS TEST: ABNORMAL
ALLENS TEST: NORMAL
ALP SERPL-CCNC: 45 U/L
ALP SERPL-CCNC: 45 U/L
ALP SERPL-CCNC: 46 U/L
ALP SERPL-CCNC: 48 U/L
ALP SERPL-CCNC: 50 U/L
ALP SERPL-CCNC: 52 U/L
ALT SERPL W/O P-5'-P-CCNC: 5 U/L
ALT SERPL W/O P-5'-P-CCNC: 6 U/L
ALT SERPL W/O P-5'-P-CCNC: 6 U/L
ALT SERPL W/O P-5'-P-CCNC: 7 U/L
ANION GAP SERPL CALC-SCNC: 4 MMOL/L
ANION GAP SERPL CALC-SCNC: 5 MMOL/L
ANION GAP SERPL CALC-SCNC: 6 MMOL/L
ANION GAP SERPL CALC-SCNC: 6 MMOL/L
ANISOCYTOSIS BLD QL SMEAR: SLIGHT
ANISOCYTOSIS BLD QL SMEAR: SLIGHT
APTT BLDCRRT: 25 SEC
APTT BLDCRRT: 25.1 SEC
APTT BLDCRRT: 25.4 SEC
APTT BLDCRRT: 25.8 SEC
APTT BLDCRRT: 26.6 SEC
APTT BLDCRRT: 27.3 SEC
AST SERPL-CCNC: 11 U/L
AST SERPL-CCNC: 12 U/L
AST SERPL-CCNC: 13 U/L
AST SERPL-CCNC: 9 U/L
BASOPHILS # BLD AUTO: 0.01 K/UL
BASOPHILS # BLD AUTO: 0.02 K/UL
BASOPHILS # BLD AUTO: 0.02 K/UL
BASOPHILS # BLD AUTO: 0.03 K/UL
BASOPHILS # BLD AUTO: 0.03 K/UL
BASOPHILS # BLD AUTO: 0.05 K/UL
BASOPHILS NFR BLD: 0.1 %
BASOPHILS NFR BLD: 0.2 %
BASOPHILS NFR BLD: 0.3 %
BASOPHILS NFR BLD: 0.4 %
BILIRUB SERPL-MCNC: 1.2 MG/DL
BILIRUB SERPL-MCNC: 1.3 MG/DL
BILIRUB SERPL-MCNC: 1.4 MG/DL
BILIRUB SERPL-MCNC: 1.6 MG/DL
BILIRUB SERPL-MCNC: 1.6 MG/DL
BILIRUB SERPL-MCNC: 1.7 MG/DL
BLD PROD TYP BPU: NORMAL
BLD PROD TYP BPU: NORMAL
BLOOD UNIT EXPIRATION DATE: NORMAL
BLOOD UNIT EXPIRATION DATE: NORMAL
BLOOD UNIT TYPE CODE: 5100
BLOOD UNIT TYPE CODE: 5100
BLOOD UNIT TYPE: NORMAL
BLOOD UNIT TYPE: NORMAL
BUN SERPL-MCNC: 10 MG/DL
BUN SERPL-MCNC: 12 MG/DL
BUN SERPL-MCNC: 12 MG/DL
BUN SERPL-MCNC: 14 MG/DL
BUN SERPL-MCNC: 14 MG/DL
BURR CELLS BLD QL SMEAR: ABNORMAL
BURR CELLS BLD QL SMEAR: ABNORMAL
CA-I BLDV-SCNC: 1.01 MMOL/L
CA-I BLDV-SCNC: 1.01 MMOL/L
CALCIUM SERPL-MCNC: 7 MG/DL
CALCIUM SERPL-MCNC: 7.1 MG/DL
CALCIUM SERPL-MCNC: 7.1 MG/DL
CALCIUM SERPL-MCNC: 7.3 MG/DL
CALCIUM SERPL-MCNC: 7.4 MG/DL
CALCIUM SERPL-MCNC: 7.4 MG/DL
CHLORIDE SERPL-SCNC: 102 MMOL/L
CHLORIDE SERPL-SCNC: 102 MMOL/L
CHLORIDE SERPL-SCNC: 103 MMOL/L
CHLORIDE SERPL-SCNC: 103 MMOL/L
CHLORIDE SERPL-SCNC: 104 MMOL/L
CHLORIDE SERPL-SCNC: 105 MMOL/L
CHLORIDE SERPL-SCNC: 105 MMOL/L
CO2 SERPL-SCNC: 24 MMOL/L
CO2 SERPL-SCNC: 24 MMOL/L
CO2 SERPL-SCNC: 25 MMOL/L
CO2 SERPL-SCNC: 26 MMOL/L
CO2 SERPL-SCNC: 27 MMOL/L
CO2 SERPL-SCNC: 27 MMOL/L
CODING SYSTEM: NORMAL
CODING SYSTEM: NORMAL
CREAT SERPL-MCNC: 0.6 MG/DL
CREAT SERPL-MCNC: 0.7 MG/DL
DACRYOCYTES BLD QL SMEAR: ABNORMAL
DELSYS: ABNORMAL
DELSYS: NORMAL
DIFFERENTIAL METHOD: ABNORMAL
DISPENSE STATUS: NORMAL
DISPENSE STATUS: NORMAL
EOSINOPHIL # BLD AUTO: 0 K/UL
EOSINOPHIL # BLD AUTO: 0.1 K/UL
EOSINOPHIL # BLD AUTO: 0.2 K/UL
EOSINOPHIL # BLD AUTO: 0.2 K/UL
EOSINOPHIL NFR BLD: 0.2 %
EOSINOPHIL NFR BLD: 0.2 %
EOSINOPHIL NFR BLD: 0.3 %
EOSINOPHIL NFR BLD: 0.7 %
EOSINOPHIL NFR BLD: 1.5 %
EOSINOPHIL NFR BLD: 1.8 %
ERYTHROCYTE [DISTWIDTH] IN BLOOD BY AUTOMATED COUNT: 17.1 %
ERYTHROCYTE [DISTWIDTH] IN BLOOD BY AUTOMATED COUNT: 17.2 %
ERYTHROCYTE [DISTWIDTH] IN BLOOD BY AUTOMATED COUNT: 17.5 %
ERYTHROCYTE [SEDIMENTATION RATE] IN BLOOD BY WESTERGREN METHOD: 18 MM/H
EST. GFR  (AFRICAN AMERICAN): >60 ML/MIN/1.73 M^2
EST. GFR  (NON AFRICAN AMERICAN): >60 ML/MIN/1.73 M^2
FIO2: 40
FIO2: 50
FIO2: 50
GLUCOSE SERPL-MCNC: 171 MG/DL
GLUCOSE SERPL-MCNC: 171 MG/DL
GLUCOSE SERPL-MCNC: 177 MG/DL
GLUCOSE SERPL-MCNC: 177 MG/DL
GLUCOSE SERPL-MCNC: 197 MG/DL
GLUCOSE SERPL-MCNC: 197 MG/DL
GLUCOSE SERPL-MCNC: 218 MG/DL
GLUCOSE SERPL-MCNC: 218 MG/DL
GLUCOSE SERPL-MCNC: 221 MG/DL
GLUCOSE SERPL-MCNC: 221 MG/DL
GLUCOSE SERPL-MCNC: 234 MG/DL
GLUCOSE SERPL-MCNC: 234 MG/DL
HCO3 UR-SCNC: 23.4 MMOL/L (ref 24–28)
HCO3 UR-SCNC: 24.8 MMOL/L (ref 24–28)
HCO3 UR-SCNC: 25.9 MMOL/L (ref 24–28)
HCO3 UR-SCNC: 27.3 MMOL/L (ref 24–28)
HCO3 UR-SCNC: 27.9 MMOL/L (ref 24–28)
HCT VFR BLD AUTO: 18.7 %
HCT VFR BLD AUTO: 22.4 %
HCT VFR BLD AUTO: 22.6 %
HCT VFR BLD AUTO: 23.7 %
HCT VFR BLD AUTO: 24.4 %
HCT VFR BLD AUTO: 25.4 %
HCT VFR BLD CALC: 21 %PCV (ref 36–54)
HGB BLD-MCNC: 5.9 G/DL
HGB BLD-MCNC: 7.2 G/DL
HGB BLD-MCNC: 7.2 G/DL
HGB BLD-MCNC: 7.5 G/DL
HGB BLD-MCNC: 8 G/DL
HGB BLD-MCNC: 8.2 G/DL
HYPOCHROMIA BLD QL SMEAR: ABNORMAL
HYPOCHROMIA BLD QL SMEAR: ABNORMAL
IMM GRANULOCYTES # BLD AUTO: 0.03 K/UL
IMM GRANULOCYTES # BLD AUTO: 0.04 K/UL
IMM GRANULOCYTES # BLD AUTO: 0.05 K/UL
IMM GRANULOCYTES # BLD AUTO: 0.07 K/UL
IMM GRANULOCYTES # BLD AUTO: 0.08 K/UL
IMM GRANULOCYTES # BLD AUTO: 0.15 K/UL
IMM GRANULOCYTES NFR BLD AUTO: 0.3 %
IMM GRANULOCYTES NFR BLD AUTO: 0.4 %
IMM GRANULOCYTES NFR BLD AUTO: 0.4 %
IMM GRANULOCYTES NFR BLD AUTO: 0.7 %
IMM GRANULOCYTES NFR BLD AUTO: 0.7 %
IMM GRANULOCYTES NFR BLD AUTO: 1.3 %
INR PPP: 1.1
INR PPP: 1.2
INR PPP: 1.4
LACTATE SERPL-SCNC: 1.6 MMOL/L
LACTATE SERPL-SCNC: 1.7 MMOL/L
LACTATE SERPL-SCNC: 1.9 MMOL/L
LACTATE SERPL-SCNC: 1.9 MMOL/L
LACTATE SERPL-SCNC: 2.1 MMOL/L
LACTATE SERPL-SCNC: 2.3 MMOL/L
LYMPHOCYTES # BLD AUTO: 0.7 K/UL
LYMPHOCYTES # BLD AUTO: 0.9 K/UL
LYMPHOCYTES # BLD AUTO: 1 K/UL
LYMPHOCYTES # BLD AUTO: 1.1 K/UL
LYMPHOCYTES # BLD AUTO: 1.5 K/UL
LYMPHOCYTES # BLD AUTO: 1.8 K/UL
LYMPHOCYTES NFR BLD: 13.3 %
LYMPHOCYTES NFR BLD: 14.6 %
LYMPHOCYTES NFR BLD: 8.5 %
LYMPHOCYTES NFR BLD: 8.7 %
LYMPHOCYTES NFR BLD: 9.1 %
LYMPHOCYTES NFR BLD: 9.6 %
MAGNESIUM SERPL-MCNC: 1.7 MG/DL
MAGNESIUM SERPL-MCNC: 2 MG/DL
MAGNESIUM SERPL-MCNC: 2.1 MG/DL
MAGNESIUM SERPL-MCNC: 2.1 MG/DL
MCH RBC QN AUTO: 24.9 PG
MCH RBC QN AUTO: 25 PG
MCH RBC QN AUTO: 25.1 PG
MCH RBC QN AUTO: 25.5 PG
MCH RBC QN AUTO: 26.2 PG
MCH RBC QN AUTO: 26.7 PG
MCHC RBC AUTO-ENTMCNC: 31.6 G/DL
MCHC RBC AUTO-ENTMCNC: 31.6 G/DL
MCHC RBC AUTO-ENTMCNC: 31.9 G/DL
MCHC RBC AUTO-ENTMCNC: 32.1 G/DL
MCHC RBC AUTO-ENTMCNC: 32.3 G/DL
MCHC RBC AUTO-ENTMCNC: 32.8 G/DL
MCV RBC AUTO: 79 FL
MCV RBC AUTO: 81 FL
MCV RBC AUTO: 81 FL
MODE: ABNORMAL
MODE: ABNORMAL
MODE: NORMAL
MONOCYTES # BLD AUTO: 0.4 K/UL
MONOCYTES # BLD AUTO: 0.7 K/UL
MONOCYTES # BLD AUTO: 0.7 K/UL
MONOCYTES # BLD AUTO: 0.9 K/UL
MONOCYTES # BLD AUTO: 1 K/UL
MONOCYTES # BLD AUTO: 1.2 K/UL
MONOCYTES NFR BLD: 5.8 %
MONOCYTES NFR BLD: 5.8 %
MONOCYTES NFR BLD: 6.6 %
MONOCYTES NFR BLD: 7.5 %
MONOCYTES NFR BLD: 8.7 %
MONOCYTES NFR BLD: 9.5 %
NEUTROPHILS # BLD AUTO: 6.3 K/UL
NEUTROPHILS # BLD AUTO: 8.6 K/UL
NEUTROPHILS # BLD AUTO: 8.6 K/UL
NEUTROPHILS # BLD AUTO: 9 K/UL
NEUTROPHILS # BLD AUTO: 9.4 K/UL
NEUTROPHILS # BLD AUTO: 9.5 K/UL
NEUTROPHILS NFR BLD: 73.7 %
NEUTROPHILS NFR BLD: 75.5 %
NEUTROPHILS NFR BLD: 81.1 %
NEUTROPHILS NFR BLD: 83.7 %
NEUTROPHILS NFR BLD: 83.8 %
NEUTROPHILS NFR BLD: 84.4 %
NRBC BLD-RTO: 0 /100 WBC
NUM UNITS TRANS PACKED RBC: NORMAL
NUM UNITS TRANS PACKED RBC: NORMAL
OVALOCYTES BLD QL SMEAR: ABNORMAL
OVALOCYTES BLD QL SMEAR: ABNORMAL
PCO2 BLDA: 38.1 MMHG (ref 35–45)
PCO2 BLDA: 41.8 MMHG (ref 35–45)
PCO2 BLDA: 42.1 MMHG (ref 35–45)
PCO2 BLDA: 42.3 MMHG (ref 35–45)
PCO2 BLDA: 45.7 MMHG (ref 35–45)
PEEP: 5
PH SMN: 7.38 [PH] (ref 7.35–7.45)
PH SMN: 7.39 [PH] (ref 7.35–7.45)
PH SMN: 7.4 [PH] (ref 7.35–7.45)
PH SMN: 7.4 [PH] (ref 7.35–7.45)
PH SMN: 7.42 [PH] (ref 7.35–7.45)
PHOSPHATE SERPL-MCNC: 2.4 MG/DL
PHOSPHATE SERPL-MCNC: 2.5 MG/DL
PHOSPHATE SERPL-MCNC: 2.5 MG/DL
PHOSPHATE SERPL-MCNC: 2.7 MG/DL
PHOSPHATE SERPL-MCNC: 3 MG/DL
PHOSPHATE SERPL-MCNC: 3.1 MG/DL
PLATELET # BLD AUTO: 102 K/UL
PLATELET # BLD AUTO: 106 K/UL
PLATELET # BLD AUTO: 107 K/UL
PLATELET # BLD AUTO: 113 K/UL
PLATELET # BLD AUTO: 115 K/UL
PLATELET # BLD AUTO: 98 K/UL
PLATELET BLD QL SMEAR: ABNORMAL
PLATELET BLD QL SMEAR: ABNORMAL
PMV BLD AUTO: 10.2 FL
PMV BLD AUTO: 10.4 FL
PMV BLD AUTO: 10.5 FL
PMV BLD AUTO: 10.6 FL
PMV BLD AUTO: 11 FL
PMV BLD AUTO: 11.3 FL
PO2 BLDA: 103 MMHG (ref 80–100)
PO2 BLDA: 74 MMHG (ref 80–100)
PO2 BLDA: 83 MMHG (ref 80–100)
PO2 BLDA: 84 MMHG (ref 80–100)
PO2 BLDA: 87 MMHG (ref 80–100)
POC BE: -1 MMOL/L
POC BE: 0 MMOL/L
POC BE: 1 MMOL/L
POC BE: 3 MMOL/L
POC BE: 3 MMOL/L
POC IONIZED CALCIUM: 1.07 MMOL/L (ref 1.06–1.42)
POC SATURATED O2: 94 % (ref 95–100)
POC SATURATED O2: 96 % (ref 95–100)
POC SATURATED O2: 96 % (ref 95–100)
POC SATURATED O2: 97 % (ref 95–100)
POC SATURATED O2: 98 % (ref 95–100)
POC TCO2: 25 MMOL/L (ref 23–27)
POC TCO2: 26 MMOL/L (ref 23–27)
POC TCO2: 27 MMOL/L (ref 23–27)
POC TCO2: 29 MMOL/L (ref 23–27)
POC TCO2: 29 MMOL/L (ref 23–27)
POCT GLUCOSE: 172 MG/DL (ref 70–110)
POCT GLUCOSE: 179 MG/DL (ref 70–110)
POCT GLUCOSE: 194 MG/DL (ref 70–110)
POCT GLUCOSE: 199 MG/DL (ref 70–110)
POCT GLUCOSE: 206 MG/DL (ref 70–110)
POCT GLUCOSE: 212 MG/DL (ref 70–110)
POCT GLUCOSE: 225 MG/DL (ref 70–110)
POIKILOCYTOSIS BLD QL SMEAR: SLIGHT
POIKILOCYTOSIS BLD QL SMEAR: SLIGHT
POLYCHROMASIA BLD QL SMEAR: ABNORMAL
POLYCHROMASIA BLD QL SMEAR: ABNORMAL
POTASSIUM BLD-SCNC: 3.5 MMOL/L (ref 3.5–5.1)
POTASSIUM SERPL-SCNC: 3.7 MMOL/L
POTASSIUM SERPL-SCNC: 3.7 MMOL/L
POTASSIUM SERPL-SCNC: 3.8 MMOL/L
POTASSIUM SERPL-SCNC: 3.8 MMOL/L
POTASSIUM SERPL-SCNC: 3.9 MMOL/L
POTASSIUM SERPL-SCNC: 4 MMOL/L
PROT SERPL-MCNC: 3.4 G/DL
PROT SERPL-MCNC: 3.7 G/DL
PROT SERPL-MCNC: 3.8 G/DL
PROT SERPL-MCNC: 3.9 G/DL
PROT SERPL-MCNC: 4.2 G/DL
PROT SERPL-MCNC: 4.2 G/DL
PROTHROMBIN TIME: 11.6 SEC
PROTHROMBIN TIME: 11.8 SEC
PROTHROMBIN TIME: 12 SEC
PROTHROMBIN TIME: 12.1 SEC
PROTHROMBIN TIME: 12.5 SEC
PROTHROMBIN TIME: 14.2 SEC
PROVIDER CREDENTIALS: ABNORMAL
PROVIDER NOTIFIED: ABNORMAL
RBC # BLD AUTO: 2.37 M/UL
RBC # BLD AUTO: 2.82 M/UL
RBC # BLD AUTO: 2.88 M/UL
RBC # BLD AUTO: 2.99 M/UL
RBC # BLD AUTO: 3 M/UL
RBC # BLD AUTO: 3.13 M/UL
SAMPLE: ABNORMAL
SAMPLE: NORMAL
SITE: ABNORMAL
SITE: NORMAL
SODIUM BLD-SCNC: 138 MMOL/L (ref 136–145)
SODIUM SERPL-SCNC: 131 MMOL/L
SODIUM SERPL-SCNC: 131 MMOL/L
SODIUM SERPL-SCNC: 133 MMOL/L
SODIUM SERPL-SCNC: 133 MMOL/L
SODIUM SERPL-SCNC: 134 MMOL/L
SODIUM SERPL-SCNC: 134 MMOL/L
SODIUM SERPL-SCNC: 135 MMOL/L
SODIUM SERPL-SCNC: 136 MMOL/L
SODIUM SERPL-SCNC: 136 MMOL/L
SP02: 97
SP02: 98
VANCOMYCIN TROUGH SERPL-MCNC: 11.3 UG/ML
VERBAL RESULT READBACK PERFORMED: YES
VT: 450
WBC # BLD AUTO: 10.31 K/UL
WBC # BLD AUTO: 11.17 K/UL
WBC # BLD AUTO: 11.41 K/UL
WBC # BLD AUTO: 11.72 K/UL
WBC # BLD AUTO: 12.26 K/UL
WBC # BLD AUTO: 7.56 K/UL

## 2018-05-04 PROCEDURE — 82330 ASSAY OF CALCIUM: CPT

## 2018-05-04 PROCEDURE — 63600175 PHARM REV CODE 636 W HCPCS: Performed by: SURGERY

## 2018-05-04 PROCEDURE — 80053 COMPREHEN METABOLIC PANEL: CPT | Mod: 91

## 2018-05-04 PROCEDURE — 85730 THROMBOPLASTIN TIME PARTIAL: CPT | Mod: 91

## 2018-05-04 PROCEDURE — 25000003 PHARM REV CODE 250: Performed by: NURSE ANESTHETIST, CERTIFIED REGISTERED

## 2018-05-04 PROCEDURE — 83605 ASSAY OF LACTIC ACID: CPT | Mod: 91

## 2018-05-04 PROCEDURE — 25000003 PHARM REV CODE 250: Performed by: STUDENT IN AN ORGANIZED HEALTH CARE EDUCATION/TRAINING PROGRAM

## 2018-05-04 PROCEDURE — 83735 ASSAY OF MAGNESIUM: CPT

## 2018-05-04 PROCEDURE — 94003 VENT MGMT INPAT SUBQ DAY: CPT

## 2018-05-04 PROCEDURE — 82800 BLOOD PH: CPT

## 2018-05-04 PROCEDURE — C9113 INJ PANTOPRAZOLE SODIUM, VIA: HCPCS | Performed by: SURGERY

## 2018-05-04 PROCEDURE — 82330 ASSAY OF CALCIUM: CPT | Mod: 91

## 2018-05-04 PROCEDURE — P9016 RBC LEUKOCYTES REDUCED: HCPCS

## 2018-05-04 PROCEDURE — 99900026 HC AIRWAY MAINTENANCE (STAT)

## 2018-05-04 PROCEDURE — 83735 ASSAY OF MAGNESIUM: CPT | Mod: 91

## 2018-05-04 PROCEDURE — 25000003 PHARM REV CODE 250: Performed by: PHYSICIAN ASSISTANT

## 2018-05-04 PROCEDURE — 27100171 HC OXYGEN HIGH FLOW UP TO 24 HOURS

## 2018-05-04 PROCEDURE — 80202 ASSAY OF VANCOMYCIN: CPT

## 2018-05-04 PROCEDURE — 87040 BLOOD CULTURE FOR BACTERIA: CPT | Mod: 59

## 2018-05-04 PROCEDURE — 63600175 PHARM REV CODE 636 W HCPCS: Performed by: STUDENT IN AN ORGANIZED HEALTH CARE EDUCATION/TRAINING PROGRAM

## 2018-05-04 PROCEDURE — 27200966 HC CLOSED SUCTION SYSTEM

## 2018-05-04 PROCEDURE — 0JQ80ZZ REPAIR ABDOMEN SUBCUTANEOUS TISSUE AND FASCIA, OPEN APPROACH: ICD-10-PCS | Performed by: SURGERY

## 2018-05-04 PROCEDURE — 85610 PROTHROMBIN TIME: CPT

## 2018-05-04 PROCEDURE — 85025 COMPLETE CBC W/AUTO DIFF WBC: CPT | Mod: 91

## 2018-05-04 PROCEDURE — 84132 ASSAY OF SERUM POTASSIUM: CPT

## 2018-05-04 PROCEDURE — 84295 ASSAY OF SERUM SODIUM: CPT

## 2018-05-04 PROCEDURE — 82803 BLOOD GASES ANY COMBINATION: CPT

## 2018-05-04 PROCEDURE — 37000009 HC ANESTHESIA EA ADD 15 MINS: Performed by: SURGERY

## 2018-05-04 PROCEDURE — 37000008 HC ANESTHESIA 1ST 15 MINUTES: Performed by: SURGERY

## 2018-05-04 PROCEDURE — 25000003 PHARM REV CODE 250: Performed by: SURGERY

## 2018-05-04 PROCEDURE — 36000706: Performed by: SURGERY

## 2018-05-04 PROCEDURE — 63600175 PHARM REV CODE 636 W HCPCS: Performed by: NURSE ANESTHETIST, CERTIFIED REGISTERED

## 2018-05-04 PROCEDURE — S0030 INJECTION, METRONIDAZOLE: HCPCS | Performed by: SURGERY

## 2018-05-04 PROCEDURE — 27000221 HC OXYGEN, UP TO 24 HOURS

## 2018-05-04 PROCEDURE — 37799 UNLISTED PX VASCULAR SURGERY: CPT

## 2018-05-04 PROCEDURE — 84100 ASSAY OF PHOSPHORUS: CPT | Mod: 91

## 2018-05-04 PROCEDURE — 85014 HEMATOCRIT: CPT

## 2018-05-04 PROCEDURE — 36000707: Performed by: SURGERY

## 2018-05-04 PROCEDURE — 49002 REOPENING OF ABDOMEN: CPT | Mod: 58,,, | Performed by: SURGERY

## 2018-05-04 PROCEDURE — 94761 N-INVAS EAR/PLS OXIMETRY MLT: CPT

## 2018-05-04 PROCEDURE — D9220A PRA ANESTHESIA: Mod: CRNA,,, | Performed by: NURSE ANESTHETIST, CERTIFIED REGISTERED

## 2018-05-04 PROCEDURE — 99900035 HC TECH TIME PER 15 MIN (STAT)

## 2018-05-04 PROCEDURE — 3E10X8Z IRRIGATION OF SKIN AND MUCOUS MEMBRANES USING IRRIGATING SUBSTANCE: ICD-10-PCS | Performed by: SURGERY

## 2018-05-04 PROCEDURE — 20000000 HC ICU ROOM

## 2018-05-04 PROCEDURE — 87040 BLOOD CULTURE FOR BACTERIA: CPT

## 2018-05-04 PROCEDURE — 13160 SEC CLSR SURG WND/DEHSN XTN: CPT | Mod: 51,58,, | Performed by: SURGERY

## 2018-05-04 PROCEDURE — 85610 PROTHROMBIN TIME: CPT | Mod: 91

## 2018-05-04 PROCEDURE — D9220A PRA ANESTHESIA: Mod: ANES,,, | Performed by: ANESTHESIOLOGY

## 2018-05-04 RX ORDER — ROCURONIUM BROMIDE 10 MG/ML
INJECTION, SOLUTION INTRAVENOUS
Status: DISCONTINUED | OUTPATIENT
Start: 2018-05-04 | End: 2018-05-04

## 2018-05-04 RX ORDER — CHLORHEXIDINE GLUCONATE ORAL RINSE 1.2 MG/ML
15 SOLUTION DENTAL 2 TIMES DAILY
Status: DISCONTINUED | OUTPATIENT
Start: 2018-05-04 | End: 2018-05-07

## 2018-05-04 RX ORDER — MIDAZOLAM HYDROCHLORIDE 1 MG/ML
INJECTION, SOLUTION INTRAMUSCULAR; INTRAVENOUS
Status: DISCONTINUED | OUTPATIENT
Start: 2018-05-04 | End: 2018-05-04

## 2018-05-04 RX ORDER — HYDROCODONE BITARTRATE AND ACETAMINOPHEN 500; 5 MG/1; MG/1
TABLET ORAL
Status: DISCONTINUED | OUTPATIENT
Start: 2018-05-04 | End: 2018-05-06

## 2018-05-04 RX ORDER — PHENYLEPHRINE HYDROCHLORIDE 10 MG/ML
INJECTION INTRAVENOUS
Status: DISCONTINUED | OUTPATIENT
Start: 2018-05-04 | End: 2018-05-04

## 2018-05-04 RX ORDER — FENTANYL CITRATE 50 UG/ML
INJECTION, SOLUTION INTRAMUSCULAR; INTRAVENOUS
Status: DISCONTINUED | OUTPATIENT
Start: 2018-05-04 | End: 2018-05-04

## 2018-05-04 RX ADMIN — PHENYLEPHRINE HYDROCHLORIDE 100 MCG: 10 INJECTION INTRAVENOUS at 07:05

## 2018-05-04 RX ADMIN — SODIUM CHLORIDE, SODIUM LACTATE, POTASSIUM CHLORIDE, AND CALCIUM CHLORIDE 1000 ML: .6; .31; .03; .02 INJECTION, SOLUTION INTRAVENOUS at 10:05

## 2018-05-04 RX ADMIN — CEFEPIME HYDROCHLORIDE 2 G: 2 INJECTION, POWDER, FOR SOLUTION INTRAVENOUS at 08:05

## 2018-05-04 RX ADMIN — SODIUM CHLORIDE, SODIUM LACTATE, POTASSIUM CHLORIDE, AND CALCIUM CHLORIDE: .6; .31; .03; .02 INJECTION, SOLUTION INTRAVENOUS at 09:05

## 2018-05-04 RX ADMIN — FENTANYL CITRATE 50 MCG: 50 INJECTION, SOLUTION INTRAMUSCULAR; INTRAVENOUS at 07:05

## 2018-05-04 RX ADMIN — VANCOMYCIN HYDROCHLORIDE 1500 MG: 10 INJECTION, POWDER, LYOPHILIZED, FOR SOLUTION INTRAVENOUS at 11:05

## 2018-05-04 RX ADMIN — Medication 0.14 MCG/KG/MIN: at 05:05

## 2018-05-04 RX ADMIN — Medication 0.14 MCG/KG/MIN: at 12:05

## 2018-05-04 RX ADMIN — INSULIN ASPART 1 UNITS: 100 INJECTION, SOLUTION INTRAVENOUS; SUBCUTANEOUS at 12:05

## 2018-05-04 RX ADMIN — METRONIDAZOLE 500 MG: 500 INJECTION, SOLUTION INTRAVENOUS at 11:05

## 2018-05-04 RX ADMIN — Medication 0.12 MCG/KG/MIN: at 12:05

## 2018-05-04 RX ADMIN — HYDROCORTISONE SODIUM SUCCINATE 100 MG: 100 INJECTION, POWDER, FOR SOLUTION INTRAMUSCULAR; INTRAVENOUS at 02:05

## 2018-05-04 RX ADMIN — PROPOFOL 30 MCG/KG/MIN: 10 INJECTION, EMULSION INTRAVENOUS at 04:05

## 2018-05-04 RX ADMIN — CHLORHEXIDINE GLUCONATE 15 ML: 1.2 RINSE ORAL at 08:05

## 2018-05-04 RX ADMIN — ROCURONIUM BROMIDE 50 MG: 10 INJECTION, SOLUTION INTRAVENOUS at 07:05

## 2018-05-04 RX ADMIN — HYDROCORTISONE SODIUM SUCCINATE 100 MG: 100 INJECTION, POWDER, FOR SOLUTION INTRAMUSCULAR; INTRAVENOUS at 09:05

## 2018-05-04 RX ADMIN — SODIUM PHOSPHATE, MONOBASIC, MONOHYDRATE 15 MMOL: 276; 142 INJECTION, SOLUTION INTRAVENOUS at 06:05

## 2018-05-04 RX ADMIN — PROPOFOL 30 MCG/KG/MIN: 10 INJECTION, EMULSION INTRAVENOUS at 01:05

## 2018-05-04 RX ADMIN — DEXTROSE 40 MG: 50 INJECTION, SOLUTION INTRAVENOUS at 09:05

## 2018-05-04 RX ADMIN — METRONIDAZOLE 500 MG: 500 INJECTION, SOLUTION INTRAVENOUS at 04:05

## 2018-05-04 RX ADMIN — CALCIUM GLUCONATE 1000 MG: 98 INJECTION, SOLUTION INTRAVENOUS at 05:05

## 2018-05-04 RX ADMIN — INSULIN ASPART 4 UNITS: 100 INJECTION, SOLUTION INTRAVENOUS; SUBCUTANEOUS at 04:05

## 2018-05-04 RX ADMIN — INSULIN ASPART 4 UNITS: 100 INJECTION, SOLUTION INTRAVENOUS; SUBCUTANEOUS at 12:05

## 2018-05-04 RX ADMIN — MAGNESIUM SULFATE IN WATER 2 G: 40 INJECTION, SOLUTION INTRAVENOUS at 04:05

## 2018-05-04 RX ADMIN — HYDROCORTISONE SODIUM SUCCINATE 100 MG: 250 INJECTION, POWDER, FOR SOLUTION INTRAMUSCULAR; INTRAVENOUS at 06:05

## 2018-05-04 RX ADMIN — MAGNESIUM SULFATE IN WATER 2 G: 40 INJECTION, SOLUTION INTRAVENOUS at 10:05

## 2018-05-04 RX ADMIN — Medication 125 MCG/HR: at 12:05

## 2018-05-04 RX ADMIN — METRONIDAZOLE 500 MG: 500 INJECTION, SOLUTION INTRAVENOUS at 08:05

## 2018-05-04 RX ADMIN — PROPOFOL 40 MCG/KG/MIN: 10 INJECTION, EMULSION INTRAVENOUS at 08:05

## 2018-05-04 RX ADMIN — Medication 100 MCG/HR: at 08:05

## 2018-05-04 RX ADMIN — INSULIN ASPART 4 UNITS: 100 INJECTION, SOLUTION INTRAVENOUS; SUBCUTANEOUS at 08:05

## 2018-05-04 RX ADMIN — SODIUM CHLORIDE, SODIUM LACTATE, POTASSIUM CHLORIDE, AND CALCIUM CHLORIDE 1000 ML: .6; .31; .03; .02 INJECTION, SOLUTION INTRAVENOUS at 11:05

## 2018-05-04 RX ADMIN — VANCOMYCIN HYDROCHLORIDE 1500 MG: 10 INJECTION, POWDER, LYOPHILIZED, FOR SOLUTION INTRAVENOUS at 12:05

## 2018-05-04 RX ADMIN — CHLORHEXIDINE GLUCONATE 15 ML: 1.2 RINSE ORAL at 10:05

## 2018-05-04 RX ADMIN — VASOPRESSIN 0.02 UNITS/MIN: 20 INJECTION INTRAVENOUS at 08:05

## 2018-05-04 RX ADMIN — MIDAZOLAM HYDROCHLORIDE 2 MG: 1 INJECTION, SOLUTION INTRAMUSCULAR; INTRAVENOUS at 07:05

## 2018-05-04 RX ADMIN — INSULIN ASPART 2 UNITS: 100 INJECTION, SOLUTION INTRAVENOUS; SUBCUTANEOUS at 10:05

## 2018-05-04 RX ADMIN — CEFEPIME HYDROCHLORIDE 2 G: 2 INJECTION, POWDER, FOR SOLUTION INTRAVENOUS at 12:05

## 2018-05-04 RX ADMIN — CEFEPIME HYDROCHLORIDE 2 G: 2 INJECTION, POWDER, FOR SOLUTION INTRAVENOUS at 04:05

## 2018-05-04 RX ADMIN — SODIUM CHLORIDE, SODIUM LACTATE, POTASSIUM CHLORIDE, AND CALCIUM CHLORIDE: .6; .31; .03; .02 INJECTION, SOLUTION INTRAVENOUS at 01:05

## 2018-05-04 RX ADMIN — SODIUM PHOSPHATE, MONOBASIC, MONOHYDRATE 15 MMOL: 276; 142 INJECTION, SOLUTION INTRAVENOUS at 10:05

## 2018-05-04 RX ADMIN — PROPOFOL 40 MCG/KG/MIN: 10 INJECTION, EMULSION INTRAVENOUS at 12:05

## 2018-05-04 RX ADMIN — Medication 0.09 MCG/KG/MIN: at 08:05

## 2018-05-04 NOTE — PLAN OF CARE
Patient is a 57 year old male re-admitted in transfer 5/3/2018 from [facility] with Sigmoid Perforation, Sepsis.  Patient taken to OR emergently 5/3/2018 and underwent Exploratory Laparotomy, MARY, Drainage of Pelvic Abscess, End Transverse Colostomy, Abdominal Packing Left with plan to take back to OR.  Patient taken back to OR 5/4/2018 and underwent Exploratory Laparotomy, Abdominal Washout, Closure Wound with Drains and Wound Vac placed.  Patient's is expected to discharge home with home health vs SNF +/- 5/11/2018.      Patient lives with his son and wife, Rachel, who will drive him home, care for him and obtain anything he needs after discharge.  [facility] Packet given on last admit.  Patient discharged 4/9/2018 after undergoing Exploratory Laparotomy with Loop Ileostomy Takedown 4/4/2018.  Patient used [facility] previously, but, discharged home with no needs on last admit.  Will continue to follow for needs.    PCP  Vince Perez MD  [address] / [facility] [place] [id number] [phone]      [facility]/pharmacy #[id number] - [place], [place] - [address]  [address] [place] [address]  Phone: [phone] Fax: [phone]      Extended Emergency Contact Information  Primary Emergency Contact: Rachel Jefferson  Address: [address] [place] [address] [place]  Home Phone: [phone]  Mobile Phone: [phone]  Relation: Spouse       05/04/18 1225   Discharge Assessment   Assessment Type Discharge Planning Assessment   Assessment information obtained from? Medical Record   Expected Length of Stay (days) 7   Prior to hospitilization cognitive status: Alert/Oriented   Prior to hospitalization functional status: Independent   Current cognitive status: Coma/Sedated/Intubated   Current Functional Status: Completely Dependent   Lives With spouse;child(kavon), dependent   Able to Return to Prior Arrangements yes   Is patient able  to care for self after discharge? Yes   Who are your caregiver(s) and their phone number(s)? wife   Patient's perception of discharge disposition home or selfcare;home health   Readmission Within The Last 30 Days other (see comments)  (Sigmoid Perforation, Sepsis)   Equipment Currently Used at Home colostomy/ostomy supplies   Do you have any problems affording any of your prescribed medications? No   Is the patient taking medications as prescribed? yes   Does the patient have transportation home? Yes   Transportation Available family or friend will provide   Discharge Plan A Home with family;Home Health   Discharge Plan B Skilled Nursing Facility   Patient/Family In Agreement With Plan yes

## 2018-05-04 NOTE — ASSESSMENT & PLAN NOTE
58yo M 1 Day Post-Op s/p ex-lap, washout and end colostomy     Plan:    Neuro:   - Propofol and fentanyl gtt for sedation/pain control  - Responsive to commands off sedation    Pulmonary:   -Intubated  Vent Mode: A/C  Oxygen Concentration (%):  [40-60] 50  Resp Rate Total:  [17 br/min-26 br/min] 18 br/min  Vt Set:  [450 mL] 450 mL  PEEP/CPAP:  [5 cmH20] 5 cmH20  Pressure Support:  [0 cmH20] 0 cmH20  Mean Airway Pressure:  [9 lpR67-43 cmH20] 10 cmH20  - Ventilator associated PNA ppx: Chlorhexidine  -ABGs prn    Recent Labs  Lab 05/04/18  0517   PH 7.393   PO2 84   PCO2 45.7*   HCO3 27.9   BE 3   -Daily CXR    Cardiac:  -H/o CAD s/p 4 vessel CABG   -MAP goal >65  -Levo at .14, vaso off, continue to wean as tolerated  -hydrocortisone 100mg IV q8h given chronic steroid use    Renal:   -Garcia in place  -UOP adequate, continue to monitor & bolus prn  -Bun/Cr 12/0.7     Fluids/Electrolytes/Nutrition/GI:   -Nutritional status: NPO  -replace lytes PRN  -LR @ 150cc/hr; additional fluid boluses prn  -BMP q4  -GI ulcer prophylaxis    Hematology/Oncology:  -H/H with significant downward progression 6.4/21.2 from 7.6/24.7; transfuse 3 units pRBCs  -INR/Plts 1.2, platelets 113 s/p platelets yesterday  - CBC q4, continue to trend and transfuse as needed    Infectious Disease:   -Afebrile since return from OR  -WBC 12.26  -Cultures drawn in ED, with gram negative rods  -Abx: Cefepime, Vanc, Flaygl    Endocrine:  -Glucose goal of 120-180  -SSI     Dispo:  -Continue care in the ICU setting  -Plan for take back to the OR today for ex-lap, washout and possible closure with Dr. Shelton

## 2018-05-04 NOTE — PROGRESS NOTES
[facility]  General Surgery  Progress Note    Subjective:     History of Present Illness:  No notes on file    Post-Op Info:  Procedure(s) (LRB):  EXPLORATORY-LAPAROTOMY (N/A)   Day of Surgery     Interval History/Significant Events: No acute events overnight. 2L LR given since yesterday aftertoon, total of 6L since admission to SICU yesterday. Received 3 units pRBCs and 1 pack of platelets. Lactic trending down, now at 1.7. Currently on levo .14, vaso has been turned off.     Follow-up For: Procedure(s) (LRB):  EXPLORATORY LAPAROTOMY Drainage of pelvic abscess (N/A)  COLOSTOMY (N/A)  LYSIS OF ADHESIONS (N/A)    Post-Operative Day: 1 Day Post-Op    Objective:     Vital Signs (Most Recent):  Temp: 99.4 °F (37.4 °C) (05/04/18 0430)  Pulse: 65 (05/04/18 0645)  Resp: 18 (05/04/18 0645)  BP: (!) 99/55 (05/04/18 0630)  SpO2: 98 % (05/04/18 0645) Vital Signs (24h Range):  Temp:  [97.7 °F (36.5 °C)-99.4 °F (37.4 °C)] 99.4 °F (37.4 °C)  Pulse:  [] 65  Resp:  [12-36] 18  SpO2:  [92 %-100 %] 98 %  BP: ()/(29-86) 99/55  Arterial Line BP: ()/(27-84) 114/56     Weight: 117.1 kg (258 lb 2.5 oz)  Body mass index is 38.12 kg/m².      Intake/Output Summary (Last 24 hours) at 05/04/18 0706  Last data filed at 05/04/18 0600   Gross per 24 hour   Intake             6525 ml   Output             5510 ml   Net             1015 ml       Physical Exam   Constitutional: He appears well-developed and well-nourished.   HENT:   Head: Normocephalic and atraumatic.   Nose: Nose normal.   Eyes: Conjunctivae are normal. Pupils are equal, round, and reactive to light. No scleral icterus.   Cardiovascular: Normal rate and regular rhythm.  Exam reveals no gallop and no friction rub.    No murmur heard.  Pulmonary/Chest:   Intubated  Vent Mode: A/C  Oxygen Concentration (%):  (40-60) 50  Resp Rate Total:  (17 br/min-26 br/min) 21 br/min  Vt Set:  (450 mL) 450 mL  PEEP/CPAP:  (5 cmH20) 5 cmH20  Pressure Support:  (0  cmH20) 0 cmH20  Mean Airway Pressure:  (9 nsS33-88 cmH20) 10 cmH20     Abdominal: Soft. He exhibits distension. There is tenderness.   Midline incision covered with dressing, incision oozing  Transverse colostomy pink     Musculoskeletal: Normal range of motion.   Neurological:   Sedated on propofol   Skin: Skin is warm and dry. No rash noted.   Nursing note and vitals reviewed.    Vents:  Vent Mode: A/C (05/04/18 0535)  Ventilator Initiated: Yes (05/03/18 0815)  Set Rate: 18 bmp (05/04/18 0535)  Vt Set: 450 mL (05/04/18 0535)  Pressure Support: 0 cmH20 (05/04/18 0535)  PEEP/CPAP: 5 cmH20 (05/04/18 0535)  Oxygen Concentration (%): 50 (05/04/18 0645)  Peak Airway Pressure: 25 cmH2O (05/04/18 0535)  Plateau Pressure: 0 cmH20 (05/04/18 0535)  Total Ve: 8.43 mL (05/04/18 0535)  F/VT Ratio<105 (RSBI): (!) 35.71 (05/04/18 0535)    Lines/Drains/Airways     Central Venous Catheter Line                 Port A Cath Single Lumen 05/02/18 2105 left subclavian 1 day         Percutaneous Central Line Insertion/Assessment - triple lumen  05/03/18 1030 right internal jugular less than 1 day          Drain                 Drain/Device  Right -- days         Urethral Catheter 05/03/18 0615 Straight-tip;Non-latex 16 Fr. 1 day         Colostomy 05/03/18 0715 RUQ less than 1 day         NG/OG Tube 05/03/18 0800 Center mouth less than 1 day          Airway                 Airway - Non-Surgical 05/03/18 0611 Endotracheal Tube 1 day          Arterial Line                 Arterial Line 05/03/18 0615 Left Radial 1 day          Surgical Packing                 Surgical Packing less than 1 day          Peripheral Intravenous Line                 Peripheral IV - Single Lumen 05/02/18 2024 Left Antecubital 1 day         Peripheral IV - Single Lumen 05/03/18 0118 Right Forearm 1 day         Peripheral IV - Single Lumen 05/03/18 0619 Right Hand 1 day                Significant Labs:    CBC/Anemia Profile:    Recent Labs  Lab 05/03/18 2000  05/04/18  0000 05/04/18  0400   WBC 11.42 11.41 12.26   HGB 7.7* 7.2* 7.5*   HCT 24.2* 22.6* 23.7*   * 115* 113*   MCV 79* 79* 79*   RDW 17.2* 17.2* 17.2*        Chemistries:    Recent Labs  Lab 05/03/18 2000 05/04/18 0000 05/04/18  0400   *  132* 131*  131* 135*  135*   K 3.6  3.6 3.9  3.9 4.0  4.0     102 102  102 104  104   CO2 25  25 25  25 27  27   BUN 15  15 14  14 12  12   CREATININE 0.7  0.7 0.7  0.7 0.7  0.7   CALCIUM 7.1*  7.1* 7.1*  7.1* 7.4*  7.4*   ALBUMIN 1.6* 1.5* 1.6*   PROT 4.0* 3.8* 4.2*   BILITOT 2.0* 1.7* 1.2*   ALKPHOS 45* 46* 50*   ALT 8* 6* 7*   AST 16 12 13   MG 1.8 2.0 2.1   PHOS 2.4* 2.5* 2.5*       ABGs:     Recent Labs  Lab 05/04/18  0517   PH 7.393   PCO2 45.7*   HCO3 27.9   POCSATURATED 96   BE 3     Lactic Acid:     Recent Labs  Lab 05/03/18 2000 05/04/18  0000 05/04/18  0400   LACTATE 1.9 2.1 1.7       Significant Imaging:  I have reviewed and interpreted all pertinent imaging results/findings within the past 24 hours.    Assessment/Plan:     * Perforation of sigmoid colon    Plan:     Neuro:   - Propofol and fentanyl gtt for sedation/pain control  - Responsive to commands off sedation     Pulmonary:   -Intubated  Vent Mode: A/C  Oxygen Concentration (%):  [40-60] 50  Resp Rate Total:  [17 br/min-26 br/min] 21 br/min  Vt Set:  [450 mL] 450 mL  PEEP/CPAP:  [5 cmH20] 5 cmH20  Pressure Support:  [0 cmH20] 0 cmH20  Mean Airway Pressure:  [9 qyO45-47 cmH20] 10 cmH20    - Ventilator associated PNA ppx: Chlorhexidine  -ABGs prn     Recent Labs  Lab 05/04/18  0517   PH 7.393   PO2 84   PCO2 45.7*   HCO3 27.9   BE 3   -Daily CXR     Cardiac:  -H/o CAD s/p 4 vessel CABG   -MAP goal >65  -Levo at .14, vaso off, continue to wean as tolerated  -hydrocortisone 100mg IV q8h given chronic steroid use     Renal:   -Garcia in place  -UOP adequate, continue to monitor & bolus prn  -Bun/Cr 12/0.7      Fluids/Electrolytes/Nutrition/GI:   -Nutritional status:  NPO  -replace lytes PRN  -LR @ 150cc/hr; additional fluid boluses prn  -BMP q4  -GI ulcer prophylaxis: pantoprazole     Hematology/Oncology:  -H/H stable s/p 3 units pRBCs now 7.5/23.7 from 7.2/22.6  -INR/Plts 1.2, platelets 113 s/p platelets yesterday  - CBC q4, continue to trend and transfuse as needed     Infectious Disease:   -Afebrile since return from OR  -WBC 12.26  - Blood cultures drawn in ED, now growing gram negative rods  -Abx: continue on Cefepime, Vanc, Flaygl     Endocrine:  -Glucose goal of 120-180  -SSI      Dispo:  -Continue care in the ICU setting  -Plan for take back to the OR today for ex-lap, washout and possible closure with Dr. Nikita Martinez MD  General Surgery  [facility]

## 2018-05-04 NOTE — OP NOTE
[facility]  General Surgery  Operative Note    SUMMARY     Date of Procedure: 5/4/2018     Procedure: Procedure(s) (LRB):  EXPLORATORY-LAPAROTOMY (N/A)  WASHOUT-ABDOMINAL (N/A)  CLOSURE-WOUND (N/A)       Surgeon(s) and Role:     * Moira Nix MD - Primary     * Stevenson Reyna MD - Resident - Assisting      Pre-Operative Diagnosis: Perforation of sigmoid colon [K63.1]    Post-Operative Diagnosis: Post-Op Diagnosis Codes:     * Perforation of sigmoid colon [K63.1]    Anesthesia: General    Procedure in Detail:    After risks and benefits were explained to the patient's   family, they expressed their wish to proceed and consent was obtained.  The   patient was wheeled to the Operating Room from the ICU.  The patient was   positioned.  Skin was prepped and draped in the normal sterile fashion.  The   patient's previously closed skin was opened in the midline.  Once this was   completed, the abdomen was opened as the fascia had not previously been closed.   Surgical laps were intentionally left to pack the abdomen related to bleeding from the prior operation. These were removed and totaled 9 in number.  Abdominal xray completed and confirmed no remaining sponges in the abdomen.  Once this was completed, the abdomen was copiously irrigated.  There was no evidence of succus or new purulent drainage.  The end colostomy(transverse colon) was examined and it   appeared viable. Six liters of irrigation was used within the abdomen.  It was meticulously inspected and hemostasis was excellent.  Electrocautery as well as Nu-knit was used to augment hemostasis.  Two large   15-Palau[place] Atul drains were placed in the pelvis and along the right and left pericolic gutters.  Next, 3-0 nylon was used to anchor these to the skin.  Once this was completed, the skin was undermined to allow and facilitate closure of the fascia.  Two looped PDS sutures were used to close the midline in the usual fashion.   Black sponge was then placed into the subcutaneous space and tape was used to cover this area.    An ostomy bag was then placed over the transverse colostomy.  The patient tolerated the procedure without complication.  At the end of the   case, the patient was off all pressor treatment.  During the closure of the fascia, there was no increase in the patient's peak pressures on his ventilator settings.  He was transferred to the ICU intubated, but in stable condition.    All needle, sponge and instrument counts were correct at the end of the case.      Description of the Findings of the Procedure:   1) No succus or active bleeding found. 9 laparotomy sponges removed.   2) Abdominal xray completed and confirmed no remaining sponges in the abdomen.    Significant Surgical Tasks Conducted by the Assistant(s), if Applicable: None    Complications: No    Estimated Blood Loss (EBL): 75 mL           Implants: * No implants in log *    Specimens:   Specimen (12h ago through future)    None                  Condition: Stable    Disposition: ICU - intubated and hemodynamically stable.    Attestation: I was present and scrubbed for the entire procedure.

## 2018-05-04 NOTE — BRIEF OP NOTE
[facility]  Brief Operative Note    SUMMARY     Surgery Date: 5/4/2018     Surgeon(s) and Role:     * Moira Nix MD - Primary     * Stevenson Reyna MD - Resident - Assisting        Pre-op Diagnosis:  Perforation of sigmoid colon [K63.1]    Post-op Diagnosis:  Post-Op Diagnosis Codes:     * Perforation of sigmoid colon [K63.1]    Procedure(s) (LRB):  EXPLORATORY-LAPAROTOMY (N/A)  WASHOUT-ABDOMINAL (N/A)  CLOSURE-WOUND (N/A)    Anesthesia: General    Description of Procedure: Exploratory laparotomy, wash out of abdominal cavity, placement of drains, closure of fascia and wound vac placed    Description of the findings of the procedure: no succus or active bleeding found. 9 laps removed. Abdominal Xray confirming no remaining labs. Irrigated with 6L normal saline. Pelvic drains x2 left in place. Fascia closed with 0 PDS. Black sponge placed overtop.     Estimated Blood Loss: 75cc         Specimens:   Specimen (12h ago through future)    None

## 2018-05-04 NOTE — PLAN OF CARE
Problem: Patient Care Overview  Goal: Individualization & Mutuality  Admit Dx: s/p Exploratory Laparotomy w/ sigmoidectomy/colostomy 2/2 sigmoid perforation    PMHx: HTN, CAD, s/p CABG, DM2, Colon Perf, Appendiceal CA, RA    PSHx: Appendectomy, EVAR for AAA (2017), CABG, Right colectomy    5/2- Patient presented to ED c/o abdominal pain, febrile, tachycardic, and hypotensive. CT revealed sigmoid perforation w/ fluid and abscess collection.   5/3- Class A to OR for ex lap, colectomy, admitted to SICU, 911L LR, lined up, 3 U PRBCs, 1 U PLT  5/4: OR washout with washout and closure with wound vac; 2L LR; 2 units PRBC    Nursing:  MAP >65  Q4H LA, BMP, CBC         Outcome: Ongoing (interventions implemented as appropriate)  Reviewed plan of care with patient and spouse. Pt intubated on vent settings: AC 50% FiO2, 5 of PEEP rate of 18. Propofol and fentanyl titrated for RASS goal -2. Levo and Vaso titrated for MAP >65. All pressure points covered with protective foam dressing. Midline wound vac intact. UO adequate. CVP 14-18. Dr. Lozada updated through this shift on patient status. See flow sheet for further assessment. All questions and concerns answered and acknowledged. VSS at this time, will continue to monitor.

## 2018-05-04 NOTE — TRANSFER OF CARE
"Anesthesia Transfer of Care Note    Patient: Cristo Jefferson    Procedure(s) Performed: Procedure(s) (LRB):  EXPLORATORY-LAPAROTOMY (N/A)  WASHOUT-ABDOMINAL (N/A)  CLOSURE-WOUND (N/A)    Patient location: ICU    Anesthesia Type: general    Transport from OR: Transported from OR intubated on 100% O2 by AMBU with adequate controlled ventilation. Continuous ECG monitoring in transport. Continuous SpO2 monitoring in transport. Continuos invasive BP monitoring in transport    Post pain: adequate analgesia    Post assessment: no apparent anesthetic complications    Post vital signs: stable    Level of consciousness: sedated    Nausea/Vomiting: no nausea/vomiting    Complications: none    Transfer of care protocol was followed      Last vitals:   Visit Vitals  BP (!) 101/56 (BP Location: Left arm, Patient Position: Lying)   Pulse 64   Temp 37.4 °C (99.3 °F) (Oral)   Resp 19   Ht 5' 9" (1.753 m)   Wt 117.1 kg (258 lb 2.5 oz)   SpO2 98%   BMI 38.12 kg/m²     "

## 2018-05-04 NOTE — PROGRESS NOTES
[facility]  Critical Care - Surgery  Progress Note    Patient Name: Cristo Jefferson  MRN: [id number]  Admission Date: 5/3/2018  Hospital Length of Stay: 1 days  Code Status: Prior  Attending Provider: Star Bautista MD  Primary Care Provider: Vince Perez MD   Principal Problem: Perforation of sigmoid colon    Subjective:     Interval History/Significant Events: Since admission to SICU yesterday from OR patient has received 6L LR bolus and is getting fluids at a rate of 150cc/hr. He also received 3 units pRBCs and 1 unit of platelets. His lactate has normalized. Currently on levo .14, vaso has been turned off.     Follow-up For: Procedure(s) (LRB):  EXPLORATORY LAPAROTOMY Drainage of pelvic abscess (N/A)  COLOSTOMY (N/A)  LYSIS OF ADHESIONS (N/A)    Post-Operative Day: 1 Day Post-Op    Objective:     Vital Signs (Most Recent):  Temp: 98.8 °F (37.1 °C) (05/03/18 2300)  Pulse: 67 (05/04/18 0535)  Resp: (!) 9 (05/04/18 0535)  BP: (!) 104/58 (05/04/18 0535)  SpO2: 97 % (05/04/18 0535) Vital Signs (24h Range):  Temp:  [97.7 °F (36.5 °C)-99.4 °F (37.4 °C)] 98.8 °F (37.1 °C)  Pulse:  [] 67  Resp:  [9-36] 9  SpO2:  [92 %-100 %] 97 %  BP: ()/(29-86) 104/58  Arterial Line BP: ()/(27-84) 116/54     Weight: 117.1 kg (258 lb 2.5 oz)  Body mass index is 38.12 kg/m².      Intake/Output Summary (Last 24 hours) at 05/04/18 0602  Last data filed at 05/04/18 0500   Gross per 24 hour   Intake             5618 ml   Output             5985 ml   Net             -367 ml       Physical Exam   Constitutional: He appears well-developed and well-nourished.   HENT:   Head: Normocephalic and atraumatic.   Nose: Nose normal.   Eyes: Conjunctivae are normal. Pupils are equal, round, and reactive to light. No scleral icterus.   Cardiovascular: Normal rate and regular rhythm.  Exam reveals no gallop and no friction rub.    No murmur heard.  Pulmonary/Chest:   Intubated  Mechanical breath sounds   Abdominal:  Soft. He exhibits distension. There is tenderness.   Midline incision c/d/i  Transverse colostomy pink  Abdominal incision oozing    Musculoskeletal: Normal range of motion.   Neurological:   Sedated   Skin: Skin is warm and dry. No rash noted.     Vents:  Vent Mode: A/C (05/04/18 0535)  Ventilator Initiated: Yes (05/03/18 0815)  Set Rate: 18 bmp (05/04/18 0535)  Vt Set: 450 mL (05/04/18 0535)  Pressure Support: 0 cmH20 (05/04/18 0535)  PEEP/CPAP: 5 cmH20 (05/04/18 0535)  Oxygen Concentration (%): 50 (05/04/18 0535)  Peak Airway Pressure: 25 cmH2O (05/04/18 0535)  Plateau Pressure: 0 cmH20 (05/04/18 0535)  Total Ve: 8.43 mL (05/04/18 0535)  F/VT Ratio<105 (RSBI): (!) 17.86 (05/04/18 0535)    Lines/Drains/Airways     Central Venous Catheter Line                 Port A Cath Single Lumen 05/02/18 2105 left subclavian 1 day         Percutaneous Central Line Insertion/Assessment - triple lumen  05/03/18 1030 right internal jugular less than 1 day          Drain                 Drain/Device  Right -- days         Colostomy 05/03/18 0715 RUQ less than 1 day         NG/OG Tube 05/03/18 0800 Center mouth less than 1 day         Urethral Catheter 05/03/18 0615 Straight-tip;Non-latex 16 Fr. less than 1 day          Airway                 Airway - Non-Surgical 05/03/18 0611 Endotracheal Tube less than 1 day          Arterial Line                 Arterial Line 05/03/18 0615 Left Radial less than 1 day          Surgical Packing                 Surgical Packing less than 1 day          Peripheral Intravenous Line                 Peripheral IV - Single Lumen 05/02/18 2024 Left Antecubital 1 day         Peripheral IV - Single Lumen 05/03/18 0118 Right Forearm 1 day         Peripheral IV - Single Lumen 05/03/18 0619 Right Hand less than 1 day                Significant Labs:    CBC/Anemia Profile:    Recent Labs  Lab 05/03/18  2000 05/04/18  0000 05/04/18  0400   WBC 11.42 11.41 12.26   HGB 7.7* 7.2* 7.5*   HCT 24.2* 22.6* 23.7*    * 115* 113*   MCV 79* 79* 79*   RDW 17.2* 17.2* 17.2*        Chemistries:    Recent Labs  Lab 05/03/18 2000 05/04/18  0000 05/04/18  0400   *  132* 131*  131* 135*  135*   K 3.6  3.6 3.9  3.9 4.0  4.0     102 102  102 104  104   CO2 25  25 25  25 27  27   BUN 15  15 14  14 12  12   CREATININE 0.7  0.7 0.7  0.7 0.7  0.7   CALCIUM 7.1*  7.1* 7.1*  7.1* 7.4*  7.4*   ALBUMIN 1.6* 1.5* 1.6*   PROT 4.0* 3.8* 4.2*   BILITOT 2.0* 1.7* 1.2*   ALKPHOS 45* 46* 50*   ALT 8* 6* 7*   AST 16 12 13   MG 1.8 2.0 2.1   PHOS 2.4* 2.5* 2.5*       ABGs:   Recent Labs  Lab 05/04/18 0517   PH 7.393   PCO2 45.7*   HCO3 27.9   POCSATURATED 96   BE 3     Lactic Acid:   Recent Labs  Lab 05/03/18 2000 05/04/18  0000 05/04/18  0400   LACTATE 1.9 2.1 1.7       Significant Imaging:  I have reviewed and interpreted all pertinent imaging results/findings within the past 24 hours.    Assessment/Plan:     * Perforation of sigmoid colon    56yo M 1 Day Post-Op s/p ex-lap, washout and end colostomy     Plan:    Neuro:   - Propofol and fentanyl gtt for sedation/pain control  - Responsive to commands off sedation    Pulmonary:   -Intubated  Vent Mode: A/C  Oxygen Concentration (%):  [40-60] 50  Resp Rate Total:  [17 br/min-26 br/min] 18 br/min  Vt Set:  [450 mL] 450 mL  PEEP/CPAP:  [5 cmH20] 5 cmH20  Pressure Support:  [0 cmH20] 0 cmH20  Mean Airway Pressure:  [9 vfO91-47 cmH20] 10 cmH20  - Ventilator associated PNA ppx: Chlorhexidine  -ABGs prn    Recent Labs  Lab 05/04/18 0517   PH 7.393   PO2 84   PCO2 45.7*   HCO3 27.9   BE 3   -Daily CXR    Cardiac:  -H/o CAD s/p 4 vessel CABG   -MAP goal >65  -Levo at .14, vaso off, continue to wean as tolerated  -hydrocortisone 100mg IV q8h given chronic steroid use    Renal:   -Garcia in place  -UOP adequate, continue to monitor & bolus prn  -Bun/Cr 12/0.7     Fluids/Electrolytes/Nutrition/GI:   -Nutritional status: NPO  -replace lytes PRN  -LR @ 150cc/hr; additional  fluid boluses prn  -BMP q4  -GI ulcer prophylaxis    Hematology/Oncology:  -H/H with significant downward progression 6.4/21.2 from 7.6/24.7; transfuse 3 units pRBCs  -INR/Plts 1.2, platelets 113 s/p platelets yesterday  - CBC q4, continue to trend and transfuse as needed    Infectious Disease:   -Afebrile since return from OR  -WBC 12.26  -Cultures drawn in ED, with gram negative rods  -Abx: Cefepime, Vanc, Flaygl    Endocrine:  -Glucose goal of 120-180  -SSI     Dispo:  -Continue care in the ICU setting  -Plan for take back to the OR today for ex-lap, washout and possible closure with Dr. Shelton           Critical care was time spent personally by me on the following activities: development of treatment plan with patient or surrogate and bedside caregivers, discussions with consultants, evaluation of patient's response to treatment, examination of patient, ordering and performing treatments and interventions, ordering and review of laboratory studies, ordering and review of radiographic studies, pulse oximetry, re-evaluation of patient's condition.  This critical care time did not overlap with that of any other provider or involve time for any procedures.     Belén Byrd MD  Critical Care - Surgery  [facility]

## 2018-05-04 NOTE — ASSESSMENT & PLAN NOTE
Plan:     Neuro:   - Propofol and fentanyl gtt for sedation/pain control  - Responsive to commands off sedation     Pulmonary:   -Intubated  Vent Mode: A/C  Oxygen Concentration (%):  [40-60] 50  Resp Rate Total:  [17 br/min-26 br/min] 21 br/min  Vt Set:  [450 mL] 450 mL  PEEP/CPAP:  [5 cmH20] 5 cmH20  Pressure Support:  [0 cmH20] 0 cmH20  Mean Airway Pressure:  [9 jwA48-11 cmH20] 10 cmH20    - Ventilator associated PNA ppx: Chlorhexidine  -ABGs prn     Recent Labs  Lab 05/04/18  0517   PH 7.393   PO2 84   PCO2 45.7*   HCO3 27.9   BE 3   -Daily CXR     Cardiac:  -H/o CAD s/p 4 vessel CABG   -MAP goal >65  -Levo at .14, vaso off, continue to wean as tolerated  -hydrocortisone 100mg IV q8h given chronic steroid use     Renal:   -Garcia in place  -UOP adequate, continue to monitor & bolus prn  -Bun/Cr 12/0.7      Fluids/Electrolytes/Nutrition/GI:   -Nutritional status: NPO  -replace lytes PRN  -LR @ 150cc/hr; additional fluid boluses prn  -BMP q4  -GI ulcer prophylaxis: pantoprazole     Hematology/Oncology:  -H/H stable s/p 3 units pRBCs now 7.5/23.7 from 7.2/22.6  -INR/Plts 1.2, platelets 113 s/p platelets yesterday  - CBC q4, continue to trend and transfuse as needed     Infectious Disease:   -Afebrile since return from OR  -WBC 12.26  - Blood cultures drawn in ED, now growing gram negative rods  -Abx: continue on Cefepime, Vanc, Flaygl     Endocrine:  -Glucose goal of 120-180  -SSI      Dispo:  -Continue care in the ICU setting  -Plan for take back to the OR today for ex-lap, washout and possible closure with Dr. Shelton

## 2018-05-04 NOTE — PLAN OF CARE
Problem: Patient Care Overview  Goal: Plan of Care Review  Outcome: Ongoing (interventions implemented as appropriate)  Reviewed plan of care with patient and spouse. Pt intubated on vent settings: AC 50% FiO2, 5 of PEEP rate of 18. Propofol and fentanyl titrated for RASS goal -2. Levo titrated for MAP >65. Pt abdomen open, HOB flat not turned. All pressure points covered with protective foam dressing. Midline dressings reinforced as needed. UO adequate. CVP 14-18. Plans for possible wound vac or closure today. See flow sheet for further assessment. All questions and concerns answered and acknowledged. VSS at this time, will continue to monitor.

## 2018-05-04 NOTE — SUBJECTIVE & OBJECTIVE
Interval History/Significant Events: Since admission to SICU yesterday from OR patient has received 6L LR bolus and is getting fluids at a rate of 150cc/hr. He also received 3 units pRBCs and 1 unit of platelets. His lactate has normalized. Currently on levo .14, vaso has been turned off.     Follow-up For: Procedure(s) (LRB):  EXPLORATORY LAPAROTOMY Drainage of pelvic abscess (N/A)  COLOSTOMY (N/A)  LYSIS OF ADHESIONS (N/A)    Post-Operative Day: 1 Day Post-Op    Objective:     Vital Signs (Most Recent):  Temp: 98.8 °F (37.1 °C) (05/03/18 2300)  Pulse: 67 (05/04/18 0535)  Resp: (!) 9 (05/04/18 0535)  BP: (!) 104/58 (05/04/18 0535)  SpO2: 97 % (05/04/18 0535) Vital Signs (24h Range):  Temp:  [97.7 °F (36.5 °C)-99.4 °F (37.4 °C)] 98.8 °F (37.1 °C)  Pulse:  [] 67  Resp:  [9-36] 9  SpO2:  [92 %-100 %] 97 %  BP: ()/(29-86) 104/58  Arterial Line BP: ()/(27-84) 116/54     Weight: 117.1 kg (258 lb 2.5 oz)  Body mass index is 38.12 kg/m².      Intake/Output Summary (Last 24 hours) at 05/04/18 0602  Last data filed at 05/04/18 0500   Gross per 24 hour   Intake             5618 ml   Output             5985 ml   Net             -367 ml       Physical Exam   Constitutional: He appears well-developed and well-nourished.   HENT:   Head: Normocephalic and atraumatic.   Nose: Nose normal.   Eyes: Conjunctivae are normal. Pupils are equal, round, and reactive to light. No scleral icterus.   Cardiovascular: Normal rate and regular rhythm.  Exam reveals no gallop and no friction rub.    No murmur heard.  Pulmonary/Chest:   Intubated  Mechanical breath sounds   Abdominal: Soft. He exhibits distension. There is tenderness.   Midline incision c/d/i  Transverse colostomy pink  Abdominal incision oozing    Musculoskeletal: Normal range of motion.   Neurological:   Sedated   Skin: Skin is warm and dry. No rash noted.     Vents:  Vent Mode: A/C (05/04/18 0535)  Ventilator Initiated: Yes (05/03/18 0815)  Set Rate: 18 bmp  (05/04/18 0535)  Vt Set: 450 mL (05/04/18 0535)  Pressure Support: 0 cmH20 (05/04/18 0535)  PEEP/CPAP: 5 cmH20 (05/04/18 0535)  Oxygen Concentration (%): 50 (05/04/18 0535)  Peak Airway Pressure: 25 cmH2O (05/04/18 0535)  Plateau Pressure: 0 cmH20 (05/04/18 0535)  Total Ve: 8.43 mL (05/04/18 0535)  F/VT Ratio<105 (RSBI): (!) 17.86 (05/04/18 0535)    Lines/Drains/Airways     Central Venous Catheter Line                 Port A Cath Single Lumen 05/02/18 2105 left subclavian 1 day         Percutaneous Central Line Insertion/Assessment - triple lumen  05/03/18 1030 right internal jugular less than 1 day          Drain                 Drain/Device  Right -- days         Colostomy 05/03/18 0715 RUQ less than 1 day         NG/OG Tube 05/03/18 0800 Center mouth less than 1 day         Urethral Catheter 05/03/18 0615 Straight-tip;Non-latex 16 Fr. less than 1 day          Airway                 Airway - Non-Surgical 05/03/18 0611 Endotracheal Tube less than 1 day          Arterial Line                 Arterial Line 05/03/18 0615 Left Radial less than 1 day          Surgical Packing                 Surgical Packing less than 1 day          Peripheral Intravenous Line                 Peripheral IV - Single Lumen 05/02/18 2024 Left Antecubital 1 day         Peripheral IV - Single Lumen 05/03/18 0118 Right Forearm 1 day         Peripheral IV - Single Lumen 05/03/18 0619 Right Hand less than 1 day                Significant Labs:    CBC/Anemia Profile:    Recent Labs  Lab 05/03/18 2000 05/04/18  0000 05/04/18  0400   WBC 11.42 11.41 12.26   HGB 7.7* 7.2* 7.5*   HCT 24.2* 22.6* 23.7*   * 115* 113*   MCV 79* 79* 79*   RDW 17.2* 17.2* 17.2*        Chemistries:    Recent Labs  Lab 05/03/18 2000 05/04/18  0000 05/04/18  0400   *  132* 131*  131* 135*  135*   K 3.6  3.6 3.9  3.9 4.0  4.0     102 102  102 104  104   CO2 25  25 25  25 27  27   BUN 15  15 14  14 12  12   CREATININE 0.7  0.7 0.7  0.7  0.7  0.7   CALCIUM 7.1*  7.1* 7.1*  7.1* 7.4*  7.4*   ALBUMIN 1.6* 1.5* 1.6*   PROT 4.0* 3.8* 4.2*   BILITOT 2.0* 1.7* 1.2*   ALKPHOS 45* 46* 50*   ALT 8* 6* 7*   AST 16 12 13   MG 1.8 2.0 2.1   PHOS 2.4* 2.5* 2.5*       ABGs:   Recent Labs  Lab 05/04/18  0517   PH 7.393   PCO2 45.7*   HCO3 27.9   POCSATURATED 96   BE 3     Lactic Acid:   Recent Labs  Lab 05/03/18  2000 05/04/18  0000 05/04/18  0400   LACTATE 1.9 2.1 1.7       Significant Imaging:  I have reviewed and interpreted all pertinent imaging results/findings within the past 24 hours.

## 2018-05-04 NOTE — ANESTHESIA POSTPROCEDURE EVALUATION
"Anesthesia Post Evaluation    Patient: Cristo Jefferson    Procedure(s) Performed: Procedure(s) (LRB):  EXPLORATORY-LAPAROTOMY (N/A)  WASHOUT-ABDOMINAL (N/A)  CLOSURE-WOUND (N/A)    Final Anesthesia Type: general  Patient location during evaluation: PACU  Patient participation: No - Unable to Participate, Intubation (interview with nurse)  Level of consciousness: awake and alert and oriented  Post-procedure vital signs: reviewed and stable  Pain management: adequate  Airway patency: patent  PONV status at discharge: No PONV  Anesthetic complications: no      Cardiovascular status: stable and hemodynamically unstable (they are giving blood and fluids)  Respiratory status: unassisted, spontaneous ventilation and room air  Hydration status: euvolemic  Follow-up not needed.        Visit Vitals  BP (!) 112/53   Pulse (!) 47   Temp 37.2 °C (99 °F) (Oral)   Resp 18   Ht 5' 9" (1.753 m)   Wt 117.1 kg (258 lb 2.5 oz)   SpO2 100%   BMI 38.12 kg/m²       Pain/Rebecca Score: Pain Assessment Performed: Yes (5/4/2018 11:00 AM)  Presence of Pain: non-verbal indicators absent (5/4/2018 11:00 AM)  Pain Rating Prior to Med Admin: 0 (5/4/2018 11:00 AM)  Pain Rating Post Med Admin: 0 (5/4/2018 12:33 AM)      "

## 2018-05-04 NOTE — SUBJECTIVE & OBJECTIVE
Interval History/Significant Events: No acute events overnight. 2L LR given since yesterday aftertoon, total of 6L since admission to SICU yesterday. Received 3 units pRBCs and 1 pack of platelets. Lactic trending down, now at 1.7. Currently on levo .14, vaso has been turned off.     Follow-up For: Procedure(s) (LRB):  EXPLORATORY LAPAROTOMY Drainage of pelvic abscess (N/A)  COLOSTOMY (N/A)  LYSIS OF ADHESIONS (N/A)    Post-Operative Day: 1 Day Post-Op    Objective:     Vital Signs (Most Recent):  Temp: 99.4 °F (37.4 °C) (05/04/18 0430)  Pulse: 65 (05/04/18 0645)  Resp: 18 (05/04/18 0645)  BP: (!) 99/55 (05/04/18 0630)  SpO2: 98 % (05/04/18 0645) Vital Signs (24h Range):  Temp:  [97.7 °F (36.5 °C)-99.4 °F (37.4 °C)] 99.4 °F (37.4 °C)  Pulse:  [] 65  Resp:  [12-36] 18  SpO2:  [92 %-100 %] 98 %  BP: ()/(29-86) 99/55  Arterial Line BP: ()/(27-84) 114/56     Weight: 117.1 kg (258 lb 2.5 oz)  Body mass index is 38.12 kg/m².      Intake/Output Summary (Last 24 hours) at 05/04/18 0706  Last data filed at 05/04/18 0600   Gross per 24 hour   Intake             6525 ml   Output             5510 ml   Net             1015 ml       Physical Exam   Constitutional: He appears well-developed and well-nourished.   HENT:   Head: Normocephalic and atraumatic.   Nose: Nose normal.   Eyes: Conjunctivae are normal. Pupils are equal, round, and reactive to light. No scleral icterus.   Cardiovascular: Normal rate and regular rhythm.  Exam reveals no gallop and no friction rub.    No murmur heard.  Pulmonary/Chest:   Intubated  Vent Mode: A/C  Oxygen Concentration (%):  (40-60) 50  Resp Rate Total:  (17 br/min-26 br/min) 21 br/min  Vt Set:  (450 mL) 450 mL  PEEP/CPAP:  (5 cmH20) 5 cmH20  Pressure Support:  (0 cmH20) 0 cmH20  Mean Airway Pressure:  (9 asQ63-54 cmH20) 10 cmH20     Abdominal: Soft. He exhibits distension. There is tenderness.   Midline incision covered with dressing, incision oozing  Transverse colostomy  pink     Musculoskeletal: Normal range of motion.   Neurological:   Sedated on propofol   Skin: Skin is warm and dry. No rash noted.   Nursing note and vitals reviewed.    Vents:  Vent Mode: A/C (05/04/18 0535)  Ventilator Initiated: Yes (05/03/18 0815)  Set Rate: 18 bmp (05/04/18 0535)  Vt Set: 450 mL (05/04/18 0535)  Pressure Support: 0 cmH20 (05/04/18 0535)  PEEP/CPAP: 5 cmH20 (05/04/18 0535)  Oxygen Concentration (%): 50 (05/04/18 0645)  Peak Airway Pressure: 25 cmH2O (05/04/18 0535)  Plateau Pressure: 0 cmH20 (05/04/18 0535)  Total Ve: 8.43 mL (05/04/18 0535)  F/VT Ratio<105 (RSBI): (!) 35.71 (05/04/18 0535)    Lines/Drains/Airways     Central Venous Catheter Line                 Port A Cath Single Lumen 05/02/18 2105 left subclavian 1 day         Percutaneous Central Line Insertion/Assessment - triple lumen  05/03/18 1030 right internal jugular less than 1 day          Drain                 Drain/Device  Right -- days         Urethral Catheter 05/03/18 0615 Straight-tip;Non-latex 16 Fr. 1 day         Colostomy 05/03/18 0715 RUQ less than 1 day         NG/OG Tube 05/03/18 0800 Center mouth less than 1 day          Airway                 Airway - Non-Surgical 05/03/18 0611 Endotracheal Tube 1 day          Arterial Line                 Arterial Line 05/03/18 0615 Left Radial 1 day          Surgical Packing                 Surgical Packing less than 1 day          Peripheral Intravenous Line                 Peripheral IV - Single Lumen 05/02/18 2024 Left Antecubital 1 day         Peripheral IV - Single Lumen 05/03/18 0118 Right Forearm 1 day         Peripheral IV - Single Lumen 05/03/18 0619 Right Hand 1 day                Significant Labs:    CBC/Anemia Profile:    Recent Labs  Lab 05/03/18  2000 05/04/18  0000 05/04/18  0400   WBC 11.42 11.41 12.26   HGB 7.7* 7.2* 7.5*   HCT 24.2* 22.6* 23.7*   * 115* 113*   MCV 79* 79* 79*   RDW 17.2* 17.2* 17.2*        Chemistries:    Recent Labs  Lab 05/03/18 2000  05/04/18  0000 05/04/18  0400   *  132* 131*  131* 135*  135*   K 3.6  3.6 3.9  3.9 4.0  4.0     102 102  102 104  104   CO2 25  25 25  25 27  27   BUN 15  15 14  14 12  12   CREATININE 0.7  0.7 0.7  0.7 0.7  0.7   CALCIUM 7.1*  7.1* 7.1*  7.1* 7.4*  7.4*   ALBUMIN 1.6* 1.5* 1.6*   PROT 4.0* 3.8* 4.2*   BILITOT 2.0* 1.7* 1.2*   ALKPHOS 45* 46* 50*   ALT 8* 6* 7*   AST 16 12 13   MG 1.8 2.0 2.1   PHOS 2.4* 2.5* 2.5*       ABGs:     Recent Labs  Lab 05/04/18  0517   PH 7.393   PCO2 45.7*   HCO3 27.9   POCSATURATED 96   BE 3     Lactic Acid:     Recent Labs  Lab 05/03/18  2000 05/04/18  0000 05/04/18  0400   LACTATE 1.9 2.1 1.7       Significant Imaging:  I have reviewed and interpreted all pertinent imaging results/findings within the past 24 hours.

## 2018-05-05 LAB
ALBUMIN SERPL BCP-MCNC: 1.4 G/DL
ALBUMIN SERPL BCP-MCNC: 1.5 G/DL
ALLENS TEST: ABNORMAL
ALLENS TEST: NORMAL
ALP SERPL-CCNC: 49 U/L
ALP SERPL-CCNC: 49 U/L
ALP SERPL-CCNC: 50 U/L
ALP SERPL-CCNC: 51 U/L
ALT SERPL W/O P-5'-P-CCNC: 11 U/L
ALT SERPL W/O P-5'-P-CCNC: 7 U/L
ALT SERPL W/O P-5'-P-CCNC: 7 U/L
ALT SERPL W/O P-5'-P-CCNC: 8 U/L
ANION GAP SERPL CALC-SCNC: 4 MMOL/L
ANION GAP SERPL CALC-SCNC: 6 MMOL/L
ANION GAP SERPL CALC-SCNC: 6 MMOL/L
ANION GAP SERPL CALC-SCNC: 7 MMOL/L
APTT BLDCRRT: 21.5 SEC
APTT BLDCRRT: 22.5 SEC
APTT BLDCRRT: 24.8 SEC
APTT BLDCRRT: 25.5 SEC
AST SERPL-CCNC: 13 U/L
AST SERPL-CCNC: 13 U/L
AST SERPL-CCNC: 14 U/L
AST SERPL-CCNC: 18 U/L
BASOPHILS # BLD AUTO: 0.01 K/UL
BASOPHILS # BLD AUTO: 0.02 K/UL
BASOPHILS NFR BLD: 0.1 %
BASOPHILS NFR BLD: 0.2 %
BILIRUB SERPL-MCNC: 1.2 MG/DL
BILIRUB SERPL-MCNC: 1.3 MG/DL
BILIRUB SERPL-MCNC: 1.5 MG/DL
BILIRUB SERPL-MCNC: 2.2 MG/DL
BLD PROD TYP BPU: NORMAL
BLOOD UNIT EXPIRATION DATE: NORMAL
BLOOD UNIT TYPE CODE: 9500
BLOOD UNIT TYPE: NORMAL
BUN SERPL-MCNC: 10 MG/DL
BUN SERPL-MCNC: 9 MG/DL
CA-I BLDV-SCNC: 0.85 MMOL/L
CA-I BLDV-SCNC: 0.99 MMOL/L
CA-I BLDV-SCNC: 1.01 MMOL/L
CA-I BLDV-SCNC: 1.1 MMOL/L
CALCIUM SERPL-MCNC: 7.4 MG/DL
CALCIUM SERPL-MCNC: 7.4 MG/DL
CALCIUM SERPL-MCNC: 7.6 MG/DL
CALCIUM SERPL-MCNC: 7.6 MG/DL
CALCIUM SERPL-MCNC: 7.7 MG/DL
CHLORIDE SERPL-SCNC: 102 MMOL/L
CHLORIDE SERPL-SCNC: 103 MMOL/L
CHLORIDE SERPL-SCNC: 103 MMOL/L
CHLORIDE SERPL-SCNC: 106 MMOL/L
CO2 SERPL-SCNC: 25 MMOL/L
CO2 SERPL-SCNC: 25 MMOL/L
CO2 SERPL-SCNC: 26 MMOL/L
CODING SYSTEM: NORMAL
CREAT SERPL-MCNC: 0.6 MG/DL
CREAT SERPL-MCNC: 0.7 MG/DL
DELSYS: ABNORMAL
DELSYS: NORMAL
DIFFERENTIAL METHOD: ABNORMAL
DISPENSE STATUS: NORMAL
EOSINOPHIL # BLD AUTO: 0 K/UL
EOSINOPHIL NFR BLD: 0 %
EOSINOPHIL NFR BLD: 0.1 %
ERYTHROCYTE [DISTWIDTH] IN BLOOD BY AUTOMATED COUNT: 17 %
ERYTHROCYTE [DISTWIDTH] IN BLOOD BY AUTOMATED COUNT: 17 %
ERYTHROCYTE [DISTWIDTH] IN BLOOD BY AUTOMATED COUNT: 17.1 %
ERYTHROCYTE [DISTWIDTH] IN BLOOD BY AUTOMATED COUNT: 17.2 %
ERYTHROCYTE [SEDIMENTATION RATE] IN BLOOD BY WESTERGREN METHOD: 18 MM/H
EST. GFR  (AFRICAN AMERICAN): >60 ML/MIN/1.73 M^2
EST. GFR  (NON AFRICAN AMERICAN): >60 ML/MIN/1.73 M^2
FIO2: 50
FIO2: 60
GLUCOSE SERPL-MCNC: 157 MG/DL
GLUCOSE SERPL-MCNC: 187 MG/DL
GLUCOSE SERPL-MCNC: 187 MG/DL
GLUCOSE SERPL-MCNC: 194 MG/DL
GLUCOSE SERPL-MCNC: 194 MG/DL
GLUCOSE SERPL-MCNC: 201 MG/DL
GLUCOSE SERPL-MCNC: 201 MG/DL
HCO3 UR-SCNC: 24.8 MMOL/L (ref 24–28)
HCO3 UR-SCNC: 26.1 MMOL/L (ref 24–28)
HCO3 UR-SCNC: 27.6 MMOL/L (ref 24–28)
HCO3 UR-SCNC: 27.6 MMOL/L (ref 24–28)
HCO3 UR-SCNC: 28.6 MMOL/L (ref 24–28)
HCO3 UR-SCNC: 28.8 MMOL/L (ref 24–28)
HCT VFR BLD AUTO: 23.3 %
HCT VFR BLD AUTO: 23.8 %
HCT VFR BLD AUTO: 28.3 %
HCT VFR BLD AUTO: 28.3 %
HGB BLD-MCNC: 7.5 G/DL
HGB BLD-MCNC: 7.8 G/DL
HGB BLD-MCNC: 9.1 G/DL
HGB BLD-MCNC: 9.2 G/DL
IMM GRANULOCYTES # BLD AUTO: 0.07 K/UL
IMM GRANULOCYTES # BLD AUTO: 0.1 K/UL
IMM GRANULOCYTES # BLD AUTO: 0.1 K/UL
IMM GRANULOCYTES # BLD AUTO: 0.12 K/UL
IMM GRANULOCYTES NFR BLD AUTO: 0.8 %
IMM GRANULOCYTES NFR BLD AUTO: 0.9 %
IMM GRANULOCYTES NFR BLD AUTO: 0.9 %
IMM GRANULOCYTES NFR BLD AUTO: 1.1 %
INR PPP: 1
INR PPP: 1
INR PPP: 1.3
INR PPP: 1.4
LACTATE SERPL-SCNC: 1.4 MMOL/L
LACTATE SERPL-SCNC: 1.5 MMOL/L
LACTATE SERPL-SCNC: 1.7 MMOL/L
LACTATE SERPL-SCNC: 1.9 MMOL/L
LYMPHOCYTES # BLD AUTO: 1.1 K/UL
LYMPHOCYTES # BLD AUTO: 1.1 K/UL
LYMPHOCYTES # BLD AUTO: 1.2 K/UL
LYMPHOCYTES # BLD AUTO: 1.3 K/UL
LYMPHOCYTES NFR BLD: 11.1 %
LYMPHOCYTES NFR BLD: 11.4 %
LYMPHOCYTES NFR BLD: 12.4 %
LYMPHOCYTES NFR BLD: 9.8 %
MAGNESIUM SERPL-MCNC: 1.5 MG/DL
MAGNESIUM SERPL-MCNC: 1.6 MG/DL
MAGNESIUM SERPL-MCNC: 1.9 MG/DL
MAGNESIUM SERPL-MCNC: 2.1 MG/DL
MCH RBC QN AUTO: 26.3 PG
MCH RBC QN AUTO: 26.4 PG
MCH RBC QN AUTO: 26.6 PG
MCH RBC QN AUTO: 26.8 PG
MCHC RBC AUTO-ENTMCNC: 32.2 G/DL
MCHC RBC AUTO-ENTMCNC: 32.2 G/DL
MCHC RBC AUTO-ENTMCNC: 32.5 G/DL
MCHC RBC AUTO-ENTMCNC: 32.8 G/DL
MCV RBC AUTO: 82 FL
MIN VOL: 10.4
MIN VOL: 8.4
MIN VOL: 8.54
MIN VOL: 9.61
MODE: ABNORMAL
MODE: NORMAL
MONOCYTES # BLD AUTO: 0.5 K/UL
MONOCYTES # BLD AUTO: 0.5 K/UL
MONOCYTES # BLD AUTO: 0.6 K/UL
MONOCYTES # BLD AUTO: 0.7 K/UL
MONOCYTES NFR BLD: 4.7 %
MONOCYTES NFR BLD: 5.3 %
MONOCYTES NFR BLD: 5.5 %
MONOCYTES NFR BLD: 6.4 %
NEUTROPHILS # BLD AUTO: 6.9 K/UL
NEUTROPHILS # BLD AUTO: 9.1 K/UL
NEUTROPHILS # BLD AUTO: 9.2 K/UL
NEUTROPHILS # BLD AUTO: 9.5 K/UL
NEUTROPHILS NFR BLD: 80.9 %
NEUTROPHILS NFR BLD: 81.4 %
NEUTROPHILS NFR BLD: 83.2 %
NEUTROPHILS NFR BLD: 83.6 %
NRBC BLD-RTO: 0 /100 WBC
NUM UNITS TRANS PACKED RBC: NORMAL
PCO2 BLDA: 37.8 MMHG (ref 35–45)
PCO2 BLDA: 40.7 MMHG (ref 35–45)
PCO2 BLDA: 41.3 MMHG (ref 35–45)
PCO2 BLDA: 42 MMHG (ref 35–45)
PCO2 BLDA: 42.3 MMHG (ref 35–45)
PCO2 BLDA: 42.8 MMHG (ref 35–45)
PEEP: 5
PEEP: 5
PEEP: 7
PH SMN: 7.38 [PH] (ref 7.35–7.45)
PH SMN: 7.4 [PH] (ref 7.35–7.45)
PH SMN: 7.42 [PH] (ref 7.35–7.45)
PH SMN: 7.43 [PH] (ref 7.35–7.45)
PH SMN: 7.46 [PH] (ref 7.35–7.45)
PH SMN: 7.49 [PH] (ref 7.35–7.45)
PHOSPHATE SERPL-MCNC: 2.6 MG/DL
PHOSPHATE SERPL-MCNC: 2.8 MG/DL
PHOSPHATE SERPL-MCNC: 2.8 MG/DL
PHOSPHATE SERPL-MCNC: 3.1 MG/DL
PIP: 27
PIP: 30
PIP: 32
PIP: 38
PLATELET # BLD AUTO: 101 K/UL
PLATELET # BLD AUTO: 104 K/UL
PLATELET # BLD AUTO: 82 K/UL
PLATELET # BLD AUTO: 92 K/UL
PMV BLD AUTO: 10.6 FL
PMV BLD AUTO: 10.6 FL
PMV BLD AUTO: 11.1 FL
PMV BLD AUTO: 11.2 FL
PO2 BLDA: 61 MMHG (ref 80–100)
PO2 BLDA: 63 MMHG (ref 80–100)
PO2 BLDA: 69 MMHG (ref 80–100)
PO2 BLDA: 72 MMHG (ref 80–100)
PO2 BLDA: 80 MMHG (ref 80–100)
PO2 BLDA: 84 MMHG (ref 80–100)
POC BE: 0 MMOL/L
POC BE: 1 MMOL/L
POC BE: 3 MMOL/L
POC BE: 3 MMOL/L
POC BE: 5 MMOL/L
POC BE: 5 MMOL/L
POC SATURATED O2: 92 % (ref 95–100)
POC SATURATED O2: 92 % (ref 95–100)
POC SATURATED O2: 93 % (ref 95–100)
POC SATURATED O2: 95 % (ref 95–100)
POC SATURATED O2: 96 % (ref 95–100)
POC SATURATED O2: 97 % (ref 95–100)
POC TCO2: 26 MMOL/L (ref 23–27)
POC TCO2: 27 MMOL/L (ref 23–27)
POC TCO2: 29 MMOL/L (ref 23–27)
POC TCO2: 29 MMOL/L (ref 23–27)
POC TCO2: 30 MMOL/L (ref 23–27)
POC TCO2: 30 MMOL/L (ref 23–27)
POCT GLUCOSE: 140 MG/DL (ref 70–110)
POCT GLUCOSE: 151 MG/DL (ref 70–110)
POCT GLUCOSE: 153 MG/DL (ref 70–110)
POCT GLUCOSE: 181 MG/DL (ref 70–110)
POCT GLUCOSE: 199 MG/DL (ref 70–110)
POTASSIUM SERPL-SCNC: 3.6 MMOL/L
POTASSIUM SERPL-SCNC: 4 MMOL/L
POTASSIUM SERPL-SCNC: 4.1 MMOL/L
POTASSIUM SERPL-SCNC: 4.1 MMOL/L
PROT SERPL-MCNC: 4 G/DL
PROT SERPL-MCNC: 4 G/DL
PROT SERPL-MCNC: 4.1 G/DL
PROT SERPL-MCNC: 4.4 G/DL
PROTHROMBIN TIME: 10.9 SEC
PROTHROMBIN TIME: 10.9 SEC
PROTHROMBIN TIME: 13 SEC
PROTHROMBIN TIME: 14.2 SEC
RBC # BLD AUTO: 2.85 M/UL
RBC # BLD AUTO: 2.91 M/UL
RBC # BLD AUTO: 3.45 M/UL
RBC # BLD AUTO: 3.46 M/UL
SAMPLE: ABNORMAL
SAMPLE: NORMAL
SITE: ABNORMAL
SITE: NORMAL
SODIUM SERPL-SCNC: 132 MMOL/L
SODIUM SERPL-SCNC: 132 MMOL/L
SODIUM SERPL-SCNC: 133 MMOL/L
SODIUM SERPL-SCNC: 139 MMOL/L
SP02: 100
SP02: 100
SP02: 94
SP02: 99
VT: 450
VT: 469
WBC # BLD AUTO: 11.05 K/UL
WBC # BLD AUTO: 11.18 K/UL
WBC # BLD AUTO: 11.4 K/UL
WBC # BLD AUTO: 8.46 K/UL

## 2018-05-05 PROCEDURE — 80053 COMPREHEN METABOLIC PANEL: CPT | Mod: 91

## 2018-05-05 PROCEDURE — 27000221 HC OXYGEN, UP TO 24 HOURS

## 2018-05-05 PROCEDURE — 37799 UNLISTED PX VASCULAR SURGERY: CPT

## 2018-05-05 PROCEDURE — 63600175 PHARM REV CODE 636 W HCPCS: Performed by: STUDENT IN AN ORGANIZED HEALTH CARE EDUCATION/TRAINING PROGRAM

## 2018-05-05 PROCEDURE — 82330 ASSAY OF CALCIUM: CPT | Mod: 91

## 2018-05-05 PROCEDURE — 25000003 PHARM REV CODE 250: Performed by: PHYSICIAN ASSISTANT

## 2018-05-05 PROCEDURE — C9113 INJ PANTOPRAZOLE SODIUM, VIA: HCPCS | Performed by: SURGERY

## 2018-05-05 PROCEDURE — P9016 RBC LEUKOCYTES REDUCED: HCPCS

## 2018-05-05 PROCEDURE — 83735 ASSAY OF MAGNESIUM: CPT

## 2018-05-05 PROCEDURE — 25000003 PHARM REV CODE 250: Performed by: SURGERY

## 2018-05-05 PROCEDURE — 94003 VENT MGMT INPAT SUBQ DAY: CPT

## 2018-05-05 PROCEDURE — S0030 INJECTION, METRONIDAZOLE: HCPCS | Performed by: SURGERY

## 2018-05-05 PROCEDURE — 85610 PROTHROMBIN TIME: CPT | Mod: 91

## 2018-05-05 PROCEDURE — 85025 COMPLETE CBC W/AUTO DIFF WBC: CPT

## 2018-05-05 PROCEDURE — 83605 ASSAY OF LACTIC ACID: CPT | Mod: 91

## 2018-05-05 PROCEDURE — 85730 THROMBOPLASTIN TIME PARTIAL: CPT | Mod: 91

## 2018-05-05 PROCEDURE — 27200966 HC CLOSED SUCTION SYSTEM

## 2018-05-05 PROCEDURE — B4185 PARENTERAL SOL 10 GM LIPIDS: HCPCS | Performed by: SURGERY

## 2018-05-05 PROCEDURE — 20000000 HC ICU ROOM

## 2018-05-05 PROCEDURE — 63600175 PHARM REV CODE 636 W HCPCS: Performed by: SURGERY

## 2018-05-05 PROCEDURE — 99900035 HC TECH TIME PER 15 MIN (STAT)

## 2018-05-05 PROCEDURE — 82803 BLOOD GASES ANY COMBINATION: CPT

## 2018-05-05 PROCEDURE — 94761 N-INVAS EAR/PLS OXIMETRY MLT: CPT

## 2018-05-05 PROCEDURE — 99900026 HC AIRWAY MAINTENANCE (STAT)

## 2018-05-05 PROCEDURE — 83735 ASSAY OF MAGNESIUM: CPT | Mod: 91

## 2018-05-05 PROCEDURE — 84100 ASSAY OF PHOSPHORUS: CPT | Mod: 91

## 2018-05-05 PROCEDURE — A4217 STERILE WATER/SALINE, 500 ML: HCPCS | Performed by: SURGERY

## 2018-05-05 PROCEDURE — 85730 THROMBOPLASTIN TIME PARTIAL: CPT

## 2018-05-05 PROCEDURE — 83605 ASSAY OF LACTIC ACID: CPT

## 2018-05-05 PROCEDURE — 25000003 PHARM REV CODE 250: Performed by: STUDENT IN AN ORGANIZED HEALTH CARE EDUCATION/TRAINING PROGRAM

## 2018-05-05 RX ORDER — POTASSIUM CHLORIDE 14.9 MG/ML
20 INJECTION INTRAVENOUS
Status: DISCONTINUED | OUTPATIENT
Start: 2018-05-05 | End: 2018-05-08

## 2018-05-05 RX ORDER — HEPARIN SODIUM 5000 [USP'U]/ML
5000 INJECTION, SOLUTION INTRAVENOUS; SUBCUTANEOUS EVERY 8 HOURS
Status: DISCONTINUED | OUTPATIENT
Start: 2018-05-06 | End: 2018-05-06

## 2018-05-05 RX ORDER — POTASSIUM CHLORIDE 29.8 MG/ML
40 INJECTION INTRAVENOUS
Status: DISCONTINUED | OUTPATIENT
Start: 2018-05-05 | End: 2018-05-08

## 2018-05-05 RX ORDER — MAGNESIUM SULFATE HEPTAHYDRATE 40 MG/ML
2 INJECTION, SOLUTION INTRAVENOUS
Status: DISCONTINUED | OUTPATIENT
Start: 2018-05-05 | End: 2018-05-08

## 2018-05-05 RX ORDER — FUROSEMIDE 10 MG/ML
20 INJECTION INTRAMUSCULAR; INTRAVENOUS 2 TIMES DAILY
Status: DISCONTINUED | OUTPATIENT
Start: 2018-05-05 | End: 2018-05-05

## 2018-05-05 RX ORDER — MAGNESIUM SULFATE HEPTAHYDRATE 40 MG/ML
4 INJECTION, SOLUTION INTRAVENOUS
Status: DISCONTINUED | OUTPATIENT
Start: 2018-05-05 | End: 2018-05-08

## 2018-05-05 RX ORDER — HYDROCODONE BITARTRATE AND ACETAMINOPHEN 500; 5 MG/1; MG/1
TABLET ORAL
Status: DISCONTINUED | OUTPATIENT
Start: 2018-05-05 | End: 2018-05-06

## 2018-05-05 RX ADMIN — CALCIUM GLUCONATE: 94 INJECTION, SOLUTION INTRAVENOUS at 09:05

## 2018-05-05 RX ADMIN — CHLORHEXIDINE GLUCONATE 15 ML: 1.2 RINSE ORAL at 09:05

## 2018-05-05 RX ADMIN — PROPOFOL 35 MCG/KG/MIN: 10 INJECTION, EMULSION INTRAVENOUS at 02:05

## 2018-05-05 RX ADMIN — Medication 125 MCG/HR: at 01:05

## 2018-05-05 RX ADMIN — METRONIDAZOLE 500 MG: 500 INJECTION, SOLUTION INTRAVENOUS at 03:05

## 2018-05-05 RX ADMIN — PROPOFOL 40 MCG/KG/MIN: 10 INJECTION, EMULSION INTRAVENOUS at 07:05

## 2018-05-05 RX ADMIN — PROPOFOL 35 MCG/KG/MIN: 10 INJECTION, EMULSION INTRAVENOUS at 06:05

## 2018-05-05 RX ADMIN — MAGNESIUM SULFATE IN WATER 2 G: 40 INJECTION, SOLUTION INTRAVENOUS at 10:05

## 2018-05-05 RX ADMIN — CALCIUM GLUCONATE 1 G: 98 INJECTION, SOLUTION INTRAVENOUS at 09:05

## 2018-05-05 RX ADMIN — FUROSEMIDE 20 MG: 10 INJECTION, SOLUTION INTRAVENOUS at 10:05

## 2018-05-05 RX ADMIN — CALCIUM GLUCONATE 1 G: 98 INJECTION, SOLUTION INTRAVENOUS at 10:05

## 2018-05-05 RX ADMIN — CALCIUM GLUCONATE 1 G: 98 INJECTION, SOLUTION INTRAVENOUS at 05:05

## 2018-05-05 RX ADMIN — CALCIUM GLUCONATE 1 G: 98 INJECTION, SOLUTION INTRAVENOUS at 02:05

## 2018-05-05 RX ADMIN — VANCOMYCIN HYDROCHLORIDE 1500 MG: 10 INJECTION, POWDER, LYOPHILIZED, FOR SOLUTION INTRAVENOUS at 11:05

## 2018-05-05 RX ADMIN — Medication 0.05 MCG/KG/MIN: at 10:05

## 2018-05-05 RX ADMIN — INSULIN ASPART 2 UNITS: 100 INJECTION, SOLUTION INTRAVENOUS; SUBCUTANEOUS at 08:05

## 2018-05-05 RX ADMIN — CEFEPIME HYDROCHLORIDE 2 G: 2 INJECTION, POWDER, FOR SOLUTION INTRAVENOUS at 09:05

## 2018-05-05 RX ADMIN — DEXTROSE 40 MG: 50 INJECTION, SOLUTION INTRAVENOUS at 09:05

## 2018-05-05 RX ADMIN — HYDROCORTISONE SODIUM SUCCINATE 100 MG: 100 INJECTION, POWDER, FOR SOLUTION INTRAMUSCULAR; INTRAVENOUS at 01:05

## 2018-05-05 RX ADMIN — CALCIUM GLUCONATE 1 G: 98 INJECTION, SOLUTION INTRAVENOUS at 03:05

## 2018-05-05 RX ADMIN — SODIUM PHOSPHATE, MONOBASIC, MONOHYDRATE 15 MMOL: 276; 142 INJECTION, SOLUTION INTRAVENOUS at 06:05

## 2018-05-05 RX ADMIN — CEFEPIME HYDROCHLORIDE 2 G: 2 INJECTION, POWDER, FOR SOLUTION INTRAVENOUS at 03:05

## 2018-05-05 RX ADMIN — INSULIN ASPART 2 UNITS: 100 INJECTION, SOLUTION INTRAVENOUS; SUBCUTANEOUS at 11:05

## 2018-05-05 RX ADMIN — INSULIN ASPART 2 UNITS: 100 INJECTION, SOLUTION INTRAVENOUS; SUBCUTANEOUS at 04:05

## 2018-05-05 RX ADMIN — HYDROCORTISONE SODIUM SUCCINATE 100 MG: 100 INJECTION, POWDER, FOR SOLUTION INTRAMUSCULAR; INTRAVENOUS at 09:05

## 2018-05-05 RX ADMIN — INSULIN ASPART 2 UNITS: 100 INJECTION, SOLUTION INTRAVENOUS; SUBCUTANEOUS at 09:05

## 2018-05-05 RX ADMIN — Medication 0.07 MCG/KG/MIN: at 06:05

## 2018-05-05 RX ADMIN — METRONIDAZOLE 500 MG: 500 INJECTION, SOLUTION INTRAVENOUS at 09:05

## 2018-05-05 RX ADMIN — HYDROCORTISONE SODIUM SUCCINATE 100 MG: 100 INJECTION, POWDER, FOR SOLUTION INTRAMUSCULAR; INTRAVENOUS at 06:05

## 2018-05-05 RX ADMIN — PROPOFOL 35 MCG/KG/MIN: 10 INJECTION, EMULSION INTRAVENOUS at 01:05

## 2018-05-05 RX ADMIN — MAGNESIUM SULFATE IN WATER 4 G: 40 INJECTION, SOLUTION INTRAVENOUS at 04:05

## 2018-05-05 RX ADMIN — CEFEPIME HYDROCHLORIDE 2 G: 2 INJECTION, POWDER, FOR SOLUTION INTRAVENOUS at 11:05

## 2018-05-05 RX ADMIN — CALCIUM GLUCONATE 1 G: 94 INJECTION, SOLUTION INTRAVENOUS at 06:05

## 2018-05-05 RX ADMIN — CHLORHEXIDINE GLUCONATE 15 ML: 1.2 RINSE ORAL at 10:05

## 2018-05-05 RX ADMIN — METRONIDAZOLE 500 MG: 500 INJECTION, SOLUTION INTRAVENOUS at 12:05

## 2018-05-05 RX ADMIN — INSULIN ASPART 1 UNITS: 100 INJECTION, SOLUTION INTRAVENOUS; SUBCUTANEOUS at 12:05

## 2018-05-05 RX ADMIN — CALCIUM GLUCONATE 1 G: 98 INJECTION, SOLUTION INTRAVENOUS at 07:05

## 2018-05-05 NOTE — PROGRESS NOTES
[facility]  Critical Care - Surgery  Progress Note    Patient Name: Cristo Jefferson  MRN: [id number]  Admission Date: 5/3/2018  Hospital Length of Stay: 2 days  Code Status: Prior  Attending Provider: Eber Riley MD  Primary Care Provider: Vince Perez MD   Principal Problem: Perforation of sigmoid colon    Subjective:     Interval History/Significant Events: NAEON; Requiring levo at 0.07 and vaso at 0.02.     Follow-up For: Procedure(s) (LRB):  EXPLORATORY-LAPAROTOMY (N/A)  WASHOUT-ABDOMINAL (N/A)  CLOSURE-WOUND (N/A)    Post-Operative Day: 1 Day Post-Op    Objective:     Vital Signs (Most Recent):  Temp: 98.1 °F (36.7 °C) (05/04/18 2300)  Pulse: (!) 43 (05/05/18 0548)  Resp: 18 (05/05/18 0548)  BP: (!) 99/59 (05/05/18 0500)  SpO2: 95 % (05/05/18 0548) Vital Signs (24h Range):  Temp:  [97.7 °F (36.5 °C)-99.3 °F (37.4 °C)] 98.1 °F (36.7 °C)  Pulse:  [] 43  Resp:  [0-23] 18  SpO2:  [88 %-100 %] 95 %  BP: ()/(50-66) 99/59  Arterial Line BP: ()/(43-72) 104/48     Weight: 117.1 kg (258 lb 2.5 oz)  Body mass index is 38.12 kg/m².      Intake/Output Summary (Last 24 hours) at 05/05/18 0619  Last data filed at 05/05/18 0500   Gross per 24 hour   Intake             5786 ml   Output             2505 ml   Net             3281 ml       Physical Exam   Constitutional: He appears well-developed and well-nourished.   HENT:   Head: Normocephalic and atraumatic.   Nose: Nose normal.   Eyes: Conjunctivae are normal. Pupils are equal, round, and reactive to light. No scleral icterus.   Cardiovascular: Normal rate and regular rhythm.  Exam reveals no gallop and no friction rub.    No murmur heard.  Pulmonary/Chest:   Intubated  Mechanical breath sounds   Abdominal: Soft. He exhibits distension. There is tenderness.   Midline incision c/d/i  Transverse colostomy pink  Abdominal incision oozing    Musculoskeletal: Normal range of motion.   Neurological:   Sedated   Skin: Skin is warm and dry.  No rash noted.       Vents:  Vent Mode: A/C (05/05/18 0548)  Ventilator Initiated: Yes (05/03/18 0815)  Set Rate: 18 bmp (05/05/18 0548)  Vt Set: 450 mL (05/05/18 0548)  Pressure Support: 0 cmH20 (05/05/18 0548)  PEEP/CPAP: 5 cmH20 (05/05/18 0548)  Oxygen Concentration (%): 50 (05/05/18 0548)  Peak Airway Pressure: 28 cmH2O (05/05/18 0548)  Plateau Pressure: 0 cmH20 (05/05/18 0548)  Total Ve: 8.49 mL (05/05/18 0548)  F/VT Ratio<105 (RSBI): (!) 38.05 (05/05/18 0548)    Lines/Drains/Airways     Central Venous Catheter Line                 Port A Cath Single Lumen 05/02/18 2105 left subclavian 2 days         Percutaneous Central Line Insertion/Assessment - triple lumen  05/03/18 1030 right internal jugular 1 day          Drain                 Drain/Device  Right -- days         Urethral Catheter 05/03/18 0615 Straight-tip;Non-latex 16 Fr. 2 days         Colostomy 05/03/18 0715 RUQ 1 day         NG/OG Tube 05/04/18 nasogastric Right nostril 1 day         Closed/Suction Drain 05/04/18 0834 Abdomen Bulb 15 Fr. less than 1 day         Closed/Suction Drain 05/04/18 0834 Anterior Abdomen Bulb 15 Fr. less than 1 day         Closed/Suction Drain 05/04/18 0854 Medial Abdomen Other (Comment) less than 1 day          Airway                 Airway - Non-Surgical 05/03/18 0611 Endotracheal Tube 2 days          Arterial Line                 Arterial Line 05/03/18 0615 Left Radial 2 days          Pressure Ulcer                 Negative Pressure Wound Therapy  less than 1 day          Surgical Packing                 Surgical Packing 1 day          Peripheral Intravenous Line                 Peripheral IV - Single Lumen 05/02/18 2024 Left Antecubital 2 days         Peripheral IV - Single Lumen 05/03/18 0118 Right Forearm 2 days         Peripheral IV - Single Lumen 05/03/18 0619 Right Hand 2 days                Significant Labs:    CBC/Anemia Profile:    Recent Labs  Lab 05/04/18  1935 05/04/18  2350 05/05/18  0400   WBC 11.17 11.40  11.18   HGB 8.0* 7.8* 7.5*   HCT 24.4* 23.8* 23.3*   * 104* 101*   MCV 81* 82 82   RDW 17.1* 17.1* 17.0*        Chemistries:    Recent Labs  Lab 05/04/18  1935 05/04/18  2350 05/05/18  0400   *  133* 133*  133* 132*  132*   K 4.0  4.0 4.0  4.0 4.1  4.1     103 103  103 102  102   CO2 25  25 26  26 26  26   BUN 10  10 10  10 10  10   CREATININE 0.6  0.6 0.6  0.6 0.6  0.6   CALCIUM 7.3*  7.3* 7.4*  7.4* 7.7*  7.7*   ALBUMIN 1.4* 1.4* 1.4*   PROT 3.9* 4.0* 4.0*   BILITOT 1.6* 1.5* 1.3*   ALKPHOS 48* 51* 49*   ALT 7* 7* 8*   AST 12 14 13   MG 2.1 2.1 1.9   PHOS 3.1 2.8 2.6*         Assessment/Plan:     * Perforation of sigmoid colon    Plan:     Neuro:   - Propofol and fentanyl gtt for sedation/pain control  - Responsive to commands off sedation     Pulmonary:   -Intubated  Vent Mode: A/C  Oxygen Concentration (%):  [40-60] 50  Resp Rate Total:  [17 br/min-26 br/min] 18 br/min  Vt Set:  [450 mL] 450 mL  PEEP/CPAP:  [5 cmH20] 5 cmH20  Pressure Support:  [0 cmH20] 0 cmH20  Mean Airway Pressure:  [9 xlE85-79 cmH20] 10 cmH20  - Ventilator associated PNA ppx: Chlorhexidine  -ABGs prn     Recent Labs  Lab 05/04/18  0517   PH 7.393   PO2 84   PCO2 45.7*   HCO3 27.9   BE 3   -Daily CXR     Cardiac:  -H/o CAD s/p 4 vessel CABG   -MAP goal >65  -On Levo and vaso; continue to wean as tolerated  -hydrocortisone 100mg IV q8h given chronic steroid use     Renal:   -Garcia in place  -UOP adequate, continue to monitor & bolus prn  -Bun/Cr 10/0.6     Fluids/Electrolytes/Nutrition/GI:   -Nutritional status: NPO  -replace lytes PRN  -LR @ 150cc/hr; additional fluid boluses prn  -BMP q4  -GI ulcer prophylaxis     Hematology/Oncology:  -H/H 7.5/23.3; continue to monitor ; 2 units of PRBC followed by 20 mg IV lasix for 2 doses  -INR/Plts 1.4, platelets 101  - CBC q4, continue to trend and transfuse as needed     Infectious Disease:   -Afebrile since return from OR  -WBC 11  -Cultures drawn in ED, with  gram negative rods  -Abx: Cefepime, Vanc, Flaygl     Endocrine:  -Glucose goal of 120-180  -SSI      Dispo:  -Continue care in the ICU setting  - S/p ex-lap and closure on 5/4             Critical care was time spent personally by me on the following activities: development of treatment plan with patient or surrogate and bedside caregivers, discussions with consultants, evaluation of patient's response to treatment, examination of patient, ordering and performing treatments and interventions, ordering and review of laboratory studies, ordering and review of radiographic studies, pulse oximetry, re-evaluation of patient's condition.  This critical care time did not overlap with that of any other provider or involve time for any procedures.     Randell Anton MD  Critical Care - Surgery  [facility]

## 2018-05-05 NOTE — ASSESSMENT & PLAN NOTE
Plan:     Neuro:   - Propofol and fentanyl gtt for sedation/pain control  - Responsive to commands off sedation     Pulmonary:   -Intubated  Vent Mode: A/C  Oxygen Concentration (%):  [40-60] 50  Resp Rate Total:  [17 br/min-26 br/min] 18 br/min  Vt Set:  [450 mL] 450 mL  PEEP/CPAP:  [5 cmH20] 5 cmH20  Pressure Support:  [0 cmH20] 0 cmH20  Mean Airway Pressure:  [9 jqQ08-44 cmH20] 10 cmH20  - Ventilator associated PNA ppx: Chlorhexidine  -ABGs prn     Recent Labs  Lab 05/04/18  0517   PH 7.393   PO2 84   PCO2 45.7*   HCO3 27.9   BE 3   -Daily CXR     Cardiac:  -H/o CAD s/p 4 vessel CABG   -MAP goal >65  -Levo at .14, vaso off, continue to wean as tolerated  -hydrocortisone 100mg IV q8h given chronic steroid use     Renal:   -Garcia in place  -UOP adequate, continue to monitor & bolus prn  -Bun/Cr 12/0.7      Fluids/Electrolytes/Nutrition/GI:   -Nutritional status: NPO  -replace lytes PRN  -LR @ 150cc/hr; additional fluid boluses prn  -BMP q4  -GI ulcer prophylaxis     Hematology/Oncology:  -H/H with significant downward progression 6.4/21.2 from 7.6/24.7; transfuse 3 units pRBCs  -INR/Plts 1.2, platelets 113 s/p platelets yesterday  - CBC q4, continue to trend and transfuse as needed     Infectious Disease:   -Afebrile since return from OR  -WBC 12.26  -Cultures drawn in ED, with gram negative rods  -Abx: Cefepime, Vanc, Flaygl     Endocrine:  -Glucose goal of 120-180  -SSI      Dispo:  -Continue care in the ICU setting  -Plan for take back to the OR today for ex-lap, washout and possible closure with Dr. Shelton

## 2018-05-05 NOTE — PROGRESS NOTES
GENERAL SURGERY  Progress Note    Hospital Day: 3  Admit Date: 5/3/2018    Date of Surgery:  5/4/2018  Post-Operative Day #:  1 Day Post-Op    Procedure:  Procedure(s):  EXPLORATORY-LAPAROTOMY (N/A)  WASHOUT-ABDOMINAL (N/A)  CLOSURE-WOUND (N/A)    SUBJECTIVE:     No acute events.  Got 2 units of blood yesterday for H/H trending down to 5.9/18.7, has been slow downtrending though stable at 7.5/23.  Still on 0.02 vasopressin and 0.07 levophed.  On 35 propofol and 150 fentanyl.    Current Medications:   ceFEPime (MAXIPIME) IVPB  2 g Intravenous Q8H    chlorhexidine  15 mL Mouth/Throat BID    fat emulsion 20%  250 mL Intravenous Daily    furosemide  20 mg Intravenous BID    [START ON 5/6/2018] heparin (porcine)  5,000 Units Subcutaneous Q8H    hydrocortisone sodium succinate  100 mg Intravenous Q8H    metronidazole  500 mg Intravenous Q8H    pantoprazole 40 mg in dextrose 5 % 100 mL infusion (ready to mix system)  40 mg Intravenous Daily    vancomycin (VANCOCIN) IVPB  1,500 mg Intravenous Q12H     Infusions:   fentanyl 150 mcg/hr (05/05/18 0700)    insulin (HUMAN R) infusion (adults)      lactated ringers 150 mL/hr at 05/05/18 0700    norepinephrine bitartrate-D5W 0.075 mcg/kg/min (05/05/18 0700)    propofol 35 mcg/kg/min (05/05/18 0700)    TPN ADULT CENTRAL LINE CUSTOM      vasopressin (PITRESSIN) infusion 0.02 Units/min (05/05/18 0700)     PRN:sodium chloride, sodium chloride, calcium gluconate IVPB, calcium gluconate IVPB, calcium gluconate IVPB, dextrose 50%, dextrose 50%, glucagon (human recombinant), insulin aspart U-100, magnesium sulfate IVPB, magnesium sulfate IVPB, sodium phosphate IVPB, sodium phosphate IVPB, sodium phosphate IVPB    Review of patient's allergies indicates:   Allergen Reactions    Morphine Rash       OBJECTIVE:     Most Recent Vitals:  Temp: 97.8 °F (36.6 °C) (05/05/18 0700)  Pulse: (!) 46 (05/05/18 0806)  Resp: 18 (05/05/18 0806)  BP: 114/66 (05/05/18 0700)  SpO2: 95 %  (05/05/18 0806)    24-Hour Vitals Range:  Temp:  [97.7 °F (36.5 °C)-99.1 °F (37.3 °C)]   Pulse:  []   Resp:  [0-23]   BP: ()/(50-66)   SpO2:  [88 %-100 %]   Arterial Line BP: ()/(43-72)     24-Hour I&O:  Intake/Output Summary (Last 24 hours) at 05/05/18 0830  Last data filed at 05/05/18 0700   Gross per 24 hour   Intake             9861 ml   Output             2665 ml   Net             7196 ml       PHYSICAL EXAM:  Intubated, sedated, opens eyes with stimulation, edematous with anasarca  Head normocephalic, atraumatic  Trachea midline, neck supple  Mechanically ventilated  Heart regular rate and rhythm  Abdomen soft, obese, moderately distended, stoma pink viable edematous with bowel sweat in bag    LAB RESULTS:    Recent Labs  Lab 05/04/18  2350 05/05/18  0400 05/05/18  0808   WBC 11.40 11.18 8.46   HGB 7.8* 7.5* 9.2*   HCT 23.8* 23.3* 28.3*   * 101* 82*     Recent Labs  Lab 05/04/18  1935 05/04/18  2350 05/05/18  0400   *  133* 133*  133* 132*  132*   K 4.0  4.0 4.0  4.0 4.1  4.1     103 103  103 102  102   CO2 25  25 26  26 26  26   BUN 10  10 10  10 10  10   CREATININE 0.6  0.6 0.6  0.6 0.6  0.6   *  221* 194*  194* 201*  201*   CALCIUM 7.3*  7.3* 7.4*  7.4* 7.7*  7.7*   CAION 1.01* 1.01* 1.10   MG 2.1 2.1 1.9   PHOS 3.1 2.8 2.6*   AST 12 14 13   ALT 7* 7* 8*   ALKPHOS 48* 51* 49*   BILITOT 1.6* 1.5* 1.3*   PROT 3.9* 4.0* 4.0*   ALBUMIN 1.4* 1.4* 1.4*     Recent Labs  Lab 05/04/18  1935 05/04/18  2350 05/05/18  0400   INR 1.4* 1.3* 1.4*   LACTATE 1.6 1.7 1.5     Recent Labs  Lab 05/04/18  2349 05/05/18  0419 05/05/18  0806   PH 7.432 7.417 7.377   PCO2 41.3 42.8 42.3   PO2 63* 72* 84   HCO3 27.6 27.6 24.8         Recent Labs  Lab 05/02/18 2059   COLORU Yellow   APPEARANCEUA Clear   PHUR 6.0   SPECGRAV 1.020   OCCULTUA Negative   LEUKOCYTESUR Negative   NITRITE Negative   PROTEINUA Negative   GLUCUA Negative   KETONESU Negative   BILIRUBINUA  Negative   UROBILINOGEN Negative       Recent Labs  Lab 05/02/18  2100   LABURIN No growth     No results for input(s): CDIFFICILEAN, CDIFFTOXIN in the last 168 hours.    Diagnostic Studes:  X-Ray: Reviewed    ASSESSMENT:     Cristo Jefferson is a 57 y.o. male who is now 1 Day Post-Op s/p Procedure(s):  EXPLORATORY-LAPAROTOMY (N/A)  WASHOUT-ABDOMINAL (N/A)  CLOSURE-WOUND (N/A)    PLAN:  · 2 units of blood in setting of cardiac history and continued high dose pressors (H/H 7.5 / 23)  · Lasix 20 mg x2 doses after 1st and 2nd units of blood.   · Port and central line current access.  · Start TPN and insulin gtt.  · Wean sedation at tolerated.  · Continue IV antibiotics.  · Stress dose steroids.  · Follow-up blood cultures.  · Trend labs.  · Prophylaxis:  Protonix.  Start SQH tomorrow afternoon if H/H stays stable.      Estrella Samayoa MD

## 2018-05-05 NOTE — PLAN OF CARE
Problem: Patient Care Overview  Goal: Individualization & Mutuality  Admit Dx: s/p Exploratory Laparotomy w/ sigmoidectomy/colostomy 2/2 sigmoid perforation    PMHx: HTN, CAD, s/p CABG, DM2, Colon Perf, Appendiceal CA, RA    PSHx: Appendectomy, EVAR for AAA (2017), CABG, Right colectomy    5/2- Patient presented to ED c/o abdominal pain, febrile, tachycardic, and hypotensive. CT revealed sigmoid perforation w/ fluid and abscess collection.   5/3- Class A to OR for ex lap, colectomy, admitted to SICU, 911L LR, lined up, 3 U PRBCs, 1 U PLT  5/4: OR washout with washout and closure with wound vac; 2L LR; 2 units PRBC  5/5: 2 units PRBC; Lasix    Nursing:  MAP >65  Q8H LA, BMP, CBC           Outcome: Ongoing (interventions implemented as appropriate)  Reviewed plan of care with patient and spouse. Pt intubated on vent settings: AC 50% FiO2, 7 of PEEP rate of 18. Propofol and fentanyl titrated for RASS goal -2. Patient does arouse to voice and follows simple commands. Levo titrated for MAP >65. All pressure points covered with protective foam dressing. Midline wound vac intact. UO adequate. CVP 14-18. Dr. Anton updated through this shift on patient status. See flow sheet for further assessment. All questions and concerns answered and acknowledged. VSS at this time, will continue to monitor.

## 2018-05-05 NOTE — SUBJECTIVE & OBJECTIVE
Interval History/Significant Events:     Follow-up For: Procedure(s) (LRB):  EXPLORATORY-LAPAROTOMY (N/A)  WASHOUT-ABDOMINAL (N/A)  CLOSURE-WOUND (N/A)    Post-Operative Day: 1 Day Post-Op    Objective:     Vital Signs (Most Recent):  Temp: 98.1 °F (36.7 °C) (05/04/18 2300)  Pulse: (!) 43 (05/05/18 0548)  Resp: 18 (05/05/18 0548)  BP: (!) 99/59 (05/05/18 0500)  SpO2: 95 % (05/05/18 0548) Vital Signs (24h Range):  Temp:  [97.7 °F (36.5 °C)-99.3 °F (37.4 °C)] 98.1 °F (36.7 °C)  Pulse:  [] 43  Resp:  [0-23] 18  SpO2:  [88 %-100 %] 95 %  BP: ()/(50-66) 99/59  Arterial Line BP: ()/(43-72) 104/48     Weight: 117.1 kg (258 lb 2.5 oz)  Body mass index is 38.12 kg/m².      Intake/Output Summary (Last 24 hours) at 05/05/18 0619  Last data filed at 05/05/18 0500   Gross per 24 hour   Intake             5786 ml   Output             2505 ml   Net             3281 ml       Physical Exam   Constitutional: He appears well-developed and well-nourished.   HENT:   Head: Normocephalic and atraumatic.   Nose: Nose normal.   Eyes: Conjunctivae are normal. Pupils are equal, round, and reactive to light. No scleral icterus.   Cardiovascular: Normal rate and regular rhythm.  Exam reveals no gallop and no friction rub.    No murmur heard.  Pulmonary/Chest:   Intubated  Mechanical breath sounds   Abdominal: Soft. He exhibits distension. There is tenderness.   Midline incision c/d/i  Transverse colostomy pink  Abdominal incision oozing    Musculoskeletal: Normal range of motion.   Neurological:   Sedated   Skin: Skin is warm and dry. No rash noted.       Vents:  Vent Mode: A/C (05/05/18 0548)  Ventilator Initiated: Yes (05/03/18 0815)  Set Rate: 18 bmp (05/05/18 0548)  Vt Set: 450 mL (05/05/18 0548)  Pressure Support: 0 cmH20 (05/05/18 0548)  PEEP/CPAP: 5 cmH20 (05/05/18 0548)  Oxygen Concentration (%): 50 (05/05/18 0548)  Peak Airway Pressure: 28 cmH2O (05/05/18 0548)  Plateau Pressure: 0 cmH20 (05/05/18 0548)  Total Ve:  8.49 mL (05/05/18 0548)  F/VT Ratio<105 (RSBI): (!) 38.05 (05/05/18 0548)    Lines/Drains/Airways     Central Venous Catheter Line                 Port A Cath Single Lumen 05/02/18 2105 left subclavian 2 days         Percutaneous Central Line Insertion/Assessment - triple lumen  05/03/18 1030 right internal jugular 1 day          Drain                 Drain/Device  Right -- days         Urethral Catheter 05/03/18 0615 Straight-tip;Non-latex 16 Fr. 2 days         Colostomy 05/03/18 0715 RUQ 1 day         NG/OG Tube 05/04/18 nasogastric Right nostril 1 day         Closed/Suction Drain 05/04/18 0834 Abdomen Bulb 15 Fr. less than 1 day         Closed/Suction Drain 05/04/18 0834 Anterior Abdomen Bulb 15 Fr. less than 1 day         Closed/Suction Drain 05/04/18 0854 Medial Abdomen Other (Comment) less than 1 day          Airway                 Airway - Non-Surgical 05/03/18 0611 Endotracheal Tube 2 days          Arterial Line                 Arterial Line 05/03/18 0615 Left Radial 2 days          Pressure Ulcer                 Negative Pressure Wound Therapy  less than 1 day          Surgical Packing                 Surgical Packing 1 day          Peripheral Intravenous Line                 Peripheral IV - Single Lumen 05/02/18 2024 Left Antecubital 2 days         Peripheral IV - Single Lumen 05/03/18 0118 Right Forearm 2 days         Peripheral IV - Single Lumen 05/03/18 0619 Right Hand 2 days                Significant Labs:    CBC/Anemia Profile:    Recent Labs  Lab 05/04/18 1935 05/04/18 2350 05/05/18  0400   WBC 11.17 11.40 11.18   HGB 8.0* 7.8* 7.5*   HCT 24.4* 23.8* 23.3*   * 104* 101*   MCV 81* 82 82   RDW 17.1* 17.1* 17.0*        Chemistries:    Recent Labs  Lab 05/04/18 1935 05/04/18  2350 05/05/18  0400   *  133* 133*  133* 132*  132*   K 4.0  4.0 4.0  4.0 4.1  4.1     103 103  103 102  102   CO2 25  25 26  26 26  26   BUN 10  10 10  10 10  10   CREATININE 0.6  0.6 0.6   0.6 0.6  0.6   CALCIUM 7.3*  7.3* 7.4*  7.4* 7.7*  7.7*   ALBUMIN 1.4* 1.4* 1.4*   PROT 3.9* 4.0* 4.0*   BILITOT 1.6* 1.5* 1.3*   ALKPHOS 48* 51* 49*   ALT 7* 7* 8*   AST 12 14 13   MG 2.1 2.1 1.9   PHOS 3.1 2.8 2.6*

## 2018-05-06 LAB
ALBUMIN SERPL BCP-MCNC: 1.4 G/DL
ALBUMIN SERPL BCP-MCNC: 1.6 G/DL
ALBUMIN SERPL BCP-MCNC: 1.6 G/DL
ALBUMIN SERPL BCP-MCNC: 1.7 G/DL
ALLENS TEST: ABNORMAL
ALP SERPL-CCNC: 48 U/L
ALP SERPL-CCNC: 49 U/L
ALP SERPL-CCNC: 52 U/L
ALP SERPL-CCNC: 54 U/L
ALT SERPL W/O P-5'-P-CCNC: 15 U/L
ALT SERPL W/O P-5'-P-CCNC: 16 U/L
ALT SERPL W/O P-5'-P-CCNC: 17 U/L
ALT SERPL W/O P-5'-P-CCNC: 17 U/L
ANION GAP SERPL CALC-SCNC: 5 MMOL/L
ANION GAP SERPL CALC-SCNC: 6 MMOL/L
ANION GAP SERPL CALC-SCNC: 7 MMOL/L
ANION GAP SERPL CALC-SCNC: 8 MMOL/L
APTT BLDCRRT: 21.1 SEC
APTT BLDCRRT: 22.9 SEC
APTT BLDCRRT: <21 SEC
AST SERPL-CCNC: 16 U/L
AST SERPL-CCNC: 24 U/L
AST SERPL-CCNC: 24 U/L
AST SERPL-CCNC: 26 U/L
BASOPHILS # BLD AUTO: 0 K/UL
BASOPHILS # BLD AUTO: 0.01 K/UL
BASOPHILS NFR BLD: 0 %
BASOPHILS NFR BLD: 0.1 %
BILIRUB SERPL-MCNC: 1 MG/DL
BILIRUB SERPL-MCNC: 1.2 MG/DL
BILIRUB SERPL-MCNC: 1.2 MG/DL
BILIRUB SERPL-MCNC: 1.9 MG/DL
BUN SERPL-MCNC: 12 MG/DL
BUN SERPL-MCNC: 15 MG/DL
BUN SERPL-MCNC: 9 MG/DL
BUN SERPL-MCNC: 9 MG/DL
CA-I BLDV-SCNC: 1.01 MMOL/L
CA-I BLDV-SCNC: 1.09 MMOL/L
CA-I BLDV-SCNC: 1.12 MMOL/L
CALCIUM SERPL-MCNC: 7.7 MG/DL
CALCIUM SERPL-MCNC: 7.8 MG/DL
CALCIUM SERPL-MCNC: 8.2 MG/DL
CALCIUM SERPL-MCNC: 8.3 MG/DL
CHLORIDE SERPL-SCNC: 108 MMOL/L
CHLORIDE SERPL-SCNC: 110 MMOL/L
CHLORIDE SERPL-SCNC: 111 MMOL/L
CHLORIDE SERPL-SCNC: 111 MMOL/L
CO2 SERPL-SCNC: 24 MMOL/L
CO2 SERPL-SCNC: 24 MMOL/L
CO2 SERPL-SCNC: 26 MMOL/L
CO2 SERPL-SCNC: 27 MMOL/L
CREAT SERPL-MCNC: 0.7 MG/DL
DELSYS: ABNORMAL
DELSYS: ABNORMAL
DIFFERENTIAL METHOD: ABNORMAL
EOSINOPHIL # BLD AUTO: 0 K/UL
EOSINOPHIL NFR BLD: 0 %
EOSINOPHIL NFR BLD: 0.3 %
ERYTHROCYTE [DISTWIDTH] IN BLOOD BY AUTOMATED COUNT: 17.4 %
ERYTHROCYTE [DISTWIDTH] IN BLOOD BY AUTOMATED COUNT: 17.5 %
ERYTHROCYTE [DISTWIDTH] IN BLOOD BY AUTOMATED COUNT: 17.7 %
ERYTHROCYTE [DISTWIDTH] IN BLOOD BY AUTOMATED COUNT: 17.8 %
ERYTHROCYTE [SEDIMENTATION RATE] IN BLOOD BY WESTERGREN METHOD: 18 MM/H
ERYTHROCYTE [SEDIMENTATION RATE] IN BLOOD BY WESTERGREN METHOD: 18 MM/H
EST. GFR  (AFRICAN AMERICAN): >60 ML/MIN/1.73 M^2
EST. GFR  (NON AFRICAN AMERICAN): >60 ML/MIN/1.73 M^2
FIO2: 60
FIO2: 60
GLUCOSE SERPL-MCNC: 148 MG/DL
GLUCOSE SERPL-MCNC: 171 MG/DL
GLUCOSE SERPL-MCNC: 187 MG/DL
GLUCOSE SERPL-MCNC: 205 MG/DL
HCO3 UR-SCNC: 28.7 MMOL/L (ref 24–28)
HCO3 UR-SCNC: 29 MMOL/L (ref 24–28)
HCO3 UR-SCNC: 30.1 MMOL/L (ref 24–28)
HCT VFR BLD AUTO: 26.2 %
HCT VFR BLD AUTO: 27.5 %
HCT VFR BLD AUTO: 27.8 %
HCT VFR BLD AUTO: 29 %
HGB BLD-MCNC: 8.3 G/DL
HGB BLD-MCNC: 8.7 G/DL
HGB BLD-MCNC: 9.1 G/DL
HGB BLD-MCNC: 9.1 G/DL
IMM GRANULOCYTES # BLD AUTO: 0.05 K/UL
IMM GRANULOCYTES # BLD AUTO: 0.06 K/UL
IMM GRANULOCYTES # BLD AUTO: 0.08 K/UL
IMM GRANULOCYTES # BLD AUTO: 0.09 K/UL
IMM GRANULOCYTES NFR BLD AUTO: 0.8 %
IMM GRANULOCYTES NFR BLD AUTO: 0.9 %
INR PPP: 1
INR PPP: 1.1
INR PPP: 1.2
LACTATE SERPL-SCNC: 0.8 MMOL/L
LACTATE SERPL-SCNC: 1.4 MMOL/L
LACTATE SERPL-SCNC: 1.8 MMOL/L
LYMPHOCYTES # BLD AUTO: 0.9 K/UL
LYMPHOCYTES # BLD AUTO: 1 K/UL
LYMPHOCYTES # BLD AUTO: 1.1 K/UL
LYMPHOCYTES # BLD AUTO: 1.2 K/UL
LYMPHOCYTES NFR BLD: 11.5 %
LYMPHOCYTES NFR BLD: 13.1 %
LYMPHOCYTES NFR BLD: 13.6 %
LYMPHOCYTES NFR BLD: 14 %
MAGNESIUM SERPL-MCNC: 1.8 MG/DL
MAGNESIUM SERPL-MCNC: 2.1 MG/DL
MAGNESIUM SERPL-MCNC: 2.2 MG/DL
MAGNESIUM SERPL-MCNC: 2.2 MG/DL
MCH RBC QN AUTO: 26.1 PG
MCH RBC QN AUTO: 26.3 PG
MCH RBC QN AUTO: 26.4 PG
MCH RBC QN AUTO: 26.8 PG
MCHC RBC AUTO-ENTMCNC: 31.4 G/DL
MCHC RBC AUTO-ENTMCNC: 31.6 G/DL
MCHC RBC AUTO-ENTMCNC: 31.7 G/DL
MCHC RBC AUTO-ENTMCNC: 32.7 G/DL
MCV RBC AUTO: 82 FL
MCV RBC AUTO: 83 FL
MCV RBC AUTO: 83 FL
MCV RBC AUTO: 84 FL
MIN VOL: 8.49
MIN VOL: 8.53
MODE: ABNORMAL
MODE: ABNORMAL
MONOCYTES # BLD AUTO: 0.4 K/UL
MONOCYTES # BLD AUTO: 0.4 K/UL
MONOCYTES # BLD AUTO: 0.5 K/UL
MONOCYTES # BLD AUTO: 0.5 K/UL
MONOCYTES NFR BLD: 5 %
MONOCYTES NFR BLD: 5.3 %
MONOCYTES NFR BLD: 5.5 %
MONOCYTES NFR BLD: 5.6 %
NEUTROPHILS # BLD AUTO: 5.1 K/UL
NEUTROPHILS # BLD AUTO: 5.8 K/UL
NEUTROPHILS # BLD AUTO: 7.6 K/UL
NEUTROPHILS # BLD AUTO: 8.2 K/UL
NEUTROPHILS NFR BLD: 79.6 %
NEUTROPHILS NFR BLD: 79.7 %
NEUTROPHILS NFR BLD: 80.7 %
NEUTROPHILS NFR BLD: 82.5 %
NRBC BLD-RTO: 0 /100 WBC
PCO2 BLDA: 41.5 MMHG (ref 35–45)
PCO2 BLDA: 44.6 MMHG (ref 35–45)
PCO2 BLDA: 46.2 MMHG (ref 35–45)
PEEP: 7
PEEP: 7
PH SMN: 7.42 [PH] (ref 7.35–7.45)
PH SMN: 7.42 [PH] (ref 7.35–7.45)
PH SMN: 7.45 [PH] (ref 7.35–7.45)
PHOSPHATE SERPL-MCNC: 2.6 MG/DL
PHOSPHATE SERPL-MCNC: 2.6 MG/DL
PHOSPHATE SERPL-MCNC: 2.8 MG/DL
PHOSPHATE SERPL-MCNC: 2.9 MG/DL
PIP: 29
PIP: 39
PLATELET # BLD AUTO: 67 K/UL
PLATELET # BLD AUTO: 79 K/UL
PLATELET # BLD AUTO: 82 K/UL
PLATELET # BLD AUTO: 85 K/UL
PMV BLD AUTO: 10.5 FL
PMV BLD AUTO: 10.6 FL
PMV BLD AUTO: 10.7 FL
PMV BLD AUTO: 11 FL
PO2 BLDA: 214 MMHG (ref 80–100)
PO2 BLDA: 70 MMHG (ref 80–100)
PO2 BLDA: 95 MMHG (ref 80–100)
POC BE: 4 MMOL/L
POC BE: 5 MMOL/L
POC BE: 6 MMOL/L
POC SATURATED O2: 100 % (ref 95–100)
POC SATURATED O2: 95 % (ref 95–100)
POC SATURATED O2: 97 % (ref 95–100)
POC TCO2: 30 MMOL/L (ref 23–27)
POC TCO2: 30 MMOL/L (ref 23–27)
POC TCO2: 31 MMOL/L (ref 23–27)
POCT GLUCOSE: 141 MG/DL (ref 70–110)
POCT GLUCOSE: 149 MG/DL (ref 70–110)
POCT GLUCOSE: 171 MG/DL (ref 70–110)
POCT GLUCOSE: 172 MG/DL (ref 70–110)
POCT GLUCOSE: 187 MG/DL (ref 70–110)
POCT GLUCOSE: 192 MG/DL (ref 70–110)
POTASSIUM SERPL-SCNC: 3.5 MMOL/L
POTASSIUM SERPL-SCNC: 3.9 MMOL/L
POTASSIUM SERPL-SCNC: 4.4 MMOL/L
POTASSIUM SERPL-SCNC: 4.4 MMOL/L
PROT SERPL-MCNC: 4.3 G/DL
PROT SERPL-MCNC: 4.5 G/DL
PROT SERPL-MCNC: 4.6 G/DL
PROT SERPL-MCNC: 4.9 G/DL
PROTHROMBIN TIME: 10.5 SEC
PROTHROMBIN TIME: 11 SEC
PROTHROMBIN TIME: 12.2 SEC
RBC # BLD AUTO: 3.16 M/UL
RBC # BLD AUTO: 3.29 M/UL
RBC # BLD AUTO: 3.39 M/UL
RBC # BLD AUTO: 3.49 M/UL
SAMPLE: ABNORMAL
SITE: ABNORMAL
SODIUM SERPL-SCNC: 140 MMOL/L
SODIUM SERPL-SCNC: 141 MMOL/L
SODIUM SERPL-SCNC: 143 MMOL/L
SODIUM SERPL-SCNC: 143 MMOL/L
SP02: 96
SP02: 98
VT: 471
VT: 474
WBC # BLD AUTO: 6.37 K/UL
WBC # BLD AUTO: 7.29 K/UL
WBC # BLD AUTO: 9.42 K/UL
WBC # BLD AUTO: 9.91 K/UL

## 2018-05-06 PROCEDURE — 84100 ASSAY OF PHOSPHORUS: CPT | Mod: 91

## 2018-05-06 PROCEDURE — 99291 CRITICAL CARE FIRST HOUR: CPT | Mod: ,,, | Performed by: ANESTHESIOLOGY

## 2018-05-06 PROCEDURE — 80053 COMPREHEN METABOLIC PANEL: CPT

## 2018-05-06 PROCEDURE — 83735 ASSAY OF MAGNESIUM: CPT | Mod: 91

## 2018-05-06 PROCEDURE — 63600175 PHARM REV CODE 636 W HCPCS: Mod: JG | Performed by: SURGERY

## 2018-05-06 PROCEDURE — 63600175 PHARM REV CODE 636 W HCPCS: Performed by: STUDENT IN AN ORGANIZED HEALTH CARE EDUCATION/TRAINING PROGRAM

## 2018-05-06 PROCEDURE — 94003 VENT MGMT INPAT SUBQ DAY: CPT

## 2018-05-06 PROCEDURE — 25000003 PHARM REV CODE 250: Performed by: SURGERY

## 2018-05-06 PROCEDURE — 94799 UNLISTED PULMONARY SVC/PX: CPT

## 2018-05-06 PROCEDURE — 80053 COMPREHEN METABOLIC PANEL: CPT | Mod: 91

## 2018-05-06 PROCEDURE — 85025 COMPLETE CBC W/AUTO DIFF WBC: CPT | Mod: 91

## 2018-05-06 PROCEDURE — S0030 INJECTION, METRONIDAZOLE: HCPCS | Performed by: SURGERY

## 2018-05-06 PROCEDURE — 37799 UNLISTED PX VASCULAR SURGERY: CPT

## 2018-05-06 PROCEDURE — 85610 PROTHROMBIN TIME: CPT | Mod: 91

## 2018-05-06 PROCEDURE — 99900026 HC AIRWAY MAINTENANCE (STAT)

## 2018-05-06 PROCEDURE — 63600175 PHARM REV CODE 636 W HCPCS: Performed by: ANESTHESIOLOGY

## 2018-05-06 PROCEDURE — 85730 THROMBOPLASTIN TIME PARTIAL: CPT

## 2018-05-06 PROCEDURE — 85730 THROMBOPLASTIN TIME PARTIAL: CPT | Mod: 91

## 2018-05-06 PROCEDURE — 25000003 PHARM REV CODE 250: Performed by: STUDENT IN AN ORGANIZED HEALTH CARE EDUCATION/TRAINING PROGRAM

## 2018-05-06 PROCEDURE — 63600175 PHARM REV CODE 636 W HCPCS: Performed by: SURGERY

## 2018-05-06 PROCEDURE — 82330 ASSAY OF CALCIUM: CPT | Mod: 91

## 2018-05-06 PROCEDURE — 25000003 PHARM REV CODE 250: Performed by: ANESTHESIOLOGY

## 2018-05-06 PROCEDURE — P9047 ALBUMIN (HUMAN), 25%, 50ML: HCPCS | Mod: JG | Performed by: SURGERY

## 2018-05-06 PROCEDURE — B4185 PARENTERAL SOL 10 GM LIPIDS: HCPCS | Performed by: SURGERY

## 2018-05-06 PROCEDURE — A4217 STERILE WATER/SALINE, 500 ML: HCPCS | Performed by: SURGERY

## 2018-05-06 PROCEDURE — 20000000 HC ICU ROOM

## 2018-05-06 PROCEDURE — 83605 ASSAY OF LACTIC ACID: CPT | Mod: 91

## 2018-05-06 PROCEDURE — 84100 ASSAY OF PHOSPHORUS: CPT

## 2018-05-06 PROCEDURE — 99900022

## 2018-05-06 PROCEDURE — 83735 ASSAY OF MAGNESIUM: CPT

## 2018-05-06 PROCEDURE — 85610 PROTHROMBIN TIME: CPT

## 2018-05-06 PROCEDURE — 82803 BLOOD GASES ANY COMBINATION: CPT

## 2018-05-06 PROCEDURE — C9113 INJ PANTOPRAZOLE SODIUM, VIA: HCPCS | Performed by: SURGERY

## 2018-05-06 RX ORDER — NALOXONE HCL 0.4 MG/ML
0.02 VIAL (ML) INJECTION
Status: DISCONTINUED | OUTPATIENT
Start: 2018-05-06 | End: 2018-05-07

## 2018-05-06 RX ORDER — ACETAMINOPHEN 10 MG/ML
1000 INJECTION, SOLUTION INTRAVENOUS EVERY 8 HOURS
Status: CANCELLED | OUTPATIENT
Start: 2018-05-06 | End: 2018-05-07

## 2018-05-06 RX ORDER — METOCLOPRAMIDE HYDROCHLORIDE 5 MG/ML
20 INJECTION INTRAMUSCULAR; INTRAVENOUS EVERY 6 HOURS PRN
Status: DISCONTINUED | OUTPATIENT
Start: 2018-05-06 | End: 2018-05-07

## 2018-05-06 RX ORDER — ALBUMIN HUMAN 250 G/1000ML
25 SOLUTION INTRAVENOUS ONCE
Status: COMPLETED | OUTPATIENT
Start: 2018-05-06 | End: 2018-05-06

## 2018-05-06 RX ORDER — ONDANSETRON 2 MG/ML
4 INJECTION INTRAMUSCULAR; INTRAVENOUS EVERY 6 HOURS PRN
Status: DISCONTINUED | OUTPATIENT
Start: 2018-05-06 | End: 2018-05-06

## 2018-05-06 RX ORDER — FUROSEMIDE 10 MG/ML
20 INJECTION INTRAMUSCULAR; INTRAVENOUS ONCE
Status: COMPLETED | OUTPATIENT
Start: 2018-05-06 | End: 2018-05-06

## 2018-05-06 RX ORDER — HYDROMORPHONE HCL IN 0.9% NACL 6 MG/30 ML
PATIENT CONTROLLED ANALGESIA SYRINGE INTRAVENOUS CONTINUOUS
Status: DISCONTINUED | OUTPATIENT
Start: 2018-05-06 | End: 2018-05-07

## 2018-05-06 RX ORDER — ACETAMINOPHEN 10 MG/ML
1000 INJECTION, SOLUTION INTRAVENOUS EVERY 8 HOURS
Status: COMPLETED | OUTPATIENT
Start: 2018-05-06 | End: 2018-05-07

## 2018-05-06 RX ORDER — DIPHENHYDRAMINE HYDROCHLORIDE 50 MG/ML
12.5 INJECTION INTRAMUSCULAR; INTRAVENOUS EVERY 6 HOURS PRN
Status: DISCONTINUED | OUTPATIENT
Start: 2018-05-06 | End: 2018-05-07

## 2018-05-06 RX ORDER — HEPARIN SODIUM 5000 [USP'U]/ML
5000 INJECTION, SOLUTION INTRAVENOUS; SUBCUTANEOUS EVERY 8 HOURS
Status: DISCONTINUED | OUTPATIENT
Start: 2018-05-07 | End: 2018-05-08

## 2018-05-06 RX ADMIN — Medication 150 MCG/HR: at 05:05

## 2018-05-06 RX ADMIN — METRONIDAZOLE 500 MG: 500 INJECTION, SOLUTION INTRAVENOUS at 04:05

## 2018-05-06 RX ADMIN — Medication: at 06:05

## 2018-05-06 RX ADMIN — PROMETHAZINE HYDROCHLORIDE 12.5 MG: 25 INJECTION INTRAMUSCULAR; INTRAVENOUS at 09:05

## 2018-05-06 RX ADMIN — METOCLOPRAMIDE 20 MG: 5 INJECTION, SOLUTION INTRAMUSCULAR; INTRAVENOUS at 07:05

## 2018-05-06 RX ADMIN — FUROSEMIDE 20 MG: 10 INJECTION, SOLUTION INTRAVENOUS at 08:05

## 2018-05-06 RX ADMIN — INSULIN ASPART 2 UNITS: 100 INJECTION, SOLUTION INTRAVENOUS; SUBCUTANEOUS at 04:05

## 2018-05-06 RX ADMIN — DEXTROSE 40 MG: 50 INJECTION, SOLUTION INTRAVENOUS at 08:05

## 2018-05-06 RX ADMIN — POTASSIUM CHLORIDE 40 MEQ: 400 INJECTION, SOLUTION INTRAVENOUS at 01:05

## 2018-05-06 RX ADMIN — CALCIUM GLUCONATE 1 G: 94 INJECTION, SOLUTION INTRAVENOUS at 02:05

## 2018-05-06 RX ADMIN — INSULIN ASPART 2 UNITS: 100 INJECTION, SOLUTION INTRAVENOUS; SUBCUTANEOUS at 08:05

## 2018-05-06 RX ADMIN — Medication: at 01:05

## 2018-05-06 RX ADMIN — MAGNESIUM SULFATE IN WATER 2 G: 40 INJECTION, SOLUTION INTRAVENOUS at 07:05

## 2018-05-06 RX ADMIN — SOYBEAN OIL 250 ML: 20 INJECTION, SOLUTION INTRAVENOUS at 09:05

## 2018-05-06 RX ADMIN — CALCIUM GLUCONATE: 94 INJECTION, SOLUTION INTRAVENOUS at 09:05

## 2018-05-06 RX ADMIN — SODIUM PHOSPHATE, MONOBASIC, MONOHYDRATE 15 MMOL: 276; 142 INJECTION, SOLUTION INTRAVENOUS at 06:05

## 2018-05-06 RX ADMIN — SODIUM PHOSPHATE, MONOBASIC, MONOHYDRATE 15 MMOL: 276; 142 INJECTION, SOLUTION INTRAVENOUS at 07:05

## 2018-05-06 RX ADMIN — INSULIN ASPART 1 UNITS: 100 INJECTION, SOLUTION INTRAVENOUS; SUBCUTANEOUS at 12:05

## 2018-05-06 RX ADMIN — CHLORHEXIDINE GLUCONATE 15 ML: 1.2 RINSE ORAL at 08:05

## 2018-05-06 RX ADMIN — ALBUMIN HUMAN 25 G: 0.25 SOLUTION INTRAVENOUS at 09:05

## 2018-05-06 RX ADMIN — HYDROCORTISONE SODIUM SUCCINATE 100 MG: 100 INJECTION, POWDER, FOR SOLUTION INTRAMUSCULAR; INTRAVENOUS at 01:05

## 2018-05-06 RX ADMIN — CALCIUM GLUCONATE 1 G: 98 INJECTION, SOLUTION INTRAVENOUS at 05:05

## 2018-05-06 RX ADMIN — HYDROCORTISONE SODIUM SUCCINATE 100 MG: 100 INJECTION, POWDER, FOR SOLUTION INTRAMUSCULAR; INTRAVENOUS at 05:05

## 2018-05-06 RX ADMIN — Medication: at 09:05

## 2018-05-06 RX ADMIN — POTASSIUM CHLORIDE 40 MEQ: 400 INJECTION, SOLUTION INTRAVENOUS at 07:05

## 2018-05-06 RX ADMIN — HYDROCORTISONE SODIUM SUCCINATE 100 MG: 100 INJECTION, POWDER, FOR SOLUTION INTRAMUSCULAR; INTRAVENOUS at 09:05

## 2018-05-06 RX ADMIN — FUROSEMIDE 20 MG: 10 INJECTION, SOLUTION INTRAMUSCULAR; INTRAVENOUS at 05:05

## 2018-05-06 RX ADMIN — CEFEPIME HYDROCHLORIDE 2 G: 2 INJECTION, POWDER, FOR SOLUTION INTRAVENOUS at 07:05

## 2018-05-06 RX ADMIN — CALCIUM GLUCONATE 1 G: 98 INJECTION, SOLUTION INTRAVENOUS at 07:05

## 2018-05-06 RX ADMIN — PROPOFOL 40 MCG/KG/MIN: 10 INJECTION, EMULSION INTRAVENOUS at 01:05

## 2018-05-06 RX ADMIN — CALCIUM GLUCONATE 1 G: 98 INJECTION, SOLUTION INTRAVENOUS at 11:05

## 2018-05-06 RX ADMIN — PROPOFOL 40 MCG/KG/MIN: 10 INJECTION, EMULSION INTRAVENOUS at 04:05

## 2018-05-06 RX ADMIN — METRONIDAZOLE 500 MG: 500 INJECTION, SOLUTION INTRAVENOUS at 09:05

## 2018-05-06 RX ADMIN — METRONIDAZOLE 500 MG: 500 INJECTION, SOLUTION INTRAVENOUS at 01:05

## 2018-05-06 RX ADMIN — CEFEPIME HYDROCHLORIDE 2 G: 2 INJECTION, POWDER, FOR SOLUTION INTRAVENOUS at 04:05

## 2018-05-06 RX ADMIN — CEFEPIME HYDROCHLORIDE 2 G: 2 INJECTION, POWDER, FOR SOLUTION INTRAVENOUS at 11:05

## 2018-05-06 RX ADMIN — ACETAMINOPHEN 1000 MG: 10 INJECTION, SOLUTION INTRAVENOUS at 09:05

## 2018-05-06 NOTE — PLAN OF CARE
Problem: Patient Care Overview  Goal: Individualization & Mutuality  Admit Dx: s/p Exploratory Laparotomy w/ sigmoidectomy/colostomy 2/2 sigmoid perforation    PMHx: HTN, CAD, s/p CABG, DM2, Colon Perf, Appendiceal CA, RA    PSHx: Appendectomy, EVAR for AAA (2017), CABG, Right colectomy    5/2- Patient presented to ED c/o abdominal pain, febrile, tachycardic, and hypotensive. CT revealed sigmoid perforation w/ fluid and abscess collection.   5/3- Class A to OR for ex lap, colectomy, admitted to SICU, 911L LR, lined up, 3 U PRBCs, 1 U PLT  5/4: OR washout with washout and closure with wound vac; 2L LR; 2 units PRBC  5/5: 2 units PRBC; Lasix  5/6: extubated  Nursing:  MAP >65  Q8H LA, BMP, CBC             Outcome: Ongoing (interventions implemented as appropriate)  Vs & assessment as documented. Pt AAOx3. On O2@4Lvia NC. Pain control with Dilaudid PCA.  Midline wound vac intact. UO adequate. Pt c/o nausea. No active vomiting. abd KUB completed NGT to MIWS draining clear green/yellow. Dr. Lozada updated throughout this shift on patient status.  All questions and concerns answered and acknowledged. VSS at this time, will continue to monitor.

## 2018-05-06 NOTE — PLAN OF CARE
Problem: Patient Care Overview  Goal: Plan of Care Review  Outcome: Ongoing (interventions implemented as appropriate)  POC reviewed with pt and spouse at bedside, more awake despite Propofol and Fentanyl gtt used for sedation and comfort, becomes very agitated and appears angry when awake, attempting to pull ETT, does not follow commands to stop biting the ETT, bite block applied by RT, following commands in all 4 extremities on Propofol @ 35 ncg/kg/min and Fentanyl @ 150 mcg/hr. Levo gtt remains on to maintain MAP> 65, TPN started overnight, accu checks q 4 using SS Novolog as BG levels 150-200, hold off on starting Insulin gtt. All electrolytes replaced as needed, UOP  mL/hr, total UOP overnight ~ 2L, afebrile, sats 95-99% on Vent A/C 60% and PEEP 7. Pt repositioned q2h using wedge, heel protector boots in place, no skin breakdown noted. All am lab results reviewed, all questions answered and addressed.

## 2018-05-06 NOTE — PROGRESS NOTES
[facility]  Critical Care - Surgery  Progress Note    Patient Name: Cristo Jefferson  MRN: [id number]  Admission Date: 5/3/2018  Hospital Length of Stay: 3 days  Code Status: Full Code  Attending Provider: Eber Riley MD  Primary Care Provider: Vince Perez MD   Principal Problem: Perforation of sigmoid colon    Subjective:     Interval History/Significant Events: Patient seen and examined this AM. NAEON. Patient bradycardic overnight. Off pressors; wean to extubate    Follow-up For: Procedure(s) (LRB):  EXPLORATORY-LAPAROTOMY (N/A)  WASHOUT-ABDOMINAL (N/A)  CLOSURE-WOUND (N/A)    Post-Operative Day: 2 Days Post-Op    Objective:     Vital Signs (Most Recent):  Temp: 98.5 °F (36.9 °C) (05/06/18 0300)  Pulse: (!) 46 (05/06/18 0522)  Resp: (!) 21 (05/06/18 0522)  BP: (!) 96/59 (05/06/18 0500)  SpO2: 99 % (05/06/18 0522) Vital Signs (24h Range):  Temp:  [97.7 °F (36.5 °C)-98.5 °F (36.9 °C)] 98.5 °F (36.9 °C)  Pulse:  [39-98] 46  Resp:  [18-38] 21  SpO2:  [92 %-100 %] 99 %  BP: ()/(52-67) 96/59  Arterial Line BP: ()/(43-67) 99/47     Weight: 117.1 kg (258 lb 2.5 oz)  Body mass index is 38.12 kg/m².      Intake/Output Summary (Last 24 hours) at 05/06/18 0547  Last data filed at 05/06/18 0500   Gross per 24 hour   Intake             6807 ml   Output             6052 ml   Net              755 ml       Physical Exam    Vents:  Vent Mode: A/C (05/06/18 0522)  Ventilator Initiated: Yes (05/03/18 0815)  Set Rate: 18 bmp (05/06/18 0522)  Vt Set: 450 mL (05/06/18 0522)  Pressure Support: 0 cmH20 (05/06/18 0522)  PEEP/CPAP: 7 cmH20 (05/06/18 0522)  Oxygen Concentration (%): 60 (05/06/18 0522)  Peak Airway Pressure: 35 cmH2O (05/06/18 0522)  Plateau Pressure: 0 cmH20 (05/06/18 0522)  Total Ve: 8.55 mL (05/06/18 0522)  F/VT Ratio<105 (RSBI): (!) 43.84 (05/06/18 0522)    Lines/Drains/Airways     Central Venous Catheter Line                 Port A Cath Single Lumen 05/02/18 2313 left subclavian 3  days         Percutaneous Central Line Insertion/Assessment - triple lumen  05/03/18 1030 right internal jugular 2 days          Drain                 Drain/Device  Right -- days         Colostomy 05/03/18 0715 RUQ 2 days         NG/OG Tube 05/04/18 nasogastric Right nostril 2 days         Urethral Catheter 05/03/18 0615 Straight-tip;Non-latex 16 Fr. 2 days         Closed/Suction Drain 05/04/18 0834 Abdomen Bulb 15 Fr. 1 day         Closed/Suction Drain 05/04/18 0834 Anterior Abdomen Bulb 15 Fr. 1 day         Closed/Suction Drain 05/04/18 0854 Medial Abdomen Other (Comment) 1 day          Airway                 Airway - Non-Surgical 05/03/18 0611 Endotracheal Tube 2 days          Arterial Line                 Arterial Line 05/03/18 0615 Left Radial 2 days          Pressure Ulcer                 Negative Pressure Wound Therapy  1 day          Surgical Packing                 Surgical Packing 2 days          Peripheral Intravenous Line                 Peripheral IV - Single Lumen 05/02/18 2024 Left Antecubital 3 days         Peripheral IV - Single Lumen 05/03/18 0619 Right Hand 2 days                Significant Labs:    CBC/Anemia Profile:    Recent Labs  Lab 05/05/18  1533 05/06/18  0000 05/06/18  0400   WBC 11.05 9.91 9.42   HGB 9.1* 9.1* 8.7*   HCT 28.3* 27.8* 27.5*   PLT 92* 82* 85*   MCV 82 82 84   RDW 17.0* 17.4* 17.7*        Chemistries:    Recent Labs  Lab 05/05/18  1533 05/06/18  0000 05/06/18  0400    141 140   K 3.6 3.9 4.4    108 111*   CO2 26 26 24   BUN 9 9 9   CREATININE 0.7 0.7 0.7   CALCIUM 7.7* 8.2* 7.7*   ALBUMIN 1.5* 1.6* 1.4*   PROT 4.4* 4.6* 4.3*   BILITOT 2.2* 1.9* 1.2*   ALKPHOS 50* 52* 49*   ALT 11 15 17   AST 18 26 24   MG 1.5* 2.2 2.2   PHOS 2.8 2.8 2.6*     Assessment/Plan:     * Perforation of sigmoid colon    Plan:     Neuro:   - Propofol and fentanyl gtt for sedation/pain control  - Responsive to commands off sedation     Pulmonary:   -Extubated this AM  Vent Mode:  A/C  Oxygen Concentration (%):  [40-60] 50  Resp Rate Total:  [17 br/min-26 br/min] 18 br/min  Vt Set:  [450 mL] 450 mL  PEEP/CPAP:  [5 cmH20] 5 cmH20  Pressure Support:  [0 cmH20] 0 cmH20  Mean Airway Pressure:  [9 qdT55-52 cmH20] 10 cmH20  - Ventilator associated PNA ppx: Chlorhexidine  -ABGs prn  - Daily CXR's      Cardiac:  -H/o CAD s/p 4 vessel CABG   -MAP goal >65  -On Levo; continue to wean as tolerated  -hydrocortisone 100mg IV q8h given chronic steroid use     Renal:   -Garcia in place  -UOP adequate, continue to monitor & bolus prn  -Bun/Cr 9/0.7  - Negative 3 L in last 24h after one dose of 20 mg IV lasix     Fluids/Electrolytes/Nutrition/GI:   -Nutritional status: NPO  -replace lytes PRN  -LR @ 150cc/hr; additional fluid boluses prn  -BMP q4  -GI ulcer prophylaxis  - Started TPN     Hematology/Oncology:  -H/H 8.7/27.5; continue to monitor ; s/p 2 units of PRBC on 5/5  -INR/Plts 1.2, platelets 85K  - CBC q4, continue to trend and transfuse as needed     Infectious Disease:   -Afebrile since return from OR  -WBC 9.42  -Cultures drawn in ED, with gram negative rods  -Abx: Cefepime, Vanc, Flaygl     Endocrine:  -Glucose goal of 120-180  -SSI      Dispo:  -Continue care in the ICU setting  - S/p ex-lap and closure on 5/4            Critical care was time spent personally by me on the following activities: development of treatment plan with patient or surrogate and bedside caregivers, discussions with consultants, evaluation of patient's response to treatment, examination of patient, ordering and performing treatments and interventions, ordering and review of laboratory studies, ordering and review of radiographic studies, pulse oximetry, re-evaluation of patient's condition.  This critical care time did not overlap with that of any other provider or involve time for any procedures.     Randell Anton MD  Critical Care - Surgery  [facility]

## 2018-05-06 NOTE — PROGRESS NOTES
GENERAL SURGERY  Progress Note    Hospital Day: 4  Admit Date: 5/3/2018    Date of Surgery:  5/4/2018  Post-Operative Day #:  2 Days Post-Op    Procedure:  Procedure(s):  EXPLORATORY-LAPAROTOMY (N/A)  WASHOUT-ABDOMINAL (N/A)  CLOSURE-WOUND (N/A)    SUBJECTIVE:     No acute events.  Got 2 units of blood yesterday and 20 mg of lasix and now entirely off pressors.  Urinated >3 L on day shift after lasix.  On fentanyl gtt which was stopped with patient on PSV and pulling over 1000 tidal volumes on request.  Alert opening eyes and following commands.  Plan to extubate (on 40% and 5).    Current Medications:   ceFEPime (MAXIPIME) IVPB  2 g Intravenous Q8H    chlorhexidine  15 mL Mouth/Throat BID    fat emulsion 20%  250 mL Intravenous Daily    heparin (porcine)  5,000 Units Subcutaneous Q8H    hydrocortisone sodium succinate  100 mg Intravenous Q8H    metronidazole  500 mg Intravenous Q8H    pantoprazole 40 mg in dextrose 5 % 100 mL infusion (ready to mix system)  40 mg Intravenous Daily    vancomycin (VANCOCIN) IVPB  1,500 mg Intravenous Q12H     Infusions:   fentanyl Stopped (05/06/18 0818)    insulin (HUMAN R) infusion (adults) Stopped (05/05/18 0930)    lactated ringers 20 mL/hr at 05/06/18 0818    norepinephrine bitartrate-D5W Stopped (05/06/18 0624)    propofol Stopped (05/06/18 0720)    TPN ADULT CENTRAL LINE CUSTOM 58 mL/hr at 05/06/18 0818    vasopressin (PITRESSIN) infusion Stopped (05/05/18 0800)     PRN:sodium chloride, sodium chloride, calcium gluconate IVPB, calcium gluconate IVPB, calcium gluconate IVPB, dextrose 50%, dextrose 50%, glucagon (human recombinant), insulin aspart U-100, magnesium sulfate IVPB, magnesium sulfate IVPB, magnesium sulfate IVPB, magnesium sulfate IVPB, potassium chloride in water, potassium chloride in water, potassium chloride in water, sodium phosphate IVPB, sodium phosphate IVPB, sodium phosphate IVPB, sodium phosphate IVPB, sodium phosphate IVPB, sodium phosphate  IVPB    Review of patient's allergies indicates:   Allergen Reactions    Morphine Rash       OBJECTIVE:     Most Recent Vitals:  Temp: 98.3 °F (36.8 °C) (05/06/18 0700)  Pulse: 71 (05/06/18 0815)  Resp: 12 (05/06/18 0815)  BP: 126/68 (05/06/18 0800)  SpO2: 100 % (05/06/18 0815)    24-Hour Vitals Range:  Temp:  [97.7 °F (36.5 °C)-98.5 °F (36.9 °C)]   Pulse:  [39-98]   Resp:  [12-38]   BP: ()/(52-68)   SpO2:  [92 %-100 %]   Arterial Line BP: ()/(43-75)     24-Hour I&O:    Intake/Output Summary (Last 24 hours) at 05/06/18 0859  Last data filed at 05/06/18 0845   Gross per 24 hour   Intake             5148 ml   Output             6697 ml   Net            -1549 ml       PHYSICAL EXAM:  Intubated, sedated, opens eyes with stimulation, edematous with anasarca  Head normocephalic, atraumatic  Trachea midline, neck supple  Mechanically ventilated  Heart regular rate and rhythm  Abdomen soft, obese, moderately distended, stoma pink viable edematous with bowel sweat in bag    LAB RESULTS:    Recent Labs  Lab 05/06/18  0000 05/06/18  0400 05/06/18  0804   WBC 9.91 9.42 6.37   HGB 9.1* 8.7* 9.1*   HCT 27.8* 27.5* 29.0*   PLT 82* 85* 79*       Recent Labs  Lab 05/05/18  0808 05/05/18  1533 05/06/18  0000 05/06/18  0400   *  133* 139 141 140   K 4.0  4.0 3.6 3.9 4.4     102 106 108 111*   CO2 25  25 26 26 24   BUN 10  10 9 9 9   CREATININE 0.7  0.7 0.7 0.7 0.7   *  187* 157* 205* 187*   CALCIUM 7.6*  7.6* 7.7* 8.2* 7.7*   CAION 0.99* 0.85* 1.12  --    MG 1.6 1.5* 2.2 2.2   PHOS 3.1 2.8 2.8 2.6*   AST 13 18 26 24   ALT 7* 11 15 17   ALKPHOS 49* 50* 52* 49*   BILITOT 1.2* 2.2* 1.9* 1.2*   PROT 4.1* 4.4* 4.6* 4.3*   ALBUMIN 1.4* 1.5* 1.6* 1.4*       Recent Labs  Lab 05/05/18  0808 05/05/18  1533 05/06/18  0000 05/06/18  0804   INR 1.0 1.0 1.2  --    LACTATE 1.9 1.4 1.4 1.8       Recent Labs  Lab 05/06/18  0021 05/06/18  0426 05/06/18  0815   PH 7.452* 7.417 7.422   PCO2 41.5 44.6 46.2*   PO2  70* 95 214*   HCO3 29.0* 28.7* 30.1*         Recent Labs  Lab 05/02/18 2059   COLORU Yellow   APPEARANCEUA Clear   PHUR 6.0   SPECGRAV 1.020   OCCULTUA Negative   LEUKOCYTESUR Negative   NITRITE Negative   PROTEINUA Negative   GLUCUA Negative   KETONESU Negative   BILIRUBINUA Negative   UROBILINOGEN Negative       Recent Labs  Lab 05/02/18  2100   LABURIN No growth     No results for input(s): CDIFFICILEAN, CDIFFTOXIN in the last 168 hours.    Diagnostic Studes:  X-Ray: Reviewed    ASSESSMENT:     Cristo Jefferson is a 57 y.o. male who is now 2 Days Post-Op s/p Procedure(s):  EXPLORATORY-LAPAROTOMY (N/A)  WASHOUT-ABDOMINAL (N/A)  CLOSURE-WOUND (N/A)    PLAN:  · Extubate.  · Lasix 20 mg x1 dose.   · Continue TPN and (if needed) insulin gtt.  · Continue IV antibiotics -- will stop Vancomycin.  · Blood cultures sensitive to cefepime (discussed with lab).  · Stress dose steroids -- should be weaned down in the next day or two as long as stable hemodynamically.  · Trend labs.  · Prophylaxis:  Protonix.  Start SQH tomorrow afternoon if H/H stays stable.      Estrella Samayoa MD

## 2018-05-06 NOTE — SUBJECTIVE & OBJECTIVE
Interval History/Significant Events: Patient seen and examined this AM. ALISHA. Patient bradycardic overnight. Weaning pressors.     Follow-up For: Procedure(s) (LRB):  EXPLORATORY-LAPAROTOMY (N/A)  WASHOUT-ABDOMINAL (N/A)  CLOSURE-WOUND (N/A)    Post-Operative Day: 2 Days Post-Op    Objective:     Vital Signs (Most Recent):  Temp: 98.5 °F (36.9 °C) (05/06/18 0300)  Pulse: (!) 46 (05/06/18 0522)  Resp: (!) 21 (05/06/18 0522)  BP: (!) 96/59 (05/06/18 0500)  SpO2: 99 % (05/06/18 0522) Vital Signs (24h Range):  Temp:  [97.7 °F (36.5 °C)-98.5 °F (36.9 °C)] 98.5 °F (36.9 °C)  Pulse:  [39-98] 46  Resp:  [18-38] 21  SpO2:  [92 %-100 %] 99 %  BP: ()/(52-67) 96/59  Arterial Line BP: ()/(43-67) 99/47     Weight: 117.1 kg (258 lb 2.5 oz)  Body mass index is 38.12 kg/m².      Intake/Output Summary (Last 24 hours) at 05/06/18 0547  Last data filed at 05/06/18 0500   Gross per 24 hour   Intake             6807 ml   Output             6052 ml   Net              755 ml       Physical Exam    Vents:  Vent Mode: A/C (05/06/18 0522)  Ventilator Initiated: Yes (05/03/18 0815)  Set Rate: 18 bmp (05/06/18 0522)  Vt Set: 450 mL (05/06/18 0522)  Pressure Support: 0 cmH20 (05/06/18 0522)  PEEP/CPAP: 7 cmH20 (05/06/18 0522)  Oxygen Concentration (%): 60 (05/06/18 0522)  Peak Airway Pressure: 35 cmH2O (05/06/18 0522)  Plateau Pressure: 0 cmH20 (05/06/18 0522)  Total Ve: 8.55 mL (05/06/18 0522)  F/VT Ratio<105 (RSBI): (!) 43.84 (05/06/18 0522)    Lines/Drains/Airways     Central Venous Catheter Line                 Port A Cath Single Lumen 05/02/18 2105 left subclavian 3 days         Percutaneous Central Line Insertion/Assessment - triple lumen  05/03/18 1030 right internal jugular 2 days          Drain                 Drain/Device  Right -- days         Colostomy 05/03/18 0715 RUQ 2 days         NG/OG Tube 05/04/18 nasogastric Right nostril 2 days         Urethral Catheter 05/03/18 0615 Straight-tip;Non-latex 16 Fr. 2 days          Closed/Suction Drain 05/04/18 0834 Abdomen Bulb 15 Fr. 1 day         Closed/Suction Drain 05/04/18 0834 Anterior Abdomen Bulb 15 Fr. 1 day         Closed/Suction Drain 05/04/18 0854 Medial Abdomen Other (Comment) 1 day          Airway                 Airway - Non-Surgical 05/03/18 0611 Endotracheal Tube 2 days          Arterial Line                 Arterial Line 05/03/18 0615 Left Radial 2 days          Pressure Ulcer                 Negative Pressure Wound Therapy  1 day          Surgical Packing                 Surgical Packing 2 days          Peripheral Intravenous Line                 Peripheral IV - Single Lumen 05/02/18 2024 Left Antecubital 3 days         Peripheral IV - Single Lumen 05/03/18 0619 Right Hand 2 days                Significant Labs:    CBC/Anemia Profile:    Recent Labs  Lab 05/05/18  1533 05/06/18  0000 05/06/18  0400   WBC 11.05 9.91 9.42   HGB 9.1* 9.1* 8.7*   HCT 28.3* 27.8* 27.5*   PLT 92* 82* 85*   MCV 82 82 84   RDW 17.0* 17.4* 17.7*        Chemistries:    Recent Labs  Lab 05/05/18  1533 05/06/18  0000 05/06/18  0400    141 140   K 3.6 3.9 4.4    108 111*   CO2 26 26 24   BUN 9 9 9   CREATININE 0.7 0.7 0.7   CALCIUM 7.7* 8.2* 7.7*   ALBUMIN 1.5* 1.6* 1.4*   PROT 4.4* 4.6* 4.3*   BILITOT 2.2* 1.9* 1.2*   ALKPHOS 50* 52* 49*   ALT 11 15 17   AST 18 26 24   MG 1.5* 2.2 2.2   PHOS 2.8 2.8 2.6*

## 2018-05-06 NOTE — ASSESSMENT & PLAN NOTE
Plan:     Neuro:   - Propofol and fentanyl gtt for sedation/pain control  - Responsive to commands off sedation     Pulmonary:   -Intubated  Vent Mode: A/C  Oxygen Concentration (%):  [40-60] 50  Resp Rate Total:  [17 br/min-26 br/min] 18 br/min  Vt Set:  [450 mL] 450 mL  PEEP/CPAP:  [5 cmH20] 5 cmH20  Pressure Support:  [0 cmH20] 0 cmH20  Mean Airway Pressure:  [9 gbW62-94 cmH20] 10 cmH20  - Ventilator associated PNA ppx: Chlorhexidine  -ABGs prn     Recent Labs  Lab 05/04/18  0517   PH 7.393   PO2 84   PCO2 45.7*   HCO3 27.9   BE 3   -Daily CXR     Cardiac:  -H/o CAD s/p 4 vessel CABG   -MAP goal >65  -On Levo and vaso; continue to wean as tolerated  -hydrocortisone 100mg IV q8h given chronic steroid use     Renal:   -Garcia in place  -UOP adequate, continue to monitor & bolus prn  -Bun/Cr 10/0.6     Fluids/Electrolytes/Nutrition/GI:   -Nutritional status: NPO  -replace lytes PRN  -LR @ 150cc/hr; additional fluid boluses prn  -BMP q4  -GI ulcer prophylaxis     Hematology/Oncology:  -H/H 7.5/23.3; continue to monitor ; 2 units of PRBC followed by 20 mg IV lasix for 2 doses  -INR/Plts 1.4, platelets 101  - CBC q4, continue to trend and transfuse as needed     Infectious Disease:   -Afebrile since return from OR  -WBC 11  -Cultures drawn in ED, with gram negative rods  -Abx: Cefepime, Vanc, Flaygl     Endocrine:  -Glucose goal of 120-180  -SSI      Dispo:  -Continue care in the ICU setting  - S/p ex-lap and closure on 5/4

## 2018-05-07 LAB
ALBUMIN SERPL BCP-MCNC: 1.7 G/DL
ALP SERPL-CCNC: 55 U/L
ALT SERPL W/O P-5'-P-CCNC: 14 U/L
ANION GAP SERPL CALC-SCNC: 8 MMOL/L
AST SERPL-CCNC: 15 U/L
BASOPHILS # BLD AUTO: 0.01 K/UL
BASOPHILS NFR BLD: 0.1 %
BILIRUB SERPL-MCNC: 0.7 MG/DL
BUN SERPL-MCNC: 18 MG/DL
CALCIUM SERPL-MCNC: 7.8 MG/DL
CHLORIDE SERPL-SCNC: 108 MMOL/L
CO2 SERPL-SCNC: 28 MMOL/L
CREAT SERPL-MCNC: 0.6 MG/DL
DIFFERENTIAL METHOD: ABNORMAL
EOSINOPHIL # BLD AUTO: 0 K/UL
EOSINOPHIL NFR BLD: 0.3 %
ERYTHROCYTE [DISTWIDTH] IN BLOOD BY AUTOMATED COUNT: 17.9 %
EST. GFR  (AFRICAN AMERICAN): >60 ML/MIN/1.73 M^2
EST. GFR  (NON AFRICAN AMERICAN): >60 ML/MIN/1.73 M^2
GLUCOSE SERPL-MCNC: 166 MG/DL
HCT VFR BLD AUTO: 27.3 %
HGB BLD-MCNC: 8.6 G/DL
IMM GRANULOCYTES # BLD AUTO: 0.07 K/UL
IMM GRANULOCYTES NFR BLD AUTO: 1 %
LYMPHOCYTES # BLD AUTO: 1.3 K/UL
LYMPHOCYTES NFR BLD: 19.4 %
MAGNESIUM SERPL-MCNC: 2.2 MG/DL
MCH RBC QN AUTO: 26.3 PG
MCHC RBC AUTO-ENTMCNC: 31.5 G/DL
MCV RBC AUTO: 84 FL
MONOCYTES # BLD AUTO: 0.4 K/UL
MONOCYTES NFR BLD: 5.3 %
NEUTROPHILS # BLD AUTO: 5 K/UL
NEUTROPHILS NFR BLD: 73.9 %
NRBC BLD-RTO: 0 /100 WBC
PHOSPHATE SERPL-MCNC: 3 MG/DL
PLATELET # BLD AUTO: 70 K/UL
PMV BLD AUTO: 10.8 FL
POCT GLUCOSE: 120 MG/DL (ref 70–110)
POCT GLUCOSE: 134 MG/DL (ref 70–110)
POCT GLUCOSE: 137 MG/DL (ref 70–110)
POCT GLUCOSE: 152 MG/DL (ref 70–110)
POCT GLUCOSE: 153 MG/DL (ref 70–110)
POCT GLUCOSE: 173 MG/DL (ref 70–110)
POCT GLUCOSE: 189 MG/DL (ref 70–110)
POTASSIUM SERPL-SCNC: 4 MMOL/L
PROT SERPL-MCNC: 5 G/DL
RBC # BLD AUTO: 3.27 M/UL
SODIUM SERPL-SCNC: 144 MMOL/L
WBC # BLD AUTO: 6.75 K/UL

## 2018-05-07 PROCEDURE — 97161 PT EVAL LOW COMPLEX 20 MIN: CPT

## 2018-05-07 PROCEDURE — B4185 PARENTERAL SOL 10 GM LIPIDS: HCPCS | Performed by: SURGERY

## 2018-05-07 PROCEDURE — 25000003 PHARM REV CODE 250: Performed by: SURGERY

## 2018-05-07 PROCEDURE — A4217 STERILE WATER/SALINE, 500 ML: HCPCS | Performed by: SURGERY

## 2018-05-07 PROCEDURE — 84100 ASSAY OF PHOSPHORUS: CPT

## 2018-05-07 PROCEDURE — C9113 INJ PANTOPRAZOLE SODIUM, VIA: HCPCS | Performed by: SURGERY

## 2018-05-07 PROCEDURE — 63600175 PHARM REV CODE 636 W HCPCS: Performed by: SURGERY

## 2018-05-07 PROCEDURE — 63600175 PHARM REV CODE 636 W HCPCS: Performed by: STUDENT IN AN ORGANIZED HEALTH CARE EDUCATION/TRAINING PROGRAM

## 2018-05-07 PROCEDURE — 85025 COMPLETE CBC W/AUTO DIFF WBC: CPT

## 2018-05-07 PROCEDURE — 94761 N-INVAS EAR/PLS OXIMETRY MLT: CPT

## 2018-05-07 PROCEDURE — 97165 OT EVAL LOW COMPLEX 30 MIN: CPT

## 2018-05-07 PROCEDURE — S0030 INJECTION, METRONIDAZOLE: HCPCS | Performed by: SURGERY

## 2018-05-07 PROCEDURE — 20600001 HC STEP DOWN PRIVATE ROOM

## 2018-05-07 PROCEDURE — 25000003 PHARM REV CODE 250: Performed by: STUDENT IN AN ORGANIZED HEALTH CARE EDUCATION/TRAINING PROGRAM

## 2018-05-07 PROCEDURE — 80053 COMPREHEN METABOLIC PANEL: CPT

## 2018-05-07 PROCEDURE — 94770 HC EXHALED C02 TEST: CPT

## 2018-05-07 PROCEDURE — 97535 SELF CARE MNGMENT TRAINING: CPT

## 2018-05-07 PROCEDURE — 83735 ASSAY OF MAGNESIUM: CPT

## 2018-05-07 RX ORDER — DOCUSATE SODIUM 100 MG/1
100 CAPSULE, LIQUID FILLED ORAL 2 TIMES DAILY
Status: CANCELLED | OUTPATIENT
Start: 2018-05-07

## 2018-05-07 RX ORDER — ACETAMINOPHEN 10 MG/ML
1000 INJECTION, SOLUTION INTRAVENOUS EVERY 8 HOURS
Status: COMPLETED | OUTPATIENT
Start: 2018-05-07 | End: 2018-05-08

## 2018-05-07 RX ORDER — HYDROMORPHONE HYDROCHLORIDE 1 MG/ML
1 INJECTION, SOLUTION INTRAMUSCULAR; INTRAVENOUS; SUBCUTANEOUS EVERY 4 HOURS PRN
Status: DISCONTINUED | OUTPATIENT
Start: 2018-05-07 | End: 2018-05-09

## 2018-05-07 RX ORDER — DOXYLAMINE SUCCINATE 25 MG
TABLET ORAL 2 TIMES DAILY
Status: DISCONTINUED | OUTPATIENT
Start: 2018-05-07 | End: 2018-05-12 | Stop reason: HOSPADM

## 2018-05-07 RX ORDER — HYDROMORPHONE HYDROCHLORIDE 1 MG/ML
0.5 INJECTION, SOLUTION INTRAMUSCULAR; INTRAVENOUS; SUBCUTANEOUS EVERY 4 HOURS PRN
Status: CANCELLED | OUTPATIENT
Start: 2018-05-07

## 2018-05-07 RX ORDER — OXYCODONE AND ACETAMINOPHEN 5; 325 MG/1; MG/1
2 TABLET ORAL EVERY 4 HOURS PRN
Status: CANCELLED | OUTPATIENT
Start: 2018-05-07

## 2018-05-07 RX ORDER — HYDROMORPHONE HYDROCHLORIDE 1 MG/ML
0.5 INJECTION, SOLUTION INTRAMUSCULAR; INTRAVENOUS; SUBCUTANEOUS EVERY 4 HOURS PRN
Status: DISCONTINUED | OUTPATIENT
Start: 2018-05-07 | End: 2018-05-09

## 2018-05-07 RX ORDER — OXYCODONE AND ACETAMINOPHEN 5; 325 MG/1; MG/1
1 TABLET ORAL EVERY 4 HOURS PRN
Status: CANCELLED | OUTPATIENT
Start: 2018-05-07

## 2018-05-07 RX ADMIN — ACETAMINOPHEN 1000 MG: 10 INJECTION, SOLUTION INTRAVENOUS at 10:05

## 2018-05-07 RX ADMIN — HYDROCORTISONE SODIUM SUCCINATE 50 MG: 100 INJECTION, POWDER, FOR SOLUTION INTRAMUSCULAR; INTRAVENOUS at 10:05

## 2018-05-07 RX ADMIN — METOCLOPRAMIDE 20 MG: 5 INJECTION, SOLUTION INTRAMUSCULAR; INTRAVENOUS at 02:05

## 2018-05-07 RX ADMIN — CALCIUM GLUCONATE: 94 INJECTION, SOLUTION INTRAVENOUS at 11:05

## 2018-05-07 RX ADMIN — ACETAMINOPHEN 1000 MG: 10 INJECTION, SOLUTION INTRAVENOUS at 05:05

## 2018-05-07 RX ADMIN — INSULIN ASPART 2 UNITS: 100 INJECTION, SOLUTION INTRAVENOUS; SUBCUTANEOUS at 05:05

## 2018-05-07 RX ADMIN — ACETAMINOPHEN 1000 MG: 10 INJECTION, SOLUTION INTRAVENOUS at 01:05

## 2018-05-07 RX ADMIN — HYDROCORTISONE SODIUM SUCCINATE 50 MG: 100 INJECTION, POWDER, FOR SOLUTION INTRAMUSCULAR; INTRAVENOUS at 05:05

## 2018-05-07 RX ADMIN — CEFTRIAXONE SODIUM 2 G: 2 INJECTION, POWDER, FOR SOLUTION INTRAMUSCULAR; INTRAVENOUS at 12:05

## 2018-05-07 RX ADMIN — DEXTROSE 40 MG: 50 INJECTION, SOLUTION INTRAVENOUS at 08:05

## 2018-05-07 RX ADMIN — HEPARIN SODIUM 5000 UNITS: 5000 INJECTION, SOLUTION INTRAVENOUS; SUBCUTANEOUS at 05:05

## 2018-05-07 RX ADMIN — METRONIDAZOLE 500 MG: 500 INJECTION, SOLUTION INTRAVENOUS at 05:05

## 2018-05-07 RX ADMIN — HYDROMORPHONE HYDROCHLORIDE 0.5 MG: 1 INJECTION, SOLUTION INTRAMUSCULAR; INTRAVENOUS; SUBCUTANEOUS at 11:05

## 2018-05-07 RX ADMIN — INSULIN ASPART 2 UNITS: 100 INJECTION, SOLUTION INTRAVENOUS; SUBCUTANEOUS at 08:05

## 2018-05-07 RX ADMIN — SOYBEAN OIL 250 ML: 20 INJECTION, SOLUTION INTRAVENOUS at 11:05

## 2018-05-07 RX ADMIN — PROMETHAZINE HYDROCHLORIDE 12.5 MG: 25 INJECTION INTRAMUSCULAR; INTRAVENOUS at 05:05

## 2018-05-07 RX ADMIN — HYDROMORPHONE HYDROCHLORIDE 0.5 MG: 1 INJECTION, SOLUTION INTRAMUSCULAR; INTRAVENOUS; SUBCUTANEOUS at 05:05

## 2018-05-07 RX ADMIN — MICONAZOLE NITRATE: 20 CREAM TOPICAL at 10:05

## 2018-05-07 RX ADMIN — INSULIN ASPART 2 UNITS: 100 INJECTION, SOLUTION INTRAVENOUS; SUBCUTANEOUS at 04:05

## 2018-05-07 RX ADMIN — HYDROMORPHONE HYDROCHLORIDE 0.5 MG: 1 INJECTION, SOLUTION INTRAMUSCULAR; INTRAVENOUS; SUBCUTANEOUS at 12:05

## 2018-05-07 RX ADMIN — CEFEPIME HYDROCHLORIDE 2 G: 2 INJECTION, POWDER, FOR SOLUTION INTRAVENOUS at 04:05

## 2018-05-07 RX ADMIN — HYDROCORTISONE SODIUM SUCCINATE 50 MG: 100 INJECTION, POWDER, FOR SOLUTION INTRAMUSCULAR; INTRAVENOUS at 01:05

## 2018-05-07 NOTE — SUBJECTIVE & OBJECTIVE
Interval History/Significant Events: No acute events overnight, afebrile, vital signs stable. Pain controlled with PCA. Remains HDS off pressors. UOP adeqaute (1.3cc/kg/hr). + ostomy output.     Follow-up For: Procedure(s) (LRB):  EXPLORATORY-LAPAROTOMY (N/A)  WASHOUT-ABDOMINAL (N/A)  CLOSURE-WOUND (N/A)    Post-Operative Day: 3 Days Post-Op    Objective:     Vital Signs (Most Recent):  Temp: 98.2 °F (36.8 °C) (05/07/18 0300)  Pulse: (!) 55 (05/07/18 0600)  Resp: (!) 23 (05/07/18 0600)  BP: 108/64 (05/07/18 0600)  SpO2: (!) 94 % (05/07/18 0600) Vital Signs (24h Range):  Temp:  [97.7 °F (36.5 °C)-98.6 °F (37 °C)] 98.2 °F (36.8 °C)  Pulse:  [47-80] 55  Resp:  [12-28] 23  SpO2:  [94 %-100 %] 94 %  BP: (108-142)/(60-78) 108/64  Arterial Line BP: (105-152)/(50-75) 115/54     Weight: 117.1 kg (258 lb 2.5 oz)  Body mass index is 38.12 kg/m².      Intake/Output Summary (Last 24 hours) at 05/07/18 0648  Last data filed at 05/07/18 0600   Gross per 24 hour   Intake             3781 ml   Output             4626 ml   Net             -845 ml       Physical Exam   Constitutional: He is oriented to person, place, and time. He appears well-developed and well-nourished.   HENT:   Head: Normocephalic and atraumatic.   Nose: Nose normal.   Eyes: Conjunctivae are normal. Pupils are equal, round, and reactive to light. No scleral icterus.   Cardiovascular: Normal rate and regular rhythm.  Exam reveals no gallop and no friction rub.    No murmur heard.  Pulmonary/Chest: Effort normal and breath sounds normal.   Abdominal: Soft. There is tenderness.   Midline incision c/d/i  Transverse colostomy pink   Musculoskeletal: Normal range of motion.   Neurological: He is alert and oriented to person, place, and time.   Skin: Skin is warm and dry. No rash noted.       Lines/Drains/Airways     Central Venous Catheter Line                 Port A Cath Single Lumen 05/02/18 2101 left subclavian 4 days         Percutaneous Central Line  Insertion/Assessment - triple lumen  05/03/18 1030 right internal jugular 3 days          Drain                 Drain/Device  Right -- days         Urethral Catheter 05/03/18 0615 Straight-tip;Non-latex 16 Fr. 4 days         Colostomy 05/03/18 0715 RUQ 3 days         NG/OG Tube 05/04/18 nasogastric Right nostril 3 days         Closed/Suction Drain 05/04/18 0834 Abdomen Bulb 15 Fr. 2 days         Closed/Suction Drain 05/04/18 0834 Anterior Abdomen Bulb 15 Fr. 2 days         Closed/Suction Drain 05/04/18 0854 Medial Abdomen Other (Comment) 2 days          Arterial Line                 Arterial Line 05/03/18 0615 Left Radial 4 days          Pressure Ulcer                 Negative Pressure Wound Therapy  2 days          Surgical Packing                 Surgical Packing 3 days          Peripheral Intravenous Line                 Peripheral IV - Single Lumen 05/02/18 2024 Left Antecubital 4 days         Peripheral IV - Single Lumen 05/03/18 0619 Right Hand 4 days                Significant Labs:    CBC/Anemia Profile:    Recent Labs  Lab 05/06/18  0804 05/06/18  1610 05/07/18  0400   WBC 6.37 7.29 6.75   HGB 9.1* 8.3* 8.6*   HCT 29.0* 26.2* 27.3*   PLT 79* 67* 70*   MCV 83 83 84   RDW 17.8* 17.5* 17.9*        Chemistries:    Recent Labs  Lab 05/06/18  0804 05/06/18  1610 05/07/18  0400    143 144   K 4.4 3.5 4.0   * 110 108   CO2 24 27 28   BUN 12 15 18   CREATININE 0.7 0.7 0.6   CALCIUM 8.3* 7.8* 7.8*   ALBUMIN 1.6* 1.7* 1.7*   PROT 4.9* 4.5* 5.0*   BILITOT 1.2* 1.0 0.7   ALKPHOS 54* 48* 55   ALT 17 16 14   AST 24 16 15   MG 2.1 1.8 2.2   PHOS 2.9 2.6* 3.0       Coagulation:   Recent Labs  Lab 05/06/18  1610   INR 1.1   APTT 21.1     Lactic Acid:   Recent Labs  Lab 05/06/18  0000 05/06/18  0804 05/06/18  1610   LACTATE 1.4 1.8 0.8       Significant Imaging:  I have reviewed and interpreted all pertinent imaging results/findings within the past 24 hours.

## 2018-05-07 NOTE — PROGRESS NOTES
[facility]  General Surgery  Progress Note    Subjective:     History of Present Illness:  No notes on file    Post-Op Info:  Procedure(s) (LRB):  EXPLORATORY-LAPAROTOMY (N/A)  WASHOUT-ABDOMINAL (N/A)  CLOSURE-WOUND (N/A)   3 Days Post-Op     Interval History/Significant Events: No acute events overnight. Afebrile. Vital signs stable off pressors since yesterday. Pain well controlled on PCA. Remains. Garcia in place, UOP adeqaute. Having ostomy output. Patient states he has had nausea off and on, several episodes of emesis yesterday during the day. Anxious overnight with some hallucinations per nursing.    Follow-up For: Procedure(s) (LRB):  EXPLORATORY-LAPAROTOMY (N/A)  WASHOUT-ABDOMINAL (N/A)  CLOSURE-WOUND (N/A)    Post-Operative Day: 3 Days Post-Op    Objective:     Vital Signs (Most Recent):  Temp: 98.2 °F (36.8 °C) (05/07/18 0300)  Pulse: (!) 55 (05/07/18 0600)  Resp: (!) 23 (05/07/18 0600)  BP: 108/64 (05/07/18 0600)  SpO2: (!) 94 % (05/07/18 0600) Vital Signs (24h Range):  Temp:  [97.7 °F (36.5 °C)-98.6 °F (37 °C)] 98.2 °F (36.8 °C)  Pulse:  [47-80] 55  Resp:  [12-28] 23  SpO2:  [94 %-100 %] 94 %  BP: (108-142)/(60-78) 108/64  Arterial Line BP: (105-152)/(50-75) 115/54     Weight: 117.1 kg (258 lb 2.5 oz)  Body mass index is 38.12 kg/m².      Intake/Output Summary (Last 24 hours) at 05/07/18 0659  Last data filed at 05/07/18 0600   Gross per 24 hour   Intake             3781 ml   Output             4626 ml   Net             -845 ml       Physical Exam   Constitutional: He is oriented to person, place, and time. He appears well-developed and well-nourished.   HENT:   Head: Normocephalic and atraumatic.   Nose: Nose normal.   Eyes: Conjunctivae are normal. Pupils are equal, round, and reactive to light. No scleral icterus.   Cardiovascular: Normal rate and regular rhythm.  Exam reveals no gallop and no friction rub.    No murmur heard.  Pulmonary/Chest: Effort normal and breath sounds  normal.   Abdominal: Soft. There is tenderness.   Midline incision c/d/i  Transverse colostomy pink   Musculoskeletal: Normal range of motion.   Neurological: He is alert and oriented to person, place, and time.   Skin: Skin is warm and dry. No rash noted.   Nursing note and vitals reviewed.      Lines/Drains/Airways     Central Venous Catheter Line                 Port A Cath Single Lumen 05/02/18 2105 left subclavian 4 days         Percutaneous Central Line Insertion/Assessment - triple lumen  05/03/18 1030 right internal jugular 3 days          Drain                 Drain/Device  Right -- days         Urethral Catheter 05/03/18 0615 Straight-tip;Non-latex 16 Fr. 4 days         Colostomy 05/03/18 0715 RUQ 3 days         NG/OG Tube 05/04/18 nasogastric Right nostril 3 days         Closed/Suction Drain 05/04/18 0834 Abdomen Bulb 15 Fr. 2 days         Closed/Suction Drain 05/04/18 0834 Anterior Abdomen Bulb 15 Fr. 2 days         Closed/Suction Drain 05/04/18 0854 Medial Abdomen Other (Comment) 2 days          Arterial Line                 Arterial Line 05/03/18 0615 Left Radial 4 days          Pressure Ulcer                 Negative Pressure Wound Therapy  2 days          Surgical Packing                 Surgical Packing 3 days          Peripheral Intravenous Line                 Peripheral IV - Single Lumen 05/02/18 2024 Left Antecubital 4 days         Peripheral IV - Single Lumen 05/03/18 0619 Right Hand 4 days                Significant Labs:    CBC/Anemia Profile:    Recent Labs  Lab 05/06/18  0804 05/06/18  1610 05/07/18  0400   WBC 6.37 7.29 6.75   HGB 9.1* 8.3* 8.6*   HCT 29.0* 26.2* 27.3*   PLT 79* 67* 70*   MCV 83 83 84   RDW 17.8* 17.5* 17.9*        Chemistries:    Recent Labs  Lab 05/06/18  0804 05/06/18  1610 05/07/18  0400    143 144   K 4.4 3.5 4.0   * 110 108   CO2 24 27 28   BUN 12 15 18   CREATININE 0.7 0.7 0.6   CALCIUM 8.3* 7.8* 7.8*   ALBUMIN 1.6* 1.7* 1.7*   PROT 4.9* 4.5* 5.0*    BILITOT 1.2* 1.0 0.7   ALKPHOS 54* 48* 55   ALT 17 16 14   AST 24 16 15   MG 2.1 1.8 2.2   PHOS 2.9 2.6* 3.0       Coagulation:     Recent Labs  Lab 05/06/18  1610   INR 1.1   APTT 21.1     Lactic Acid:     Recent Labs  Lab 05/06/18  0000 05/06/18  0804 05/06/18  1610   LACTATE 1.4 1.8 0.8       Significant Imaging:  I have reviewed and interpreted all pertinent imaging results/findings within the past 24 hours.    Assessment/Plan:     * Perforation of sigmoid colon    Plan:     Neuro:   - dilaudid PCA for pain  - hallucinations overnight with some agitation, delirium precautions in the setting of ICU delirium     Pulmonary:   - extubated on 2L NC, continue to wean as tolerated     Cardiac:  -H/o CAD s/p 4 vessel CABG   -MAP goal >65  - HDS off pressors since yesterday  -hydrocortisone 100mg IV q8h given chronic steroid use  - d/c satya today     Renal:   -Garcia in place  -UOP adequate, continue to monitor & bolus prn  -Bun/Cr 18/0.6      Fluids/Electrolytes/Nutrition/GI:   -Nutritional status: NPO  -replace lytes PRN  -CMP daily  -GI ulcer prophylaxis: pantoprazole     Hematology/Oncology:  -H/H stable now 8.6/27.3 from 8.3/26.2  -INR pending this morning, Plts 70  - CBC daily, continue to trend and transfuse as needed     Infectious Disease:   -Afebrile since return from OR  - wound vac in place in midline  -WBC within normal limits at 6.74  - Blood cultures drawn in ED, growing gram negative rods, repeat blood cultures on 5/4 show NGTD  -Abx: continue on Cefepime, Flagyl     Endocrine:  -Glucose goal of 120-180  -SSI      Dispo:  - Patient doing well, stable for stepdown- will discuss with staff possibly stepping down today as well as possibly removing NGT  - d/c satya  - wound vac change today              Jinny Martinez MD  General Surgery  [facility]

## 2018-05-07 NOTE — PLAN OF CARE
Patient extubated yesterday and Ingris discontinued today (POD# 3).  Patient may be ready to step down today and is expected to discharge home with home health later in the week.  Will continue to follow for needs.       05/07/18 1308   Discharge Reassessment   Assessment Type Discharge Planning Reassessment   Discharge Plan A Home with family;Home Health

## 2018-05-07 NOTE — ASSESSMENT & PLAN NOTE
Contributing Nutrition Diagnosis  Altered GI Function    Related to (etiology):   Perforation of sigmoid colon    Signs and Symptoms (as evidenced by):   NPO and need for TPN to meet EEN     Nutrition Diagnosis Status:   New

## 2018-05-07 NOTE — PROGRESS NOTES
[facility]  Critical Care - Surgery  Progress Note    Patient Name: Cristo Jefferson  MRN: [id number]  Admission Date: 5/3/2018  Hospital Length of Stay: 4 days  Code Status: Full Code  Attending Provider: Eber Riley MD  Primary Care Provider: Vince Perez MD   Principal Problem: Perforation of sigmoid colon    Subjective:     Interval History/Significant Events: No acute events overnight, afebrile, vital signs stable. Pain controlled with PCA. Remains HDS off pressors. UOP adeqaute (1.3cc/kg/hr). + ostomy output.     Follow-up For: Procedure(s) (LRB):  EXPLORATORY-LAPAROTOMY (N/A)  WASHOUT-ABDOMINAL (N/A)  CLOSURE-WOUND (N/A)    Post-Operative Day: 3 Days Post-Op    Objective:     Vital Signs (Most Recent):  Temp: 98.2 °F (36.8 °C) (05/07/18 0300)  Pulse: (!) 55 (05/07/18 0600)  Resp: (!) 23 (05/07/18 0600)  BP: 108/64 (05/07/18 0600)  SpO2: (!) 94 % (05/07/18 0600) Vital Signs (24h Range):  Temp:  [97.7 °F (36.5 °C)-98.6 °F (37 °C)] 98.2 °F (36.8 °C)  Pulse:  [47-80] 55  Resp:  [12-28] 23  SpO2:  [94 %-100 %] 94 %  BP: (108-142)/(60-78) 108/64  Arterial Line BP: (105-152)/(50-75) 115/54     Weight: 117.1 kg (258 lb 2.5 oz)  Body mass index is 38.12 kg/m².      Intake/Output Summary (Last 24 hours) at 05/07/18 0648  Last data filed at 05/07/18 0600   Gross per 24 hour   Intake             3781 ml   Output             4626 ml   Net             -845 ml       Physical Exam   Constitutional: He is oriented to person, place, and time. He appears well-developed and well-nourished.   HENT:   Head: Normocephalic and atraumatic.   Nose: Nose normal.   Eyes: Conjunctivae are normal. Pupils are equal, round, and reactive to light. No scleral icterus.   Cardiovascular: Normal rate and regular rhythm.  Exam reveals no gallop and no friction rub.    No murmur heard.  Pulmonary/Chest: Effort normal and breath sounds normal.   Abdominal: Soft. There is tenderness.   Midline incision c/d/i  Transverse  colostomy pink   Musculoskeletal: Normal range of motion.   Neurological: He is alert and oriented to person, place, and time.   Skin: Skin is warm and dry. No rash noted.       Lines/Drains/Airways     Central Venous Catheter Line                 Port A Cath Single Lumen 05/02/18 2105 left subclavian 4 days         Percutaneous Central Line Insertion/Assessment - triple lumen  05/03/18 1030 right internal jugular 3 days          Drain                 Drain/Device  Right -- days         Urethral Catheter 05/03/18 0615 Straight-tip;Non-latex 16 Fr. 4 days         Colostomy 05/03/18 0715 RUQ 3 days         NG/OG Tube 05/04/18 nasogastric Right nostril 3 days         Closed/Suction Drain 05/04/18 0834 Abdomen Bulb 15 Fr. 2 days         Closed/Suction Drain 05/04/18 0834 Anterior Abdomen Bulb 15 Fr. 2 days         Closed/Suction Drain 05/04/18 0854 Medial Abdomen Other (Comment) 2 days          Arterial Line                 Arterial Line 05/03/18 0615 Left Radial 4 days          Pressure Ulcer                 Negative Pressure Wound Therapy  2 days          Surgical Packing                 Surgical Packing 3 days          Peripheral Intravenous Line                 Peripheral IV - Single Lumen 05/02/18 2024 Left Antecubital 4 days         Peripheral IV - Single Lumen 05/03/18 0619 Right Hand 4 days                Significant Labs:    CBC/Anemia Profile:    Recent Labs  Lab 05/06/18  0804 05/06/18  1610 05/07/18  0400   WBC 6.37 7.29 6.75   HGB 9.1* 8.3* 8.6*   HCT 29.0* 26.2* 27.3*   PLT 79* 67* 70*   MCV 83 83 84   RDW 17.8* 17.5* 17.9*        Chemistries:    Recent Labs  Lab 05/06/18  0804 05/06/18  1610 05/07/18  0400    143 144   K 4.4 3.5 4.0   * 110 108   CO2 24 27 28   BUN 12 15 18   CREATININE 0.7 0.7 0.6   CALCIUM 8.3* 7.8* 7.8*   ALBUMIN 1.6* 1.7* 1.7*   PROT 4.9* 4.5* 5.0*   BILITOT 1.2* 1.0 0.7   ALKPHOS 54* 48* 55   ALT 17 16 14   AST 24 16 15   MG 2.1 1.8 2.2   PHOS 2.9 2.6* 3.0        Coagulation:   Recent Labs  Lab 05/06/18  1610   INR 1.1   APTT 21.1     Lactic Acid:   Recent Labs  Lab 05/06/18  0000 05/06/18  0804 05/06/18  1610   LACTATE 1.4 1.8 0.8       Significant Imaging:  I have reviewed and interpreted all pertinent imaging results/findings within the past 24 hours.    Assessment/Plan:     * Perforation of sigmoid colon    Plan:     Neuro:   - Alert and oriented off sedation  - PCA for pain control     Pulmonary:   - Extubated yesterday, saturating well on 2L NC  - ABGs prn     Cardiac:  -H/o CAD s/p 4 vessel CABG   -MAP goal >65  -HDS off pressors  -hydrocortisone 100mg IV q8h given acutely, weaning, now on 50mg q8 given chronic steroid use     Renal:   -Garcia in place  -UOP adequate, continue to monitor & bolus prn  -Bun/Cr 18/0.6     Fluids/Electrolytes/Nutrition/GI:   -Nutritional status: NPO  -replace lytes PRN  -TPN  -CMP daily  -GI ulcer prophylaxis     Hematology/Oncology:  -H/H stable; 8.6/27.3  -INR/Plts 1.4, platelets 70  - CBC daily     Infectious Disease:   -Afebrile since return from OR  -WBC 6.75  -Cultures drawn in ED, with E coli  -Abx: Cefepime, Flaygl     Endocrine:  -Glucose goal of 120-180  -Insulin gtt      Dispo:  -Continue care in the ICU setting  - S/p ex-lap and closure on 5/4; will discuss possible step down with primary team            Critical care was time spent personally by me on the following activities: development of treatment plan with patient or surrogate and bedside caregivers, discussions with consultants, evaluation of patient's response to treatment, examination of patient, ordering and performing treatments and interventions, ordering and review of laboratory studies, ordering and review of radiographic studies, pulse oximetry, re-evaluation of patient's condition.  This critical care time did not overlap with that of any other provider or involve time for any procedures.     Belén Byrd MD  Critical Care - Surgery  [facility]  [place]

## 2018-05-07 NOTE — PLAN OF CARE
Problem: Patient Care Overview  Goal: Individualization & Mutuality  Admit Dx: s/p Exploratory Laparotomy w/ sigmoidectomy/colostomy 2/2 sigmoid perforation    PMHx: HTN, CAD, s/p CABG, DM2, Colon Perf, Appendiceal CA, RA    PSHx: Appendectomy, EVAR for AAA (2017), CABG, Right colectomy    5/2- Patient presented to ED c/o abdominal pain, febrile, tachycardic, and hypotensive. CT revealed sigmoid perforation w/ fluid and abscess collection.   5/3- Class A to OR for ex lap, colectomy, admitted to SICU, 911L LR, lined up, 3 U PRBCs, 1 U PLT  5/4: OR washout with washout and closure with wound vac; 2L LR; 2 units PRBC  5/5: 2 units PRBC; Lasix  5/6: extubated  Nursing:  MAP >65  Accu checks q 4  Outcome: Ongoing (interventions implemented as appropriate)  Vs & assessment as documented. Pt AAOx3. On O2@2Lvia NC. Pain control with Dilaudid as needed. Midline wound vac changed today by MD. UO adequate. Pt OOB to chair today. Tolerated well  All questions and concerns answered and acknowledged. Transfer orders in. Awaiting bed assignement. VSS at this time, will continue to monitor.

## 2018-05-07 NOTE — ASSESSMENT & PLAN NOTE
Plan:     Neuro:   - dilaudid PCA for pain  - hallucinations overnight with some agitation, delirium precautions in the setting of ICU delirium     Pulmonary:   - extubated on 2L NC, continue to wean as tolerated     Cardiac:  -H/o CAD s/p 4 vessel CABG   -MAP goal >65  - HDS off pressors since yesterday  -hydrocortisone 100mg IV q8h given chronic steroid use  - d/c satya today     Renal:   -Garcia in place  -UOP adequate, continue to monitor & bolus prn  -Bun/Cr 18/0.6      Fluids/Electrolytes/Nutrition/GI:   -Nutritional status: NPO  -replace lytes PRN  -CMP daily  -GI ulcer prophylaxis: pantoprazole     Hematology/Oncology:  -H/H stable now 8.6/27.3 from 8.3/26.2  -INR pending this morning, Plts 70  - CBC daily, continue to trend and transfuse as needed     Infectious Disease:   -Afebrile since return from OR  - wound vac in place in midline  -WBC within normal limits at 6.74  - Blood cultures drawn in ED, growing gram negative rods, repeat blood cultures on 5/4 show NGTD  -Abx: continue on Cefepime, Flagyl     Endocrine:  -Glucose goal of 120-180  -SSI      Dispo:  - Patient doing well, stable for stepdown- will discuss with staff possibly stepping down today as well as possibly removing NGT  - d/c satya  - wound vac change today

## 2018-05-07 NOTE — PLAN OF CARE
Problem: Patient Care Overview  Goal: Plan of Care Review  Recommendations  Recommendation/Intervention:   Current TPN not meeting EEN, recommend modifying to Custom TPN 150g AA and 325g Dextrose + IV lipids daily - to provide 2205 kcal/day and 150 g protein/day (GIR = 1.93 mg/kg/min).   RD to monitor.    Goals: Patient to meet > 85% EEN and EPN  Nutrition Goal Status: new    Full assessment completed, see RD Note 5/7/2018.

## 2018-05-07 NOTE — PROGRESS NOTES
" [facility]  Adult Nutrition  Progress Note    SUMMARY       Recommendations  Recommendation/Intervention:   Current TPN not meeting EEN, recommend modifying to Custom TPN 150g AA and 325g Dextrose + IV lipids daily - to provide 2205 kcal/day and 150 g protein/day (GIR = 1.93 mg/kg/min).   RD to monitor.    Goals: Patient to meet > 85% EEN and EPN  Nutrition Goal Status: new  Communication of RD Recs:  (POC)    Reason for Assessment  Reason for Assessment: new TPN  Diagnosis: gastrointestinal disease (perforation of sigmoid colon)  Relevant Medical History: appendiceal cancer s/p appendectomy, s/p R hemicolectomy & chemo/radiation 3/2017, perforated diverticulitis w/p ex lap & ileostomy 11/2017, ileostomy takedown 4/2018), DM2, HTN, CAD s/p CABG  General Information Comments: S/p ex lap, MARY, drainage of pelvic abscess, and colostomy 5/3. Extubated 5/6. On TPN + lipids.  Nutrition Discharge Planning: Unable to determine at this time.    Nutrition/Diet History  Food Preferences: N/A  Factors Affecting Nutritional Intake: NPO, altered gastrointestinal function    Anthropometrics  Temp: 99 °F (37.2 °C)  Height Method: Stated  Height: (P) 5' 9" (175.3 cm) (reviewed)  Height (inches): (P) 69 in  Weight Method: Bed Scale  Weight: 117.1 kg (258 lb 2.5 oz)  Weight (lb): 258.16 lb  Ideal Body Weight (IBW), Male: (P) 160 lb  % Ideal Body Weight, Male (lb): (P) 161.35 lb  BMI (Calculated): (P) 38.2  BMI Grade: 35 - 39.9 - obesity - grade II    Lab/Procedures/Meds  Pertinent Labs Reviewed: reviewed  Pertinent Labs Comments: Glu 166, POCT Glu 134-187, HgbA1c 5.6, Ca 7.8, Alb 1.7  Pertinent Medications Reviewed: reviewed  Pertinent Medications Comments: pantoprazole, insulin drip    Physical Findings/Assessment  Overall Physical Appearance: on oxygen therapy  Tubes: nasogastric tube, other (see comments) (colostomy)  Oral/Mouth Cavity: tooth/teeth missing  Skin: edema, incision(s) (w/wound " vac)    Estimated/Assessed Needs  Weight Used For Calorie Calculations: 117.1 kg (258 lb 2.5 oz)  Energy Calorie Requirements (kcal): 2371 kcal/day  Energy Need Method: [place]-St Jeor (x 1.2)  Protein Requirements: 141-164 g/day (1.2-1.4 g/kg)  Weight Used For Protein Calculations: 117.1 kg (258 lb 2.5 oz)  Fluid Requirements (mL): 1 mL/kcal or per MD  Fluid Need Method: RDA Method  RDA Method (mL): 2371    Nutrition Prescription Ordered  Current Diet Order: NPO  Current Nutrition Support Formula Ordered:  (Custom TPN 150g AA and 150g Dextrose + lipids)  Current Nutrition Support Rate Ordered: 58 (ml)  Current Nutrition Support Frequency Ordered: mL/hr    Evaluation of Received Nutrient/Fluid Intake  Parenteral Calories (kcal): 1110  Parenteral Protein (gm): 150  Parenteral Fluid (mL): 1392  Lipid Calories (kcals): 500 kcals  GIR (Glucose Infusion Rate) (mg/kg/min): 0.89 mg/kg/min  Total Calories (kcal): 1610  % Kcal Needs: 68  % Protein Needs: 100  I/O: +8.3L since admit, good UOP  Energy Calories Required: not meeting needs  Protein Required: meeting needs  Fluid Required:  (per MD)  Comments: Minimal ostomy output noted  Tolerance: tolerating  % Intake of Estimated Energy Needs: 50 - 75 %  % Meal Intake: NPO    Nutrition Risk  Level of Risk/Frequency of Follow-up: moderate (1x/week)     Assessment and Plan  * Perforation of sigmoid colon    Contributing Nutrition Diagnosis  Altered GI Function    Related to (etiology):   Perforation of sigmoid colon    Signs and Symptoms (as evidenced by):   NPO and need for TPN to meet EEN     Nutrition Diagnosis Status:   New          Monitor and Evaluation  Food and Nutrient Intake: energy intake, parenteral nutrition intake  Food and Nutrient Adminstration: enteral and parenteral nutrition administration  Anthropometric Measurements: weight, weight change  Biochemical Data, Medical Tests and Procedures: electrolyte and renal panel, gastrointestinal profile, glucose/endocrine  profile, inflammatory profile, lipid profile  Nutrition-Focused Physical Findings: overall appearance     Nutrition Follow-Up  RD Follow-up?: Yes

## 2018-05-07 NOTE — PLAN OF CARE
Problem: Patient Care Overview  Goal: Plan of Care Review  Outcome: Ongoing (interventions implemented as appropriate)  POC reviewed with pt and spouse at bedside, VSS, no pressors, 2L O2 per n.c., pain well controlled with PCA pump, intermittent nausea with spitting, UOP 30-40 mL/hr, 1 BM, assisted with repositioning. All questions answered and addressed, all am lab results reviewed.

## 2018-05-07 NOTE — PLAN OF CARE
Problem: Physical Therapy Goal  Goal: Physical Therapy Goal  Outcome: Ongoing (interventions implemented as appropriate)  PT alonso completed.  Lori Perry, PT  5/7/2018

## 2018-05-07 NOTE — PT/OT/SLP EVAL
Occupational Therapy   Evaluation    Name: Cristo Jefferson  MRN: [id number]  Admitting Diagnosis:  Perforation of sigmoid colon 3 Days Post-Op (Ex-lap)    Recommendations:     Discharge Recommendations: Recommend HHOT/HHPT and spouse to assist  Discharge Equipment Recommendations:  Shower chair  Barriers to discharge:   none    History:     Occupational Profile:  Living Environment: Pt lives at home with spouse and adult son and 3 SHANNON and tub shower  Previous level of function: Pt was independent with self care and functional mobility.  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, completed chemo    Depression     Diabetes mellitus type I     Hyperlipidemia     Hypertension     On home O2        Past Surgical History:   Procedure Laterality Date    APPENDECTOMY      COLONOSCOPY N/A 3/6/2018    Procedure: COLONOSCOPY;  Surgeon: Yfn Martinez MD;  Location: [facility] ([address] [place] [address]);  Service: Endoscopy;  Laterality: N/A;   See telephone encounter dated 1/30/18/pt on Plavix/neither PCP or cardiologist will clear him to hold med without being evaluated/wife refusing to make appointment/per Dr Riley, pt to remain on Plavix and proceed with colonoscopy with no biposies. Spoke with wife on 2/8/1    EVAR for AAA      S/P EVAR for AAA in 2017    open heart       RIGHT COLECTOMY  2016       Subjective     Chief Complaint:  10/10 pain in abdomen  Patient/Family stated goals: Pt agreeable to progressing towards independence with ADLs and functional mobility.   Communicated with: RN prior to session.  Pain/Comfort:  · Pain Rating 1: 10/10  · Location 1: abdomen    Patients cultural, spiritual, Judaism conflicts given the current situation:  (none)    Objective:     Patient found with: blood pressure cuff, central line, RACHEL drain, pulse ox (continuous), telemetry, wound  vac    General Precautions: Standard,     Orthopedic Precautions:  none  Braces:       Occupational Performance:    Bed Mobility:    · Patient completed Scooting/Bridging with moderate assistance  · Patient completed Supine to Sit with moderate assistance  · Patient completed Sit to Supine with moderate assistance    Functional Mobility/Transfers:  · Patient completed Sit <> Stand Transfer with minimum assistance  with  no assistive device   · Patient completed Bed <> Chair Transfer using Step Transfer technique with minimum assistance with no assistive device  · Functional Mobility: Pt walked from bed to chair ~6 steps.    Activities of Daily Living:  · Feeding:  independence    · Grooming: not able to in standing today due to pain. Set up for seated EOB or in chair. Wife performed shaving this AM.   · Bathing: moderate assistance for UB/LB bathing due to   · UB Dressing: minimum assistance limited by lines and pain  · LB Dressing: total assistance for socks   · Toileting: minimum assistance (pt has weaver, would require min A for transfer to [facility])    Cognitive/Visual Perceptual:  Cognitive/Psychosocial Skills:     -       Oriented to: Person, Place, Time and Situation   -       Follows Commands/attention:Follows one-step commands  -       Safety awareness/insight to disability: intact   Visual/Perceptual:      -Intact    Physical Exam:  Balance: -       needs CGA  Upper Extremity Range of Motion:     -       Right Upper Extremity: WFL  -       Left Upper Extremity: WFL  Upper Extremity Strength:    -       Right Upper Extremity: WFL  -       Left Upper Extremity: WFL  Fine Motor Coordination:    -       Intact  Gross motor coordination:   WFL  Neurological: -       intact    Patient left up in chair with all lines intact and call button in reach    AMPAC 6 Click:  AMPAC Total Score:  16    Treatment & Education:  · Educated on role of OT in acute care setting  · Discussed benefits of mobility to prevent secondary  "complications following surgery.  Education:    Assessment:     Cristo Jefferson is a 57 y.o. male with a medical diagnosis of Perforation of sigmoid colon.  He presents with decline in ADLs and functional mobility. Pt would benefit from skilled OT services in order to maximize independence with ADLs and facilitate safe discharge.  Performance deficits affecting function are pain, decline in ADLs and impaired safety with functional mobility.     Rehab Prognosis:  Good; patient would benefit from acute skilled OT services to address these deficits and reach maximum level of function.         Clinical Decision Makin.  OT Low:  "Pt evaluation falls under low complexity for evaluation coding due to performance deficits noted in 1-3 areas as stated above and 0 co-morbities affecting current functional status. Data obtained from problem focused assessments. No modifications or assistance was required for completion of evaluation. Only brief occupational profile and history review completed."     Plan:     Patient to be seen 4x/week to address the above listed problems via  self care management training, therapeutic exercises, therapeutic activities, neuromuscular reeducation.   · Plan of Care Expires:2018  · Plan of Care Reviewed with:  patient and spouse    This Plan of care has been discussed with the patient who was involved in its development and understands and is in agreement with the identified goals and treatment plan    GOALS:    Occupational Therapy Goals        Problem: Occupational Therapy Goal    Goal Priority Disciplines Outcome Interventions   Occupational Therapy Goal     OT, PT/OT     Description:  Goals to be met by: 2018    Patient will increase functional independence with ADLs by performing:    UE Dressing with Modified [place].  LE Dressing with Modified [place].  Grooming while standing with Stand-by Assistance.  Toileting from toilet with Supervision for hygiene and clothing " management.   Toilet transfer to toilet with Minimal Assistance.                      Time Tracking:     OT Date of Treatment: 05/07/18  OT Start Time: 1121  OT Stop Time: 1135  OT Total Time (min): 14 min    Billable Minutes:Evaluation 14    NAINA Mccartney  5/7/2018

## 2018-05-07 NOTE — SUBJECTIVE & OBJECTIVE
Interval History/Significant Events: No acute events overnight. Afebrile. Vital signs stable off pressors since yesterday. Pain well controlled on PCA. Remains. Garcia in place, UOP adeqaute. Having ostomy output. Patient states he has had nausea off and on, several episodes of emesis yesterday during the day. Anxious overnight with some hallucinations per nursing.    Follow-up For: Procedure(s) (LRB):  EXPLORATORY-LAPAROTOMY (N/A)  WASHOUT-ABDOMINAL (N/A)  CLOSURE-WOUND (N/A)    Post-Operative Day: 3 Days Post-Op    Objective:     Vital Signs (Most Recent):  Temp: 98.2 °F (36.8 °C) (05/07/18 0300)  Pulse: (!) 55 (05/07/18 0600)  Resp: (!) 23 (05/07/18 0600)  BP: 108/64 (05/07/18 0600)  SpO2: (!) 94 % (05/07/18 0600) Vital Signs (24h Range):  Temp:  [97.7 °F (36.5 °C)-98.6 °F (37 °C)] 98.2 °F (36.8 °C)  Pulse:  [47-80] 55  Resp:  [12-28] 23  SpO2:  [94 %-100 %] 94 %  BP: (108-142)/(60-78) 108/64  Arterial Line BP: (105-152)/(50-75) 115/54     Weight: 117.1 kg (258 lb 2.5 oz)  Body mass index is 38.12 kg/m².      Intake/Output Summary (Last 24 hours) at 05/07/18 0659  Last data filed at 05/07/18 0600   Gross per 24 hour   Intake             3781 ml   Output             4626 ml   Net             -845 ml       Physical Exam   Constitutional: He is oriented to person, place, and time. He appears well-developed and well-nourished.   HENT:   Head: Normocephalic and atraumatic.   Nose: Nose normal.   Eyes: Conjunctivae are normal. Pupils are equal, round, and reactive to light. No scleral icterus.   Cardiovascular: Normal rate and regular rhythm.  Exam reveals no gallop and no friction rub.    No murmur heard.  Pulmonary/Chest: Effort normal and breath sounds normal.   Abdominal: Soft. There is tenderness.   Midline incision c/d/i  Transverse colostomy pink   Musculoskeletal: Normal range of motion.   Neurological: He is alert and oriented to person, place, and time.   Skin: Skin is warm and dry. No rash noted.   Nursing  note and vitals reviewed.      Lines/Drains/Airways     Central Venous Catheter Line                 Port A Cath Single Lumen 05/02/18 2105 left subclavian 4 days         Percutaneous Central Line Insertion/Assessment - triple lumen  05/03/18 1030 right internal jugular 3 days          Drain                 Drain/Device  Right -- days         Urethral Catheter 05/03/18 0615 Straight-tip;Non-latex 16 Fr. 4 days         Colostomy 05/03/18 0715 RUQ 3 days         NG/OG Tube 05/04/18 nasogastric Right nostril 3 days         Closed/Suction Drain 05/04/18 0834 Abdomen Bulb 15 Fr. 2 days         Closed/Suction Drain 05/04/18 0834 Anterior Abdomen Bulb 15 Fr. 2 days         Closed/Suction Drain 05/04/18 0854 Medial Abdomen Other (Comment) 2 days          Arterial Line                 Arterial Line 05/03/18 0615 Left Radial 4 days          Pressure Ulcer                 Negative Pressure Wound Therapy  2 days          Surgical Packing                 Surgical Packing 3 days          Peripheral Intravenous Line                 Peripheral IV - Single Lumen 05/02/18 2024 Left Antecubital 4 days         Peripheral IV - Single Lumen 05/03/18 0619 Right Hand 4 days                Significant Labs:    CBC/Anemia Profile:    Recent Labs  Lab 05/06/18  0804 05/06/18  1610 05/07/18  0400   WBC 6.37 7.29 6.75   HGB 9.1* 8.3* 8.6*   HCT 29.0* 26.2* 27.3*   PLT 79* 67* 70*   MCV 83 83 84   RDW 17.8* 17.5* 17.9*        Chemistries:    Recent Labs  Lab 05/06/18  0804 05/06/18  1610 05/07/18  0400    143 144   K 4.4 3.5 4.0   * 110 108   CO2 24 27 28   BUN 12 15 18   CREATININE 0.7 0.7 0.6   CALCIUM 8.3* 7.8* 7.8*   ALBUMIN 1.6* 1.7* 1.7*   PROT 4.9* 4.5* 5.0*   BILITOT 1.2* 1.0 0.7   ALKPHOS 54* 48* 55   ALT 17 16 14   AST 24 16 15   MG 2.1 1.8 2.2   PHOS 2.9 2.6* 3.0       Coagulation:     Recent Labs  Lab 05/06/18  1610   INR 1.1   APTT 21.1     Lactic Acid:     Recent Labs  Lab 05/06/18  0000 05/06/18  0804 05/06/18  1617    LACTATE 1.4 1.8 0.8       Significant Imaging:  I have reviewed and interpreted all pertinent imaging results/findings within the past 24 hours.

## 2018-05-07 NOTE — ASSESSMENT & PLAN NOTE
Plan:     Neuro:   - Alert and oriented off sedation  - PCA for pain control     Pulmonary:   - Extubated yesterday, saturating well on 2L NC  - ABGs prn     Cardiac:  -H/o CAD s/p 4 vessel CABG   -MAP goal >65  -HDS off pressors  -hydrocortisone 100mg IV q8h given acutely, weaning, now on 50mg q8 given chronic steroid use     Renal:   -Garcia in place  -UOP adequate, continue to monitor & bolus prn  -Bun/Cr 18/0.6     Fluids/Electrolytes/Nutrition/GI:   -Nutritional status: NPO  -replace lytes PRN  -TPN  -CMP daily  -GI ulcer prophylaxis     Hematology/Oncology:  -H/H stable; 8.6/27.3  -INR/Plts 1.4, platelets 70  - CBC daily     Infectious Disease:   -Afebrile since return from OR  -WBC 6.75  -Cultures drawn in ED, with E coli  -Abx: Cefepime, Flaygl     Endocrine:  -Glucose goal of 120-180  -Insulin gtt      Dispo:  -Continue care in the ICU setting  - S/p ex-lap and closure on 5/4; will discuss possible step down with primary team

## 2018-05-07 NOTE — PT/OT/SLP EVAL
"Physical Therapy Evaluation    Patient Name:  Cristo Jefferson   MRN:  [id number]    Recommendations:     Discharge Recommendations:  home health PT   Discharge Equipment Recommendations: none   Barriers to discharge: None    Assessment:     Cristo Jefferson is a 57 y.o. male admitted with a medical diagnosis of Perforation of sigmoid colon.  He presents with the following impairments/functional limitations:  impaired functional mobilty, gait instability, impaired balance.    Rehab Prognosis:  fair; patient would benefit from acute skilled PT services to address these deficits and reach maximum level of function.      Recent Surgery: Procedure(s) (LRB):  EXPLORATORY-LAPAROTOMY (N/A)  WASHOUT-ABDOMINAL (N/A)  CLOSURE-WOUND (N/A) 3 Days Post-Op    Plan:     During this hospitalization, patient to be seen 4 x/week to address the above listed problems via gait training, therapeutic activities, therapeutic exercises      Plan of Care Reviewed with: patient    Subjective     Communicated with RN prior to session.  Patient found supine upon PT entry to room, agreeable to evaluation.      Chief Complaint: Pt agreeable to OOB.  "Go slow."  Pain/Comfort:  · Pain Rating 1: 10/10  · Location 1: abdomen    Living Environment:  Pt lives with wife in 1-story home with 3 SHANNON.  Prior to admission, patients level of function was independent.   Upon discharge, patient will have assistance from wife.    Objective:     Patient found with:  (pulse ox, RACHEL x 2, tele, Bp cuff, Garcia, wound VAC)     General Precautions: Standard,  (fall)   Orthopedic Precautions:N/A   Braces: N/A     Exams:  · Cognitive Exam:  Patient is oriented to Person, Place, Time and Situation and follows 100% of verbal commands     Functional Mobility:  · Bed Mobility:     · Supine to Sit: moderate assistance  · Transfers:     · Sit to Stand:  minimum assistance with no AD  · Gait: 5 feet with CGA and HHA  · Balance: fair    AM-PAC 6 CLICK MOBILITY  Total " Score:11     Patient left up in chair with all lines intact and call button in reach.    GOALS:    Physical Therapy Goals        Problem: Physical Therapy Goal    Goal Priority Disciplines Outcome Goal Variances Interventions   Physical Therapy Goal     PT/OT, PT Ongoing (interventions implemented as appropriate)     Description:  Goals to be met by:      Patient will increase functional independence with mobility by performin. Supine to sit with Modified [place]  2. Sit to supine with Modified [place]  3. Sit to stand transfer with Supervision  4. 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, completed chemo    Depression     Diabetes mellitus type I     Hyperlipidemia     Hypertension     On home O2        Past Surgical History:   Procedure Laterality Date    APPENDECTOMY      COLONOSCOPY N/A 3/6/2018    Procedure: COLONOSCOPY;  Surgeon: Yfn Martinez MD;  Location: [facility] ([address] [place] [address]);  Service: Endoscopy;  Laterality: N/A;   See telephone encounter dated 18/pt on Plavix/neither PCP or cardiologist will clear him to hold med without being evaluated/wife refusing to make appointment/per Dr Riley, pt to remain on Plavix and proceed with colonoscopy with no biposies. Spoke with wife on     EVAR for AAA      S/P EVAR for AAA in 2017    open heart       RIGHT COLECTOMY         Clinical Decision Making:     History  Co-morbidities and personal factors that may impact the plan of care Examination  Body Structures and Functions, activity limitations and participation restrictions that may impact the plan of care Clinical Presentation   Decision Making/ Complexity Score   Co-morbidities:   [] Time since onset of injury / illness / exacerbation  [] Status of current condition  []Patient's cognitive status and safety concerns    [] Multiple Medical Problems (see med  hx)  Personal Factors:   [] Patient's age  [] Prior Level of function   [] Patient's home situation (environment and family support)  [] Patient's level of motivation  [] Expected progression of patient      HISTORY:(criteria)    [] 91294 - no personal factors/history    [] 41882 - has 1-2 personal factor/comorbidity     [] 15654 - has >3 personal factor/comorbidity     Body Regions:  [] Objective examination findings  [] Head     []  Neck  [] Trunk   [] Upper Extremity  [] Lower Extremity    Body Systems:  [] For communication ability, affect, cognition, language, and learning style: the assessment of the ability to make needs known, consciousness, orientation (person, place, and time), expected emotional /behavioral responses, and learning preferences (eg, learning barriers, education  needs)  [] For the neuromuscular system: a general assessment of gross coordinated movement (eg, balance, gait, locomotion, transfers, and transitions) and motor function  (motor control and motor learning)  [] For the musculoskeletal system: the assessment of gross symmetry, gross range of motion, gross strength, height, and weight  [] For the integumentary system: the assessment of pliability(texture), presence of scar formation, skin color, and skin integrity  [] For cardiovascular/pulmonary system: the assessment of heart rate, respiratory rate, blood pressure, and edema     Activity limitations:    [] Patient's cognitive status and saf ety concerns          [] Status of current condition      [] Weight bearing restriction  [] Cardiopulmunary Restriction    Participation Restrictions:   [] Goals and goal agreement with the patient     [] Rehab potential (prognosis) and probable outcome      Examination of Body System: (criteria)    [] 79230 - addressing 1-2 elements    [] 97961 - addressing a total of 3 or more elements     [] 49466 -  Addressing a total of 4 or more elements         Clinical Presentation: (criteria)  Choose  one     On examination of body system using standardized tests and measures patient presents with (CHOOSE ONE) elements from any of the following: body structures and functions, activity limitations, and/or participation restrictions.  Leading to a clinical presentation that is considered (CHOOSE ONE)                              Clinical Decision Making  (Eval Complexity):  Choose One     Time Tracking:     PT Received On: 05/07/18  PT Start Time: 1100     PT Stop Time: 1115  PT Total Time (min): 15 min     Billable Minutes: Evaluation 15      Lori Perry, PT  05/07/2018

## 2018-05-08 LAB
ALBUMIN SERPL BCP-MCNC: 1.8 G/DL
ALP SERPL-CCNC: 116 U/L
ALT SERPL W/O P-5'-P-CCNC: 17 U/L
ANION GAP SERPL CALC-SCNC: 7 MMOL/L
ANION GAP SERPL CALC-SCNC: 7 MMOL/L
AST SERPL-CCNC: 25 U/L
BASOPHILS # BLD AUTO: 0.01 K/UL
BASOPHILS NFR BLD: 0.1 %
BILIRUB SERPL-MCNC: 0.6 MG/DL
BUN SERPL-MCNC: 24 MG/DL
BUN SERPL-MCNC: 24 MG/DL
CALCIUM SERPL-MCNC: 7.7 MG/DL
CALCIUM SERPL-MCNC: 7.7 MG/DL
CHLORIDE SERPL-SCNC: 109 MMOL/L
CHLORIDE SERPL-SCNC: 109 MMOL/L
CO2 SERPL-SCNC: 29 MMOL/L
CO2 SERPL-SCNC: 30 MMOL/L
CREAT SERPL-MCNC: 0.6 MG/DL
CREAT SERPL-MCNC: 0.7 MG/DL
DIFFERENTIAL METHOD: ABNORMAL
EOSINOPHIL # BLD AUTO: 0.1 K/UL
EOSINOPHIL NFR BLD: 0.7 %
ERYTHROCYTE [DISTWIDTH] IN BLOOD BY AUTOMATED COUNT: 18.2 %
EST. GFR  (AFRICAN AMERICAN): >60 ML/MIN/1.73 M^2
EST. GFR  (AFRICAN AMERICAN): >60 ML/MIN/1.73 M^2
EST. GFR  (NON AFRICAN AMERICAN): >60 ML/MIN/1.73 M^2
EST. GFR  (NON AFRICAN AMERICAN): >60 ML/MIN/1.73 M^2
GLUCOSE SERPL-MCNC: 132 MG/DL
GLUCOSE SERPL-MCNC: 185 MG/DL
HCT VFR BLD AUTO: 26.5 %
HGB BLD-MCNC: 8.4 G/DL
IMM GRANULOCYTES # BLD AUTO: 0.3 K/UL
IMM GRANULOCYTES NFR BLD AUTO: 4.4 %
LYMPHOCYTES # BLD AUTO: 1.6 K/UL
LYMPHOCYTES NFR BLD: 23.4 %
MAGNESIUM SERPL-MCNC: 1.9 MG/DL
MCH RBC QN AUTO: 26.9 PG
MCHC RBC AUTO-ENTMCNC: 31.7 G/DL
MCV RBC AUTO: 85 FL
MONOCYTES # BLD AUTO: 0.5 K/UL
MONOCYTES NFR BLD: 7.7 %
NEUTROPHILS # BLD AUTO: 4.4 K/UL
NEUTROPHILS NFR BLD: 63.7 %
NRBC BLD-RTO: 1 /100 WBC
PHOSPHATE SERPL-MCNC: 1.9 MG/DL
PLATELET # BLD AUTO: 84 K/UL
PMV BLD AUTO: 11.5 FL
POCT GLUCOSE: 119 MG/DL (ref 70–110)
POCT GLUCOSE: 135 MG/DL (ref 70–110)
POCT GLUCOSE: 137 MG/DL (ref 70–110)
POCT GLUCOSE: 166 MG/DL (ref 70–110)
POCT GLUCOSE: 175 MG/DL (ref 70–110)
POCT GLUCOSE: 182 MG/DL (ref 70–110)
POTASSIUM SERPL-SCNC: 3.1 MMOL/L
POTASSIUM SERPL-SCNC: 3.5 MMOL/L
PROT SERPL-MCNC: 5 G/DL
RBC # BLD AUTO: 3.12 M/UL
SODIUM SERPL-SCNC: 145 MMOL/L
SODIUM SERPL-SCNC: 146 MMOL/L
WBC # BLD AUTO: 6.85 K/UL

## 2018-05-08 PROCEDURE — 25000242 PHARM REV CODE 250 ALT 637 W/ HCPCS: Performed by: STUDENT IN AN ORGANIZED HEALTH CARE EDUCATION/TRAINING PROGRAM

## 2018-05-08 PROCEDURE — 63600175 PHARM REV CODE 636 W HCPCS: Performed by: SURGERY

## 2018-05-08 PROCEDURE — B4185 PARENTERAL SOL 10 GM LIPIDS: HCPCS | Performed by: STUDENT IN AN ORGANIZED HEALTH CARE EDUCATION/TRAINING PROGRAM

## 2018-05-08 PROCEDURE — 25000003 PHARM REV CODE 250: Performed by: STUDENT IN AN ORGANIZED HEALTH CARE EDUCATION/TRAINING PROGRAM

## 2018-05-08 PROCEDURE — 97530 THERAPEUTIC ACTIVITIES: CPT

## 2018-05-08 PROCEDURE — 80053 COMPREHEN METABOLIC PANEL: CPT

## 2018-05-08 PROCEDURE — 94640 AIRWAY INHALATION TREATMENT: CPT

## 2018-05-08 PROCEDURE — 63600175 PHARM REV CODE 636 W HCPCS: Performed by: STUDENT IN AN ORGANIZED HEALTH CARE EDUCATION/TRAINING PROGRAM

## 2018-05-08 PROCEDURE — 80048 BASIC METABOLIC PNL TOTAL CA: CPT

## 2018-05-08 PROCEDURE — A4217 STERILE WATER/SALINE, 500 ML: HCPCS | Performed by: STUDENT IN AN ORGANIZED HEALTH CARE EDUCATION/TRAINING PROGRAM

## 2018-05-08 PROCEDURE — 27000221 HC OXYGEN, UP TO 24 HOURS

## 2018-05-08 PROCEDURE — C9113 INJ PANTOPRAZOLE SODIUM, VIA: HCPCS | Performed by: SURGERY

## 2018-05-08 PROCEDURE — 83735 ASSAY OF MAGNESIUM: CPT

## 2018-05-08 PROCEDURE — 94761 N-INVAS EAR/PLS OXIMETRY MLT: CPT

## 2018-05-08 PROCEDURE — S0030 INJECTION, METRONIDAZOLE: HCPCS | Performed by: SURGERY

## 2018-05-08 PROCEDURE — 25000003 PHARM REV CODE 250: Performed by: SURGERY

## 2018-05-08 PROCEDURE — 85025 COMPLETE CBC W/AUTO DIFF WBC: CPT

## 2018-05-08 PROCEDURE — 84100 ASSAY OF PHOSPHORUS: CPT

## 2018-05-08 PROCEDURE — 20600001 HC STEP DOWN PRIVATE ROOM

## 2018-05-08 RX ORDER — IPRATROPIUM BROMIDE AND ALBUTEROL SULFATE 2.5; .5 MG/3ML; MG/3ML
3 SOLUTION RESPIRATORY (INHALATION) EVERY 4 HOURS PRN
Status: DISCONTINUED | OUTPATIENT
Start: 2018-05-08 | End: 2018-05-12 | Stop reason: HOSPADM

## 2018-05-08 RX ORDER — FUROSEMIDE 10 MG/ML
40 INJECTION INTRAMUSCULAR; INTRAVENOUS ONCE
Status: COMPLETED | OUTPATIENT
Start: 2018-05-08 | End: 2018-05-08

## 2018-05-08 RX ORDER — POTASSIUM CHLORIDE 7.45 MG/ML
10 INJECTION INTRAVENOUS
Status: COMPLETED | OUTPATIENT
Start: 2018-05-08 | End: 2018-05-08

## 2018-05-08 RX ORDER — ATORVASTATIN CALCIUM 10 MG/1
10 TABLET, FILM COATED ORAL DAILY
Status: DISCONTINUED | OUTPATIENT
Start: 2018-05-08 | End: 2018-05-12 | Stop reason: HOSPADM

## 2018-05-08 RX ORDER — IPRATROPIUM BROMIDE AND ALBUTEROL SULFATE 2.5; .5 MG/3ML; MG/3ML
3 SOLUTION RESPIRATORY (INHALATION) EVERY 6 HOURS PRN
Status: DISCONTINUED | OUTPATIENT
Start: 2018-05-08 | End: 2018-05-08

## 2018-05-08 RX ORDER — NAPROXEN SODIUM 220 MG/1
81 TABLET, FILM COATED ORAL DAILY
Status: DISCONTINUED | OUTPATIENT
Start: 2018-05-08 | End: 2018-05-12 | Stop reason: HOSPADM

## 2018-05-08 RX ORDER — INSULIN ASPART 100 [IU]/ML
1-10 INJECTION, SOLUTION INTRAVENOUS; SUBCUTANEOUS EVERY 4 HOURS PRN
Status: DISCONTINUED | OUTPATIENT
Start: 2018-05-08 | End: 2018-05-12 | Stop reason: HOSPADM

## 2018-05-08 RX ORDER — METRONIDAZOLE 500 MG/100ML
500 INJECTION, SOLUTION INTRAVENOUS
Status: DISCONTINUED | OUTPATIENT
Start: 2018-05-08 | End: 2018-05-09

## 2018-05-08 RX ORDER — CITALOPRAM 20 MG/1
40 TABLET, FILM COATED ORAL DAILY
Status: DISCONTINUED | OUTPATIENT
Start: 2018-05-08 | End: 2018-05-12 | Stop reason: HOSPADM

## 2018-05-08 RX ORDER — ENOXAPARIN SODIUM 100 MG/ML
40 INJECTION SUBCUTANEOUS EVERY 24 HOURS
Status: DISCONTINUED | OUTPATIENT
Start: 2018-05-08 | End: 2018-05-12 | Stop reason: HOSPADM

## 2018-05-08 RX ADMIN — CITALOPRAM HYDROBROMIDE 40 MG: 20 TABLET ORAL at 08:05

## 2018-05-08 RX ADMIN — CALCIUM GLUCONATE: 94 INJECTION, SOLUTION INTRAVENOUS at 10:05

## 2018-05-08 RX ADMIN — ENOXAPARIN SODIUM 40 MG: 100 INJECTION SUBCUTANEOUS at 06:05

## 2018-05-08 RX ADMIN — ASPIRIN 81 MG CHEWABLE TABLET 81 MG: 81 TABLET CHEWABLE at 08:05

## 2018-05-08 RX ADMIN — METRONIDAZOLE 500 MG: 500 INJECTION, SOLUTION INTRAVENOUS at 10:05

## 2018-05-08 RX ADMIN — POTASSIUM CHLORIDE 10 MEQ: 7.46 INJECTION, SOLUTION INTRAVENOUS at 12:05

## 2018-05-08 RX ADMIN — INSULIN ASPART 2 UNITS: 100 INJECTION, SOLUTION INTRAVENOUS; SUBCUTANEOUS at 01:05

## 2018-05-08 RX ADMIN — POTASSIUM PHOSPHATE, MONOBASIC AND POTASSIUM PHOSPHATE, DIBASIC 30 MMOL: 224; 236 INJECTION, SOLUTION, CONCENTRATE INTRAVENOUS at 12:05

## 2018-05-08 RX ADMIN — POTASSIUM CHLORIDE 10 MEQ: 7.46 INJECTION, SOLUTION INTRAVENOUS at 10:05

## 2018-05-08 RX ADMIN — HYDROCORTISONE SODIUM SUCCINATE 25 MG: 100 INJECTION, POWDER, FOR SOLUTION INTRAMUSCULAR; INTRAVENOUS at 02:05

## 2018-05-08 RX ADMIN — POTASSIUM CHLORIDE 10 MEQ: 7.46 INJECTION, SOLUTION INTRAVENOUS at 11:05

## 2018-05-08 RX ADMIN — ACETAMINOPHEN 1000 MG: 10 INJECTION, SOLUTION INTRAVENOUS at 02:05

## 2018-05-08 RX ADMIN — ATORVASTATIN CALCIUM 10 MG: 10 TABLET, FILM COATED ORAL at 08:05

## 2018-05-08 RX ADMIN — INSULIN ASPART 2 UNITS: 100 INJECTION, SOLUTION INTRAVENOUS; SUBCUTANEOUS at 06:05

## 2018-05-08 RX ADMIN — MAGNESIUM SULFATE HEPTAHYDRATE 1 G: 500 INJECTION, SOLUTION INTRAMUSCULAR; INTRAVENOUS at 09:05

## 2018-05-08 RX ADMIN — MICONAZOLE NITRATE: 20 CREAM TOPICAL at 09:05

## 2018-05-08 RX ADMIN — CEFTRIAXONE SODIUM 2 G: 2 INJECTION, POWDER, FOR SOLUTION INTRAMUSCULAR; INTRAVENOUS at 02:05

## 2018-05-08 RX ADMIN — HYDROMORPHONE HYDROCHLORIDE 0.5 MG: 1 INJECTION, SOLUTION INTRAMUSCULAR; INTRAVENOUS; SUBCUTANEOUS at 10:05

## 2018-05-08 RX ADMIN — MICONAZOLE NITRATE: 20 CREAM TOPICAL at 08:05

## 2018-05-08 RX ADMIN — INSULIN ASPART 1 UNITS: 100 INJECTION, SOLUTION INTRAVENOUS; SUBCUTANEOUS at 12:05

## 2018-05-08 RX ADMIN — IPRATROPIUM BROMIDE AND ALBUTEROL SULFATE 3 ML: .5; 3 SOLUTION RESPIRATORY (INHALATION) at 03:05

## 2018-05-08 RX ADMIN — HYDROCORTISONE SODIUM SUCCINATE 50 MG: 100 INJECTION, POWDER, FOR SOLUTION INTRAMUSCULAR; INTRAVENOUS at 06:05

## 2018-05-08 RX ADMIN — HYDROMORPHONE HYDROCHLORIDE 0.5 MG: 1 INJECTION, SOLUTION INTRAMUSCULAR; INTRAVENOUS; SUBCUTANEOUS at 06:05

## 2018-05-08 RX ADMIN — HEPARIN SODIUM 5000 UNITS: 5000 INJECTION, SOLUTION INTRAVENOUS; SUBCUTANEOUS at 06:05

## 2018-05-08 RX ADMIN — HYDROMORPHONE HYDROCHLORIDE 0.5 MG: 1 INJECTION, SOLUTION INTRAMUSCULAR; INTRAVENOUS; SUBCUTANEOUS at 02:05

## 2018-05-08 RX ADMIN — HYDROCORTISONE SODIUM SUCCINATE 25 MG: 100 INJECTION, POWDER, FOR SOLUTION INTRAMUSCULAR; INTRAVENOUS at 10:05

## 2018-05-08 RX ADMIN — FUROSEMIDE 40 MG: 10 INJECTION, SOLUTION INTRAMUSCULAR; INTRAVENOUS at 02:05

## 2018-05-08 RX ADMIN — IPRATROPIUM BROMIDE AND ALBUTEROL SULFATE 3 ML: .5; 3 SOLUTION RESPIRATORY (INHALATION) at 02:05

## 2018-05-08 RX ADMIN — METRONIDAZOLE 500 MG: 500 INJECTION, SOLUTION INTRAVENOUS at 06:05

## 2018-05-08 RX ADMIN — HYDROMORPHONE HYDROCHLORIDE 0.5 MG: 1 INJECTION, SOLUTION INTRAMUSCULAR; INTRAVENOUS; SUBCUTANEOUS at 08:05

## 2018-05-08 RX ADMIN — IPRATROPIUM BROMIDE AND ALBUTEROL SULFATE 3 ML: .5; 3 SOLUTION RESPIRATORY (INHALATION) at 08:05

## 2018-05-08 RX ADMIN — SOYBEAN OIL 250 ML: 20 INJECTION, SOLUTION INTRAVENOUS at 09:05

## 2018-05-08 RX ADMIN — ACETAMINOPHEN 1000 MG: 10 INJECTION, SOLUTION INTRAVENOUS at 06:05

## 2018-05-08 RX ADMIN — DEXTROSE 40 MG: 50 INJECTION, SOLUTION INTRAVENOUS at 08:05

## 2018-05-08 RX ADMIN — POTASSIUM CHLORIDE 10 MEQ: 7.46 INJECTION, SOLUTION INTRAVENOUS at 03:05

## 2018-05-08 RX ADMIN — POTASSIUM CHLORIDE 10 MEQ: 7.46 INJECTION, SOLUTION INTRAVENOUS at 02:05

## 2018-05-08 NOTE — PLAN OF CARE
Problem: Patient Care Overview  Goal: Plan of Care Review  Outcome: Ongoing (interventions implemented as appropriate)  VS stable. SpO2=92-95% on 2L NC. Garcia catheter with dany urine. Wound vac in place with dark serosanguineous fluid. Glucose stable. TPN at 65ml/hr. Bilateral RACHEL drain with serosanguineous fluid. C/o abdominal pain, relieved with IV Dilaudid. Will continue to monitor.

## 2018-05-08 NOTE — NURSING TRANSFER
Nursing Transfer Note      5/8/2018     Transfer From: SICU    Transfer via bed    Transfer with cardiac monitoring    Transported by nurse    Medicines sent: yes    Chart send with patient: Yes    Notified: spouse    Patient reassessed at: 05/07/18, 2200  Upon arrival to floor: patient oriented to room, call bell in reach and bed in lowest position

## 2018-05-08 NOTE — NURSING TRANSFER
Nursing Transfer Note      5/7/2018     Transfer To: 604 from 6067     Transfer via bed    Transfer with  O2, cardiac monitoring    Transported by RN and PCT     Medicines sent: yes     Chart send with patient: Yes    Notified: spouse    Patient reassessed at: 05/07/18 @ 2030    Upon arrival to floor: cardiac monitor applied, patient oriented to room, call bell in reach and bed in lowest position

## 2018-05-08 NOTE — HPI
Cristo Jefferson is a 57 y.o. male who presents to [facility] on 5/3/2018 as transfer for septic shock in setting of recurrent sigmoid perforation.     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 with complains of abdominal pain since about 2 PM yesterday after eating crawfish.  No nausea or vomiting.  Patient was found to be tachycardic and hypotensive and CT showed sigmoid perforation with adjacent fluid collection and abscess 5 cm in size not appearing well-contained or walled-off.  Patient was given IV Vancomycin and Zosyn and started on Dopamine gtt at OSH.  Febrile to 102.7 in ED and tachycardic in 110s and hypotensive on 0.1 of levophed.     The patient is recently s/p exploratory laparotomy and reversal of loop ileostomy on 4/4/2018 by Dr. Riley.  He was having a lot of difficulties with dehydration and was requiring daily LR infusions for fluid loss from ileostomy since it's creation in November.     The patient has previously undergone exploratory laparotomy and washout and drainage of sigmoid perforation with construction of loop ileostomy in November 2017 by Dr. Riley for sigmoid perforation from diverticulitis.  The patient went on to have colonoscopy and water soluble enema that looked good prior to reversal and he has overall been doing well up until yesterday which is when he started feeling bad.     He is on Plavix for history of CAD.     Currently on Prednisone 12.5 mg daily (5 mg in AM, 2.5 mg in afternoon, and 5 mg at night).

## 2018-05-08 NOTE — PROGRESS NOTES
[facility]  General Surgery  Progress Note    Subjective:     History of Present Illness:  Cristo Jefferson is a 57 y.o. male who presents to [facility] on 5/3/2018 as transfer for septic shock in setting of recurrent sigmoid perforation.     Patient has a past medical history of RA on immunosuppressants including prednisone 12.5 mg daily and previously Orencia (abatacept) which was stopped in November, DM II, HTN, CAD s/p CABG and appendiceal CA s/p appendectomy and subsequent R hemicolectomy and adjuvant chemo/XRT with last treatment in March 2017 who [place] [facility] [place] of [place] pain since about 2 PM yesterday after eating crawfish.  No nausea or vomiting.  Patient was found to be tachycardic and hypotensive and CT showed sigmoid perforation with adjacent fluid collection and abscess 5 cm in size not appearing well-contained or walled-off.  Patient was given IV Vancomycin and Zosyn and started on Dopamine gtt at OSH.  Febrile to 102.7 in ED and tachycardic in 110s and hypotensive on 0.1 of levophed.     The patient is recently s/p exploratory laparotomy and reversal of loop ileostomy on 4/4/2018 by Dr. Riley.  He was having a lot of difficulties with dehydration and was requiring daily LR infusions for fluid loss from ileostomy since it's creation in November.     The patient has previously undergone exploratory laparotomy and washout and drainage of sigmoid perforation with construction of loop ileostomy in November 2017 by Dr. Riley for sigmoid perforation from diverticulitis.  The patient went on to have colonoscopy and water soluble enema that looked good prior to reversal and he has overall been doing well up until yesterday which is when he started feeling bad.     He is on Plavix for history of CAD.     Currently on Prednisone 12.5 mg daily (5 mg in AM, 2.5 mg in afternoon, and 5 mg at night).    Post-Op Info:  Procedure(s) (LRB):  EXPLORATORY-LAPAROTOMY  (N/A)  WASHOUT-ABDOMINAL (N/A)  CLOSURE-WOUND (N/A)   4 Days Post-Op     Interval History/Significant Events: No acute events overnight. Stepped down from ICU overnight. TPN infusing. Pain well controlled on dilaudid pushes. Garcia in place, UOP adequate. Tolerating sips. No nausea/vomiting.    Follow-up For: Procedure(s) (LRB):  EXPLORATORY-LAPAROTOMY (N/A)  WASHOUT-ABDOMINAL (N/A)  CLOSURE-WOUND (N/A)    Post-Operative Day: 3 Days Post-Op    Objective:     Vital Signs (Most Recent):  Temp: 97.7 °F (36.5 °C) (05/08/18 0750)  Pulse: (!) 59 (05/08/18 0750)  Resp: 20 (05/08/18 0750)  BP: 139/69 (05/08/18 0750)  SpO2: (!) 92 % (05/08/18 0750) Vital Signs (24h Range):  Temp:  [97.5 °F (36.4 °C)-99 °F (37.2 °C)] 97.7 °F (36.5 °C)  Pulse:  [51-83] 59  Resp:  [14-36] 20  SpO2:  [87 %-97 %] 92 %  BP: (115-139)/(62-77) 139/69     Weight: 117.1 kg (258 lb 2.5 oz)  Body mass index is 38.12 kg/m² (pended).      Intake/Output Summary (Last 24 hours) at 05/08/18 0816  Last data filed at 05/08/18 0800   Gross per 24 hour   Intake           2538.8 ml   Output             1565 ml   Net            973.8 ml       Physical Exam   Constitutional: He is oriented to person, place, and time. He appears well-developed and well-nourished.   HENT:   Head: Normocephalic and atraumatic.   Nose: Nose normal.   Eyes: Conjunctivae are normal. Pupils are equal, round, and reactive to light. No scleral icterus.   Cardiovascular: Normal rate and regular rhythm.  Exam reveals no gallop and no friction rub.    No murmur heard.  Pulmonary/Chest: Effort normal and breath sounds normal.   Abdominal: Soft. There is tenderness.   Midline incision c/d/i  Transverse colostomy pink   Musculoskeletal: Normal range of motion.   Neurological: He is alert and oriented to person, place, and time.   Skin: Skin is warm and dry. No rash noted.   Nursing note and vitals reviewed.      Lines/Drains/Airways     Central Venous Catheter Line                 Port A Cath  Single Lumen 05/02/18 2105 left subclavian 5 days         Percutaneous Central Line Insertion/Assessment - triple lumen  05/03/18 1030 right internal jugular 4 days          Drain                 Drain/Device  Right -- days         Colostomy 05/03/18 0715 RUQ 5 days         Closed/Suction Drain 05/04/18 0834 Abdomen Bulb 15 Fr. 3 days         Closed/Suction Drain 05/04/18 0834 Anterior Abdomen Bulb 15 Fr. 3 days         Closed/Suction Drain 05/04/18 0854 Medial Abdomen Other (Comment) 3 days          Pressure Ulcer                 Negative Pressure Wound Therapy  3 days          Surgical Packing                 Surgical Packing 5 days                Significant Labs:    CBC/Anemia Profile:    Recent Labs  Lab 05/06/18  1610 05/07/18  0400 05/08/18  0500   WBC 7.29 6.75 6.85   HGB 8.3* 8.6* 8.4*   HCT 26.2* 27.3* 26.5*   PLT 67* 70* 84*   MCV 83 84 85   RDW 17.5* 17.9* 18.2*        Chemistries:    Recent Labs  Lab 05/06/18  1610 05/07/18  0400 05/08/18  0500    144 146*   K 3.5 4.0 3.1*    108 109   CO2 27 28 30*   BUN 15 18 24*   CREATININE 0.7 0.6 0.7   CALCIUM 7.8* 7.8* 7.7*   ALBUMIN 1.7* 1.7* 1.8*   PROT 4.5* 5.0* 5.0*   BILITOT 1.0 0.7 0.6   ALKPHOS 48* 55 116   ALT 16 14 17   AST 16 15 25   MG 1.8 2.2 1.9   PHOS 2.6* 3.0 1.9*       Coagulation:     Recent Labs  Lab 05/06/18  1610   INR 1.1   APTT 21.1     Lactic Acid:     Recent Labs  Lab 05/06/18  1610   LACTATE 0.8       Significant Imaging:  I have reviewed and interpreted all pertinent imaging results/findings within the past 24 hours.    Assessment/Plan:     * Perforation of sigmoid colon    56yo male with recurrent perforated sigmoid s/p ex lap with transverse colostomy 5/3/18 with takeback for washout with fascial closure and placement of wound vac on 5/4/18    - tolerating sips, advance to CLD today  - dilaudid IV pushes for pain, will advance to PO pain meds if tolerating CLD  - some confusion on and off, continue delirium precautions   -  on 2L NC, continue to wean as tolerated, CXR today  -H/o CAD s/p 4 vessel CABG   -hydrocortisone 100mg IV q8h given chronic steroid use  - d/c weaver  -UOP adequate, continue to monitor Is and Os  - trend daily labs, H/H stable, WBC within normal limits  -replace lytes PRN  -GI ulcer prophylaxis: pantoprazole  - wound vac in place in midline, last changed 5/7/18, change per wound care M/W/F (request to have picture placed in chart with each change)  - Blood cultures drawn in ED, growing E.coli sensitive to rocephin, repeat blood cultures on 5/4 show NGTD  -Abx: continue on ceftriaxone  -Glucose goal of 120-180  -SSI   - new PT/OT orders to help patient with deconditioning    Dispo: d/c meg today, advance to [facility], will consider PO pain meds later today if tolerating diet, CXR this morning, work with PT/OT              Jinny Martinez MD  General Surgery  [facility]

## 2018-05-08 NOTE — ASSESSMENT & PLAN NOTE
56yo male with recurrent perforated sigmoid s/p ex lap with transverse colostomy 5/3/18 with takeback for washout with fascial closure and placement of wound vac on 5/4/18    - tolerating sips, advance to CLD today  - dilaudid IV pushes for pain, will advance to PO pain meds if tolerating CLD  - some confusion on and off, continue delirium precautions   - on 2L NC, continue to wean as tolerated, CXR today  -H/o CAD s/p 4 vessel CABG   -hydrocortisone 100mg IV q8h given chronic steroid use  - d/c weaver  -UOP adequate, continue to monitor Is and Os  - trend daily labs, H/H stable, WBC within normal limits  -replace lytes PRN  -GI ulcer prophylaxis: pantoprazole  - wound vac in place in midline, last changed 5/7/18, change per wound care M/W/F (request to have picture placed in chart with each change)  - Blood cultures drawn in ED, growing gram negative rods, repeat blood cultures on 5/4 show NGTD  -Abx: continue on Cefepime, Flagyl  -Glucose goal of 120-180  -SSI   - new PT/OT orders to help patient with deconditioning    Dispo: d/c weaver today, advance to CLD, will consider PO pain meds later today if tolerating diet, CXR this morning, work with PT/OT

## 2018-05-08 NOTE — PT/OT/SLP PROGRESS
Occupational Therapy   Treatment    Name: Cristo Jefferson  MRN: [id number]  Admitting Diagnosis:  Perforation of sigmoid colon  4 Days Post-Op    Recommendations:     Discharge Recommendations: home health OT  Discharge Equipment Recommendations:  shower chair  Barriers to discharge:  None    Subjective     Communicated with: RN prior to session. Pt agreeable to participate with OT session.   Pain/Comfort:  · Pain Rating 1: 6/10  · Location 1: abdomen  · Pain Addressed 1: Pre-medicate for activity, Reposition, Distraction  · Pain Rating Post-Intervention 1: 6/10    Patients cultural, spiritual, Yazidism conflicts given the current situation: none noted or reported     Objective:     Patient found with: central line, SCD, oxygen, wound vac, blood pressure cuff, RACHEL drain, telemetry    General Precautions: Standard, fall   Orthopedic Precautions:N/A   Braces: N/A     Occupational Performance:    Bed Mobility:    · Patient completed Rolling/Turning to Left with  minimum assistance  · Patient completed Supine to Sit with moderate assistance with HOB elevated      Functional Mobility/Transfers:  · Patient completed Sit <> Stand Transfer with minimum assistance  with  no assistive device   · Patient completed Bed <> Chair Transfer using Stand Pivot technique with minimum assistance with no assistive device  · Functional Mobility: Pt performed functional mobility for household distances with CGA/min A with no AD.     Activities of Daily Living:  · LB Dressing: moderate assistance to don shoes while sitting unsupported at EOB     Patient left up in chair with all lines intact, call button in reach and family present    [facility] 6 Click:  [facility] Total Score: 16    Treatment & Education:  · Pt and pt family educated on safety awareness with functional transfers and with completion of ADLS and OT POC  · Pt and pt family educated on role of OT and purpose of evaluation and goals for therapy  · Pt and pt family instructed on and  educated on proper completion of BUE HEP including: B bicep curls and chest press and BLE ankle pumps while sitting upright in bedside chair x 10 reps x 2 sets with rest breaks required between trials secondary to noted SOB.  · Pt completed ADLS and functional mobility for treatment session as noted above  · Pt and pt family educated on importance of completing OOB with nursing staff assistance to increase pt functional activity tolerance and for the prevention of secondary complications following surgery  · Pt educated on line management in various set-ups (sitting in recliner, supine in bed, functional mobility tasks)  · White board/Communication board updated     Education:    Assessment:     Cristo Jefferson is a 57 y.o. male with a medical diagnosis of Perforation of sigmoid colon.  He presents with performance deficits affecting function including gait instability, weakness, impaired endurance, impaired balance, impaired self care skills, impaired functional mobilty, pain, impaired cardiopulmonary response to activity. Pt tolerated treatment session well, despite noted generalized weakness and decreased functional activity tolerance. Pt will continue to benefit from skilled OT services to address the above listed impairments, decrease caregiver burden, and increase pt functional independence level. Recommend home discharge with  OT services upon discharge.     Rehab Prognosis:  Good; patient would benefit from acute skilled OT services to address these deficits and reach maximum level of function.       Plan:     Patient to be seen 4 x/week to address the above listed problems via self-care/home management, therapeutic activities, therapeutic exercises  · Plan of Care Expires: 06/08/18  · Plan of Care Reviewed with: patient, spouse    This Plan of care has been discussed with the patient who was involved in its development and understands and is in agreement with the identified goals and treatment  plan    GOALS:    Occupational Therapy Goals        Problem: Occupational Therapy Goal    Goal Priority Disciplines Outcome Interventions   Occupational Therapy Goal     OT, PT/OT Ongoing (interventions implemented as appropriate)    Description:  Goals to be met by: 5/17/2018    Patient will increase functional independence with ADLs by performing:    UE Dressing with Modified [place].  LE Dressing with Modified [place].  Grooming while standing with Stand-by Assistance.  Toileting from toilet with Supervision for hygiene and clothing management.   Toilet transfer to toilet with Minimal Assistance.                      Time Tracking:     OT Date of Treatment: 05/08/18  OT Start Time: 1302  OT Stop Time: 1324  OT Total Time (min): 22 min    Billable Minutes:Self Care/Home Management 22    Fred Porter OT  5/8/2018

## 2018-05-08 NOTE — PLAN OF CARE
Pt stepped down from the unit yesterday. Pt currently has a wound vac and is on TPN. TRAMAINE paged Surgery Resident to discuss discharge planning for Pt. Resident stated Pt will likely not be discharge ready until early next week. Resident stated that Pt will likely be on a home wound vac upon discharge, but the TPN is questionable at this point. TRAMAINE informed Resident that she would put the [place] wound vac form on the chart for completion and would continue to follow-up.    Anastasia Fay LCSW

## 2018-05-08 NOTE — PLAN OF CARE
Problem: Occupational Therapy Goal  Goal: Occupational Therapy Goal  Goals to be met by: 5/17/2018    Patient will increase functional independence with ADLs by performing:    UE Dressing with Modified [place].  LE Dressing with Modified [place].  Grooming while standing with Stand-by Assistance.  Toileting from toilet with Supervision for hygiene and clothing management.   Toilet transfer to toilet with Minimal Assistance.     Outcome: Ongoing (interventions implemented as appropriate)  Pt progressing towards all goals at this time. All goals remain appropriate. Revise goals as needed.    Fred Porter, FLAVIA   5/8/2018

## 2018-05-08 NOTE — PLAN OF CARE
Problem: Patient Care Overview  Goal: Plan of Care Review  Plan of care reviewed with patient who verbalized understanding. AA, disoriented to time only (wife states this is baseline for pt).  Up to chair with therapy, pt has been in chair several hours.  O2 WNL on 2 L NC.  Voiding per urinal.  Wound vac intact.  Ostomy bag intact, output recorded.  Tolerating sugar clear free liquids.  Wife at bedside, attentive to pt and involved in POC.  Call bell remains within reach.  Bed in lowest position.  Frequent rounds made for pt safety.  Patient remains free of any new or additional falls/injury at this time.  VSS, will continue to monitor.

## 2018-05-08 NOTE — SUBJECTIVE & OBJECTIVE
Interval History/Significant Events: No acute events overnight. Stepped down from ICU overnight. TPN infusing. Pain well controlled on dilaudid pushes. Garcia in place, UOP adequate. Tolerating sips. No nausea/vomiting.    Follow-up For: Procedure(s) (LRB):  EXPLORATORY-LAPAROTOMY (N/A)  WASHOUT-ABDOMINAL (N/A)  CLOSURE-WOUND (N/A)    Post-Operative Day: 3 Days Post-Op    Objective:     Vital Signs (Most Recent):  Temp: 97.7 °F (36.5 °C) (05/08/18 0750)  Pulse: (!) 59 (05/08/18 0750)  Resp: 20 (05/08/18 0750)  BP: 139/69 (05/08/18 0750)  SpO2: (!) 92 % (05/08/18 0750) Vital Signs (24h Range):  Temp:  [97.5 °F (36.4 °C)-99 °F (37.2 °C)] 97.7 °F (36.5 °C)  Pulse:  [51-83] 59  Resp:  [14-36] 20  SpO2:  [87 %-97 %] 92 %  BP: (115-139)/(62-77) 139/69     Weight: 117.1 kg (258 lb 2.5 oz)  Body mass index is 38.12 kg/m² (pended).      Intake/Output Summary (Last 24 hours) at 05/08/18 0816  Last data filed at 05/08/18 0800   Gross per 24 hour   Intake           2538.8 ml   Output             1565 ml   Net            973.8 ml       Physical Exam   Constitutional: He is oriented to person, place, and time. He appears well-developed and well-nourished.   HENT:   Head: Normocephalic and atraumatic.   Nose: Nose normal.   Eyes: Conjunctivae are normal. Pupils are equal, round, and reactive to light. No scleral icterus.   Cardiovascular: Normal rate and regular rhythm.  Exam reveals no gallop and no friction rub.    No murmur heard.  Pulmonary/Chest: Effort normal and breath sounds normal.   Abdominal: Soft. There is tenderness.   Midline incision c/d/i  Transverse colostomy pink   Musculoskeletal: Normal range of motion.   Neurological: He is alert and oriented to person, place, and time.   Skin: Skin is warm and dry. No rash noted.   Nursing note and vitals reviewed.      Lines/Drains/Airways     Central Venous Catheter Line                 Port A Cath Single Lumen 05/02/18 2105 left subclavian 5 days         Percutaneous  Central Line Insertion/Assessment - triple lumen  05/03/18 1030 right internal jugular 4 days          Drain                 Drain/Device  Right -- days         Colostomy 05/03/18 0715 RUQ 5 days         Closed/Suction Drain 05/04/18 0834 Abdomen Bulb 15 Fr. 3 days         Closed/Suction Drain 05/04/18 0834 Anterior Abdomen Bulb 15 Fr. 3 days         Closed/Suction Drain 05/04/18 0854 Medial Abdomen Other (Comment) 3 days          Pressure Ulcer                 Negative Pressure Wound Therapy  3 days          Surgical Packing                 Surgical Packing 5 days                Significant Labs:    CBC/Anemia Profile:    Recent Labs  Lab 05/06/18  1610 05/07/18  0400 05/08/18  0500   WBC 7.29 6.75 6.85   HGB 8.3* 8.6* 8.4*   HCT 26.2* 27.3* 26.5*   PLT 67* 70* 84*   MCV 83 84 85   RDW 17.5* 17.9* 18.2*        Chemistries:    Recent Labs  Lab 05/06/18  1610 05/07/18  0400 05/08/18  0500    144 146*   K 3.5 4.0 3.1*    108 109   CO2 27 28 30*   BUN 15 18 24*   CREATININE 0.7 0.6 0.7   CALCIUM 7.8* 7.8* 7.7*   ALBUMIN 1.7* 1.7* 1.8*   PROT 4.5* 5.0* 5.0*   BILITOT 1.0 0.7 0.6   ALKPHOS 48* 55 116   ALT 16 14 17   AST 16 15 25   MG 1.8 2.2 1.9   PHOS 2.6* 3.0 1.9*       Coagulation:     Recent Labs  Lab 05/06/18  1610   INR 1.1   APTT 21.1     Lactic Acid:     Recent Labs  Lab 05/06/18  1610   LACTATE 0.8       Significant Imaging:  I have reviewed and interpreted all pertinent imaging results/findings within the past 24 hours.

## 2018-05-09 PROBLEM — Z78.9 PRESENCE OF SURGICAL INCISION: Status: ACTIVE | Noted: 2018-05-09

## 2018-05-09 LAB
ALBUMIN SERPL BCP-MCNC: 1.8 G/DL
ALP SERPL-CCNC: 103 U/L
ALT SERPL W/O P-5'-P-CCNC: 15 U/L
ANION GAP SERPL CALC-SCNC: 9 MMOL/L
ANISOCYTOSIS BLD QL SMEAR: SLIGHT
AST SERPL-CCNC: 23 U/L
BACTERIA BLD CULT: NORMAL
BACTERIA BLD CULT: NORMAL
BASOPHILS # BLD AUTO: ABNORMAL K/UL
BASOPHILS NFR BLD: 0 %
BILIRUB SERPL-MCNC: 0.5 MG/DL
BUN SERPL-MCNC: 22 MG/DL
CALCIUM SERPL-MCNC: 7.6 MG/DL
CHLORIDE SERPL-SCNC: 109 MMOL/L
CO2 SERPL-SCNC: 28 MMOL/L
CREAT SERPL-MCNC: 0.6 MG/DL
DIFFERENTIAL METHOD: ABNORMAL
EOSINOPHIL # BLD AUTO: ABNORMAL K/UL
EOSINOPHIL NFR BLD: 2 %
ERYTHROCYTE [DISTWIDTH] IN BLOOD BY AUTOMATED COUNT: 18.6 %
EST. GFR  (AFRICAN AMERICAN): >60 ML/MIN/1.73 M^2
EST. GFR  (NON AFRICAN AMERICAN): >60 ML/MIN/1.73 M^2
GLUCOSE SERPL-MCNC: 120 MG/DL (ref 70–110)
GLUCOSE SERPL-MCNC: 124 MG/DL
HCT VFR BLD AUTO: 25.8 %
HGB BLD-MCNC: 8 G/DL
IMM GRANULOCYTES # BLD AUTO: ABNORMAL K/UL
IMM GRANULOCYTES NFR BLD AUTO: ABNORMAL %
LYMPHOCYTES # BLD AUTO: ABNORMAL K/UL
LYMPHOCYTES NFR BLD: 24 %
MAGNESIUM SERPL-MCNC: 1.8 MG/DL
MCH RBC QN AUTO: 26.6 PG
MCHC RBC AUTO-ENTMCNC: 31 G/DL
MCV RBC AUTO: 86 FL
METAMYELOCYTES NFR BLD MANUAL: 1 %
MONOCYTES # BLD AUTO: ABNORMAL K/UL
MONOCYTES NFR BLD: 4 %
NEUTROPHILS NFR BLD: 69 %
NRBC BLD-RTO: 1 /100 WBC
OVALOCYTES BLD QL SMEAR: ABNORMAL
PHOSPHATE SERPL-MCNC: 2.9 MG/DL
PLATELET # BLD AUTO: 81 K/UL
PMV BLD AUTO: 11.4 FL
POCT GLUCOSE: 113 MG/DL (ref 70–110)
POCT GLUCOSE: 116 MG/DL (ref 70–110)
POCT GLUCOSE: 119 MG/DL (ref 70–110)
POCT GLUCOSE: 120 MG/DL (ref 70–110)
POCT GLUCOSE: 124 MG/DL (ref 70–110)
POCT GLUCOSE: 178 MG/DL (ref 70–110)
POIKILOCYTOSIS BLD QL SMEAR: SLIGHT
POLYCHROMASIA BLD QL SMEAR: ABNORMAL
POTASSIUM SERPL-SCNC: 3.2 MMOL/L
PROT SERPL-MCNC: 4.7 G/DL
RBC # BLD AUTO: 3.01 M/UL
SODIUM SERPL-SCNC: 146 MMOL/L
WBC # BLD AUTO: 5.02 K/UL

## 2018-05-09 PROCEDURE — 25000242 PHARM REV CODE 250 ALT 637 W/ HCPCS: Performed by: STUDENT IN AN ORGANIZED HEALTH CARE EDUCATION/TRAINING PROGRAM

## 2018-05-09 PROCEDURE — G8996 SWALLOW CURRENT STATUS: HCPCS | Mod: CH

## 2018-05-09 PROCEDURE — G8998 SWALLOW D/C STATUS: HCPCS | Mod: CH

## 2018-05-09 PROCEDURE — B4185 PARENTERAL SOL 10 GM LIPIDS: HCPCS | Performed by: STUDENT IN AN ORGANIZED HEALTH CARE EDUCATION/TRAINING PROGRAM

## 2018-05-09 PROCEDURE — 63600175 PHARM REV CODE 636 W HCPCS: Performed by: SURGERY

## 2018-05-09 PROCEDURE — 84100 ASSAY OF PHOSPHORUS: CPT

## 2018-05-09 PROCEDURE — 80053 COMPREHEN METABOLIC PANEL: CPT

## 2018-05-09 PROCEDURE — 97116 GAIT TRAINING THERAPY: CPT

## 2018-05-09 PROCEDURE — 92610 EVALUATE SWALLOWING FUNCTION: CPT

## 2018-05-09 PROCEDURE — C1751 CATH, INF, PER/CENT/MIDLINE: HCPCS

## 2018-05-09 PROCEDURE — 97110 THERAPEUTIC EXERCISES: CPT

## 2018-05-09 PROCEDURE — 63600175 PHARM REV CODE 636 W HCPCS: Performed by: STUDENT IN AN ORGANIZED HEALTH CARE EDUCATION/TRAINING PROGRAM

## 2018-05-09 PROCEDURE — S0030 INJECTION, METRONIDAZOLE: HCPCS | Performed by: SURGERY

## 2018-05-09 PROCEDURE — 76937 US GUIDE VASCULAR ACCESS: CPT

## 2018-05-09 PROCEDURE — G8997 SWALLOW GOAL STATUS: HCPCS | Mod: CH

## 2018-05-09 PROCEDURE — 25000003 PHARM REV CODE 250: Performed by: SURGERY

## 2018-05-09 PROCEDURE — 27000221 HC OXYGEN, UP TO 24 HOURS

## 2018-05-09 PROCEDURE — A4217 STERILE WATER/SALINE, 500 ML: HCPCS | Performed by: STUDENT IN AN ORGANIZED HEALTH CARE EDUCATION/TRAINING PROGRAM

## 2018-05-09 PROCEDURE — C9113 INJ PANTOPRAZOLE SODIUM, VIA: HCPCS | Performed by: SURGERY

## 2018-05-09 PROCEDURE — 25000003 PHARM REV CODE 250: Performed by: STUDENT IN AN ORGANIZED HEALTH CARE EDUCATION/TRAINING PROGRAM

## 2018-05-09 PROCEDURE — 94761 N-INVAS EAR/PLS OXIMETRY MLT: CPT

## 2018-05-09 PROCEDURE — 83735 ASSAY OF MAGNESIUM: CPT

## 2018-05-09 PROCEDURE — 85027 COMPLETE CBC AUTOMATED: CPT

## 2018-05-09 PROCEDURE — 97607 NEG PRS WND THR NDME<=50SQCM: CPT

## 2018-05-09 PROCEDURE — 85007 BL SMEAR W/DIFF WBC COUNT: CPT

## 2018-05-09 PROCEDURE — 20600001 HC STEP DOWN PRIVATE ROOM

## 2018-05-09 PROCEDURE — 02HV33Z INSERTION OF INFUSION DEVICE INTO SUPERIOR VENA CAVA, PERCUTANEOUS APPROACH: ICD-10-PCS | Performed by: SURGERY

## 2018-05-09 PROCEDURE — 94640 AIRWAY INHALATION TREATMENT: CPT

## 2018-05-09 PROCEDURE — 36569 INSJ PICC 5 YR+ W/O IMAGING: CPT

## 2018-05-09 RX ORDER — HYDROMORPHONE HYDROCHLORIDE 1 MG/ML
0.5 INJECTION, SOLUTION INTRAMUSCULAR; INTRAVENOUS; SUBCUTANEOUS EVERY 6 HOURS PRN
Status: DISCONTINUED | OUTPATIENT
Start: 2018-05-09 | End: 2018-05-12 | Stop reason: HOSPADM

## 2018-05-09 RX ORDER — OXYCODONE AND ACETAMINOPHEN 10; 325 MG/1; MG/1
1 TABLET ORAL EVERY 4 HOURS PRN
Status: DISCONTINUED | OUTPATIENT
Start: 2018-05-09 | End: 2018-05-12 | Stop reason: HOSPADM

## 2018-05-09 RX ORDER — AMOXICILLIN AND CLAVULANATE POTASSIUM 875; 125 MG/1; MG/1
1 TABLET, FILM COATED ORAL EVERY 12 HOURS
Status: DISCONTINUED | OUTPATIENT
Start: 2018-05-10 | End: 2018-05-12 | Stop reason: HOSPADM

## 2018-05-09 RX ORDER — MAGNESIUM SULFATE HEPTAHYDRATE 40 MG/ML
2 INJECTION, SOLUTION INTRAVENOUS ONCE
Status: COMPLETED | OUTPATIENT
Start: 2018-05-09 | End: 2018-05-09

## 2018-05-09 RX ORDER — POTASSIUM CHLORIDE 7.45 MG/ML
10 INJECTION INTRAVENOUS
Status: COMPLETED | OUTPATIENT
Start: 2018-05-09 | End: 2018-05-09

## 2018-05-09 RX ORDER — FUROSEMIDE 10 MG/ML
40 INJECTION INTRAMUSCULAR; INTRAVENOUS ONCE
Status: COMPLETED | OUTPATIENT
Start: 2018-05-09 | End: 2018-05-09

## 2018-05-09 RX ORDER — OXYCODONE AND ACETAMINOPHEN 5; 325 MG/1; MG/1
1 TABLET ORAL EVERY 4 HOURS PRN
Status: DISCONTINUED | OUTPATIENT
Start: 2018-05-09 | End: 2018-05-12 | Stop reason: HOSPADM

## 2018-05-09 RX ORDER — PANTOPRAZOLE SODIUM 40 MG/1
40 TABLET, DELAYED RELEASE ORAL DAILY
Status: DISCONTINUED | OUTPATIENT
Start: 2018-05-10 | End: 2018-05-12 | Stop reason: HOSPADM

## 2018-05-09 RX ADMIN — MICONAZOLE NITRATE: 20 CREAM TOPICAL at 08:05

## 2018-05-09 RX ADMIN — OXYCODONE HYDROCHLORIDE AND ACETAMINOPHEN 1 TABLET: 10; 325 TABLET ORAL at 08:05

## 2018-05-09 RX ADMIN — IPRATROPIUM BROMIDE AND ALBUTEROL SULFATE 3 ML: .5; 3 SOLUTION RESPIRATORY (INHALATION) at 04:05

## 2018-05-09 RX ADMIN — POTASSIUM CHLORIDE 10 MEQ: 7.46 INJECTION, SOLUTION INTRAVENOUS at 11:05

## 2018-05-09 RX ADMIN — CALCIUM GLUCONATE: 94 INJECTION, SOLUTION INTRAVENOUS at 10:05

## 2018-05-09 RX ADMIN — HYDROMORPHONE HYDROCHLORIDE 0.5 MG: 1 INJECTION, SOLUTION INTRAMUSCULAR; INTRAVENOUS; SUBCUTANEOUS at 06:05

## 2018-05-09 RX ADMIN — CEFTRIAXONE SODIUM 2 G: 2 INJECTION, POWDER, FOR SOLUTION INTRAMUSCULAR; INTRAVENOUS at 03:05

## 2018-05-09 RX ADMIN — METRONIDAZOLE 500 MG: 500 INJECTION, SOLUTION INTRAVENOUS at 06:05

## 2018-05-09 RX ADMIN — POTASSIUM CHLORIDE 10 MEQ: 7.46 INJECTION, SOLUTION INTRAVENOUS at 09:05

## 2018-05-09 RX ADMIN — CITALOPRAM HYDROBROMIDE 40 MG: 20 TABLET ORAL at 09:05

## 2018-05-09 RX ADMIN — INSULIN ASPART 2 UNITS: 100 INJECTION, SOLUTION INTRAVENOUS; SUBCUTANEOUS at 09:05

## 2018-05-09 RX ADMIN — OXYCODONE HYDROCHLORIDE AND ACETAMINOPHEN 1 TABLET: 10; 325 TABLET ORAL at 11:05

## 2018-05-09 RX ADMIN — HYDROCORTISONE SODIUM SUCCINATE 25 MG: 100 INJECTION, POWDER, FOR SOLUTION INTRAMUSCULAR; INTRAVENOUS at 06:05

## 2018-05-09 RX ADMIN — IPRATROPIUM BROMIDE AND ALBUTEROL SULFATE 3 ML: .5; 3 SOLUTION RESPIRATORY (INHALATION) at 08:05

## 2018-05-09 RX ADMIN — MICONAZOLE NITRATE: 20 CREAM TOPICAL at 09:05

## 2018-05-09 RX ADMIN — POTASSIUM CHLORIDE 10 MEQ: 7.46 INJECTION, SOLUTION INTRAVENOUS at 10:05

## 2018-05-09 RX ADMIN — HYDROMORPHONE HYDROCHLORIDE 1 MG: 1 INJECTION, SOLUTION INTRAMUSCULAR; INTRAVENOUS; SUBCUTANEOUS at 02:05

## 2018-05-09 RX ADMIN — ASPIRIN 81 MG CHEWABLE TABLET 81 MG: 81 TABLET CHEWABLE at 09:05

## 2018-05-09 RX ADMIN — POTASSIUM CHLORIDE 10 MEQ: 7.46 INJECTION, SOLUTION INTRAVENOUS at 06:05

## 2018-05-09 RX ADMIN — HYDROCORTISONE SODIUM SUCCINATE 25 MG: 100 INJECTION, POWDER, FOR SOLUTION INTRAMUSCULAR; INTRAVENOUS at 10:05

## 2018-05-09 RX ADMIN — METRONIDAZOLE 500 MG: 500 INJECTION, SOLUTION INTRAVENOUS at 02:05

## 2018-05-09 RX ADMIN — ATORVASTATIN CALCIUM 10 MG: 10 TABLET, FILM COATED ORAL at 09:05

## 2018-05-09 RX ADMIN — SOYBEAN OIL 250 ML: 20 INJECTION, SOLUTION INTRAVENOUS at 10:05

## 2018-05-09 RX ADMIN — POTASSIUM CHLORIDE 10 MEQ: 7.46 INJECTION, SOLUTION INTRAVENOUS at 12:05

## 2018-05-09 RX ADMIN — OXYCODONE HYDROCHLORIDE AND ACETAMINOPHEN 1 TABLET: 10; 325 TABLET ORAL at 03:05

## 2018-05-09 RX ADMIN — HYDROMORPHONE HYDROCHLORIDE 0.5 MG: 1 INJECTION, SOLUTION INTRAMUSCULAR; INTRAVENOUS; SUBCUTANEOUS at 11:05

## 2018-05-09 RX ADMIN — FUROSEMIDE 40 MG: 10 INJECTION, SOLUTION INTRAMUSCULAR; INTRAVENOUS at 06:05

## 2018-05-09 RX ADMIN — OXYCODONE HYDROCHLORIDE AND ACETAMINOPHEN 1 TABLET: 10; 325 TABLET ORAL at 06:05

## 2018-05-09 RX ADMIN — IPRATROPIUM BROMIDE AND ALBUTEROL SULFATE 3 ML: .5; 3 SOLUTION RESPIRATORY (INHALATION) at 03:05

## 2018-05-09 RX ADMIN — DEXTROSE 40 MG: 50 INJECTION, SOLUTION INTRAVENOUS at 09:05

## 2018-05-09 RX ADMIN — MAGNESIUM SULFATE IN WATER 2 G: 40 INJECTION, SOLUTION INTRAVENOUS at 06:05

## 2018-05-09 RX ADMIN — POTASSIUM PHOSPHATE, MONOBASIC AND POTASSIUM PHOSPHATE, DIBASIC 20 MMOL: 224; 236 INJECTION, SOLUTION, CONCENTRATE INTRAVENOUS at 07:05

## 2018-05-09 RX ADMIN — ENOXAPARIN SODIUM 40 MG: 100 INJECTION SUBCUTANEOUS at 05:05

## 2018-05-09 RX ADMIN — METRONIDAZOLE 500 MG: 500 INJECTION, SOLUTION INTRAVENOUS at 12:05

## 2018-05-09 NOTE — ASSESSMENT & PLAN NOTE
56yo male with recurrent perforated sigmoid s/p ex lap with transverse colostomy 5/3/18 with takeback for washout with fascial closure and placement of wound vac on 5/4/18    - tolerating CLD, advance to diabetic soft diet  - switch to PO pain meds  - some confusion on and off, continue delirium precautions   - on 2L NC, continue to wean as tolerated, one dose lasix today  -H/o CAD s/p 4 vessel CABG   -hydrocortisone 100mg IV q8h given chronic steroid use  -UOP adequate, continue to monitor Is and Os  - trend daily labs, H/H stable, WBC within normal limits  -replace lytes PRN  -GI ulcer prophylaxis: pantoprazole  - wound vac in place in midline, last changed 5/7/18, change per wound care M/W/F (request to have picture placed in chart with each change)  - Blood cultures drawn in ED, growing gram negative rods, repeat blood cultures on 5/4 show NGTD  -Abx: continue on ceftriaxone, Flagyl  -Glucose goal of 120-180  -SSI   - new PT/OT orders to help patient with deconditioning    Dispo: advance to diabetic soft diet, switch to PO pain meds, work with PT/OT, speech to eval today

## 2018-05-09 NOTE — PT/OT/SLP EVAL
Speech Language Pathology Evaluation  Bedside Swallow  Discharge    Patient Name:  Cristo Jefferson   MRN:  [id number]   [id number]    Admitting Diagnosis: Perforation of sigmoid colon    Recommendations:                 General Recommendations:  Follow-up not indicated  Diet recommendations:  Regular, Thin   Aspiration Precautions: Standard aspiration precautions   General Precautions: Standard, fall  Communication strategies:  none    History:     Past Medical History:   Diagnosis Date    Arthritis     Cancer 2016    appendix, completed chemo    Depression     Diabetes mellitus type I     Hyperlipidemia     Hypertension     On home O2        Past Surgical History:   Procedure Laterality Date    APPENDECTOMY      COLONOSCOPY N/A 3/6/2018    Procedure: COLONOSCOPY;  Surgeon: Yfn Martinez MD;  Location: [facility] ([address] [place] [address]);  Service: Endoscopy;  Laterality: N/A;   See telephone encounter dated 1/30/18/pt on Plavix/neither PCP or cardiologist will clear him to hold med without being evaluated/wife refusing to make appointment/per Dr Riley, pt to remain on Plavix and proceed with colonoscopy with no biposies. Spoke with wife on 2/8/1    EVAR for AAA      S/P EVAR for AAA in 2017    open heart       RIGHT COLECTOMY  2016     HPI:  Cristo Jefferson is a 56 yo M with PMH of RA on immunosuppressants (prednisone 12.5 mg daily), DM II, HTN, CAD s/p CABG (on plavix), appendiceal CA s/p appendectomy, R hemicolectomy and adjuvant chemo/XRT (3/2017), sigmoid diverticulitis with perforation s/p ex-lap, loop ileostomy 11/2017 and most recently ileostomy takedown (Dr. Riley) 4/4/18. Patient presented to [facility] yesterday afternoon with acute onset abdominal pain, tachycardia, hypotension and CT with sigmoid perforation with adjacent fluid collection and abscess 5 cm in size not appearing well-contained or walled-off. Febrile to 102.7 in ED and tachycardic in 110s and hypotensive on 0.1 of levophed. He was emergently taken to  the OR for ex-lap where he was found to have recurrent colonic perforation, transverse colon was used for end colostomy and his abdomen was packed. He was transferred to the SICU with skin closure of abdomen only, intubated and on 0.07 of levo.     Prior Intubation HX:  5/3-5/6    Chest X-Rays:   Results for orders placed or performed during the hospital encounter of 05/03/18   X-Ray Chest 1 View    Narrative    EXAMINATION:  XR CHEST 1 VIEW    CLINICAL HISTORY:  wheezing with concern for fluid overload;    COMPARISON:  Comparison is made to 5/6/2018.    FINDINGS:  Previously present endotracheal and enteric tubes have been removed.  Vascular catheter entering from a right jugular approach again has its tip near the junction of the superior vena cava and right atrium, with a left subclavian origin vascular catheter having its tip in the left brachiocephalic vein near its junction with the right brachiocephalic vein.  Postoperative changes again noted in the thorax.  Heart remains modestly enlarged, with the cardiomediastinal silhouette is unchanged since the prior exam.  No interval pulmonary vascular engorgement or findings to specifically suggest current cardiac decompensation/volume overload.  Lung zones are stable, and free of superimposed airspace consolidation or volume loss.  Minimal linear opacity in the right mid lung zone is consistent with either a small area of subsegmental atelectasis or a minimal amount of pleural fluid in the minor fissure.  A very small amount of pleural fluid blunts the left costophrenic sulcus, but no pleural fluid of any substantial volume is seen on either side.  No pneumothorax.      Impression    No significant detrimental interval change in the appearance of the chest since 05/06/2018.      Electronically signed by: Austin Cooley MD  Date:    05/08/2018  Time:    09:11       Prior diet: reg/thin.      Subjective     Patient awake and cooperative. RN present in room administering  whole po medications.   Patient goals: none stated     Pain/Comfort:  ·  none reported    Objective:     Oral Musculature Evaluation  Oral Musculature: WFL  Dentition: present and adequate  Mucosal Quality: adequate  Mandibular Strength and Mobility: WFL  Oral Labial Strength and Mobility: WFL  Lingual Strength and Mobility: WFL  Velar Elevation: WFL  Buccal Strength and Mobility: WFL  Volitional Cough: elicited  Volitional Swallow: timley  Voice Prior to PO Intake: clear    Bedside Swallow Eval:   Consistencies Assessed:  · Thin liquids 12 ounces water via cup and straw  · Solids whole zoila cracker   · RN administered whole po meds    Oral Phase:   · WFL    Pharyngeal Phase:   · no overt clinical signs/symptoms of aspiration  · no overt clinical signs/symptoms of pharyngeal dysphagia    Compensatory Strategies  · None    Treatment: SLP provided education on SLP role and s/s and risks of aspiration. Patient verbalized understanding of all discussed.     Assessment:     Cristo Jefferson is a 57 y.o. male with no overt s/s of aspiration with all consistencies trialed. No further skilled ST services warranted at this time. Please reconsult as needed.     Goals:    SLP Goals     Not on file          Multidisciplinary Problems (Resolved)        Problem: SLP Goal    Goal Priority Disciplines Outcome   SLP Goal   (Resolved)     SLP Outcome(s) achieved   Description:  Short Term Goals:   1. Pt will participate in a bedside swallow study to determine the safest and least restrictive possible po diet with possible updated goals to follow pending results. -MET                      Plan:     · Patient to be seen:      · Plan of Care expires:     · Plan of Care reviewed with:  patient, spouse   · SLP Follow-Up:  No       Discharge recommendations:   (see PT/OT recs, no ST needs)   Barriers to Discharge:  None per ST discipline    Time Tracking:     SLP Treatment Date:   05/09/18  Speech Start Time:  0914  Speech Stop Time:  0923      Speech Total Time (min):  9 min    Billable Minutes: Eval Swallow and Oral Function 9    MARY Cisse, CCC-SLP   Pager: [phone]  05/09/2018

## 2018-05-09 NOTE — PLAN OF CARE
Problem: Occupational Therapy Goal  Goal: Occupational Therapy Goal  Goals to be met by: 5/17/2018    Patient will increase functional independence with ADLs by performing:    UE Dressing with Modified [place].  LE Dressing with Modified [place].  Grooming while standing with Stand-by Assistance.  Toileting from toilet with Supervision for hygiene and clothing management.   Toilet transfer to toilet with Minimal Assistance.     Outcome: Ongoing (interventions implemented as appropriate)  Pt progressing towards all goals at this time. All goals remain appropriate. Revise goals as needed.    Fred Porter, OT  5/9/2018

## 2018-05-09 NOTE — PLAN OF CARE
Problem: Physical Therapy Goal  Goal: Physical Therapy Goal  Goals to be met by:      Patient will increase functional independence with mobility by performin. Supine to sit with Modified [place]  2. Sit to supine with Modified [place]  3. Sit to stand transfer with Supervision  4. Gait  x 200 feet with Supervision      Pt progressing towards goals. continue with PT POC.Goals remain appropriate.  Austin Hendricks PTA  2018

## 2018-05-09 NOTE — PT/OT/SLP PROGRESS
Physical Therapy Treatment    Patient Name:  Cristo Jefferson   MRN:  [id number]    Recommendations:     Discharge Recommendations:      Discharge Equipment Recommendations: shower chair   Barriers to discharge: None    Assessment:     Cristo Jefferson is a 57 y.o. male admitted with a medical diagnosis of Perforation of sigmoid colon.  He presents with the following impairments/functional limitations:  weakness, impaired endurance, impaired self care skills, gait instability, impaired functional mobilty, impaired cardiopulmonary response to activity, pain . Pt Progressing with PT Intervention. Pt Progressing with improving gait distance. Pt would continue to benefit from skilled PT to address overall functional mobility and goals. Goals remain appropriate      Rehab Prognosis:  good; patient would benefit from acute skilled PT services to address these deficits and reach maximum level of function.      Recent Surgery: Procedure(s) (LRB):  EXPLORATORY-LAPAROTOMY (N/A)  WASHOUT-ABDOMINAL (N/A)  CLOSURE-WOUND (N/A) 5 Days Post-Op    Plan:     During this hospitalization, patient to be seen 4 x/week to address the above listed problems via gait training, therapeutic activities, therapeutic exercises  · Plan of Care Expires:      Plan of Care Reviewed with: patient, spouse    Subjective     Communicated with RN prior to session.  Patient found supine upon PT entry to room, agreeable to treatment.      Pt agreeable to treatment  Pain/Comfort:  · Pain Rating 1: 5/10  · Location 1: abdomen  · Pain Addressed 1: Pre-medicate for activity, Reposition, Distraction  · Pain Rating Post-Intervention 1: 6/10    Patients cultural, spiritual, Taoism conflicts given the current situation: none noted    Objective:     Patient found with: central line, oxygen, wound vac, RACHEL drain, telemetry     General Precautions: Standard, fall   Orthopedic Precautions:N/A   Braces: N/A     Functional Mobility:  · Bed Mobility:     · Rolling Left:   minimum assistance  · Supine to Sit: moderate assistance  · Transfers:     · Sit to Stand:  minimum assistance with no AD  · Gait: pt ambulated 100 ft with no AD/holding onto IV pole with CGA. pt required 1 standing rest break      AM-PAC 6 CLICK MOBILITY  Turning over in bed (including adjusting bedclothes, sheets and blankets)?: 3  Sitting down on and standing up from a chair with arms (e.g., wheelchair, bedside commode, etc.): 3  Moving from lying on back to sitting on the side of the bed?: 2  Moving to and from a bed to a chair (including a wheelchair)?: 3  Need to walk in hospital room?: 3  Climbing 3-5 steps with a railing?: 2  Total Score: 16       Therapeutic Activities and Exercises:   Discussed/educated patient on progress, safety, i d/c, PT POC   Patient performed therex X 15 reps seated in bedside chair B LE AROM AP, LAQ, Hip Flexion, Hip Abd/Add   White board updated   Donned an extra gown and shoes   Pt encouraged to ambulate in hallways 3x/day with nursing or family assistance to improve endurance.   Pt safe to ambulate in hallway with RN or PCT assistance   Bedside table in front of patient and area set up for function, convenience, and safety. RN aware of patient's mobility needs and status. Questions/concerns addressed within PT scope of practice; patient and family with no further questions.       Patient left up in chair with all lines intact, call button in reach, nsg notified and spouse present..    GOALS:    Physical Therapy Goals        Problem: Physical Therapy Goal    Goal Priority Disciplines Outcome Goal Variances Interventions   Physical Therapy Goal     PT/OT, PT Ongoing (interventions implemented as appropriate)     Description:  Goals to be met by:      Patient will increase functional independence with mobility by performin. Supine to sit with Modified [place]  2. Sit to supine with Modified [place]  3. Sit to stand transfer with Supervision  4. Gait  x 200 feet  with Supervision                       Time Tracking:     PT Received On: 05/09/18  PT Total Time (min):   25 min    Billable Minutes: Gait Training 15 and Therapeutic Exercise 10    Treatment Type: Treatment  PT/PTA: JOANNA     PTA Visit Number: 1     Austin Hendricks PTA  05/09/2018

## 2018-05-09 NOTE — PROCEDURES
"Cristo Jefferson is a 57 y.o. male patient.    Temp: 98.6 °F (37 °C) (05/09/18 1144)  Pulse: 63 (05/09/18 1144)  Resp: 16 (05/09/18 1144)  BP: 137/72 (05/09/18 1144)  SpO2: 98 % (05/09/18 1144)  Weight: 117.1 kg (258 lb 2.5 oz) (05/03/18 2305)  Height: (P) 5' 9" (175.3 cm) (reviewed) (05/03/18 2305)    PICC  Date/Time: 5/9/2018 2:55 PM  Performed by: JUAN ANTONIO BECKETT  Consent Done: Yes  Time out: Immediately prior to procedure a time out was called to verify the correct patient, procedure, equipment, support staff and site/side marked as required  Indications: med administration and vascular access  Anesthesia: local infiltration  Local anesthetic: lidocaine 1% without epinephrine  Anesthetic Total (mL): 2  Preparation: skin prepped with ChloraPrep  Skin prep agent dried: skin prep agent completely dried prior to procedure  Sterile barriers: all five maximum sterile barriers used - cap, mask, sterile gown, sterile gloves, and large sterile sheet  Hand hygiene: hand hygiene performed prior to central venous catheter insertion  Location details: right brachial  Catheter type: double lumen  Catheter size: 5 Fr  Catheter Length: 41cm    Ultrasound guidance: yes  Vessel Caliber: medium and patent, compressibility normal  Vascular Doppler: not done  Needle advanced into vessel with real time Ultrasound guidance.  Guidewire confirmed in vessel.  Image recorded and saved.  Sterile sheath used.  no esophageal manometryNumber of attempts: 1  Post-procedure: blood return through all ports, chlorhexidine patch and sterile dressing applied  Specimens: No  Implants: No  Assessment: placement verified by x-ray  Complications: none          Janette Staley  5/9/2018    "

## 2018-05-09 NOTE — PLAN OF CARE
Problem: Patient Care Overview  Goal: Plan of Care Review  Outcome: Ongoing (interventions implemented as appropriate)  POC reviewed with patient and wife who both verbalized understanding. AAOx4. VSS on 2L NC. Midline abdominal wound noted with wound vac in place @ 100, sanguinous output. Bilateral RACHEL drains noted with serosanguinous output. RLQ colostomy in place with small dark brown output overnight. C/o pain, controlled with PRN medications per MAR. TPN and abx infusing. Tolerating sugar free clear liquids, denies any nausea. Voiding per urinal, up with one person assist. Blood glucose being monitored every 4 hours, no coverage needed. Safety precautions maintained, call light within reach. Remains free from falls and injury. No acute events. No distress noted. Will continue to monitor. Wife at bedside.

## 2018-05-09 NOTE — PROGRESS NOTES
[facility]  General Surgery  Progress Note    Subjective:     History of Present Illness:  Cristo Jefferson is a 57 y.o. male who presents to [facility] on 5/3/2018 as transfer for septic shock in setting of recurrent sigmoid perforation.     Patient has a past medical history of RA on immunosuppressants including prednisone 12.5 mg daily and previously Orencia (abatacept) which was stopped in November, DM II, HTN, CAD s/p CABG and appendiceal CA s/p appendectomy and subsequent R hemicolectomy and adjuvant chemo/XRT with last treatment in March 2017 who was transferred to [facility] with complains of abdominal pain since about 2 PM yesterday after eating crawfish.  No nausea or vomiting.  Patient was found to be tachycardic and hypotensive and CT showed sigmoid perforation with adjacent fluid collection and abscess 5 cm in size not appearing well-contained or walled-off.  Patient was given IV Vancomycin and Zosyn and started on Dopamine gtt at OSH.  Febrile to 102.7 in ED and tachycardic in 110s and hypotensive on 0.1 of levophed.     The patient is recently s/p exploratory laparotomy and reversal of loop ileostomy on 4/4/2018 by Dr. Riley.  He was having a lot of difficulties with dehydration and was requiring daily LR infusions for fluid loss from ileostomy since it's creation in November.     The patient has previously undergone exploratory laparotomy and washout and drainage of sigmoid perforation with construction of loop ileostomy in November 2017 by Dr. Riley for sigmoid perforation from diverticulitis.  The patient went on to have colonoscopy and water soluble enema that looked good prior to reversal and he has overall been doing well up until yesterday which is when he started feeling bad.     He is on Plavix for history of CAD.     Currently on Prednisone 12.5 mg daily (5 mg in AM, 2.5 mg in afternoon, and 5 mg at night).    Post-Op Info:  Procedure(s) (LRB):  EXPLORATORY-LAPAROTOMY  (N/A)  WASHOUT-ABDOMINAL (N/A)  CLOSURE-WOUND (N/A)   5 Days Post-Op     Interval History/Significant Events: No acute events overnight. Stepped down from ICU overnight. TPN infusing. Pain well controlled on dilaudid pushes. Voiding. One dose lasix yesterday. Tolerating CLD. No nausea/vomiting.    Follow-up For: Procedure(s) (LRB):  EXPLORATORY-LAPAROTOMY (N/A)  WASHOUT-ABDOMINAL (N/A)  CLOSURE-WOUND (N/A)    Post-Operative Day: 3 Days Post-Op    Objective:     Vital Signs (Most Recent):  Temp: 98.1 °F (36.7 °C) (05/09/18 0436)  Pulse: 61 (05/09/18 0436)  Resp: 18 (05/09/18 0436)  BP: 132/75 (05/09/18 0436)  SpO2: 99 % (05/09/18 0436) Vital Signs (24h Range):  Temp:  [96.8 °F (36 °C)-98.1 °F (36.7 °C)] 98.1 °F (36.7 °C)  Pulse:  [55-87] 61  Resp:  [18-20] 18  SpO2:  [92 %-99 %] 99 %  BP: (127-139)/(69-76) 132/75     Weight: 117.1 kg (258 lb 2.5 oz)  Body mass index is 38.12 kg/m² (pended).      Intake/Output Summary (Last 24 hours) at 05/09/18 0619  Last data filed at 05/09/18 0420   Gross per 24 hour   Intake          3698.77 ml   Output             4140 ml   Net          -441.23 ml       Physical Exam   Constitutional: He is oriented to person, place, and time. He appears well-developed and well-nourished.   HENT:   Head: Normocephalic and atraumatic.   Nose: Nose normal.   Eyes: Conjunctivae are normal. Pupils are equal, round, and reactive to light. No scleral icterus.   Cardiovascular: Normal rate and regular rhythm.  Exam reveals no gallop and no friction rub.    No murmur heard.  Pulmonary/Chest: Effort normal and breath sounds normal.   Abdominal: Soft. There is tenderness.   Midline incision c/d/i  Transverse colostomy pink   Musculoskeletal: Normal range of motion.   Neurological: He is alert and oriented to person, place, and time.   Skin: Skin is warm and dry. No rash noted.   Nursing note and vitals reviewed.      Lines/Drains/Airways     Central Venous Catheter Line                 Port A Cath Single  Lumen 05/02/18 2105 left subclavian 6 days         Percutaneous Central Line Insertion/Assessment - triple lumen  05/03/18 1030 right internal jugular 5 days          Drain                 Drain/Device  Right -- days         Colostomy 05/03/18 0715 RUQ 5 days         Closed/Suction Drain 05/04/18 0834 Abdomen Bulb 15 Fr. 4 days         Closed/Suction Drain 05/04/18 0834 Anterior Abdomen Bulb 15 Fr. 4 days         Closed/Suction Drain 05/04/18 0854 Medial Abdomen Other (Comment) 4 days          Pressure Ulcer                 Negative Pressure Wound Therapy  4 days          Surgical Packing                 Surgical Packing 5 days                Significant Labs:    CBC/Anemia Profile:    Recent Labs  Lab 05/08/18  0500 05/09/18  0420   WBC 6.85 5.02   HGB 8.4* 8.0*   HCT 26.5* 25.8*   PLT 84* 81*   MCV 85 86   RDW 18.2* 18.6*        Chemistries:    Recent Labs  Lab 05/08/18  0500 05/08/18  1248 05/09/18  0420   * 145 146*   K 3.1* 3.5 3.2*    109 109   CO2 30* 29 28   BUN 24* 24* 22*   CREATININE 0.7 0.6 0.6   CALCIUM 7.7* 7.7* 7.6*   ALBUMIN 1.8*  --  1.8*   PROT 5.0*  --  4.7*   BILITOT 0.6  --  0.5   ALKPHOS 116  --  103   ALT 17  --  15   AST 25  --  23   MG 1.9  --  1.8   PHOS 1.9*  --  2.9       Coagulation:   No results for input(s): PT, INR, APTT in the last 48 hours.  Lactic Acid:   No results for input(s): LACTATE in the last 48 hours.    Significant Imaging:  I have reviewed and interpreted all pertinent imaging results/findings within the past 24 hours.    Assessment/Plan:     * Perforation of sigmoid colon    58yo male with recurrent perforated sigmoid s/p ex lap with transverse colostomy 5/3/18 with takeback for washout with fascial closure and placement of wound vac on 5/4/18    - tolerating CLD, advance to diabetic soft diet  - switch to PO pain meds  - some confusion on and off, continue delirium precautions   - on 2L NC, continue to wean as tolerated, one dose lasix today  -H/o CAD s/p 4  vessel CABG   -hydrocortisone 100mg IV q8h given chronic steroid use  -UOP adequate, continue to monitor Is and Os  - trend daily labs, H/H stable, WBC within normal limits  -replace lytes PRN  -GI ulcer prophylaxis: pantoprazole  - wound vac in place in midline, last changed 5/7/18, change per wound care M/W/F (request to have picture placed in chart with each change)  - Blood cultures drawn in ED, growing gram negative rods, repeat blood cultures on 5/4 show NGTD  -Abx: continue on ceftriaxone, Flagyl  -Glucose goal of 120-180  -SSI   - new PT/OT orders to help patient with deconditioning    Dispo: advance to diabetic soft diet, switch to PO pain meds, work with PT/OT, speech to eval today              Jinny Martinez MD  General Surgery  [facility]

## 2018-05-09 NOTE — CONSULTS
Double lumen PICC placed to (R) BRACHIAL vein.   41cm in length, 0cm exposed, and 34cm arm circumference.  Lot # [id number]

## 2018-05-09 NOTE — PLAN OF CARE
Problem: SLP Goal  Goal: SLP Goal  Short Term Goals:   1. Pt will participate in a bedside swallow study to determine the safest and least restrictive possible po diet with possible updated goals to follow pending results. -MET    Outcome: Outcome(s) achieved Date Met: 05/09/18  BSS completed. Recommend: regular diet, thin liquids, standard aspiration precautions. No further skilled ST services warranted at this time. Please re-consult as needed.   JOSLYN Trammell., CCC-SLP  Pager: [phone]  05/09/2018

## 2018-05-09 NOTE — PROGRESS NOTES
Wound care consult received for wound vac changes to abdominal midline incision. Pt known to wound care team from previous admissions. Wound vac dressing and colostomy bag removed after pt was medicated by pts nurse for pain of 9/10. Applied wound vac dressing using black foam. Pt tolerated procedure well. Also replaced ostomy bag, stoma with rosebud appearance, tasia-stomal skin intact. Nurse notified of care provided, next dressing change on 5/11/18.     Pressure injury preventions in place with SPR pump for existing mattress and chair cushion.   [id number]     05/09/18 1238       Incision/Site 05/03/18 0800 Abdomen medial   Date First Assessed/Time First Assessed: 05/03/18 0800   Location: Abdomen  Orientation: medial   Wound Image    Incision WDL ex   Dressing Appearance Intact   Drainage Amount Moderate   Drainage Characteristics/Odor Serosanguineous;No odor   Appearance Pink;Red;Slough;Moist;Granulating;Sutures intact;Adipose   Periwound Area Intact   Wound Edges Open   Wound Length (cm) 14.3 cm   Wound Width (cm) 6.5 cm   Wound Depth (cm) 3.3 cm   Wound Volume (cm^3) 306.74 cm^3   Undermining (depth (cm)/location) 3.0  (at 10-11 oclock, 3.8 at 1-4 o'clock)   Care Sterile normal saline;Cleansed with:   Dressing Applied;Other (see comments)  (wound vac)   Dressing Change Due 05/11/18       Negative Pressure Wound Therapy    Placement Date/Time: 05/04/18 0910   Orientation: anterior  Location: Abdomen   NPWT Type Vacuum Therapy   Therapy Setting NPWT Continuous therapy   Pressure Setting NPWT 100 mmHg   Sponges Inserted NPWT 4;Black

## 2018-05-09 NOTE — PT/OT/SLP PROGRESS
Occupational Therapy   Treatment    Name: Cristo Jefferson  MRN: [id number]  Admitting Diagnosis:  Perforation of sigmoid colon  5 Days Post-Op    Recommendations:     Discharge Recommendations: home health OT  Discharge Equipment Recommendations:  shower chair  Barriers to discharge:  None    Subjective     Communicated with: RN prior to session.  Pain/Comfort:  · Pain Rating 1: 5/10  · Location 1: abdomen  · Pain Addressed 1: Pre-medicate for activity, Reposition, Distraction  · Pain Rating Post-Intervention 1: 6/10    Patients cultural, spiritual, Gnosticist conflicts given the current situation: none noted or reported     Objective:     Patient found with: central line, oxygen, wound vac, RACHEL drain, telemetry    General Precautions: Standard, fall   Orthopedic Precautions:N/A   Braces: N/A     Occupational Performance:    Bed Mobility:    · Patient completed Rolling/Turning to Left with  stand by assistance  · Patient completed Rolling/Turning to Right with stand by assistance  · Patient completed Scooting/Bridging with stand by assistance     Functional Mobility/Transfers:  · Pt declined secondary to having just finished with wound care and just got back into bed from sitting up in chair for a couple of hours.     Patient left HOB elevated with all lines intact, call button in reach and wife present    [facility] 6 Click:  [facility] Total Score: 16    Treatment & Education:  · Pt and pt family educated on safety awareness with functional transfers and with completion of ADLS and OT POC   · Pt instructed on and completed BUE HEP including: B bicep curls, chest press, and overhead press x 10 reps x 2 sets with HOB elevated and min rest breaks between trials.   · Pt completed ADLS and functional mobility for treatment session as noted above  · Pt and pt family educated on importance of completing OOB with nursing staff assistance to increase pt functional activity tolerance and for the prevention of secondary complications  following surgery  · Pt educated on line management in various set-ups (sitting in recliner, supine in bed, functional mobility tasks)  · White board/Communication board updated       Education:    Assessment:     Cristo Jefferson is a 57 y.o. male with a medical diagnosis of Perforation of sigmoid colon.  He presents with performance deficits affecting function including weakness, impaired endurance, impaired self care skills, gait instability, impaired functional mobilty, impaired cardiopulmonary response to activity, pain, impaired balance.  Pt tolerated treatment well, despite reported fatigue. Pt will continue to benefit from skilled OT services to address the above listed impairments, decrease caregiver burden, and increase pt functional independence level. Recommend home with  OT services upon discharge.     Rehab Prognosis:  Good ; patient would benefit from acute skilled OT services to address these deficits and reach maximum level of function.       Plan:     Patient to be seen 4 x/week to address the above listed problems via self-care/home management, therapeutic activities, therapeutic exercises  · Plan of Care Expires: 06/08/18  · Plan of Care Reviewed with: patient, spouse    This Plan of care has been discussed with the patient who was involved in its development and understands and is in agreement with the identified goals and treatment plan    GOALS:    Occupational Therapy Goals        Problem: Occupational Therapy Goal    Goal Priority Disciplines Outcome Interventions   Occupational Therapy Goal     OT, PT/OT Ongoing (interventions implemented as appropriate)    Description:  Goals to be met by: 5/17/2018    Patient will increase functional independence with ADLs by performing:    UE Dressing with Modified [place].  LE Dressing with Modified [place].  Grooming while standing with Stand-by Assistance.  Toileting from toilet with Supervision for hygiene and clothing management.   Toilet  transfer to toilet with Minimal Assistance.                      Time Tracking:     OT Date of Treatment: 05/09/18  OT Start Time: 1351  OT Stop Time: 1410  OT Total Time (min): 19 min    Billable Minutes:Therapeutic Exercise 19    Fred Porter OT  5/9/2018

## 2018-05-10 LAB
ALBUMIN SERPL BCP-MCNC: 1.8 G/DL
ALP SERPL-CCNC: 84 U/L
ALT SERPL W/O P-5'-P-CCNC: 12 U/L
ANION GAP SERPL CALC-SCNC: 6 MMOL/L
AST SERPL-CCNC: 18 U/L
BASOPHILS # BLD AUTO: 0.01 K/UL
BASOPHILS NFR BLD: 0.2 %
BILIRUB SERPL-MCNC: 0.4 MG/DL
BUN SERPL-MCNC: 19 MG/DL
CALCIUM SERPL-MCNC: 7.6 MG/DL
CHLORIDE SERPL-SCNC: 106 MMOL/L
CO2 SERPL-SCNC: 29 MMOL/L
CREAT SERPL-MCNC: 0.6 MG/DL
DIFFERENTIAL METHOD: ABNORMAL
EOSINOPHIL # BLD AUTO: 0.2 K/UL
EOSINOPHIL NFR BLD: 3.6 %
ERYTHROCYTE [DISTWIDTH] IN BLOOD BY AUTOMATED COUNT: 19.6 %
EST. GFR  (AFRICAN AMERICAN): >60 ML/MIN/1.73 M^2
EST. GFR  (NON AFRICAN AMERICAN): >60 ML/MIN/1.73 M^2
GLUCOSE SERPL-MCNC: 133 MG/DL
HCT VFR BLD AUTO: 27.5 %
HGB BLD-MCNC: 8.5 G/DL
IMM GRANULOCYTES # BLD AUTO: 0.16 K/UL
IMM GRANULOCYTES NFR BLD AUTO: 3.6 %
LYMPHOCYTES # BLD AUTO: 1 K/UL
LYMPHOCYTES NFR BLD: 21.9 %
MAGNESIUM SERPL-MCNC: 1.8 MG/DL
MCH RBC QN AUTO: 26.6 PG
MCHC RBC AUTO-ENTMCNC: 30.9 G/DL
MCV RBC AUTO: 86 FL
MONOCYTES # BLD AUTO: 0.3 K/UL
MONOCYTES NFR BLD: 7.4 %
NEUTROPHILS # BLD AUTO: 2.8 K/UL
NEUTROPHILS NFR BLD: 63.3 %
NRBC BLD-RTO: 1 /100 WBC
PHOSPHATE SERPL-MCNC: 2.9 MG/DL
PLATELET # BLD AUTO: 71 K/UL
PMV BLD AUTO: 11.6 FL
POCT GLUCOSE: 112 MG/DL (ref 70–110)
POCT GLUCOSE: 113 MG/DL (ref 70–110)
POCT GLUCOSE: 132 MG/DL (ref 70–110)
POCT GLUCOSE: 139 MG/DL (ref 70–110)
POCT GLUCOSE: 141 MG/DL (ref 70–110)
POCT GLUCOSE: 141 MG/DL (ref 70–110)
POTASSIUM SERPL-SCNC: 3.8 MMOL/L
PROT SERPL-MCNC: 5 G/DL
RBC # BLD AUTO: 3.19 M/UL
SODIUM SERPL-SCNC: 141 MMOL/L
WBC # BLD AUTO: 4.47 K/UL

## 2018-05-10 PROCEDURE — 83735 ASSAY OF MAGNESIUM: CPT

## 2018-05-10 PROCEDURE — B4185 PARENTERAL SOL 10 GM LIPIDS: HCPCS | Performed by: STUDENT IN AN ORGANIZED HEALTH CARE EDUCATION/TRAINING PROGRAM

## 2018-05-10 PROCEDURE — 85025 COMPLETE CBC W/AUTO DIFF WBC: CPT

## 2018-05-10 PROCEDURE — 20600001 HC STEP DOWN PRIVATE ROOM

## 2018-05-10 PROCEDURE — 25000003 PHARM REV CODE 250: Performed by: SURGERY

## 2018-05-10 PROCEDURE — 63600175 PHARM REV CODE 636 W HCPCS: Performed by: SURGERY

## 2018-05-10 PROCEDURE — 63600175 PHARM REV CODE 636 W HCPCS: Performed by: STUDENT IN AN ORGANIZED HEALTH CARE EDUCATION/TRAINING PROGRAM

## 2018-05-10 PROCEDURE — 84100 ASSAY OF PHOSPHORUS: CPT

## 2018-05-10 PROCEDURE — A4217 STERILE WATER/SALINE, 500 ML: HCPCS | Performed by: STUDENT IN AN ORGANIZED HEALTH CARE EDUCATION/TRAINING PROGRAM

## 2018-05-10 PROCEDURE — 94640 AIRWAY INHALATION TREATMENT: CPT

## 2018-05-10 PROCEDURE — 25000003 PHARM REV CODE 250: Performed by: STUDENT IN AN ORGANIZED HEALTH CARE EDUCATION/TRAINING PROGRAM

## 2018-05-10 PROCEDURE — 25000242 PHARM REV CODE 250 ALT 637 W/ HCPCS: Performed by: STUDENT IN AN ORGANIZED HEALTH CARE EDUCATION/TRAINING PROGRAM

## 2018-05-10 PROCEDURE — 94761 N-INVAS EAR/PLS OXIMETRY MLT: CPT

## 2018-05-10 PROCEDURE — 80053 COMPREHEN METABOLIC PANEL: CPT

## 2018-05-10 RX ORDER — CLOPIDOGREL BISULFATE 75 MG/1
75 TABLET ORAL DAILY
Status: DISCONTINUED | OUTPATIENT
Start: 2018-05-10 | End: 2018-05-12 | Stop reason: HOSPADM

## 2018-05-10 RX ORDER — FUROSEMIDE 10 MG/ML
40 INJECTION INTRAMUSCULAR; INTRAVENOUS ONCE
Status: COMPLETED | OUTPATIENT
Start: 2018-05-10 | End: 2018-05-10

## 2018-05-10 RX ORDER — HYDROCHLOROTHIAZIDE 12.5 MG/1
12.5 TABLET ORAL DAILY
Qty: 30 TABLET | Refills: 11 | Status: SHIPPED | OUTPATIENT
Start: 2018-05-10 | End: 2019-05-14

## 2018-05-10 RX ORDER — MAGNESIUM SULFATE HEPTAHYDRATE 40 MG/ML
2 INJECTION, SOLUTION INTRAVENOUS ONCE
Status: COMPLETED | OUTPATIENT
Start: 2018-05-10 | End: 2018-05-10

## 2018-05-10 RX ORDER — HYDROCHLOROTHIAZIDE 12.5 MG/1
12.5 TABLET ORAL DAILY
Status: DISCONTINUED | OUTPATIENT
Start: 2018-05-10 | End: 2018-05-12 | Stop reason: HOSPADM

## 2018-05-10 RX ORDER — POTASSIUM CHLORIDE 20 MEQ/15ML
20 SOLUTION ORAL ONCE
Status: COMPLETED | OUTPATIENT
Start: 2018-05-10 | End: 2018-05-10

## 2018-05-10 RX ORDER — AMOXICILLIN AND CLAVULANATE POTASSIUM 875; 125 MG/1; MG/1
1 TABLET, FILM COATED ORAL EVERY 12 HOURS
Qty: 14 TABLET | Refills: 0 | Status: SHIPPED | OUTPATIENT
Start: 2018-05-10 | End: 2018-05-17

## 2018-05-10 RX ORDER — SODIUM,POTASSIUM PHOSPHATES 280-250MG
2 POWDER IN PACKET (EA) ORAL ONCE
Status: COMPLETED | OUTPATIENT
Start: 2018-05-10 | End: 2018-05-10

## 2018-05-10 RX ADMIN — ATORVASTATIN CALCIUM 10 MG: 10 TABLET, FILM COATED ORAL at 09:05

## 2018-05-10 RX ADMIN — MICONAZOLE NITRATE: 20 CREAM TOPICAL at 09:05

## 2018-05-10 RX ADMIN — ENOXAPARIN SODIUM 40 MG: 100 INJECTION SUBCUTANEOUS at 05:05

## 2018-05-10 RX ADMIN — POTASSIUM CHLORIDE 20 MEQ: 20 SOLUTION ORAL at 06:05

## 2018-05-10 RX ADMIN — FUROSEMIDE 40 MG: 10 INJECTION, SOLUTION INTRAMUSCULAR; INTRAVENOUS at 06:05

## 2018-05-10 RX ADMIN — CITALOPRAM HYDROBROMIDE 40 MG: 20 TABLET ORAL at 09:05

## 2018-05-10 RX ADMIN — OXYCODONE HYDROCHLORIDE AND ACETAMINOPHEN 1 TABLET: 10; 325 TABLET ORAL at 09:05

## 2018-05-10 RX ADMIN — PANTOPRAZOLE SODIUM 40 MG: 40 TABLET, DELAYED RELEASE ORAL at 09:05

## 2018-05-10 RX ADMIN — HYDROMORPHONE HYDROCHLORIDE 0.5 MG: 1 INJECTION, SOLUTION INTRAMUSCULAR; INTRAVENOUS; SUBCUTANEOUS at 02:05

## 2018-05-10 RX ADMIN — HYDROCHLOROTHIAZIDE 12.5 MG: 12.5 TABLET ORAL at 03:05

## 2018-05-10 RX ADMIN — IPRATROPIUM BROMIDE AND ALBUTEROL SULFATE 3 ML: .5; 3 SOLUTION RESPIRATORY (INHALATION) at 02:05

## 2018-05-10 RX ADMIN — OXYCODONE HYDROCHLORIDE AND ACETAMINOPHEN 1 TABLET: 10; 325 TABLET ORAL at 12:05

## 2018-05-10 RX ADMIN — HYDROCORTISONE SODIUM SUCCINATE 12.5 MG: 100 INJECTION, POWDER, FOR SOLUTION INTRAMUSCULAR; INTRAVENOUS at 09:05

## 2018-05-10 RX ADMIN — OXYCODONE HYDROCHLORIDE AND ACETAMINOPHEN 1 TABLET: 10; 325 TABLET ORAL at 05:05

## 2018-05-10 RX ADMIN — IPRATROPIUM BROMIDE AND ALBUTEROL SULFATE 3 ML: .5; 3 SOLUTION RESPIRATORY (INHALATION) at 08:05

## 2018-05-10 RX ADMIN — HYDROMORPHONE HYDROCHLORIDE 0.5 MG: 1 INJECTION, SOLUTION INTRAMUSCULAR; INTRAVENOUS; SUBCUTANEOUS at 04:05

## 2018-05-10 RX ADMIN — SOYBEAN OIL 250 ML: 20 INJECTION, SOLUTION INTRAVENOUS at 10:05

## 2018-05-10 RX ADMIN — ASPIRIN 81 MG CHEWABLE TABLET 81 MG: 81 TABLET CHEWABLE at 09:05

## 2018-05-10 RX ADMIN — AMOXICILLIN AND CLAVULANATE POTASSIUM 1 TABLET: 875; 125 TABLET, FILM COATED ORAL at 09:05

## 2018-05-10 RX ADMIN — CLOPIDOGREL BISULFATE 75 MG: 75 TABLET, FILM COATED ORAL at 03:05

## 2018-05-10 RX ADMIN — MAGNESIUM SULFATE IN WATER 2 G: 40 INJECTION, SOLUTION INTRAVENOUS at 06:05

## 2018-05-10 RX ADMIN — POTASSIUM & SODIUM PHOSPHATES POWDER PACK 280-160-250 MG 2 PACKET: 280-160-250 PACK at 06:05

## 2018-05-10 RX ADMIN — HYDROMORPHONE HYDROCHLORIDE 0.5 MG: 1 INJECTION, SOLUTION INTRAMUSCULAR; INTRAVENOUS; SUBCUTANEOUS at 10:05

## 2018-05-10 RX ADMIN — OXYCODONE HYDROCHLORIDE AND ACETAMINOPHEN 1 TABLET: 10; 325 TABLET ORAL at 04:05

## 2018-05-10 RX ADMIN — OXYCODONE HYDROCHLORIDE AND ACETAMINOPHEN 1 TABLET: 10; 325 TABLET ORAL at 01:05

## 2018-05-10 RX ADMIN — CALCIUM GLUCONATE: 94 INJECTION, SOLUTION INTRAVENOUS at 10:05

## 2018-05-10 NOTE — PLAN OF CARE
Problem: Patient Care Overview  Goal: Plan of Care Review  Outcome: Ongoing (interventions implemented as appropriate)  POC reviewed with pt and family at 1400. Pt verbalized understanding. Questions and concerns addressed. No acute events today. Pt. Persistent about Q4 Percocet and Q6 Dilaudid pain management.  No coverage for accuchecks.  Appetite minimal.  TPN @ 65 with lipids.  (L) chest portacath deaccessed.  Pt. Sat above 88% on room air.Colostomy RUQ intact. Abdomen Bulb 15 Fr. Output 15 mL.  Anterior Abdomen Bulb 15 Fr. Output 15 mL.  Negative Pressure Wound Therapy @100 output 75 mL.  Pt progressing toward goals. Will continue to monitor. See flowsheets for full assessment and VS info.

## 2018-05-10 NOTE — PLAN OF CARE
[facility]    HOME HEALTH ORDERS  FACE TO FACE ENCOUNTER    Patient Name: Cristo Jefferson  YOB: 1960    PCP: Vince Perez MD   PCP Address: [address] / [place] [address]  PCP Phone Number: [phone]  PCP Fax: [phone]    Encounter Date: 05/10/2018    Admit to Home Health    Diagnoses:  Active Hospital Problems    Diagnosis  POA    *Perforation of sigmoid colon [K63.1]  Yes    Presence of surgical incision [Z78.9]  Yes    Encounter for imaging study to confirm nasogastric (NG) tube placement [Z01.89]  Not Applicable    Septic shock [A41.9, R65.21]  Yes      Resolved Hospital Problems    Diagnosis Date Resolved POA   No resolved problems to display.       No future appointments.        I have seen and examined this patient face to face today. My clinical findings that support the need for the home health skilled services and home bound status are the following:  Weakness/numbness causing balance and gait disturbance due to Surgery making it taxing to leave home.    Allergies:  Review of patient's allergies indicates:   Allergen Reactions    Morphine Rash       Diet: diabetic diet: 2200 calorie    Activities: activity as tolerated, no driving while on analgesics and no heavy lifting for 6 weeks    Nursing:   SN to complete comprehensive assessment including routine vital signs. Instruct on disease process and s/s of complications to report to MD. Review/verify medication list sent home with the patient at time of discharge  and instruct patient/caregiver as needed. Frequency may be adjusted depending on start of care date.    Notify MD if SBP > 160 or < 90; DBP > 90 or < 50; HR > 120 or < 50; Temp > 101      CONSULTS:    Physical Therapy to evaluate and treat. Evaluate for home safety and equipment needs; Establish/upgrade home exercise program. Perform / instruct on therapeutic exercises, gait training, transfer training, and Range of  Motion.  Occupational Therapy to evaluate and treat. Evaluate home environment for safety and equipment needs. Perform/Instruct on transfers, ADL training, ROM, and therapeutic exercises.   to evaluate for community resources/long-range planning.  Aide to provide assistance with personal care, ADLs, and vital signs.    MISCELLANEOUS CARE:  Wound Care Orders:  yes:  Wound Vac:   Location:  Midline abdominal           Dressing changes every Monday, Wednesday and Friday.        ET Consult    Medications: Review discharge medications with patient and family and provide education.      Current Discharge Medication List      CONTINUE these medications which have NOT CHANGED    Details   acetaminophen (TYLENOL EXTRA STRENGTH) 500 MG tablet Take 500 mg by mouth 2 (two) times daily as needed for Pain.      aspirin 81 MG Chew Take 81 mg by mouth once daily.      atorvastatin (LIPITOR) 10 MG tablet TAKE 1 TABLET BY MOUTH EVERY DAY  Qty: 90 tablet, Refills: 0      citalopram (CELEXA) 40 MG tablet TAKE 1 TABLET BY MOUTH EVERY DAY  Qty: 90 tablet, Refills: 0      clopidogrel (PLAVIX) 75 mg tablet TAKE 1 TABLET BY MOUTH EVERY DAY  Qty: 90 tablet, Refills: 0      docusate sodium (COLACE) 100 MG capsule Take 1 capsule (100 mg total) by mouth 2 (two) times daily.  Refills: 0      ferrous sulfate 325 (65 FE) MG EC tablet Take 325 mg by mouth 2 (two) times daily.      hydrocodone-acetaminophen 10-325mg (NORCO)  mg Tab Take 1 tablet by mouth every 6 (six) hours as needed.  Qty: 30 tablet, Refills: 0      losartan-hydrochlorothiazide 100-12.5 mg (HYZAAR) 100-12.5 mg Tab TAKE 1 TABLET BY MOUTH EVERY DAY  Qty: 90 tablet, Refills: 0      meloxicam (MOBIC) 15 MG tablet TAKE 1 TABLET BY MOUTH EVERY DAY  Qty: 90 tablet, Refills: 0      metFORMIN (GLUCOPHAGE-XR) 750 MG 24 hr tablet TAKE 1 TABLET BY MOUTH TWICE A DAY  Qty: 180 tablet, Refills: 0      metoprolol succinate (TOPROL-XL) 25 MG 24 hr tablet TAKE 1 TABLET BY MOUTH  TWICE A DAY  Qty: 180 tablet, Refills: 0      metoprolol tartrate (LOPRESSOR) 25 MG tablet TAKE 1 TABLET BY MOUTH TWICE DAILY  Qty: 180 tablet, Refills: 0      MULTIVIT WITH MINERALS/LUTEIN (MULTIVITAMIN 50 PLUS ORAL) Take by mouth.      predniSONE (DELTASONE) 5 MG tablet 5 mg 2 (two) times daily.       vitamin D 1000 units Tab Take 2,000 Units by mouth once daily.             I certify that this patient is confined to his home and needs intermittent skilled nursing care, physical therapy and occupational therapy.    Jinny Martinez, PGY-1  General Surgery  [phone]

## 2018-05-10 NOTE — PLAN OF CARE
Wound vac form not completed. TRAMAINE paged Resident and is awaiting a return call.     Anastasia Fay LCSW

## 2018-05-10 NOTE — ASSESSMENT & PLAN NOTE
58yo male with recurrent perforated sigmoid s/p ex lap with transverse colostomy 5/3/18 with takeback for washout with fascial closure and placement of wound vac on 5/4/18    - tolerating diabetic diet, switch to regular (glucose has been well controlled)  - pain well controlled on PO pain meds  - on 2L NC, wean as tolerated, only need to keep O2>88%one dose lasix today  -H/o CAD s/p 4 vessel CABG   -hydrocortisone 100mg IV BID given chronic steroid use  -UOP adequate, continue to monitor Is and Os  - trend daily labs  -replace lytes PRN  -GI ulcer prophylaxis: pantoprazole  - wound vac in place in midline, last changed 5/10/18, change per wound care M/W/F (request to have picture placed in chart with each change)  - Blood cultures drawn in ED, growing gram negative rods, repeat blood cultures on 5/4 show NGTD  -Abx: continue on ceftriaxone, Flagyl  -Glucose goal of 120-180  -SSI   - PT/OT orders to help patient with deconditioning    Dispo: regular diet, pain well controlled on PO pain meds, work with PT/OT

## 2018-05-10 NOTE — PLAN OF CARE
Problem: Patient Care Overview  Goal: Plan of Care Review  Outcome: Ongoing (interventions implemented as appropriate)  POC reviewed with patient and wife who both verbalized understanding. AAOx4. VSS on 2L NC. Midline abdominal wound noted with wound vac in place @ 100, sanguinous output. Bilateral RACHEL drains noted with serosanguinous output. RLQ colostomy in place with small dark brown output overnight. C/o pain, controlled with PRN medications per MAR. TPN and lipids infusing. Tolerating soft diabetic diet, denies any nausea. Voiding per urinal, up with one person assist. Blood glucose being monitored every 4 hours, no coverage needed. Refusing BLE SCDS. Safety precautions maintained, call light within reach. Remains free from falls and injury. No acute events. No distress noted. Will continue to monitor. Wife at bedside.

## 2018-05-10 NOTE — PLAN OF CARE
PLAN AT THIS TIME IS FOR DISCHARGE HOME TOMORROW     05/10/18 1201   Discharge Reassessment   Assessment Type Discharge Planning Reassessment   Provided patient/caregiver education on the expected discharge date and the discharge plan No

## 2018-05-10 NOTE — PLAN OF CARE
SW received completed wound vac form and faxed ([phone]) it along with Pt's face sheet, H&P and Op Note to [facility]. SW also sent all necessary Home Health referral information to Pt's insurance, [facility], and asked that she be made aware of the home health agency to which Pt is assigned. SW to continue to follow.     Anastasia Fay LCSW

## 2018-05-10 NOTE — SUBJECTIVE & OBJECTIVE
Interval History/Significant Events: No acute events overnight. TPN infusing. Pain well controlled on PO pain meds. Voiding. One dose lasix yesterday. Tolerating regular diet. No nausea/vomiting, some bloating. Wound vac changed yesterday per wound care.    Follow-up For: Procedure(s) (LRB):  EXPLORATORY-LAPAROTOMY (N/A)  WASHOUT-ABDOMINAL (N/A)  CLOSURE-WOUND (N/A)    Post-Operative Day: 3 Days Post-Op    Objective:     Vital Signs (Most Recent):  Temp: 98.8 °F (37.1 °C) (05/10/18 0431)  Pulse: 65 (05/10/18 0431)  Resp: 16 (05/10/18 0431)  BP: 133/79 (05/10/18 0431)  SpO2: 97 % (05/10/18 0431) Vital Signs (24h Range):  Temp:  [97.7 °F (36.5 °C)-99 °F (37.2 °C)] 98.8 °F (37.1 °C)  Pulse:  [60-72] 65  Resp:  [16-20] 16  SpO2:  [92 %-98 %] 97 %  BP: (118-137)/(71-83) 133/79     Weight: 117.1 kg (258 lb 2.5 oz)  Body mass index is 38.12 kg/m² (pended).      Intake/Output Summary (Last 24 hours) at 05/10/18 0609  Last data filed at 05/10/18 0418   Gross per 24 hour   Intake           890.88 ml   Output           3552.5 ml   Net         -2661.62 ml       Physical Exam   Constitutional: He is oriented to person, place, and time. He appears well-developed and well-nourished.   HENT:   Head: Normocephalic and atraumatic.   Nose: Nose normal.   Eyes: Conjunctivae are normal. Pupils are equal, round, and reactive to light. No scleral icterus.   Cardiovascular: Normal rate and regular rhythm.  Exam reveals no gallop and no friction rub.    No murmur heard.  Pulmonary/Chest: Effort normal and breath sounds normal.   Abdominal: Soft. There is no tenderness.   Midline incision with wound vac in place  Transverse colostomy pink   Musculoskeletal: Normal range of motion.   Neurological: He is alert and oriented to person, place, and time.   Skin: Skin is warm and dry. No rash noted.   Nursing note and vitals reviewed.      Lines/Drains/Airways     Peripherally Inserted Central Catheter Line                 PICC Double Lumen  05/09/18 1448 right brachial less than 1 day          Central Venous Catheter Line                 Port A Cath Single Lumen 05/02/18 2105 left subclavian 7 days          Drain                 Drain/Device  Right -- days         Colostomy 05/03/18 0715 RUQ 6 days         Closed/Suction Drain 05/04/18 0834 Abdomen Bulb 15 Fr. 5 days         Closed/Suction Drain 05/04/18 0834 Anterior Abdomen Bulb 15 Fr. 5 days         Closed/Suction Drain 05/04/18 0854 Medial Abdomen Other (Comment) 5 days          Pressure Ulcer                 Negative Pressure Wound Therapy  5 days                Significant Labs:    CBC/Anemia Profile:    Recent Labs  Lab 05/09/18  0420   WBC 5.02   HGB 8.0*   HCT 25.8*   PLT 81*   MCV 86   RDW 18.6*        Chemistries:    Recent Labs  Lab 05/08/18  1248 05/09/18  0420 05/10/18  0414    146* 141   K 3.5 3.2* 3.8    109 106   CO2 29 28 29   BUN 24* 22* 19   CREATININE 0.6 0.6 0.6   CALCIUM 7.7* 7.6* 7.6*   ALBUMIN  --  1.8* 1.8*   PROT  --  4.7* 5.0*   BILITOT  --  0.5 0.4   ALKPHOS  --  103 84   ALT  --  15 12   AST  --  23 18   MG  --  1.8 1.8   PHOS  --  2.9 2.9       Coagulation:   No results for input(s): PT, INR, APTT in the last 48 hours.  Lactic Acid:   No results for input(s): LACTATE in the last 48 hours.    Significant Imaging:  I have reviewed and interpreted all pertinent imaging results/findings within the past 24 hours.

## 2018-05-10 NOTE — SIGNIFICANT EVENT
Artificial Intelligence Arianne      Admit Date: 5/3/2018  LOS: 7  Code Status: Full Code   Date of Consult: 05/10/2018  : 1960  Age: 57 y.o.  Weight:   Wt Readings from Last 1 Encounters:   18 117.1 kg (258 lb 2.5 oz)     Sex: male  Bed: [facility] named [facility]:   MRN: [id number]  Attending Physician: Eber Riley MD  Primary Service: Networked reference to record PCT   Time AI Alert Received: 09:05  Time at Bedside: 09:15           Patient found alert, resting in bed with family and 2 RN at bedside giving AM medications. On room air, in no distress. Chart review noted oxygen saturation charting error (charted 16% when really 94% on room air). Discussed with primary team, no CCM consult requested.       Vital Signs (Most Recent):  Temp: 98.3 °F (36.8 °C) (05/10/18 1134)  Pulse: 68 (05/10/18 1134)  Resp: 18 (05/10/18 1134)  BP: 128/81 (05/10/18 1134)  SpO2: (!) 91 % (05/10/18 1134) Vital Signs (24h Range):  Temp:  [97.3 °F (36.3 °C)-99 °F (37.2 °C)] 98.3 °F (36.8 °C)  Pulse:  [62-73] 68  Resp:  [16-20] 18  SpO2:  [90 %-97 %] 91 %  BP: (118-140)/(71-83) 128/81         This is an  Artificial Intelligence Notification.     Artificial Intelligence alert discussed with Primary team:  Dr. Martinez with General Surgery     Please place a Critical Care consult if evaluation/consultation is needed  The Critical Care Fellow can be reached at x [phone]

## 2018-05-10 NOTE — PT/OT/SLP PROGRESS
Physical Therapy      Patient Name:  Cristo Jefferson   MRN:  [id number]    Patient not seen today secondary to pt with nsg care on first attempt, eating on second attempt and 3rd attempt pt reports not feeling well today  . Will follow-up next scheduled treatment per PT POC    Austin Hendricks, PTA   5/10/2018

## 2018-05-10 NOTE — PLAN OF CARE
SW placed call to Surgery Resident and reminded her to please complete the [employer] wound vac form and complete home health orders. Resident agreed to complete form and said they are working on advancing Pt's diet and should be ready to discharge home as soon as tomorrow. SW to continue to follow.     Anastasia Fay, JONAS

## 2018-05-10 NOTE — PROGRESS NOTES
[facility]  General Surgery  Progress Note    Subjective:     History of Present Illness:  Cristo Jefferson is a 57 y.o. male who presents to [facility] on 5/3/2018 as transfer for septic shock in setting of recurrent sigmoid perforation.     Patient has a past medical history of RA on immunosuppressants including prednisone 12.5 mg daily and previously Orencia (abatacept) which was stopped in November, DM II, HTN, CAD s/p CABG and appendiceal CA s/p appendectomy and subsequent R hemicolectomy and adjuvant chemo/XRT with last treatment in March 2017 who was transferred to [facility] with complains of abdominal pain since about 2 PM yesterday after eating crawfish.  No nausea or vomiting.  Patient was found to be tachycardic and hypotensive and CT showed sigmoid perforation with adjacent fluid collection and abscess 5 cm in size not appearing well-contained or walled-off.  Patient was given IV Vancomycin and Zosyn and started on Dopamine gtt at OSH.  Febrile to 102.7 in ED and tachycardic in 110s and hypotensive on 0.1 of levophed.     The patient is recently s/p exploratory laparotomy and reversal of loop ileostomy on 4/4/2018 by Dr. Riley.  He was having a lot of difficulties with dehydration and was requiring daily LR infusions for fluid loss from ileostomy since it's creation in November.     The patient has previously undergone exploratory laparotomy and washout and drainage of sigmoid perforation with construction of loop ileostomy in November 2017 by Dr. Riley for sigmoid perforation from diverticulitis.  The patient went on to have colonoscopy and water soluble enema that looked good prior to reversal and he has overall been doing well up until yesterday which is when he started feeling bad.     He is on Plavix for history of CAD.     Currently on Prednisone 12.5 mg daily (5 mg in AM, 2.5 mg in afternoon, and 5 mg at night).    Post-Op Info:  Procedure(s) (LRB):  EXPLORATORY-LAPAROTOMY  (N/A)  WASHOUT-ABDOMINAL (N/A)  CLOSURE-WOUND (N/A)   6 Days Post-Op     Interval History/Significant Events: No acute events overnight. TPN infusing. Pain well controlled on PO pain meds. Voiding. One dose lasix yesterday. Tolerating regular diet. No nausea/vomiting, some bloating. Wound vac changed yesterday per wound care.    Follow-up For: Procedure(s) (LRB):  EXPLORATORY-LAPAROTOMY (N/A)  WASHOUT-ABDOMINAL (N/A)  CLOSURE-WOUND (N/A)    Post-Operative Day: 3 Days Post-Op    Objective:     Vital Signs (Most Recent):  Temp: 98.8 °F (37.1 °C) (05/10/18 0431)  Pulse: 65 (05/10/18 0431)  Resp: 16 (05/10/18 0431)  BP: 133/79 (05/10/18 0431)  SpO2: 97 % (05/10/18 0431) Vital Signs (24h Range):  Temp:  [97.7 °F (36.5 °C)-99 °F (37.2 °C)] 98.8 °F (37.1 °C)  Pulse:  [60-72] 65  Resp:  [16-20] 16  SpO2:  [92 %-98 %] 97 %  BP: (118-137)/(71-83) 133/79     Weight: 117.1 kg (258 lb 2.5 oz)  Body mass index is 38.12 kg/m² (pended).      Intake/Output Summary (Last 24 hours) at 05/10/18 0609  Last data filed at 05/10/18 0418   Gross per 24 hour   Intake           890.88 ml   Output           3552.5 ml   Net         -2661.62 ml       Physical Exam   Constitutional: He is oriented to person, place, and time. He appears well-developed and well-nourished.   HENT:   Head: Normocephalic and atraumatic.   Nose: Nose normal.   Eyes: Conjunctivae are normal. Pupils are equal, round, and reactive to light. No scleral icterus.   Cardiovascular: Normal rate and regular rhythm.  Exam reveals no gallop and no friction rub.    No murmur heard.  Pulmonary/Chest: Effort normal and breath sounds normal.   Abdominal: Soft. There is no tenderness.   Midline incision with wound vac in place  Transverse colostomy pink   Musculoskeletal: Normal range of motion.   Neurological: He is alert and oriented to person, place, and time.   Skin: Skin is warm and dry. No rash noted.   Nursing note and vitals reviewed.      Lines/Drains/Airways     Peripherally  Inserted Central Catheter Line                 PICC Double Lumen 05/09/18 1448 right brachial less than 1 day          Central Venous Catheter Line                 Port A Cath Single Lumen 05/02/18 2105 left subclavian 7 days          Drain                 Drain/Device  Right -- days         Colostomy 05/03/18 0715 RUQ 6 days         Closed/Suction Drain 05/04/18 0834 Abdomen Bulb 15 Fr. 5 days         Closed/Suction Drain 05/04/18 0834 Anterior Abdomen Bulb 15 Fr. 5 days         Closed/Suction Drain 05/04/18 0854 Medial Abdomen Other (Comment) 5 days          Pressure Ulcer                 Negative Pressure Wound Therapy  5 days                Significant Labs:    CBC/Anemia Profile:    Recent Labs  Lab 05/09/18  0420   WBC 5.02   HGB 8.0*   HCT 25.8*   PLT 81*   MCV 86   RDW 18.6*        Chemistries:    Recent Labs  Lab 05/08/18  1248 05/09/18  0420 05/10/18  0414    146* 141   K 3.5 3.2* 3.8    109 106   CO2 29 28 29   BUN 24* 22* 19   CREATININE 0.6 0.6 0.6   CALCIUM 7.7* 7.6* 7.6*   ALBUMIN  --  1.8* 1.8*   PROT  --  4.7* 5.0*   BILITOT  --  0.5 0.4   ALKPHOS  --  103 84   ALT  --  15 12   AST  --  23 18   MG  --  1.8 1.8   PHOS  --  2.9 2.9       Coagulation:   No results for input(s): PT, INR, APTT in the last 48 hours.  Lactic Acid:   No results for input(s): LACTATE in the last 48 hours.    Significant Imaging:  I have reviewed and interpreted all pertinent imaging results/findings within the past 24 hours.    Assessment/Plan:     * Perforation of sigmoid colon    58yo male with recurrent perforated sigmoid s/p ex lap with transverse colostomy 5/3/18 with takeback for washout with fascial closure and placement of wound vac on 5/4/18    - tolerating diabetic diet, switch to regular (glucose has been well controlled)  - pain well controlled on PO pain meds  - on 2L NC, wean as tolerated, only need to keep O2>88%one dose lasix today  -H/o CAD s/p 4 vessel CABG   -hydrocortisone 12.5mg IV BID given  chronic steroid use  -UOP adequate, continue to monitor Is and Os  - trend daily labs  -replace lytes PRN  -GI ulcer prophylaxis: pantoprazole  - wound vac in place in midline, last changed 5/10/18, change per wound care M/W/F (request to have picture placed in chart with each change)  - Blood cultures drawn in ED, growing gram negative rods, repeat blood cultures on 5/4 show NGTD  -Abx: continue on ceftriaxone, Flagyl  -Glucose goal of 120-180  -SSI   - PT/OT orders to help patient with deconditioning    Dispo: regular diet, pain well controlled on PO pain meds, work with PT/OT              Jinny Martinez MD  General Surgery  [facility]

## 2018-05-11 LAB
ALBUMIN SERPL BCP-MCNC: 1.9 G/DL
ALP SERPL-CCNC: 81 U/L
ALT SERPL W/O P-5'-P-CCNC: 12 U/L
ANION GAP SERPL CALC-SCNC: 11 MMOL/L
AST SERPL-CCNC: 15 U/L
BASOPHILS # BLD AUTO: 0.01 K/UL
BASOPHILS NFR BLD: 0.2 %
BILIRUB SERPL-MCNC: 0.5 MG/DL
BUN SERPL-MCNC: 17 MG/DL
CALCIUM SERPL-MCNC: 8 MG/DL
CHLORIDE SERPL-SCNC: 103 MMOL/L
CO2 SERPL-SCNC: 23 MMOL/L
CREAT SERPL-MCNC: 0.6 MG/DL
DIFFERENTIAL METHOD: ABNORMAL
EOSINOPHIL # BLD AUTO: 0.1 K/UL
EOSINOPHIL NFR BLD: 2.5 %
ERYTHROCYTE [DISTWIDTH] IN BLOOD BY AUTOMATED COUNT: 20.1 %
EST. GFR  (AFRICAN AMERICAN): >60 ML/MIN/1.73 M^2
EST. GFR  (NON AFRICAN AMERICAN): >60 ML/MIN/1.73 M^2
GLUCOSE SERPL-MCNC: 138 MG/DL
HCT VFR BLD AUTO: 29.3 %
HGB BLD-MCNC: 9.1 G/DL
IMM GRANULOCYTES # BLD AUTO: 0.05 K/UL
IMM GRANULOCYTES NFR BLD AUTO: 1 %
LYMPHOCYTES # BLD AUTO: 1 K/UL
LYMPHOCYTES NFR BLD: 20.7 %
MAGNESIUM SERPL-MCNC: 1.6 MG/DL
MCH RBC QN AUTO: 26.5 PG
MCHC RBC AUTO-ENTMCNC: 31.1 G/DL
MCV RBC AUTO: 85 FL
MONOCYTES # BLD AUTO: 0.3 K/UL
MONOCYTES NFR BLD: 6.4 %
NEUTROPHILS # BLD AUTO: 3.4 K/UL
NEUTROPHILS NFR BLD: 69.2 %
NRBC BLD-RTO: 0 /100 WBC
PHOSPHATE SERPL-MCNC: 2.9 MG/DL
PLATELET # BLD AUTO: 101 K/UL
PMV BLD AUTO: 11.2 FL
POCT GLUCOSE: 105 MG/DL (ref 70–110)
POCT GLUCOSE: 121 MG/DL (ref 70–110)
POCT GLUCOSE: 130 MG/DL (ref 70–110)
POCT GLUCOSE: 132 MG/DL (ref 70–110)
POCT GLUCOSE: 136 MG/DL (ref 70–110)
POCT GLUCOSE: 152 MG/DL (ref 70–110)
POTASSIUM SERPL-SCNC: 3.8 MMOL/L
PROT SERPL-MCNC: 5.4 G/DL
RBC # BLD AUTO: 3.44 M/UL
SODIUM SERPL-SCNC: 137 MMOL/L
WBC # BLD AUTO: 4.84 K/UL

## 2018-05-11 PROCEDURE — 25000003 PHARM REV CODE 250: Performed by: SURGERY

## 2018-05-11 PROCEDURE — 25000003 PHARM REV CODE 250: Performed by: STUDENT IN AN ORGANIZED HEALTH CARE EDUCATION/TRAINING PROGRAM

## 2018-05-11 PROCEDURE — 97110 THERAPEUTIC EXERCISES: CPT

## 2018-05-11 PROCEDURE — 25000242 PHARM REV CODE 250 ALT 637 W/ HCPCS: Performed by: STUDENT IN AN ORGANIZED HEALTH CARE EDUCATION/TRAINING PROGRAM

## 2018-05-11 PROCEDURE — 94761 N-INVAS EAR/PLS OXIMETRY MLT: CPT

## 2018-05-11 PROCEDURE — 84100 ASSAY OF PHOSPHORUS: CPT

## 2018-05-11 PROCEDURE — 63600175 PHARM REV CODE 636 W HCPCS: Performed by: STUDENT IN AN ORGANIZED HEALTH CARE EDUCATION/TRAINING PROGRAM

## 2018-05-11 PROCEDURE — 80053 COMPREHEN METABOLIC PANEL: CPT

## 2018-05-11 PROCEDURE — 97116 GAIT TRAINING THERAPY: CPT

## 2018-05-11 PROCEDURE — 83735 ASSAY OF MAGNESIUM: CPT

## 2018-05-11 PROCEDURE — 20600001 HC STEP DOWN PRIVATE ROOM

## 2018-05-11 PROCEDURE — 94640 AIRWAY INHALATION TREATMENT: CPT

## 2018-05-11 PROCEDURE — 85025 COMPLETE CBC W/AUTO DIFF WBC: CPT

## 2018-05-11 RX ORDER — GUAIFENESIN 600 MG/1
600 TABLET, EXTENDED RELEASE ORAL 2 TIMES DAILY
Status: DISCONTINUED | OUTPATIENT
Start: 2018-05-11 | End: 2018-05-12 | Stop reason: HOSPADM

## 2018-05-11 RX ORDER — GUAIFENESIN 600 MG/1
600 TABLET, EXTENDED RELEASE ORAL 2 TIMES DAILY
Qty: 20 TABLET | Refills: 0 | COMMUNITY
Start: 2018-05-11 | End: 2018-05-21

## 2018-05-11 RX ORDER — METOPROLOL TARTRATE 25 MG/1
12.5 TABLET ORAL 2 TIMES DAILY
Status: DISCONTINUED | OUTPATIENT
Start: 2018-05-11 | End: 2018-05-12 | Stop reason: HOSPADM

## 2018-05-11 RX ORDER — FUROSEMIDE 10 MG/ML
20 INJECTION INTRAMUSCULAR; INTRAVENOUS ONCE
Status: COMPLETED | OUTPATIENT
Start: 2018-05-11 | End: 2018-05-11

## 2018-05-11 RX ORDER — METOPROLOL TARTRATE 25 MG/1
12.5 TABLET, FILM COATED ORAL 2 TIMES DAILY
Qty: 30 TABLET | Refills: 2 | Status: SHIPPED | OUTPATIENT
Start: 2018-05-11 | End: 2018-07-30 | Stop reason: SDUPTHER

## 2018-05-11 RX ORDER — POTASSIUM CHLORIDE 20 MEQ/1
20 TABLET, EXTENDED RELEASE ORAL ONCE
Status: COMPLETED | OUTPATIENT
Start: 2018-05-11 | End: 2018-05-11

## 2018-05-11 RX ORDER — ALBUTEROL SULFATE 90 UG/1
2 AEROSOL, METERED RESPIRATORY (INHALATION) EVERY 6 HOURS PRN
Qty: 18 G | Refills: 0 | Status: SHIPPED | OUTPATIENT
Start: 2018-05-11 | End: 2019-03-28

## 2018-05-11 RX ORDER — PREDNISONE 5 MG/1
5 TABLET ORAL 2 TIMES DAILY
Status: DISCONTINUED | OUTPATIENT
Start: 2018-05-11 | End: 2018-05-11

## 2018-05-11 RX ORDER — PREDNISONE 5 MG/1
5 TABLET ORAL 3 TIMES DAILY
Status: DISCONTINUED | OUTPATIENT
Start: 2018-05-11 | End: 2018-05-12 | Stop reason: HOSPADM

## 2018-05-11 RX ORDER — OXYCODONE AND ACETAMINOPHEN 5; 325 MG/1; MG/1
1-2 TABLET ORAL
Qty: 41 TABLET | Refills: 0 | Status: SHIPPED | OUTPATIENT
Start: 2018-05-11 | End: 2018-05-12 | Stop reason: HOSPADM

## 2018-05-11 RX ADMIN — OXYCODONE HYDROCHLORIDE AND ACETAMINOPHEN 1 TABLET: 10; 325 TABLET ORAL at 06:05

## 2018-05-11 RX ADMIN — ATORVASTATIN CALCIUM 10 MG: 10 TABLET, FILM COATED ORAL at 08:05

## 2018-05-11 RX ADMIN — GUAIFENESIN 600 MG: 600 TABLET, EXTENDED RELEASE ORAL at 08:05

## 2018-05-11 RX ADMIN — CITALOPRAM HYDROBROMIDE 40 MG: 20 TABLET ORAL at 08:05

## 2018-05-11 RX ADMIN — Medication 12.5 MG: at 08:05

## 2018-05-11 RX ADMIN — FUROSEMIDE 20 MG: 10 INJECTION, SOLUTION INTRAVENOUS at 06:05

## 2018-05-11 RX ADMIN — ASPIRIN 81 MG CHEWABLE TABLET 81 MG: 81 TABLET CHEWABLE at 08:05

## 2018-05-11 RX ADMIN — CLOPIDOGREL BISULFATE 75 MG: 75 TABLET, FILM COATED ORAL at 08:05

## 2018-05-11 RX ADMIN — PANTOPRAZOLE SODIUM 40 MG: 40 TABLET, DELAYED RELEASE ORAL at 08:05

## 2018-05-11 RX ADMIN — SODIUM PHOSPHATE, MONOBASIC, MONOHYDRATE 20.01 MMOL: 276; 142 INJECTION, SOLUTION INTRAVENOUS at 06:05

## 2018-05-11 RX ADMIN — PREDNISONE 5 MG: 5 TABLET ORAL at 08:05

## 2018-05-11 RX ADMIN — MICONAZOLE NITRATE: 20 CREAM TOPICAL at 09:05

## 2018-05-11 RX ADMIN — PREDNISONE 5 MG: 5 TABLET ORAL at 02:05

## 2018-05-11 RX ADMIN — AMOXICILLIN AND CLAVULANATE POTASSIUM 1 TABLET: 875; 125 TABLET, FILM COATED ORAL at 08:05

## 2018-05-11 RX ADMIN — IPRATROPIUM BROMIDE AND ALBUTEROL SULFATE 3 ML: .5; 3 SOLUTION RESPIRATORY (INHALATION) at 02:05

## 2018-05-11 RX ADMIN — INSULIN ASPART 1 UNITS: 100 INJECTION, SOLUTION INTRAVENOUS; SUBCUTANEOUS at 04:05

## 2018-05-11 RX ADMIN — ENOXAPARIN SODIUM 40 MG: 100 INJECTION SUBCUTANEOUS at 05:05

## 2018-05-11 RX ADMIN — MICONAZOLE NITRATE: 20 CREAM TOPICAL at 08:05

## 2018-05-11 RX ADMIN — HYDROCHLOROTHIAZIDE 12.5 MG: 12.5 TABLET ORAL at 08:05

## 2018-05-11 RX ADMIN — OXYCODONE HYDROCHLORIDE AND ACETAMINOPHEN 1 TABLET: 10; 325 TABLET ORAL at 01:05

## 2018-05-11 RX ADMIN — OXYCODONE HYDROCHLORIDE AND ACETAMINOPHEN 1 TABLET: 10; 325 TABLET ORAL at 10:05

## 2018-05-11 RX ADMIN — HYDROMORPHONE HYDROCHLORIDE 0.5 MG: 1 INJECTION, SOLUTION INTRAMUSCULAR; INTRAVENOUS; SUBCUTANEOUS at 08:05

## 2018-05-11 RX ADMIN — POTASSIUM CHLORIDE 20 MEQ: 1500 TABLET, EXTENDED RELEASE ORAL at 06:05

## 2018-05-11 RX ADMIN — MAGNESIUM SULFATE HEPTAHYDRATE 3 G: 500 INJECTION, SOLUTION INTRAMUSCULAR; INTRAVENOUS at 06:05

## 2018-05-11 RX ADMIN — OXYCODONE HYDROCHLORIDE AND ACETAMINOPHEN 1 TABLET: 10; 325 TABLET ORAL at 02:05

## 2018-05-11 RX ADMIN — HYDROMORPHONE HYDROCHLORIDE 0.5 MG: 1 INJECTION, SOLUTION INTRAMUSCULAR; INTRAVENOUS; SUBCUTANEOUS at 12:05

## 2018-05-11 NOTE — PT/OT/SLP PROGRESS
Physical Therapy Treatment    Patient Name:  Cristo Jefferson   MRN:  [id number]    Recommendations:     Discharge Recommendations:  home health PT   Discharge Equipment Recommendations: shower chair   Barriers to discharge: None    Assessment:     Cristo Jefferson is a 57 y.o. male admitted with a medical diagnosis of Perforation of sigmoid colon.  He presents with the following impairments/functional limitations:  weakness, impaired endurance, impaired self care skills, gait instability, impaired functional mobilty, impaired balance, impaired cardiopulmonary response to activity, pain .Pt tolerated treatment fairly well and is progressing slowly with mobility. Pt limited due to pain and fatigue. Pt would continue to benefit from skilled PT to address overall functional mobility and goals. Goals remain appropriate      Rehab Prognosis:  good; patient would benefit from acute skilled PT services to address these deficits and reach maximum level of function.      Recent Surgery: Procedure(s) (LRB):  EXPLORATORY-LAPAROTOMY (N/A)  WASHOUT-ABDOMINAL (N/A)  CLOSURE-WOUND (N/A) 7 Days Post-Op    Plan:     During this hospitalization, patient to be seen 4 x/week to address the above listed problems via gait training, therapeutic activities, therapeutic exercises  · Plan of Care Expires:      Plan of Care Reviewed with: patient, spouse    Subjective     Communicated with RN prior to session.  Patient found supine upon PT entry to room, agreeable to treatment.      Chief Complaint: I don't want to use the walker, I don't have room for it at home, it doesn't fit in my house    Pain/Comfort:  · Pain Rating 1: 5/10  · Location - Orientation 1: generalized  · Location 1: abdomen  · Pain Addressed 1: Pre-medicate for activity  · Pain Rating Post-Intervention 1: 5/10    Patients cultural, spiritual, Church conflicts given the current situation: none noted    Objective:     Patient found with: wound vac, RACHEL drain, telemetry     General  Precautions: Standard, fall   Orthopedic Precautions:N/A   Braces: N/A     Functional Mobility:  · Bed Mobility:     · Rolling Left:  minimum assistance  · Supine to Sit: minimum assistance  · Transfers:     · Sit to Stand:  contact guard assistance and minimum assistance with no AD  · Gait: pt ambulated 110 ft with no AD/holding onto IV pole with CGA. pt required 1 standing rest break  · Stairs:  Pt declined to attempt stair training this date      AM-PAC 6 CLICK MOBILITY  Turning over in bed (including adjusting bedclothes, sheets and blankets)?: 3  Sitting down on and standing up from a chair with arms (e.g., wheelchair, bedside commode, etc.): 3  Moving from lying on back to sitting on the side of the bed?: 3  Moving to and from a bed to a chair (including a wheelchair)?: 3  Need to walk in hospital room?: 3  Climbing 3-5 steps with a railing?: 2  Total Score: 17       Therapeutic Activities and Exercises:   Discussed/educated patient on progress, safety,  d/c, PT POC   Patient performed therex X 15 reps seated in bedside chair B LE AROM AP, LAQ, Hip Flexion, Hip Abd/Add   White board updated   Donned an extra gown and shoes   Pt encouraged to ambulate in hallways 3x/day with nursing or family assistance to improve endurance.   Pt safe to ambulate in hallway with RN or PCT assistance   Bedside table in front of patient and area set up for function, convenience, and safety. RN aware of patient's mobility needs and status. Questions/concerns addressed within PT scope of practice; patient and family with no further questions.         Patient left up in chair with all lines intact, call button in reach, nsg notified and spouse present..           GOALS:    Physical Therapy Goals        Problem: Physical Therapy Goal    Goal Priority Disciplines Outcome Goal Variances Interventions   Physical Therapy Goal     PT/OT, PT Ongoing (interventions implemented as appropriate)     Description:  Goals to be met by: 5/21      Patient will increase functional independence with mobility by performin. Supine to sit with Modified [place]  2. Sit to supine with Modified [place]  3. Sit to stand transfer with Supervision  4. Gait  x 200 feet with Supervision                       Time Tracking:     PT Received On: 18  PT Start Time: 850     PT Stop Time: 914  PT Total Time (min): 24 min     Billable Minutes: Gait Training 15 and Therapeutic Exercise 9    Treatment Type: Treatment  PT/PTA: PTA     PTA Visit Number: 2     Austin Hendricks, PTA  2018

## 2018-05-11 NOTE — PLAN OF CARE
Patient is expected to discharge home with home health and wound vac tomorrow.  Will continue to follow for needs.       05/11/18 1132   Discharge Reassessment   Assessment Type Discharge Planning Reassessment   Provided patient/caregiver education on the expected discharge date and the discharge plan Yes   Do you have any problems affording any of your prescribed medications? No   Discharge Plan A Home with family;Home Health

## 2018-05-11 NOTE — PROGRESS NOTES
[facility]  General Surgery  Progress Note    Subjective:     History of Present Illness:  Cristo Jefferson is a 57 y.o. male who presents to [facility] on 5/3/2018 as transfer for septic shock in setting of recurrent sigmoid perforation.     Patient has a past medical history of RA on immunosuppressants including prednisone 12.5 mg daily and previously Orencia (abatacept) which was stopped in November, DM II, HTN, CAD s/p CABG and appendiceal CA s/p appendectomy and subsequent R hemicolectomy and adjuvant chemo/XRT with last treatment in March 2017 who was transferred to [facility] with complains of abdominal pain since about 2 PM yesterday after eating crawfish.  No nausea or vomiting.  Patient was found to be tachycardic and hypotensive and CT showed sigmoid perforation with adjacent fluid collection and abscess 5 cm in size not appearing well-contained or walled-off.  Patient was given IV Vancomycin and Zosyn and started on Dopamine gtt at OSH.  Febrile to 102.7 in ED and tachycardic in 110s and hypotensive on 0.1 of levophed.     The patient is recently s/p exploratory laparotomy and reversal of loop ileostomy on 4/4/2018 by Dr. Riley.  He was having a lot of difficulties with dehydration and was requiring daily LR infusions for fluid loss from ileostomy since it's creation in November.     The patient has previously undergone exploratory laparotomy and washout and drainage of sigmoid perforation with construction of loop ileostomy in November 2017 by Dr. Riley for sigmoid perforation from diverticulitis.  The patient went on to have colonoscopy and water soluble enema that looked good prior to reversal and he has overall been doing well up until yesterday which is when he started feeling bad.     He is on Plavix for history of CAD.     Currently on Prednisone 12.5 mg daily (5 mg in AM, 2.5 mg in afternoon, and 5 mg at night).    Post-Op Info:  Procedure(s) (LRB):  EXPLORATORY-LAPAROTOMY  (N/A)  WASHOUT-ABDOMINAL (N/A)  CLOSURE-WOUND (N/A)   7 Days Post-Op     Interval History/Significant Events: No acute events overnight. TPN infusing. Pain well controlled on PO pain meds. Voiding. One dose lasix yesterday again. Tolerating regular diet, no nausea/vomiting. Colostomy with succus. Wound vac in place.    Follow-up For: Procedure(s) (LRB):  EXPLORATORY-LAPAROTOMY (N/A)  WASHOUT-ABDOMINAL (N/A)  CLOSURE-WOUND (N/A)      Objective:     Vital Signs (Most Recent):  Temp: 98.9 °F (37.2 °C) (05/11/18 0746)  Pulse: 73 (05/11/18 0746)  Resp: 18 (05/11/18 0746)  BP: 117/75 (05/11/18 0746)  SpO2: (!) 91 % (05/11/18 0746) Vital Signs (24h Range):  Temp:  [98.1 °F (36.7 °C)-98.9 °F (37.2 °C)] 98.9 °F (37.2 °C)  Pulse:  [65-83] 73  Resp:  [16-20] 18  SpO2:  [91 %-93 %] 91 %  BP: (117-132)/(68-81) 117/75     Weight: 117.1 kg (258 lb 2.5 oz)  Body mass index is 38.12 kg/m² (pended).      Intake/Output Summary (Last 24 hours) at 05/11/18 0824  Last data filed at 05/11/18 0800   Gross per 24 hour   Intake          2263.87 ml   Output           6722.5 ml   Net         -4458.63 ml       Physical Exam   Constitutional: He is oriented to person, place, and time. He appears well-developed and well-nourished.   HENT:   Head: Normocephalic and atraumatic.   Nose: Nose normal.   Eyes: Conjunctivae are normal. Pupils are equal, round, and reactive to light. No scleral icterus.   Cardiovascular: Normal rate and regular rhythm.  Exam reveals no gallop and no friction rub.    No murmur heard.  Pulmonary/Chest: Effort normal and breath sounds normal.   Abdominal: Soft. There is no tenderness.   Midline incision with wound vac in place  Transverse colostomy pink   Musculoskeletal: Normal range of motion.   Neurological: He is alert and oriented to person, place, and time.   Skin: Skin is warm and dry. No rash noted.   Nursing note and vitals reviewed.      Lines/Drains/Airways     Peripherally Inserted Central Catheter Line                  PICC Double Lumen 05/09/18 1448 right brachial 1 day          Central Venous Catheter Line                 Port A Cath Single Lumen 05/02/18 2105 left subclavian 8 days          Drain                 Drain/Device  Right -- days         Colostomy 05/03/18 0715 RUQ 8 days         Closed/Suction Drain 05/04/18 0834 Abdomen Bulb 15 Fr. 6 days         Closed/Suction Drain 05/04/18 0834 Anterior Abdomen Bulb 15 Fr. 6 days          Pressure Ulcer                 Negative Pressure Wound Therapy  6 days                Significant Labs:    CBC/Anemia Profile:    Recent Labs  Lab 05/10/18  0414 05/11/18  0400   WBC 4.47 4.84   HGB 8.5* 9.1*   HCT 27.5* 29.3*   PLT 71* 101*   MCV 86 85   RDW 19.6* 20.1*        Chemistries:    Recent Labs  Lab 05/10/18  0414 05/11/18  0400    137   K 3.8 3.8    103   CO2 29 23   BUN 19 17   CREATININE 0.6 0.6   CALCIUM 7.6* 8.0*   ALBUMIN 1.8* 1.9*   PROT 5.0* 5.4*   BILITOT 0.4 0.5   ALKPHOS 84 81   ALT 12 12   AST 18 15   MG 1.8 1.6   PHOS 2.9 2.9       Coagulation:   No results for input(s): PT, INR, APTT in the last 48 hours.  Lactic Acid:   No results for input(s): LACTATE in the last 48 hours.    Significant Imaging:  I have reviewed and interpreted all pertinent imaging results/findings within the past 24 hours.    Assessment/Plan:     * Perforation of sigmoid colon    56yo male with recurrent perforated sigmoid s/p ex lap with transverse colostomy 5/3/18 with takeback for washout with fascial closure and placement of wound vac on 5/4/18    - tolerating regular diet, taking in approximately 25% of meals, TPN infusing- cut in half and let run out, will not reorder today  - mucinex BID for cough  - duo nebs  - restarted plavix yesterday  - restart home BP meds, hydrochlorothiazide and metoprolol (12.5mg BID)  - pain well controlled on PO pain meds  - on room air, one dose lasix today  -H/o CAD s/p 4 vessel CABG   - switch to home prednisone PO  -UOP adequate, continue to  monitor Is and Os  - trend daily labs, H/H stable, WBC within normal limits  -replace lytes PRN  -GI ulcer prophylaxis: pantoprazole  - wound vac in place in midline, last changed 5/9/18, change per wound care M/W/F (request to have picture placed in chart with each change)  - Blood cultures drawn in ED, growing gram negative rods, repeat blood cultures on 5/4 show NGTD  -Abx: continue on ceftriaxone, Flagyl  -Glucose goal of 120-180  -SSI   - PT/OT orders to help patient with deconditioning    Dispo: regular diet, pain well controlled on PO pain meds, work with PT/OT              Jinny Martinez MD  General Surgery  [facility]

## 2018-05-11 NOTE — PT/OT/SLP PROGRESS
Occupational Therapy      Patient Name:  Cristo Jefferson   MRN:  [id number]    Patient not seen today secondary to Other (Comment) (Pt declined OT services on both a.m. and p.m. attempts). Will follow-up on next available service date.    Fred Porter, OT  5/11/2018

## 2018-05-11 NOTE — PHYSICIAN QUERY
"PT Name: Cristo Jefferson  MR #: [id number]     Physician Query Form - Documentation Clarification      Vinita Cesar RN, CCDS  Contact Info: [phone] [email or web address]      This form is a permanent document in the medical record.     Query Date: May 11, 2018    By submitting this query, we are merely seeking further clarification of documentation. Please utilize your independent clinical judgment when addressing the question(s) below.    The Medical record reflects the following:    Supporting Clinical Findings Location in Medical Record   - hallucinations overnight with some agitation, delirium precautions in the setting of ICU delirium   Gen Sx PN 5/7   some confusion on and off, continue delirium precautions   Gen Sx PN 5/8-5/9                                                             Doctor, Please specify diagnosis or diagnoses associated with above clinical findings.       Please further specify "hallucinations".     Provider Use Only    ( x ) Visual  (  ) Auditory  (  ) Visual & Auditory  (  ) Other type - specify: ________________                                                                                                                 [  ] Clinically undetermined            "

## 2018-05-11 NOTE — PLAN OF CARE
TRAMAINE followed up with Erin from [facility] who stated that she had assigned Pt to [facility]. TRAMAINE also placed call to Agustin with [facility] ([phone]) who said they had approved Pt's wound vac and it should be delivered today.     ISAAC BritoW

## 2018-05-11 NOTE — PLAN OF CARE
Problem: Patient Care Overview  Goal: Plan of Care Review  Outcome: Ongoing (interventions implemented as appropriate)  POC reviewed with pt and family at 1400. Pt verbalized understanding. Questions and concerns addressed. No acute events today. Pt. Persistent about Q4 Percocet and Q6 Dilaudid pain management.  No coverage for accuchecks.  Appetite minimal.  TPN @ 32 to be discontinued when empty.  (L) chest portacath deaccessed.  Pt. Sat above 88% on room air.Colostomy RUQ intact. Abdomen Bulb 15 Fr. Output 10 mL.  Anterior Abdomen Bulb 15 Fr. Output 15 mL.  Negative Pressure Wound Therapy @100 output 50 mL.  Wound vac dressings changed and ready to be hooked up to travel vac 5/11. Pt progressing toward goals. Will continue to monitor. See flowsheets for full assessment and VS info.

## 2018-05-11 NOTE — PHYSICIAN QUERY
PT Name: Cristo Jefferson  MR #: [id number]    Physician Query Form - Neurological Condition Clarification      Vinita Cesar RN, CCDS  Contact Info: [phone] [email or web address]      This form is a permanent document in the medical record.     Query Date: May 11, 2018    By submitting this query, we are merely seeking further clarification of documentation. Please utilize your independent clinical judgment when addressing the question(s) below.    The Medical record contains the following:   Indicators   Supporting Clinical Findings Location in Medical Record   x AMS, Confusion, LOC, etc. - hallucinations overnight with some agitation, delirium precautions in the setting of ICU delirium    some confusion on and off, continue delirium precautions Gen Sx PN 5/7      Gen Sx PN 5/8-5/9     x Acute / Chronic Illness Septic shock with perforated viscus   presents to [facility] on 5/3/2018 as transfer for septic shock in setting of recurrent sigmoid perforation.    Procedures:  1.  Exploratory laparotomy.  2.  Lysis of adhesions.   3.  Drainage of pelvic abscess.  4.  End transverse colostomy.     Exp lap, abd wash out, wound closure    PMH of   RA on immunosuppressants (prednisone 12.5 mg daily),   DM II,   HTN,   CAD s/p CABG (on plavix),   appendiceal CA s/p appendectomy,   R hemicolectomy and adjuvant chemo/XRT (3/2017),   sigmoid diverticulitis with perforation s/p ex-lap, loop ileostomy 11/2017 and most recently ileostomy takedown (Dr. Riley) 4/4/18 H&P          Gen Sx Brief Op Note 5/3        Brief Op Note 5/4            H&P            Radiology Findings      Electrolyte Imbalance      Medication     x Treatment continue delirium precautions  Gen Sx PN 5/9    Other         Provider, please specify the diagnosis or diagnoses associated with above clinical findings.    [  ] Metabolic Encephalopathy    [  ] Other Encephalopathy    [x  ] Unspecified Encephalopathy    [  ] Other (please specify):  _____________________________________    [  ] Clinically Undetermined      Please document in your progress notes daily for the duration of treatment until resolved, and  include in your discharge summary.

## 2018-05-11 NOTE — PHYSICIAN QUERY
"PT Name: Cristo Jefferson  MR #: [id number]     Physician Query Form - Documentation Clarification      Vinita Cesar RN, CCDS  Contact Info: [phone] [email or web address]      This form is a permanent document in the medical record.     Query Date: May 11, 2018    By submitting this query, we are merely seeking further clarification of documentation. Please utilize your independent clinical judgment when addressing the question(s) below.    The Medical record reflects the following:    Supporting Clinical Findings Location in Medical Record   - Blood cultures drawn in ED, growing E.coli sensitive to rocephin, repeat blood cultures on 5/4 show NGTD  -Abx: continue on ceftriaxone     Gen Sx PN 5/8   presents to [facility] on 5/3/2018 as transfer for septic shock in setting of recurrent sigmoid perforation   Gen Sx PN 5/10   Blood Culture, Routine Gram stain eugene bottle: Gram negative rods    Blood Culture, Routine Results called to and read back by:Raven Mathews RN 05/03/2018      Blood Culture, Routine 18:00    Blood Culture, Routine ESCHERICHIA COLI     Blood Culture 5/2                                                                          Doctor, Please specify diagnosis or diagnoses associated with above clinical findings.      Please specify organism associated with "Septic Shock".    Provider Use Only    ( x ) Escherichia Coli    (  ) Other organism - specify: _______________                                                                                                             [  ] Clinically undetermined            "

## 2018-05-11 NOTE — PROGRESS NOTES
Patient seen for follow up wound vac change and colostomy pouch change. See flow sheet and photograph below for additional documentation. Patient premedicated prior to vac change. Patient tolerated well. Colostomy pouch changed: new coloplast nelly pouch [id number] applied. Patient and patient's wife familiar with ostomy care. Extra ostomy supplies ordered to patient's room. Coloplast contacted for samples. Answer patient's and wife's questions. Wound care to continue to follow.       05/11/18 1204       Colostomy 05/03/18 0715 RUQ   Placement Date/Time: 05/03/18 0715   Present Prior to Hospital Arrival?: No  Inserted by: MD  Location: RUQ   Stomal Appliance 1 piece   Stoma Appearance round;moist;red;protruding above skin level   Peristomal Assessment (redness noted beneath stoma)   Accessories/Skin Care cleansed w/ sterile normal saline;skin sealant   Stoma Function flatus;stool   Treatment Bag change   Tolerance no signs/symptoms of discomfort       Incision/Site 05/03/18 0800 Abdomen medial   Date First Assessed/Time First Assessed: 05/03/18 0800   Location: Abdomen  Orientation: medial   Wound Image    Incision WDL ex   Drainage Amount Moderate   Drainage Characteristics/Odor Serosanguineous;No odor   Appearance Moist;Pink;Granulating   Red (%), Wound Tissue Color 100 %   Wound Edges Open   Care Cleansed with:;Sterile normal saline   Dressing Changed  (wound vac)   Dressing Change Due 05/14/18       Negative Pressure Wound Therapy    Placement Date/Time: 05/04/18 0910   Orientation: anterior  Location: Abdomen   NPWT Type Vacuum Therapy   Therapy Setting NPWT Continuous therapy   Pressure Setting NPWT 100 mmHg   Therapy Interventions NPWT Dressing changed   Sponges Inserted NPWT Black;1

## 2018-05-11 NOTE — PLAN OF CARE
Problem: Physical Therapy Goal  Goal: Physical Therapy Goal  Goals to be met by:      Patient will increase functional independence with mobility by performin. Supine to sit with Modified [place]  2. Sit to supine with Modified [place]  3. Sit to stand transfer with Supervision  4. Gait  x 200 feet with Supervision      Pt progressing towards goals. continue with PT POC.Goals remain appropriate.  Austin Hendricks PTA  2018

## 2018-05-11 NOTE — ASSESSMENT & PLAN NOTE
56yo male with recurrent perforated sigmoid s/p ex lap with transverse colostomy 5/3/18 with takeback for washout with fascial closure and placement of wound vac on 5/4/18    - tolerating regular diet, taking in approximately 25% of meals, TPN infusing- cut in half and let run out, will not reorder today  - mucinex BID for cough  - duo nebs  - restarted plavix yesterday  - restart home BP meds, hydrochlorothiazide and metoprolol (12.5mg BID)  - pain well controlled on PO pain meds  - on room air, one dose lasix today  -H/o CAD s/p 4 vessel CABG   - switch to home prednisone PO  -UOP adequate, continue to monitor Is and Os  - trend daily labs, H/H stable, WBC within normal limits  -replace lytes PRN  -GI ulcer prophylaxis: pantoprazole  - wound vac in place in midline, last changed 5/9/18, change per wound care M/W/F (request to have picture placed in chart with each change)  - Blood cultures drawn in ED, growing gram negative rods, repeat blood cultures on 5/4 show NGTD  -Abx: continue on ceftriaxone, Flagyl  -Glucose goal of 120-180  -SSI   - PT/OT orders to help patient with deconditioning    Dispo: regular diet, pain well controlled on PO pain meds, work with PT/OT

## 2018-05-11 NOTE — PLAN OF CARE
Problem: Patient Care Overview  Goal: Plan of Care Review  Outcome: Ongoing (interventions implemented as appropriate)  POC reviewed with patient and wife who both verbalized understanding. AAOx4. VSS on RA. Midline abdominal wound noted with wound vac in place @ 100, sanguinous/serosanganious output. Bilateral RACHEL drains noted with serosanguinous output. RLQ colostomy in place with small dark brown output overnight. C/o pain, controlled with PRN medications per MAR. TPN and lipids infusing. Tolerating regular diet, denies any nausea. Voiding per urinal, up with one person assist. Blood glucose being monitored every 4 hours, no coverage needed. Refusing BLE SCDS. Safety precautions maintained, call light within reach. Remains free from falls and injury. No acute events. No distress noted. Will continue to monitor. Wife at bedside.

## 2018-05-11 NOTE — SUBJECTIVE & OBJECTIVE
Interval History/Significant Events: No acute events overnight. TPN infusing. Pain well controlled on PO pain meds. Voiding. One dose lasix yesterday again. Tolerating regular diet, no nausea/vomiting. Colostomy with succus. Wound vac in place.    Follow-up For: Procedure(s) (LRB):  EXPLORATORY-LAPAROTOMY (N/A)  WASHOUT-ABDOMINAL (N/A)  CLOSURE-WOUND (N/A)      Objective:     Vital Signs (Most Recent):  Temp: 98.9 °F (37.2 °C) (05/11/18 0746)  Pulse: 73 (05/11/18 0746)  Resp: 18 (05/11/18 0746)  BP: 117/75 (05/11/18 0746)  SpO2: (!) 91 % (05/11/18 0746) Vital Signs (24h Range):  Temp:  [98.1 °F (36.7 °C)-98.9 °F (37.2 °C)] 98.9 °F (37.2 °C)  Pulse:  [65-83] 73  Resp:  [16-20] 18  SpO2:  [91 %-93 %] 91 %  BP: (117-132)/(68-81) 117/75     Weight: 117.1 kg (258 lb 2.5 oz)  Body mass index is 38.12 kg/m² (pended).      Intake/Output Summary (Last 24 hours) at 05/11/18 0824  Last data filed at 05/11/18 0800   Gross per 24 hour   Intake          2263.87 ml   Output           6722.5 ml   Net         -4458.63 ml       Physical Exam   Constitutional: He is oriented to person, place, and time. He appears well-developed and well-nourished.   HENT:   Head: Normocephalic and atraumatic.   Nose: Nose normal.   Eyes: Conjunctivae are normal. Pupils are equal, round, and reactive to light. No scleral icterus.   Cardiovascular: Normal rate and regular rhythm.  Exam reveals no gallop and no friction rub.    No murmur heard.  Pulmonary/Chest: Effort normal and breath sounds normal.   Abdominal: Soft. There is no tenderness.   Midline incision with wound vac in place  Transverse colostomy pink   Musculoskeletal: Normal range of motion.   Neurological: He is alert and oriented to person, place, and time.   Skin: Skin is warm and dry. No rash noted.   Nursing note and vitals reviewed.      Lines/Drains/Airways     Peripherally Inserted Central Catheter Line                 PICC Double Lumen 05/09/18 1448 right brachial 1 day           Central Venous Catheter Line                 Port A Cath Single Lumen 05/02/18 2105 left subclavian 8 days          Drain                 Drain/Device  Right -- days         Colostomy 05/03/18 0715 RUQ 8 days         Closed/Suction Drain 05/04/18 0834 Abdomen Bulb 15 Fr. 6 days         Closed/Suction Drain 05/04/18 0834 Anterior Abdomen Bulb 15 Fr. 6 days          Pressure Ulcer                 Negative Pressure Wound Therapy  6 days                Significant Labs:    CBC/Anemia Profile:    Recent Labs  Lab 05/10/18  0414 05/11/18  0400   WBC 4.47 4.84   HGB 8.5* 9.1*   HCT 27.5* 29.3*   PLT 71* 101*   MCV 86 85   RDW 19.6* 20.1*        Chemistries:    Recent Labs  Lab 05/10/18  0414 05/11/18  0400    137   K 3.8 3.8    103   CO2 29 23   BUN 19 17   CREATININE 0.6 0.6   CALCIUM 7.6* 8.0*   ALBUMIN 1.8* 1.9*   PROT 5.0* 5.4*   BILITOT 0.4 0.5   ALKPHOS 84 81   ALT 12 12   AST 18 15   MG 1.8 1.6   PHOS 2.9 2.9       Coagulation:   No results for input(s): PT, INR, APTT in the last 48 hours.  Lactic Acid:   No results for input(s): LACTATE in the last 48 hours.    Significant Imaging:  I have reviewed and interpreted all pertinent imaging results/findings within the past 24 hours.

## 2018-05-11 NOTE — PHYSICIAN QUERY
PT Name: Cristo Jefferson  MR #: [id number]     Physician Query Form - Documentation Clarification      Vinita Cesar RN, CCDS  Contact Info: [phone] [email or web address]      This form is a permanent document in the medical record.     Query Date: May 11, 2018    By submitting this query, we are merely seeking further clarification of documentation. Please utilize your independent clinical judgment when addressing the question(s) below.    The Medical record reflects the following:    Supporting Clinical Findings Location in Medical Record   Septic shock with perforated viscus   presents to [facility] on 5/3/2018 as transfer for septic shock in setting of recurrent sigmoid perforation.   H&P   Post-op Diagnoses:    1.  Recurrent perforation of sigmoid colon.  2.  Septic shock.     Procedures:  1.  Exploratory laparotomy.  2.  Lysis of adhesions.   3.  Drainage of pelvic abscess.  4.  End transverse colostomy.      Estimated Blood Loss:  150 mL   IV Fluids:  4000 mL of crystalloid.   Gen Sx Brief Op Note 5/3   -H/H with significant downward progression 6.4/21.2 from 7.6/24.7; transfuse 3 units pRBCs    platelets x 1 unit    - CBC q4, continue to trend and transfuse as needed    -H/H 7.5/23.3; continue to monitor     5/3-  3 U PRBCs, 1 U PLT  5/4:  2 units PRBC  5/5: 2 units PRBC   Crit Care H&P 5/3      Crit Care PN 5/4    Gen Sx 5/4    Gen Sx 5/5      ICU RN Plan of Care 5/5   Exp lap, abd wash out, wound closure  EBL: 75 ml   5/4 Brief Op Note    H&H:   5/3    =  10.0/33.8; 6.4/21.2; 8.2/25.6  5/4    =   7.2/33.6;  5.9/18.7; 8.2/25.4  5/5    =   7.5/23.3;  9.2/28.3; 9.1/28.3  5/6    =   9.1/28.3;  8.3/26.2  5/7    =   8.6/27.3  5/8    =   8.4/26.5  5/9    =   8.0/25.8  5/10  =   8.5/27.5  5/11  =   9.1/29.3    Blood Bank:   7 units PRBC  1 unit Platelets   Lab                                                                          Doctor, Please specify diagnosis or diagnoses associated with above clinical  findings.    Provider Use Only    (  ) Acute Blood Loss Anemia, due to perforated viscus, present on admit = yes    (  ) Acute Blood Loss Anemia, due to perforated viscus, not present on admit    (  ) Acute Blood Loss Anemia, complication of surgery    (  x Acute Blood Loss Anemia, expected outcome from surgery    (  ) Other anemia type, cause, present on admit status - specify:                          Type: ____________________                         Cause: __________________                         Present on admit status: (  )Yes;  (  )No;  (  )Undeterminable    (  ) Other diagnosis - specify: __________________                                                                                                             [  ] Clinically undetermined

## 2018-05-11 NOTE — PHYSICIAN QUERY
"PT Name: Cristo Jefferson  MR #: [id number]    Physician Query Form - Nutrition Clarification     Vinita Cesar RN, CCDS  Contact Info: [phone] [email or web address]      This form is a permanent document in the medical record.     Query Date: May 11, 2018    By submitting this query, we are merely seeking further clarification of documentation.. Please utilize your independent clinical judgment when addressing the question(s) below.    The Medical record contains the following:   Indicators  Supporting Clinical Findings Location in Medical Record   x % of Estimated Energy Intake over a time frame from p.o., TF, or TPN Energy Calories Required: not meeting needs  Protein Required: meeting needs  Fluid Required:  (per MD)    Comments: Minimal ostomy output noted  Tolerance: tolerating    % Intake of Estimated Energy Needs: 50 - 75 %  % Meal Intake: NPO RD PN 5/7    Weight Status over a time frame      Subcutaneous Fat and/or Muscle Loss      Fluid Accumulation or Edema      Reduced  Strength     x Wt / BMI / Usual Body Weight Height: (P) 5' 9" (175.3 cm) (reviewed)  Height (inches): (P) 69 in    Weight Method: Bed Scale  Weight: 117.1 kg (258 lb 2.5 oz)  Weight (lb): 258.16 lb    Ideal Body Weight (IBW), Male: (P) 160 lb  % Ideal Body Weight, Male (lb): (P) 161.35 lb    BMI (Calculated): (P) 38.2  BMI Grade: 35 - 39.9 - obesity - grade II   RD PN 5/7   x Delayed Wound Healing / Failure to Thrive wound vac in place in midline, last changed 5/10/18 Gen Sx PN 5/10   x Acute or Chronic Illness Septic shock with perforated viscus   presents to [facility] on 5/3/2018 as transfer for septic shock in setting of recurrent sigmoid perforation.     Procedures:  1.  Exploratory laparotomy.  2.  Lysis of adhesions.   3.  Drainage of pelvic abscess.  4.  End transverse colostomy.      Exp lap, abd wash out, wound closure     PMH of   RA on immunosuppressants (prednisone 12.5 mg daily),   DM II,   HTN,   CAD s/p CABG (on plavix), "   appendiceal CA s/p appendectomy,   R hemicolectomy and adjuvant chemo/XRT (3/2017),   sigmoid diverticulitis with perforation s/p ex-lap, loop ileostomy 11/2017 and most recently ileostomy takedown (Dr. Riley) 4/4/18 H&P              Gen Sx Brief Op Note 5/3           Brief Op Note 5/4                  H&P    Medication     x Treatment Recommendation/Intervention:   Current TPN not meeting EEN, recommend modifying to Custom TPN 150g AA and 325g Dextrose + IV lipids daily - to provide 2205 kcal/day and 150 g protein/day (GIR = 1.93 mg/kg/min).     RD to monitor.  Level of Risk/Frequency of Follow-up: moderate (1x/week)     RD PN 5/7   x Other Oral/Mouth Cavity: tooth/teeth missing  Skin: edema, incision(s) (w/wound vac)    Perforation of sigmoid colon   Contributing Nutrition Diagnosis - Altered GI Function   Related to (etiology): Perforation of sigmoid colon   Signs and Symptoms (as evidenced by): NPO and need for TPN to meet EEN  RD PN 5/7     AND / ASPEN Clinical Characteristics (October 2011)  A minimum of two characteristics is recommended for diagnosing either moderate or severe malnutrition   Mild Malnutrition Moderate Malnutrition Severe Malnutrition   Energy Intake from p.o., TF or TPN. < 75% intake of estimated energy needs for less than 7 days < 75% intake of estimated energy needs for greater than 7 days < 50% intake of estimated energy needs for > 5 days   Weight Loss 1-2% in 1 month  5% in 3 months  7.5% in 6 months  10% in 1 year 1-2 % in 1 week  5% in 1 month  7.5% in 3 months  10% in 6 months  20% in 1 year > 2% in 1 week  > 5% in 1 month  > 7.5% in 3 months  > 10% in 6 months  > 20% in 1 year   Physical Findings     None *Mild subcutaneous fat and/or muscle loss  *Mild fluid accumulation  *Stage II decubitus  *Surgical wound or non-healing wound *Mod/severe subcutaneous fat and/or muscle loss  *Mod/severe fluid accumulation  *Stage III or IV decubitus  *Non-healing surgical wound     Provider,  please specify diagnosis or diagnoses associated with above clinical findings.    [ x] Mild Protein-Calorie Malnutrition  [ ] Moderate Protein-Calorie Malnutrition  [ ] Other Nutritional Diagnosis (please specify): ____________________________________  [ ] Other: ________________________________  [ ] Clinically Undetermined    Please document in your progress notes daily for the duration of treatment until resolved and include in your discharge summary.

## 2018-05-12 VITALS
OXYGEN SATURATION: 94 % | RESPIRATION RATE: 16 BRPM | WEIGHT: 258.19 LBS | SYSTOLIC BLOOD PRESSURE: 118 MMHG | HEART RATE: 64 BPM | TEMPERATURE: 99 F | BODY MASS INDEX: 38.24 KG/M2 | DIASTOLIC BLOOD PRESSURE: 77 MMHG | HEIGHT: 69 IN

## 2018-05-12 LAB
ALBUMIN SERPL BCP-MCNC: 2.1 G/DL
ALP SERPL-CCNC: 88 U/L
ALT SERPL W/O P-5'-P-CCNC: 12 U/L
ANION GAP SERPL CALC-SCNC: 8 MMOL/L
AST SERPL-CCNC: 15 U/L
BASOPHILS # BLD AUTO: 0.01 K/UL
BASOPHILS NFR BLD: 0.2 %
BILIRUB SERPL-MCNC: 0.6 MG/DL
BUN SERPL-MCNC: 14 MG/DL
CALCIUM SERPL-MCNC: 8.2 MG/DL
CHLORIDE SERPL-SCNC: 102 MMOL/L
CO2 SERPL-SCNC: 25 MMOL/L
CREAT SERPL-MCNC: 0.7 MG/DL
DIFFERENTIAL METHOD: ABNORMAL
EOSINOPHIL # BLD AUTO: 0.1 K/UL
EOSINOPHIL NFR BLD: 1.9 %
ERYTHROCYTE [DISTWIDTH] IN BLOOD BY AUTOMATED COUNT: 19.9 %
EST. GFR  (AFRICAN AMERICAN): >60 ML/MIN/1.73 M^2
EST. GFR  (NON AFRICAN AMERICAN): >60 ML/MIN/1.73 M^2
GLUCOSE SERPL-MCNC: 107 MG/DL
HCT VFR BLD AUTO: 28.6 %
HGB BLD-MCNC: 9.2 G/DL
IMM GRANULOCYTES # BLD AUTO: 0.06 K/UL
IMM GRANULOCYTES NFR BLD AUTO: 1 %
LYMPHOCYTES # BLD AUTO: 1 K/UL
LYMPHOCYTES NFR BLD: 16.6 %
MAGNESIUM SERPL-MCNC: 1.6 MG/DL
MCH RBC QN AUTO: 27.4 PG
MCHC RBC AUTO-ENTMCNC: 32.2 G/DL
MCV RBC AUTO: 85 FL
MONOCYTES # BLD AUTO: 0.4 K/UL
MONOCYTES NFR BLD: 7.4 %
NEUTROPHILS # BLD AUTO: 4.3 K/UL
NEUTROPHILS NFR BLD: 72.9 %
NRBC BLD-RTO: 0 /100 WBC
PHOSPHATE SERPL-MCNC: 2.9 MG/DL
PLATELET # BLD AUTO: 109 K/UL
PMV BLD AUTO: 12.2 FL
POCT GLUCOSE: 106 MG/DL (ref 70–110)
POCT GLUCOSE: 113 MG/DL (ref 70–110)
POCT GLUCOSE: 90 MG/DL (ref 70–110)
POTASSIUM SERPL-SCNC: 4.1 MMOL/L
PROT SERPL-MCNC: 6 G/DL
RBC # BLD AUTO: 3.36 M/UL
SODIUM SERPL-SCNC: 135 MMOL/L
WBC # BLD AUTO: 5.84 K/UL

## 2018-05-12 PROCEDURE — 83735 ASSAY OF MAGNESIUM: CPT

## 2018-05-12 PROCEDURE — 25000003 PHARM REV CODE 250: Performed by: STUDENT IN AN ORGANIZED HEALTH CARE EDUCATION/TRAINING PROGRAM

## 2018-05-12 PROCEDURE — 80053 COMPREHEN METABOLIC PANEL: CPT

## 2018-05-12 PROCEDURE — 94640 AIRWAY INHALATION TREATMENT: CPT

## 2018-05-12 PROCEDURE — 25000003 PHARM REV CODE 250: Performed by: SURGERY

## 2018-05-12 PROCEDURE — 63600175 PHARM REV CODE 636 W HCPCS: Performed by: STUDENT IN AN ORGANIZED HEALTH CARE EDUCATION/TRAINING PROGRAM

## 2018-05-12 PROCEDURE — 85025 COMPLETE CBC W/AUTO DIFF WBC: CPT

## 2018-05-12 PROCEDURE — 25000242 PHARM REV CODE 250 ALT 637 W/ HCPCS: Performed by: STUDENT IN AN ORGANIZED HEALTH CARE EDUCATION/TRAINING PROGRAM

## 2018-05-12 PROCEDURE — 84100 ASSAY OF PHOSPHORUS: CPT

## 2018-05-12 PROCEDURE — 94761 N-INVAS EAR/PLS OXIMETRY MLT: CPT

## 2018-05-12 RX ORDER — OXYCODONE HYDROCHLORIDE 10 MG/1
5-10 TABLET ORAL
Qty: 51 TABLET | Refills: 0 | Status: SHIPPED | OUTPATIENT
Start: 2018-05-12 | End: 2019-03-28

## 2018-05-12 RX ADMIN — MICONAZOLE NITRATE: 20 CREAM TOPICAL at 08:05

## 2018-05-12 RX ADMIN — OXYCODONE HYDROCHLORIDE AND ACETAMINOPHEN 1 TABLET: 10; 325 TABLET ORAL at 04:05

## 2018-05-12 RX ADMIN — Medication 12.5 MG: at 08:05

## 2018-05-12 RX ADMIN — CITALOPRAM HYDROBROMIDE 40 MG: 20 TABLET ORAL at 08:05

## 2018-05-12 RX ADMIN — PANTOPRAZOLE SODIUM 40 MG: 40 TABLET, DELAYED RELEASE ORAL at 08:05

## 2018-05-12 RX ADMIN — AMOXICILLIN AND CLAVULANATE POTASSIUM 1 TABLET: 875; 125 TABLET, FILM COATED ORAL at 08:05

## 2018-05-12 RX ADMIN — CLOPIDOGREL BISULFATE 75 MG: 75 TABLET, FILM COATED ORAL at 08:05

## 2018-05-12 RX ADMIN — IPRATROPIUM BROMIDE AND ALBUTEROL SULFATE 3 ML: .5; 3 SOLUTION RESPIRATORY (INHALATION) at 02:05

## 2018-05-12 RX ADMIN — OXYCODONE HYDROCHLORIDE AND ACETAMINOPHEN 1 TABLET: 10; 325 TABLET ORAL at 08:05

## 2018-05-12 RX ADMIN — PREDNISONE 5 MG: 5 TABLET ORAL at 08:05

## 2018-05-12 RX ADMIN — GUAIFENESIN 600 MG: 600 TABLET, EXTENDED RELEASE ORAL at 08:05

## 2018-05-12 RX ADMIN — ASPIRIN 81 MG CHEWABLE TABLET 81 MG: 81 TABLET CHEWABLE at 08:05

## 2018-05-12 RX ADMIN — HYDROCHLOROTHIAZIDE 12.5 MG: 12.5 TABLET ORAL at 08:05

## 2018-05-12 RX ADMIN — ATORVASTATIN CALCIUM 10 MG: 10 TABLET, FILM COATED ORAL at 08:05

## 2018-05-12 RX ADMIN — HYDROMORPHONE HYDROCHLORIDE 0.5 MG: 1 INJECTION, SOLUTION INTRAMUSCULAR; INTRAVENOUS; SUBCUTANEOUS at 10:05

## 2018-05-12 NOTE — DISCHARGE SUMMARY
[facility]  General Surgery  Discharge Summary      Patient Name: Cristo Jefferson  MRN: [id number]  Admission Date: 5/3/2018  Hospital Length of Stay: 9 days  Discharge Date and Time:  05/12/2018 8:50 AM  Attending Physician: Eber Riley MD   Discharging Provider: XIOMARA Carpio Jr., MD  Primary Care Provider: Vince Perez MD     HPI: Cristo Jefferson is a 57 y.o. male who presents to [facility] on 5/3/2018 as transfer for septic shock in setting of recurrent sigmoid perforation.     Patient has a past medical history of RA on immunosuppressants including prednisone 12.5 mg daily and previously Orencia (abatacept) which was stopped in November, DM II, HTN, CAD s/p CABG and appendiceal CA s/p appendectomy and subsequent R hemicolectomy and adjuvant chemo/XRT with last treatment in March 2017 who was transferred to [facility] with complains of abdominal pain since about 2 PM yesterday after eating crawfish.  No nausea or vomiting.  Patient was found to be tachycardic and hypotensive and CT showed sigmoid perforation with adjacent fluid collection and abscess 5 cm in size not appearing well-contained or walled-off.  Patient was given IV Vancomycin and Zosyn and started on Dopamine gtt at OSH.  Febrile to 102.7 in ED and tachycardic in 110s and hypotensive on 0.1 of levophed.     The patient is recently s/p exploratory laparotomy and reversal of loop ileostomy on 4/4/2018 by Dr. Riley.  He was having a lot of difficulties with dehydration and was requiring daily LR infusions for fluid loss from ileostomy since it's creation in November.     The patient has previously undergone exploratory laparotomy and washout and drainage of sigmoid perforation with construction of loop ileostomy in November 2017 by Dr. Riley for sigmoid perforation from diverticulitis.  The patient went on to have colonoscopy and water soluble enema that looked good prior to reversal and he has overall been doing well up until  yesterday which is when he started feeling bad.     He is on Plavix for history of CAD.     Currently on Prednisone 12.5 mg daily (5 mg in AM, 2.5 mg in afternoon, and 5 mg at night).    Procedure(s) (LRB):  EXPLORATORY-LAPAROTOMY (N/A)  WASHOUT-ABDOMINAL (N/A)  CLOSURE-WOUND (N/A)     Hospital Course: IV abx were initiated.  Taken to OR on 5/3 for washout and drainage of pelvic abscess (two RACHEL drains were left).  End colostomy was created at that time, but the sigmoid was left alone, and the abdomen was left open.  The following day, he was taken back to the OR, washed out, and fascia closed.  Skin was left open with a black sponge wound vac.    Over the weekend, he was managed in the ICU, and with fluid resuscitation, weaned off of pressors and extubated.  He was placed on TPN for malnutrition.    He worked well with PT, and now is ambulating with minimal assistance.  Pain is controlled with PO medication.  He has been afebrile and without leukocytosis.  We will discharge him with oral abx and with the drains in place, and he will follow up with us next week for possible drain removal at that time.    Consults:   Consults         Status Ordering Provider     Inpatient consult to PICC team (NIAS)  Once     Provider:  (Not yet assigned)    Completed REMIGIO SÁNCHEZ JR        Pending Diagnostic Studies:     Procedure Component Value Units Date/Time    CBC auto differential [[id number]] Collected:  05/03/18 1127    Order Status:  Sent Lab Status:  In process Updated:  05/03/18 1127    Specimen:  Blood from Blood         Final Active Diagnoses:    Diagnosis Date Noted POA    PRINCIPAL PROBLEM:  Perforation of sigmoid colon [K63.1] 05/03/2018 Yes    Presence of surgical incision [Z78.9] 05/09/2018 Yes    Encounter for imaging study to confirm nasogastric (NG) tube placement [Z01.89] 05/04/2018 Not Applicable    Septic shock [A41.9, R65.21] 05/03/2018 Yes      Problems Resolved During this Admission:    Diagnosis Date  "Noted Date Resolved POA      Discharged Condition: good    Disposition:     Follow Up:  Follow-up Information     Eber Riley MD On 5/17/2018.    Specialty:  General Surgery  Why:  For wound re-check, RACHEL drain eval  Contact information:  [address]  [facility] [id number]  [phone]                 Patient Instructions:     BATH/SHOWER CHAIR FOR HOME USE   Order Specific Question Answer Comments   Height: 5' 9" (1.753 m) reviewed   Weight: 117.1 kg (258 lb 2.5 oz)    Does patient have medical equipment at home? none    Length of need (1-99 months): 10    Type: With back      Activity as tolerated     Lifting restrictions   Order Comments: Do not lift anything greater than 10-15 lbs for 6 weeks     Notify your health care provider if you experience any of the following:  increased confusion or weakness     Notify your health care provider if you experience any of the following:  persistent dizziness, light-headedness, or visual disturbances     Notify your health care provider if you experience any of the following:  worsening rash     Notify your health care provider if you experience any of the following:  severe persistent headache     Notify your health care provider if you experience any of the following:  difficulty breathing or increased cough     Notify your health care provider if you experience any of the following:  redness, tenderness, or signs of infection (pain, swelling, redness, odor or green/yellow discharge around incision site)     Notify your health care provider if you experience any of the following:  severe uncontrolled pain     Notify your health care provider if you experience any of the following:  persistent nausea and vomiting or diarrhea     Notify your health care provider if you experience any of the following:  temperature >100.4     Change dressing (specify)   Order Comments: Dressing change: per home health three times a week.       Medications:  Reconciled Home " Medications:      Medication List      START taking these medications    albuterol 90 mcg/actuation inhaler  Inhale 2 puffs into the lungs every 6 (six) hours as needed for Wheezing. Rescue     amoxicillin-clavulanate 875-125mg 875-125 mg per tablet  Commonly known as:  AUGMENTIN  Take 1 tablet by mouth every 12 (twelve) hours.     guaiFENesin 600 mg 12 hr tablet  Commonly known as:  MUCINEX  Take 1 tablet (600 mg total) by mouth 2 (two) times daily.     hydroCHLOROthiazide 12.5 MG Tab  Commonly known as:  HYDRODIURIL  Take 1 tablet (12.5 mg total) by mouth once daily.     oxyCODONE 10 mg Tab immediate release tablet  Commonly known as:  ROXICODONE  Take 0.5-1 tablets (5-10 mg total) by mouth every 4 to 6 hours as needed for Pain.        CHANGE how you take these medications    metoprolol tartrate 25 MG tablet  Commonly known as:  LOPRESSOR  Take 0.5 tablets (12.5 mg total) by mouth 2 (two) times daily.  What changed:  See the new instructions.        CONTINUE taking these medications    aspirin 81 MG Chew  Take 81 mg by mouth once daily.     atorvastatin 10 MG tablet  Commonly known as:  LIPITOR  TAKE 1 TABLET BY MOUTH EVERY DAY     citalopram 40 MG tablet  Commonly known as:  CELEXA  TAKE 1 TABLET BY MOUTH EVERY DAY     clopidogrel 75 mg tablet  Commonly known as:  PLAVIX  TAKE 1 TABLET BY MOUTH EVERY DAY     docusate sodium 100 MG capsule  Commonly known as:  COLACE  Take 1 capsule (100 mg total) by mouth 2 (two) times daily.     ferrous sulfate 325 (65 FE) MG EC tablet  Take 325 mg by mouth 2 (two) times daily.     meloxicam 15 MG tablet  Commonly known as:  MOBIC  TAKE 1 TABLET BY MOUTH EVERY DAY     metFORMIN 750 MG 24 hr tablet  Commonly known as:  GLUCOPHAGE-XR  TAKE 1 TABLET BY MOUTH TWICE A DAY     predniSONE 5 MG tablet  Commonly known as:  DELTASONE  5 mg 2 (two) times daily.     vitamin D 1000 units Tab  Take 2,000 Units by mouth once daily.        STOP taking these medications     hydrocodone-acetaminophen 10-325mg  mg Tab  Commonly known as:  NORCO     losartan-hydrochlorothiazide 100-12.5 mg 100-12.5 mg Tab  Commonly known as:  HYZAAR     metoprolol succinate 25 MG 24 hr tablet  Commonly known as:  TOPROL-XL     MULTIVITAMIN 50 PLUS ORAL     TYLENOL EXTRA STRENGTH 500 MG tablet  Generic drug:  acetaminophen            C Mayito Carpio Jr., MD  General Surgery  [facility]    I have personally taken the history and examined this patient and agree with the resident's note as stated above.  Disposition is to home and f/u in clinic in 2 weeks.

## 2018-05-12 NOTE — NURSING
Discharge instructions given to pt and pt's spouse, both verbalized understanding. Pt leaving with left and right JPs in place, pt understands how to empty/care for them. PICC removed. Home wound vac in place. Prescription given to pt. Pt leaving in wheelchair with spouse.   
Informed patient and spouse [facility] is in route to deliver wound vac.  Will continue to monitor.  
.

## 2018-05-13 NOTE — PT/OT/SLP DISCHARGE
Occupational Therapy Discharge Summary    Cristo Jefferson  MRN: [id number]   Principal Problem: Perforation of sigmoid colon      Patient Discharged from acute Occupational Therapy on 05/12/2018.  Please refer to prior OT note dated 05/09/2018 for functional status.    Assessment:      Patient appropriate for care in another setting.    Objective:     GOALS:    Occupational Therapy Goals     Not on file          Multidisciplinary Problems (Resolved)        Problem: Occupational Therapy Goal    Goal Priority Disciplines Outcome Interventions   Occupational Therapy Goal   (Resolved)     OT, PT/OT Outcome(s) achieved    Description:  Goals to be met by: 5/17/2018    Patient will increase functional independence with ADLs by performing:    UE Dressing with Modified [place].  LE Dressing with Modified [place].  Grooming while standing with Stand-by Assistance.  Toileting from toilet with Supervision for hygiene and clothing management.   Toilet transfer to toilet with Minimal Assistance.                      Reasons for Discontinuation of Therapy Services  Transfer to alternate level of care.      Plan:     Patient Discharged to: Home with Home Health Service    Fred Porter, OT  5/13/2018

## 2018-05-14 NOTE — PLAN OF CARE
Patient discharged home 5/12/2018 with [facility] and wound vac.    Will call Dr Riley's nurse this morning to set up follow up appointment.       05/14/18 0847   Final Note   Assessment Type Final Discharge Note   Discharge Disposition Home-Health   Hospital Follow Up  Appt(s) scheduled? Yes   Right Care Referral Info   Post Acute Recommendation Home-care

## 2018-05-16 ENCOUNTER — TELEPHONE (OUTPATIENT)
Dept: SURGERY | Facility: CLINIC | Age: 58
End: 2018-05-16

## 2018-05-16 NOTE — TELEPHONE ENCOUNTER
----- Message from Abdulaziz Escalante sent at 5/16/2018  8:11 AM CDT -----  Contact: Pt wife-Rachel  Message for Nurse/Provider     Who called: Pt wife-Rachel   Message: Pt wife calling to see if she can get an earlier apt on tomorrow 5/17 for the pt due to her being in [place] and having to pick her son up at 2:30 and the apt is for 3.  Contact preferences: [phone]  Additional Information: N/A

## 2018-05-17 ENCOUNTER — OFFICE VISIT (OUTPATIENT)
Dept: SURGERY | Facility: CLINIC | Age: 58
End: 2018-05-17
Payer: MEDICARE

## 2018-05-17 VITALS
DIASTOLIC BLOOD PRESSURE: 86 MMHG | SYSTOLIC BLOOD PRESSURE: 136 MMHG | WEIGHT: 202.31 LBS | HEIGHT: 69 IN | BODY MASS INDEX: 29.97 KG/M2 | TEMPERATURE: 98 F | HEART RATE: 95 BPM

## 2018-05-17 DIAGNOSIS — K57.20 PERFORATION OF SIGMOID COLON DUE TO DIVERTICULITIS: Primary | ICD-10-CM

## 2018-05-17 PROCEDURE — 99499 UNLISTED E&M SERVICE: CPT | Mod: S$GLB,,, | Performed by: SURGERY

## 2018-05-17 PROCEDURE — 99999 PR PBB SHADOW E&M-EST. PATIENT-LVL III: CPT | Mod: PBBFAC,,, | Performed by: SURGERY

## 2018-05-17 PROCEDURE — 99024 POSTOP FOLLOW-UP VISIT: CPT | Mod: S$GLB,,, | Performed by: SURGERY

## 2018-05-17 RX ORDER — OXYCODONE AND ACETAMINOPHEN 10; 325 MG/1; MG/1
.5-1 TABLET ORAL EVERY 4 HOURS PRN
Qty: 71 TABLET | Refills: 0 | Status: SHIPPED | OUTPATIENT
Start: 2018-05-17 | End: 2019-03-28

## 2018-05-17 NOTE — PROGRESS NOTES
"Patient is a 57 y.o. man with recurrent complicated diverticulitis s/p exploratory laparotomy, drainage of pelvic abscess, and diverting colostomy on 5/3/18 who presents to clinic today for post op check.  Fascia is closed, and skin is open with wound vac in place (black sponge only)    Upon further interview, he received adjuvant chemotherapy but no radiotherapy for treatment of his appendiceal cancer.  Had a rectal fissure x 2 (NOT anal), which is felt to be related to his rheumatoid arthritis medicine.  The first fissure was related to constipation, but the second was unprovoked.    Prior to his acute issues, he had issues mainly with fecal frequency- going as often has every other hour, but on some days able to only go once or twice a day.  Imodium had been helping him out.  He describes episodes of fecal incontinence such as with coughing, but perhaps only about once a week.    S:  Pain is well controlled, but he is still requiring oxy 10 every 4 hours.  Ostomy output is about 1 L a day.  Appetite and fluid intake are poor.  He is getting around the house very well.  Denies fever, chills, nausea, vomiting.  Wound vac is being changed MWF, and wound care is satisfied that it is granulating nicely.    O:   Vitals:    05/17/18 0854   BP: 136/86   BP Location: Right arm   Patient Position: Sitting   BP Method: Medium (Automatic)   Pulse: 95   Temp: 97.9 °F (36.6 °C)   TempSrc: Oral   Weight: 91.8 kg (202 lb 4.8 oz)   Height: 5' 9" (1.753 m)       Phys:   Gen: NAD   HEENT: NCAT, trachea midline  Pulm: Unlabored  Abd: Soft, nttp. No rebound, guarding.  Wound vac in place, good seal.  Some irritation to right of wound vac.  RACHEL drains- one SS and one SS with some fibrinous material present  Extremities: no cyanosis or edema, or clubbing  Skin: Skin color, texture, turgor normal. No rashes or lesions      A/P 57 y.o. 57 y.o. man with recurrent complicated diverticulitis s/p exploratory laparotomy, drainage of pelvic " abscess, and diverting colostomy on 5/3/18     Counseled on the options for continuity- keeping end colostomy versus ileoproctostomy (as we would remove sigmoid and any other inflamed areas of Left colon if takedown of ostomy is desired).  We have asked him to consider his frequency and incontinence (poor sphincter tone) issues as drawbacks of ostomy takedown    RACHEL drains removed.  Finish up course of abx (Saturday)  Continue wound vac changes MWF  Refilled pain meds- Percocet , and he will start weaning   He and is wife are considering changing medical oncologists- they will let us know on next visit if they would like one here  F/u 1 mo    Mayito Carpio MD  General Surgery, PGY-4  [phone]  I have personally taken the history and examined this patient and agree with the resident's note as stated above.

## 2018-05-28 ENCOUNTER — TELEPHONE (OUTPATIENT)
Dept: ADMINISTRATIVE | Facility: CLINIC | Age: 58
End: 2018-05-28

## 2018-06-14 ENCOUNTER — OFFICE VISIT (OUTPATIENT)
Dept: SURGERY | Facility: CLINIC | Age: 58
End: 2018-06-14
Payer: MEDICARE

## 2018-06-14 VITALS
HEART RATE: 59 BPM | HEIGHT: 69 IN | BODY MASS INDEX: 30.96 KG/M2 | WEIGHT: 209 LBS | DIASTOLIC BLOOD PRESSURE: 83 MMHG | SYSTOLIC BLOOD PRESSURE: 126 MMHG | TEMPERATURE: 99 F

## 2018-06-14 DIAGNOSIS — K57.20 PERFORATION OF SIGMOID COLON DUE TO DIVERTICULITIS: Primary | ICD-10-CM

## 2018-06-14 PROCEDURE — 99024 POSTOP FOLLOW-UP VISIT: CPT | Mod: S$GLB,,, | Performed by: SURGERY

## 2018-06-14 PROCEDURE — 99999 PR PBB SHADOW E&M-EST. PATIENT-LVL III: CPT | Mod: PBBFAC,,, | Performed by: SURGERY

## 2018-06-14 RX ORDER — PREDNISONE 5 MG/1
5 TABLET ORAL 3 TIMES DAILY
Qty: 270 TABLET | Refills: 0 | Status: SHIPPED | OUTPATIENT
Start: 2018-06-14 | End: 2018-09-12

## 2018-06-14 NOTE — PROGRESS NOTES
Post op    Doing well after ex lap for re-perforation with end transverse colostomy and skin vac placement  Tolerating PO  Ostomy functioning well      Vitals:    06/14/18 0946   BP: 126/83   Pulse: (!) 59   Temp: 98.9 °F (37.2 °C)       Midline incision is granulating well  There are small areas of fibrinous necrosis around the PDS sutures with exposed PDS  Granulation tissue is flush with the skin     A/P:   Doing well  Ok to go to wet to dry dressings over midline  Rx for Prednisone given- will need to find a Rheumatologist     I have personally taken the history and examined this patient and agree with the resident's note as stated above.  D/C wound vac  Ok to use wet to dry NS moistened gauze to midline wound once to twice daily.  May shower.  Given 90 D supply of prednisone 5 mg p.o. TID but recommended he establish himself with a new rheumatologist as I typically do not manage chronic steroid use.  Pt will f/u with me in about 8 months per request.  Discussed pros/cons of colostomy closure.  Pt may develop incisional hernia with some separation and loosening of sutures along fascial closure.  No overt infection and overall doing well with about 500-1000 cc daily output of soft loose stool from RUQ colostomy site.

## 2018-06-15 ENCOUNTER — TELEPHONE (OUTPATIENT)
Dept: SURGERY | Facility: CLINIC | Age: 58
End: 2018-06-15

## 2018-06-15 NOTE — TELEPHONE ENCOUNTER
Spoke with Tanya at [facility] regarding home health orders, wound vac dc'd, Per MD WTD dressing change once daily to midline incision, YIMIB

## 2018-06-15 NOTE — TELEPHONE ENCOUNTER
Returned call to [facility], spoke with Tanya, need updated home health orders, wound vac was dc'd, at patient's appointment and WTD dressing to midline incision, will speak with MD regarding updated home health orders

## 2018-06-15 NOTE — TELEPHONE ENCOUNTER
----- Message from Nilda Hood sent at 6/15/2018 10:05 AM CDT -----  Contact: Jostin from [facility]  Jostin is calling to speak with nurse in regards to wound vac, needing orders for next steps and any new wound care orders.     Jostin can be reached at [phone].  Thank you

## 2018-07-09 RX ORDER — MELOXICAM 15 MG/1
TABLET ORAL
Qty: 90 TABLET | Refills: 0 | Status: SHIPPED | OUTPATIENT
Start: 2018-07-09 | End: 2018-10-11 | Stop reason: SDUPTHER

## 2018-07-09 RX ORDER — CITALOPRAM 40 MG/1
TABLET, FILM COATED ORAL
Qty: 90 TABLET | Refills: 0 | Status: SHIPPED | OUTPATIENT
Start: 2018-07-09 | End: 2018-10-11 | Stop reason: SDUPTHER

## 2018-07-09 RX ORDER — METOPROLOL SUCCINATE 25 MG/1
TABLET, EXTENDED RELEASE ORAL
Qty: 180 TABLET | Refills: 0 | Status: SHIPPED | OUTPATIENT
Start: 2018-07-09 | End: 2018-07-30

## 2018-07-09 RX ORDER — CLOPIDOGREL BISULFATE 75 MG/1
TABLET ORAL
Qty: 90 TABLET | Refills: 0 | Status: SHIPPED | OUTPATIENT
Start: 2018-07-09 | End: 2018-10-11 | Stop reason: SDUPTHER

## 2018-07-09 RX ORDER — ATORVASTATIN CALCIUM 10 MG/1
TABLET, FILM COATED ORAL
Qty: 90 TABLET | Refills: 0 | Status: SHIPPED | OUTPATIENT
Start: 2018-07-09 | End: 2018-10-11 | Stop reason: SDUPTHER

## 2018-07-09 RX ORDER — LOSARTAN POTASSIUM AND HYDROCHLOROTHIAZIDE 12.5; 1 MG/1; MG/1
TABLET ORAL
Qty: 90 TABLET | Refills: 0 | Status: SHIPPED | OUTPATIENT
Start: 2018-07-09 | End: 2019-05-14 | Stop reason: ALTCHOICE

## 2018-07-09 RX ORDER — METFORMIN HYDROCHLORIDE 750 MG/1
TABLET, EXTENDED RELEASE ORAL
Qty: 180 TABLET | Refills: 0 | Status: SHIPPED | OUTPATIENT
Start: 2018-07-09 | End: 2018-10-11 | Stop reason: SDUPTHER

## 2018-07-26 ENCOUNTER — TELEPHONE (OUTPATIENT)
Dept: WOUND CARE | Facility: CLINIC | Age: 58
End: 2018-07-26

## 2018-07-26 NOTE — TELEPHONE ENCOUNTER
----- Message from Sahara Gordon sent at 7/26/2018 10:30 AM CDT -----  Contact: Lacie pt wife#[phone]  Needs Advice    Reason for call:    She states that pt has a wound under the stoma and she wants to speak with you   Communication Preference:callbck   Additional Information:

## 2018-07-26 NOTE — TELEPHONE ENCOUNTER
Pt has home health but not sure what to do about small sore on skin under stoma. Bleeds and compromises the seal, she will send me a picture and will advise.

## 2018-07-30 ENCOUNTER — TELEPHONE (OUTPATIENT)
Dept: PRIMARY CARE CLINIC | Facility: CLINIC | Age: 58
End: 2018-07-30

## 2018-07-30 RX ORDER — METOPROLOL TARTRATE 25 MG/1
25 TABLET, FILM COATED ORAL 2 TIMES DAILY
Qty: 60 TABLET | Refills: 2 | Status: SHIPPED | OUTPATIENT
Start: 2018-07-30 | End: 2018-10-25 | Stop reason: SDUPTHER

## 2018-07-30 NOTE — TELEPHONE ENCOUNTER
----- Message from RT Jenny sent at 7/30/2018  1:04 PM CDT -----  Contact: Riley,[phone], [facility]  Riley,[phone], [facility], requesting to check the status of the pt prescription: metorolol, and the faxes sent to your office, thanks.

## 2018-07-30 NOTE — TELEPHONE ENCOUNTER
Spoke with wife and she states that pt. Was always on metoprolol tartrate 25 mg and that the last refill was sent wrong also . She said they never got the last rx sent back in April either but still had some of the tartrate from a previous prescription. They are requesting a 1 month supply until pt. Can make a visit because he has a open wound right now. Patient has not seen his cardiologist in awhile either.

## 2018-07-31 ENCOUNTER — TELEPHONE (OUTPATIENT)
Dept: SURGERY | Facility: CLINIC | Age: 58
End: 2018-07-31

## 2018-07-31 NOTE — TELEPHONE ENCOUNTER
Spoke to Cris, home health nurse, she stated that pt tripped over the dog, refused to go to ER, denies pain just a little busing

## 2018-07-31 NOTE — TELEPHONE ENCOUNTER
----- Message from Shelton Santos sent at 7/31/2018 10:32 AM CDT -----  Contact: Cris with [facility]  Cris said pt had a fall on Saturday. Cris said pt has a bruise on right back shoulder and under left arm. Pt is okay other than that and she just wanted to let office know. Please call if needed at [phone]

## 2018-09-07 ENCOUNTER — TELEPHONE (OUTPATIENT)
Dept: ADMINISTRATIVE | Facility: CLINIC | Age: 58
End: 2018-09-07

## 2018-09-07 NOTE — TELEPHONE ENCOUNTER
[facility]. Pt received  services.

## 2018-09-14 ENCOUNTER — TELEPHONE (OUTPATIENT)
Dept: SURGERY | Facility: CLINIC | Age: 58
End: 2018-09-14

## 2018-09-14 NOTE — TELEPHONE ENCOUNTER
Return call to pt. Wife answered phone and stated that she is the one who call about refill. Informed wife per Dr Riley, that 1 month only will be given. Pt needs to f/u w/PCP or MD who originally prescribed the steroid. Rx called to [facility] in [place] doctor hotline.Pt instructed to call for an appt w/Dr Perez.

## 2018-09-14 NOTE — TELEPHONE ENCOUNTER
----- Message from Eber Riley MD sent at 9/14/2018  4:25 PM CDT -----  Contact: Rachel- wife  You can refill it for him.  He is on it long term.  I did not originally prescribe but he has been on it since I got involved with him.  She should ideally go through his PCP in the future before it gets to last minute when he is about to run out.  [facility].  ----- Message -----  From: Michael Pradhan RN  Sent: 9/14/2018   3:57 PM  To: Eber Riley MD    Do you have this pt on this med long term??   ----- Message -----  From: Shelton TED Tom  Sent: 9/14/2018   3:36 PM  To: Osvaldo IRIZARRY Staff    Rx Refill/Request     Is this a Refill or New Rx: Refill   Rx Name and Strength:  Prednisone 5 mg tab 3 times a day 90 day supply  Preferred Pharmacy with phone number: [id number] [place] [facility] [address]   [phone] (Phone)  [phone] (Fax)  Communication Preference: [phone]  Additional Information: Caller said the pharmacy told her they faxed a request over three times. Caller said pt is almost out and needs this medication.

## 2018-09-25 ENCOUNTER — TELEPHONE (OUTPATIENT)
Dept: ADMINISTRATIVE | Facility: CLINIC | Age: 58
End: 2018-09-25

## 2018-09-25 NOTE — TELEPHONE ENCOUNTER
Home Health Recert with [facility] -Dr. Eber Riley. Pt received  services.

## 2018-10-11 RX ORDER — CITALOPRAM 40 MG/1
TABLET, FILM COATED ORAL
Qty: 90 TABLET | Refills: 0 | Status: SHIPPED | OUTPATIENT
Start: 2018-10-11 | End: 2019-01-08 | Stop reason: SDUPTHER

## 2018-10-11 RX ORDER — METFORMIN HYDROCHLORIDE 750 MG/1
TABLET, EXTENDED RELEASE ORAL
Qty: 180 TABLET | Refills: 0 | Status: SHIPPED | OUTPATIENT
Start: 2018-10-11 | End: 2019-01-08 | Stop reason: SDUPTHER

## 2018-10-11 RX ORDER — MELOXICAM 15 MG/1
TABLET ORAL
Qty: 90 TABLET | Refills: 0 | Status: SHIPPED | OUTPATIENT
Start: 2018-10-11 | End: 2019-01-08 | Stop reason: SDUPTHER

## 2018-10-11 RX ORDER — ATORVASTATIN CALCIUM 10 MG/1
TABLET, FILM COATED ORAL
Qty: 90 TABLET | Refills: 0 | Status: SHIPPED | OUTPATIENT
Start: 2018-10-11 | End: 2019-01-08 | Stop reason: SDUPTHER

## 2018-10-11 RX ORDER — CLOPIDOGREL BISULFATE 75 MG/1
TABLET ORAL
Qty: 90 TABLET | Refills: 0 | Status: SHIPPED | OUTPATIENT
Start: 2018-10-11 | End: 2019-01-08 | Stop reason: SDUPTHER

## 2018-10-13 RX ORDER — PREDNISONE 5 MG/1
TABLET ORAL
Qty: 90 TABLET | Refills: 0 | Status: SHIPPED | OUTPATIENT
Start: 2018-10-13 | End: 2018-11-13 | Stop reason: SDUPTHER

## 2018-10-25 RX ORDER — METOPROLOL TARTRATE 25 MG/1
TABLET, FILM COATED ORAL
Qty: 60 TABLET | Refills: 1 | Status: SHIPPED | OUTPATIENT
Start: 2018-10-25 | End: 2018-12-15 | Stop reason: SDUPTHER

## 2018-10-30 NOTE — TELEPHONE ENCOUNTER
This pt needs to be scheduled for a colonoscopy, he is on Plavix and we need him to hold med 5 days prior to procedure. Messages were sent to his PCP Dr Vince Perez who would not give OK to hold med. Fax sent to pt's cardiologist Dr Cristo Allen at [facility] (fax # [phone]) and he will not OK holding med either because pt has not been seen in 2 years and he needs office visit before OK is given. Pt's wife refuses to bring him in for visit due to recent surgery he's had. I wanted to give you an update on why he is not booked yet. The only other thing we can do is book him while staying on med and letting MD doing procedure know that we could not get OK on holding med, also pt would not be able to have any biopsies done if needed.   all other ROS negative except as per HPI

## 2018-11-13 RX ORDER — PREDNISONE 5 MG/1
TABLET ORAL
Qty: 90 TABLET | Refills: 0 | Status: SHIPPED | OUTPATIENT
Start: 2018-11-13 | End: 2018-12-12 | Stop reason: SDUPTHER

## 2018-12-10 RX ORDER — METOPROLOL TARTRATE 25 MG/1
TABLET, FILM COATED ORAL
Qty: 60 TABLET | Refills: 1 | OUTPATIENT
Start: 2018-12-10

## 2018-12-13 RX ORDER — PREDNISONE 5 MG/1
TABLET ORAL
Qty: 90 TABLET | Refills: 0 | Status: SHIPPED | OUTPATIENT
Start: 2018-12-13 | End: 2018-12-20 | Stop reason: SDUPTHER

## 2018-12-17 RX ORDER — METOPROLOL TARTRATE 25 MG/1
TABLET, FILM COATED ORAL
Qty: 60 TABLET | Refills: 1 | Status: SHIPPED | OUTPATIENT
Start: 2018-12-17 | End: 2019-03-28

## 2018-12-18 ENCOUNTER — TELEPHONE (OUTPATIENT)
Dept: SURGERY | Facility: CLINIC | Age: 58
End: 2018-12-18

## 2018-12-18 NOTE — TELEPHONE ENCOUNTER
Returned call to Fide with [facility] regarding wound care supplies, ostomy supplies, and follow appointments needed, patient has not seen MD since 6/2018, will speak with provider regarding patient at this time

## 2018-12-18 NOTE — TELEPHONE ENCOUNTER
----- Message from Negar Madsen sent at 12/18/2018 10:28 AM CST -----  Contact: Fide with [facility]   Needs Advice    Reason for call: Please fax orders for wound & ostomy supplies         Communication Preference: Fax# [phone] #[phone] ext [phone]    Additional Information:

## 2018-12-19 ENCOUNTER — TELEPHONE (OUTPATIENT)
Dept: SURGERY | Facility: CLINIC | Age: 58
End: 2018-12-19

## 2018-12-19 DIAGNOSIS — Z71.89 ENCOUNTER FOR OSTOMY NURSE CONSULTATION: Primary | ICD-10-CM

## 2018-12-19 DIAGNOSIS — K57.20 PERFORATION OF SIGMOID COLON DUE TO DIVERTICULITIS: ICD-10-CM

## 2018-12-19 NOTE — TELEPHONE ENCOUNTER
Left VM with my direct contact information, patient advised to give a return call with any questions or concerns regarding ostomy supplies, pt to see ostomy wound care nurse at this time, referral entered into Epic

## 2018-12-19 NOTE — TELEPHONE ENCOUNTER
----- Message from Eber Riley MD sent at 12/19/2018 10:28 AM CST -----  Contact: Fide with [facility]   I would have no problem referring him to the ostomy nurse for future supplies as needed.  Thanks, [facility]  ----- Message -----  From: Radha Braga RN  Sent: 12/18/2018  10:38 AM  To: Eber Riley MD    Hey there,  Do you want to continue ordering ostomy supplies for this patient?  Do you want to refer him?    Radha    ----- Message -----  From: Negar Madsen  Sent: 12/18/2018  10:28 AM  To: Osvaldo IRIZARRY Staff    Needs Advice    Reason for call: Please fax orders for wound & ostomy supplies         Communication Preference: Fax# [phone] Ph#[phone] ext [phone]    Additional Information:

## 2018-12-20 ENCOUNTER — TELEPHONE (OUTPATIENT)
Dept: PODIATRY | Facility: CLINIC | Age: 58
End: 2018-12-20

## 2018-12-20 NOTE — TELEPHONE ENCOUNTER
Extended conversation with wife re workque referral.  The are looking for prescription for ostomy supplies they are not having any immediate issues that would require ostomy nurse visit as long as supplies can be sent to home.  Wife will contact Dr Riley who wrote last Rx and if no success, she will contact Shelia giles

## 2018-12-21 NOTE — TELEPHONE ENCOUNTER
----- Message from Amelie Anna sent at 12/20/2018  2:05 PM CST -----  Contact: Rachel pts wife   Needs Advice    Reason for call: pts wife is calling to speak with the nurse pt needs his ostomy supplies pt has been getting the run around pt is out almost out of the supplies pts wife said home health was bringing the supplies but they had stopped. Pt wife was saying they needed authorization pt just needs his supplies         Communication Preference: can you please call pts wife at [phone]    Additional Information: none    EVELYNE

## 2018-12-22 RX ORDER — PREDNISONE 5 MG/1
TABLET ORAL
Qty: 90 TABLET | Refills: 0 | Status: SHIPPED | OUTPATIENT
Start: 2018-12-22 | End: 2019-02-05 | Stop reason: SDUPTHER

## 2019-01-01 ENCOUNTER — OFFICE VISIT (OUTPATIENT)
Dept: RHEUMATOLOGY | Facility: CLINIC | Age: 59
End: 2019-01-01
Payer: MEDICARE

## 2019-01-01 ENCOUNTER — TELEPHONE (OUTPATIENT)
Dept: UROLOGY | Facility: CLINIC | Age: 59
End: 2019-01-01

## 2019-01-01 ENCOUNTER — LAB VISIT (OUTPATIENT)
Dept: LAB | Facility: HOSPITAL | Age: 59
End: 2019-01-01
Attending: INTERNAL MEDICINE
Payer: MEDICARE

## 2019-01-01 ENCOUNTER — OFFICE VISIT (OUTPATIENT)
Dept: CARDIOLOGY | Facility: CLINIC | Age: 59
End: 2019-01-01
Payer: MEDICARE

## 2019-01-01 ENCOUNTER — TELEPHONE (OUTPATIENT)
Dept: RHEUMATOLOGY | Facility: CLINIC | Age: 59
End: 2019-01-01

## 2019-01-01 ENCOUNTER — PATIENT OUTREACH (OUTPATIENT)
Dept: ADMINISTRATIVE | Facility: OTHER | Age: 59
End: 2019-01-01

## 2019-01-01 ENCOUNTER — PATIENT MESSAGE (OUTPATIENT)
Dept: RHEUMATOLOGY | Facility: CLINIC | Age: 59
End: 2019-01-01

## 2019-01-01 VITALS
SYSTOLIC BLOOD PRESSURE: 109 MMHG | TEMPERATURE: 98 F | HEIGHT: 69 IN | WEIGHT: 231.94 LBS | BODY MASS INDEX: 34.35 KG/M2 | DIASTOLIC BLOOD PRESSURE: 76 MMHG | HEART RATE: 57 BPM

## 2019-01-01 VITALS
HEIGHT: 69 IN | DIASTOLIC BLOOD PRESSURE: 83 MMHG | SYSTOLIC BLOOD PRESSURE: 127 MMHG | HEART RATE: 55 BPM | BODY MASS INDEX: 37.01 KG/M2 | WEIGHT: 249.88 LBS

## 2019-01-01 VITALS
HEART RATE: 61 BPM | DIASTOLIC BLOOD PRESSURE: 82 MMHG | WEIGHT: 254.63 LBS | BODY MASS INDEX: 37.71 KG/M2 | HEIGHT: 69 IN | SYSTOLIC BLOOD PRESSURE: 136 MMHG

## 2019-01-01 DIAGNOSIS — E78.5 HYPERLIPIDEMIA, UNSPECIFIED HYPERLIPIDEMIA TYPE: ICD-10-CM

## 2019-01-01 DIAGNOSIS — Z79.52 LONG TERM CURRENT USE OF SYSTEMIC STEROIDS: ICD-10-CM

## 2019-01-01 DIAGNOSIS — I45.10 RBBB: ICD-10-CM

## 2019-01-01 DIAGNOSIS — I10 ESSENTIAL HYPERTENSION: Chronic | ICD-10-CM

## 2019-01-01 DIAGNOSIS — M06.9 RHEUMATOID ARTHRITIS INVOLVING MULTIPLE SITES, UNSPECIFIED RHEUMATOID FACTOR PRESENCE: ICD-10-CM

## 2019-01-01 DIAGNOSIS — E78.5 TYPE 2 DIABETES MELLITUS WITH HYPERLIPIDEMIA: ICD-10-CM

## 2019-01-01 DIAGNOSIS — M05.79 SEROPOSITIVE RHEUMATOID ARTHRITIS OF MULTIPLE SITES: Primary | ICD-10-CM

## 2019-01-01 DIAGNOSIS — F33.9 MAJOR DEPRESSION, RECURRENT, CHRONIC: Chronic | ICD-10-CM

## 2019-01-01 DIAGNOSIS — I25.810 CORONARY ARTERY DISEASE INVOLVING AUTOLOGOUS VEIN CORONARY BYPASS GRAFT WITHOUT ANGINA PECTORIS: Chronic | ICD-10-CM

## 2019-01-01 DIAGNOSIS — E11.69 TYPE 2 DIABETES MELLITUS WITH HYPERLIPIDEMIA: ICD-10-CM

## 2019-01-01 DIAGNOSIS — Z98.61 S/P PTCA (PERCUTANEOUS TRANSLUMINAL CORONARY ANGIOPLASTY): Primary | ICD-10-CM

## 2019-01-01 DIAGNOSIS — Z95.828 HISTORY OF ENDOVASCULAR STENT GRAFT FOR ABDOMINAL AORTIC ANEURYSM (AAA): ICD-10-CM

## 2019-01-01 DIAGNOSIS — Z95.1 HX OF CABG: ICD-10-CM

## 2019-01-01 DIAGNOSIS — E78.00 PURE HYPERCHOLESTEROLEMIA: ICD-10-CM

## 2019-01-01 DIAGNOSIS — Z86.79 HISTORY OF ABDOMINAL AORTIC ANEURYSM (AAA): Chronic | ICD-10-CM

## 2019-01-01 DIAGNOSIS — M06.9 RHEUMATOID ARTHRITIS INVOLVING MULTIPLE SITES, UNSPECIFIED RHEUMATOID FACTOR PRESENCE: Primary | ICD-10-CM

## 2019-01-01 DIAGNOSIS — Z98.890 HISTORY OF ILEOSTOMY: ICD-10-CM

## 2019-01-01 LAB
CRP SERPL-MCNC: 25.9 MG/L (ref 0–8.2)
ERYTHROCYTE [SEDIMENTATION RATE] IN BLOOD BY WESTERGREN METHOD: 71 MM/HR (ref 0–23)

## 2019-01-01 PROCEDURE — 99213 OFFICE O/P EST LOW 20 MIN: CPT | Mod: S$GLB,,, | Performed by: INTERNAL MEDICINE

## 2019-01-01 PROCEDURE — 36415 COLL VENOUS BLD VENIPUNCTURE: CPT

## 2019-01-01 PROCEDURE — 3079F PR MOST RECENT DIASTOLIC BLOOD PRESSURE 80-89 MM HG: ICD-10-PCS | Mod: CPTII,S$GLB,, | Performed by: INTERNAL MEDICINE

## 2019-01-01 PROCEDURE — 99214 PR OFFICE/OUTPT VISIT, EST, LEVL IV, 30-39 MIN: ICD-10-PCS | Mod: S$GLB,,, | Performed by: INTERNAL MEDICINE

## 2019-01-01 PROCEDURE — 99999 PR PBB SHADOW E&M-EST. PATIENT-LVL III: ICD-10-PCS | Mod: PBBFAC,,, | Performed by: INTERNAL MEDICINE

## 2019-01-01 PROCEDURE — 99999 PR PBB SHADOW E&M-EST. PATIENT-LVL III: CPT | Mod: PBBFAC,,, | Performed by: INTERNAL MEDICINE

## 2019-01-01 PROCEDURE — 3008F BODY MASS INDEX DOCD: CPT | Mod: CPTII,S$GLB,, | Performed by: INTERNAL MEDICINE

## 2019-01-01 PROCEDURE — 3074F PR MOST RECENT SYSTOLIC BLOOD PRESSURE < 130 MM HG: ICD-10-PCS | Mod: CPTII,S$GLB,, | Performed by: INTERNAL MEDICINE

## 2019-01-01 PROCEDURE — 3074F SYST BP LT 130 MM HG: CPT | Mod: CPTII,S$GLB,, | Performed by: INTERNAL MEDICINE

## 2019-01-01 PROCEDURE — 3078F DIAST BP <80 MM HG: CPT | Mod: CPTII,S$GLB,, | Performed by: INTERNAL MEDICINE

## 2019-01-01 PROCEDURE — 3008F PR BODY MASS INDEX (BMI) DOCUMENTED: ICD-10-PCS | Mod: CPTII,S$GLB,, | Performed by: INTERNAL MEDICINE

## 2019-01-01 PROCEDURE — 3079F DIAST BP 80-89 MM HG: CPT | Mod: CPTII,S$GLB,, | Performed by: INTERNAL MEDICINE

## 2019-01-01 PROCEDURE — 85652 RBC SED RATE AUTOMATED: CPT

## 2019-01-01 PROCEDURE — 99213 PR OFFICE/OUTPT VISIT, EST, LEVL III, 20-29 MIN: ICD-10-PCS | Mod: S$GLB,,, | Performed by: INTERNAL MEDICINE

## 2019-01-01 PROCEDURE — 93000 EKG 12-LEAD: ICD-10-PCS | Mod: S$GLB,,, | Performed by: INTERNAL MEDICINE

## 2019-01-01 PROCEDURE — 3044F HG A1C LEVEL LT 7.0%: CPT | Mod: CPTII,S$GLB,, | Performed by: INTERNAL MEDICINE

## 2019-01-01 PROCEDURE — 3075F PR MOST RECENT SYSTOLIC BLOOD PRESS GE 130-139MM HG: ICD-10-PCS | Mod: CPTII,S$GLB,, | Performed by: INTERNAL MEDICINE

## 2019-01-01 PROCEDURE — 86140 C-REACTIVE PROTEIN: CPT

## 2019-01-01 PROCEDURE — 99499 RISK ADDL DX/OHS AUDIT: ICD-10-PCS | Mod: S$GLB,,, | Performed by: INTERNAL MEDICINE

## 2019-01-01 PROCEDURE — 99499 UNLISTED E&M SERVICE: CPT | Mod: S$GLB,,, | Performed by: INTERNAL MEDICINE

## 2019-01-01 PROCEDURE — 3078F PR MOST RECENT DIASTOLIC BLOOD PRESSURE < 80 MM HG: ICD-10-PCS | Mod: CPTII,S$GLB,, | Performed by: INTERNAL MEDICINE

## 2019-01-01 PROCEDURE — 93000 ELECTROCARDIOGRAM COMPLETE: CPT | Mod: S$GLB,,, | Performed by: INTERNAL MEDICINE

## 2019-01-01 PROCEDURE — 99214 OFFICE O/P EST MOD 30 MIN: CPT | Mod: S$GLB,,, | Performed by: INTERNAL MEDICINE

## 2019-01-01 PROCEDURE — 3075F SYST BP GE 130 - 139MM HG: CPT | Mod: CPTII,S$GLB,, | Performed by: INTERNAL MEDICINE

## 2019-01-01 PROCEDURE — 3044F PR MOST RECENT HEMOGLOBIN A1C LEVEL <7.0%: ICD-10-PCS | Mod: CPTII,S$GLB,, | Performed by: INTERNAL MEDICINE

## 2019-01-01 RX ORDER — HYDROCHLOROTHIAZIDE 12.5 MG/1
CAPSULE ORAL
Qty: 30 CAPSULE | Refills: 5 | Status: SHIPPED | OUTPATIENT
Start: 2019-01-01 | End: 2020-01-01

## 2019-01-01 RX ORDER — METFORMIN HYDROCHLORIDE 750 MG/1
750 TABLET, EXTENDED RELEASE ORAL 2 TIMES DAILY
Qty: 180 TABLET | Refills: 1 | Status: SHIPPED | OUTPATIENT
Start: 2019-01-01 | End: 2020-01-01

## 2019-01-01 RX ORDER — PREDNISONE 5 MG/1
TABLET ORAL
Qty: 270 TABLET | Refills: 1 | Status: SHIPPED | OUTPATIENT
Start: 2019-01-01 | End: 2020-01-01 | Stop reason: CLARIF

## 2019-01-01 RX ORDER — NITROGLYCERIN 0.4 MG/1
0.4 TABLET SUBLINGUAL EVERY 5 MIN PRN
Qty: 30 TABLET | Refills: 11 | Status: SHIPPED | OUTPATIENT
Start: 2019-01-01 | End: 2020-09-22

## 2019-01-01 RX ORDER — METOPROLOL TARTRATE 25 MG/1
TABLET, FILM COATED ORAL
Qty: 180 TABLET | Refills: 1 | Status: SHIPPED | OUTPATIENT
Start: 2019-01-01 | End: 2020-01-01

## 2019-01-01 RX ORDER — CITALOPRAM 40 MG/1
TABLET, FILM COATED ORAL
Qty: 90 TABLET | Refills: 1 | Status: SHIPPED | OUTPATIENT
Start: 2019-01-01 | End: 2020-01-01

## 2019-01-01 RX ORDER — CLOPIDOGREL BISULFATE 75 MG/1
TABLET ORAL
Qty: 90 TABLET | Refills: 1 | Status: SHIPPED | OUTPATIENT
Start: 2019-01-01 | End: 2020-01-01

## 2019-01-01 RX ORDER — ATORVASTATIN CALCIUM 10 MG/1
TABLET, FILM COATED ORAL
Qty: 90 TABLET | Refills: 1 | Status: SHIPPED | OUTPATIENT
Start: 2019-01-01 | End: 2020-01-01

## 2019-01-01 RX ORDER — PREDNISONE 5 MG/1
TABLET ORAL
Qty: 90 TABLET | Refills: 3 | Status: SHIPPED | OUTPATIENT
Start: 2019-01-01

## 2019-01-01 RX ORDER — MELOXICAM 15 MG/1
15 TABLET ORAL DAILY
COMMUNITY
End: 2020-01-01 | Stop reason: SDUPTHER

## 2019-01-01 RX ORDER — SULFASALAZINE 500 MG/1
TABLET, DELAYED RELEASE ORAL
Qty: 120 TABLET | Refills: 6 | Status: SHIPPED | OUTPATIENT
Start: 2019-01-01 | End: 2020-01-01

## 2019-01-01 ASSESSMENT — ROUTINE ASSESSMENT OF PATIENT INDEX DATA (RAPID3)
PSYCHOLOGICAL DISTRESS SCORE: 1.1
FATIGUE SCORE: 7.5
PATIENT GLOBAL ASSESSMENT SCORE: 7.5
AM STIFFNESS SCORE: 1, YES
WHEN YOU AWAKENED IN THE MORNING OVER THE LAST WEEK, PLEASE INDICATE THE AMOUNT OF TIME IT TAKES UNTIL YOU ARE AS LIMBER AS YOU WILL BE FOR THE DAY: 24 HOURS
WHEN YOU AWAKENED IN THE MORNING OVER THE LAST WEEK, PLEASE INDICATE THE AMOUNT OF TIME IT TAKES UNTIL YOU ARE AS LIMBER AS YOU WILL BE FOR THE DAY: 24HR
TOTAL RAPID3 SCORE: 6.55
AM STIFFNESS SCORE: 1, YES
TOTAL RAPID3 SCORE: 7
MDHAQ FUNCTION SCORE: 1.4
MDHAQ FUNCTION SCORE: 3
PSYCHOLOGICAL DISTRESS SCORE: 1.1
PAIN SCORE: 7.5
FATIGUE SCORE: 5
PATIENT GLOBAL ASSESSMENT SCORE: 5
PAIN SCORE: 6

## 2019-01-08 RX ORDER — CLOPIDOGREL BISULFATE 75 MG/1
TABLET ORAL
Qty: 90 TABLET | Refills: 0 | Status: SHIPPED | OUTPATIENT
Start: 2019-01-08 | End: 2019-05-14 | Stop reason: SDUPTHER

## 2019-01-08 RX ORDER — MELOXICAM 15 MG/1
TABLET ORAL
Qty: 90 TABLET | Refills: 0 | Status: SHIPPED | OUTPATIENT
Start: 2019-01-08 | End: 2019-05-14 | Stop reason: SDUPTHER

## 2019-01-08 RX ORDER — ATORVASTATIN CALCIUM 10 MG/1
TABLET, FILM COATED ORAL
Qty: 90 TABLET | Refills: 0 | Status: SHIPPED | OUTPATIENT
Start: 2019-01-08 | End: 2019-05-14 | Stop reason: SDUPTHER

## 2019-01-08 RX ORDER — CITALOPRAM 40 MG/1
TABLET, FILM COATED ORAL
Qty: 90 TABLET | Refills: 0 | Status: SHIPPED | OUTPATIENT
Start: 2019-01-08 | End: 2019-05-14 | Stop reason: SDUPTHER

## 2019-01-08 RX ORDER — METFORMIN HYDROCHLORIDE 750 MG/1
TABLET, EXTENDED RELEASE ORAL
Qty: 180 TABLET | Refills: 0 | Status: SHIPPED | OUTPATIENT
Start: 2019-01-08 | End: 2019-05-14 | Stop reason: SDUPTHER

## 2019-01-08 NOTE — TELEPHONE ENCOUNTER
I have not seen this patient since July of 2016.  I will send 1 more refill of these medications but he needs to either follow up with me or get established with a new primary care physician.

## 2019-01-28 ENCOUNTER — TELEPHONE (OUTPATIENT)
Dept: SURGERY | Facility: CLINIC | Age: 59
End: 2019-01-28

## 2019-02-05 RX ORDER — PREDNISONE 5 MG/1
TABLET ORAL
Qty: 90 TABLET | Refills: 0 | Status: SHIPPED | OUTPATIENT
Start: 2019-02-05 | End: 2019-02-24 | Stop reason: SDUPTHER

## 2019-02-12 RX ORDER — METOPROLOL TARTRATE 25 MG/1
TABLET, FILM COATED ORAL
Qty: 60 TABLET | Refills: 1 | OUTPATIENT
Start: 2019-02-12

## 2019-02-21 ENCOUNTER — OFFICE VISIT (OUTPATIENT)
Dept: SURGERY | Facility: CLINIC | Age: 59
End: 2019-02-21
Payer: MEDICARE

## 2019-02-21 VITALS
WEIGHT: 227.88 LBS | HEIGHT: 69 IN | DIASTOLIC BLOOD PRESSURE: 76 MMHG | BODY MASS INDEX: 33.75 KG/M2 | TEMPERATURE: 97 F | HEART RATE: 53 BPM | SYSTOLIC BLOOD PRESSURE: 132 MMHG

## 2019-02-21 DIAGNOSIS — Z93.3 PRESENCE OF COLOSTOMY: Primary | ICD-10-CM

## 2019-02-21 PROCEDURE — 99999 PR PBB SHADOW E&M-EST. PATIENT-LVL III: CPT | Mod: PBBFAC,,, | Performed by: SURGERY

## 2019-02-21 PROCEDURE — 99999 PR PBB SHADOW E&M-EST. PATIENT-LVL III: ICD-10-PCS | Mod: PBBFAC,,, | Performed by: SURGERY

## 2019-02-21 PROCEDURE — 3075F PR MOST RECENT SYSTOLIC BLOOD PRESS GE 130-139MM HG: ICD-10-PCS | Mod: CPTII,S$GLB,, | Performed by: SURGERY

## 2019-02-21 PROCEDURE — 3075F SYST BP GE 130 - 139MM HG: CPT | Mod: CPTII,S$GLB,, | Performed by: SURGERY

## 2019-02-21 PROCEDURE — 99499 RISK ADDL DX/OHS AUDIT: ICD-10-PCS | Mod: S$GLB,,, | Performed by: SURGERY

## 2019-02-21 PROCEDURE — 3008F PR BODY MASS INDEX (BMI) DOCUMENTED: ICD-10-PCS | Mod: CPTII,S$GLB,, | Performed by: SURGERY

## 2019-02-21 PROCEDURE — 3008F BODY MASS INDEX DOCD: CPT | Mod: CPTII,S$GLB,, | Performed by: SURGERY

## 2019-02-21 PROCEDURE — 99213 OFFICE O/P EST LOW 20 MIN: CPT | Mod: S$GLB,,, | Performed by: SURGERY

## 2019-02-21 PROCEDURE — 3078F PR MOST RECENT DIASTOLIC BLOOD PRESSURE < 80 MM HG: ICD-10-PCS | Mod: CPTII,S$GLB,, | Performed by: SURGERY

## 2019-02-21 PROCEDURE — 99213 PR OFFICE/OUTPT VISIT, EST, LEVL III, 20-29 MIN: ICD-10-PCS | Mod: S$GLB,,, | Performed by: SURGERY

## 2019-02-21 PROCEDURE — 3078F DIAST BP <80 MM HG: CPT | Mod: CPTII,S$GLB,, | Performed by: SURGERY

## 2019-02-21 PROCEDURE — 99499 UNLISTED E&M SERVICE: CPT | Mod: S$GLB,,, | Performed by: SURGERY

## 2019-02-21 NOTE — LETTER
[facility]  [address]  [facility] [address]  Phone: [phone]  Fax: [phone] February 24, 2019      Piotr Milton MD  [address]  [facility] [address]    Patient: Cristo Jefferson   MR Number: [id number]   YOB: 1960   Date of Visit: 2/21/2019     Dear Dr. Milton:    Thank you for referring Cristo Jefferson to me for evaluation. Below are the relevant portions of my assessment and plan of care.    Cristo Jefferson is a very pleasant 58-year-old  male who presents with his wife for another routine followup.  The patient is well known to me, dating back to November of 2017, when on 11/21/2017, I performed a diverting loop ileostomy and abdominal washout for perforated pelvic sigmoid diverticulitis.  The patient had a presentation at that point and was in septic shock at the time of surgery with a history of coronary artery disease and prior MI who also takes chronic immunosuppressive therapy and steroids for his arthritic disease. The patient also has a history of appendiceal carcinoma, status post right hemicolectomy in the past. He essentially did well with the initial surgery, but required 3 liters of Lactated Ringer's daily through his Port-A-Cath for dehydration secondary to the loop ileostomy.  He subsequently underwent an air contrast barium enema and colonoscopy, which revealed no evidence of stricture or disease that would prohibit closure and I closed the loop ileostomy on 04/04/2018.  Approximately 1 month later, he presented to the ER again with recurrent perforation of the sigmoid diverticular disease and was operated on by Dr. Bautista on 05/03/2018, and subsequently Dr. Nix on 05/04/2018, where he underwent an end transverse colostomy to manage his recurrent perforated sigmoid colon.  Due to his chronic immunosuppressive therapy and cardiac history, we have hesitated to reverse the colostomy and the patient presents today to  discuss this option again.    He is not getting dehydrated with the transverse loop colostomy.  The midline wound has fully granulated in.  We discussed that with his disease and chronic history of immunosuppression that in order to close the colostomy, the sigmoid colon and area of diverticular disease would likely need to be resected.  Since he has already had a right hemicolectomy and would require sigmoid colectomy, it would likely not be feasible to save just the splenic flexure and descending colon, which likely has diverticular disease in it as well and therefore, he would likely require an ileoproctostomy for closure.      We discussed the implications of ileoproctostomy, typically with 3 to 5 bowel movements a day and often presentation with rectal urgency.  We discussed that if at that point he would then need that reversed due to difficulty controlling bowel movements or having rectal urgency and went back to an ostomy then it would revert back to an ileostomy through which he had become profoundly dehydrated in the past.  The patient would like to have it reversed.  The patient's wife is hesitant to have it reversed due to his coronary artery disease and history of immunosuppression with immunotherapy as well as steroids.  He is also an outdoorsman and likes to fish and rectal urgency could certainly be an issue at that point.      At this point, we discussed at length the pros and cons of end transverse colostomy closure and creation of ileoproctostomy versus maintenance as is.  I would like to refer the patient to Dr. Piotr Milton to have him discuss potential options for closure and reversal versus maintenance of end transverse colostomy as is given his medical comorbidities before the patient ultimately makes a decision.      Time spent with the patient today and his wife was greater than 30 minutes with greater than 50% of that time in counseling.  The patient states he has a hard time getting  hold of his primary care physician and I will renew his metoprolol and steroids.  I have advised him to seek his PCP for his maintenance medications and if truly having a hard time getting access to the PCP then he may need to consider a new PCP at [facility], which is close to home for him.  He will otherwise follow up with me on a p.r.n. basis at this point.    If you have questions, please do not hesitate to call me. I look forward to following Cristo along with you.    Sincerely,    Eber Riley MD   Medical Director, [facility]  Staff Attending Surgeon - Department of Surgery  [facility]  Associate Professor of Surgery  [employer]  [employer]    RLC/hcr

## 2019-02-24 PROBLEM — Z93.3 PRESENCE OF COLOSTOMY: Status: ACTIVE | Noted: 2019-02-24

## 2019-02-24 RX ORDER — PREDNISONE 5 MG/1
5 TABLET ORAL 3 TIMES DAILY
Qty: 90 TABLET | Refills: 3 | Status: SHIPPED | OUTPATIENT
Start: 2019-02-24 | End: 2019-05-14 | Stop reason: SDUPTHER

## 2019-02-24 RX ORDER — PREDNISONE 5 MG/1
5 TABLET ORAL DAILY
Qty: 10 TABLET | Refills: 0 | Status: SHIPPED | OUTPATIENT
Start: 2019-02-24 | End: 2019-03-06

## 2019-02-24 RX ORDER — METOPROLOL SUCCINATE 25 MG/1
25 TABLET, EXTENDED RELEASE ORAL 2 TIMES DAILY
Qty: 60 TABLET | Refills: 11 | Status: SHIPPED | OUTPATIENT
Start: 2019-02-24 | End: 2019-03-28

## 2019-02-24 NOTE — PROGRESS NOTES
Cristo Jefferson is a very pleasant 58-year-old  male who presents with   his wife for another routine followup.  The patient is well known to me, dating   back to November of 2017, when on 11/21/2017, I performed a diverting loop   ileostomy and abdominal washout for perforated pelvic sigmoid diverticulitis.    The patient had had a presentation at that point and was in septic shock at the   time of surgery with a history of coronary artery disease and prior MI who also   takes chronic immunosuppressive therapy and steroids for his arthritic disease.    The patient also has a history of appendiceal carcinoma, status post right   hemicolectomy in the past.  He essentially did well with the initial surgery,   but required 3 liters of Lactated Ringer's daily through his Port-A-Cath for   dehydration secondary to the loop ileostomy.  He subsequently underwent an air   contrast barium enema and colonoscopy, which revealed no evidence of stricture   or disease that would prohibit closure and I closed the loop ileostomy on   04/04/2018.  Approximately 1 month later, he presented to the ER again with   recurrent perforation of the sigmoid diverticular disease and was operated on by   Dr. Bautista on 05/03/2018, and subsequently Dr. Nix on 05/04/2018,   where he underwent an end transverse colostomy to manage his recurrent   perforated sigmoid colon.  Due to his chronic immunosuppressive therapy and   cardiac history, we have hesitated to reverse the colostomy and the patient   presents today to discuss this option again.    He is not getting dehydrated with the transverse loop colostomy.  The midline   wound has fully granulated in.  We discussed that with his disease and chronic   history of immunosuppression that in order to close the colostomy, the sigmoid   colon and area of diverticular disease would likely need to be resected.  Since   he has already had a right hemicolectomy and would require  sigmoid colectomy, it   would likely not be feasible to save just the splenic flexure and descending   colon, which likely has diverticular disease in it as well and therefore, he   would likely require an ileoproctostomy for closure.  We discussed the   implications of ileoproctostomy, typically with 3 to 5 bowel movements a day and   often presentation with rectal urgency.  We discussed that if at that point he   would then need that reversed due to difficulty controlling bowel movements or   having rectal urgency and went back to an ostomy then it would revert back to an   ileostomy through which he had become profoundly dehydrated in the past.  The   patient would like to have it reversed.  The patient's wife is hesitant to have   it reversed due to his coronary artery disease and history of immunosuppression   with immunotherapy as well as steroids.  He is also an outdoorsman and likes to   fish and rectal urgency could certainly be an issue at that point.  At this   point, we discussed at length the pros and cons of end transverse colostomy   closure and creation of ileoproctostomy versus maintenance as is.  I would like   to refer the patient to Dr. Piotr Milton to have him discuss potential   options for closure and reversal versus maintenance of end transverse colostomy   as is given his medical comorbidities before the patient ultimately makes a   decision.  Time spent with the patient today and his wife was greater than 30   minutes with greater than 50% of that time in counseling.  The patient states he   has a hard time getting hold of his primary care physician and I will renew his   metoprolol and steroids.  I have advised him to seek his PCP for his   maintenance medications and if truly having a hard time getting access to the   PCP then he may need to consider a new PCP at [facility], which is   close to home for him.  He will otherwise follow up with me on a p.r.n. basis at   this  point.      JOSE/IN  dd: 02/24/2019 13:25:55 (CST)  td: 02/24/2019 14:58:12 (CST)  Doc ID   #[id number]  Job ID #[id number]    CC:     Job # [id number].

## 2019-02-27 ENCOUNTER — TELEPHONE (OUTPATIENT)
Dept: SURGERY | Facility: CLINIC | Age: 59
End: 2019-02-27

## 2019-02-27 NOTE — TELEPHONE ENCOUNTER
----- Message from Vinita Brizuela RN sent at 2/25/2019  5:23 PM CST -----  Schedule surgery.  ----- Message -----  From: Madeleine Veloz RN  Sent: 2/25/2019   3:43 PM  To: Karine STAPLETON [facility]    Good afternoon,  Dr. Riley would like to refer this patient to Dr. Piotr Milton to have him discuss potential   options for closure and reversal versus maintenance of end transverse colostomy.  Thanks,  Madeleine with Dr. Riley

## 2019-03-28 ENCOUNTER — OFFICE VISIT (OUTPATIENT)
Dept: SURGERY | Facility: CLINIC | Age: 59
End: 2019-03-28
Payer: MEDICARE

## 2019-03-28 VITALS
SYSTOLIC BLOOD PRESSURE: 126 MMHG | HEIGHT: 69 IN | BODY MASS INDEX: 34.91 KG/M2 | WEIGHT: 235.69 LBS | HEART RATE: 55 BPM | DIASTOLIC BLOOD PRESSURE: 87 MMHG

## 2019-03-28 DIAGNOSIS — Z93.3 COLOSTOMY IN PLACE: Primary | ICD-10-CM

## 2019-03-28 PROCEDURE — 99999 PR PBB SHADOW E&M-EST. PATIENT-LVL III: CPT | Mod: PBBFAC,,, | Performed by: COLON & RECTAL SURGERY

## 2019-03-28 PROCEDURE — 3079F PR MOST RECENT DIASTOLIC BLOOD PRESSURE 80-89 MM HG: ICD-10-PCS | Mod: CPTII,S$GLB,, | Performed by: COLON & RECTAL SURGERY

## 2019-03-28 PROCEDURE — 99999 PR PBB SHADOW E&M-EST. PATIENT-LVL III: ICD-10-PCS | Mod: PBBFAC,,, | Performed by: COLON & RECTAL SURGERY

## 2019-03-28 PROCEDURE — 3079F DIAST BP 80-89 MM HG: CPT | Mod: CPTII,S$GLB,, | Performed by: COLON & RECTAL SURGERY

## 2019-03-28 PROCEDURE — 3008F PR BODY MASS INDEX (BMI) DOCUMENTED: ICD-10-PCS | Mod: CPTII,S$GLB,, | Performed by: COLON & RECTAL SURGERY

## 2019-03-28 PROCEDURE — 99213 PR OFFICE/OUTPT VISIT, EST, LEVL III, 20-29 MIN: ICD-10-PCS | Mod: S$GLB,,, | Performed by: COLON & RECTAL SURGERY

## 2019-03-28 PROCEDURE — 3008F BODY MASS INDEX DOCD: CPT | Mod: CPTII,S$GLB,, | Performed by: COLON & RECTAL SURGERY

## 2019-03-28 PROCEDURE — 3074F SYST BP LT 130 MM HG: CPT | Mod: CPTII,S$GLB,, | Performed by: COLON & RECTAL SURGERY

## 2019-03-28 PROCEDURE — 99213 OFFICE O/P EST LOW 20 MIN: CPT | Mod: S$GLB,,, | Performed by: COLON & RECTAL SURGERY

## 2019-03-28 PROCEDURE — 3074F PR MOST RECENT SYSTOLIC BLOOD PRESSURE < 130 MM HG: ICD-10-PCS | Mod: CPTII,S$GLB,, | Performed by: COLON & RECTAL SURGERY

## 2019-03-28 NOTE — PROGRESS NOTES
History & Physical    SUBJECTIVE:     History of Present Illness:  Patient is a 58 y.o. male with h/o sigmoid perf x2 (ex lap with DLI with Dr Riley 11/2017, taken down 4/2018 with subsequent perf 5/2018 with ex lap x2, diverting loop transverse colostomy with no bowel resection) presents for consideration of colostomy takedown. Functionally patient is doing well, only limited by RA for which he remains on prednisone. Was able to hunt this year. No issues with colostomy, able to pouch well, functioning well.     Last colonoscopy 3/2018 with Dr Martinez showed ileocolic anastomosis and some friable mucosa in the transverse colon.     Chief Complaint   Patient presents with    Discuss Closure       Review of patient's allergies indicates:   Allergen Reactions    Morphine Rash       Current Outpatient Medications   Medication Sig Dispense Refill    aspirin 81 MG Chew Take 81 mg by mouth once daily.      atorvastatin (LIPITOR) 10 MG tablet TAKE 1 TABLET BY MOUTH EVERY DAY 90 tablet 0    citalopram (CELEXA) 40 MG tablet TAKE 1 TABLET BY MOUTH EVERY DAY 90 tablet 0    clopidogrel (PLAVIX) 75 mg tablet TAKE 1 TABLET BY MOUTH EVERY DAY 90 tablet 0    ferrous sulfate 325 (65 FE) MG EC tablet Take 325 mg by mouth 2 (two) times daily.      hydroCHLOROthiazide (HYDRODIURIL) 12.5 MG Tab Take 1 tablet (12.5 mg total) by mouth once daily. 30 tablet 11    losartan-hydrochlorothiazide 100-12.5 mg (HYZAAR) 100-12.5 mg Tab TAKE 1 TABLET BY MOUTH EVERY DAY 90 tablet 0    meloxicam (MOBIC) 15 MG tablet TAKE 1 TABLET BY MOUTH EVERY DAY 90 tablet 0    metFORMIN (GLUCOPHAGE-XR) 750 MG 24 hr tablet TAKE 1 TABLET BY MOUTH TWICE A  tablet 0    predniSONE (DELTASONE) 5 MG tablet Take 1 tablet (5 mg total) by mouth 3 (three) times daily. 90 tablet 3    vitamin D 1000 units Tab Take 2,000 Units by mouth once daily.       No current facility-administered medications for this visit.        Past Medical History:   Diagnosis Date     Arthritis     Cancer 2016    appendix, completed chemo    Depression     Diabetes mellitus type I     Hyperlipidemia     Hypertension     On home O2      Past Surgical History:   Procedure Laterality Date    APPENDECTOMY      CLOSURE-WOUND N/A 5/4/2018    [facility] 2ND F[facility]    COLONOSCOPY N/A 3/6/2018    [facility] Yfn [facility] ENDO (4TH FLR)    COLOSTOMY N/A 5/3/2018    [facility] 2ND FLR    EVAR for AAA      S/P EVAR for AAA in 2017    EXPLORATORY LAPAROTOMY Drainage of pelvic abscess N/A 5/3/2018    [facility] 2ND FLR    EXPLORATORY-LAPAROTOMY N/A 5/4/2018    [facility] 2ND FLR    EXPLORATORY-LAPAROTOMY N/A 11/21/2017    [facility] 2ND FLR    EXPLORATORY-LAPAROTOMY loop ilestomy takedown N/A 4/4/2018    [facility] 2ND FLR    INCISION AND DRAINAGE (I & D)- Intra-Abdominal abcess N/A 11/21/2017    [facility] OR [address] [place] [address]    INCISION AND DRAINAGE (I & D)-Abdominal-Pelvic Abcess N/A 11/21/2017    [facility] OR 2ND FLR    INSERTION-TUBE-GASTROSTOMY Right 11/21/2017    [facility] OR 2ND FLR    Loop Ileostomy N/A 11/21/2017    [facility] 2ND FLR    LYSIS OF ADHESIONS N/A 5/3/2018    [facility] 2ND FLR    open heart       RIGHT COLECTOMY  2016    WASHOUT-ABDOMINAL N/A 5/4/2018    [facility] OR 2ND FLR     Family History   Problem Relation Age of Onset    Heart disease Mother     Heart disease Father     Cancer Father         prostate    Cancer Sister         breast , lymphoma     Social History     Tobacco Use    Smoking status: Current Some Day Smoker     Types:  "Cigarettes, Pipe     Last attempt to quit: 2017     Years since quittin.3    Smokeless tobacco: Former User   Substance Use Topics    Alcohol use: No    Drug use: No        Review of Systems:  Review of Systems   Constitutional: Negative for fever and unexpected weight change.   Respiratory: Negative for chest tightness and shortness of breath.    Cardiovascular: Negative for chest pain and leg swelling.   Gastrointestinal: Negative for abdominal pain and blood in stool.   Genitourinary: Negative for dysuria.   Musculoskeletal: Positive for arthralgias and joint swelling.   Skin: Negative for color change and wound.   Neurological: Negative for weakness.   Hematological: Negative for adenopathy. Does not bruise/bleed easily.       OBJECTIVE:     Vital Signs (Most Recent)  Pulse: (!) 55 (19 1058)  BP: 126/87 (19 1058)  5' 9" (1.753 m)  106.9 kg (235 lb 10.8 oz)     Physical Exam:  Physical Exam   Constitutional: No distress.   HENT:   Head: Normocephalic and atraumatic.   Cardiovascular: Normal rate and regular rhythm.   Pulmonary/Chest: Effort normal. No respiratory distress.   Abdominal:   Mildly obese abdomen, midline incision wide scar with thin skin, RUQ colostomy in place   Musculoskeletal:   Swelling to hands/finger joints   Skin: He is not diaphoretic.       Laboratory  No recent    Diagnostic Results:  No recent    ASSESSMENT/PLAN:   59 yo M with h/o complicated diverticular disease (sigmoid perforation x2, no sigmoid resection) and h/o appendiceal cancer and R colectomy with transverse colostomy here to discuss possible colostomy takedown    - d/w pt given previous R colectomy and area of disease in sigmoid that would need to be resected he would not have much remaining colon and his function in terms of number of bowel movements a day and control would probably not be ideal, also discussed that if we did perform surgery with a colostomy takedown, sigmoid colectomy and primary " anastomosis we would likely also have a DLI (which he did not tolerate well in the past), discussed possibility of impaired function and QOL, and risks from the procedure itself  - also d/w pt possibility of colostomy takedown, sigmoid resection and descending end colostomy  - CAD, CABG and stents 15 years ago, on plavix, would need cardiology clearance prior to operations  - severe RA - currently on prednisone 15 mg qd, not following with a rheumatologist- patient has been on biologics in the past, pt and wife very hesitant about restarting any biologic therapy given perforations, though his function with his RA was much better previously on RA. D/w pt and wife extensively, would recommend consult with rheumatologist to at least discuss options, and if pt is going to go back on therapy for RA this would push us more toward considering taking out his diseased sigmoid    Coleen Garcias MD  PGY-5 General Surgery  Pager: [phone]

## 2019-04-08 RX ORDER — ATORVASTATIN CALCIUM 10 MG/1
TABLET, FILM COATED ORAL
Qty: 90 TABLET | Refills: 0 | OUTPATIENT
Start: 2019-04-08

## 2019-04-08 RX ORDER — MELOXICAM 15 MG/1
TABLET ORAL
Qty: 90 TABLET | Refills: 0 | OUTPATIENT
Start: 2019-04-08

## 2019-04-08 RX ORDER — CLOPIDOGREL BISULFATE 75 MG/1
TABLET ORAL
Qty: 90 TABLET | Refills: 0 | OUTPATIENT
Start: 2019-04-08

## 2019-04-08 RX ORDER — CITALOPRAM 40 MG/1
TABLET, FILM COATED ORAL
Qty: 90 TABLET | Refills: 0 | OUTPATIENT
Start: 2019-04-08

## 2019-04-08 RX ORDER — METFORMIN HYDROCHLORIDE 750 MG/1
TABLET, EXTENDED RELEASE ORAL
Qty: 180 TABLET | Refills: 0 | OUTPATIENT
Start: 2019-04-08

## 2019-04-10 RX ORDER — CITALOPRAM 40 MG/1
TABLET, FILM COATED ORAL
Qty: 90 TABLET | Refills: 0 | OUTPATIENT
Start: 2019-04-10

## 2019-04-17 ENCOUNTER — DOCUMENTATION ONLY (OUTPATIENT)
Dept: SURGERY | Facility: HOSPITAL | Age: 59
End: 2019-04-17

## 2019-04-17 NOTE — PROGRESS NOTES
Multidisciplinary Conference - Evaluation and Recommendation Summary  4/17/2019  Cristo Jefferson  [id number]  58 y.o. male    1. Evaluation  59 yo M with CAD, DM h/o sigmoid perf x2 presents for colostomy takedown eval. No issues with stoma. Stable weight. Activity only limited by RA (on 15 mg prednisone).  PSH:  11/21/17 - Ex lap, abdominal washout, drain placement, diverting loop ileostomy, g-tube   04/04/18 - Ex lap, MARY, loop ileostomy takedown   05/03/18 - Ex lap, MARY, Drainage of pelvic abscess, end transverse colostomy, open abdomen   Stage III Appendiceal cancer s/p appendectomy with subsequent R hemicolectomy, chemo 2017  AAA s/p EVAR in 02/2017  CAD s/p CABG in 2004, stents placed in 2005 (on plavix)   Rectal fissures x2 while on Orencia (abatecept)  Colonoscopy 3/18 transverse colon colitis, no significant diverticulosis      2. Treatment Recommendation    Plan to reimage with CT abd/pelvis to further delineate anatomy.     Extensive discussion about non surgical and surgical options. At this time would not take down current stoma/do any operation unless if patient presents with further attacks of diverticulitis

## 2019-04-29 RX ORDER — MELOXICAM 15 MG/1
TABLET ORAL
Qty: 90 TABLET | Refills: 0 | OUTPATIENT
Start: 2019-04-29

## 2019-04-29 RX ORDER — ATORVASTATIN CALCIUM 10 MG/1
TABLET, FILM COATED ORAL
Qty: 90 TABLET | Refills: 0 | OUTPATIENT
Start: 2019-04-29

## 2019-04-29 RX ORDER — METFORMIN HYDROCHLORIDE 750 MG/1
TABLET, EXTENDED RELEASE ORAL
Qty: 180 TABLET | Refills: 0 | OUTPATIENT
Start: 2019-04-29

## 2019-04-29 RX ORDER — CITALOPRAM 40 MG/1
TABLET, FILM COATED ORAL
Qty: 90 TABLET | Refills: 0 | OUTPATIENT
Start: 2019-04-29

## 2019-04-30 RX ORDER — CITALOPRAM 40 MG/1
TABLET, FILM COATED ORAL
Qty: 90 TABLET | Refills: 0 | OUTPATIENT
Start: 2019-04-30

## 2019-05-06 ENCOUNTER — TELEPHONE (OUTPATIENT)
Dept: SURGERY | Facility: CLINIC | Age: 59
End: 2019-05-06

## 2019-05-06 RX ORDER — CITALOPRAM 40 MG/1
TABLET, FILM COATED ORAL
Qty: 90 TABLET | Refills: 0 | OUTPATIENT
Start: 2019-05-06

## 2019-05-06 RX ORDER — ATORVASTATIN CALCIUM 10 MG/1
TABLET, FILM COATED ORAL
Qty: 90 TABLET | Refills: 0 | OUTPATIENT
Start: 2019-05-06

## 2019-05-06 RX ORDER — CLOPIDOGREL BISULFATE 75 MG/1
TABLET ORAL
Qty: 90 TABLET | Refills: 0 | OUTPATIENT
Start: 2019-05-06

## 2019-05-06 RX ORDER — METFORMIN HYDROCHLORIDE 750 MG/1
TABLET, EXTENDED RELEASE ORAL
Qty: 180 TABLET | Refills: 0 | OUTPATIENT
Start: 2019-05-06

## 2019-05-06 NOTE — TELEPHONE ENCOUNTER
Called patient to inquire about the fax for refills on Hydrochlorothiazide 12.5 mg and Metoprolol Tartrate 25 mg. Spoke to patients wife, she states that his PCP is booked and he can not get an appt. He only has 1 pill left and needs refills. I told her that I will speak to Dr. gutierrez about the refills and call her back. She verbalized understanding.

## 2019-05-06 NOTE — TELEPHONE ENCOUNTER
Spoke with pharmacist. Dr. Riley authorized Hydrochlorothiazide 12.5 mg take 1 po qd. Dispense 30 - 1 refill, and Metroprolol Tartrate 25 mg BID dispense 60 - 1 refell. LINK

## 2019-05-06 NOTE — TELEPHONE ENCOUNTER
Spoke to patients wife again. Discussed that the patients refill request was authorized by Dr. gutierrez and that the patient needs to make an appt and f/u with PCP asap. She said that someone from his insurance company is working with them to help him get an appt. She verbalized understanding.

## 2019-05-09 ENCOUNTER — TELEPHONE (OUTPATIENT)
Dept: ADMINISTRATIVE | Facility: HOSPITAL | Age: 59
End: 2019-05-09

## 2019-05-09 NOTE — TELEPHONE ENCOUNTER
"Per Lisa Yun, RN [facility] Nurse Navigator - "Can you please call MsMissy Lacie (Mr. Lmeons's wife) to help her set up an apt for him to see Dr. Perez. He is overdue for an appt. and he is over a month overdue and behind on his atorvastatin and metformin medications. Any way we can get a script sent in so he can fill asap?   Lisa   [phone] is their phone number"    I do not see where patient ever was seen in the office by Dr. Perez. [employer] listed as primary insurance and [employer] secondary? Dr. Perez has been refilling the patient's medication?   "

## 2019-05-09 NOTE — TELEPHONE ENCOUNTER
I spoke to patients wife and she says he has [employer] as his primary insurance, not [employer]. appointment made for next week.

## 2019-05-14 ENCOUNTER — CLINICAL SUPPORT (OUTPATIENT)
Dept: PRIMARY CARE CLINIC | Facility: CLINIC | Age: 59
End: 2019-05-14
Payer: MEDICARE

## 2019-05-14 ENCOUNTER — OFFICE VISIT (OUTPATIENT)
Dept: PRIMARY CARE CLINIC | Facility: CLINIC | Age: 59
End: 2019-05-14
Payer: MEDICARE

## 2019-05-14 VITALS
RESPIRATION RATE: 18 BRPM | WEIGHT: 234 LBS | HEIGHT: 69 IN | TEMPERATURE: 98 F | SYSTOLIC BLOOD PRESSURE: 130 MMHG | BODY MASS INDEX: 34.66 KG/M2 | HEART RATE: 56 BPM | OXYGEN SATURATION: 96 % | DIASTOLIC BLOOD PRESSURE: 85 MMHG

## 2019-05-14 DIAGNOSIS — E78.5 TYPE 2 DIABETES MELLITUS WITH HYPERLIPIDEMIA: Primary | ICD-10-CM

## 2019-05-14 DIAGNOSIS — Z79.52 LONG TERM (CURRENT) USE OF SYSTEMIC STEROIDS: ICD-10-CM

## 2019-05-14 DIAGNOSIS — E11.69 TYPE 2 DIABETES MELLITUS WITH HYPERLIPIDEMIA: Primary | ICD-10-CM

## 2019-05-14 DIAGNOSIS — Z11.59 NEED FOR HEPATITIS C SCREENING TEST: ICD-10-CM

## 2019-05-14 DIAGNOSIS — M89.9 DISORDER OF BONE: ICD-10-CM

## 2019-05-14 DIAGNOSIS — F33.9 MAJOR DEPRESSION, RECURRENT, CHRONIC: Chronic | ICD-10-CM

## 2019-05-14 DIAGNOSIS — M06.9 RHEUMATOID ARTHRITIS INVOLVING MULTIPLE SITES, UNSPECIFIED RHEUMATOID FACTOR PRESENCE: ICD-10-CM

## 2019-05-14 DIAGNOSIS — Z12.5 PROSTATE CANCER SCREENING: ICD-10-CM

## 2019-05-14 DIAGNOSIS — I25.810 CORONARY ARTERY DISEASE INVOLVING AUTOLOGOUS VEIN CORONARY BYPASS GRAFT WITHOUT ANGINA PECTORIS: Chronic | ICD-10-CM

## 2019-05-14 DIAGNOSIS — E78.5 TYPE 2 DIABETES MELLITUS WITH HYPERLIPIDEMIA: ICD-10-CM

## 2019-05-14 DIAGNOSIS — E11.69 TYPE 2 DIABETES MELLITUS WITH HYPERLIPIDEMIA: ICD-10-CM

## 2019-05-14 DIAGNOSIS — I10 ESSENTIAL HYPERTENSION: Chronic | ICD-10-CM

## 2019-05-14 DIAGNOSIS — E78.5 HYPERLIPIDEMIA, UNSPECIFIED HYPERLIPIDEMIA TYPE: ICD-10-CM

## 2019-05-14 DIAGNOSIS — Z85.068 HISTORY OF CANCER OF SMALL INTESTINE: ICD-10-CM

## 2019-05-14 DIAGNOSIS — Z23 NEED FOR VACCINATION: ICD-10-CM

## 2019-05-14 PROBLEM — R65.21 SEPTIC SHOCK: Status: RESOLVED | Noted: 2018-05-03 | Resolved: 2019-05-14

## 2019-05-14 PROBLEM — E86.0 DEHYDRATION: Status: RESOLVED | Noted: 2017-12-06 | Resolved: 2019-05-14

## 2019-05-14 PROBLEM — Z01.89 ENCOUNTER FOR IMAGING STUDY TO CONFIRM NASOGASTRIC (NG) TUBE PLACEMENT: Status: RESOLVED | Noted: 2018-05-04 | Resolved: 2019-05-14

## 2019-05-14 PROBLEM — K66.8 FREE INTRAPERITONEAL AIR: Status: RESOLVED | Noted: 2017-11-24 | Resolved: 2019-05-14

## 2019-05-14 PROBLEM — F32.9 MAJOR DEPRESSIVE DISORDER: Chronic | Status: RESOLVED | Noted: 2017-11-16 | Resolved: 2019-05-14

## 2019-05-14 PROBLEM — A41.9 SEPTIC SHOCK: Status: RESOLVED | Noted: 2018-05-03 | Resolved: 2019-05-14

## 2019-05-14 LAB
ALBUMIN SERPL BCP-MCNC: 3.4 G/DL (ref 3.5–5.2)
ALBUMIN/CREAT UR: 33.4 UG/MG (ref 0–30)
ALP SERPL-CCNC: 58 U/L (ref 38–126)
ALT SERPL W/O P-5'-P-CCNC: 36 U/L (ref 17–63)
ANION GAP SERPL CALC-SCNC: 12 MMOL/L (ref 8–16)
AST SERPL-CCNC: 38 U/L (ref 15–41)
BASOPHILS # BLD AUTO: 0.1 K/UL (ref 0–0.2)
BASOPHILS NFR BLD: 1.2 % (ref 0–1.9)
BILIRUB SERPL-MCNC: 0.4 MG/DL (ref 0.3–1.2)
BUN SERPL-MCNC: 16 MG/DL (ref 6–20)
CALCIUM SERPL-MCNC: 9.3 MG/DL (ref 8.6–10)
CHLORIDE SERPL-SCNC: 102 MMOL/L (ref 101–111)
CHOLEST SERPL-MCNC: 127 MG/DL (ref 80–200)
CHOLEST/HDLC SERPL: 3.4 {RATIO} (ref 2–5)
CO2 SERPL-SCNC: 25 MMOL/L (ref 23–29)
COMPLEXED PSA SERPL-MCNC: 5.8 NG/ML (ref 0–4)
CREAT SERPL-MCNC: 0.8 MG/DL (ref 0.5–1.4)
CREAT UR-MCNC: 320 MG/DL (ref 23–375)
DIFFERENTIAL METHOD: ABNORMAL
EOSINOPHIL # BLD AUTO: 0.1 K/UL (ref 0–0.5)
EOSINOPHIL NFR BLD: 1.7 % (ref 0–8)
ERYTHROCYTE [DISTWIDTH] IN BLOOD BY AUTOMATED COUNT: 19.9 % (ref 11.5–14.5)
EST. GFR  (AFRICAN AMERICAN): >60 ML/MIN/1.73 M^2
EST. GFR  (NON AFRICAN AMERICAN): >60 ML/MIN/1.73 M^2
ESTIMATED AVG GLUCOSE: 126 MG/DL (ref 68–131)
GLUCOSE SERPL-MCNC: 117 MG/DL (ref 74–118)
HBA1C MFR BLD HPLC: 6 % (ref 4–5.6)
HCT VFR BLD AUTO: 40.5 % (ref 40–54)
HDLC SERPL-MCNC: 37 MG/DL (ref 40–75)
HDLC SERPL: 29.1 % (ref 20–50)
HGB BLD-MCNC: 12.8 G/DL (ref 14–18)
LDLC SERPL CALC-MCNC: 51 MG/DL
LYMPHOCYTES # BLD AUTO: 1.6 K/UL (ref 1–4.8)
LYMPHOCYTES NFR BLD: 24 % (ref 18–48)
MCH RBC QN AUTO: 23.8 PG (ref 27–31)
MCHC RBC AUTO-ENTMCNC: 31.6 G/DL (ref 32–36)
MCV RBC AUTO: 75 FL (ref 82–98)
MICROALBUMIN UR DL<=1MG/L-MCNC: 107 UG/ML
MONOCYTES # BLD AUTO: 0.7 K/UL (ref 0.3–1)
MONOCYTES NFR BLD: 10.5 % (ref 4–15)
NEUTROPHILS # BLD AUTO: 4.3 K/UL (ref 1.8–7.7)
NEUTROPHILS NFR BLD: 62.6 % (ref 38–73)
NONHDLC SERPL-MCNC: 90 MG/DL
PLATELET # BLD AUTO: 151 K/UL (ref 150–350)
PMV BLD AUTO: 10.7 FL (ref 9.2–12.9)
POTASSIUM SERPL-SCNC: 3.9 MMOL/L (ref 3.5–5.1)
PROT SERPL-MCNC: 8 G/DL (ref 6–8.4)
RBC # BLD AUTO: 5.38 M/UL (ref 4.6–6.2)
SODIUM SERPL-SCNC: 139 MMOL/L (ref 136–145)
TRIGL SERPL-MCNC: 195 MG/DL (ref 30–150)
TSH SERPL DL<=0.005 MIU/L-ACNC: 3.84 UIU/ML (ref 0.45–5.33)
WBC # BLD AUTO: 6.8 K/UL (ref 3.9–12.7)

## 2019-05-14 PROCEDURE — 90714 TD VACC NO PRESV 7 YRS+ IM: CPT | Mod: S$GLB,,, | Performed by: FAMILY MEDICINE

## 2019-05-14 PROCEDURE — 3008F BODY MASS INDEX DOCD: CPT | Mod: CPTII,S$GLB,, | Performed by: FAMILY MEDICINE

## 2019-05-14 PROCEDURE — 99499 UNLISTED E&M SERVICE: CPT | Mod: S$GLB,,, | Performed by: FAMILY MEDICINE

## 2019-05-14 PROCEDURE — 80061 LIPID PANEL: CPT

## 2019-05-14 PROCEDURE — 99499 RISK ADDL DX/OHS AUDIT: ICD-10-PCS | Mod: S$GLB,,, | Performed by: FAMILY MEDICINE

## 2019-05-14 PROCEDURE — 99999 PR PBB SHADOW E&M-EST. PATIENT-LVL IV: CPT | Mod: PBBFAC,,, | Performed by: FAMILY MEDICINE

## 2019-05-14 PROCEDURE — 90670 PCV13 VACCINE IM: CPT | Mod: S$GLB,,, | Performed by: FAMILY MEDICINE

## 2019-05-14 PROCEDURE — 82043 UR ALBUMIN QUANTITATIVE: CPT

## 2019-05-14 PROCEDURE — 90670 PNEUMOCOCCAL CONJUGATE VACCINE 13-VALENT LESS THAN 5YO & GREATER THAN: ICD-10-PCS | Mod: S$GLB,,, | Performed by: FAMILY MEDICINE

## 2019-05-14 PROCEDURE — 3075F SYST BP GE 130 - 139MM HG: CPT | Mod: CPTII,S$GLB,, | Performed by: FAMILY MEDICINE

## 2019-05-14 PROCEDURE — 80053 COMPREHEN METABOLIC PANEL: CPT

## 2019-05-14 PROCEDURE — G0009 ADMIN PNEUMOCOCCAL VACCINE: HCPCS | Mod: 59,S$GLB,, | Performed by: FAMILY MEDICINE

## 2019-05-14 PROCEDURE — 84153 ASSAY OF PSA TOTAL: CPT

## 2019-05-14 PROCEDURE — 83036 HEMOGLOBIN GLYCOSYLATED A1C: CPT

## 2019-05-14 PROCEDURE — 84443 ASSAY THYROID STIM HORMONE: CPT

## 2019-05-14 PROCEDURE — 90714 TD VACCINE GREATER THAN OR EQUAL TO 7YO PRESERVATIVE FREE IM: ICD-10-PCS | Mod: S$GLB,,, | Performed by: FAMILY MEDICINE

## 2019-05-14 PROCEDURE — 99204 OFFICE O/P NEW MOD 45 MIN: CPT | Mod: 25,S$GLB,, | Performed by: FAMILY MEDICINE

## 2019-05-14 PROCEDURE — 3008F PR BODY MASS INDEX (BMI) DOCUMENTED: ICD-10-PCS | Mod: CPTII,S$GLB,, | Performed by: FAMILY MEDICINE

## 2019-05-14 PROCEDURE — 99999 PR PBB SHADOW E&M-EST. PATIENT-LVL IV: ICD-10-PCS | Mod: PBBFAC,,, | Performed by: FAMILY MEDICINE

## 2019-05-14 PROCEDURE — 90471 IMMUNIZATION ADMIN: CPT | Mod: S$GLB,,, | Performed by: FAMILY MEDICINE

## 2019-05-14 PROCEDURE — 3075F PR MOST RECENT SYSTOLIC BLOOD PRESS GE 130-139MM HG: ICD-10-PCS | Mod: CPTII,S$GLB,, | Performed by: FAMILY MEDICINE

## 2019-05-14 PROCEDURE — 99204 PR OFFICE/OUTPT VISIT, NEW, LEVL IV, 45-59 MIN: ICD-10-PCS | Mod: 25,S$GLB,, | Performed by: FAMILY MEDICINE

## 2019-05-14 PROCEDURE — 86803 HEPATITIS C AB TEST: CPT

## 2019-05-14 PROCEDURE — 85025 COMPLETE CBC W/AUTO DIFF WBC: CPT

## 2019-05-14 PROCEDURE — G0009 TD VACCINE GREATER THAN OR EQUAL TO 7YO PRESERVATIVE FREE IM: ICD-10-PCS | Mod: 59,S$GLB,, | Performed by: FAMILY MEDICINE

## 2019-05-14 PROCEDURE — 3079F PR MOST RECENT DIASTOLIC BLOOD PRESSURE 80-89 MM HG: ICD-10-PCS | Mod: CPTII,S$GLB,, | Performed by: FAMILY MEDICINE

## 2019-05-14 PROCEDURE — 3079F DIAST BP 80-89 MM HG: CPT | Mod: CPTII,S$GLB,, | Performed by: FAMILY MEDICINE

## 2019-05-14 PROCEDURE — 90471 PNEUMOCOCCAL CONJUGATE VACCINE 13-VALENT LESS THAN 5YO & GREATER THAN: ICD-10-PCS | Mod: S$GLB,,, | Performed by: FAMILY MEDICINE

## 2019-05-14 RX ORDER — CLOPIDOGREL BISULFATE 75 MG/1
75 TABLET ORAL DAILY
Qty: 90 TABLET | Refills: 1 | Status: SHIPPED | OUTPATIENT
Start: 2019-05-14 | End: 2019-01-01 | Stop reason: SDUPTHER

## 2019-05-14 RX ORDER — ATORVASTATIN CALCIUM 10 MG/1
10 TABLET, FILM COATED ORAL DAILY
Qty: 90 TABLET | Refills: 1 | Status: SHIPPED | OUTPATIENT
Start: 2019-05-14 | End: 2019-01-01 | Stop reason: SDUPTHER

## 2019-05-14 RX ORDER — CITALOPRAM 40 MG/1
40 TABLET, FILM COATED ORAL DAILY
Qty: 90 TABLET | Refills: 1 | Status: SHIPPED | OUTPATIENT
Start: 2019-05-14 | End: 2019-01-01 | Stop reason: SDUPTHER

## 2019-05-14 RX ORDER — PREDNISONE 5 MG/1
5 TABLET ORAL 3 TIMES DAILY
Qty: 270 TABLET | Refills: 1 | Status: SHIPPED | OUTPATIENT
Start: 2019-05-14 | End: 2019-01-01 | Stop reason: SDUPTHER

## 2019-05-14 RX ORDER — MELOXICAM 15 MG/1
15 TABLET ORAL DAILY
Qty: 90 TABLET | Refills: 1 | Status: SHIPPED | OUTPATIENT
Start: 2019-05-14 | End: 2019-06-21 | Stop reason: ALTCHOICE

## 2019-05-14 RX ORDER — METOPROLOL TARTRATE 25 MG/1
25 TABLET, FILM COATED ORAL 2 TIMES DAILY
Refills: 1 | COMMUNITY
Start: 2019-05-06 | End: 2019-05-14 | Stop reason: SDUPTHER

## 2019-05-14 RX ORDER — METFORMIN HYDROCHLORIDE 750 MG/1
750 TABLET, EXTENDED RELEASE ORAL 2 TIMES DAILY
Qty: 180 TABLET | Refills: 1 | Status: SHIPPED | OUTPATIENT
Start: 2019-05-14 | End: 2019-01-01 | Stop reason: SDUPTHER

## 2019-05-14 RX ORDER — METOPROLOL TARTRATE 25 MG/1
25 TABLET, FILM COATED ORAL 2 TIMES DAILY
Qty: 180 TABLET | Refills: 1 | Status: SHIPPED | OUTPATIENT
Start: 2019-05-14 | End: 2019-01-01 | Stop reason: SDUPTHER

## 2019-05-14 NOTE — PROGRESS NOTES
Subjective:       Patient ID: Cristo Jefferson is a 58 y.o. male.    Chief Complaint: Establish Care and Medication Refill    Here to reestablish primary care services.  Has not been seen by me in the past several years.  Has had multiple complicated medical/surgical issues.  Has had 2 previous colonic perforations necessitating permanent diverting colostomy placement, has had nonhealing surgical wound says stating use of wound vacs.  Has been on prednisone for the past several years for treatment if it is rheumatoid arthritis, patient reluctant to go back on any biologic agents due to perception that these were the cause of his bowel perforations.  Currently taking 12-,1/2 mg prednisone most days, feels like he needs at least this much prednisone to remain functional.  Only checking his blood sugar occasionally, reports that it is consistently normal.  Has a history of coronary artery disease status post prior CABG, has not seen a cardiologist in several years.  Denies chest pain, palpitations or exertional dyspnea.    Review of Systems   Constitutional: Positive for fatigue. Negative for fever.   HENT: Negative for trouble swallowing.    Eyes: Positive for visual disturbance.   Respiratory: Negative for cough and wheezing.    Cardiovascular: Negative for chest pain and palpitations.   Gastrointestinal: Negative for blood in stool.   Endocrine: Negative for polydipsia and polyuria.   Genitourinary: Negative for difficulty urinating.   Musculoskeletal: Positive for arthralgias.   Skin: Negative for wound.   Neurological: Negative for dizziness and light-headedness.   Hematological: Bruises/bleeds easily.   Psychiatric/Behavioral: Positive for dysphoric mood. Negative for agitation, confusion, self-injury and suicidal ideas.       Objective:      Vitals:    05/14/19 1006   BP: 130/85   BP Location: Right arm   Patient Position: Sitting   BP Method: Large (Automatic)   Pulse: (!) 56   Resp: 18   Temp: 97.9 °F (36.6 °C)  "  TempSrc: Oral   SpO2: 96%   Weight: 106.1 kg (234 lb)   Height: 5' 9" (1.753 m)     Physical Exam   Constitutional: He is oriented to person, place, and time. He appears well-developed and well-nourished.   HENT:   Head: Normocephalic and atraumatic.   Eyes: EOM are normal.   Neck: Neck supple. No JVD present.   Cardiovascular: Normal rate, regular rhythm and normal heart sounds.   Pulses:       Dorsalis pedis pulses are 1+ on the right side, and 1+ on the left side.   Pulmonary/Chest: Effort normal and breath sounds normal.   Abdominal: Soft. Bowel sounds are normal. He exhibits no distension. There is no tenderness. There is no guarding.       Musculoskeletal: He exhibits no edema.   Feet:   Right Foot:   Protective Sensation: 10 sites tested. 10 sites sensed.   Skin Integrity: Negative for ulcer, blister or skin breakdown.   Left Foot:   Protective Sensation: 10 sites tested. 10 sites sensed.   Skin Integrity: Negative for ulcer, blister or skin breakdown.   Neurological: He is alert and oriented to person, place, and time.   Skin: Skin is warm and dry.   Psychiatric: He has a normal mood and affect. His behavior is normal. Judgment and thought content normal.   Nursing note and vitals reviewed.      Assessment:       1. Type 2 diabetes mellitus with hyperlipidemia    2. Rheumatoid arthritis involving multiple sites, unspecified rheumatoid factor presence    3. History of cancer of small intestine    4. Hyperlipidemia, unspecified hyperlipidemia type    5. Coronary artery disease involving autologous vein coronary bypass graft without angina pectoris    6. Essential hypertension    7. Major depression, recurrent, chronic    8. Prostate cancer screening    9. Need for hepatitis C screening test    10. Long term (current) use of systemic steroids    11. Disorder of bone    12. Need for vaccination        Plan:       Type 2 diabetes mellitus with hyperlipidemia  -     metFORMIN (GLUCOPHAGE-XR) 750 MG 24 hr tablet; " Take 1 tablet (750 mg total) by mouth 2 (two) times daily.  Dispense: 180 tablet; Refill: 1  -     Hemoglobin A1c; Future; Expected date: 05/14/2019  -     Microalbumin/creatinine urine ratio; Future; Expected date: 05/14/2019  -     TSH; Future; Expected date: 05/14/2019  -     HM DIABETES FOOT EXAM    Rheumatoid arthritis involving multiple sites, unspecified rheumatoid factor presence  -     predniSONE (DELTASONE) 5 MG tablet; Take 1 tablet (5 mg total) by mouth 3 (three) times daily.  Dispense: 270 tablet; Refill: 1  -     meloxicam (MOBIC) 15 MG tablet; Take 1 tablet (15 mg total) by mouth once daily.  Dispense: 90 tablet; Refill: 1  -     Ambulatory Referral to Rheumatology    History of cancer of small intestine    Hyperlipidemia, unspecified hyperlipidemia type  -     atorvastatin (LIPITOR) 10 MG tablet; Take 1 tablet (10 mg total) by mouth once daily.  Dispense: 90 tablet; Refill: 1  -     Comprehensive metabolic panel; Future; Expected date: 05/14/2019  -     Lipid panel; Future; Expected date: 05/14/2019    Coronary artery disease involving autologous vein coronary bypass graft without angina pectoris  -     clopidogrel (PLAVIX) 75 mg tablet; Take 1 tablet (75 mg total) by mouth once daily.  Dispense: 90 tablet; Refill: 1  -     CBC auto differential; Future; Expected date: 05/14/2019  -     Comprehensive metabolic panel; Future; Expected date: 05/14/2019  -     Ambulatory referral to Cardiology    Essential hypertension  -     metoprolol tartrate (LOPRESSOR) 25 MG tablet; Take 1 tablet (25 mg total) by mouth 2 (two) times daily.  Dispense: 180 tablet; Refill: 1    Major depression, recurrent, chronic  -     citalopram (CELEXA) 40 MG tablet; Take 1 tablet (40 mg total) by mouth once daily.  Dispense: 90 tablet; Refill: 1  -     TSH; Future; Expected date: 05/14/2019    Prostate cancer screening  -     PSA, Screening; Future; Expected date: 05/14/2019    Need for hepatitis C screening test  -     Hepatitis  C antibody; Future; Expected date: 05/14/2019    Long term (current) use of systemic steroids  -     DXA Bone Density Spine And Hip; Future; Expected date: 05/21/2019    Disorder of bone  -     DXA Bone Density Spine And Hip; Future; Expected date: 05/21/2019    Need for vaccination  -     Td Vaccine (Adult) - Preservative Free  -     Pneumococcal Conjugate Vaccine (13 Valent) (IM)      Medication List with Changes/Refills   Current Medications    ASPIRIN 81 MG CHEW    Take 81 mg by mouth once daily.    FERROUS SULFATE 325 (65 FE) MG EC TABLET    Take 325 mg by mouth 2 (two) times daily.    HYDROCHLOROTHIAZIDE (HYDRODIURIL) 12.5 MG TAB    Take 1 tablet (12.5 mg total) by mouth once daily.    VITAMIN D 1000 UNITS TAB    Take 2,000 Units by mouth once daily.   Changed and/or Refilled Medications    Modified Medication Previous Medication    ATORVASTATIN (LIPITOR) 10 MG TABLET atorvastatin (LIPITOR) 10 MG tablet       Take 1 tablet (10 mg total) by mouth once daily.    TAKE 1 TABLET BY MOUTH EVERY DAY    CITALOPRAM (CELEXA) 40 MG TABLET citalopram (CELEXA) 40 MG tablet       Take 1 tablet (40 mg total) by mouth once daily.    TAKE 1 TABLET BY MOUTH EVERY DAY    CLOPIDOGREL (PLAVIX) 75 MG TABLET clopidogrel (PLAVIX) 75 mg tablet       Take 1 tablet (75 mg total) by mouth once daily.    TAKE 1 TABLET BY MOUTH EVERY DAY    MELOXICAM (MOBIC) 15 MG TABLET meloxicam (MOBIC) 15 MG tablet       Take 1 tablet (15 mg total) by mouth once daily.    TAKE 1 TABLET BY MOUTH EVERY DAY    METFORMIN (GLUCOPHAGE-XR) 750 MG 24 HR TABLET metFORMIN (GLUCOPHAGE-XR) 750 MG 24 hr tablet       Take 1 tablet (750 mg total) by mouth 2 (two) times daily.    TAKE 1 TABLET BY MOUTH TWICE A DAY    METOPROLOL TARTRATE (LOPRESSOR) 25 MG TABLET metoprolol tartrate (LOPRESSOR) 25 MG tablet       Take 1 tablet (25 mg total) by mouth 2 (two) times daily.    Take 25 mg by mouth 2 (two) times daily.    PREDNISONE (DELTASONE) 5 MG TABLET predniSONE  (DELTASONE) 5 MG tablet       Take 1 tablet (5 mg total) by mouth 3 (three) times daily.    Take 1 tablet (5 mg total) by mouth 3 (three) times daily.   Discontinued Medications    LOSARTAN-HYDROCHLOROTHIAZIDE 100-12.5 MG (HYZAAR) 100-12.5 MG TAB    TAKE 1 TABLET BY MOUTH EVERY DAY

## 2019-05-14 NOTE — PROGRESS NOTES
Verified pt ID using name and . Verified allergies. Administered prevnar 13 in right deltoid per physician order using aseptic technique. Aspirated and no blood return noted. Pt tolerated well with no adverse reactions noted.

## 2019-05-14 NOTE — PROGRESS NOTES
Patient, Cristo Jefferson (MRN #[id number]), presented with a recent Platelet count less than 150 K/uL consistent with the definition of thrombocytopenia (ICD10 - D69.6).    Platelets   Date Value Ref Range Status   05/12/2018 109 (L) 150 - 350 K/uL Final     The patient's thrombocytopenia was monitored, evaluated, addressed and/or treated. This addendum to the medical record is made on 05/14/2019.

## 2019-05-14 NOTE — PROGRESS NOTES
Verified pt ID using name and . Verified allergies. Administered td in left deltoid per physician order using aseptic technique. Aspirated and no blood return noted. Pt tolerated well with no adverse reactions noted.

## 2019-05-14 NOTE — LETTER
May 14, 2019      [facility]  [address] [facility] [address]           [facility] [address] [address]  Phone: [phone]  Fax: [phone]          Patient: Cristo Jefferson   MR Number: [id number]   YOB: 1960   Date of Visit: 5/14/2019       Dear [facility]:    Thank you for referring Cristo Jefferson to me for evaluation. Attached you will find relevant portions of my assessment and plan of care.    If you have questions, please do not hesitate to call me. I look forward to following Cristo Jefferson along with you.    Sincerely,    Vince Perez MD    Enclosure  CC:  No Recipients    If you would like to receive this communication electronically, please contact [email or web address] or [phone] to request more information on [employer] Link access.    For providers and/or their staff who would like to refer a patient to [facility], please contact us through our one-stop-shop provider referral line, [facility] , at [phone].    If you feel you have received this communication in error or would no longer like to receive these types of communications, please e-mail [email or web address]

## 2019-05-15 DIAGNOSIS — R97.20 ELEVATED PSA, LESS THAN 10 NG/ML: Primary | ICD-10-CM

## 2019-05-15 LAB — HCV AB SERPL QL IA: NEGATIVE

## 2019-05-29 RX ORDER — HYDROCHLOROTHIAZIDE 12.5 MG/1
CAPSULE ORAL
Qty: 30 CAPSULE | Refills: 0 | Status: SHIPPED | OUTPATIENT
Start: 2019-05-29 | End: 2019-06-25 | Stop reason: SDUPTHER

## 2019-06-14 ENCOUNTER — OFFICE VISIT (OUTPATIENT)
Dept: UROLOGY | Facility: CLINIC | Age: 59
End: 2019-06-14
Payer: MEDICARE

## 2019-06-14 VITALS
DIASTOLIC BLOOD PRESSURE: 84 MMHG | BODY MASS INDEX: 34.65 KG/M2 | WEIGHT: 233.94 LBS | HEART RATE: 56 BPM | SYSTOLIC BLOOD PRESSURE: 122 MMHG | HEIGHT: 69 IN

## 2019-06-14 DIAGNOSIS — N40.0 ENLARGED PROSTATE: Primary | ICD-10-CM

## 2019-06-14 DIAGNOSIS — Z87.442 HISTORY OF KIDNEY STONES: ICD-10-CM

## 2019-06-14 DIAGNOSIS — R97.20 ELEVATED PSA: ICD-10-CM

## 2019-06-14 LAB
BILIRUB SERPL-MCNC: ABNORMAL MG/DL
BLOOD URINE, POC: 50
COLOR, POC UA: ABNORMAL
GLUCOSE UR QL STRIP: ABNORMAL
KETONES UR QL STRIP: ABNORMAL
LEUKOCYTE ESTERASE URINE, POC: ABNORMAL
NITRITE, POC UA: ABNORMAL
PH, POC UA: 5
PROTEIN, POC: ABNORMAL
SPECIFIC GRAVITY, POC UA: 1.02
UROBILINOGEN, POC UA: ABNORMAL

## 2019-06-14 PROCEDURE — 99204 OFFICE O/P NEW MOD 45 MIN: CPT | Mod: 25,S$GLB,, | Performed by: UROLOGY

## 2019-06-14 PROCEDURE — 3079F PR MOST RECENT DIASTOLIC BLOOD PRESSURE 80-89 MM HG: ICD-10-PCS | Mod: CPTII,S$GLB,, | Performed by: UROLOGY

## 2019-06-14 PROCEDURE — 99204 PR OFFICE/OUTPT VISIT, NEW, LEVL IV, 45-59 MIN: ICD-10-PCS | Mod: 25,S$GLB,, | Performed by: UROLOGY

## 2019-06-14 PROCEDURE — 3074F PR MOST RECENT SYSTOLIC BLOOD PRESSURE < 130 MM HG: ICD-10-PCS | Mod: CPTII,S$GLB,, | Performed by: UROLOGY

## 2019-06-14 PROCEDURE — 81002 URINALYSIS NONAUTO W/O SCOPE: CPT | Mod: S$GLB,,, | Performed by: UROLOGY

## 2019-06-14 PROCEDURE — 81002 POCT URINE DIPSTICK WITHOUT MICROSCOPE: ICD-10-PCS | Mod: S$GLB,,, | Performed by: UROLOGY

## 2019-06-14 PROCEDURE — 3074F SYST BP LT 130 MM HG: CPT | Mod: CPTII,S$GLB,, | Performed by: UROLOGY

## 2019-06-14 PROCEDURE — 3008F PR BODY MASS INDEX (BMI) DOCUMENTED: ICD-10-PCS | Mod: CPTII,S$GLB,, | Performed by: UROLOGY

## 2019-06-14 PROCEDURE — 3079F DIAST BP 80-89 MM HG: CPT | Mod: CPTII,S$GLB,, | Performed by: UROLOGY

## 2019-06-14 PROCEDURE — 3008F BODY MASS INDEX DOCD: CPT | Mod: CPTII,S$GLB,, | Performed by: UROLOGY

## 2019-06-14 RX ORDER — DUTASTERIDE 0.5 MG/1
0.5 CAPSULE, LIQUID FILLED ORAL DAILY
Qty: 30 CAPSULE | Refills: 11 | Status: SHIPPED | OUTPATIENT
Start: 2019-06-14 | End: 2019-01-01

## 2019-06-14 NOTE — LETTER
June 14, 2019      Vince Perez MD  [address]  [address]  [facility] [address]           [facility] [address] [address]  Phone: [phone]  Fax: [phone]          Patient: Cristo Jefferson   MR Number: [id number]   YOB: 1960   Date of Visit: 6/14/2019       Dear Dr. Vince Perez:    Thank you for referring Cristo Jefferson to me for evaluation. Attached you will find relevant portions of my assessment and plan of care.    If you have questions, please do not hesitate to call me. I look forward to following Cristo Jefferson along with you.    Sincerely,    Pavel Staley MD    Enclosure  CC:  No Recipients    If you would like to receive this communication electronically, please contact [email or web address] or [phone] to request more information on [employer] Link access.    For providers and/or their staff who would like to refer a patient to [facility], please contact us through our one-stop-shop provider referral line, [facility], at [phone].    If you feel you have received this communication in error or would no longer like to receive these types of communications, please e-mail [email or web address]

## 2019-06-14 NOTE — PATIENT INSTRUCTIONS
Dutasteride capsules  What is this medicine?  DUTASTERIDE (doo TAS mikaela frank) is used to treat benign prostatic hyperplasia (BPH) in men. This is a condition that causes you to have an enlarged prostate. This medicine helps to control your symptoms, decrease urinary retention, and reduces your risk of needing surgery.  How should I use this medicine?  Take this medicine by mouth with a glass of water. Follow the directions on the prescription label. Do not cut, crush, chew or open this medicine. You can take this medicine with or without food. Take your doses at regular intervals. Do not take your medicine more often than directed. Do not stop taking except on your doctor's advice.  Talk to your pediatrician regarding the use of this medicine in children. Special care may be needed.  What side effects may I notice from receiving this medicine?  Side effects that you should report to your doctor or health care professional as soon as possible:  · allergic reactions like skin rash, itching or hives, swelling of the face, lips, or tongue  · changes in breast like lumps, pain or fluids leaking from the nipple  · pain in the testicles  Side effects that usually do not require medical attention (report to your doctor or health care professional if they continue or are bothersome):  · change in sex drive or performance  What may interact with this medicine?  · antiviral medicines for HIV or AIDS  · boceprevir  · certain medicines for fungal infections like ketoconazole, itraconazole, voriconazole  · certain medicines for infection like erythromycin, telithromycin  · cimetidine  · diltiazem  · saw palmetto or other dietary supplements  · verapamil  What if I miss a dose?  If you miss a dose, take it as soon as you can. If it is almost time for your next dose, take only that dose. Do not take double or extra doses.  Where should I keep my medicine?  Keep out of the reach of children.  Store at room temperature between 15  and 30 degrees C (59 and 86 degrees F). Keep container tightly closed. Throw away any unused medicine after the expiration date.  What should I tell my health care provider before I take this medicine?  They need to know if you have any of these conditions:  · liver disease  · prostate cancer  · an unusual or allergic reaction to dutasteride, finasteride, other medicines, foods, dyes, or preservatives  · pregnant or trying to get pregnant  · breast-feeding  What should I watch for while using this medicine?  Do not donate blood while you are taking this medicine. This will prevent giving this medicine to a pregnant female through a blood transfusion. Ask your doctor or health care professional when it is safe to donate blood after you stop taking this medicine.  Contact your doctor or health care professional if your symptoms do not start to get better. You may need to take this medicine for 6 to 12 months to get the best results.  Women who are pregnant or may get pregnant must not handle this medicine. The active ingredient could harm the unborn baby. If a pregnant woman or woman who may become pregnant comes into contact with a leaking capsule, she should wash the exposed area of skin with soap and water immediately and check with her doctor or health care professional.  This medicine can interfere with PSA laboratory tests for prostate cancer. If you are scheduled to have a lab test for prostate cancer, tell your doctor or health care professional that you are taking this medicine.  NOTE:This sheet is a summary. It may not cover all possible information. If you have questions about this medicine, talk to your doctor, pharmacist, or health care provider. Copyright© 2017 [employer]

## 2019-06-14 NOTE — PROGRESS NOTES
"Subjective:      Cristo Jefferson is a 58 y.o. male who was referred by Dr. Perez for evaluation of elevated PSA.    No old urology records are available at this time    Long history of kidney stones.  He passed a stone about 2 weeks ago.  Previously has been seeing Dr. Duarte Dewitt.  No flank pain no issues currently    Father was diagnosed with prostate cancer in his 70s  No lower urinary tract symptoms      He has a very complex medical and surgical history and he is anticoagulated on Plavix        The following portions of the patient's history were reviewed and updated as appropriate: allergies, current medications, past family history, past medical history, past social history, past surgical history and problem list.    Review of Systems  Constitutional: no fever or chills  ENT: no nasal congestion or sore throat  Respiratory: no cough or shortness of breath  Cardiovascular: no chest pain or palpitations  Gastrointestinal: no nausea or vomiting, tolerating diet  Genitourinary: as per HPI  Hematologic/Lymphatic: no easy bruising or lymphadenopathy  Musculoskeletal: no arthralgias or myalgias  Neurological: no seizures or tremors  Behavioral/Psych: no auditory or visual hallucinations     Objective:   Vitals: /84 (BP Location: Left arm, Patient Position: Sitting, BP Method: X-Large (Automatic))   Pulse (!) 56   Ht 5' 9" (1.753 m)   Wt 106.1 kg (233 lb 14.5 oz)   BMI 34.54 kg/m²     Physical Exam   General: alert and oriented, no acute distress  Head: normocephalic, atraumatic  Neck: normal ROM  Respiratory: Symmetric expansion, non-labored breathing  Cardiovascular:  Normal to inspection  Abdomen:  Right lower quadrant ostomy with very large midline scar and soft, non tender, non distended, no palpable masses, no hernias, no hepatomegaly or splenomegaly  Genitourinary:   Penis: normal, no lesions, patent orthotopic meatus, no plaques  Scrotum: no rashes or skin changes;   Testes: descended bilaterally, " no masses, nontender, normal epididymides bilaterally, no hydroceles  Prostate:  45-50 g no nodules; seminal vesicles not palpated  Rectum:  Perianal anatomy is somewhat distorted  Lymphatic: no inguinal nodes  Skin: normal coloration and turgor, no rashes, no suspicious skin lesions noted  Neuro: alert and oriented x3, no gross deficits  Psych: normal judgment and insight, normal mood/affect and non-anxious    Physical Exam    Lab Review   Urinalysis demonstrates nitrite negative trace leukocytes positive red blood cells  Microscopic urinalysis 10-25 red blood cells per high-power field otherwise negative    Lab Results   Component Value Date    WBC 6.80 05/14/2019    HGB 12.8 (L) 05/14/2019    HCT 40.5 05/14/2019    HCT 21 (L) 05/04/2018    MCV 75 (L) 05/14/2019     05/14/2019     Lab Results   Component Value Date    CREATININE 0.8 05/14/2019    BUN 16 05/14/2019     Lab Results   Component Value Date    PSA 5.8 (H) 05/14/2019     Imaging  -  Assessment:     1. Enlarged prostate    2. Elevated PSA    3. History of kidney stones      -  Plan:     Orders Placed This Encounter    US Kidney    X-Ray KUB    Prostate Specific Antigen, Diagnostic    POCT URINE DIPSTICK WITHOUT MICROSCOPE    dutasteride (AVODART) 0.5 mg capsule     Return to clinic 6 months with above.  If PSA does not decrease approximately 50% we will re-evaluate his prostate and discuss MRI and prostate biopsy is options.  He would need to stop Plavix to have a biopsy

## 2019-06-17 ENCOUNTER — OFFICE VISIT (OUTPATIENT)
Dept: CARDIOLOGY | Facility: CLINIC | Age: 59
End: 2019-06-17
Payer: MEDICARE

## 2019-06-17 VITALS
WEIGHT: 242.38 LBS | SYSTOLIC BLOOD PRESSURE: 129 MMHG | HEART RATE: 57 BPM | DIASTOLIC BLOOD PRESSURE: 82 MMHG | HEIGHT: 69 IN | BODY MASS INDEX: 35.9 KG/M2

## 2019-06-17 DIAGNOSIS — Z98.61 POST PTCA: Primary | ICD-10-CM

## 2019-06-17 DIAGNOSIS — M06.9 RHEUMATOID ARTHRITIS, INVOLVING UNSPECIFIED SITE, UNSPECIFIED RHEUMATOID FACTOR PRESENCE: ICD-10-CM

## 2019-06-17 DIAGNOSIS — Z95.828 HISTORY OF ENDOVASCULAR STENT GRAFT FOR ABDOMINAL AORTIC ANEURYSM (AAA): ICD-10-CM

## 2019-06-17 DIAGNOSIS — I45.10 RBBB: ICD-10-CM

## 2019-06-17 DIAGNOSIS — I10 ESSENTIAL HYPERTENSION: Chronic | ICD-10-CM

## 2019-06-17 DIAGNOSIS — Z93.2 ILEOSTOMY PRESENT: ICD-10-CM

## 2019-06-17 DIAGNOSIS — E78.00 PURE HYPERCHOLESTEROLEMIA: ICD-10-CM

## 2019-06-17 DIAGNOSIS — Z98.61 S/P PTCA (PERCUTANEOUS TRANSLUMINAL CORONARY ANGIOPLASTY): ICD-10-CM

## 2019-06-17 DIAGNOSIS — I25.810 CORONARY ARTERY DISEASE INVOLVING AUTOLOGOUS VEIN CORONARY BYPASS GRAFT WITHOUT ANGINA PECTORIS: Chronic | ICD-10-CM

## 2019-06-17 DIAGNOSIS — Z93.3 PRESENCE OF COLOSTOMY: ICD-10-CM

## 2019-06-17 DIAGNOSIS — D50.9 MICROCYTIC ANEMIA: ICD-10-CM

## 2019-06-17 PROCEDURE — 99205 PR OFFICE/OUTPT VISIT, NEW, LEVL V, 60-74 MIN: ICD-10-PCS | Mod: S$GLB,,, | Performed by: INTERNAL MEDICINE

## 2019-06-17 PROCEDURE — 99205 OFFICE O/P NEW HI 60 MIN: CPT | Mod: S$GLB,,, | Performed by: INTERNAL MEDICINE

## 2019-06-17 PROCEDURE — 99499 RISK ADDL DX/OHS AUDIT: ICD-10-PCS | Mod: S$GLB,,, | Performed by: INTERNAL MEDICINE

## 2019-06-17 PROCEDURE — 99999 PR PBB SHADOW E&M-EST. PATIENT-LVL II: CPT | Mod: PBBFAC,,, | Performed by: INTERNAL MEDICINE

## 2019-06-17 PROCEDURE — 3079F PR MOST RECENT DIASTOLIC BLOOD PRESSURE 80-89 MM HG: ICD-10-PCS | Mod: CPTII,S$GLB,, | Performed by: INTERNAL MEDICINE

## 2019-06-17 PROCEDURE — 99999 PR PBB SHADOW E&M-EST. PATIENT-LVL II: ICD-10-PCS | Mod: PBBFAC,,, | Performed by: INTERNAL MEDICINE

## 2019-06-17 PROCEDURE — 99499 UNLISTED E&M SERVICE: CPT | Mod: S$GLB,,, | Performed by: INTERNAL MEDICINE

## 2019-06-17 PROCEDURE — 3008F PR BODY MASS INDEX (BMI) DOCUMENTED: ICD-10-PCS | Mod: CPTII,S$GLB,, | Performed by: INTERNAL MEDICINE

## 2019-06-17 PROCEDURE — 3079F DIAST BP 80-89 MM HG: CPT | Mod: CPTII,S$GLB,, | Performed by: INTERNAL MEDICINE

## 2019-06-17 PROCEDURE — 93010 EKG 12-LEAD: ICD-10-PCS | Mod: S$GLB,,, | Performed by: INTERNAL MEDICINE

## 2019-06-17 PROCEDURE — 93005 EKG 12-LEAD: ICD-10-PCS | Mod: S$GLB,,, | Performed by: INTERNAL MEDICINE

## 2019-06-17 PROCEDURE — 93010 ELECTROCARDIOGRAM REPORT: CPT | Mod: S$GLB,,, | Performed by: INTERNAL MEDICINE

## 2019-06-17 PROCEDURE — 3008F BODY MASS INDEX DOCD: CPT | Mod: CPTII,S$GLB,, | Performed by: INTERNAL MEDICINE

## 2019-06-17 PROCEDURE — 3074F SYST BP LT 130 MM HG: CPT | Mod: CPTII,S$GLB,, | Performed by: INTERNAL MEDICINE

## 2019-06-17 PROCEDURE — 3074F PR MOST RECENT SYSTOLIC BLOOD PRESSURE < 130 MM HG: ICD-10-PCS | Mod: CPTII,S$GLB,, | Performed by: INTERNAL MEDICINE

## 2019-06-17 PROCEDURE — 93005 ELECTROCARDIOGRAM TRACING: CPT | Mod: S$GLB,,, | Performed by: INTERNAL MEDICINE

## 2019-06-17 NOTE — LETTER
June 17, 2019      Vince Perez MD  [address]  [address]  [place] [address]           [facility]  [address], [facility] [address]  [place] [address]  Phone: [phone]  Fax: [phone]          Patient: Cristo Jefferson   MR Number: [id number]   YOB: 1960   Date of Visit: 6/17/2019       Dear Dr. Vince Perez:    Thank you for referring Cristo Jefferson to me for evaluation. Attached you will find relevant portions of my assessment and plan of care.    If you have questions, please do not hesitate to call me. I look forward to following Cristo Jefferson along with you.    Sincerely,    Homer Da Silva MD    Enclosure  CC:  No Recipients    If you would like to receive this communication electronically, please contact [email or web address] or [phone] to request more information on [employer] Link access.    For providers and/or their staff who would like to refer a patient to [facility], please contact us through our one-stop-shop provider referral line, [facility], at [phone].    If you feel you have received this communication in error or would no longer like to receive these types of communications, please e-mail [email or web address]

## 2019-06-17 NOTE — PROGRESS NOTES
Subjective:      Patient ID: Cristo Jefferson is a 58 y.o. male.    Chief Complaint: Hypertension and Coronary Artery Disease    HPI:  Pt used to see me years ago.    Pt recently has seen Dr Allen.    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  Pt also had chemotherapy.    Pt had a perforated diverticulum and had a temporary colostomy which was reversed and then pt had another colostomy from another ruptured diverticulum.    Pt's main problem is arthritis.    Pt has been on biologicals for debilitating rheumatoid arthritis.    Pt had been followed by a rheumatologist at [facility].    Pt took Orencia.    Pt is now taking steroids.    Pt had AAA endovascular repair by Dr Ayala.    [facility].    Leg pain keeps pt from fishing.    Hunted for deer last fall.    Review of Systems   Cardiovascular: Positive for chest pain (Rare left infraclavicular pain unrelated to exertion lasts a couple of minutes..). Negative for claudication, dyspnea on exertion, irregular heartbeat, leg swelling, near-syncope, orthopnea, palpitations and syncope.      In the past Nexium has helped chest pain due to GERD.    Last stress test by Dr Allen in his office.    Pt was put on Avodart by Dr Pavel Staley    Past Medical History:   Diagnosis Date    Arthritis     Cancer 2016    appendix, completed chemo    Depression     Diabetes mellitus, type 2     Hyperlipidemia     Hypertension     Major depressive disorder 11/16/2017    On home O2     Rheumatoid arthritis         Past Surgical History:   Procedure Laterality Date    ABDOMINAL AORTIC ANEURYSM REPAIR, ENDOVASCULAR      2016    APPENDECTOMY      CLOSURE-WOUND N/A 5/4/2018    Performed by [facility] at [facility] 2ND FLR    COLONOSCOPY N/A 3/6/2018    Performed by Yfn Martinez MD at [facility] ENDO (4TH FLR)    COLOSTOMY N/A 5/3/2018    Performed by Star Bautista MD at [facility] OR 2ND  FLR    CORONARY ANGIOPLASTY      times 4 in 2005    CORONARY ARTERY BYPASS GRAFT      2004    EVAR for AAA      S/P EVAR for AAA in 2017    EXPLORATORY LAPAROTOMY Drainage of pelvic abscess N/A 5/3/2018    Performed by [facility] at [facility] OR 2ND FLR    EXPLORATORY-LAPAROTOMY N/A 5/4/2018    Performed by Moira Nix MD at [facility] OR 2ND FLR    EXPLORATORY-LAPAROTOMY N/A 11/21/2017    Performed by Eber Riley MD at [facility] OR [facility] FLR    EXPLORATORY-LAPAROTOMY loop ilestomy takedown N/A 4/4/2018    [facility]    INCISION AND DRAINAGE (I & D)- Intra-Abdominal abcess N/A 11/21/2017    Performed by Eber Riley MD at [facility] OR [address] [place] [address]    INCISION AND DRAINAGE (I & D)-Abdominal-Pelvic Abcess N/A 11/21/2017    Performed by Eber Riley MD at [facility] OR [address] [place] [address]    INSERTION-TUBE-GASTROSTOMY Right 11/21/2017    [facility]    Loop Ileostomy N/A 11/21/2017    Performed by Eber Riley MD at [facility] OR [address] [place] [address]    LYSIS OF ADHESIONS N/A 5/3/2018    [facility]    open heart       RIGHT COLECTOMY  2016    WASHOUT-ABDOMINAL N/A 5/4/2018    [facility]       Family History   Problem Relation Age of Onset    Heart disease Mother     Heart disease Father     Cancer Father         prostate    Cancer Sister         breast , lymphoma       Social History     Socioeconomic History    Marital status:      Spouse name: Not on file    Number of children: Not on file    Years of education: Not on file    Highest education level: Not on file   Occupational History    Not on file   Social Needs    Financial resource strain: Not on file    Food insecurity:     Worry: Not on file     Inability: Not on file    Transportation needs:     Medical: Not on file     Non-medical: Not on file   Tobacco Use    Smoking  status: Current Some Day Smoker     Types: Cigarettes, Pipe     Last attempt to quit: 2017     Years since quittin.5    Smokeless tobacco: Former User   Substance and Sexual Activity    Alcohol use: No    Drug use: No    Sexual activity: Yes     Partners: Female     Birth control/protection: None   Lifestyle    Physical activity:     Days per week: Not on file     Minutes per session: Not on file    Stress: Not on file   Relationships    Social connections:     Talks on phone: Not on file     Gets together: Not on file     Attends Restoration service: Not on file     Active member of club or organization: Not on file     Attends meetings of clubs or organizations: Not on file     Relationship status: Not on file   Other Topics Concern    Not on file   Social History Narrative    Not on file       Current Outpatient Medications on File Prior to Visit   Medication Sig Dispense Refill    aspirin 81 MG Chew Take 81 mg by mouth once daily.      atorvastatin (LIPITOR) 10 MG tablet Take 1 tablet (10 mg total) by mouth once daily. 90 tablet 1    citalopram (CELEXA) 40 MG tablet Take 1 tablet (40 mg total) by mouth once daily. 90 tablet 1    clopidogrel (PLAVIX) 75 mg tablet Take 1 tablet (75 mg total) by mouth once daily. 90 tablet 1    dutasteride (AVODART) 0.5 mg capsule Take 1 capsule (0.5 mg total) by mouth once daily. 30 capsule 11    ferrous sulfate 325 (65 FE) MG EC tablet Take 325 mg by mouth 2 (two) times daily.      hydroCHLOROthiazide (MICROZIDE) 12.5 mg capsule TAKE ONE CAPSULE BY MOUTH EVERY DAY 30 capsule 0    meloxicam (MOBIC) 15 MG tablet Take 1 tablet (15 mg total) by mouth once daily. 90 tablet 1    metFORMIN (GLUCOPHAGE-XR) 750 MG 24 hr tablet Take 1 tablet (750 mg total) by mouth 2 (two) times daily. 180 tablet 1    metoprolol tartrate (LOPRESSOR) 25 MG tablet Take 1 tablet (25 mg total) by mouth 2 (two) times daily. 180 tablet 1    multivitamin capsule Take 1 capsule by mouth  "once daily.      predniSONE (DELTASONE) 5 MG tablet Take 1 tablet (5 mg total) by mouth 3 (three) times daily. 270 tablet 1    vitamin D 1000 units Tab Take 2,000 Units by mouth once daily.       No current facility-administered medications on file prior to visit.        Review of patient's allergies indicates:   Allergen Reactions    Morphine Rash     Objective:     Vitals:    06/17/19 1414   BP: 129/82   BP Location: Right arm   Patient Position: Sitting   BP Method: Large (Automatic)   Pulse: (!) 57   Weight: 109.9 kg (242 lb 6.3 oz)   Height: 5' 9" (1.753 m)        Physical Exam   Constitutional: He is oriented to person, place, and time. He appears well-developed and well-nourished. No distress.   Eyes: No scleral icterus.   Neck: No JVD present. Carotid bruit is present (left).   Cardiovascular: Regular rhythm and normal heart sounds. Exam reveals no gallop and no friction rub.   No murmur heard.  Pulses:       Dorsalis pedis pulses are 1+ on the right side, and 1+ on the left side.        Posterior tibial pulses are 2+ on the right side, and 2+ on the left side.   Pulmonary/Chest: Effort normal and breath sounds normal. No respiratory distress.   Musculoskeletal: He exhibits no edema.   Neurological: He is alert and oriented to person, place, and time.   Skin: Skin is warm and dry. He is not diaphoretic.   Psychiatric: He has a normal mood and affect. His behavior is normal. Judgment and thought content normal.   Vitals reviewed.     ECG: sinus bradycardia, RBBB, unchanged compared with 2017    HgbA1C 6  HDL 37    LDL 51  CMP WNL except Alb 3.4  Hgb 12.8  MCV 75    Note CT abdomen with contrast in 2018 showed AAA repair intact.  Assessment:     1. Post PTCA    2. Coronary artery disease involving autologous vein coronary bypass graft without angina pectoris    3. S/P PTCA (percutaneous transluminal coronary angioplasty)    4. S/P EVAR for abdominal aortic aneurysm (AAA) in February/March 2017    5. " Pure hypercholesterolemia    6. Essential hypertension    7. Ileostomy present    8. Presence of colostomy    9. Rheumatoid arthritis, involving unspecified site, unspecified rheumatoid factor presence    10. RBBB    11. Microcytic anemia      Plan:   Cristo was seen today for hypertension and coronary artery disease.    Diagnoses and all orders for this visit:    Post PTCA  -     IN OFFICE EKG 12-LEAD (to [place])    Coronary artery disease involving autologous vein coronary bypass graft without angina pectoris    S/P PTCA (percutaneous transluminal coronary angioplasty)    S/P EVAR for abdominal aortic aneurysm (AAA) in February/March 2017    Pure hypercholesterolemia    Essential hypertension    Ileostomy present    Presence of colostomy    Rheumatoid arthritis, involving unspecified site, unspecified rheumatoid factor presence    RBBB    Microcytic anemia  -     IRON AND TIBC; Future  -     FERRITIN; Future    f/u with Dr Perez   F/u with rheumatologist    Check iron levels.    Recent minor and rare chest pains are more likely GI or chest wall in etiology rather than angina.  Consider Lexiscan Cardiolite stress test. At this point pt does not feel his symptoms    Need records from Dr Allen: carotid ultrasound, stress test echocardiogram, etc    F/u with Dr Milton, surgeon.    Follow up in about 3 months (around 9/17/2019).

## 2019-06-21 ENCOUNTER — INITIAL CONSULT (OUTPATIENT)
Dept: RHEUMATOLOGY | Facility: CLINIC | Age: 59
End: 2019-06-21
Payer: MEDICARE

## 2019-06-21 ENCOUNTER — HOSPITAL ENCOUNTER (OUTPATIENT)
Dept: RADIOLOGY | Facility: HOSPITAL | Age: 59
Discharge: HOME | End: 2019-06-21
Attending: INTERNAL MEDICINE
Payer: MEDICARE

## 2019-06-21 VITALS
HEART RATE: 53 BPM | HEIGHT: 69 IN | DIASTOLIC BLOOD PRESSURE: 80 MMHG | SYSTOLIC BLOOD PRESSURE: 150 MMHG | WEIGHT: 250.88 LBS | BODY MASS INDEX: 37.16 KG/M2

## 2019-06-21 DIAGNOSIS — F33.9 MAJOR DEPRESSION, RECURRENT, CHRONIC: Chronic | ICD-10-CM

## 2019-06-21 DIAGNOSIS — Z93.3 PRESENCE OF COLOSTOMY: ICD-10-CM

## 2019-06-21 DIAGNOSIS — Z79.52 LONG TERM CURRENT USE OF SYSTEMIC STEROIDS: ICD-10-CM

## 2019-06-21 DIAGNOSIS — E78.5 TYPE 2 DIABETES MELLITUS WITH HYPERLIPIDEMIA: ICD-10-CM

## 2019-06-21 DIAGNOSIS — I25.810 CORONARY ARTERY DISEASE INVOLVING AUTOLOGOUS VEIN CORONARY BYPASS GRAFT WITHOUT ANGINA PECTORIS: Chronic | ICD-10-CM

## 2019-06-21 DIAGNOSIS — Z85.068 HISTORY OF CANCER OF SMALL INTESTINE: ICD-10-CM

## 2019-06-21 DIAGNOSIS — M06.9 RHEUMATOID ARTHRITIS INVOLVING MULTIPLE SITES, UNSPECIFIED RHEUMATOID FACTOR PRESENCE: ICD-10-CM

## 2019-06-21 DIAGNOSIS — M06.9 RHEUMATOID ARTHRITIS INVOLVING MULTIPLE SITES, UNSPECIFIED RHEUMATOID FACTOR PRESENCE: Primary | ICD-10-CM

## 2019-06-21 DIAGNOSIS — E11.69 TYPE 2 DIABETES MELLITUS WITH HYPERLIPIDEMIA: ICD-10-CM

## 2019-06-21 PROCEDURE — 99205 OFFICE O/P NEW HI 60 MIN: CPT | Mod: S$GLB,,, | Performed by: INTERNAL MEDICINE

## 2019-06-21 PROCEDURE — 3079F PR MOST RECENT DIASTOLIC BLOOD PRESSURE 80-89 MM HG: ICD-10-PCS | Mod: CPTII,S$GLB,, | Performed by: INTERNAL MEDICINE

## 2019-06-21 PROCEDURE — 77077 JOINT SURVEY SINGLE VIEW: CPT | Mod: TC

## 2019-06-21 PROCEDURE — 99999 PR PBB SHADOW E&M-EST. PATIENT-LVL III: ICD-10-PCS | Mod: PBBFAC,,, | Performed by: INTERNAL MEDICINE

## 2019-06-21 PROCEDURE — 99205 PR OFFICE/OUTPT VISIT, NEW, LEVL V, 60-74 MIN: ICD-10-PCS | Mod: S$GLB,,, | Performed by: INTERNAL MEDICINE

## 2019-06-21 PROCEDURE — 3044F HG A1C LEVEL LT 7.0%: CPT | Mod: CPTII,S$GLB,, | Performed by: INTERNAL MEDICINE

## 2019-06-21 PROCEDURE — 3044F PR MOST RECENT HEMOGLOBIN A1C LEVEL <7.0%: ICD-10-PCS | Mod: CPTII,S$GLB,, | Performed by: INTERNAL MEDICINE

## 2019-06-21 PROCEDURE — 3008F PR BODY MASS INDEX (BMI) DOCUMENTED: ICD-10-PCS | Mod: CPTII,S$GLB,, | Performed by: INTERNAL MEDICINE

## 2019-06-21 PROCEDURE — 3077F PR MOST RECENT SYSTOLIC BLOOD PRESSURE >= 140 MM HG: ICD-10-PCS | Mod: CPTII,S$GLB,, | Performed by: INTERNAL MEDICINE

## 2019-06-21 PROCEDURE — 3008F BODY MASS INDEX DOCD: CPT | Mod: CPTII,S$GLB,, | Performed by: INTERNAL MEDICINE

## 2019-06-21 PROCEDURE — 77077 JOINT SURVEY SINGLE VIEW: CPT | Mod: 26,,, | Performed by: RADIOLOGY

## 2019-06-21 PROCEDURE — 99499 UNLISTED E&M SERVICE: CPT | Mod: S$GLB,,, | Performed by: INTERNAL MEDICINE

## 2019-06-21 PROCEDURE — 77077 XR ARTHRITIS SURVEY: ICD-10-PCS | Mod: 26,,, | Performed by: RADIOLOGY

## 2019-06-21 PROCEDURE — 99499 RISK ADDL DX/OHS AUDIT: ICD-10-PCS | Mod: S$GLB,,, | Performed by: INTERNAL MEDICINE

## 2019-06-21 PROCEDURE — 3077F SYST BP >= 140 MM HG: CPT | Mod: CPTII,S$GLB,, | Performed by: INTERNAL MEDICINE

## 2019-06-21 PROCEDURE — 99999 PR PBB SHADOW E&M-EST. PATIENT-LVL III: CPT | Mod: PBBFAC,,, | Performed by: INTERNAL MEDICINE

## 2019-06-21 PROCEDURE — 3079F DIAST BP 80-89 MM HG: CPT | Mod: CPTII,S$GLB,, | Performed by: INTERNAL MEDICINE

## 2019-06-21 RX ORDER — CELECOXIB 200 MG/1
200 CAPSULE ORAL DAILY
Qty: 30 CAPSULE | Refills: 6 | Status: SHIPPED | OUTPATIENT
Start: 2019-06-21 | End: 2019-01-01

## 2019-06-21 RX ORDER — ACETAMINOPHEN 500 MG
500 TABLET ORAL EVERY 6 HOURS PRN
COMMUNITY

## 2019-06-21 ASSESSMENT — ROUTINE ASSESSMENT OF PATIENT INDEX DATA (RAPID3)
FATIGUE SCORE: 8
TOTAL RAPID3 SCORE: 6.89
MDHAQ FUNCTION SCORE: 1.4
PSYCHOLOGICAL DISTRESS SCORE: 1
PAIN SCORE: 8
WHEN YOU AWAKENED IN THE MORNING OVER THE LAST WEEK, PLEASE INDICATE THE AMOUNT OF TIME IT TAKES UNTIL YOU ARE AS LIMBER AS YOU WILL BE FOR THE DAY: ALL DAY
AM STIFFNESS SCORE: 1, YES
PATIENT GLOBAL ASSESSMENT SCORE: 8

## 2019-06-21 NOTE — PROGRESS NOTES
History of present illness:  58-year-old gentleman complains of joint pain going back to the 1990s.  In 2004 he had a severe flare.  He had swelling in multiple joints.  He was seen by Dr. Beck.  He was treated with multiple medications including methotrexate and leflunomide.  He had reactions to both of these.  He also was on Plaquenil.  He had been on prednisone from time to time.  He had been tried on Humira, Enbrel, and Remicade.  He was eventually placed on Orencia.  He had a good response and remained on it until 2012 when he developed a ruptured appendix.  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  He was treated with surgery and chemotherapy.  Orencia was discontinued.  His arthritis did well until 2017.  He had a bad flare that lasted for 3 months.  He was evaluated by Dr. Wallace.  He was placed back on prednisone and Orencia.  He remained on this for 20 weeks.  At that time, he was hospitalized for a ruptured diverticulum.  He had sepsis and a prolonged hospitalization.  He had an ileostomy.  This was taken down.  He subsequently developed another rupture which resulted in a permanent colostomy.  He again had a prolonged infection.  He continued to take prednisone although he stopped the Orencia.    His main complaint now is diffuse aching.  The pain is bad in the morning.  It gets better and initially with moving around but he has to limit his activity.  It does wake him up at night.  He has noted some swelling in his ankles but no other recent areas of joint swelling.  He has been taking meloxicam.  It helped initially but is not helping now.  Heat has been helping.  Topical medications did not help.  He has had prior treatment with Celebrex and Lodine.    He has had no unexplained fevers.  He denies any headaches.  He has a chronic rash.  He describes it as erythematous macules.  It is primarily on his legs but it has spread all over his body.  He previously had had  biopsies.  Apparently no definite diagnosis was made.  This also does better with prednisone.  He has no conjunctivitis, oral ulcers, dry eye or mouth.  He does appear to have Raynaud's phenomena but no digital ulcers.  He complains of paresthesias in the left thigh but this is related to previous surgery.  He does have a history of nodules.    He is also under treatment for coronary artery disease and peripheral vascular disease    Physical examination:  Skin:  He has erythematous macules on his legs.  He has ecchymoses on the arms.  He has small nodules in his thumbs about the size of a grain of sand.  ENT:  Adequate tears in saliva  Chest:  Clear to auscultation and percussion  Cardiac:  No murmurs, gallops, rubs  Abdomen:  No hepatosplenomegaly.  He has a colostomy.  Extremities:  No pedal edema  Musculoskeletal:  Cervical spine has good range of motion without pain on range of motion.  He has tenderness of the TMJ  He has decreased range of motion of the shoulders bilaterally.  He has no effusion.  He is diffusely tender to palpation.  Elbow has full range of motion without pain on range of motion.  He has soft tissue swelling of the left wrist with decreased range of motion.  Right wrist is unremarkable.  He has synovitis of the right 2nd and 3rd MCP.  Left MCPs are unremarkable.  Other small joints of the hands have no synovitis.  He is tender to palpation of the lower lumbar spine.  He has good range of motion.  Hips have good range of motion.  He has an effusion in the right knee.  There is no erythema or increased warmth.  The left knee is tender but not swollen.  He has good range of motion of the knees.  He has tenderness of the left ankle with slight swelling.  Right ankle is unremarkable.  Small joints of the feet are unremarkable.    Assessment:  He has longstanding rheumatoid arthritis pain.  He has minimal disease activity at this time, but it is most likely being suppressed by the prednisone.  In  addition he shows complications of long-term prednisone therapy.  I suspect most of his pain at this point is due to permanent damage rather than active arthritis.    Plans:  1.  Laboratory studies are obtained  2.  I changed his prednisone to 10 mg in the morning and 2.5 mg in the evening.  I would like to be able to taper the prednisone in the future.  3.  I changed his anti-inflammatory medicine from meloxicam to Celebrex 200 mg daily.  4.  He may continue Tylenol as needed for pain  5.  He is going to be a difficult management problem because of his history of coronary artery disease, prior abdominal infections, and prior history of cancer.  The only possible biologic drug would be rituximab.  A no other consideration may be azathioprine.  6.  Return to see me in 2 months

## 2019-06-21 NOTE — LETTER
June 21, 2019      Vince Perez MD  [address]  [address]  [facility] [address]           [facility]  [address]  [place] [address]  Phone: [phone]  Fax: [phone]          Patient: Cristo Jefferson   MR Number: [id number]   YOB: 1960   Date of Visit: 6/21/2019       Dear Dr. Vince Perez:    Thank you for referring Cristo Jefferson to me for evaluation. Attached you will find relevant portions of my assessment and plan of care.    If you have questions, please do not hesitate to call me. I look forward to following Cristo Jefferson along with you.    Sincerely,    Blas Langley MD    Enclosure  CC:  No Recipients    If you would like to receive this communication electronically, please contact [email or web address] or [phone] to request more information on [employer] access.    For providers and/or their staff who would like to refer a patient to [facility], please contact us through our one-stop-shop provider referral line, [facility], at [phone].    If you feel you have received this communication in error or would no longer like to receive these types of communications, please e-mail [email or web address]

## 2019-06-24 ENCOUNTER — TELEPHONE (OUTPATIENT)
Dept: CARDIOLOGY | Facility: CLINIC | Age: 59
End: 2019-06-24

## 2019-06-24 NOTE — TELEPHONE ENCOUNTER
Iron level is still a little low although ferritin is normal.  I spoke with pt's wife.  Continue iron tablet b.i.d.

## 2019-06-25 RX ORDER — HYDROCHLOROTHIAZIDE 12.5 MG/1
CAPSULE ORAL
Qty: 30 CAPSULE | Refills: 1 | Status: ON HOLD | OUTPATIENT
Start: 2019-06-25 | End: 2019-01-01 | Stop reason: HOSPADM

## 2019-06-27 DIAGNOSIS — R97.20 ELEVATED PSA: Primary | ICD-10-CM

## 2019-09-16 NOTE — PROGRESS NOTES
History of present illness:  58-year-old gentleman accompanied by his wife, who answers most of the questions.  I saw him for the 1st time in June.  He had previously been followed by doctors Ebony and [place].  He had previously been on methotrexate, leflunomide, Plaquenil, Humira, Enbrel, Remicade, and Orencia.  He did best when he was on Orencia.  Unfortunately he had a ruptured appendix with carcinoma, then subsequently 2 ruptured diverticulum.  He has a permanent ileostomy.  He has been on prednisone for the past 2 years but no DMARDs.  He was on meloxicam.  He was complaining of significant arthritic pain but only had mild synovitis in the right 2nd and 3rd MCP.  I did not feel he had much disease activity.  I changed his prednisone to 10 mg in the morning and 2.5 mg in the evening rather than the 3 times a day dosage she was taking.  I changed his anti-inflammatory medicine from meloxicam to celebrate X.  He comes back for follow-up.    He did worse after his last visit.  Heat started taking the prednisone again the way he had taken it previously was some response.  He does not think Celebrex is helping.  He thinks he did better with meloxicam.  He states the pain is bad in the morning.  He has some pain with activity.  He has some trouble sleeping.  He has no other recent medical problems.    Physical examination:  Musculoskeletal:  He has multiple tender areas to palpation but no definite evidence of synovitis.  Even the MCPs are no longer swollen.  He has no arthritic deformities.  He does have some soft tissue swelling of the ankles but this appears to be fluid retention rather than synovitis.  Skin:  He has multiple excoriations and ecchymoses.  Laboratory:  He has positive rheumatoid factor, CCP, and MICHELLE with a negative MICHELLE panel.  He has increased inflammatory markers.  Radiology:  No evidence of erosive disease    Assessment:  Even with the lack of synovitis I feel he still has active rheumatoid  arthritis    Plans:  1.  Discontinue sella breakfast and changed to meloxicam 15 mg daily  2.  Continue prednisone as before  3.  Laboratory studies are obtained    4.  I elected to start him on sulfasalazine 500 mg daily, building up to 2 g daily over the next 2 weeks.  5.  Return to see me in 3 months

## 2019-09-23 NOTE — PROGRESS NOTES
Subjective:      Patient ID: Cristo Jefferson is a 58 y.o. male.    Chief Complaint: Follow-up (Hypertension, CAD)    HPI:  [place]    Went to [place] last weekend    Walks some but is limited by arthritis pain    Review of Systems   Cardiovascular: Positive for chest pain (rare pain attributed to heartburn, eased with Tums, nonexertional;). Negative for claudication, dyspnea on exertion, irregular heartbeat, leg swelling, near-syncope, orthopnea, palpitations and syncope.      Had an episode of chest pain (anterior heaviness)    Past Medical History:   Diagnosis Date    Arthritis     Cancer 2016    appendix, completed chemo    Depression     Diabetes mellitus, type 2     Hyperlipidemia     Hypertension     Major depressive disorder 11/16/2017    On home O2     Rheumatoid arthritis         Past Surgical History:   Procedure Laterality Date    ABDOMINAL AORTIC ANEURYSM REPAIR, ENDOVASCULAR      2016    APPENDECTOMY      CLOSURE-WOUND N/A 5/4/2018    Performed by [facility] at [facility] 2ND FLR    COLONOSCOPY N/A 3/6/2018    Performed by Yfn Martinez MD at [facility] ENDO (4TH FLR)    COLOSTOMY N/A 5/3/2018    [facility]formed by Star [facility]ista [facility] at [facility] OR 2ND FLR    CORONARY ANGIOPLASTY      times 4 in 2005    CORONARY ARTERY BYPASS GRAFT      2004    EVAR for AAA      S/P EVAR for AAA in 2017    EXPLORATORY LAPAROTOMY Drainage of pelvic abscess N/A 5/3/2018    [facility] at [facility] OR 2ND FLR    EXPLORATORY-LAPAROTOMY N/A 5/4/2018    [facility] at [facility] OR 2ND FLR    EXPLORATORY-LAPAROTOMY N/A 11/21/2017    Performed by Eber Riley [facility] at [facility] OR 2ND FLR    EXPLORATORY-LAPAROTOMY loop ilestomy takedown N/A 4/4/2018    [facility]    INCISION AND DRAINAGE (I & D)- Intra-Abdominal abcess N/A 11/21/2017    [facility]    INCISION AND DRAINAGE (I &  D)-Abdominal-Pelvic Abcess N/A 2017    [facility] OR 2ND FLR    INSERTION-TUBE-GASTROSTOMY Right 2017    [facility] OR 2ND FLR    Loop Ileostomy N/A 2017    [facility] OR 2ND FLR    LYSIS OF ADHESIONS N/A 5/3/2018    [facility] 2ND FLR    open heart       RIGHT COLECTOMY  2016    WASHOUT-ABDOMINAL N/A 2018    [facility] OR 2ND FLR       Family History   Problem Relation Age of Onset    Heart disease Mother     Heart disease Father     Cancer Father         prostate    Cancer Sister         breast , lymphoma       Social History     Socioeconomic History    Marital status:      Spouse name: Not on file    Number of children: Not on file    Years of education: Not on file    Highest education level: Not on file   Occupational History    Not on file   Social Needs    Financial resource strain: Not on file    Food insecurity:     Worry: Not on file     Inability: Not on file    Transportation needs:     Medical: Not on file     Non-medical: Not on file   Tobacco Use    Smoking status: Current Some Day Smoker     Types: Cigarettes, Pipe     Last attempt to quit: 2017     Years since quittin.8    Smokeless tobacco: Former User   Substance and Sexual Activity    Alcohol use: No    Drug use: No    Sexual activity: Yes     Partners: Female     Birth control/protection: None   Lifestyle    Physical activity:     Days per week: Not on file     Minutes per session: Not on file    Stress: Not on file   Relationships    Social connections:     Talks on phone: Not on file     Gets together: Not on file     Attends Caodaism service: Not on file     Active member of club or organization: Not on file     Attends meetings of clubs or organizations: Not on file     Relationship status: Not on file   Other  Topics Concern    Not on file   Social History Narrative    Not on file       Current Outpatient Medications on File Prior to Visit   Medication Sig Dispense Refill    acetaminophen (TYLENOL) 500 MG tablet Take 500 mg by mouth every 6 (six) hours as needed for Pain.      aspirin 81 MG Chew Take 81 mg by mouth once daily.      atorvastatin (LIPITOR) 10 MG tablet Take 1 tablet (10 mg total) by mouth once daily. 90 tablet 1    citalopram (CELEXA) 40 MG tablet Take 1 tablet (40 mg total) by mouth once daily. 90 tablet 1    clopidogrel (PLAVIX) 75 mg tablet Take 1 tablet (75 mg total) by mouth once daily. 90 tablet 1    dutasteride (AVODART) 0.5 mg capsule Take 1 capsule (0.5 mg total) by mouth once daily. 30 capsule 11    ferrous sulfate 325 (65 FE) MG EC tablet Take 325 mg by mouth 2 (two) times daily.      hydroCHLOROthiazide (MICROZIDE) 12.5 mg capsule TAKE ONE CAPSULE BY MOUTH EVERY DAY 30 capsule 1    meloxicam (MOBIC) 15 MG tablet Take 15 mg by mouth once daily.      metFORMIN (GLUCOPHAGE-XR) 750 MG 24 hr tablet Take 1 tablet (750 mg total) by mouth 2 (two) times daily. 180 tablet 1    metoprolol tartrate (LOPRESSOR) 25 MG tablet Take 1 tablet (25 mg total) by mouth 2 (two) times daily. 180 tablet 1    multivitamin capsule Take 1 capsule by mouth once daily.      predniSONE (DELTASONE) 5 MG tablet Take 1 tablet (5 mg total) by mouth 3 (three) times daily. 270 tablet 1    sulfaSALAzine (AZULFIDINE) 500 MG TbEC 1 daily for 5 days, 1 twice daily for 5 days, 1 three times daily for 5 day, 2 twice daily 120 tablet 6    vitamin D 1000 units Tab Take 2,000 Units by mouth once daily.      hydroCHLOROthiazide (MICROZIDE) 12.5 mg capsule TAKE 1 CAPSULE BY MOUTH EVERY DAY 30 capsule 5    predniSONE (DELTASONE) 5 MG tablet TAKE 1 TABLET BY MOUTH THREE TIMES A DAY 90 tablet 3     No current facility-administered medications on file prior to visit.        Review of patient's allergies indicates:   Allergen  "Reactions    Morphine Rash     Objective:     Vitals:    09/23/19 1123   BP: 127/83   BP Location: Left arm   Patient Position: Sitting   BP Method: Large (Automatic)   Pulse: (!) 55   Weight: 113.3 kg (249 lb 14.3 oz)   Height: 5' 9" (1.753 m)        Physical Exam   Constitutional: He is oriented to person, place, and time. He appears well-developed and well-nourished. No distress.   Eyes: No scleral icterus.   Neck: No JVD present. Carotid bruit is not present.   Cardiovascular: Regular rhythm and normal heart sounds. Exam reveals no gallop and no friction rub.   No murmur heard.  Pulmonary/Chest: Effort normal and breath sounds normal. No respiratory distress.   Musculoskeletal: He exhibits no edema.   Neurological: He is alert and oriented to person, place, and time.   Skin: Skin is warm and dry. He is not diaphoretic.   Psychiatric: He has a normal mood and affect. His behavior is normal. Judgment and thought content normal.   Vitals reviewed.     Wt down 5 lbs    ECG--sinus bradycardia, RBBB, possible inferior scar, nonspecific ST sagging    Recent labs reviewed:  Ferritin normal.  Iron low but TIBC normal     Assessment:     1. S/P PTCA (percutaneous transluminal coronary angioplasty)    2. S/P EVAR for abdominal aortic aneurysm (AAA) in February/March 2017    3. RBBB    4. Pure hypercholesterolemia    5. Coronary artery disease involving autologous vein coronary bypass graft without angina pectoris    6. Essential hypertension    7. History of abdominal aortic aneurysm (AAA)    8. Type 2 diabetes mellitus with hyperlipidemia    9. Hx of CABG      Plan:   Cristo was seen today for follow-up.    Diagnoses and all orders for this visit:    S/P PTCA (percutaneous transluminal coronary angioplasty)  -     IN OFFICE EKG 12-LEAD (to [place])    S/P EVAR for abdominal aortic aneurysm (AAA) in February/March 2017    RBBB    Pure hypercholesterolemia    Coronary artery disease involving autologous vein coronary bypass " graft without angina pectoris  -     IN OFFICE EKG 12-LEAD (to [place])    Essential hypertension    History of abdominal aortic aneurysm (AAA)    Type 2 diabetes mellitus with hyperlipidemia    Hx of CABG  -     IN OFFICE EKG 12-LEAD (to [place])    Other orders  -     nitroGLYCERIN (NITROSTAT) 0.4 MG SL tablet; Place 1 tablet (0.4 mg total) under the tongue every 5 (five) minutes as needed for Chest pain.     Dr Milton is managing colostomy (S/P right hemicolectomy for CA of appendix) (Pt had one reversal already which failed)    Pt had 3 out of 19 positive lymph nodes.  Pt still has a port for chemotherapy.  Pt has not seen oncologist since he finished chemotherapy in 2017.      Pt wishes to consult a new hematologist/oncologist at [facility].    Pt's wife reports that Dr Riley is ochestrating the oncology consult    Recent chest pain is likely due to GERD again    Pt does not feel like he needs another Lexiscan Cardiolite stress test at this time    Same meds    NTG prn    Go to ER if chest pains are sufficient to require NTG    Pt prefers Tums prn to chronic PPI    F/u with Dr Pavel Staley for elevated PSA    F/u with Dr Langley for rheumatoid arthritis    F/u Dr Perez PCP    F/u with oncologist    Follow up in about 6 months (around 3/23/2020).

## 2019-10-06 NOTE — TELEPHONE ENCOUNTER
"Spoke to [facility], Ruth Elkins will zane " home health" services  "
----- Message from Daryl Lazar sent at 1/3/2018  8:39 AM CST -----  Contact: [place]//[facility]  Caller states that (s)he needs to speak with nurse in ref to getting an order to start home health for the pt to start the home infusion//please call back at [phone]//thank you  
Explanation of exam/test/Position of comfort/Call bell

## 2019-10-08 NOTE — TELEPHONE ENCOUNTER
Lethargic, lack of libido, no desire.  These are SE he is experiencing since being on avodart in June.  He would like to stop medication but wanted to check with you before stopping.

## 2019-10-08 NOTE — TELEPHONE ENCOUNTER
----- Message from Kendell Ibarra, Patient Care Assistant sent at 10/8/2019 12:13 PM CDT -----  Contact: ROCKY CHRISTENSEN [[id number]]  Name of Who is Calling: ROCKY CHRISTENSEN [[id number]]    What is the request in detail:Requesting a call back in regards to RX dutasteride (AVODART) 0.5 mg capsule. Please contact to further discuss and advise      Can the clinic reply by [facility]: No    [facility]   [phone]

## 2019-10-08 NOTE — TELEPHONE ENCOUNTER
----- Message from Maynor Mckinnon sent at 10/8/2019  2:26 PM CDT -----  Contact: ROCKY CHRISTENSEN [[id number]]  Type:  Patient Returning Call    Who Called: ROCKY CHRISTENSEN [[id number]]    Who Left Message for Patient: Evy Garcia RN      Does the patient know what this is regarding?:yes     Best Call Back Number:[phone]    Additional Information:   Detail Level: Simple Detail Level: Detailed

## 2019-10-08 NOTE — TELEPHONE ENCOUNTER
----- Message from Domingo Gilman sent at 10/8/2019 12:06 PM CDT -----  Contact: Self   Pt would like to know if he can get a FLU shot with all the meds he is taking.     [phone]

## 2019-12-05 PROBLEM — K56.609 BOWEL OBSTRUCTION: Status: ACTIVE | Noted: 2019-01-01

## 2019-12-06 PROBLEM — K56.609 BOWEL OBSTRUCTION: Status: RESOLVED | Noted: 2019-01-01 | Resolved: 2019-01-01

## 2019-12-16 PROBLEM — M05.79 SEROPOSITIVE RHEUMATOID ARTHRITIS OF MULTIPLE SITES: Status: ACTIVE | Noted: 2019-01-01

## 2019-12-16 NOTE — PROGRESS NOTES
History of present illness:  59-year-old gentleman complains of joint pain going back to the 1990s.  In 2004 he had a severe flare.  He had swelling in multiple joints.  He was seen by Dr. Beck.  He was treated with multiple medications including methotrexate and leflunomide.  He had reactions to both of these.  He also was on Plaquenil.  He had been on prednisone from time to time.  He had been tried on Humira, Enbrel, and Remicade.  He was eventually placed on Orencia.  He had a good response and remained on it until 2012 when he developed a ruptured appendix.  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  He was treated with surgery and chemotherapy.  Orencia was discontinued.  His arthritis did well until 2017.  He had a bad flare that lasted for 3 months.  He was evaluated by Dr. Wallace.  He was placed back on prednisone and Orencia.  He remained on this for 20 weeks.  At that time, he was hospitalized for a ruptured diverticulum.  He had sepsis and a prolonged hospitalization.  He had an ileostomy.  This was taken down.  He subsequently developed another rupture which resulted in a permanent colostomy.  He again had a prolonged infection.  He continued to take prednisone although he stopped the Orencia.      I saw him initially in June with some evidence of active disease.  I tried to change his prednisone from 3 times daily to twice daily but he did not do as well and went back to 3 times daily.  I changed his anti-inflammatory medicine from meloxicam to Celebrex.  He did not do as well on the Celebrex and I changed him back to meloxicam.  I also started him on sulfasalazine.  He is now on 2 g daily.  He thinks he has had some response to the sulfasalazine.    Since his last visit he was hospitalized with a small-bowel obstruction.  This responded to fluids and suctioning.  His abdominal pain has improved.  He remains on the meloxicam.    Physical examination:  Musculoskeletal:   Shoulders have full range of motion without pain on range of motion.  He has synovitis in the right elbow.  Left elbow is unremarkable.  He has soft tissue swelling of the left wrist but no erythema and increased warmth.  Right wrist is unremarkable.  Small joints of the hands are unremarkable.  He has tenderness of the knees and ankles but no effusion.   Laboratory:  Sed rate and CRP remain elevated but are actually improved from 3 months ago.    Assessment:  He may be showing some response to sulfasalazine    Plans:  Continue medications as before.  Return to see me in 4 months.

## 2020-01-01 ENCOUNTER — PATIENT MESSAGE (OUTPATIENT)
Dept: RHEUMATOLOGY | Facility: CLINIC | Age: 60
End: 2020-01-01

## 2020-01-01 ENCOUNTER — TELEPHONE (OUTPATIENT)
Dept: PRIMARY CARE CLINIC | Facility: CLINIC | Age: 60
End: 2020-01-01

## 2020-01-01 ENCOUNTER — OFFICE VISIT (OUTPATIENT)
Dept: PRIMARY CARE CLINIC | Facility: CLINIC | Age: 60
End: 2020-01-01
Payer: MEDICARE

## 2020-01-01 ENCOUNTER — DOCUMENT SCAN (OUTPATIENT)
Dept: HOME HEALTH SERVICES | Facility: HOSPITAL | Age: 60
End: 2020-01-01
Payer: MEDICARE

## 2020-01-01 ENCOUNTER — EXTERNAL HOME HEALTH (OUTPATIENT)
Dept: HOME HEALTH SERVICES | Facility: HOSPITAL | Age: 60
End: 2020-01-01

## 2020-01-01 ENCOUNTER — HOSPITAL ENCOUNTER (INPATIENT)
Facility: HOSPITAL | Age: 60
LOS: 9 days | DRG: 682 | End: 2020-07-05
Attending: INTERNAL MEDICINE | Admitting: INTERNAL MEDICINE
Payer: MEDICARE

## 2020-01-01 ENCOUNTER — NURSE TRIAGE (OUTPATIENT)
Dept: ADMINISTRATIVE | Facility: CLINIC | Age: 60
End: 2020-01-01

## 2020-01-01 VITALS
TEMPERATURE: 99 F | RESPIRATION RATE: 27 BRPM | HEART RATE: 35 BPM | DIASTOLIC BLOOD PRESSURE: 59 MMHG | HEIGHT: 69 IN | OXYGEN SATURATION: 80 % | BODY MASS INDEX: 37.77 KG/M2 | SYSTOLIC BLOOD PRESSURE: 105 MMHG | WEIGHT: 255 LBS

## 2020-01-01 DIAGNOSIS — M06.9 RHEUMATOID ARTHRITIS INVOLVING MULTIPLE SITES, UNSPECIFIED RHEUMATOID FACTOR PRESENCE: ICD-10-CM

## 2020-01-01 DIAGNOSIS — J96.01 ACUTE HYPOXEMIC RESPIRATORY FAILURE: ICD-10-CM

## 2020-01-01 DIAGNOSIS — B96.20 E COLI BACTEREMIA: Primary | ICD-10-CM

## 2020-01-01 DIAGNOSIS — E87.20 LACTIC ACIDOSIS: ICD-10-CM

## 2020-01-01 DIAGNOSIS — I25.810 CORONARY ARTERY DISEASE INVOLVING AUTOLOGOUS VEIN CORONARY BYPASS GRAFT WITHOUT ANGINA PECTORIS: Chronic | ICD-10-CM

## 2020-01-01 DIAGNOSIS — I48.91 A-FIB: ICD-10-CM

## 2020-01-01 DIAGNOSIS — F33.9 MAJOR DEPRESSION, RECURRENT, CHRONIC: Chronic | ICD-10-CM

## 2020-01-01 DIAGNOSIS — J18.9 PNEUMONIA OF BOTH LUNGS DUE TO INFECTIOUS ORGANISM, UNSPECIFIED PART OF LUNG: Primary | ICD-10-CM

## 2020-01-01 DIAGNOSIS — R65.21 SEPTIC SHOCK: ICD-10-CM

## 2020-01-01 DIAGNOSIS — R78.81 E COLI BACTEREMIA: Primary | ICD-10-CM

## 2020-01-01 DIAGNOSIS — A41.9 SEPTIC SHOCK: ICD-10-CM

## 2020-01-01 DIAGNOSIS — E78.5 TYPE 2 DIABETES MELLITUS WITH HYPERLIPIDEMIA: ICD-10-CM

## 2020-01-01 DIAGNOSIS — A41.51 ESCHERICHIA COLI SEPSIS: ICD-10-CM

## 2020-01-01 DIAGNOSIS — E78.5 HYPERLIPIDEMIA, UNSPECIFIED HYPERLIPIDEMIA TYPE: ICD-10-CM

## 2020-01-01 DIAGNOSIS — I46.9 CARDIAC ARREST: ICD-10-CM

## 2020-01-01 DIAGNOSIS — M05.79 SEROPOSITIVE RHEUMATOID ARTHRITIS OF MULTIPLE SITES: ICD-10-CM

## 2020-01-01 DIAGNOSIS — I10 ESSENTIAL HYPERTENSION: Chronic | ICD-10-CM

## 2020-01-01 DIAGNOSIS — R57.9 SHOCK: ICD-10-CM

## 2020-01-01 DIAGNOSIS — Z93.3 PRESENCE OF COLOSTOMY: ICD-10-CM

## 2020-01-01 DIAGNOSIS — K56.609 SMALL BOWEL OBSTRUCTION: ICD-10-CM

## 2020-01-01 DIAGNOSIS — N17.9 ACUTE KIDNEY INJURY: ICD-10-CM

## 2020-01-01 DIAGNOSIS — E11.69 TYPE 2 DIABETES MELLITUS WITH HYPERLIPIDEMIA: ICD-10-CM

## 2020-01-01 DIAGNOSIS — E11.9 TYPE 2 DIABETES MELLITUS WITHOUT COMPLICATION: ICD-10-CM

## 2020-01-01 DIAGNOSIS — K72.00 SHOCK LIVER: ICD-10-CM

## 2020-01-01 DIAGNOSIS — N17.0 ACUTE RENAL FAILURE WITH TUBULAR NECROSIS: ICD-10-CM

## 2020-01-01 DIAGNOSIS — J18.9 PNEUMONIA OF BOTH LUNGS DUE TO INFECTIOUS ORGANISM, UNSPECIFIED PART OF LUNG: ICD-10-CM

## 2020-01-01 LAB
1,3 BETA GLUCAN SER-MCNC: 34 PG/ML
ABO + RH BLD: NORMAL
ALBUMIN SERPL BCP-MCNC: 1.4 G/DL (ref 3.5–5.2)
ALBUMIN SERPL BCP-MCNC: 1.5 G/DL (ref 3.5–5.2)
ALBUMIN SERPL BCP-MCNC: 1.6 G/DL (ref 3.5–5.2)
ALBUMIN SERPL BCP-MCNC: 1.7 G/DL (ref 3.5–5.2)
ALLENS TEST: ABNORMAL
ALLENS TEST: NORMAL
ALP SERPL-CCNC: 101 U/L (ref 55–135)
ALP SERPL-CCNC: 102 U/L (ref 55–135)
ALP SERPL-CCNC: 103 U/L (ref 55–135)
ALP SERPL-CCNC: 104 U/L (ref 55–135)
ALP SERPL-CCNC: 105 U/L (ref 55–135)
ALP SERPL-CCNC: 105 U/L (ref 55–135)
ALP SERPL-CCNC: 106 U/L (ref 55–135)
ALP SERPL-CCNC: 109 U/L (ref 55–135)
ALP SERPL-CCNC: 73 U/L (ref 55–135)
ALP SERPL-CCNC: 76 U/L (ref 55–135)
ALP SERPL-CCNC: 83 U/L (ref 55–135)
ALP SERPL-CCNC: 97 U/L (ref 55–135)
ALT SERPL W/O P-5'-P-CCNC: 133 U/L (ref 10–44)
ALT SERPL W/O P-5'-P-CCNC: 176 U/L (ref 10–44)
ALT SERPL W/O P-5'-P-CCNC: 188 U/L (ref 10–44)
ALT SERPL W/O P-5'-P-CCNC: 22 U/L (ref 10–44)
ALT SERPL W/O P-5'-P-CCNC: 24 U/L (ref 10–44)
ALT SERPL W/O P-5'-P-CCNC: 29 U/L (ref 10–44)
ALT SERPL W/O P-5'-P-CCNC: 30 U/L (ref 10–44)
ALT SERPL W/O P-5'-P-CCNC: 35 U/L (ref 10–44)
ALT SERPL W/O P-5'-P-CCNC: 43 U/L (ref 10–44)
ALT SERPL W/O P-5'-P-CCNC: 57 U/L (ref 10–44)
ALT SERPL W/O P-5'-P-CCNC: 73 U/L (ref 10–44)
ALT SERPL W/O P-5'-P-CCNC: 93 U/L (ref 10–44)
AMMONIA PLAS-SCNC: 45 UMOL/L (ref 10–50)
AMORPH CRY UR QL COMP ASSIST: ABNORMAL
ANION GAP SERPL CALC-SCNC: 10 MMOL/L (ref 8–16)
ANION GAP SERPL CALC-SCNC: 11 MMOL/L (ref 8–16)
ANION GAP SERPL CALC-SCNC: 12 MMOL/L (ref 8–16)
ANION GAP SERPL CALC-SCNC: 13 MMOL/L (ref 8–16)
ANION GAP SERPL CALC-SCNC: 14 MMOL/L (ref 8–16)
ANION GAP SERPL CALC-SCNC: 15 MMOL/L (ref 8–16)
ANION GAP SERPL CALC-SCNC: 16 MMOL/L (ref 8–16)
ANION GAP SERPL CALC-SCNC: 16 MMOL/L (ref 8–16)
ANION GAP SERPL CALC-SCNC: 17 MMOL/L (ref 8–16)
ANION GAP SERPL CALC-SCNC: 17 MMOL/L (ref 8–16)
ANION GAP SERPL CALC-SCNC: 18 MMOL/L (ref 8–16)
ANION GAP SERPL CALC-SCNC: 18 MMOL/L (ref 8–16)
ANION GAP SERPL CALC-SCNC: 9 MMOL/L (ref 8–16)
ANISOCYTOSIS BLD QL SMEAR: SLIGHT
APTT BLDCRRT: 34.2 SEC (ref 21–32)
ASCENDING AORTA: 3.58 CM
ASCENDING AORTA: 3.95 CM
AST SERPL-CCNC: 145 U/L (ref 10–40)
AST SERPL-CCNC: 188 U/L (ref 10–40)
AST SERPL-CCNC: 27 U/L (ref 10–40)
AST SERPL-CCNC: 33 U/L (ref 10–40)
AST SERPL-CCNC: 38 U/L (ref 10–40)
AST SERPL-CCNC: 44 U/L (ref 10–40)
AST SERPL-CCNC: 50 U/L (ref 10–40)
AST SERPL-CCNC: 50 U/L (ref 10–40)
AST SERPL-CCNC: 51 U/L (ref 10–40)
AST SERPL-CCNC: 53 U/L (ref 10–40)
AST SERPL-CCNC: 53 U/L (ref 10–40)
AST SERPL-CCNC: 80 U/L (ref 10–40)
AV INDEX (PROSTH): 0.57
AV INDEX (PROSTH): 0.77
AV MEAN GRADIENT: 6 MMHG
AV MEAN GRADIENT: 8 MMHG
AV PEAK GRADIENT: 13 MMHG
AV PEAK GRADIENT: 9 MMHG
AV VALVE AREA: 2.61 CM2
AV VALVE AREA: 3.19 CM2
AV VELOCITY RATIO: 0.54
AV VELOCITY RATIO: 0.67
BACTERIA #/AREA URNS AUTO: ABNORMAL /HPF
BACTERIA BLD CULT: NORMAL
BACTERIA SPEC AEROBE CULT: ABNORMAL
BASO STIPL BLD QL SMEAR: ABNORMAL
BASOPHILS # BLD AUTO: 0 K/UL (ref 0–0.2)
BASOPHILS # BLD AUTO: 0.01 K/UL (ref 0–0.2)
BASOPHILS # BLD AUTO: 0.02 K/UL (ref 0–0.2)
BASOPHILS # BLD AUTO: 0.03 K/UL (ref 0–0.2)
BASOPHILS # BLD AUTO: 0.04 K/UL (ref 0–0.2)
BASOPHILS # BLD AUTO: 0.07 K/UL (ref 0–0.2)
BASOPHILS # BLD AUTO: 0.08 K/UL (ref 0–0.2)
BASOPHILS # BLD AUTO: 0.08 K/UL (ref 0–0.2)
BASOPHILS # BLD AUTO: 0.11 K/UL (ref 0–0.2)
BASOPHILS # BLD AUTO: 0.14 K/UL (ref 0–0.2)
BASOPHILS # BLD AUTO: ABNORMAL K/UL (ref 0–0.2)
BASOPHILS NFR BLD: 0 % (ref 0–1.9)
BASOPHILS NFR BLD: 0.1 % (ref 0–1.9)
BASOPHILS NFR BLD: 0.2 % (ref 0–1.9)
BASOPHILS NFR BLD: 0.3 % (ref 0–1.9)
BASOPHILS NFR BLD: 0.4 % (ref 0–1.9)
BASOPHILS NFR BLD: 0.4 % (ref 0–1.9)
BASOPHILS NFR BLD: 0.5 % (ref 0–1.9)
BASOPHILS NFR BLD: 0.7 % (ref 0–1.9)
BASOPHILS NFR BLD: 1 % (ref 0–1.9)
BASOPHILS NFR BLD: 1 % (ref 0–1.9)
BASOPHILS NFR BLD: 3 % (ref 0–1.9)
BILIRUB DIRECT SERPL-MCNC: 0.7 MG/DL (ref 0.1–0.3)
BILIRUB SERPL-MCNC: 0.5 MG/DL (ref 0.1–1)
BILIRUB SERPL-MCNC: 0.6 MG/DL (ref 0.1–1)
BILIRUB SERPL-MCNC: 0.7 MG/DL (ref 0.1–1)
BILIRUB SERPL-MCNC: 0.8 MG/DL (ref 0.1–1)
BILIRUB SERPL-MCNC: 0.9 MG/DL (ref 0.1–1)
BILIRUB SERPL-MCNC: 0.9 MG/DL (ref 0.1–1)
BILIRUB SERPL-MCNC: 1 MG/DL (ref 0.1–1)
BILIRUB SERPL-MCNC: 1.1 MG/DL (ref 0.1–1)
BILIRUB SERPL-MCNC: 1.2 MG/DL (ref 0.1–1)
BILIRUB SERPL-MCNC: 1.2 MG/DL (ref 0.1–1)
BILIRUB UR QL STRIP: NEGATIVE
BLD GP AB SCN CELLS X3 SERPL QL: NORMAL
BLD PROD TYP BPU: NORMAL
BLOOD UNIT EXPIRATION DATE: NORMAL
BLOOD UNIT TYPE CODE: 9500
BLOOD UNIT TYPE: NORMAL
BSA FOR ECHO PROCEDURE: 2.31 M2
BSA FOR ECHO PROCEDURE: 2.37 M2
BUN SERPL-MCNC: 10 MG/DL (ref 8–23)
BUN SERPL-MCNC: 11 MG/DL (ref 8–23)
BUN SERPL-MCNC: 12 MG/DL (ref 8–23)
BUN SERPL-MCNC: 13 MG/DL (ref 8–23)
BUN SERPL-MCNC: 13 MG/DL (ref 8–23)
BUN SERPL-MCNC: 15 MG/DL (ref 8–23)
BUN SERPL-MCNC: 17 MG/DL (ref 8–23)
BUN SERPL-MCNC: 17 MG/DL (ref 8–23)
BUN SERPL-MCNC: 18 MG/DL (ref 8–23)
BUN SERPL-MCNC: 19 MG/DL (ref 8–23)
BUN SERPL-MCNC: 19 MG/DL (ref 8–23)
BUN SERPL-MCNC: 21 MG/DL (ref 8–23)
BUN SERPL-MCNC: 25 MG/DL (ref 8–23)
BUN SERPL-MCNC: 25 MG/DL (ref 8–23)
BUN SERPL-MCNC: 29 MG/DL (ref 8–23)
BUN SERPL-MCNC: 29 MG/DL (ref 8–23)
BUN SERPL-MCNC: 32 MG/DL (ref 8–23)
BUN SERPL-MCNC: 35 MG/DL (ref 8–23)
BUN SERPL-MCNC: 36 MG/DL (ref 8–23)
BUN SERPL-MCNC: 43 MG/DL (ref 8–23)
BUN SERPL-MCNC: 8 MG/DL (ref 8–23)
BUN SERPL-MCNC: 9 MG/DL (ref 8–23)
BUN SERPL-MCNC: 9 MG/DL (ref 8–23)
BURR CELLS BLD QL SMEAR: ABNORMAL
BURR CELLS BLD QL SMEAR: ABNORMAL
CA-I BLDV-SCNC: 0.91 MMOL/L (ref 1.06–1.42)
CA-I BLDV-SCNC: 0.91 MMOL/L (ref 1.06–1.42)
CA-I BLDV-SCNC: 0.97 MMOL/L (ref 1.06–1.42)
CA-I BLDV-SCNC: 0.99 MMOL/L (ref 1.06–1.42)
CA-I BLDV-SCNC: 0.99 MMOL/L (ref 1.06–1.42)
CALCIUM SERPL-MCNC: 5.9 MG/DL (ref 8.7–10.5)
CALCIUM SERPL-MCNC: 6.7 MG/DL (ref 8.7–10.5)
CALCIUM SERPL-MCNC: 6.8 MG/DL (ref 8.7–10.5)
CALCIUM SERPL-MCNC: 6.9 MG/DL (ref 8.7–10.5)
CALCIUM SERPL-MCNC: 7 MG/DL (ref 8.7–10.5)
CALCIUM SERPL-MCNC: 7.1 MG/DL (ref 8.7–10.5)
CALCIUM SERPL-MCNC: 7.2 MG/DL (ref 8.7–10.5)
CALCIUM SERPL-MCNC: 7.2 MG/DL (ref 8.7–10.5)
CALCIUM SERPL-MCNC: 7.4 MG/DL (ref 8.7–10.5)
CALCIUM SERPL-MCNC: 7.4 MG/DL (ref 8.7–10.5)
CALCIUM SERPL-MCNC: 7.5 MG/DL (ref 8.7–10.5)
CALCIUM SERPL-MCNC: 7.6 MG/DL (ref 8.7–10.5)
CHLORIDE SERPL-SCNC: 100 MMOL/L (ref 95–110)
CHLORIDE SERPL-SCNC: 101 MMOL/L (ref 95–110)
CHLORIDE SERPL-SCNC: 102 MMOL/L (ref 95–110)
CHLORIDE SERPL-SCNC: 103 MMOL/L (ref 95–110)
CHLORIDE SERPL-SCNC: 104 MMOL/L (ref 95–110)
CHLORIDE SERPL-SCNC: 105 MMOL/L (ref 95–110)
CHLORIDE SERPL-SCNC: 110 MMOL/L (ref 95–110)
CHLORIDE SERPL-SCNC: 95 MMOL/L (ref 95–110)
CHLORIDE SERPL-SCNC: 95 MMOL/L (ref 95–110)
CHLORIDE SERPL-SCNC: 97 MMOL/L (ref 95–110)
CHLORIDE SERPL-SCNC: 97 MMOL/L (ref 95–110)
CHLORIDE SERPL-SCNC: 98 MMOL/L (ref 95–110)
CHLORIDE SERPL-SCNC: 99 MMOL/L (ref 95–110)
CLARITY UR REFRACT.AUTO: ABNORMAL
CO2 SERPL-SCNC: 18 MMOL/L (ref 23–29)
CO2 SERPL-SCNC: 19 MMOL/L (ref 23–29)
CO2 SERPL-SCNC: 19 MMOL/L (ref 23–29)
CO2 SERPL-SCNC: 20 MMOL/L (ref 23–29)
CO2 SERPL-SCNC: 21 MMOL/L (ref 23–29)
CO2 SERPL-SCNC: 22 MMOL/L (ref 23–29)
CO2 SERPL-SCNC: 23 MMOL/L (ref 23–29)
CO2 SERPL-SCNC: 24 MMOL/L (ref 23–29)
CO2 SERPL-SCNC: 25 MMOL/L (ref 23–29)
CO2 SERPL-SCNC: 26 MMOL/L (ref 23–29)
CO2 SERPL-SCNC: 29 MMOL/L (ref 23–29)
CODING SYSTEM: NORMAL
COLOR UR AUTO: ABNORMAL
CREAT SERPL-MCNC: 1.1 MG/DL (ref 0.5–1.4)
CREAT SERPL-MCNC: 1.1 MG/DL (ref 0.5–1.4)
CREAT SERPL-MCNC: 1.2 MG/DL (ref 0.5–1.4)
CREAT SERPL-MCNC: 1.3 MG/DL (ref 0.5–1.4)
CREAT SERPL-MCNC: 1.4 MG/DL (ref 0.5–1.4)
CREAT SERPL-MCNC: 1.5 MG/DL (ref 0.5–1.4)
CREAT SERPL-MCNC: 1.5 MG/DL (ref 0.5–1.4)
CREAT SERPL-MCNC: 1.6 MG/DL (ref 0.5–1.4)
CREAT SERPL-MCNC: 1.7 MG/DL (ref 0.5–1.4)
CREAT SERPL-MCNC: 1.7 MG/DL (ref 0.5–1.4)
CREAT SERPL-MCNC: 1.8 MG/DL (ref 0.5–1.4)
CREAT SERPL-MCNC: 1.9 MG/DL (ref 0.5–1.4)
CREAT SERPL-MCNC: 2.2 MG/DL (ref 0.5–1.4)
CREAT SERPL-MCNC: 2.2 MG/DL (ref 0.5–1.4)
CREAT SERPL-MCNC: 2.5 MG/DL (ref 0.5–1.4)
CREAT SERPL-MCNC: 2.6 MG/DL (ref 0.5–1.4)
CREAT SERPL-MCNC: 2.8 MG/DL (ref 0.5–1.4)
CREAT SERPL-MCNC: 2.8 MG/DL (ref 0.5–1.4)
CREAT SERPL-MCNC: 3.1 MG/DL (ref 0.5–1.4)
CREAT SERPL-MCNC: 3.1 MG/DL (ref 0.5–1.4)
CREAT SERPL-MCNC: 3.2 MG/DL (ref 0.5–1.4)
CREAT SERPL-MCNC: 4.3 MG/DL (ref 0.5–1.4)
CREAT SERPL-MCNC: 5.1 MG/DL (ref 0.5–1.4)
CV ECHO LV RWT: 0.42 CM
CV ECHO LV RWT: 0.44 CM
DACRYOCYTES BLD QL SMEAR: ABNORMAL
DELSYS: ABNORMAL
DELSYS: NORMAL
DIFFERENTIAL METHOD: ABNORMAL
DISPENSE STATUS: NORMAL
DOHLE BOD BLD QL SMEAR: PRESENT
DOP CALC AO PEAK VEL: 1.52 M/S
DOP CALC AO PEAK VEL: 1.78 M/S
DOP CALC AO VTI: 19.44 CM
DOP CALC AO VTI: 27.04 CM
DOP CALC LVOT AREA: 4.1 CM2
DOP CALC LVOT AREA: 4.6 CM2
DOP CALC LVOT DIAMETER: 2.29 CM
DOP CALC LVOT DIAMETER: 2.41 CM
DOP CALC LVOT PEAK VEL: 0.82 M/S
DOP CALC LVOT PEAK VEL: 1.2 M/S
DOP CALC LVOT STROKE VOLUME: 62 CM3
DOP CALC LVOT STROKE VOLUME: 70.67 CM3
DOP CALCLVOT PEAK VEL VTI: 15.06 CM
DOP CALCLVOT PEAK VEL VTI: 15.5 CM
E WAVE DECELERATION TIME: 153.33 MSEC
E WAVE DECELERATION TIME: 95.17 MSEC
E/A RATIO: 1.34
E/A RATIO: 1.52
E/E' RATIO: 6.07 M/S
E/E' RATIO: 8.88 M/S
ECHO LV POSTERIOR WALL: 1.06 CM (ref 0.6–1.1)
ECHO LV POSTERIOR WALL: 1.08 CM (ref 0.6–1.1)
EOSINOPHIL # BLD AUTO: 0 K/UL (ref 0–0.5)
EOSINOPHIL # BLD AUTO: 0.2 K/UL (ref 0–0.5)
EOSINOPHIL # BLD AUTO: 0.3 K/UL (ref 0–0.5)
EOSINOPHIL # BLD AUTO: 0.6 K/UL (ref 0–0.5)
EOSINOPHIL # BLD AUTO: 0.8 K/UL (ref 0–0.5)
EOSINOPHIL # BLD AUTO: 0.9 K/UL (ref 0–0.5)
EOSINOPHIL # BLD AUTO: 1 K/UL (ref 0–0.5)
EOSINOPHIL # BLD AUTO: ABNORMAL K/UL (ref 0–0.5)
EOSINOPHIL NFR BLD: 0 % (ref 0–8)
EOSINOPHIL NFR BLD: 0.1 % (ref 0–8)
EOSINOPHIL NFR BLD: 0.4 % (ref 0–8)
EOSINOPHIL NFR BLD: 0.5 % (ref 0–8)
EOSINOPHIL NFR BLD: 1 % (ref 0–8)
EOSINOPHIL NFR BLD: 1 % (ref 0–8)
EOSINOPHIL NFR BLD: 1.9 % (ref 0–8)
EOSINOPHIL NFR BLD: 2 % (ref 0–8)
EOSINOPHIL NFR BLD: 2.1 % (ref 0–8)
EOSINOPHIL NFR BLD: 2.2 % (ref 0–8)
EOSINOPHIL NFR BLD: 2.9 % (ref 0–8)
EOSINOPHIL NFR BLD: 3 % (ref 0–8)
EOSINOPHIL NFR BLD: 3 % (ref 0–8)
EOSINOPHIL NFR BLD: 4 % (ref 0–8)
EOSINOPHIL NFR BLD: 4 % (ref 0–8)
ERYTHROCYTE [DISTWIDTH] IN BLOOD BY AUTOMATED COUNT: 17.9 % (ref 11.5–14.5)
ERYTHROCYTE [DISTWIDTH] IN BLOOD BY AUTOMATED COUNT: 18 % (ref 11.5–14.5)
ERYTHROCYTE [DISTWIDTH] IN BLOOD BY AUTOMATED COUNT: 18.1 % (ref 11.5–14.5)
ERYTHROCYTE [DISTWIDTH] IN BLOOD BY AUTOMATED COUNT: 18.1 % (ref 11.5–14.5)
ERYTHROCYTE [DISTWIDTH] IN BLOOD BY AUTOMATED COUNT: 18.3 % (ref 11.5–14.5)
ERYTHROCYTE [DISTWIDTH] IN BLOOD BY AUTOMATED COUNT: 18.5 % (ref 11.5–14.5)
ERYTHROCYTE [DISTWIDTH] IN BLOOD BY AUTOMATED COUNT: 18.5 % (ref 11.5–14.5)
ERYTHROCYTE [DISTWIDTH] IN BLOOD BY AUTOMATED COUNT: 18.9 % (ref 11.5–14.5)
ERYTHROCYTE [DISTWIDTH] IN BLOOD BY AUTOMATED COUNT: 19 % (ref 11.5–14.5)
ERYTHROCYTE [DISTWIDTH] IN BLOOD BY AUTOMATED COUNT: 19.1 % (ref 11.5–14.5)
ERYTHROCYTE [DISTWIDTH] IN BLOOD BY AUTOMATED COUNT: 19.2 % (ref 11.5–14.5)
ERYTHROCYTE [DISTWIDTH] IN BLOOD BY AUTOMATED COUNT: 19.5 % (ref 11.5–14.5)
ERYTHROCYTE [DISTWIDTH] IN BLOOD BY AUTOMATED COUNT: 19.6 % (ref 11.5–14.5)
ERYTHROCYTE [DISTWIDTH] IN BLOOD BY AUTOMATED COUNT: 19.7 % (ref 11.5–14.5)
ERYTHROCYTE [DISTWIDTH] IN BLOOD BY AUTOMATED COUNT: 19.7 % (ref 11.5–14.5)
ERYTHROCYTE [DISTWIDTH] IN BLOOD BY AUTOMATED COUNT: 19.8 % (ref 11.5–14.5)
ERYTHROCYTE [DISTWIDTH] IN BLOOD BY AUTOMATED COUNT: 19.9 % (ref 11.5–14.5)
ERYTHROCYTE [DISTWIDTH] IN BLOOD BY AUTOMATED COUNT: 19.9 % (ref 11.5–14.5)
ERYTHROCYTE [DISTWIDTH] IN BLOOD BY AUTOMATED COUNT: 20 % (ref 11.5–14.5)
ERYTHROCYTE [SEDIMENTATION RATE] IN BLOOD BY WESTERGREN METHOD: 18 MM/H
ERYTHROCYTE [SEDIMENTATION RATE] IN BLOOD BY WESTERGREN METHOD: 18 MM/H
ERYTHROCYTE [SEDIMENTATION RATE] IN BLOOD BY WESTERGREN METHOD: 22 MM/H
ERYTHROCYTE [SEDIMENTATION RATE] IN BLOOD BY WESTERGREN METHOD: 28 MM/H
ERYTHROCYTE [SEDIMENTATION RATE] IN BLOOD BY WESTERGREN METHOD: 28 MM/H
EST. GFR  (AFRICAN AMERICAN): 13 ML/MIN/1.73 M^2
EST. GFR  (AFRICAN AMERICAN): 16 ML/MIN/1.73 M^2
EST. GFR  (AFRICAN AMERICAN): 22.9 ML/MIN/1.73 M^2
EST. GFR  (AFRICAN AMERICAN): 23.8 ML/MIN/1.73 M^2
EST. GFR  (AFRICAN AMERICAN): 23.8 ML/MIN/1.73 M^2
EST. GFR  (AFRICAN AMERICAN): 26.9 ML/MIN/1.73 M^2
EST. GFR  (AFRICAN AMERICAN): 26.9 ML/MIN/1.73 M^2
EST. GFR  (AFRICAN AMERICAN): 29.4 ML/MIN/1.73 M^2
EST. GFR  (AFRICAN AMERICAN): 30.9 ML/MIN/1.73 M^2
EST. GFR  (AFRICAN AMERICAN): 36 ML/MIN/1.73 M^2
EST. GFR  (AFRICAN AMERICAN): 36 ML/MIN/1.73 M^2
EST. GFR  (AFRICAN AMERICAN): 43 ML/MIN/1.73 M^2
EST. GFR  (AFRICAN AMERICAN): 45.9 ML/MIN/1.73 M^2
EST. GFR  (AFRICAN AMERICAN): 49.2 ML/MIN/1.73 M^2
EST. GFR  (AFRICAN AMERICAN): 49.2 ML/MIN/1.73 M^2
EST. GFR  (AFRICAN AMERICAN): 53 ML/MIN/1.73 M^2
EST. GFR  (AFRICAN AMERICAN): 57.3 ML/MIN/1.73 M^2
EST. GFR  (AFRICAN AMERICAN): 57.3 ML/MIN/1.73 M^2
EST. GFR  (AFRICAN AMERICAN): >60 ML/MIN/1.73 M^2
EST. GFR  (NON AFRICAN AMERICAN): 11.3 ML/MIN/1.73 M^2
EST. GFR  (NON AFRICAN AMERICAN): 13.9 ML/MIN/1.73 M^2
EST. GFR  (NON AFRICAN AMERICAN): 19.8 ML/MIN/1.73 M^2
EST. GFR  (NON AFRICAN AMERICAN): 20.6 ML/MIN/1.73 M^2
EST. GFR  (NON AFRICAN AMERICAN): 20.6 ML/MIN/1.73 M^2
EST. GFR  (NON AFRICAN AMERICAN): 23.3 ML/MIN/1.73 M^2
EST. GFR  (NON AFRICAN AMERICAN): 23.3 ML/MIN/1.73 M^2
EST. GFR  (NON AFRICAN AMERICAN): 25.5 ML/MIN/1.73 M^2
EST. GFR  (NON AFRICAN AMERICAN): 26.7 ML/MIN/1.73 M^2
EST. GFR  (NON AFRICAN AMERICAN): 31.2 ML/MIN/1.73 M^2
EST. GFR  (NON AFRICAN AMERICAN): 31.2 ML/MIN/1.73 M^2
EST. GFR  (NON AFRICAN AMERICAN): 37.2 ML/MIN/1.73 M^2
EST. GFR  (NON AFRICAN AMERICAN): 39.7 ML/MIN/1.73 M^2
EST. GFR  (NON AFRICAN AMERICAN): 42.6 ML/MIN/1.73 M^2
EST. GFR  (NON AFRICAN AMERICAN): 42.6 ML/MIN/1.73 M^2
EST. GFR  (NON AFRICAN AMERICAN): 45.8 ML/MIN/1.73 M^2
EST. GFR  (NON AFRICAN AMERICAN): 49.5 ML/MIN/1.73 M^2
EST. GFR  (NON AFRICAN AMERICAN): 49.5 ML/MIN/1.73 M^2
EST. GFR  (NON AFRICAN AMERICAN): 53.8 ML/MIN/1.73 M^2
EST. GFR  (NON AFRICAN AMERICAN): 58.9 ML/MIN/1.73 M^2
EST. GFR  (NON AFRICAN AMERICAN): >60 ML/MIN/1.73 M^2
FERRITIN SERPL-MCNC: 501 NG/ML (ref 20–300)
FIBRINOGEN PPP-MCNC: 353 MG/DL (ref 182–366)
FIO2: 100
FIO2: 40
FIO2: 50
FIO2: 50
FIO2: 60
FIO2: 60
FLOW: 15
FRACTIONAL SHORTENING: 24 % (ref 28–44)
FRACTIONAL SHORTENING: 34 % (ref 28–44)
FUNGITELL COMMENTS: NEGATIVE
GIANT PLATELETS BLD QL SMEAR: PRESENT
GLUCOSE SERPL-MCNC: 103 MG/DL (ref 70–110)
GLUCOSE SERPL-MCNC: 103 MG/DL (ref 70–110)
GLUCOSE SERPL-MCNC: 105 MG/DL (ref 70–110)
GLUCOSE SERPL-MCNC: 119 MG/DL (ref 70–110)
GLUCOSE SERPL-MCNC: 120 MG/DL (ref 70–110)
GLUCOSE SERPL-MCNC: 122 MG/DL (ref 70–110)
GLUCOSE SERPL-MCNC: 130 MG/DL (ref 70–110)
GLUCOSE SERPL-MCNC: 131 MG/DL (ref 70–110)
GLUCOSE SERPL-MCNC: 133 MG/DL (ref 70–110)
GLUCOSE SERPL-MCNC: 136 MG/DL (ref 70–110)
GLUCOSE SERPL-MCNC: 138 MG/DL (ref 70–110)
GLUCOSE SERPL-MCNC: 145 MG/DL (ref 70–110)
GLUCOSE SERPL-MCNC: 146 MG/DL (ref 70–110)
GLUCOSE SERPL-MCNC: 150 MG/DL (ref 70–110)
GLUCOSE SERPL-MCNC: 151 MG/DL (ref 70–110)
GLUCOSE SERPL-MCNC: 152 MG/DL (ref 70–110)
GLUCOSE SERPL-MCNC: 157 MG/DL (ref 70–110)
GLUCOSE SERPL-MCNC: 158 MG/DL (ref 70–110)
GLUCOSE SERPL-MCNC: 158 MG/DL (ref 70–110)
GLUCOSE SERPL-MCNC: 159 MG/DL (ref 70–110)
GLUCOSE SERPL-MCNC: 159 MG/DL (ref 70–110)
GLUCOSE SERPL-MCNC: 163 MG/DL (ref 70–110)
GLUCOSE SERPL-MCNC: 163 MG/DL (ref 70–110)
GLUCOSE SERPL-MCNC: 173 MG/DL (ref 70–110)
GLUCOSE SERPL-MCNC: 182 MG/DL (ref 70–110)
GLUCOSE SERPL-MCNC: 182 MG/DL (ref 70–110)
GLUCOSE SERPL-MCNC: 199 MG/DL (ref 70–110)
GLUCOSE SERPL-MCNC: 208 MG/DL (ref 70–110)
GLUCOSE SERPL-MCNC: 208 MG/DL (ref 70–110)
GLUCOSE SERPL-MCNC: 222 MG/DL (ref 70–110)
GLUCOSE SERPL-MCNC: 227 MG/DL (ref 70–110)
GLUCOSE SERPL-MCNC: 227 MG/DL (ref 70–110)
GLUCOSE SERPL-MCNC: 235 MG/DL (ref 70–110)
GLUCOSE SERPL-MCNC: 235 MG/DL (ref 70–110)
GLUCOSE SERPL-MCNC: 91 MG/DL (ref 70–110)
GLUCOSE SERPL-MCNC: 96 MG/DL (ref 70–110)
GLUCOSE UR QL STRIP: ABNORMAL
GRAM STN SPEC: ABNORMAL
HAPTOGLOB SERPL-MCNC: 357 MG/DL (ref 30–250)
HCO3 UR-SCNC: 17.3 MMOL/L (ref 24–28)
HCO3 UR-SCNC: 19.4 MMOL/L (ref 24–28)
HCO3 UR-SCNC: 22.3 MMOL/L (ref 24–28)
HCO3 UR-SCNC: 22.6 MMOL/L (ref 24–28)
HCO3 UR-SCNC: 22.6 MMOL/L (ref 24–28)
HCO3 UR-SCNC: 22.7 MMOL/L (ref 24–28)
HCO3 UR-SCNC: 24.5 MMOL/L (ref 24–28)
HCO3 UR-SCNC: 27.9 MMOL/L (ref 24–28)
HCT VFR BLD AUTO: 18.5 % (ref 40–54)
HCT VFR BLD AUTO: 20.4 % (ref 40–54)
HCT VFR BLD AUTO: 20.8 % (ref 40–54)
HCT VFR BLD AUTO: 22.4 % (ref 40–54)
HCT VFR BLD AUTO: 23.3 % (ref 40–54)
HCT VFR BLD AUTO: 23.9 % (ref 40–54)
HCT VFR BLD AUTO: 24.3 % (ref 40–54)
HCT VFR BLD AUTO: 25.7 % (ref 40–54)
HCT VFR BLD AUTO: 26.2 % (ref 40–54)
HCT VFR BLD AUTO: 26.2 % (ref 40–54)
HCT VFR BLD AUTO: 26.4 % (ref 40–54)
HCT VFR BLD AUTO: 26.4 % (ref 40–54)
HCT VFR BLD AUTO: 26.5 % (ref 40–54)
HCT VFR BLD AUTO: 26.8 % (ref 40–54)
HCT VFR BLD AUTO: 26.8 % (ref 40–54)
HCT VFR BLD AUTO: 27.8 % (ref 40–54)
HCT VFR BLD AUTO: 29.7 % (ref 40–54)
HCT VFR BLD AUTO: 29.7 % (ref 40–54)
HCT VFR BLD AUTO: 30 % (ref 40–54)
HCT VFR BLD AUTO: 31.8 % (ref 40–54)
HCT VFR BLD AUTO: 32.7 % (ref 40–54)
HCT VFR BLD AUTO: 33.2 % (ref 40–54)
HCT VFR BLD AUTO: 33.7 % (ref 40–54)
HCT VFR BLD CALC: 27 %PCV (ref 36–54)
HEPARIN INDUCED THROMBOCYTOPENIA: 0.15 OD (ref 0–0.4)
HGB BLD-MCNC: 10 G/DL (ref 14–18)
HGB BLD-MCNC: 10 G/DL (ref 14–18)
HGB BLD-MCNC: 10.2 G/DL (ref 14–18)
HGB BLD-MCNC: 10.3 G/DL (ref 14–18)
HGB BLD-MCNC: 5.6 G/DL (ref 14–18)
HGB BLD-MCNC: 6.1 G/DL (ref 14–18)
HGB BLD-MCNC: 6.3 G/DL (ref 14–18)
HGB BLD-MCNC: 7.1 G/DL (ref 14–18)
HGB BLD-MCNC: 7.2 G/DL (ref 14–18)
HGB BLD-MCNC: 7.2 G/DL (ref 14–18)
HGB BLD-MCNC: 7.3 G/DL (ref 14–18)
HGB BLD-MCNC: 7.8 G/DL (ref 14–18)
HGB BLD-MCNC: 7.9 G/DL (ref 14–18)
HGB BLD-MCNC: 8.1 G/DL (ref 14–18)
HGB BLD-MCNC: 8.2 G/DL (ref 14–18)
HGB BLD-MCNC: 8.2 G/DL (ref 14–18)
HGB BLD-MCNC: 8.3 G/DL (ref 14–18)
HGB BLD-MCNC: 8.6 G/DL (ref 14–18)
HGB BLD-MCNC: 9 G/DL (ref 14–18)
HGB BLD-MCNC: 9 G/DL (ref 14–18)
HGB BLD-MCNC: 9.5 G/DL (ref 14–18)
HGB UR QL STRIP: ABNORMAL
HYALINE CASTS UR QL AUTO: 0 /LPF
HYPOCHROMIA BLD QL SMEAR: ABNORMAL
IMM GRANULOCYTES # BLD AUTO: 0.06 K/UL (ref 0–0.04)
IMM GRANULOCYTES # BLD AUTO: 0.06 K/UL (ref 0–0.04)
IMM GRANULOCYTES # BLD AUTO: 0.07 K/UL (ref 0–0.04)
IMM GRANULOCYTES # BLD AUTO: 0.17 K/UL (ref 0–0.04)
IMM GRANULOCYTES # BLD AUTO: 0.2 K/UL (ref 0–0.04)
IMM GRANULOCYTES # BLD AUTO: 0.28 K/UL (ref 0–0.04)
IMM GRANULOCYTES # BLD AUTO: 0.36 K/UL (ref 0–0.04)
IMM GRANULOCYTES # BLD AUTO: 0.41 K/UL (ref 0–0.04)
IMM GRANULOCYTES # BLD AUTO: 0.66 K/UL (ref 0–0.04)
IMM GRANULOCYTES # BLD AUTO: 0.7 K/UL (ref 0–0.04)
IMM GRANULOCYTES # BLD AUTO: 0.73 K/UL (ref 0–0.04)
IMM GRANULOCYTES # BLD AUTO: 0.93 K/UL (ref 0–0.04)
IMM GRANULOCYTES # BLD AUTO: ABNORMAL K/UL (ref 0–0.04)
IMM GRANULOCYTES NFR BLD AUTO: 0.5 % (ref 0–0.5)
IMM GRANULOCYTES NFR BLD AUTO: 0.6 % (ref 0–0.5)
IMM GRANULOCYTES NFR BLD AUTO: 0.7 % (ref 0–0.5)
IMM GRANULOCYTES NFR BLD AUTO: 1.3 % (ref 0–0.5)
IMM GRANULOCYTES NFR BLD AUTO: 1.4 % (ref 0–0.5)
IMM GRANULOCYTES NFR BLD AUTO: 1.6 % (ref 0–0.5)
IMM GRANULOCYTES NFR BLD AUTO: 1.8 % (ref 0–0.5)
IMM GRANULOCYTES NFR BLD AUTO: 1.9 % (ref 0–0.5)
IMM GRANULOCYTES NFR BLD AUTO: 2.2 % (ref 0–0.5)
IMM GRANULOCYTES NFR BLD AUTO: 2.4 % (ref 0–0.5)
IMM GRANULOCYTES NFR BLD AUTO: 2.9 % (ref 0–0.5)
IMM GRANULOCYTES NFR BLD AUTO: 3.3 % (ref 0–0.5)
IMM GRANULOCYTES NFR BLD AUTO: 3.6 % (ref 0–0.5)
IMM GRANULOCYTES NFR BLD AUTO: 4.9 % (ref 0–0.5)
IMM GRANULOCYTES NFR BLD AUTO: ABNORMAL % (ref 0–0.5)
INR PPP: 1.2 (ref 0.8–1.2)
INR PPP: 1.4 (ref 0.8–1.2)
INTERVENTRICULAR SEPTUM: 1.18 CM (ref 0.6–1.1)
INTERVENTRICULAR SEPTUM: 1.34 CM (ref 0.6–1.1)
IP: 15
IP: 15
IT: 0.8
IT: 0.8
IVRT: 62.8 MSEC
KETONES UR QL STRIP: NEGATIVE
LA MAJOR: 6.16 CM
LA MAJOR: 6.31 CM
LA MINOR: 5.17 CM
LA MINOR: 6.34 CM
LA WIDTH: 3.8 CM
LA WIDTH: 4.62 CM
LACTATE SERPL-SCNC: 1 MMOL/L (ref 0.5–2.2)
LACTATE SERPL-SCNC: 1.1 MMOL/L (ref 0.5–2.2)
LACTATE SERPL-SCNC: 1.3 MMOL/L (ref 0.5–2.2)
LACTATE SERPL-SCNC: 1.5 MMOL/L (ref 0.5–2.2)
LACTATE SERPL-SCNC: 1.5 MMOL/L (ref 0.5–2.2)
LACTATE SERPL-SCNC: 2 MMOL/L (ref 0.5–2.2)
LACTATE SERPL-SCNC: 2.2 MMOL/L (ref 0.5–2.2)
LACTATE SERPL-SCNC: 3 MMOL/L (ref 0.5–2.2)
LACTATE SERPL-SCNC: 4 MMOL/L (ref 0.5–2.2)
LACTATE SERPL-SCNC: 5.9 MMOL/L (ref 0.5–2.2)
LDH SERPL L TO P-CCNC: 471 U/L (ref 110–260)
LEFT ATRIUM SIZE: 3.14 CM
LEFT ATRIUM SIZE: 3.7 CM
LEFT ATRIUM VOLUME INDEX: 28 ML/M2
LEFT ATRIUM VOLUME INDEX: 36.5 ML/M2
LEFT ATRIUM VOLUME: 64.15 CM3
LEFT ATRIUM VOLUME: 81.68 CM3
LEFT INTERNAL DIMENSION IN SYSTOLE: 3.17 CM (ref 2.1–4)
LEFT INTERNAL DIMENSION IN SYSTOLE: 3.95 CM (ref 2.1–4)
LEFT VENTRICLE DIASTOLIC VOLUME INDEX: 47.47 ML/M2
LEFT VENTRICLE DIASTOLIC VOLUME INDEX: 57.97 ML/M2
LEFT VENTRICLE DIASTOLIC VOLUME: 108.72 ML
LEFT VENTRICLE DIASTOLIC VOLUME: 129.63 ML
LEFT VENTRICLE MASS INDEX: 113 G/M2
LEFT VENTRICLE MASS INDEX: 87 G/M2
LEFT VENTRICLE SYSTOLIC VOLUME INDEX: 17.5 ML/M2
LEFT VENTRICLE SYSTOLIC VOLUME INDEX: 30.5 ML/M2
LEFT VENTRICLE SYSTOLIC VOLUME: 40.05 ML
LEFT VENTRICLE SYSTOLIC VOLUME: 68.11 ML
LEFT VENTRICULAR INTERNAL DIMENSION IN DIASTOLE: 4.82 CM (ref 3.5–6)
LEFT VENTRICULAR INTERNAL DIMENSION IN DIASTOLE: 5.2 CM (ref 3.5–6)
LEFT VENTRICULAR MASS: 200.21 G
LEFT VENTRICULAR MASS: 251.73 G
LEUKOCYTE ESTERASE UR QL STRIP: NEGATIVE
LV LATERAL E/E' RATIO: 5.47 M/S
LV LATERAL E/E' RATIO: 6.45 M/S
LV SEPTAL E/E' RATIO: 14.2 M/S
LV SEPTAL E/E' RATIO: 6.83 M/S
LYMPHOCYTES # BLD AUTO: 0.7 K/UL (ref 1–4.8)
LYMPHOCYTES # BLD AUTO: 0.8 K/UL (ref 1–4.8)
LYMPHOCYTES # BLD AUTO: 1 K/UL (ref 1–4.8)
LYMPHOCYTES # BLD AUTO: 1 K/UL (ref 1–4.8)
LYMPHOCYTES # BLD AUTO: 1.1 K/UL (ref 1–4.8)
LYMPHOCYTES # BLD AUTO: 1.3 K/UL (ref 1–4.8)
LYMPHOCYTES # BLD AUTO: 1.3 K/UL (ref 1–4.8)
LYMPHOCYTES # BLD AUTO: 1.5 K/UL (ref 1–4.8)
LYMPHOCYTES # BLD AUTO: 1.8 K/UL (ref 1–4.8)
LYMPHOCYTES # BLD AUTO: 2.1 K/UL (ref 1–4.8)
LYMPHOCYTES # BLD AUTO: 2.3 K/UL (ref 1–4.8)
LYMPHOCYTES # BLD AUTO: 3.2 K/UL (ref 1–4.8)
LYMPHOCYTES # BLD AUTO: 3.8 K/UL (ref 1–4.8)
LYMPHOCYTES # BLD AUTO: 7.3 K/UL (ref 1–4.8)
LYMPHOCYTES # BLD AUTO: ABNORMAL K/UL (ref 1–4.8)
LYMPHOCYTES NFR BLD: 11.3 % (ref 18–48)
LYMPHOCYTES NFR BLD: 12 % (ref 18–48)
LYMPHOCYTES NFR BLD: 12.2 % (ref 18–48)
LYMPHOCYTES NFR BLD: 12.6 % (ref 18–48)
LYMPHOCYTES NFR BLD: 12.7 % (ref 18–48)
LYMPHOCYTES NFR BLD: 13 % (ref 18–48)
LYMPHOCYTES NFR BLD: 13.3 % (ref 18–48)
LYMPHOCYTES NFR BLD: 13.8 % (ref 18–48)
LYMPHOCYTES NFR BLD: 14.4 % (ref 18–48)
LYMPHOCYTES NFR BLD: 15 % (ref 18–48)
LYMPHOCYTES NFR BLD: 17 % (ref 18–48)
LYMPHOCYTES NFR BLD: 20 % (ref 18–48)
LYMPHOCYTES NFR BLD: 23 % (ref 18–48)
LYMPHOCYTES NFR BLD: 26 % (ref 18–48)
LYMPHOCYTES NFR BLD: 5 % (ref 18–48)
LYMPHOCYTES NFR BLD: 6 % (ref 18–48)
LYMPHOCYTES NFR BLD: 7.7 % (ref 18–48)
LYMPHOCYTES NFR BLD: 8.2 % (ref 18–48)
LYMPHOCYTES NFR BLD: 8.4 % (ref 18–48)
LYMPHOCYTES NFR BLD: 8.7 % (ref 18–48)
LYMPHOCYTES NFR BLD: 9.5 % (ref 18–48)
LYMPHOCYTES NFR BLD: 9.7 % (ref 18–48)
LYMPHOCYTES NFR BLD: 9.8 % (ref 18–48)
MAGNESIUM SERPL-MCNC: 1.7 MG/DL (ref 1.6–2.6)
MAGNESIUM SERPL-MCNC: 1.7 MG/DL (ref 1.6–2.6)
MAGNESIUM SERPL-MCNC: 1.8 MG/DL (ref 1.6–2.6)
MAGNESIUM SERPL-MCNC: 1.8 MG/DL (ref 1.6–2.6)
MAGNESIUM SERPL-MCNC: 1.9 MG/DL (ref 1.6–2.6)
MAGNESIUM SERPL-MCNC: 2 MG/DL (ref 1.6–2.6)
MAGNESIUM SERPL-MCNC: 2.1 MG/DL (ref 1.6–2.6)
MAGNESIUM SERPL-MCNC: 2.2 MG/DL (ref 1.6–2.6)
MAGNESIUM SERPL-MCNC: 2.4 MG/DL (ref 1.6–2.6)
MAGNESIUM SERPL-MCNC: 2.5 MG/DL (ref 1.6–2.6)
MCH RBC QN AUTO: 25.1 PG (ref 27–31)
MCH RBC QN AUTO: 25.3 PG (ref 27–31)
MCH RBC QN AUTO: 25.3 PG (ref 27–31)
MCH RBC QN AUTO: 25.4 PG (ref 27–31)
MCH RBC QN AUTO: 25.4 PG (ref 27–31)
MCH RBC QN AUTO: 25.5 PG (ref 27–31)
MCH RBC QN AUTO: 25.6 PG (ref 27–31)
MCH RBC QN AUTO: 25.7 PG (ref 27–31)
MCH RBC QN AUTO: 25.7 PG (ref 27–31)
MCH RBC QN AUTO: 25.9 PG (ref 27–31)
MCH RBC QN AUTO: 25.9 PG (ref 27–31)
MCH RBC QN AUTO: 26.2 PG (ref 27–31)
MCH RBC QN AUTO: 26.5 PG (ref 27–31)
MCH RBC QN AUTO: 26.5 PG (ref 27–31)
MCH RBC QN AUTO: 26.6 PG (ref 27–31)
MCH RBC QN AUTO: 26.8 PG (ref 27–31)
MCH RBC QN AUTO: 26.8 PG (ref 27–31)
MCH RBC QN AUTO: 27 PG (ref 27–31)
MCH RBC QN AUTO: 27 PG (ref 27–31)
MCHC RBC AUTO-ENTMCNC: 29.6 G/DL (ref 32–36)
MCHC RBC AUTO-ENTMCNC: 29.9 G/DL (ref 32–36)
MCHC RBC AUTO-ENTMCNC: 29.9 G/DL (ref 32–36)
MCHC RBC AUTO-ENTMCNC: 30 G/DL (ref 32–36)
MCHC RBC AUTO-ENTMCNC: 30.1 G/DL (ref 32–36)
MCHC RBC AUTO-ENTMCNC: 30.3 G/DL (ref 32–36)
MCHC RBC AUTO-ENTMCNC: 30.4 G/DL (ref 32–36)
MCHC RBC AUTO-ENTMCNC: 30.5 G/DL (ref 32–36)
MCHC RBC AUTO-ENTMCNC: 30.6 G/DL (ref 32–36)
MCHC RBC AUTO-ENTMCNC: 30.7 G/DL (ref 32–36)
MCHC RBC AUTO-ENTMCNC: 30.9 G/DL (ref 32–36)
MCHC RBC AUTO-ENTMCNC: 31.2 G/DL (ref 32–36)
MCHC RBC AUTO-ENTMCNC: 31.4 G/DL (ref 32–36)
MCHC RBC AUTO-ENTMCNC: 31.7 G/DL (ref 32–36)
MCHC RBC AUTO-ENTMCNC: 31.7 G/DL (ref 32–36)
MCHC RBC AUTO-ENTMCNC: 32 G/DL (ref 32–36)
MCV RBC AUTO: 81 FL (ref 82–98)
MCV RBC AUTO: 81 FL (ref 82–98)
MCV RBC AUTO: 82 FL (ref 82–98)
MCV RBC AUTO: 83 FL (ref 82–98)
MCV RBC AUTO: 84 FL (ref 82–98)
MCV RBC AUTO: 85 FL (ref 82–98)
MCV RBC AUTO: 87 FL (ref 82–98)
MCV RBC AUTO: 88 FL (ref 82–98)
MCV RBC AUTO: 89 FL (ref 82–98)
METAMYELOCYTES NFR BLD MANUAL: 1 %
METAMYELOCYTES NFR BLD MANUAL: 2 %
METAMYELOCYTES NFR BLD MANUAL: 2.5 %
MICROSCOPIC COMMENT: ABNORMAL
MIN VOL: 12
MODE: ABNORMAL
MODE: NORMAL
MONOCYTES # BLD AUTO: 0.6 K/UL (ref 0.3–1)
MONOCYTES # BLD AUTO: 0.6 K/UL (ref 0.3–1)
MONOCYTES # BLD AUTO: 0.7 K/UL (ref 0.3–1)
MONOCYTES # BLD AUTO: 0.7 K/UL (ref 0.3–1)
MONOCYTES # BLD AUTO: 0.8 K/UL (ref 0.3–1)
MONOCYTES # BLD AUTO: 0.8 K/UL (ref 0.3–1)
MONOCYTES # BLD AUTO: 0.9 K/UL (ref 0.3–1)
MONOCYTES # BLD AUTO: 0.9 K/UL (ref 0.3–1)
MONOCYTES # BLD AUTO: 1.1 K/UL (ref 0.3–1)
MONOCYTES # BLD AUTO: 1.1 K/UL (ref 0.3–1)
MONOCYTES # BLD AUTO: 1.9 K/UL (ref 0.3–1)
MONOCYTES # BLD AUTO: 2.4 K/UL (ref 0.3–1)
MONOCYTES # BLD AUTO: 2.5 K/UL (ref 0.3–1)
MONOCYTES # BLD AUTO: 2.6 K/UL (ref 0.3–1)
MONOCYTES # BLD AUTO: ABNORMAL K/UL (ref 0.3–1)
MONOCYTES NFR BLD: 11 % (ref 4–15)
MONOCYTES NFR BLD: 3 % (ref 4–15)
MONOCYTES NFR BLD: 4 % (ref 4–15)
MONOCYTES NFR BLD: 5 % (ref 4–15)
MONOCYTES NFR BLD: 5.5 % (ref 4–15)
MONOCYTES NFR BLD: 6 % (ref 4–15)
MONOCYTES NFR BLD: 6 % (ref 4–15)
MONOCYTES NFR BLD: 6.3 % (ref 4–15)
MONOCYTES NFR BLD: 6.7 % (ref 4–15)
MONOCYTES NFR BLD: 6.9 % (ref 4–15)
MONOCYTES NFR BLD: 7 % (ref 4–15)
MONOCYTES NFR BLD: 7.2 % (ref 4–15)
MONOCYTES NFR BLD: 7.4 % (ref 4–15)
MONOCYTES NFR BLD: 7.5 % (ref 4–15)
MONOCYTES NFR BLD: 8 % (ref 4–15)
MONOCYTES NFR BLD: 8.2 % (ref 4–15)
MONOCYTES NFR BLD: 8.4 % (ref 4–15)
MONOCYTES NFR BLD: 9.2 % (ref 4–15)
MONOCYTES NFR BLD: 9.3 % (ref 4–15)
MV PEAK A VEL: 0.53 M/S
MV PEAK A VEL: 0.54 M/S
MV PEAK E VEL: 0.71 M/S
MV PEAK E VEL: 0.82 M/S
MV STENOSIS PRESSURE HALF TIME: 27.6 MS
MV STENOSIS PRESSURE HALF TIME: 44.47 MS
MV VALVE AREA P 1/2 METHOD: 4.95 CM2
MV VALVE AREA P 1/2 METHOD: 7.97 CM2
MYELOCYTES NFR BLD MANUAL: 1 %
MYELOCYTES NFR BLD MANUAL: 2 %
MYELOCYTES NFR BLD MANUAL: 4 %
MYELOCYTES NFR BLD MANUAL: 5 %
NEUTROPHILS # BLD AUTO: 11 K/UL (ref 1.8–7.7)
NEUTROPHILS # BLD AUTO: 12 K/UL (ref 1.8–7.7)
NEUTROPHILS # BLD AUTO: 16.5 K/UL (ref 1.8–7.7)
NEUTROPHILS # BLD AUTO: 20.9 K/UL (ref 1.8–7.7)
NEUTROPHILS # BLD AUTO: 21.6 K/UL (ref 1.8–7.7)
NEUTROPHILS # BLD AUTO: 25.4 K/UL (ref 1.8–7.7)
NEUTROPHILS # BLD AUTO: 6.4 K/UL (ref 1.8–7.7)
NEUTROPHILS # BLD AUTO: 7 K/UL (ref 1.8–7.7)
NEUTROPHILS # BLD AUTO: 7.5 K/UL (ref 1.8–7.7)
NEUTROPHILS # BLD AUTO: 7.9 K/UL (ref 1.8–7.7)
NEUTROPHILS # BLD AUTO: 8.1 K/UL (ref 1.8–7.7)
NEUTROPHILS # BLD AUTO: 8.9 K/UL (ref 1.8–7.7)
NEUTROPHILS # BLD AUTO: 9.1 K/UL (ref 1.8–7.7)
NEUTROPHILS # BLD AUTO: 9.2 K/UL (ref 1.8–7.7)
NEUTROPHILS NFR BLD: 59 % (ref 38–73)
NEUTROPHILS NFR BLD: 66 % (ref 38–73)
NEUTROPHILS NFR BLD: 70 % (ref 38–73)
NEUTROPHILS NFR BLD: 71 % (ref 38–73)
NEUTROPHILS NFR BLD: 72 % (ref 38–73)
NEUTROPHILS NFR BLD: 73 % (ref 38–73)
NEUTROPHILS NFR BLD: 73 % (ref 38–73)
NEUTROPHILS NFR BLD: 73.1 % (ref 38–73)
NEUTROPHILS NFR BLD: 74.6 % (ref 38–73)
NEUTROPHILS NFR BLD: 75.2 % (ref 38–73)
NEUTROPHILS NFR BLD: 75.7 % (ref 38–73)
NEUTROPHILS NFR BLD: 75.9 % (ref 38–73)
NEUTROPHILS NFR BLD: 77.5 % (ref 38–73)
NEUTROPHILS NFR BLD: 78.3 % (ref 38–73)
NEUTROPHILS NFR BLD: 79 % (ref 38–73)
NEUTROPHILS NFR BLD: 79.1 % (ref 38–73)
NEUTROPHILS NFR BLD: 79.2 % (ref 38–73)
NEUTROPHILS NFR BLD: 79.2 % (ref 38–73)
NEUTROPHILS NFR BLD: 81.4 % (ref 38–73)
NEUTROPHILS NFR BLD: 83 % (ref 38–73)
NEUTROPHILS NFR BLD: 86 % (ref 38–73)
NEUTS BAND NFR BLD MANUAL: 1 %
NEUTS BAND NFR BLD MANUAL: 2 %
NEUTS BAND NFR BLD MANUAL: 3 %
NEUTS BAND NFR BLD MANUAL: 6 %
NITRITE UR QL STRIP: NEGATIVE
NRBC BLD-RTO: 0 /100 WBC
NRBC BLD-RTO: 1 /100 WBC
NRBC BLD-RTO: 2 /100 WBC
NRBC BLD-RTO: 2 /100 WBC
NUM UNITS TRANS PACKED RBC: NORMAL
OVALOCYTES BLD QL SMEAR: ABNORMAL
PATH REV BLD -IMP: NORMAL
PATH REV BLD -IMP: NORMAL
PCO2 BLDA: 28.2 MMHG (ref 35–45)
PCO2 BLDA: 28.2 MMHG (ref 35–45)
PCO2 BLDA: 32.5 MMHG (ref 35–45)
PCO2 BLDA: 33 MMHG (ref 35–45)
PCO2 BLDA: 34.2 MMHG (ref 35–45)
PCO2 BLDA: 34.5 MMHG (ref 35–45)
PCO2 BLDA: 35.7 MMHG (ref 35–45)
PCO2 BLDA: 36.6 MMHG (ref 35–45)
PEEP: 10
PEEP: 5
PH SMN: 7.33 [PH] (ref 7.35–7.45)
PH SMN: 7.36 [PH] (ref 7.35–7.45)
PH SMN: 7.41 [PH] (ref 7.35–7.45)
PH SMN: 7.43 [PH] (ref 7.35–7.45)
PH SMN: 7.44 [PH] (ref 7.35–7.45)
PH SMN: 7.51 [PH] (ref 7.35–7.45)
PH UR STRIP: 5 [PH] (ref 5–8)
PHOSPHATE SERPL-MCNC: 1.8 MG/DL (ref 2.7–4.5)
PHOSPHATE SERPL-MCNC: 2.3 MG/DL (ref 2.7–4.5)
PHOSPHATE SERPL-MCNC: 2.3 MG/DL (ref 2.7–4.5)
PHOSPHATE SERPL-MCNC: 2.5 MG/DL (ref 2.7–4.5)
PHOSPHATE SERPL-MCNC: 2.7 MG/DL (ref 2.7–4.5)
PHOSPHATE SERPL-MCNC: 3 MG/DL (ref 2.7–4.5)
PHOSPHATE SERPL-MCNC: 3.2 MG/DL (ref 2.7–4.5)
PHOSPHATE SERPL-MCNC: 3.2 MG/DL (ref 2.7–4.5)
PHOSPHATE SERPL-MCNC: 3.5 MG/DL (ref 2.7–4.5)
PHOSPHATE SERPL-MCNC: 3.6 MG/DL (ref 2.7–4.5)
PHOSPHATE SERPL-MCNC: 3.8 MG/DL (ref 2.7–4.5)
PHOSPHATE SERPL-MCNC: 3.9 MG/DL (ref 2.7–4.5)
PHOSPHATE SERPL-MCNC: 3.9 MG/DL (ref 2.7–4.5)
PHOSPHATE SERPL-MCNC: 4 MG/DL (ref 2.7–4.5)
PHOSPHATE SERPL-MCNC: 4 MG/DL (ref 2.7–4.5)
PHOSPHATE SERPL-MCNC: 4.1 MG/DL (ref 2.7–4.5)
PHOSPHATE SERPL-MCNC: 4.3 MG/DL (ref 2.7–4.5)
PHOSPHATE SERPL-MCNC: 4.3 MG/DL (ref 2.7–4.5)
PHOSPHATE SERPL-MCNC: 4.4 MG/DL (ref 2.7–4.5)
PHOSPHATE SERPL-MCNC: 4.6 MG/DL (ref 2.7–4.5)
PHOSPHATE SERPL-MCNC: 5 MG/DL (ref 2.7–4.5)
PHOSPHATE SERPL-MCNC: 5.4 MG/DL (ref 2.7–4.5)
PHOSPHATE SERPL-MCNC: 6.4 MG/DL (ref 2.7–4.5)
PIP: 16
PIP: 26
PISA TR MAX VEL: 2 M/S
PISA TR MAX VEL: 2.46 M/S
PLATELET # BLD AUTO: 100 K/UL (ref 150–350)
PLATELET # BLD AUTO: 149 K/UL (ref 150–350)
PLATELET # BLD AUTO: 166 K/UL (ref 150–350)
PLATELET # BLD AUTO: 193 K/UL (ref 150–350)
PLATELET # BLD AUTO: 202 K/UL (ref 150–350)
PLATELET # BLD AUTO: 227 K/UL (ref 150–350)
PLATELET # BLD AUTO: 32 K/UL (ref 150–350)
PLATELET # BLD AUTO: 36 K/UL (ref 150–350)
PLATELET # BLD AUTO: 39 K/UL (ref 150–350)
PLATELET # BLD AUTO: 41 K/UL (ref 150–350)
PLATELET # BLD AUTO: 41 K/UL (ref 150–350)
PLATELET # BLD AUTO: 47 K/UL (ref 150–350)
PLATELET # BLD AUTO: 51 K/UL (ref 150–350)
PLATELET # BLD AUTO: 57 K/UL (ref 150–350)
PLATELET # BLD AUTO: 58 K/UL (ref 150–350)
PLATELET # BLD AUTO: 69 K/UL (ref 150–350)
PLATELET # BLD AUTO: 76 K/UL (ref 150–350)
PLATELET # BLD AUTO: 78 K/UL (ref 150–350)
PLATELET # BLD AUTO: 80 K/UL (ref 150–350)
PLATELET # BLD AUTO: 85 K/UL (ref 150–350)
PLATELET # BLD AUTO: 88 K/UL (ref 150–350)
PLATELET # BLD AUTO: 94 K/UL (ref 150–350)
PLATELET # BLD AUTO: 95 K/UL (ref 150–350)
PLATELET BLD QL SMEAR: ABNORMAL
PMV BLD AUTO: 10.2 FL (ref 9.2–12.9)
PMV BLD AUTO: 10.4 FL (ref 9.2–12.9)
PMV BLD AUTO: 10.4 FL (ref 9.2–12.9)
PMV BLD AUTO: 10.5 FL (ref 9.2–12.9)
PMV BLD AUTO: 10.8 FL (ref 9.2–12.9)
PMV BLD AUTO: 10.9 FL (ref 9.2–12.9)
PMV BLD AUTO: 11 FL (ref 9.2–12.9)
PMV BLD AUTO: 11.1 FL (ref 9.2–12.9)
PMV BLD AUTO: 11.1 FL (ref 9.2–12.9)
PMV BLD AUTO: 11.6 FL (ref 9.2–12.9)
PMV BLD AUTO: 11.7 FL (ref 9.2–12.9)
PMV BLD AUTO: 11.9 FL (ref 9.2–12.9)
PMV BLD AUTO: 12.4 FL (ref 9.2–12.9)
PMV BLD AUTO: 12.5 FL (ref 9.2–12.9)
PMV BLD AUTO: 12.7 FL (ref 9.2–12.9)
PMV BLD AUTO: 9.8 FL (ref 9.2–12.9)
PMV BLD AUTO: ABNORMAL FL (ref 9.2–12.9)
PO2 BLDA: 60 MMHG (ref 80–100)
PO2 BLDA: 67 MMHG (ref 80–100)
PO2 BLDA: 77 MMHG (ref 80–100)
PO2 BLDA: 78 MMHG (ref 80–100)
PO2 BLDA: 83 MMHG (ref 80–100)
PO2 BLDA: 84 MMHG (ref 80–100)
POC BE: -2 MMOL/L
POC BE: -2 MMOL/L
POC BE: -6 MMOL/L
POC BE: -9 MMOL/L
POC BE: 0 MMOL/L
POC BE: 5 MMOL/L
POC IONIZED CALCIUM: 1.02 MMOL/L (ref 1.06–1.42)
POC SATURATED O2: 89 % (ref 95–100)
POC SATURATED O2: 92 % (ref 95–100)
POC SATURATED O2: 95 % (ref 95–100)
POC SATURATED O2: 95 % (ref 95–100)
POC SATURATED O2: 96 % (ref 95–100)
POC SATURATED O2: 96 % (ref 95–100)
POC SATURATED O2: 97 % (ref 95–100)
POC SATURATED O2: 97 % (ref 95–100)
POC TCO2: 18 MMOL/L (ref 23–27)
POC TCO2: 20 MMOL/L (ref 23–27)
POC TCO2: 23 MMOL/L (ref 23–27)
POC TCO2: 24 MMOL/L (ref 23–27)
POC TCO2: 26 MMOL/L (ref 23–27)
POC TCO2: 29 MMOL/L (ref 23–27)
POCT GLUCOSE: 121 MG/DL (ref 70–110)
POCT GLUCOSE: 132 MG/DL (ref 70–110)
POCT GLUCOSE: 133 MG/DL (ref 70–110)
POCT GLUCOSE: 141 MG/DL (ref 70–110)
POCT GLUCOSE: 141 MG/DL (ref 70–110)
POCT GLUCOSE: 142 MG/DL (ref 70–110)
POCT GLUCOSE: 146 MG/DL (ref 70–110)
POCT GLUCOSE: 146 MG/DL (ref 70–110)
POCT GLUCOSE: 147 MG/DL (ref 70–110)
POCT GLUCOSE: 149 MG/DL (ref 70–110)
POCT GLUCOSE: 152 MG/DL (ref 70–110)
POCT GLUCOSE: 154 MG/DL (ref 70–110)
POCT GLUCOSE: 154 MG/DL (ref 70–110)
POCT GLUCOSE: 155 MG/DL (ref 70–110)
POCT GLUCOSE: 158 MG/DL (ref 70–110)
POCT GLUCOSE: 162 MG/DL (ref 70–110)
POCT GLUCOSE: 170 MG/DL (ref 70–110)
POCT GLUCOSE: 181 MG/DL (ref 70–110)
POCT GLUCOSE: 184 MG/DL (ref 70–110)
POCT GLUCOSE: 192 MG/DL (ref 70–110)
POCT GLUCOSE: 194 MG/DL (ref 70–110)
POCT GLUCOSE: 200 MG/DL (ref 70–110)
POCT GLUCOSE: 201 MG/DL (ref 70–110)
POCT GLUCOSE: 215 MG/DL (ref 70–110)
POCT GLUCOSE: 231 MG/DL (ref 70–110)
POCT GLUCOSE: 235 MG/DL (ref 70–110)
POCT GLUCOSE: 88 MG/DL (ref 70–110)
POCT GLUCOSE: 94 MG/DL (ref 70–110)
POCT GLUCOSE: 98 MG/DL (ref 70–110)
POIKILOCYTOSIS BLD QL SMEAR: SLIGHT
POLYCHROMASIA BLD QL SMEAR: ABNORMAL
POTASSIUM BLD-SCNC: 6.1 MMOL/L (ref 3.5–5.1)
POTASSIUM SERPL-SCNC: 3.7 MMOL/L (ref 3.5–5.1)
POTASSIUM SERPL-SCNC: 3.8 MMOL/L (ref 3.5–5.1)
POTASSIUM SERPL-SCNC: 3.8 MMOL/L (ref 3.5–5.1)
POTASSIUM SERPL-SCNC: 3.9 MMOL/L (ref 3.5–5.1)
POTASSIUM SERPL-SCNC: 4.1 MMOL/L (ref 3.5–5.1)
POTASSIUM SERPL-SCNC: 4.2 MMOL/L (ref 3.5–5.1)
POTASSIUM SERPL-SCNC: 4.2 MMOL/L (ref 3.5–5.1)
POTASSIUM SERPL-SCNC: 4.3 MMOL/L (ref 3.5–5.1)
POTASSIUM SERPL-SCNC: 4.4 MMOL/L (ref 3.5–5.1)
POTASSIUM SERPL-SCNC: 4.5 MMOL/L (ref 3.5–5.1)
POTASSIUM SERPL-SCNC: 4.6 MMOL/L (ref 3.5–5.1)
POTASSIUM SERPL-SCNC: 4.7 MMOL/L (ref 3.5–5.1)
POTASSIUM SERPL-SCNC: 4.7 MMOL/L (ref 3.5–5.1)
POTASSIUM SERPL-SCNC: 4.8 MMOL/L (ref 3.5–5.1)
POTASSIUM SERPL-SCNC: 4.9 MMOL/L (ref 3.5–5.1)
POTASSIUM SERPL-SCNC: 5 MMOL/L (ref 3.5–5.1)
POTASSIUM SERPL-SCNC: 5 MMOL/L (ref 3.5–5.1)
POTASSIUM SERPL-SCNC: 5.2 MMOL/L (ref 3.5–5.1)
POTASSIUM SERPL-SCNC: 5.2 MMOL/L (ref 3.5–5.1)
POTASSIUM SERPL-SCNC: 6.2 MMOL/L (ref 3.5–5.1)
POTASSIUM SERPL-SCNC: 6.2 MMOL/L (ref 3.5–5.1)
PROCALCITONIN SERPL IA-MCNC: 12.41 NG/ML
PROCALCITONIN SERPL IA-MCNC: 97.07 NG/ML
PROMYELOCYTES NFR BLD MANUAL: 0.5 %
PROMYELOCYTES NFR BLD MANUAL: 1 %
PROMYELOCYTES NFR BLD MANUAL: 2 %
PROT SERPL-MCNC: 5 G/DL (ref 6–8.4)
PROT SERPL-MCNC: 5.1 G/DL (ref 6–8.4)
PROT SERPL-MCNC: 5.1 G/DL (ref 6–8.4)
PROT SERPL-MCNC: 5.3 G/DL (ref 6–8.4)
PROT SERPL-MCNC: 5.4 G/DL (ref 6–8.4)
PROT SERPL-MCNC: 5.5 G/DL (ref 6–8.4)
PROT SERPL-MCNC: 5.5 G/DL (ref 6–8.4)
PROT SERPL-MCNC: 5.6 G/DL (ref 6–8.4)
PROT SERPL-MCNC: 5.6 G/DL (ref 6–8.4)
PROT SERPL-MCNC: 6.2 G/DL (ref 6–8.4)
PROT UR QL STRIP: ABNORMAL
PROTHROMBIN TIME: 12.4 SEC (ref 9–12.5)
PROTHROMBIN TIME: 14.2 SEC (ref 9–12.5)
RA MAJOR: 5.32 CM
RA MAJOR: 6.49 CM
RA PRESSURE: 3 MMHG
RA WIDTH: 3.58 CM
RA WIDTH: 4.25 CM
RBC # BLD AUTO: 2.19 M/UL (ref 4.6–6.2)
RBC # BLD AUTO: 2.43 M/UL (ref 4.6–6.2)
RBC # BLD AUTO: 2.45 M/UL (ref 4.6–6.2)
RBC # BLD AUTO: 2.63 M/UL (ref 4.6–6.2)
RBC # BLD AUTO: 2.69 M/UL (ref 4.6–6.2)
RBC # BLD AUTO: 2.82 M/UL (ref 4.6–6.2)
RBC # BLD AUTO: 2.85 M/UL (ref 4.6–6.2)
RBC # BLD AUTO: 3.08 M/UL (ref 4.6–6.2)
RBC # BLD AUTO: 3.11 M/UL (ref 4.6–6.2)
RBC # BLD AUTO: 3.17 M/UL (ref 4.6–6.2)
RBC # BLD AUTO: 3.18 M/UL (ref 4.6–6.2)
RBC # BLD AUTO: 3.21 M/UL (ref 4.6–6.2)
RBC # BLD AUTO: 3.21 M/UL (ref 4.6–6.2)
RBC # BLD AUTO: 3.23 M/UL (ref 4.6–6.2)
RBC # BLD AUTO: 3.38 M/UL (ref 4.6–6.2)
RBC # BLD AUTO: 3.48 M/UL (ref 4.6–6.2)
RBC # BLD AUTO: 3.69 M/UL (ref 4.6–6.2)
RBC # BLD AUTO: 3.77 M/UL (ref 4.6–6.2)
RBC # BLD AUTO: 3.78 M/UL (ref 4.6–6.2)
RBC # BLD AUTO: 3.88 M/UL (ref 4.6–6.2)
RBC # BLD AUTO: 3.92 M/UL (ref 4.6–6.2)
RBC #/AREA URNS AUTO: >100 /HPF (ref 0–4)
RIGHT VENTRICULAR END-DIASTOLIC DIMENSION: 3.59 CM
RIGHT VENTRICULAR END-DIASTOLIC DIMENSION: 4.8 CM
RV TISSUE DOPPLER FREE WALL SYSTOLIC VELOCITY 1 (APICAL 4 CHAMBER VIEW): 14.08 CM/S
SAMPLE: ABNORMAL
SAMPLE: NORMAL
SINUS: 3.49 CM
SINUS: 3.67 CM
SITE: ABNORMAL
SITE: NORMAL
SODIUM BLD-SCNC: 133 MMOL/L (ref 136–145)
SODIUM SERPL-SCNC: 132 MMOL/L (ref 136–145)
SODIUM SERPL-SCNC: 133 MMOL/L (ref 136–145)
SODIUM SERPL-SCNC: 134 MMOL/L (ref 136–145)
SODIUM SERPL-SCNC: 135 MMOL/L (ref 136–145)
SODIUM SERPL-SCNC: 136 MMOL/L (ref 136–145)
SODIUM SERPL-SCNC: 137 MMOL/L (ref 136–145)
SODIUM SERPL-SCNC: 138 MMOL/L (ref 136–145)
SODIUM SERPL-SCNC: 139 MMOL/L (ref 136–145)
SODIUM SERPL-SCNC: 139 MMOL/L (ref 136–145)
SP GR UR STRIP: 1.02 (ref 1–1.03)
SP02: 93
SP02: 94
SP02: 94
STJ: 3.22 CM
STJ: 3.63 CM
TDI LATERAL: 0.11 M/S
TDI LATERAL: 0.15 M/S
TDI SEPTAL: 0.05 M/S
TDI SEPTAL: 0.12 M/S
TDI: 0.08 M/S
TDI: 0.14 M/S
TOXIC GRANULES BLD QL SMEAR: PRESENT
TR MAX PG: 16 MMHG
TR MAX PG: 24 MMHG
TRICUSPID ANNULAR PLANE SYSTOLIC EXCURSION: 1.19 CM
TRICUSPID ANNULAR PLANE SYSTOLIC EXCURSION: 1.57 CM
TROPONIN I SERPL DL<=0.01 NG/ML-MCNC: 0.14 NG/ML (ref 0–0.03)
TROPONIN I SERPL DL<=0.01 NG/ML-MCNC: 0.52 NG/ML (ref 0–0.03)
TV REST PULMONARY ARTERY PRESSURE: 27 MMHG
URN SPEC COLLECT METH UR: ABNORMAL
VANCOMYCIN SERPL-MCNC: 15 UG/ML
VANCOMYCIN SERPL-MCNC: 17.7 UG/ML
VANCOMYCIN SERPL-MCNC: 17.9 UG/ML
VANCOMYCIN SERPL-MCNC: 18.5 UG/ML
VANCOMYCIN SERPL-MCNC: 18.7 UG/ML
VANCOMYCIN SERPL-MCNC: 19.5 UG/ML
VANCOMYCIN SERPL-MCNC: 19.7 UG/ML
VANCOMYCIN SERPL-MCNC: 21.6 UG/ML
VANCOMYCIN SERPL-MCNC: 25 UG/ML
VANCOMYCIN SERPL-MCNC: 27.8 UG/ML
VT: 420
VT: 420
VT: 430
VT: 450
VT: 500
WBC # BLD AUTO: 11.32 K/UL (ref 3.9–12.7)
WBC # BLD AUTO: 11.55 K/UL (ref 3.9–12.7)
WBC # BLD AUTO: 12.15 K/UL (ref 3.9–12.7)
WBC # BLD AUTO: 12.58 K/UL (ref 3.9–12.7)
WBC # BLD AUTO: 13.57 K/UL (ref 3.9–12.7)
WBC # BLD AUTO: 13.95 K/UL (ref 3.9–12.7)
WBC # BLD AUTO: 15.83 K/UL (ref 3.9–12.7)
WBC # BLD AUTO: 26.33 K/UL (ref 3.9–12.7)
WBC # BLD AUTO: 28.01 K/UL (ref 3.9–12.7)
WBC # BLD AUTO: 29.59 K/UL (ref 3.9–12.7)
WBC # BLD AUTO: 32.78 K/UL (ref 3.9–12.7)
WBC # BLD AUTO: 6.57 K/UL (ref 3.9–12.7)
WBC # BLD AUTO: 7.94 K/UL (ref 3.9–12.7)
WBC # BLD AUTO: 7.99 K/UL (ref 3.9–12.7)
WBC # BLD AUTO: 8.25 K/UL (ref 3.9–12.7)
WBC # BLD AUTO: 8.52 K/UL (ref 3.9–12.7)
WBC # BLD AUTO: 8.87 K/UL (ref 3.9–12.7)
WBC # BLD AUTO: 8.98 K/UL (ref 3.9–12.7)
WBC # BLD AUTO: 9.14 K/UL (ref 3.9–12.7)
WBC # BLD AUTO: 9.22 K/UL (ref 3.9–12.7)
WBC # BLD AUTO: 9.7 K/UL (ref 3.9–12.7)
WBC # BLD AUTO: 9.78 K/UL (ref 3.9–12.7)
WBC # BLD AUTO: 9.97 K/UL (ref 3.9–12.7)
WBC #/AREA URNS AUTO: 4 /HPF (ref 0–5)

## 2020-01-01 PROCEDURE — 90945 DIALYSIS ONE EVALUATION: CPT

## 2020-01-01 PROCEDURE — 86078 PHYS BLOOD BANK SERV REACTJ: CPT | Mod: ,,, | Performed by: PATHOLOGY

## 2020-01-01 PROCEDURE — 63600175 PHARM REV CODE 636 W HCPCS: Performed by: NURSE PRACTITIONER

## 2020-01-01 PROCEDURE — 25000003 PHARM REV CODE 250: Performed by: INTERNAL MEDICINE

## 2020-01-01 PROCEDURE — 99900035 HC TECH TIME PER 15 MIN (STAT)

## 2020-01-01 PROCEDURE — 37799 UNLISTED PX VASCULAR SURGERY: CPT

## 2020-01-01 PROCEDURE — 63600175 PHARM REV CODE 636 W HCPCS: Performed by: PHYSICIAN ASSISTANT

## 2020-01-01 PROCEDURE — 94640 AIRWAY INHALATION TREATMENT: CPT

## 2020-01-01 PROCEDURE — 83605 ASSAY OF LACTIC ACID: CPT | Mod: 91

## 2020-01-01 PROCEDURE — 80053 COMPREHEN METABOLIC PANEL: CPT

## 2020-01-01 PROCEDURE — 25000003 PHARM REV CODE 250: Performed by: NURSE PRACTITIONER

## 2020-01-01 PROCEDURE — 25000242 PHARM REV CODE 250 ALT 637 W/ HCPCS: Performed by: NURSE PRACTITIONER

## 2020-01-01 PROCEDURE — 99900026 HC AIRWAY MAINTENANCE (STAT)

## 2020-01-01 PROCEDURE — 36415 COLL VENOUS BLD VENIPUNCTURE: CPT

## 2020-01-01 PROCEDURE — 20000000 HC ICU ROOM

## 2020-01-01 PROCEDURE — 27000221 HC OXYGEN, UP TO 24 HOURS

## 2020-01-01 PROCEDURE — 80069 RENAL FUNCTION PANEL: CPT

## 2020-01-01 PROCEDURE — 63600175 PHARM REV CODE 636 W HCPCS: Performed by: INTERNAL MEDICINE

## 2020-01-01 PROCEDURE — 85384 FIBRINOGEN ACTIVITY: CPT

## 2020-01-01 PROCEDURE — 99292 PR CRITICAL CARE, ADDL 30 MIN: ICD-10-PCS | Mod: 25,,, | Performed by: INTERNAL MEDICINE

## 2020-01-01 PROCEDURE — 27200966 HC CLOSED SUCTION SYSTEM

## 2020-01-01 PROCEDURE — 99291 CRITICAL CARE FIRST HOUR: CPT | Mod: GC,,, | Performed by: INTERNAL MEDICINE

## 2020-01-01 PROCEDURE — 84100 ASSAY OF PHOSPHORUS: CPT

## 2020-01-01 PROCEDURE — 99213 OFFICE O/P EST LOW 20 MIN: CPT | Mod: 95,,, | Performed by: INTERNAL MEDICINE

## 2020-01-01 PROCEDURE — 87070 CULTURE OTHR SPECIMN AEROBIC: CPT

## 2020-01-01 PROCEDURE — 90945 PR DIALYSIS, NOT HEMO, 1 EVAL: ICD-10-PCS | Mod: ,,, | Performed by: INTERNAL MEDICINE

## 2020-01-01 PROCEDURE — 85025 COMPLETE CBC W/AUTO DIFF WBC: CPT

## 2020-01-01 PROCEDURE — 63600175 PHARM REV CODE 636 W HCPCS: Performed by: STUDENT IN AN ORGANIZED HEALTH CARE EDUCATION/TRAINING PROGRAM

## 2020-01-01 PROCEDURE — 80053 COMPREHEN METABOLIC PANEL: CPT | Mod: 91

## 2020-01-01 PROCEDURE — 99213 PR OFFICE/OUTPT VISIT, EST, LEVL III, 20-29 MIN: ICD-10-PCS | Mod: 95,,, | Performed by: FAMILY MEDICINE

## 2020-01-01 PROCEDURE — 86901 BLOOD TYPING SEROLOGIC RH(D): CPT

## 2020-01-01 PROCEDURE — 83735 ASSAY OF MAGNESIUM: CPT | Mod: 91

## 2020-01-01 PROCEDURE — 99291 CRITICAL CARE FIRST HOUR: CPT | Mod: 25,,, | Performed by: NURSE PRACTITIONER

## 2020-01-01 PROCEDURE — 25000003 PHARM REV CODE 250: Performed by: STUDENT IN AN ORGANIZED HEALTH CARE EDUCATION/TRAINING PROGRAM

## 2020-01-01 PROCEDURE — 80100008 HC CRRT DAILY MAINTENANCE

## 2020-01-01 PROCEDURE — 94003 VENT MGMT INPAT SUBQ DAY: CPT

## 2020-01-01 PROCEDURE — 87186 SC STD MICRODIL/AGAR DIL: CPT

## 2020-01-01 PROCEDURE — 3044F PR MOST RECENT HEMOGLOBIN A1C LEVEL <7.0%: ICD-10-PCS | Mod: CPTII,S$GLB,, | Performed by: INTERNAL MEDICINE

## 2020-01-01 PROCEDURE — 99291 PR CRITICAL CARE, E/M 30-74 MINUTES: ICD-10-PCS | Mod: ,,, | Performed by: NURSE PRACTITIONER

## 2020-01-01 PROCEDURE — 63600175 PHARM REV CODE 636 W HCPCS: Mod: JG | Performed by: STUDENT IN AN ORGANIZED HEALTH CARE EDUCATION/TRAINING PROGRAM

## 2020-01-01 PROCEDURE — 88341 IMHCHEM/IMCYTCHM EA ADD ANTB: CPT | Performed by: PATHOLOGY

## 2020-01-01 PROCEDURE — 80202 ASSAY OF VANCOMYCIN: CPT | Mod: 91

## 2020-01-01 PROCEDURE — 99291 PR CRITICAL CARE, E/M 30-74 MINUTES: ICD-10-PCS | Mod: GC,,, | Performed by: INTERNAL MEDICINE

## 2020-01-01 PROCEDURE — 25000003 PHARM REV CODE 250: Performed by: PHYSICIAN ASSISTANT

## 2020-01-01 PROCEDURE — 25000003 PHARM REV CODE 250: Performed by: HOSPITALIST

## 2020-01-01 PROCEDURE — 80202 ASSAY OF VANCOMYCIN: CPT

## 2020-01-01 PROCEDURE — 99900025 HC BRONCHOSCOPY-ASST (STAT)

## 2020-01-01 PROCEDURE — 87077 CULTURE AEROBIC IDENTIFY: CPT

## 2020-01-01 PROCEDURE — 99499 RISK ADDL DX/OHS AUDIT: ICD-10-PCS | Mod: 95,,, | Performed by: INTERNAL MEDICINE

## 2020-01-01 PROCEDURE — 85007 BL SMEAR W/DIFF WBC COUNT: CPT | Mod: 91

## 2020-01-01 PROCEDURE — 90935 PR HEMODIALYSIS, ONE EVALUATION: ICD-10-PCS | Mod: ,,, | Performed by: INTERNAL MEDICINE

## 2020-01-01 PROCEDURE — P9016 RBC LEUKOCYTES REDUCED: HCPCS

## 2020-01-01 PROCEDURE — 94761 N-INVAS EAR/PLS OXIMETRY MLT: CPT

## 2020-01-01 PROCEDURE — 85730 THROMBOPLASTIN TIME PARTIAL: CPT

## 2020-01-01 PROCEDURE — 83615 LACTATE (LD) (LDH) ENZYME: CPT

## 2020-01-01 PROCEDURE — 90935 HEMODIALYSIS ONE EVALUATION: CPT | Mod: ,,, | Performed by: INTERNAL MEDICINE

## 2020-01-01 PROCEDURE — 63600175 PHARM REV CODE 636 W HCPCS: Mod: JG | Performed by: NURSE PRACTITIONER

## 2020-01-01 PROCEDURE — 84145 PROCALCITONIN (PCT): CPT

## 2020-01-01 PROCEDURE — 82330 ASSAY OF CALCIUM: CPT

## 2020-01-01 PROCEDURE — 83605 ASSAY OF LACTIC ACID: CPT

## 2020-01-01 PROCEDURE — 85025 COMPLETE CBC W/AUTO DIFF WBC: CPT | Mod: 91

## 2020-01-01 PROCEDURE — 80076 HEPATIC FUNCTION PANEL: CPT

## 2020-01-01 PROCEDURE — 99291 CRITICAL CARE FIRST HOUR: CPT | Mod: ,,, | Performed by: INTERNAL MEDICINE

## 2020-01-01 PROCEDURE — 99499 RISK ADDL DX/OHS AUDIT: ICD-10-PCS | Mod: 95,,, | Performed by: FAMILY MEDICINE

## 2020-01-01 PROCEDURE — 82803 BLOOD GASES ANY COMBINATION: CPT

## 2020-01-01 PROCEDURE — 25500020 PHARM REV CODE 255: Performed by: STUDENT IN AN ORGANIZED HEALTH CARE EDUCATION/TRAINING PROGRAM

## 2020-01-01 PROCEDURE — 81001 URINALYSIS AUTO W/SCOPE: CPT

## 2020-01-01 PROCEDURE — 88342 IMHCHEM/IMCYTCHM 1ST ANTB: CPT | Performed by: PATHOLOGY

## 2020-01-01 PROCEDURE — 80069 RENAL FUNCTION PANEL: CPT | Mod: 91

## 2020-01-01 PROCEDURE — 87040 BLOOD CULTURE FOR BACTERIA: CPT | Mod: 59

## 2020-01-01 PROCEDURE — 84484 ASSAY OF TROPONIN QUANT: CPT

## 2020-01-01 PROCEDURE — 82140 ASSAY OF AMMONIA: CPT

## 2020-01-01 PROCEDURE — 83735 ASSAY OF MAGNESIUM: CPT

## 2020-01-01 PROCEDURE — 87040 BLOOD CULTURE FOR BACTERIA: CPT

## 2020-01-01 PROCEDURE — 86078 PATHOLOGIST INTERPRETATION TRANSF REACTION: ICD-10-PCS | Mod: ,,, | Performed by: PATHOLOGY

## 2020-01-01 PROCEDURE — 82330 ASSAY OF CALCIUM: CPT | Mod: 91

## 2020-01-01 PROCEDURE — 63600175 PHARM REV CODE 636 W HCPCS

## 2020-01-01 PROCEDURE — 99291 CRITICAL CARE FIRST HOUR: CPT | Mod: ,,, | Performed by: NURSE PRACTITIONER

## 2020-01-01 PROCEDURE — 85610 PROTHROMBIN TIME: CPT

## 2020-01-01 PROCEDURE — 94002 VENT MGMT INPAT INIT DAY: CPT

## 2020-01-01 PROCEDURE — 27201236 HC CRRT SET UP & CANCELED

## 2020-01-01 PROCEDURE — 86920 COMPATIBILITY TEST SPIN: CPT

## 2020-01-01 PROCEDURE — 85027 COMPLETE CBC AUTOMATED: CPT | Mod: 91

## 2020-01-01 PROCEDURE — 3074F PR MOST RECENT SYSTOLIC BLOOD PRESSURE < 130 MM HG: ICD-10-PCS | Mod: CPTII,S$GLB,, | Performed by: INTERNAL MEDICINE

## 2020-01-01 PROCEDURE — 90945 DIALYSIS ONE EVALUATION: CPT | Mod: ,,, | Performed by: INTERNAL MEDICINE

## 2020-01-01 PROCEDURE — 86022 PLATELET ANTIBODIES: CPT

## 2020-01-01 PROCEDURE — 85007 BL SMEAR W/DIFF WBC COUNT: CPT

## 2020-01-01 PROCEDURE — 99291 PR CRITICAL CARE, E/M 30-74 MINUTES: ICD-10-PCS | Mod: 25,,, | Performed by: NURSE PRACTITIONER

## 2020-01-01 PROCEDURE — 85014 HEMATOCRIT: CPT

## 2020-01-01 PROCEDURE — 99292 CRITICAL CARE ADDL 30 MIN: CPT | Mod: ,,, | Performed by: INTERNAL MEDICINE

## 2020-01-01 PROCEDURE — 99499 UNLISTED E&M SERVICE: CPT | Mod: 95,,, | Performed by: FAMILY MEDICINE

## 2020-01-01 PROCEDURE — 36556 PR INSERT NON-TUNNEL CV CATH 5+ YRS OLD: ICD-10-PCS | Mod: ,,, | Performed by: NURSE PRACTITIONER

## 2020-01-01 PROCEDURE — 99233 SBSQ HOSP IP/OBS HIGH 50: CPT | Mod: ,,, | Performed by: INTERNAL MEDICINE

## 2020-01-01 PROCEDURE — 84295 ASSAY OF SERUM SODIUM: CPT

## 2020-01-01 PROCEDURE — 3078F PR MOST RECENT DIASTOLIC BLOOD PRESSURE < 80 MM HG: ICD-10-PCS | Mod: CPTII,S$GLB,, | Performed by: INTERNAL MEDICINE

## 2020-01-01 PROCEDURE — 3074F SYST BP LT 130 MM HG: CPT | Mod: CPTII,S$GLB,, | Performed by: INTERNAL MEDICINE

## 2020-01-01 PROCEDURE — 63600175 PHARM REV CODE 636 W HCPCS: Performed by: HOSPITALIST

## 2020-01-01 PROCEDURE — 86850 RBC ANTIBODY SCREEN: CPT

## 2020-01-01 PROCEDURE — 99292 PR CRITICAL CARE, ADDL 30 MIN: ICD-10-PCS | Mod: ,,, | Performed by: INTERNAL MEDICINE

## 2020-01-01 PROCEDURE — 87449 NOS EACH ORGANISM AG IA: CPT

## 2020-01-01 PROCEDURE — 25000003 PHARM REV CODE 250

## 2020-01-01 PROCEDURE — 85060 BLOOD SMEAR INTERPRETATION: CPT | Mod: ,,, | Performed by: PATHOLOGY

## 2020-01-01 PROCEDURE — 85027 COMPLETE CBC AUTOMATED: CPT

## 2020-01-01 PROCEDURE — 84132 ASSAY OF SERUM POTASSIUM: CPT

## 2020-01-01 PROCEDURE — 99499 UNLISTED E&M SERVICE: CPT | Mod: 95,,, | Performed by: INTERNAL MEDICINE

## 2020-01-01 PROCEDURE — 99292 CRITICAL CARE ADDL 30 MIN: CPT | Mod: 25,,, | Performed by: INTERNAL MEDICINE

## 2020-01-01 PROCEDURE — 36556 INSERT NON-TUNNEL CV CATH: CPT | Mod: ,,, | Performed by: NURSE PRACTITIONER

## 2020-01-01 PROCEDURE — 31622 DX BRONCHOSCOPE/WASH: CPT

## 2020-01-01 PROCEDURE — 88312 SPECIAL STAINS GROUP 1: CPT | Performed by: PATHOLOGY

## 2020-01-01 PROCEDURE — 87205 SMEAR GRAM STAIN: CPT

## 2020-01-01 PROCEDURE — 99291 PR CRITICAL CARE, E/M 30-74 MINUTES: ICD-10-PCS | Mod: ,,, | Performed by: INTERNAL MEDICINE

## 2020-01-01 PROCEDURE — 3044F HG A1C LEVEL LT 7.0%: CPT | Mod: CPTII,S$GLB,, | Performed by: INTERNAL MEDICINE

## 2020-01-01 PROCEDURE — 99213 OFFICE O/P EST LOW 20 MIN: CPT | Mod: 95,,, | Performed by: FAMILY MEDICINE

## 2020-01-01 PROCEDURE — 3078F DIAST BP <80 MM HG: CPT | Mod: CPTII,S$GLB,, | Performed by: INTERNAL MEDICINE

## 2020-01-01 PROCEDURE — 83010 ASSAY OF HAPTOGLOBIN QUANT: CPT

## 2020-01-01 PROCEDURE — 97802 MEDICAL NUTRITION INDIV IN: CPT

## 2020-01-01 PROCEDURE — 87103 BLOOD FUNGUS CULTURE: CPT

## 2020-01-01 PROCEDURE — 82728 ASSAY OF FERRITIN: CPT

## 2020-01-01 PROCEDURE — 85060 PATHOLOGIST REVIEW: ICD-10-PCS | Mod: ,,, | Performed by: PATHOLOGY

## 2020-01-01 PROCEDURE — 99213 PR OFFICE/OUTPT VISIT, EST, LEVL III, 20-29 MIN: ICD-10-PCS | Mod: 95,,, | Performed by: INTERNAL MEDICINE

## 2020-01-01 PROCEDURE — 99233 PR SUBSEQUENT HOSPITAL CARE,LEVL III: ICD-10-PCS | Mod: ,,, | Performed by: INTERNAL MEDICINE

## 2020-01-01 PROCEDURE — 36593 DECLOT VASCULAR DEVICE: CPT

## 2020-01-01 PROCEDURE — 87106 FUNGI IDENTIFICATION YEAST: CPT

## 2020-01-01 RX ORDER — EPINEPHRINE 1 MG/ML
INJECTION, SOLUTION INTRACARDIAC; INTRAMUSCULAR; INTRAVENOUS; SUBCUTANEOUS
Status: DISCONTINUED
Start: 2020-01-01 | End: 2020-07-06 | Stop reason: HOSPADM

## 2020-01-01 RX ORDER — MAGNESIUM SULFATE HEPTAHYDRATE 40 MG/ML
2 INJECTION, SOLUTION INTRAVENOUS
Status: DISCONTINUED | OUTPATIENT
Start: 2020-01-01 | End: 2020-01-01

## 2020-01-01 RX ORDER — HYDROCODONE BITARTRATE AND ACETAMINOPHEN 500; 5 MG/1; MG/1
TABLET ORAL CONTINUOUS
Status: ACTIVE | OUTPATIENT
Start: 2020-01-01 | End: 2020-01-01

## 2020-01-01 RX ORDER — MAGNESIUM SULFATE HEPTAHYDRATE 40 MG/ML
2 INJECTION, SOLUTION INTRAVENOUS
Status: ACTIVE | OUTPATIENT
Start: 2020-01-01 | End: 2020-01-01

## 2020-01-01 RX ORDER — VANCOMYCIN HCL IN 5 % DEXTROSE 1G/250ML
1000 PLASTIC BAG, INJECTION (ML) INTRAVENOUS ONCE
Status: COMPLETED | OUTPATIENT
Start: 2020-01-01 | End: 2020-01-01

## 2020-01-01 RX ORDER — INSULIN ASPART 100 [IU]/ML
0-5 INJECTION, SOLUTION INTRAVENOUS; SUBCUTANEOUS EVERY 6 HOURS PRN
Status: DISCONTINUED | OUTPATIENT
Start: 2020-01-01 | End: 2020-07-06 | Stop reason: HOSPADM

## 2020-01-01 RX ORDER — PHENYLEPHRINE HYDROCHLORIDE 10 MG/ML
INJECTION INTRAVENOUS
Status: COMPLETED
Start: 2020-01-01 | End: 2020-01-01

## 2020-01-01 RX ORDER — PROPOFOL 10 MG/ML
5 INJECTION, EMULSION INTRAVENOUS CONTINUOUS
Status: DISCONTINUED | OUTPATIENT
Start: 2020-01-01 | End: 2020-07-06 | Stop reason: HOSPADM

## 2020-01-01 RX ORDER — ACETAMINOPHEN 325 MG/1
650 TABLET ORAL EVERY 6 HOURS PRN
Status: DISCONTINUED | OUTPATIENT
Start: 2020-01-01 | End: 2020-07-06 | Stop reason: HOSPADM

## 2020-01-01 RX ORDER — FENTANYL CITRATE-0.9 % NACL/PF 10 MCG/ML
PLASTIC BAG, INJECTION (ML) INTRAVENOUS CONTINUOUS
Status: DISCONTINUED | OUTPATIENT
Start: 2020-01-01 | End: 2020-07-06 | Stop reason: HOSPADM

## 2020-01-01 RX ORDER — FLUDROCORTISONE ACETATE 0.1 MG/1
100 TABLET ORAL DAILY
Status: DISCONTINUED | OUTPATIENT
Start: 2020-01-01 | End: 2020-01-01

## 2020-01-01 RX ORDER — HEPARIN SODIUM,PORCINE/D5W 25000/250
18 INTRAVENOUS SOLUTION INTRAVENOUS CONTINUOUS
Status: CANCELLED | OUTPATIENT
Start: 2020-01-01

## 2020-01-01 RX ORDER — DEXTROSE 50 % IN WATER (D50W) INTRAVENOUS SYRINGE
12.5
Status: DISCONTINUED | OUTPATIENT
Start: 2020-01-01 | End: 2020-07-06 | Stop reason: HOSPADM

## 2020-01-01 RX ORDER — FLUDROCORTISONE ACETATE 0.1 MG/1
100 TABLET ORAL DAILY
Status: COMPLETED | OUTPATIENT
Start: 2020-01-01 | End: 2020-01-01

## 2020-01-01 RX ORDER — PHENYLEPHRINE HYDROCHLORIDE 10 MG/ML
INJECTION INTRAVENOUS
Status: DISCONTINUED
Start: 2020-01-01 | End: 2020-07-06 | Stop reason: HOSPADM

## 2020-01-01 RX ORDER — CITALOPRAM 40 MG/1
TABLET, FILM COATED ORAL
Qty: 90 TABLET | Refills: 1 | Status: SHIPPED | OUTPATIENT
Start: 2020-01-01

## 2020-01-01 RX ORDER — HYDROCODONE BITARTRATE AND ACETAMINOPHEN 500; 5 MG/1; MG/1
TABLET ORAL
Status: DISCONTINUED | OUTPATIENT
Start: 2020-01-01 | End: 2020-07-06 | Stop reason: HOSPADM

## 2020-01-01 RX ORDER — MELOXICAM 15 MG/1
TABLET ORAL
Qty: 90 TABLET | Refills: 1 | Status: SHIPPED | OUTPATIENT
Start: 2020-01-01

## 2020-01-01 RX ORDER — PROPOFOL 10 MG/ML
INJECTION, EMULSION INTRAVENOUS
Status: COMPLETED
Start: 2020-01-01 | End: 2020-01-01

## 2020-01-01 RX ORDER — VASOPRESSIN 20 [USP'U]/ML
INJECTION, SOLUTION INTRAMUSCULAR; SUBCUTANEOUS
Status: COMPLETED
Start: 2020-01-01 | End: 2020-01-01

## 2020-01-01 RX ORDER — FAMOTIDINE 20 MG/1
20 TABLET, FILM COATED ORAL DAILY
Status: DISCONTINUED | OUTPATIENT
Start: 2020-01-01 | End: 2020-07-06 | Stop reason: HOSPADM

## 2020-01-01 RX ORDER — HYDROCODONE BITARTRATE AND ACETAMINOPHEN 500; 5 MG/1; MG/1
TABLET ORAL CONTINUOUS
Status: DISCONTINUED | OUTPATIENT
Start: 2020-01-01 | End: 2020-07-06 | Stop reason: HOSPADM

## 2020-01-01 RX ORDER — SODIUM CHLORIDE 0.9 % (FLUSH) 0.9 %
10 SYRINGE (ML) INJECTION
Status: DISCONTINUED | OUTPATIENT
Start: 2020-01-01 | End: 2020-07-06 | Stop reason: HOSPADM

## 2020-01-01 RX ORDER — MAGNESIUM SULFATE HEPTAHYDRATE 40 MG/ML
2 INJECTION, SOLUTION INTRAVENOUS
Status: DISPENSED | OUTPATIENT
Start: 2020-01-01 | End: 2020-01-01

## 2020-01-01 RX ORDER — HYDROCODONE BITARTRATE AND ACETAMINOPHEN 500; 5 MG/1; MG/1
TABLET ORAL CONTINUOUS
Status: DISCONTINUED | OUTPATIENT
Start: 2020-01-01 | End: 2020-01-01

## 2020-01-01 RX ORDER — EPINEPHRINE 0.1 MG/ML
INJECTION INTRAVENOUS CODE/TRAUMA/SEDATION MEDICATION
Status: COMPLETED | OUTPATIENT
Start: 2020-01-01 | End: 2020-01-01

## 2020-01-01 RX ORDER — HYDROCHLOROTHIAZIDE 12.5 MG/1
CAPSULE ORAL
Qty: 30 CAPSULE | Refills: 1 | Status: SHIPPED | OUTPATIENT
Start: 2020-01-01 | End: 2020-01-01

## 2020-01-01 RX ORDER — HEPARIN SODIUM 5000 [USP'U]/ML
5000 INJECTION, SOLUTION INTRAVENOUS; SUBCUTANEOUS EVERY 8 HOURS
Status: DISCONTINUED | OUTPATIENT
Start: 2020-01-01 | End: 2020-01-01

## 2020-01-01 RX ORDER — NOREPINEPHRINE BITARTRATE 1 MG/ML
INJECTION, SOLUTION INTRAVENOUS
Status: DISCONTINUED
Start: 2020-01-01 | End: 2020-01-01 | Stop reason: WASHOUT

## 2020-01-01 RX ORDER — GLUCAGON 1 MG
1 KIT INJECTION
Status: DISCONTINUED | OUTPATIENT
Start: 2020-01-01 | End: 2020-07-06 | Stop reason: HOSPADM

## 2020-01-01 RX ORDER — MAGNESIUM SULFATE HEPTAHYDRATE 40 MG/ML
2 INJECTION, SOLUTION INTRAVENOUS
Status: DISCONTINUED | OUTPATIENT
Start: 2020-01-01 | End: 2020-07-06 | Stop reason: HOSPADM

## 2020-01-01 RX ORDER — PREDNISONE 2.5 MG/1
2.5 TABLET ORAL DAILY
Status: DISCONTINUED | OUTPATIENT
Start: 2020-01-01 | End: 2020-01-01

## 2020-01-01 RX ORDER — PROMETHAZINE HYDROCHLORIDE AND DEXTROMETHORPHAN HYDROBROMIDE 6.25; 15 MG/5ML; MG/5ML
5 SYRUP ORAL EVERY 4 HOURS PRN
Qty: 118 ML | Refills: 0 | Status: SHIPPED | OUTPATIENT
Start: 2020-01-01 | End: 2020-01-01

## 2020-01-01 RX ORDER — MORPHINE SULFATE 2 MG/ML
INJECTION, SOLUTION INTRAMUSCULAR; INTRAVENOUS
Status: DISCONTINUED
Start: 2020-01-01 | End: 2020-07-06 | Stop reason: HOSPADM

## 2020-01-01 RX ORDER — FAMOTIDINE 20 MG/1
20 TABLET, FILM COATED ORAL DAILY
Status: DISCONTINUED | OUTPATIENT
Start: 2020-01-01 | End: 2020-01-01

## 2020-01-01 RX ORDER — SODIUM BICARBONATE 1 MEQ/ML
SYRINGE (ML) INTRAVENOUS CODE/TRAUMA/SEDATION MEDICATION
Status: COMPLETED | OUTPATIENT
Start: 2020-01-01 | End: 2020-01-01

## 2020-01-01 RX ORDER — SULFASALAZINE 500 MG/1
TABLET, DELAYED RELEASE ORAL
Qty: 120 TABLET | Refills: 6 | Status: SHIPPED | OUTPATIENT
Start: 2020-01-01

## 2020-01-01 RX ORDER — PHENYLEPHRINE HCL IN 0.9% NACL 20MG/250ML
0.5 PLASTIC BAG, INJECTION (ML) INTRAVENOUS CONTINUOUS
Status: DISCONTINUED | OUTPATIENT
Start: 2020-01-01 | End: 2020-01-01

## 2020-01-01 RX ORDER — METOPROLOL TARTRATE 25 MG/1
TABLET, FILM COATED ORAL
Qty: 180 TABLET | Refills: 1 | Status: SHIPPED | OUTPATIENT
Start: 2020-01-01

## 2020-01-01 RX ORDER — FLUCONAZOLE 2 MG/ML
400 INJECTION, SOLUTION INTRAVENOUS
Status: DISCONTINUED | OUTPATIENT
Start: 2020-01-01 | End: 2020-01-01

## 2020-01-01 RX ORDER — PREDNISONE 2.5 MG/1
5 TABLET ORAL 2 TIMES DAILY
Status: DISCONTINUED | OUTPATIENT
Start: 2020-01-01 | End: 2020-01-01

## 2020-01-01 RX ORDER — CLOPIDOGREL BISULFATE 75 MG/1
TABLET ORAL
Qty: 90 TABLET | Refills: 1 | Status: SHIPPED | OUTPATIENT
Start: 2020-01-01

## 2020-01-01 RX ORDER — VASOPRESSIN 20 [USP'U]/ML
INJECTION, SOLUTION INTRAMUSCULAR; SUBCUTANEOUS
Status: DISPENSED
Start: 2020-01-01 | End: 2020-01-01

## 2020-01-01 RX ORDER — DEXMEDETOMIDINE HYDROCHLORIDE 4 UG/ML
0.2 INJECTION, SOLUTION INTRAVENOUS CONTINUOUS
Status: DISCONTINUED | OUTPATIENT
Start: 2020-01-01 | End: 2020-07-06 | Stop reason: HOSPADM

## 2020-01-01 RX ORDER — METFORMIN HYDROCHLORIDE 750 MG/1
750 TABLET, EXTENDED RELEASE ORAL 2 TIMES DAILY
Qty: 180 TABLET | Refills: 1 | OUTPATIENT
Start: 2020-01-01

## 2020-01-01 RX ORDER — HEPARIN SODIUM,PORCINE 10 UNIT/ML
10 VIAL (ML) INTRAVENOUS
Status: DISCONTINUED | OUTPATIENT
Start: 2020-01-01 | End: 2020-07-06 | Stop reason: HOSPADM

## 2020-01-01 RX ORDER — HEPARIN SODIUM 5000 [USP'U]/ML
5000 INJECTION, SOLUTION INTRAVENOUS; SUBCUTANEOUS EVERY 8 HOURS
Status: DISCONTINUED | OUTPATIENT
Start: 2020-01-01 | End: 2020-07-06 | Stop reason: HOSPADM

## 2020-01-01 RX ORDER — MELOXICAM 15 MG/1
TABLET ORAL
Qty: 90 TABLET | Refills: 1 | OUTPATIENT
Start: 2020-01-01

## 2020-01-01 RX ORDER — HYDROCHLOROTHIAZIDE 12.5 MG/1
CAPSULE ORAL
Qty: 30 CAPSULE | Refills: 1 | Status: ON HOLD | OUTPATIENT
Start: 2020-01-01 | End: 2020-01-01

## 2020-01-01 RX ORDER — NOREPINEPHRINE BITARTRATE/D5W 4MG/250ML
0.02 PLASTIC BAG, INJECTION (ML) INTRAVENOUS CONTINUOUS
Status: DISCONTINUED | OUTPATIENT
Start: 2020-01-01 | End: 2020-01-01

## 2020-01-01 RX ORDER — ATORVASTATIN CALCIUM 10 MG/1
TABLET, FILM COATED ORAL
Qty: 90 TABLET | Refills: 1 | Status: SHIPPED | OUTPATIENT
Start: 2020-01-01

## 2020-01-01 RX ORDER — PROPOFOL 10 MG/ML
5 INJECTION, EMULSION INTRAVENOUS CONTINUOUS
Status: DISPENSED | OUTPATIENT
Start: 2020-01-01 | End: 2020-01-01

## 2020-01-01 RX ORDER — NOREPINEPHRINE BITARTRATE 1 MG/ML
INJECTION, SOLUTION INTRAVENOUS
Status: DISCONTINUED
Start: 2020-01-01 | End: 2020-07-06 | Stop reason: HOSPADM

## 2020-01-01 RX ORDER — NOREPINEPHRINE BITARTRATE 1 MG/ML
INJECTION, SOLUTION INTRAVENOUS
Status: DISPENSED
Start: 2020-01-01 | End: 2020-01-01

## 2020-01-01 RX ORDER — FLUCONAZOLE 2 MG/ML
400 INJECTION, SOLUTION INTRAVENOUS
Status: DISCONTINUED | OUTPATIENT
Start: 2020-01-01 | End: 2020-07-06 | Stop reason: HOSPADM

## 2020-01-01 RX ORDER — IPRATROPIUM BROMIDE AND ALBUTEROL SULFATE 2.5; .5 MG/3ML; MG/3ML
3 SOLUTION RESPIRATORY (INHALATION) EVERY 4 HOURS
Status: DISCONTINUED | OUTPATIENT
Start: 2020-01-01 | End: 2020-07-06 | Stop reason: HOSPADM

## 2020-01-01 RX ADMIN — Medication 2500 MCG: at 11:07

## 2020-01-01 RX ADMIN — PROPOFOL 25 MCG/KG/MIN: 10 INJECTION, EMULSION INTRAVENOUS at 02:06

## 2020-01-01 RX ADMIN — INSULIN ASPART 2 UNITS: 100 INJECTION, SOLUTION INTRAVENOUS; SUBCUTANEOUS at 05:07

## 2020-01-01 RX ADMIN — PROPOFOL 35 MCG/KG/MIN: 10 INJECTION, EMULSION INTRAVENOUS at 11:06

## 2020-01-01 RX ADMIN — ACETAMINOPHEN 650 MG: 325 TABLET ORAL at 07:07

## 2020-01-01 RX ADMIN — NOREPINEPHRINE BITARTRATE 0.4 MCG/KG/MIN: 1 INJECTION, SOLUTION, CONCENTRATE INTRAVENOUS at 05:07

## 2020-01-01 RX ADMIN — PROPOFOL 5 MCG/KG/MIN: 10 INJECTION, EMULSION INTRAVENOUS at 03:06

## 2020-01-01 RX ADMIN — DEXMEDETOMIDINE HYDROCHLORIDE 1 MCG/KG/HR: 4 INJECTION, SOLUTION INTRAVENOUS at 01:07

## 2020-01-01 RX ADMIN — PROPOFOL 30 MCG/KG/MIN: 10 INJECTION, EMULSION INTRAVENOUS at 05:06

## 2020-01-01 RX ADMIN — CALCIUM GLUCONATE 1000 MG: 98 INJECTION, SOLUTION INTRAVENOUS at 01:06

## 2020-01-01 RX ADMIN — Medication 0.48 MCG/KG/MIN: at 08:06

## 2020-01-01 RX ADMIN — FLUCONAZOLE 400 MG: 2 INJECTION, SOLUTION INTRAVENOUS at 12:07

## 2020-01-01 RX ADMIN — HYDROCORTISONE SODIUM SUCCINATE 100 MG: 100 INJECTION, POWDER, FOR SOLUTION INTRAMUSCULAR; INTRAVENOUS at 02:06

## 2020-01-01 RX ADMIN — HYDROCORTISONE SODIUM SUCCINATE 100 MG: 100 INJECTION, POWDER, FOR SOLUTION INTRAMUSCULAR; INTRAVENOUS at 08:06

## 2020-01-01 RX ADMIN — PROPOFOL 40 MCG/KG/MIN: 10 INJECTION, EMULSION INTRAVENOUS at 10:07

## 2020-01-01 RX ADMIN — PROPOFOL 40 MCG/KG/MIN: 10 INJECTION, EMULSION INTRAVENOUS at 07:06

## 2020-01-01 RX ADMIN — MAGNESIUM SULFATE IN WATER 2 G: 40 INJECTION, SOLUTION INTRAVENOUS at 04:07

## 2020-01-01 RX ADMIN — HEPARIN SODIUM 5000 UNITS: 5000 INJECTION INTRAVENOUS; SUBCUTANEOUS at 06:06

## 2020-01-01 RX ADMIN — PROPOFOL 30 MCG/KG/MIN: 10 INJECTION, EMULSION INTRAVENOUS at 09:07

## 2020-01-01 RX ADMIN — DEXMEDETOMIDINE HYDROCHLORIDE 1.4 MCG/KG/HR: 4 INJECTION, SOLUTION INTRAVENOUS at 09:07

## 2020-01-01 RX ADMIN — PREDNISONE 2.5 MG: 2.5 TABLET ORAL at 04:07

## 2020-01-01 RX ADMIN — HEPARIN SODIUM 5000 UNITS: 5000 INJECTION INTRAVENOUS; SUBCUTANEOUS at 09:06

## 2020-01-01 RX ADMIN — HYDROCORTISONE SODIUM SUCCINATE 100 MG: 100 INJECTION, POWDER, FOR SOLUTION INTRAMUSCULAR; INTRAVENOUS at 01:07

## 2020-01-01 RX ADMIN — MAGNESIUM SULFATE IN WATER 2 G: 40 INJECTION, SOLUTION INTRAVENOUS at 07:07

## 2020-01-01 RX ADMIN — PROPOFOL 45 MCG/KG/MIN: 10 INJECTION, EMULSION INTRAVENOUS at 09:06

## 2020-01-01 RX ADMIN — DEXMEDETOMIDINE HYDROCHLORIDE 1.4 MCG/KG/HR: 4 INJECTION, SOLUTION INTRAVENOUS at 10:07

## 2020-01-01 RX ADMIN — PROPOFOL 30 MCG/KG/MIN: 10 INJECTION, EMULSION INTRAVENOUS at 09:06

## 2020-01-01 RX ADMIN — EPINEPHRINE 1 MG: 0.1 INJECTION, SOLUTION ENDOTRACHEAL; INTRACARDIAC; INTRAVENOUS at 05:07

## 2020-01-01 RX ADMIN — SODIUM CHLORIDE: 0.9 INJECTION, SOLUTION INTRAVENOUS at 12:07

## 2020-01-01 RX ADMIN — ACETAMINOPHEN 650 MG: 325 TABLET ORAL at 05:06

## 2020-01-01 RX ADMIN — SODIUM CHLORIDE: 0.9 INJECTION, SOLUTION INTRAVENOUS at 12:06

## 2020-01-01 RX ADMIN — SODIUM CHLORIDE: 0.9 INJECTION, SOLUTION INTRAVENOUS at 02:06

## 2020-01-01 RX ADMIN — DEXMEDETOMIDINE HYDROCHLORIDE 1.2 MCG/KG/HR: 4 INJECTION, SOLUTION INTRAVENOUS at 04:07

## 2020-01-01 RX ADMIN — FLUCONAZOLE 400 MG: 2 INJECTION, SOLUTION INTRAVENOUS at 10:07

## 2020-01-01 RX ADMIN — HYDROCORTISONE SODIUM SUCCINATE 100 MG: 100 INJECTION, POWDER, FOR SOLUTION INTRAMUSCULAR; INTRAVENOUS at 09:07

## 2020-01-01 RX ADMIN — HEPARIN SODIUM 5000 UNITS: 5000 INJECTION INTRAVENOUS; SUBCUTANEOUS at 10:06

## 2020-01-01 RX ADMIN — IPRATROPIUM BROMIDE AND ALBUTEROL SULFATE 3 ML: .5; 3 SOLUTION RESPIRATORY (INHALATION) at 07:07

## 2020-01-01 RX ADMIN — PROPOFOL 30 MCG/KG/MIN: 10 INJECTION, EMULSION INTRAVENOUS at 04:06

## 2020-01-01 RX ADMIN — PIPERACILLIN AND TAZOBACTAM 4.5 G: 4; .5 INJECTION, POWDER, FOR SOLUTION INTRAVENOUS at 09:06

## 2020-01-01 RX ADMIN — IOHEXOL 15 ML: 350 INJECTION, SOLUTION INTRAVENOUS at 03:07

## 2020-01-01 RX ADMIN — PIPERACILLIN AND TAZOBACTAM 4.5 G: 4; .5 INJECTION, POWDER, FOR SOLUTION INTRAVENOUS at 06:07

## 2020-01-01 RX ADMIN — PREDNISONE 5 MG: 2.5 TABLET ORAL at 08:07

## 2020-01-01 RX ADMIN — MINERAL OIL AND PETROLATUM: 150; 830 OINTMENT OPHTHALMIC at 09:07

## 2020-01-01 RX ADMIN — VASOPRESSIN 0.04 UNITS/MIN: 20 INJECTION INTRAVENOUS at 06:06

## 2020-01-01 RX ADMIN — DEXMEDETOMIDINE HYDROCHLORIDE 1.2 MCG/KG/HR: 4 INJECTION, SOLUTION INTRAVENOUS at 10:07

## 2020-01-01 RX ADMIN — VASOPRESSIN 20 UNITS: 20 INJECTION INTRAVENOUS at 09:07

## 2020-01-01 RX ADMIN — PHENYLEPHRINE HYDROCHLORIDE 3.5 MCG/KG/MIN: 10 INJECTION INTRAVENOUS at 01:07

## 2020-01-01 RX ADMIN — FLUCONAZOLE 400 MG: 2 INJECTION, SOLUTION INTRAVENOUS at 11:06

## 2020-01-01 RX ADMIN — VASOPRESSIN 0.04 UNITS/MIN: 20 INJECTION INTRAVENOUS at 08:06

## 2020-01-01 RX ADMIN — ACETAMINOPHEN 650 MG: 325 TABLET ORAL at 03:07

## 2020-01-01 RX ADMIN — DEXMEDETOMIDINE HYDROCHLORIDE 1.2 MCG/KG/HR: 4 INJECTION, SOLUTION INTRAVENOUS at 07:07

## 2020-01-01 RX ADMIN — SODIUM CHLORIDE: 0.9 INJECTION, SOLUTION INTRAVENOUS at 10:07

## 2020-01-01 RX ADMIN — PIPERACILLIN AND TAZOBACTAM 4.5 G: 4; .5 INJECTION, POWDER, FOR SOLUTION INTRAVENOUS at 05:06

## 2020-01-01 RX ADMIN — NOREPINEPHRINE BITARTRATE 0.6 MCG/KG/MIN: 1 INJECTION, SOLUTION, CONCENTRATE INTRAVENOUS at 10:06

## 2020-01-01 RX ADMIN — PROPOFOL 40 MCG/KG/MIN: 10 INJECTION, EMULSION INTRAVENOUS at 12:07

## 2020-01-01 RX ADMIN — ALTEPLASE 2 MG: 2.2 INJECTION, POWDER, LYOPHILIZED, FOR SOLUTION INTRAVENOUS at 02:07

## 2020-01-01 RX ADMIN — HYDROCORTISONE SODIUM SUCCINATE 100 MG: 100 INJECTION, POWDER, FOR SOLUTION INTRAMUSCULAR; INTRAVENOUS at 04:06

## 2020-01-01 RX ADMIN — DEXMEDETOMIDINE HYDROCHLORIDE 0.5 MCG/KG/HR: 4 INJECTION, SOLUTION INTRAVENOUS at 08:07

## 2020-01-01 RX ADMIN — HYDROCORTISONE SODIUM SUCCINATE 100 MG: 100 INJECTION, POWDER, FOR SOLUTION INTRAMUSCULAR; INTRAVENOUS at 09:06

## 2020-01-01 RX ADMIN — Medication 150 MCG/HR: at 12:07

## 2020-01-01 RX ADMIN — MINERAL OIL AND PETROLATUM: 150; 830 OINTMENT OPHTHALMIC at 08:07

## 2020-01-01 RX ADMIN — PIPERACILLIN AND TAZOBACTAM 4.5 G: 4; .5 INJECTION, POWDER, FOR SOLUTION INTRAVENOUS at 02:07

## 2020-01-01 RX ADMIN — ERTAPENEM 1 G: 1 INJECTION INTRAMUSCULAR; INTRAVENOUS at 10:07

## 2020-01-01 RX ADMIN — PROPOFOL 45 MCG/KG/MIN: 10 INJECTION, EMULSION INTRAVENOUS at 08:07

## 2020-01-01 RX ADMIN — Medication 125 MCG/HR: at 06:06

## 2020-01-01 RX ADMIN — Medication 2500 MCG: at 12:07

## 2020-01-01 RX ADMIN — HYDROCORTISONE SODIUM SUCCINATE 100 MG: 100 INJECTION, POWDER, FOR SOLUTION INTRAMUSCULAR; INTRAVENOUS at 05:07

## 2020-01-01 RX ADMIN — VASOPRESSIN 0.04 UNITS/MIN: 20 INJECTION INTRAVENOUS at 11:06

## 2020-01-01 RX ADMIN — PROPOFOL 35 MCG/KG/MIN: 10 INJECTION, EMULSION INTRAVENOUS at 05:06

## 2020-01-01 RX ADMIN — FLUDROCORTISONE ACETATE 100 MCG: 0.1 TABLET ORAL at 09:06

## 2020-01-01 RX ADMIN — AMIODARONE HYDROCHLORIDE 1 MG/MIN: 1.8 INJECTION, SOLUTION INTRAVENOUS at 01:07

## 2020-01-01 RX ADMIN — ACETAMINOPHEN 650 MG: 325 TABLET ORAL at 04:07

## 2020-01-01 RX ADMIN — FAMOTIDINE 20 MG: 20 TABLET, FILM COATED ORAL at 09:07

## 2020-01-01 RX ADMIN — PROPOFOL 45 MCG/KG/MIN: 10 INJECTION, EMULSION INTRAVENOUS at 02:06

## 2020-01-01 RX ADMIN — EPINEPHRINE 1 MG: 0.1 INJECTION, SOLUTION ENDOTRACHEAL; INTRACARDIAC; INTRAVENOUS at 04:07

## 2020-01-01 RX ADMIN — SODIUM CHLORIDE: 0.9 INJECTION, SOLUTION INTRAVENOUS at 09:06

## 2020-01-01 RX ADMIN — DEXMEDETOMIDINE HYDROCHLORIDE 1.4 MCG/KG/HR: 4 INJECTION, SOLUTION INTRAVENOUS at 12:07

## 2020-01-01 RX ADMIN — IPRATROPIUM BROMIDE AND ALBUTEROL SULFATE 3 ML: .5; 3 SOLUTION RESPIRATORY (INHALATION) at 11:07

## 2020-01-01 RX ADMIN — SODIUM PHOSPHATE, MONOBASIC, MONOHYDRATE 39.99 MMOL: 276; 142 INJECTION, SOLUTION INTRAVENOUS at 04:07

## 2020-01-01 RX ADMIN — PROPOFOL 5 MCG/KG/MIN: 10 INJECTION, EMULSION INTRAVENOUS at 06:07

## 2020-01-01 RX ADMIN — PROPOFOL 30 MCG/KG/MIN: 10 INJECTION, EMULSION INTRAVENOUS at 03:07

## 2020-01-01 RX ADMIN — PIPERACILLIN AND TAZOBACTAM 4.5 G: 4; .5 INJECTION, POWDER, FOR SOLUTION INTRAVENOUS at 01:07

## 2020-01-01 RX ADMIN — SODIUM BICARBONATE 50 MEQ: 84 INJECTION, SOLUTION INTRAVENOUS at 04:07

## 2020-01-01 RX ADMIN — Medication 125 MCG: at 06:06

## 2020-01-01 RX ADMIN — HYDROCORTISONE SODIUM SUCCINATE 100 MG: 100 INJECTION, POWDER, FOR SOLUTION INTRAMUSCULAR; INTRAVENOUS at 01:06

## 2020-01-01 RX ADMIN — Medication 0.48 MCG/KG/MIN: at 07:06

## 2020-01-01 RX ADMIN — IPRATROPIUM BROMIDE AND ALBUTEROL SULFATE 3 ML: .5; 3 SOLUTION RESPIRATORY (INHALATION) at 03:07

## 2020-01-01 RX ADMIN — SODIUM CHLORIDE: 0.9 INJECTION, SOLUTION INTRAVENOUS at 10:06

## 2020-01-01 RX ADMIN — DEXMEDETOMIDINE HYDROCHLORIDE 1.2 MCG/KG/HR: 4 INJECTION, SOLUTION INTRAVENOUS at 03:07

## 2020-01-01 RX ADMIN — FAMOTIDINE 20 MG: 20 TABLET, FILM COATED ORAL at 09:06

## 2020-01-01 RX ADMIN — FAMOTIDINE 20 MG: 20 TABLET, FILM COATED ORAL at 08:06

## 2020-01-01 RX ADMIN — Medication 150 MCG: at 11:06

## 2020-01-01 RX ADMIN — HEPARIN SODIUM 5000 UNITS: 5000 INJECTION INTRAVENOUS; SUBCUTANEOUS at 02:06

## 2020-01-01 RX ADMIN — NOREPINEPHRINE BITARTRATE 0.12 MCG/KG/MIN: 1 INJECTION, SOLUTION, CONCENTRATE INTRAVENOUS at 11:06

## 2020-01-01 RX ADMIN — PROPOFOL 35 MCG/KG/MIN: 10 INJECTION, EMULSION INTRAVENOUS at 08:06

## 2020-01-01 RX ADMIN — NOREPINEPHRINE BITARTRATE 0.18 MCG/KG/MIN: 1 INJECTION, SOLUTION, CONCENTRATE INTRAVENOUS at 02:06

## 2020-01-01 RX ADMIN — PHENYLEPHRINE HYDROCHLORIDE: 10 INJECTION INTRAVENOUS at 01:07

## 2020-01-01 RX ADMIN — PROPOFOL 45 MCG/KG/MIN: 10 INJECTION, EMULSION INTRAVENOUS at 04:06

## 2020-01-01 RX ADMIN — VASOPRESSIN 0.04 UNITS/MIN: 20 INJECTION INTRAVENOUS at 07:06

## 2020-01-01 RX ADMIN — VANCOMYCIN HYDROCHLORIDE 750 MG: 750 INJECTION, POWDER, LYOPHILIZED, FOR SOLUTION INTRAVENOUS at 09:07

## 2020-01-01 RX ADMIN — PROPOFOL 25 MCG/KG/MIN: 10 INJECTION, EMULSION INTRAVENOUS at 05:07

## 2020-01-01 RX ADMIN — MAGNESIUM SULFATE IN WATER 2 G: 40 INJECTION, SOLUTION INTRAVENOUS at 06:06

## 2020-01-01 RX ADMIN — PROPOFOL 40 MCG/KG/MIN: 10 INJECTION, EMULSION INTRAVENOUS at 09:06

## 2020-01-01 RX ADMIN — PROPOFOL 35 MCG/KG/MIN: 10 INJECTION, EMULSION INTRAVENOUS at 06:06

## 2020-01-01 RX ADMIN — TOBRAMYCIN SULFATE 265 MG: 40 INJECTION, SOLUTION INTRAMUSCULAR; INTRAVENOUS at 08:07

## 2020-01-01 RX ADMIN — FAMOTIDINE 20 MG: 20 TABLET, FILM COATED ORAL at 08:07

## 2020-01-01 RX ADMIN — NOREPINEPHRINE BITARTRATE 0.03 MCG/KG/MIN: 1 INJECTION, SOLUTION, CONCENTRATE INTRAVENOUS at 12:07

## 2020-01-01 RX ADMIN — NOREPINEPHRINE BITARTRATE 0.08 MCG/KG/MIN: 1 INJECTION, SOLUTION, CONCENTRATE INTRAVENOUS at 02:07

## 2020-01-01 RX ADMIN — ERTAPENEM 1 G: 1 INJECTION INTRAMUSCULAR; INTRAVENOUS at 11:07

## 2020-01-01 RX ADMIN — FLUDROCORTISONE ACETATE 100 MCG: 0.1 TABLET ORAL at 08:07

## 2020-01-01 RX ADMIN — HEPARIN SODIUM 5000 UNITS: 5000 INJECTION INTRAVENOUS; SUBCUTANEOUS at 01:06

## 2020-01-01 RX ADMIN — PROPOFOL 45 MCG/KG/MIN: 10 INJECTION, EMULSION INTRAVENOUS at 01:06

## 2020-01-01 RX ADMIN — Medication 2500 MCG: at 03:07

## 2020-01-01 RX ADMIN — FLUDROCORTISONE ACETATE 100 MCG: 0.1 TABLET ORAL at 08:06

## 2020-01-01 RX ADMIN — PROPOFOL 30 MCG/KG/MIN: 10 INJECTION, EMULSION INTRAVENOUS at 08:07

## 2020-01-01 RX ADMIN — DEXMEDETOMIDINE HYDROCHLORIDE 1.4 MCG/KG/HR: 4 INJECTION, SOLUTION INTRAVENOUS at 07:07

## 2020-01-01 RX ADMIN — MINERAL OIL AND PETROLATUM: 150; 830 OINTMENT OPHTHALMIC at 09:06

## 2020-01-01 RX ADMIN — FLUDROCORTISONE ACETATE 100 MCG: 0.1 TABLET ORAL at 09:07

## 2020-01-01 RX ADMIN — PIPERACILLIN AND TAZOBACTAM 4.5 G: 4; .5 INJECTION, POWDER, FOR SOLUTION INTRAVENOUS at 11:06

## 2020-01-01 RX ADMIN — PROPOFOL 30 MCG/KG/MIN: 10 INJECTION, EMULSION INTRAVENOUS at 12:06

## 2020-01-01 RX ADMIN — DEXMEDETOMIDINE HYDROCHLORIDE 1.4 MCG/KG/HR: 4 INJECTION, SOLUTION INTRAVENOUS at 01:07

## 2020-01-01 RX ADMIN — INSULIN ASPART 2 UNITS: 100 INJECTION, SOLUTION INTRAVENOUS; SUBCUTANEOUS at 06:07

## 2020-01-01 RX ADMIN — DEXMEDETOMIDINE HYDROCHLORIDE 1.4 MCG/KG/HR: 4 INJECTION, SOLUTION INTRAVENOUS at 04:07

## 2020-01-01 RX ADMIN — VANCOMYCIN HYDROCHLORIDE 1000 MG: 1 INJECTION, POWDER, LYOPHILIZED, FOR SOLUTION INTRAVENOUS at 10:06

## 2020-01-01 RX ADMIN — PIPERACILLIN AND TAZOBACTAM 4.5 G: 4; .5 INJECTION, POWDER, FOR SOLUTION INTRAVENOUS at 01:06

## 2020-01-01 RX ADMIN — DEXMEDETOMIDINE HYDROCHLORIDE 1.2 MCG/KG/HR: 4 INJECTION, SOLUTION INTRAVENOUS at 05:07

## 2020-01-01 RX ADMIN — IOHEXOL 15 ML: 350 INJECTION, SOLUTION INTRAVENOUS at 04:07

## 2020-01-01 RX ADMIN — PIPERACILLIN AND TAZOBACTAM 4.5 G: 4; .5 INJECTION, POWDER, FOR SOLUTION INTRAVENOUS at 08:06

## 2020-01-01 RX ADMIN — PROPOFOL 40 MCG/KG/MIN: 10 INJECTION, EMULSION INTRAVENOUS at 11:07

## 2020-01-01 RX ADMIN — HYDROCORTISONE SODIUM SUCCINATE 100 MG: 100 INJECTION, POWDER, FOR SOLUTION INTRAMUSCULAR; INTRAVENOUS at 05:06

## 2020-01-01 RX ADMIN — VANCOMYCIN HYDROCHLORIDE 1250 MG: 1.25 INJECTION, POWDER, LYOPHILIZED, FOR SOLUTION INTRAVENOUS at 10:07

## 2020-01-01 RX ADMIN — MAGNESIUM SULFATE IN WATER 2 G: 40 INJECTION, SOLUTION INTRAVENOUS at 11:06

## 2020-01-01 RX ADMIN — Medication 2500 MCG: at 08:06

## 2020-01-01 RX ADMIN — ALTEPLASE 4 MG: 2.2 INJECTION, POWDER, LYOPHILIZED, FOR SOLUTION INTRAVENOUS at 01:06

## 2020-01-01 RX ADMIN — HUMAN ALBUMIN MICROSPHERES AND PERFLUTREN 0.66 MG: 10; .22 INJECTION, SOLUTION INTRAVENOUS at 02:06

## 2020-01-01 RX ADMIN — SODIUM CHLORIDE 200 ML/HR: 0.9 INJECTION, SOLUTION INTRAVENOUS at 09:07

## 2020-01-01 RX ADMIN — PROPOFOL 30 MCG/KG/MIN: 10 INJECTION, EMULSION INTRAVENOUS at 01:07

## 2020-01-01 RX ADMIN — FLUCONAZOLE 400 MG: 2 INJECTION, SOLUTION INTRAVENOUS at 05:07

## 2020-01-01 RX ADMIN — VASOPRESSIN 0.04 UNITS/MIN: 20 INJECTION INTRAVENOUS at 09:06

## 2020-01-01 RX ADMIN — DEXMEDETOMIDINE HYDROCHLORIDE 1.4 MCG/KG/HR: 4 INJECTION, SOLUTION INTRAVENOUS at 11:07

## 2020-01-01 RX ADMIN — ALTEPLASE 2 MG: 2.2 INJECTION, POWDER, LYOPHILIZED, FOR SOLUTION INTRAVENOUS at 01:07

## 2020-01-01 RX ADMIN — PROPOFOL 40 MCG/KG/MIN: 10 INJECTION, EMULSION INTRAVENOUS at 03:07

## 2020-01-01 RX ADMIN — NOREPINEPHRINE BITARTRATE 0.12 MCG/KG/MIN: 1 INJECTION, SOLUTION, CONCENTRATE INTRAVENOUS at 04:07

## 2020-01-01 RX ADMIN — VASOPRESSIN 0.04 UNITS/MIN: 20 INJECTION INTRAVENOUS at 12:06

## 2020-01-01 RX ADMIN — NOREPINEPHRINE BITARTRATE 0.1 MCG/KG/MIN: 1 INJECTION, SOLUTION, CONCENTRATE INTRAVENOUS at 11:06

## 2020-01-01 RX ADMIN — PROPOFOL 30 MCG/KG/MIN: 10 INJECTION, EMULSION INTRAVENOUS at 11:06

## 2020-01-01 RX ADMIN — NOREPINEPHRINE BITARTRATE 0.31 MCG/KG/MIN: 1 INJECTION, SOLUTION, CONCENTRATE INTRAVENOUS at 11:06

## 2020-01-01 RX ADMIN — FAMOTIDINE 20 MG: 20 TABLET, FILM COATED ORAL at 10:07

## 2020-01-01 RX ADMIN — SODIUM PHOSPHATE, MONOBASIC, MONOHYDRATE 30 MMOL: 276; 142 INJECTION, SOLUTION INTRAVENOUS at 09:07

## 2020-01-01 RX ADMIN — ACETAMINOPHEN 650 MG: 325 TABLET ORAL at 07:06

## 2020-01-01 RX ADMIN — SODIUM BICARBONATE 50 MEQ: 84 INJECTION, SOLUTION INTRAVENOUS at 05:07

## 2020-01-01 RX ADMIN — PIPERACILLIN AND TAZOBACTAM 4.5 G: 4; .5 INJECTION, POWDER, FOR SOLUTION INTRAVENOUS at 02:06

## 2020-01-01 RX ADMIN — Medication 125 MCG: at 02:06

## 2020-01-01 RX ADMIN — PROPOFOL 5 MCG/KG/MIN: 10 INJECTION, EMULSION INTRAVENOUS at 11:07

## 2020-01-01 RX ADMIN — PIPERACILLIN AND TAZOBACTAM 4.5 G: 4; .5 INJECTION, POWDER, FOR SOLUTION INTRAVENOUS at 09:07

## 2020-01-01 RX ADMIN — VANCOMYCIN HYDROCHLORIDE 750 MG: 750 INJECTION, POWDER, LYOPHILIZED, FOR SOLUTION INTRAVENOUS at 07:07

## 2020-01-01 RX ADMIN — NOREPINEPHRINE BITARTRATE 0.32 MCG/KG/MIN: 1 INJECTION, SOLUTION, CONCENTRATE INTRAVENOUS at 06:06

## 2020-01-01 RX ADMIN — HEPARIN SODIUM 5000 UNITS: 5000 INJECTION INTRAVENOUS; SUBCUTANEOUS at 05:06

## 2020-01-01 RX ADMIN — SODIUM PHOSPHATE, MONOBASIC, MONOHYDRATE 30 MMOL: 276; 142 INJECTION, SOLUTION INTRAVENOUS at 11:06

## 2020-01-01 RX ADMIN — ERTAPENEM 1 G: 1 INJECTION INTRAMUSCULAR; INTRAVENOUS at 09:07

## 2020-01-01 RX ADMIN — PROPOFOL 45 MCG/KG/MIN: 10 INJECTION, EMULSION INTRAVENOUS at 06:06

## 2020-01-01 RX ADMIN — NOREPINEPHRINE BITARTRATE 0.08 MCG/KG/MIN: 1 INJECTION, SOLUTION, CONCENTRATE INTRAVENOUS at 08:07

## 2020-01-01 RX ADMIN — HYDROCORTISONE SODIUM SUCCINATE 100 MG: 100 INJECTION, POWDER, FOR SOLUTION INTRAMUSCULAR; INTRAVENOUS at 02:07

## 2020-01-01 RX ADMIN — INSULIN ASPART 1 UNITS: 100 INJECTION, SOLUTION INTRAVENOUS; SUBCUTANEOUS at 12:07

## 2020-01-01 RX ADMIN — DEXMEDETOMIDINE HYDROCHLORIDE 1.2 MCG/KG/HR: 4 INJECTION, SOLUTION INTRAVENOUS at 01:07

## 2020-01-01 RX ADMIN — Medication 50 MCG/HR: at 07:06

## 2020-01-01 RX ADMIN — VANCOMYCIN HYDROCHLORIDE 1000 MG: 1 INJECTION, POWDER, LYOPHILIZED, FOR SOLUTION INTRAVENOUS at 08:06

## 2020-01-01 RX ADMIN — PIPERACILLIN AND TAZOBACTAM 4.5 G: 4; .5 INJECTION, POWDER, FOR SOLUTION INTRAVENOUS at 04:06

## 2020-01-01 RX ADMIN — DEXMEDETOMIDINE HYDROCHLORIDE 1.4 MCG/KG/HR: 4 INJECTION, SOLUTION INTRAVENOUS at 06:07

## 2020-01-01 RX ADMIN — PIPERACILLIN AND TAZOBACTAM 4.5 G: 4; .5 INJECTION, POWDER, FOR SOLUTION INTRAVENOUS at 06:06

## 2020-01-01 RX ADMIN — NOREPINEPHRINE BITARTRATE 0.28 MCG/KG/MIN: 1 INJECTION, SOLUTION, CONCENTRATE INTRAVENOUS at 01:06

## 2020-01-01 RX ADMIN — Medication 75 MCG/HR: at 08:06

## 2020-01-01 RX ADMIN — NOREPINEPHRINE BITARTRATE 0.4 MCG/KG/MIN: 1 INJECTION, SOLUTION, CONCENTRATE INTRAVENOUS at 03:07

## 2020-01-01 RX ADMIN — VASOPRESSIN 0.04 UNITS/MIN: 20 INJECTION INTRAVENOUS at 02:07

## 2020-01-01 RX ADMIN — PIPERACILLIN AND TAZOBACTAM 4.5 G: 4; .5 INJECTION, POWDER, FOR SOLUTION INTRAVENOUS at 12:06

## 2020-01-01 RX ADMIN — DEXTROSE MONOHYDRATE 500 MG: 50 INJECTION, SOLUTION INTRAVENOUS at 11:06

## 2020-01-01 RX ADMIN — PROPOFOL 45 MCG/KG/MIN: 10 INJECTION, EMULSION INTRAVENOUS at 10:06

## 2020-01-01 RX ADMIN — Medication 200 MCG/HR: at 09:07

## 2020-01-01 RX ADMIN — Medication 0.5 MCG/KG/MIN: at 09:07

## 2020-01-01 RX ADMIN — AMIODARONE HYDROCHLORIDE 150 MG: 1.5 INJECTION, SOLUTION INTRAVENOUS at 12:07

## 2020-03-25 PROBLEM — Z20.822 SUSPECTED COVID-19 VIRUS INFECTION: Status: ACTIVE | Noted: 2020-01-01

## 2020-03-25 PROBLEM — I10 HYPERTENSION: Status: ACTIVE | Noted: 2017-11-16

## 2020-03-25 PROBLEM — I25.10 CORONARY ARTERY DISEASE: Status: ACTIVE | Noted: 2017-11-16

## 2020-03-25 PROBLEM — E87.6 HYPOKALEMIA: Status: ACTIVE | Noted: 2020-01-01

## 2020-03-25 PROBLEM — A41.9 SEPSIS: Status: ACTIVE | Noted: 2020-01-01

## 2020-03-25 PROBLEM — E11.9 TYPE II DIABETES MELLITUS: Status: ACTIVE | Noted: 2017-11-16

## 2020-03-25 PROBLEM — E87.20 LACTIC ACIDOSIS: Status: ACTIVE | Noted: 2020-01-01

## 2020-03-25 NOTE — TELEPHONE ENCOUNTER
Fever 101, nausea, no cough    Spoke with wife:    S/s started this afternoon. + headache-rated as 2/10, shivers chills, palms of hands were white. 97.1F 1630-- gave tylenol. 1952: high temp 103.3 + colostomy bag. Hx perf colon 2 times. With hx of  SepticShock  2 times. Last time: 05/02/2018; inc in urination. . No change in stool. instructed to go to ER----verbalized understanding.  Going to [facility] ER- wife state swill call to alert in route.         Reason for Disposition   [1] Fever > 101 F (38.3 C) AND [2] age > 60   Patient sounds very sick or weak to the triager  (Exception: mild weakness and hasn't taken fever medicine)    Additional Information   Negative: Shock suspected (e.g., cold/pale/clammy skin, too weak to stand, low BP, rapid pulse)   Negative: Difficult to awaken or acting confused (e.g., disoriented, slurred speech)   Negative: [1] Difficulty breathing AND [2] bluish lips, tongue or face   Negative: New onset rash with multiple purple (or blood-colored) spots or dots   Negative: Sounds like a life-threatening emergency to the triager   Negative: Difficulty breathing   Negative: [1] Headache AND [2] stiff neck (can't touch chin to chest)   Negative: [1] Drinking very little AND [2] dehydration suspected (e.g., no urine > 12 hours, very dry mouth, very lightheaded)   Negative: IV drug abuse   Negative: Fever > 104 F (40 C)    Protocols used: FEVER-A-AH

## 2020-04-01 PROBLEM — R07.9 CHEST PAIN: Status: ACTIVE | Noted: 2020-01-01

## 2020-04-01 NOTE — TELEPHONE ENCOUNTER
----- Message from Brooke Blancas sent at 4/1/2020 10:21 AM CDT -----  Contact: Spouse/Lacie [phone]  Patient was in the hospital on 03/24/2020 with pneumonia and tested negative for COVID-19. Stated that the patient started running a fever and having bodyaches on yesterday.    Please call and advise.    Thank You

## 2020-04-01 NOTE — PROGRESS NOTES
Subjective:       Patient ID: Cristo Jefferson is a 59 y.o. male.    Chief Complaint: No chief complaint on file.    HPI patient with history of rheumatoid arthritis on prednisone and sulfazine and history of diabetes mellitus at home just recently discharged from the hospital for bilateral pneumonia and E coli bacteremia patient sent home on Z-Phillip and Levaquin (E coli resistant to levaquin) but today he begin to have fever the again and feeling bad and this afternoon he become hypothermic patient was seen through a virtual visit appear to be sick and sweating discussed with patient's wife patient need to go to the emergency room for further the treatment his Covid19 was negative  Review of Systems    Objective:     patient was seen to telemedicine physical exam limited  Physical Exam   Constitutional: He is oriented to person, place, and time. He appears well-developed and well-nourished. He appears distressed.   Appear to be sick sweating still feeling weak   Eyes: EOM are normal.   Neck: Neck supple.   Pulmonary/Chest: Effort normal.   Neurological: He is alert and oriented to person, place, and time.       Assessment:       1. Pneumonia of both lungs due to infectious organism, unspecified part of lung    2. Escherichia coli sepsis    3. Type 2 diabetes mellitus with hyperlipidemia    4. Rheumatoid arthritis involving multiple sites, unspecified rheumatoid factor presence        Plan:       Pneumonia of both lungs due to infectious organism, unspecified part of lung    Escherichia coli sepsis  Comments:  Patient still with fever diaphoretic feeling sick again with history of bilateral pneumonia E coli sepsis and immunocompromise sent to emergency room for furthe    Type 2 diabetes mellitus with hyperlipidemia    Rheumatoid arthritis involving multiple sites, unspecified rheumatoid factor presence

## 2020-04-01 NOTE — TELEPHONE ENCOUNTER
Spoke with patients wife.  She states patient ran a fever of 99.4 last night with a bad headache and body aches.    Patient was in-patient on 3/24 for pneumonia.  Finished Z-kristy and still taking Levaquin and cough syrup.    Now temperature is running low.  95.4/ 94.2.  She is concerned that temperature is to low.  Using tylenol for the bad headache.    Will set up a portal visit.

## 2020-04-02 PROBLEM — B96.20 E. COLI BACTEREMIA: Status: ACTIVE | Noted: 2020-01-01

## 2020-04-02 PROBLEM — J18.9 BILATERAL PNEUMONIA: Status: ACTIVE | Noted: 2020-01-01

## 2020-04-02 PROBLEM — R78.81 E. COLI BACTEREMIA: Status: ACTIVE | Noted: 2020-01-01

## 2020-04-02 PROBLEM — F32.A DEPRESSION: Status: ACTIVE | Noted: 2017-11-16

## 2020-04-09 NOTE — TELEPHONE ENCOUNTER
Spoke with patients wife. States patient has an appointment with  next Monday. Patient needs a refill of promethazine DM until he sees you.

## 2020-04-09 NOTE — TELEPHONE ENCOUNTER
----- Message from Bernice Bush sent at 4/9/2020  1:02 PM CDT -----  Contact: self/[phone]  Requesting an RX refill or new RX.  Is this a refill or new RX: Refill 1   RX name and strength: cough medicine  Directions (copy/paste from chart):    Is this a 30 day or 90 day RX:    Local pharmacy or mail order pharmacy:  Local pharmacy  Pharmacy name and phone # (copy/paste from chart): [facility]/pharmacy #[id number] - [place] - [address] [phone] (Phone) [phone] (Fax)    Comments:

## 2020-04-18 NOTE — PROGRESS NOTES
Subjective:    The patient location is: home  The chief complaint leading to consultation is: pneumonia  Visit type: audiovisual  Total time spent with patient: 12 mnutes  Each patient to whom he or she provides medical services by telemedicine is:  (1) informed of the relationship between the physician and patient and the respective role of any other health care provider with respect to management of the patient; and (2) notified that he or she may decline to receive medical services by telemedicine and may withdraw from such care at any time.    Notes:    Patient ID: Cristo Jefferson is a 59 y.o. male.   Patient was seen by telemedicine physical exam limited and blood pressure the temperature not available  Chief Complaint: No chief complaint on file.    HPI  patient recently discharged from the hospital again after 2nd admission for pneumonia with history of E coli sepsis but repeat blood culture this admission need negative he had bilateral normal linea his stated hard for 2 day for IV antibiotic and sent home on IV antibiotic for 4 weeks at home patient states his coughing short of breath a lot better even though his still has some cough with mucus he denies chest pain nausea vomiting diarrhea and his wife take very good care of him he has a colostomy and is functioning well no abdominal pain no bleeding patient immunocompromise due to treatment for rheumatoid arthritis with prednisone and sulfasalazine  Review of Systems    Objective:      Physical Exam   Constitutional: He is oriented to person, place, and time. He appears well-developed and well-nourished. No distress.   Neck: Neck supple.   Pulmonary/Chest: Effort normal.   Frequent coughing   Neurological: He is alert and oriented to person, place, and time.   Psychiatric: He has a normal mood and affect. His behavior is normal. Judgment and thought content normal.       Assessment:       1. Pneumonia of both lungs due to infectious organism, unspecified part  of lung    2. Rheumatoid arthritis involving multiple sites, unspecified rheumatoid factor presence        Plan:       Pneumonia of both lungs due to infectious organism, unspecified part of lung  Comments:  Resolving continue with IV antibiotic at the sore treatment and Mucinex to break up mucus    Rheumatoid arthritis involving multiple sites, unspecified rheumatoid factor presence  Comments:  Continue new with sulfasalazinre and kaylin dose prednisone

## 2020-04-30 NOTE — TELEPHONE ENCOUNTER
----- Message from Shaylee Ramachandran sent at 4/30/2020  2:50 PM CDT -----  Contact: Rachel (wife) [phone]  Patient is returning a phone call.  Who left a message for the patient: Priti Hayward LPN   Does patient know what this is regarding:    Comments:

## 2020-04-30 NOTE — TELEPHONE ENCOUNTER
Tried calling patient to schedule a hospital follow up but he did not answer. Message left for him to return my call

## 2020-04-30 NOTE — TELEPHONE ENCOUNTER
Called pt. At both numbers listed in EHR. Left VM on Home phone for pt. To call back. Needs hospital f/u with his PCP for Bilateral Pneumonia

## 2020-04-30 NOTE — TELEPHONE ENCOUNTER
----- Message from Elyse Soriano sent at 4/30/2020 12:13 PM CDT -----  Contact: Nohemy with [employer] phone [phone] fax [phone]  Nohemy with [employer] phone [phone] fax [phone], Calling because the patient will be finished with IV antibiotic tomorrow, do you want it continued and if so they need a new order. Or they need an order to remove the midline. Please call her. Thanks.

## 2020-04-30 NOTE — TELEPHONE ENCOUNTER
"Spoke with Nohemy At [facility] states, "Dr. Devine from [facility] ED order the pt. IV antibiotics." Unsure as to whether or not pt. Should continue abx as he has been on them for 4 weeks. Dr. Maxwell did not order the IV antibiotics OR the PICC line insertion as it was performed in the ED. Pt. Needs a follow up with his PCP. Nohemy at [facility] states she will let the pt. Know to go to the ED to have the PICC line removed as we have no orders on whether to continue the antibiotics or not.   Pt. Had bilateral pneumonia noted on 04/1/2020    According to home health orders pt. Was supposed to complete 4 weeks of antibiotic therapy. Calling pt. To scheduled Hospital f/u with PCP.   "

## 2020-05-01 PROBLEM — Z20.822 SUSPECTED COVID-19 VIRUS INFECTION: Status: RESOLVED | Noted: 2020-01-01 | Resolved: 2020-01-01

## 2020-05-01 NOTE — PROGRESS NOTES
Subjective:       Patient ID: Cristo Jefferson is a 59 y.o. male.    Chief Complaint: No chief complaint on file.    The patient location is:  Home  The chief complaint leading to consultation is:  Hospital follow-up, pneumonia, bacteremia  Visit type: audiovisual  Total time spent with patient:  13 min  Each patient to whom he or she provides medical services by telemedicine is:  (1) informed of the relationship between the physician and patient and the respective role of any other health care provider with respect to management of the patient; and (2) notified that he or she may decline to receive medical services by telemedicine and may withdraw from such care at any time.    Notes:  Patient was admitted to the hospital late March with bilateral pneumonia and E coli bacteremia.  Discharged home in early April, he will take his 28th and final dose of IV ceftriaxone today.  Blood cultures from 03/24 grew E coli, repeat blood cultures on 04/01 were negative.  Patient reports that overall he is feeling much better.  Cough resolved 7-10 days ago.  Denies shortness of breath or chest pain.  Appetite is voracious.  No diarrhea or bloody stools.  Has a PICC line in place,    Review of Systems   Constitutional: Negative for chills and fever.   HENT: Negative for trouble swallowing.    Respiratory: Negative for choking and shortness of breath.    Cardiovascular: Negative for chest pain.   Gastrointestinal: Negative for blood in stool and vomiting.   Neurological: Negative for dizziness.   Psychiatric/Behavioral: Negative for agitation and confusion.       Objective:      There were no vitals filed for this visit.  Physical Exam   Constitutional: He is oriented to person, place, and time. He appears well-developed and well-nourished.   Pulmonary/Chest: No respiratory distress.   Neurological: He is alert and oriented to person, place, and time.   Psychiatric: He has a normal mood and affect. His behavior is normal.       Lab  Results   Component Value Date    WBC 6.70 04/03/2020    HGB 12.2 (L) 04/03/2020    HCT 36.9 (L) 04/03/2020     04/03/2020    CHOL 127 05/14/2019    TRIG 195 (H) 05/14/2019    HDL 37 (L) 05/14/2019    ALT 42 04/01/2020    AST 40 04/01/2020     04/03/2020    K 3.8 04/03/2020     (L) 04/03/2020    CREATININE 0.7 04/03/2020    BUN 13 04/03/2020    CO2 24 04/03/2020    TSH 1.45 12/06/2019    PSA 2.4 12/06/2019    INR 1.1 04/01/2020    HGBA1C 6.3 (H) 04/02/2020      Assessment:       1. E coli bacteremia    2. Pneumonia of both lungs due to infectious organism, unspecified part of lung    3. Presence of colostomy    4. Seropositive rheumatoid arthritis of multiple sites        Plan:       E coli bacteremia  -     X-Ray Chest PA And Lateral; Future; Expected date: 05/04/2020  -     Blood culture; Future; Expected date: 05/04/2020  -     Blood culture; Future; Expected date: 05/04/2020  Repeat blood cultures.  If negative, will remove PICC line  Pneumonia of both lungs due to infectious organism, unspecified part of lung  -     X-Ray Chest PA And Lateral; Future; Expected date: 05/04/2020  Repeat chest x-ray to ensure radiographic clearance  Presence of colostomy    Seropositive rheumatoid arthritis of multiple sites  Continue prednisone    Medication List with Changes/Refills   Current Medications    ACETAMINOPHEN (TYLENOL) 500 MG TABLET    Take 500 mg by mouth every 6 (six) hours as needed for Pain.    ALBUTEROL (VENTOLIN HFA) 90 MCG/ACTUATION INHALER    Inhale 2 puffs into the lungs every 6 (six) hours as needed for Wheezing. Rescue    ASPIRIN 81 MG CHEW    Take 81 mg by mouth once daily.    ATORVASTATIN (LIPITOR) 10 MG TABLET    TAKE 1 TABLET BY MOUTH EVERY DAY    CEFTRIAXONE SODIUM (CEFTRIAXONE 2 G/50 ML D5W, READY TO MIX,)    Inject 2 g into the vein once daily.    CITALOPRAM (CELEXA) 40 MG TABLET    TAKE 1 TABLET BY MOUTH EVERY DAY    CLOPIDOGREL (PLAVIX) 75 MG TABLET    TAKE 1 TABLET BY MOUTH  EVERY DAY    DUTASTERIDE (AVODART) 0.5 MG CAPSULE    Take 1 capsule (0.5 mg total) by mouth once daily.    FERROUS SULFATE 325 (65 FE) MG EC TABLET    Take 325 mg by mouth 2 (two) times daily.    HYDROCHLOROTHIAZIDE (MICROZIDE) 12.5 MG CAPSULE    TAKE 1 CAPSULE BY MOUTH EVERY DAY    MELOXICAM (MOBIC) 15 MG TABLET    TAKE 1 TABLET BY MOUTH EVERY DAY    METFORMIN (FORTAMET) 500 MG 24HR TABLET    Take 750 mg by mouth 2 (two) times daily with meals.    METOPROLOL TARTRATE (LOPRESSOR) 25 MG TABLET    TAKE 1 TABLET BY MOUTH TWICE A DAY    MULTIVITAMIN CAPSULE    Take 1 capsule by mouth once daily.    NITROGLYCERIN (NITROSTAT) 0.4 MG SL TABLET    Place 1 tablet (0.4 mg total) under the tongue every 5 (five) minutes as needed for Chest pain.    PREDNISONE (DELTASONE) 5 MG TABLET    TAKE 1 TABLET BY MOUTH THREE TIMES A DAY    PREDNISONE (DELTASONE) 5 MG TABLET    TAKE 1 TABLET BY MOUTH THREE TIMES A DAY    SULFASALAZINE (AZULFIDINE) 500 MG TBEC    1 DAILY FOR 5 DAYS, 1 TWICE DAILY FOR 5 DAYS, 1 THREE TIMES DAILY FOR 5 DAY, 2 TWICE DAILY    VITAMIN D 1000 UNITS TAB    Take 2,000 Units by mouth once daily.

## 2020-05-01 NOTE — TELEPHONE ENCOUNTER
----- Message from Liliya Zhou sent at 5/1/2020 11:16 AM CDT -----  Contact: [phone] or [phone]  Patient's wife is requesting a call from the office regarding the patient's medication, and other questions.  Please call today.   Please advise, thank you.

## 2020-05-11 NOTE — TELEPHONE ENCOUNTER
----- Message from Frances Villar sent at 5/11/2020 11:39 AM CDT -----  Contact: Fariba Slater [phone]  [facility] ask If is it ok to maintain the IV line and or is ok to remove it. Please advise

## 2020-06-24 PROBLEM — K56.609 INTESTINAL OBSTRUCTION: Status: ACTIVE | Noted: 2020-01-01

## 2020-06-25 PROBLEM — N17.9 AKI (ACUTE KIDNEY INJURY): Status: ACTIVE | Noted: 2020-01-01

## 2020-06-25 PROBLEM — K92.2 GI BLEEDING: Status: ACTIVE | Noted: 2020-01-01

## 2020-06-25 PROBLEM — J96.01 ACUTE HYPOXEMIC RESPIRATORY FAILURE: Status: ACTIVE | Noted: 2020-01-01

## 2020-06-26 PROBLEM — D69.6 THROMBOCYTOPENIA: Status: ACTIVE | Noted: 2020-01-01

## 2020-06-26 PROBLEM — E87.29 HIGH ANION GAP METABOLIC ACIDOSIS: Status: ACTIVE | Noted: 2020-01-01

## 2020-06-26 PROBLEM — R79.89 ELEVATED TROPONIN: Status: ACTIVE | Noted: 2020-01-01

## 2020-06-26 PROBLEM — E83.42 HYPOMAGNESEMIA: Status: ACTIVE | Noted: 2020-01-01

## 2020-06-26 PROBLEM — I46.9 CARDIAC ARREST: Status: ACTIVE | Noted: 2020-01-01

## 2020-06-26 PROBLEM — K72.00 SHOCK LIVER: Status: ACTIVE | Noted: 2020-01-01

## 2020-06-26 PROBLEM — D62 ACUTE BLOOD LOSS ANEMIA: Status: ACTIVE | Noted: 2020-01-01

## 2020-06-26 NOTE — CONSULTS
[facility]  Nephrology  Consult Note    Patient Name: Cristo Jefferson  MRN: [id number]  Admission Date: 6/26/2020  Hospital Length of Stay: 0 days  Attending Provider: Lisette Mon MD   Primary Care Physician: Vince Perez MD  Principal Problem:<principal problem not specified>    Nephrology  Consult performed by: Mario Aldrich MD  Consult ordered by: Yazmin Velasquez MD  Reason for consult: RRT continuity         Subjective:     HPI: A 60 y/o male with PMHx of CAD s/p CABG, AAA, RA, HTN, T2DM,  Adenocarcinoma of appendix s/p resection, SBO and perforation s/p colostomy was [facility] [place] [facility] [place]. On 6/24/20 he presented to the OSH with nausea, emesis, and abdominal pain for approximately 24 hours.  He arrived to the ED hypothermic with significant lactic acidosis and CTwith IV contrast suggestive of SBO. Exploratory laparotomy was performed and no signs of ischemia were found. Had cardiac arrest 6/25, atropine given due to severe bradycardia, required transcutaneous pacing, ROSC achieved. Worsening renal function ensue with minimal UOP even with high dose diuretic trial and was started on HD 6/26. Required administration of multiple pressors and bicarb drip. sCr at admission was 0.9 and 5.4 day of first HD (6/26).     Nephrology consulted for CRRT initiation.         Past Medical History:   Diagnosis Date    Abdominal aortic aneurysm     Arthritis     Colon perforation     Coronary artery disease     Depression     History of diverticulitis     Hyperlipidemia     Hypertension     Primary appendiceal adenocarcinoma 2016    Completed chemotherapy    Rheumatoid arthritis     Type II diabetes mellitus        Past Surgical History:   Procedure Laterality Date    APPENDECTOMY      COLONOSCOPY N/A 3/6/2018    Procedure: COLONOSCOPY;  Surgeon: Yfn Martinez MD;  Location: [facility] ([address] [place] [address]);  Service: Endoscopy;  Laterality: N/A;   See telephone  encounter dated 18/pt on Plavix/neither PCP or cardiologist will clear him to hold med without being evaluated/wife refusing to make appointment/per Dr Riley, pt to remain on Plavix and proceed with colonoscopy with no biposies. Spoke with wife on     CORONARY ANGIOPLASTY WITH STENT PLACEMENT  2005    x 4    CORONARY ARTERY BYPASS GRAFT      2004    EVAR for AAA      EXPLORATORY LAPAROTOMY W/ BOWEL RESECTION  2018    LYSIS OF ADHESIONS N/A 2020    Procedure: LYSIS, ADHESIONS;  Surgeon: Duarte De La Garza MD;  Location: [facility] OR;  Service: General;  Laterality: N/A;    OPEN REDUCTION AND INTERNAL FIXATION (ORIF) OF INJURY OF HAND Right     RIGHT COLECTOMY  2016    TONSILLECTOMY         Review of patient's allergies indicates:   Allergen Reactions    Morphine Rash     Current Facility-Administered Medications   Medication Frequency    [START ON 2020] famotidine tablet 20 mg Daily    fentaNYL 2500 mcg in 0.9% sodium chloride 250 mL infusion premix (titrating) Continuous    heparin (porcine) injection 5,000 Units Q8H    norepinephrine 4 mg in dextrose 5% 250 mL infusion (premix) (titrating) Continuous    propofol (DIPRIVAN) 10 mg/mL infusion Q5 Min    sodium chloride 0.9% flush 10 mL PRN    vasopressin (PITRESSIN) 0.2 Units/mL in dextrose 5 % 100 mL infusion Continuous     Family History     Problem Relation (Age of Onset)    Cancer Father, Sister    Heart disease Mother, Father        Tobacco Use    Smoking status: Former Smoker     Types: Pipe     Quit date: 2017     Years since quittin.5    Smokeless tobacco: Former User   Substance and Sexual Activity    Alcohol use: No    Drug use: No    Sexual activity: Yes     Partners: Female     Birth control/protection: None     Review of Systems   Unable to perform ROS: Intubated     Objective:     Vital Signs (Most Recent):  Pulse: 82 (20 1800)  Resp: (!) 8 (20 1800)  BP: 134/73 (20  1800)  SpO2: (!) 91 % (06/26/20 1800)  O2 Device (Oxygen Therapy): ventilator (06/26/20 1743) Vital Signs (24h Range):  Temp:  [98.1 °F (36.7 °C)-105.1 °F (40.6 °C)] 98.1 °F (36.7 °C)  Pulse:  [] 82  Resp:  [8-105] 8  SpO2:  [79 %-100 %] 91 %  BP: ()/(34-81) 134/73     Weight: 109.5 kg (241 lb 6.5 oz) (06/26/20 1808)  Body mass index is 35.65 kg/m².  Body surface area is 2.31 meters squared.    No intake/output data recorded.    Physical Exam  Vitals signs and nursing note reviewed.   Constitutional:       Appearance: He is ill-appearing.   HENT:      Mouth/Throat:      Comments: ETT in place  Neck:      Comments: Rt IJ CVC in place  Cardiovascular:      Rate and Rhythm: Normal rate and regular rhythm.   Pulmonary:      Comments: Transmitted ventilator sounds  Abdominal:      General: There is distension.      Comments: Midline surgical incision   Colostomy in place   Musculoskeletal:      Right lower leg: Edema present.      Left lower leg: Edema present.   Neurological:      Comments: Sedated         Significant Labs:  ABGs:   Recent Labs   Lab 06/26/20  1558   PH 7.406   PCO2 38.1   HCO3 23.9*   POCSATURATED 94*   BE -1     CBC:   Recent Labs   Lab 06/26/20  0739 06/26/20  1558   WBC 11.40  --    RBC 4.14*  --    HGB 10.8*  --    HCT 32.4* 31*   PLT 97*  --    MCV 78*  --    MCH 26.1*  --    MCHC 33.3  --      CMP:   Recent Labs   Lab 06/26/20  0324   *   CALCIUM 7.2*   ALBUMIN 2.0*   PROT 4.6*   *   K 4.8   CO2 15*   CL 98*   BUN 43*   CREATININE 5.4*   ALKPHOS 72   *   *   BILITOT 1.2     Coagulation:   Recent Labs   Lab 06/26/20  1801   INR 1.4*   APTT 34.2*     LFTs:   Recent Labs   Lab 06/26/20  0324   *   *   ALKPHOS 72   BILITOT 1.2   PROT 4.6*   ALBUMIN 2.0*     Recent Labs   Lab 06/24/20  0729   COLORU Yellow   SPECGRAV >=1.030*   PHUR 6.0   PROTEINUA Negative   BACTERIA Rare   NITRITE Negative   LEUKOCYTESUR Negative   UROBILINOGEN Negative     All  labs within the past 24 hours have been reviewed.    Significant Imaging:  X-Ray: Reviewed  Personally reviewed    Assessment/Plan:     Acute kidney injury  Mr. Perales is a 60 y/o M with previously described history, physical exam, laboratories and imaging studies being consulted for continuity of RRT.   - COCO most likely is 2/2 to ATN in the setting of septic shock, cardiac arrest, IV contrast and nephrotoxic medications complicated by minimal UOP and worsening HAGMA.     Plan/Rec:   - Start SLED for volume and metabolic clearance. Will follow trend of labs and adjust dialysate baths as necessary.   - HAGMA: 2/2 to septic shock/resp failure. Bicarb bath on SLED to be adjusted accordingly.   - Follow labs serially per CRRT protocol   - Strict I/O and chart  - Avoid nephrotoxic medications, NSAIDs, IV contrast, etc.  - Medication doses adjusted to GFR  - Maintain MAP > 65  - Hb > 7 gm/dL  - Will follow closely    Septic shock  - On Vaso,NE, Hydrocortisone   - Broad spectrum abx covereage. Received Gentamicin on OSH.   - Managed by primary team         Thank you for your consult. I will follow-up with patient. Please contact us if you have any additional questions.    Mario Aldrich MD  Nephrology  [facility]    ATTENDING PHYSICIAN ATTESTATION  I have personally verified the history and examined the patient. I thoroughly reviewed the demographic, clinical, laboratorial and imaging information available in medical records. I agree with the assessment and recommendations provided by the subspecialty resident who was under my supervision.     DIALYSIS (CRRT) NOTE  Patient evaluated while undergoing Slow Low Efficiency Dialysis (SLED) indicated for COCO and hemodynamic instability. No complications, tolerating session with current UFR. Will continue current support.

## 2020-06-26 NOTE — ASSESSMENT & PLAN NOTE
- On Vaso,NE, Hydrocortisone   - Broad spectrum abx covereage. Received Gentamicin on OSH.   - Managed by primary team

## 2020-06-26 NOTE — HPI
A 58 y/o male with PMHx of CAD s/p CABG, AAA, RA, HTN, T2DM,  Adenocarcinoma of appendix s/p resection, SBO and perforation s/p colostomy was transferred from [facility] to [facility] for CRRT initiation. On 6/24/20 he presented to the OSH with nausea, emesis, and abdominal pain for approximately 24 hours.  He arrived to the ED hypothermic with significant lactic acidosis and CTwith IV contrast suggestive of SBO. Exploratory laparotomy was performed and no signs of ischemia were found. Had cardiac arrest 6/25, atropine given due to severe bradycardia, required transcutaneous pacing, ROSC achieved. Worsening renal function ensue with minimal UOP even with high dose diuretic trial and was started on HD 6/26. Required administration of multiple pressors and bicarb drip. sCr at admission was 0.9 and 5.4 day of first HD (6/26).     Nephrology consulted for CRRT initiation.

## 2020-06-26 NOTE — RESPIRATORY THERAPY
Pt arrived via transport. ETT secure 23 cm at the teeth. Placed on  at the documented settings.     Will continue to monitor.    16-Aug-2017 15:08

## 2020-06-26 NOTE — PLAN OF CARE
[facility] Transfer Acceptance Note    Transferring Facility/Hospital: [facility]    Referring Provider/Specialty giving report: Dr. Babs Almanzar - critical care/pulm    Accepting Physician for admission to hospital: Nazario Cannon MD -verbal handoff completed    Target Destination & Service: [facility]     Date of acceptance:  6/26/2020   2:46 PM    Patients name: Cristo Jefferson     Allergies:  Review of patient's allergies indicates:   Allergen Reactions    Morphine Rash        Reason for transfer:  higher level of care - CRRT/SLED     Overview/ Report from Physician/Mid-Level Provider:    HPI  60 y/o hx of Multiple prior SBO and appendiceal cancer who was admitted on 6/24 with septic shock requiring up to 3 vasopressors for support and lactic acid of 8, initially concern for recurrence of SBO and possible acute bowel ischemia, was taken by gen surgery for Exlap, no new or acute bowel ischemia found and pt returned to ICU on multiple vasopressor support and intubated on mechanical ventilation.     Since this patient continues to spike fevers, despite IV antibiotics. Last night suffered cardiac arrest, bradyarrhythmic in etiology is s/p atropine, required transcutaneous pacing, ROSC achieved.  But now patient with worsening renal function, persistent lactic acidosis (7.5) on bicarbonate infusion and continues to require 2 vasopressors.   Concern pt requires CRRT.   Report that HD line access is in place for patient and condition deteriorating quickly     Patient seen by cardiology post-arrest do not believe pt had any ACS/MI as cause of arrest.     VS: Temp:  [99 °F (37.2 °C)-105.1 °F (40.6 °C)]   Pulse:  []   Resp:  [19-45]   BP: ()/(34-70)   SpO2:  [85 %-100 %]     Labs: see epic  Lab Results   Component Value Date    [id number] Negative 06/24/2020     Lab Results   Component Value Date    WBC 11.40 06/26/2020    HGB 10.8 (L) 06/26/2020    HCT 32.4 (L)  06/26/2020    MCV 78 (L) 06/26/2020    PLT 97 (L) 06/26/2020       BMP  Lab Results   Component Value Date     (L) 06/26/2020    K 4.8 06/26/2020    CL 98 (L) 06/26/2020    CO2 15 (L) 06/26/2020    BUN 43 (H) 06/26/2020    CREATININE 5.4 (H) 06/26/2020    CALCIUM 7.2 (L) 06/26/2020    ANIONGAP 22 (H) 06/26/2020    ESTGFRAFRICA 12.2 (A) 06/26/2020    EGFRNONAA 10.5 (A) 06/26/2020     ABG  Recent Labs   Lab 06/26/20  0229   PH 7.233*   PO2 65*   PCO2 41.8   HCO3 17.6*   BE -10     Lab Results   Component Value Date    LACTATE 5.1 (HH) 06/26/2020    LACTATE 7.5 (HH) 06/25/2020    LACTATE 8.9 (HH) 06/25/2020    LACTATE 8.4 (HH) 06/24/2020         Diagnostic Tests/Radiographs:   See epic     To Do List upon arrival:    1. Consult Nephrology ICU team     2. Continue critical care measures that have been started_ Check Lactic acid_repeat [id number]    3. Consult General Surgery given hx of complex abdomen surgical hx and post-op care s/p ExLap     5. If repeat bradyarrhymia consult CCU team vs EP re:management, transvenous pacer need.             Emir Key M.D.  [facility]   Physician in Lead of [facility] Medicine Dept  Pager: [phone]   Ext: [phone]  ([phone])

## 2020-06-26 NOTE — SUBJECTIVE & OBJECTIVE
Past Medical History:   Diagnosis Date    Abdominal aortic aneurysm     Arthritis     Colon perforation     Coronary artery disease     Depression     History of diverticulitis     Hyperlipidemia     Hypertension     Primary appendiceal adenocarcinoma 2016    Completed chemotherapy    Rheumatoid arthritis     Type II diabetes mellitus        Past Surgical History:   Procedure Laterality Date    APPENDECTOMY      COLONOSCOPY N/A 3/6/2018    Procedure: COLONOSCOPY;  Surgeon: Yfn Martinez MD;  Location: [facility] ENDO (4TH FLR);  Service: Endoscopy;  Laterality: N/A;   See telephone encounter dated 1/30/18/pt on Plavix/neither PCP or cardiologist will clear him to hold med without being evaluated/wife refusing to make appointment/per Dr Riley, pt to remain on Plavix and proceed with colonoscopy with no biposies. Spoke with wife on 2/8/1    CORONARY ANGIOPLASTY WITH STENT PLACEMENT  2005    x 4    CORONARY ARTERY BYPASS GRAFT      2004    EVAR for AAA      EXPLORATORY LAPAROTOMY W/ BOWEL RESECTION  04/04/2018    LYSIS OF ADHESIONS N/A 6/24/2020    Procedure: LYSIS, ADHESIONS;  Surgeon: Duarte De La Garza MD;  Location[facility] OR;  Service: General;  Laterality: N/A;    OPEN REDUCTION AND INTERNAL FIXATION (ORIF) OF INJURY OF HAND Right     RIGHT COLECTOMY  2016    TONSILLECTOMY         Review of patient's allergies indicates:   Allergen Reactions    Morphine Rash     Current Facility-Administered Medications   Medication Frequency    [START ON 6/27/2020] famotidine tablet 20 mg Daily    fentaNYL 2500 mcg in 0.9% sodium chloride 250 mL infusion premix (titrating) Continuous    heparin (porcine) injection 5,000 Units Q8H    norepinephrine 4 mg in dextrose 5% 250 mL infusion (premix) (titrating) Continuous    propofol (DIPRIVAN) 10 mg/mL infusion Q5 Min    sodium chloride 0.9% flush 10 mL PRN    vasopressin (PITRESSIN) 0.2 Units/mL in dextrose 5 % 100 mL infusion Continuous     Family  History     Problem Relation (Age of Onset)    Cancer Father, Sister    Heart disease Mother, Father        Tobacco Use    Smoking status: Former Smoker     Types: Pipe     Quit date: 2017     Years since quittin.5    Smokeless tobacco: Former User   Substance and Sexual Activity    Alcohol use: No    Drug use: No    Sexual activity: Yes     Partners: Female     Birth control/protection: None     Review of Systems   Unable to perform ROS: Intubated     Objective:     Vital Signs (Most Recent):  Pulse: 82 (20 1800)  Resp: (!) 8 (20 1800)  BP: 134/73 (20 1800)  SpO2: (!) 91 % (20 1800)  O2 Device (Oxygen Therapy): ventilator (20 1743) Vital Signs (24h Range):  Temp:  [98.1 °F (36.7 °C)-105.1 °F (40.6 °C)] 98.1 °F (36.7 °C)  Pulse:  [] 82  Resp:  [8-105] 8  SpO2:  [79 %-100 %] 91 %  BP: ()/(34-81) 134/73     Weight: 109.5 kg (241 lb 6.5 oz) (20 180)  Body mass index is 35.65 kg/m².  Body surface area is 2.31 meters squared.    No intake/output data recorded.    Physical Exam  Vitals signs and nursing note reviewed.   Constitutional:       Appearance: He is ill-appearing.   HENT:      Mouth/Throat:      Comments: ETT in place  Neck:      Comments: Rt IJ CVC in place  Cardiovascular:      Rate and Rhythm: Normal rate and regular rhythm.   Pulmonary:      Comments: Transmitted ventilator sounds  Abdominal:      General: There is distension.      Comments: Midline surgical incision   Colostomy in place   Musculoskeletal:      Right lower leg: Edema present.      Left lower leg: Edema present.   Neurological:      Comments: Sedated         Significant Labs:  ABGs:   Recent Labs   Lab 20  1558   PH 7.406   PCO2 38.1   HCO3 23.9*   POCSATURATED 94*   BE -1     CBC:   Recent Labs   Lab 20  0739 20  1558   WBC 11.40  --    RBC 4.14*  --    HGB 10.8*  --    HCT 32.4* 31*   PLT 97*  --    MCV 78*  --    MCH 26.1*  --    MCHC 33.3  --      CMP:    Recent Labs   Lab 06/26/20  0324   *   CALCIUM 7.2*   ALBUMIN 2.0*   PROT 4.6*   *   K 4.8   CO2 15*   CL 98*   BUN 43*   CREATININE 5.4*   ALKPHOS 72   *   *   BILITOT 1.2     Coagulation:   Recent Labs   Lab 06/26/20  1801   INR 1.4*   APTT 34.2*     LFTs:   Recent Labs   Lab 06/26/20  0324   *   *   ALKPHOS 72   BILITOT 1.2   PROT 4.6*   ALBUMIN 2.0*     Recent Labs   Lab 06/24/20  0729   COLORU Yellow   SPECGRAV >=1.030*   PHUR 6.0   PROTEINUA Negative   BACTERIA Rare   NITRITE Negative   LEUKOCYTESUR Negative   UROBILINOGEN Negative     All labs within the past 24 hours have been reviewed.    Significant Imaging:  X-Ray: Reviewed  Personally reviewed

## 2020-06-26 NOTE — HPI
Mr. Jefferson is a 59-year-old  male with history of abdominal aortic aneurysm, rheumatoid arthritis prednisone, HLD, CAD s/p CABG, HTN, T2DM, history of adenocarcinoma of the appendix s/p resection and chemotherapy in 2016, history of diverticulitis, and history of numerous small bowel obstructions complicated by perforation s/p colostomy in May 2018 who presented to [facility] ED on 6/24 with complaints of one day of abdominal pain, nausea, and vomiting. Upon presentation he was noted to be hypothermic with temperature of 95.4 F in septic shock with serum lactate of 3.6. General Surgery was consulted. Patient became increasingly acidotic with rising level of serum lactate despite IVFs, NG/OG placement, and empiric antibiotics. He was ultimately taken for ex-lap for which he underwent extensive lysis of adhesions. No perforation or ischemic bowel was noted. He continued to require pressor support, became increasingly acidotic, developed COCO, anemia presumed secondary to GI bleed, and transaminitits. Cardiology, Critical Care, and Nephrology were consulted. In the AM of 6/26 patient developed bradyarrhythmia. ACLS was performed with use of atropine, bicarbonate and calcium gluconate, in addition to multiple epinephrine boluses and external pacing. Wife was contacted and wished to proceed with resuscitation attempts. Following ROSC he was noted to have elevated troponin and BNP. Prior ECHO and repeat CXR were mostly unrevealing. UOP continued to decline. Lactate acidosis advanced. Antibiotics had already been broadened so antifungals were added. He was started on RRT. He was ultimately transferred to [facility] for higher level of care.

## 2020-06-26 NOTE — ASSESSMENT & PLAN NOTE
Mr. Perales is a 58 y/o M with previously described history, physical exam, laboratories and imaging studies being consulted for continuity of RRT.   - COCO most likely is 2/2 to ATN in the setting of septic shock, cardiac arrest, IV contrast and nephrotoxic medications complicated by minimal UOP and worsening HAGMA.     Plan/Rec:   - Start SLED for volume and metabolic clearance. Will follow trend of labs and adjust dialysate baths as necessary.   - HAGMA: 2/2 to septic shock/resp failure. Bicarb bath on SLED to be adjusted accordingly.   - Follow labs serially per CRRT protocol   - Strict I/O and chart  - Avoid nephrotoxic medications, NSAIDs, IV contrast, etc.  - Medication doses adjusted to GFR  - Maintain MAP > 65  - Hb > 7 gm/dL  - Will follow closely

## 2020-06-27 NOTE — ASSESSMENT & PLAN NOTE
- hemoglobin A1c in April was 6.3%  - glucose checks Q6H given stress dose steroids  - LDSSI for now

## 2020-06-27 NOTE — HOSPITAL COURSE
Patient transferred to [facility] on 6/26. He resumed on broad spectrum antibiotics with vancomycin and Zosyn and started on SLED per Nephrology with improvement in lactic acidosis. He remains on pressors at this time (now down to two, norepinephrine and vasopressin). He underwent repeat TTE on 6/28 which showed similar EF to 6/25 prior to ACLS however there was evidence of moderate to severely right ventricular systolic with enlargement. Patient becoming increasingly more thrombocytopenic and anemic this admission without and evidence of overt bleeding and not receiving anticoagulation. HIT panel negative.  US lower extremities on 6/29 showed no evidence of DVTs. There is concern for PE however given thrombocytopenia unable to start empiric AC. He is resumed on vancomycin, Zosyn, and Diflucan was added on 6/29 while awaiting Beta-D-glucan assay and fungal culture. Sputum culture from 06/30 with Klebsiella ESBL. Changed zosyn to ertapenem and d/c'd vanc.

## 2020-06-27 NOTE — ASSESSMENT & PLAN NOTE
- lactate trend as follows: 3.6 > 5.6 > 4.3 > 8.8 > 8.4 > 8.9 > 7.5 > 5.1 > 4.0 > 3.0 > 2.2 > 2.0  - cultures so far have been without growth including repeats here at [facility]  - will continue to follow prior respiratory, urine and blood cultures  - resume broad spectrum antibiotics for now vancomycin and Zosyn (renally dosed)  - resume pressor support, currently on vasopressin and epinephrine currently  - stress does steroids with fludrocortisone 100 mg daily and hydrocortisone 100 mg IV Q8H  - serial lactates and spot ABGs  - daily CBC and RFP Q8H  - will obtain TTE

## 2020-06-27 NOTE — NURSING
Pt arrived to room via stretcher by flight care. VSS. -120. MAP > 65 via R radial arterial line. Intubated 7.5 and 23cm at the teeth. Colostomy site intact. Stoma pink in color. R forearm bruising noted. Midline incision intact. Central lines intact. Garcia present. CCS team at bedside. No distress noted. Fentanyl gtt. Vaso gtt. Levo gtt. Propofol gtt. Present.

## 2020-06-27 NOTE — PLAN OF CARE
I attempted to call the spouse (Rachel Jefferson) x 2; no answer. I left message and will attempt to call again

## 2020-06-27 NOTE — H&P
[facility]  Critical Care Medicine  History & Physical    Patient Name: Cristo Jefferson  MRN: [id number]  Admission Date: 6/26/2020  Hospital Length of Stay: 0 days  Code Status: Full Code  Attending Physician: Lisette Mon MD   Primary Care Provider: Vince Perez MD   Principal Problem: Septic shock    Subjective:     HPI:  Mr. 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 Upon presentation he was noted to be hypothermic with temperature of 95.4 F in septic shock with serum lactate of 3.6. General Surgery was consulted. Patient became increasingly acidotic with rising level of serum lactate despite IVFs, NG/OG placement, and empiric antibiotics. He was ultimately taken for ex-lap for which he underwent extensive lysis of adhesions. No perforation or ischemic bowel was noted. He continued to require pressor support, became increasingly acidotic, developed COCO, anemia presumed secondary to GI bleed, elevated troponins, and transaminitits. Cardiology, Critical Care, and Nephrology were consulted. In the AM of 6/26 patient developed bradyarrhythmia. ACLS was performed with use of atropine, bicarbonate and calcium gluconate, in addition to multiple epinephrine boluses and external pacing. Wife was contacted and wished to proceed with resuscitation attempts. Following ROSC he was noted to have elevated troponin and BNP. Prior ECHO and repeat CXR were mostly unrevealing. UOP continued to decline. Lactate acidosis advanced. Antibiotics had already been broadened so  antifungals were added. He was started on RRT. He was ultimately transferred to [facility] [place] for higher level of care.    Hospital/ICU Course:  Patient transferred to [facility] on .     Past Medical History:   Diagnosis Date    Abdominal aortic aneurysm     Arthritis     Colon perforation     Coronary artery disease     Depression     History of diverticulitis     Hyperlipidemia     Hypertension     Primary appendiceal adenocarcinoma 2016    Completed chemotherapy    Rheumatoid arthritis     Type II diabetes mellitus        Past Surgical History:   Procedure Laterality Date    APPENDECTOMY      COLONOSCOPY N/A 3/6/2018    Procedure: COLONOSCOPY;  Surgeon: Yfn Martinez MD;  Location: [facility] ([address] [place] [address]);  Service: Endoscopy;  Laterality: N/A;   See telephone encounter dated 18/pt on Plavix/neither PCP or cardiologist will clear him to hold med without being evaluated/wife refusing to make appointment/per Dr Riley, pt to remain on Plavix and proceed with colonoscopy with no biposies. Spoke with wife on     CORONARY ANGIOPLASTY WITH STENT PLACEMENT  2005    x 4    CORONARY ARTERY BYPASS GRAFT          EVAR for AAA      EXPLORATORY LAPAROTOMY W/ BOWEL RESECTION  2018    LYSIS OF ADHESIONS N/A 2020    Procedure: LYSIS, ADHESIONS;  Surgeon: Duarte De La Garza MD;  Location: [facility] OR;  Service: General;  Laterality: N/A;    OPEN REDUCTION AND INTERNAL FIXATION (ORIF) OF INJURY OF HAND Right     RIGHT COLECTOMY  2016    TONSILLECTOMY         Review of patient's allergies indicates:   Allergen Reactions    Morphine Rash       Family History     Problem Relation (Age of Onset)    Cancer Father, Sister    Heart disease Mother, Father        Tobacco Use    Smoking status: Former Smoker     Types: Pipe     Quit date: 2017     Years since quittin.5    Smokeless tobacco: Former User   Substance and Sexual Activity    Alcohol use: No    Drug use: No    Sexual activity: Yes      Partners: Female     Birth control/protection: None      Review of Systems   Unable to perform ROS: Intubated     Objective:     Vital Signs (Most Recent):  Pulse: 75 (06/26/20 1841)  Resp: (!) 25 (06/26/20 1841)  BP: 134/73 (06/26/20 1800)  SpO2: (!) 94 % (06/26/20 1841) Vital Signs (24h Range):  Temp:  [98.1 °F (36.7 °C)-105.1 °F (40.6 °C)] 98.1 °F (36.7 °C)  Pulse:  [] 75  Resp:  [8-105] 25  SpO2:  [79 %-100 %] 94 %  BP: ()/(34-81) 134/73   Weight: 109.5 kg (241 lb 6.5 oz)  Body mass index is 35.65 kg/m².    No intake or output data in the 24 hours ending 06/26/20 1904    Physical Exam  Vitals signs and nursing note reviewed.   Constitutional:       Appearance: He is morbidly obese. He is ill-appearing. He is not diaphoretic.      Interventions: He is sedated and intubated.   HENT:      Head: Normocephalic and atraumatic.      Nose: Nose normal. No congestion.      Mouth/Throat:      Mouth: Mucous membranes are dry.   Eyes:      General: No scleral icterus.     Extraocular Movements: Extraocular movements intact.      Pupils: Pupils are equal, round, and reactive to light.   Neck:      Musculoskeletal: Normal range of motion and neck supple.      Comments: CVC line to right IJ.  Cardiovascular:      Rate and Rhythm: Normal rate.      Pulses: Normal pulses.      Heart sounds: No murmur. No friction rub. No gallop.    Pulmonary:      Effort: He is intubated.      Breath sounds: No wheezing, rhonchi or rales.      Comments: Transmitted ventilatory sounds. Well healed sternotomy scar to chest. Left-sided port palpated over left chest wall. CVC to right subclavian.   Abdominal:      General: Bowel sounds are normal. There is no distension.      Comments: Obese/protuberant abdomen. Midline incision present without evidence of erythema, induration, or discharge. Ostomy with brown liquid stool to right abdominal wall.    Musculoskeletal:         General: Swelling (bilateral upper extremities) present.       Right lower leg: Edema present.      Left lower leg: Edema present.      Comments: Scar to right lower extremity from prior vein harvest.   Lymphadenopathy:      Cervical: No cervical adenopathy.   Skin:     Coloration: Skin is pale.      Findings: Bruising (upper extremities bilaterally) present.         Vents:  Vent Mode: A/C (06/26/20 1841)  Ventilator Initiated: Yes (06/26/20 1743)  Set Rate: 22 BPM (06/26/20 1841)  Vt Set: 450 mL (06/26/20 1841)  PEEP/CPAP: 10 cmH20 (06/26/20 1841)  Oxygen Concentration (%): 60 (06/26/20 1841)  Peak Airway Pressure: 27 cmH2O (06/26/20 1841)  Plateau Pressure: 29 cmH20 (06/26/20 1841)  Total Ve: 12.6 mL (06/26/20 1841)  F/VT Ratio<105 (RSBI): (!) 46.99 (06/26/20 1841)  Lines/Drains/Airways     Central Venous Catheter Line                 Port A Cath Single Lumen 05/02/18 2105 left subclavian 785 days    Percutaneous Central Line Insertion/Assessment - Triple Lumen  06/24/20 1836 right internal jugular 2 days         Hemodialysis Catheter 06/26/20 0932 less than 1 day    Permacath 06/26/20 0932 less than 1 day          Drain                 Colostomy 05/03/18 0715  days         Colostomy 06/24/20 RLQ 2 days         NG/OG Tube 06/24/20 1103 [place] sump 16 Fr. Right nostril 2 days         Urethral Catheter 06/24/20 2000 Non-latex 16 Fr. 1 day          Airway                 Airway - Non-Surgical 06/24/20 1959 Endotracheal Tube-Hi/Lo 1 day          Arterial Line                 Arterial Line 06/24/20 1852 Right Radial 2 days          Peripheral Intravenous Line                 Midline Catheter Insertion/Assessment  - Single Lumen (RETIRED) 04/03/20 1312 Right basilic vein (medial side of arm) other (see comments) 84 days         Peripheral IV - Double Lumen 06/24/20 1455 18 G Left;Posterior Hand 2 days         Peripheral IV - Single Lumen 06/24/20 0740 20 G Left Antecubital 2 days              Significant Labs:    CBC/Anemia Profile:  Recent Labs   Lab 06/26/20  0005   06/26/20  0739 06/26/20  1558 06/26/20  1801   WBC 13.50*  --  11.40  --  11.55   HGB 11.5*  --  10.8*  --  10.0*   HCT 35.2*   < > 32.4* 31* 31.8*   *  --  97*  --  69*   MCV 79*  --  78*  --  81*   RDW 18.9*  --  18.5*  --  18.1*    < > = values in this interval not displayed.        Chemistries:  Recent Labs   Lab 06/25/20  0730 06/25/20  1255 06/26/20  0324 06/26/20  1801    140 135* 135*   K 3.8 4.1 4.8 4.8    102 98* 97   CO2 15* 17* 15* 21*   BUN 28* 30* 43* 43*   CREATININE 3.1* 3.5* 5.4* 5.1*   CALCIUM 7.4* 7.3* 7.2* 7.0*   ALBUMIN 2.4*  --  2.0* 1.7*   PROT 5.5*  --  4.6* 5.1*   BILITOT 1.1  --  1.2 1.0   ALKPHOS 49  --  72 76   *  --  178* 188*   *  --  295* 188*   MG  --   --  1.5*  --        Significant Imaging: I have reviewed all pertinent imaging results/findings within the past 24 hours.    Assessment/Plan:     Pulmonary  Acute hypoxemic respiratory failure  Please see Septic shock.    Cardiac/Vascular  Cardiac arrest  LUCAS on 6/25:  · Mild concentric left ventricular hypertrophy.  · Normal left ventricular systolic function. The estimated ejection fraction is 55%.  · Normal LV diastolic function.  · Normal right ventricular systolic function.  · Normal central venous pressure (3 mmHg).  · The estimated PA systolic pressure is 25 mmHg.    - status post cardiac arrest on 6/26  in the setting of bradycardia requiring maximum dose of atropine, external pacing, and multiple doses of epinephrine  - troponin trend: 0.54 > 0.522    Renal/  Acute kidney injury  - ATN in the setting of septic shock  - serum creatinine trend as follows: 0.9 > 2.1 > 2.0 > 3.1 > 3.5 > 5.45 > 5.1  - Nephrology consulted and following, getting SLED per Nephrology  - Magnesium and RFP Q8H with daily phosphorous  - strict I/Os  - daily weights  - avoid nephrotoxic agents when possible  - renally dose all medications     Lactic acidosis  Please see Septic shock.    ID  Septic shock  - lactate trend  as follows: 3.6 > 5.6 > 4.3 > 8.8 > 8.4 > 8.9 > 7.5 > 5.1 > 4.0  - cultures so far have been without growth will repeat blood cultures now  - follow up prior respiratory, urine and blood cultures  - resume broad spectrum antibiotics for now vancomycin and Zosyn (renally dosed)  - resume pressor support, currently on vasopressin and epinephrine currently  - stress does steroids with fludrocortisone 100 mg daily  - serial lactates and spot ABGs  - daily CBC and RFP Q8H    Endocrine  Type II diabetes mellitus  - hemoglobin A1c in April was 6.3%  - glucose checks Q6H given stress dose steroids  - LDSSI for now    GI  Shock liver  Please see Septic shock.    Small bowel obstruction  - s/p ex-lap on 6/24 with extensive lysis of adhesions  - please see Septic shock        Critical Care Daily Checklist:    A: Awake: RASS Goal/Actual Goal:  talk with family, supportive care  Actual:     B: Spontaneous Breathing Trial Performed?  N/A   C: SAT & SBT Coordinated?  N/A                     D: Delirium: CAM-ICU  N/A   E: Early Mobility Performed? N/A   F: Feeding Goal:    Status:     Current Diet Order   Procedures    Diet NPO      AS: Analgesia/Sedation propofol & fentanyl    T: Thromboembolic Prophylaxis heparin   H: HOB > 300 yes   U: Stress Ulcer Prophylaxis (if needed) famotidine    G: Glucose Control LDSSI   B: Bowel Function  monitor ostomy output   I: Indwelling Catheter (Lines & Garcia) Necessity yes   D: De-escalation of Antimicrobials/Pharmacotherapies Zosyn and vancomycin     Plan for the day/ETD supportive care    Code Status:  Family/Goals of Care: Full Code         Critical secondary to Patient has a condition that poses threat to life and bodily function: Septic Shock, Acute Renal Failure, and Cardiac Arrest.     Critical care was time spent personally by me on the following activities: development of treatment plan with patient or surrogate and bedside caregivers, discussions with consultants, evaluation of  patient's response to treatment, examination of patient, ordering and performing treatments and interventions, ordering and review of laboratory studies, ordering and review of radiographic studies, pulse oximetry, re-evaluation of patient's condition. This critical care time did not overlap with that of any other provider or involve time for any procedures.     Randell Middleton MD  Critical Care Medicine  [facility]

## 2020-06-27 NOTE — SUBJECTIVE & OBJECTIVE
Past Medical History:   Diagnosis Date    Abdominal aortic aneurysm     Arthritis     Colon perforation     Coronary artery disease     Depression     History of diverticulitis     Hyperlipidemia     Hypertension     Primary appendiceal adenocarcinoma 2016    Completed chemotherapy    Rheumatoid arthritis     Type II diabetes mellitus        Past Surgical History:   Procedure Laterality Date    APPENDECTOMY      COLONOSCOPY N/A 3/6/2018    Procedure: COLONOSCOPY;  Surgeon: Yfn Martinez MD;  Location: [facility] ENDO (4TH FLR);  Service: Endoscopy;  Laterality: N/A;   See telephone encounter dated 18/pt on Plavix/neither PCP or cardiologist will clear him to hold med without being evaluated/wife refusing to make appointment/per Dr Riley, pt to remain on Plavix and proceed with colonoscopy with no biposies. Spoke with wife on     CORONARY ANGIOPLASTY WITH STENT PLACEMENT  2005    x 4    CORONARY ARTERY BYPASS GRAFT          EVAR for AAA      EXPLORATORY LAPAROTOMY W/ BOWEL RESECTION  2018    LYSIS OF ADHESIONS N/A 2020    Procedure: LYSIS, ADHESIONS;  Surgeon: Duarte De La Garza MD;  Location: [facility] OR;  Service: General;  Laterality: N/A;    OPEN REDUCTION AND INTERNAL FIXATION (ORIF) OF INJURY OF HAND Right     RIGHT COLECTOMY  2016    TONSILLECTOMY         Review of patient's allergies indicates:   Allergen Reactions    Morphine Rash       Family History     Problem Relation (Age of Onset)    Cancer Father, Sister    Heart disease Mother, Father        Tobacco Use    Smoking status: Former Smoker     Types: Pipe     Quit date: 2017     Years since quittin.5    Smokeless tobacco: Former User   Substance and Sexual Activity    Alcohol use: No    Drug use: No    Sexual activity: Yes     Partners: Female     Birth control/protection: None      Review of Systems   Unable to perform ROS: Intubated     Objective:     Vital Signs (Most Recent):  Pulse: 75  (06/26/20 1841)  Resp: (!) 25 (06/26/20 1841)  BP: 134/73 (06/26/20 1800)  SpO2: (!) 94 % (06/26/20 1841) Vital Signs (24h Range):  Temp:  [98.1 °F (36.7 °C)-105.1 °F (40.6 °C)] 98.1 °F (36.7 °C)  Pulse:  [] 75  Resp:  [8-105] 25  SpO2:  [79 %-100 %] 94 %  BP: ()/(34-81) 134/73   Weight: 109.5 kg (241 lb 6.5 oz)  Body mass index is 35.65 kg/m².    No intake or output data in the 24 hours ending 06/26/20 1904    Physical Exam  Vitals signs and nursing note reviewed.   Constitutional:       Appearance: He is morbidly obese. He is ill-appearing. He is not diaphoretic.      Interventions: He is sedated and intubated.   HENT:      Head: Normocephalic and atraumatic.      Nose: Nose normal. No congestion.      Mouth/Throat:      Mouth: Mucous membranes are dry.   Eyes:      General: No scleral icterus.     Extraocular Movements: Extraocular movements intact.      Pupils: Pupils are equal, round, and reactive to light.   Neck:      Musculoskeletal: Normal range of motion and neck supple.      Comments: CVC line to right IJ.  Cardiovascular:      Rate and Rhythm: Normal rate.      Pulses: Normal pulses.      Heart sounds: No murmur. No friction rub. No gallop.    Pulmonary:      Effort: He is intubated.      Breath sounds: No wheezing, rhonchi or rales.      Comments: Transmitted ventilatory sounds. Well healed sternotomy scar to chest. Left-sided port palpated over left chest wall. CVC to right subclavian.   Abdominal:      General: Bowel sounds are normal. There is no distension.      Comments: Obese/protuberant abdomen. Midline incision present without evidence of erythema, induration, or discharge. Ostomy with brown liquid stool to right abdominal wall.    Musculoskeletal:         General: Swelling (bilateral upper extremities) present.      Right lower leg: Edema present.      Left lower leg: Edema present.      Comments: Scar to right lower extremity from prior vein harvest.   Lymphadenopathy:       Cervical: No cervical adenopathy.   Skin:     Coloration: Skin is pale.      Findings: Bruising (upper extremities bilaterally) present.         Vents:  Vent Mode: A/C (06/26/20 1841)  Ventilator Initiated: Yes (06/26/20 1743)  Set Rate: 22 BPM (06/26/20 1841)  Vt Set: 450 mL (06/26/20 1841)  PEEP/CPAP: 10 cmH20 (06/26/20 1841)  Oxygen Concentration (%): 60 (06/26/20 1841)  Peak Airway Pressure: 27 cmH2O (06/26/20 1841)  Plateau Pressure: 29 cmH20 (06/26/20 1841)  Total Ve: 12.6 mL (06/26/20 1841)  F/VT Ratio<105 (RSBI): (!) 46.99 (06/26/20 1841)  Lines/Drains/Airways     Central Venous Catheter Line                 Port A Cath Single Lumen 05/02/18 2105 left subclavian 785 days    Percutaneous Central Line Insertion/Assessment - Triple Lumen  06/24/20 1836 right internal jugular 2 days         Hemodialysis Catheter 06/26/20 0932 less than 1 day    Permacath 06/26/20 0932 less than 1 day          Drain                 Colostomy 05/03/18 0715  days         Colostomy 06/24/20 RLQ 2 days         NG/OG Tube 06/24/20 1103 [place] sump 16 Fr. Right nostril 2 days         Urethral Catheter 06/24/20 2000 Non-latex 16 Fr. 1 day          Airway                 Airway - Non-Surgical 06/24/20 1959 Endotracheal Tube-Hi/Lo 1 day          Arterial Line                 Arterial Line 06/24/20 1852 Right Radial 2 days          Peripheral Intravenous Line                 Midline Catheter Insertion/Assessment  - Single Lumen (RETIRED) 04/03/20 1312 Right basilic vein (medial side of arm) other (see comments) 84 days         Peripheral IV - Double Lumen 06/24/20 1455 18 G Left;Posterior Hand 2 days         Peripheral IV - Single Lumen 06/24/20 0740 20 G Left Antecubital 2 days              Significant Labs:    CBC/Anemia Profile:  Recent Labs   Lab 06/26/20  0005  06/26/20  0739 06/26/20  1558 06/26/20  1801   WBC 13.50*  --  11.40  --  11.55   HGB 11.5*  --  10.8*  --  10.0*   HCT 35.2*   < > 32.4* 31* 31.8*   *  --  97*   --  69*   MCV 79*  --  78*  --  81*   RDW 18.9*  --  18.5*  --  18.1*    < > = values in this interval not displayed.        Chemistries:  Recent Labs   Lab 06/25/20  0730 06/25/20  1255 06/26/20  0324 06/26/20  1801    140 135* 135*   K 3.8 4.1 4.8 4.8    102 98* 97   CO2 15* 17* 15* 21*   BUN 28* 30* 43* 43*   CREATININE 3.1* 3.5* 5.4* 5.1*   CALCIUM 7.4* 7.3* 7.2* 7.0*   ALBUMIN 2.4*  --  2.0* 1.7*   PROT 5.5*  --  4.6* 5.1*   BILITOT 1.1  --  1.2 1.0   ALKPHOS 49  --  72 76   *  --  178* 188*   *  --  295* 188*   MG  --   --  1.5*  --        Significant Imaging: I have reviewed all pertinent imaging results/findings within the past 24 hours.

## 2020-06-27 NOTE — PROGRESS NOTES
Pharmacokinetic Assessment Follow Up: IV Vancomycin    Assessment and Plan:    - Continuing vanc from OSH, where pt was supratherapeutic to 42.4 mcg/mL  - Vanc random level down to 21.6 mcg/mL overnight  - Now on continuous SLED for COCO- dose by level  - Administer 500 mg x 1 now as patient is currently receiving SLED  - Draw random level with AM labs tomorrow 6/28   - Will re-dose based on level and RRT plan    Drug levels (last 3 results):  Recent Labs   Lab Result Units 06/26/20  1929 06/26/20  2203 06/27/20  0315   Vancomycin, Random ug/mL 27.8 25.0 21.6       Pharmacy will continue to follow and monitor vancomycin.    Please contact pharmacy at extension [phone] for questions regarding this assessment.    Thank you for the consult,   Yue Olivera, PharmD, [facility]  [id number]       Patient brief summary:  Cristo Jefferson is a 59 y.o. male initiated on antimicrobial therapy with IV Vancomycin for treatment of sepsis    The patient's current regimen is dosed by level    Drug Allergies:   Review of patient's allergies indicates:   Allergen Reactions    Morphine Rash       Actual Body Weight:   109.5 kg    Renal Function:   Estimated Creatinine Clearance: 30.5 mL/min (A) (based on SCr of 3.1 mg/dL (H)).,     Dialysis Method (if applicable):  SLED    CBC (last 72 hours):  Recent Labs   Lab Result Units 06/24/20  1819 06/25/20  0103 06/25/20  0520 06/25/20  1153 06/26/20  0005 06/26/20  0739 06/26/20  1801 06/27/20  0315   WBC K/uL 7.80 15.70* 16.10* 18.00* 13.50* 11.40 11.55 11.32   Hemoglobin g/dL 15.6 13.9* 13.6* 12.8* 11.5* 10.8* 10.0* 9.5*   Hematocrit % 48.9 44.4 42.9 39.9* 35.2* 32.4* 31.8* 29.7*   Platelets K/uL 194 228 214 172 134* 97* 69* 58*   Gran% % 61.5 50.0 73.9*  --  22.0* 53.0 79.2* 78.3*   Lymph% % 28.1 19.0 17.9*  --  9.0* 4.0* 12.6* 11.3*   Mono% % 7.9 10.0 7.8  --  11.0 7.0 6.9 9.3   Eosinophil% % 2.3 0.0 0.1  --  0.0 0.0 0.1 0.1   Basophil% % 0.2 0.0 0.3  --  0.0 0.0 0.7 0.4   Differential Method   Automated Manual Automated  --  Manual Manual Automated Automated       Metabolic Panel (last 72 hours):  Recent Labs   Lab Result Units 06/24/20  1819 06/24/20  2344 06/25/20  0730 06/25/20  1255 06/26/20  0324 06/26/20  1801 06/26/20  2051 06/26/20  2203 06/27/20  0315   Sodium mmol/L 141 144 141 140 135* 135*  --  132* 136   Potassium mmol/L 3.4* 3.2* 3.8 4.1 4.8 4.8  --  4.8 4.6   Chloride mmol/L 103 108 102 102 98* 97  --  95 95   CO2 mmol/L 19* 20* 15* 17* 15* 21*  --  23 29   Glucose mg/dL 164* 218* 176* 179* 216* 173*  --  222* 146*   Glucose, UA   --   --   --   --   --   --  1+*  --   --    BUN, Bld mg/dL 23 24* 28* 30* 43* 43*  --  36* 25*   Creatinine mg/dL 2.1* 2.0* 3.1* 3.5* 5.4* 5.1*  --  4.3* 3.1*   Albumin g/dL 3.0* 2.4* 2.4*  --  2.0* 1.7*  --  1.6* 1.6*   Total Bilirubin mg/dL 0.8  --  1.1  --  1.2 1.0  --   --  0.9   Alkaline Phosphatase U/L 52  --  49  --  72 76  --   --  83   AST U/L 58*  --  170*  --  295* 188*  --   --  145*   ALT U/L 47  --  108*  --  178* 188*  --   --  176*   Magnesium mg/dL 1.9  --   --   --  1.5*  --   --  1.8 1.7   Phosphorus mg/dL 3.7  --   --   --   --   --   --  5.4* 4.1       Vancomycin Administrations:  vancomycin given in the last 96 hours                   vancomycin (VANCOCIN) 1,750 mg in dextrose 5 % 500 mL IVPB (mg) 1,750 mg New Bag 06/25/20 2257     1,750 mg New Bag  0119                Microbiologic Results:  Microbiology Results (last 7 days)     Procedure Component Value Units Date/Time    Blood Culture #1 **CANNOT BE ORDERED STAT** [[id number]] Collected: 06/26/20 2330    Order Status: Completed Specimen: Blood from Peripheral, Foot, Right Updated: 06/27/20 0715     Blood Culture, Routine No Growth to date    Blood culture [[id number]] Collected: 06/26/20 2353    Order Status: Completed Specimen: Blood from Peripheral, Ankle, Right Updated: 06/27/20 0715     Blood Culture, Routine No Growth to date

## 2020-06-27 NOTE — ASSESSMENT & PLAN NOTE
- ATN in the setting of septic shock  - serum creatinine trend as follows: 0.9 > 2.1 > 2.0 > 3.1 > 3.5 > 5.45 > 5.1 (now SLED)  - Nephrology consulted and following, getting SLED per Nephrology  - Magnesium and RFP Q8H with daily phosphorous  - strict I/Os  - daily weights  - avoid nephrotoxic agents when possible  - renally dose all medications

## 2020-06-27 NOTE — ASSESSMENT & PLAN NOTE
- ATN in the setting of septic shock  - serum creatinine trend as follows: 0.9 > 2.1 > 2.0 > 3.1 > 3.5 > 5.45 > 5.1  - Nephrology consulted and following, getting SLED per Nephrology  - Magnesium and RFP Q8H with daily phosphorous  - strict I/Os  - daily weights  - avoid nephrotoxic agents when possible  - renally dose all medications

## 2020-06-27 NOTE — NURSING
See vital signs and assessments in flowsheets. See below for updates on today's progress.        Pulmonary:  Vent, fio2: 40%, peep 5, TV:420, oxygen saturations >92% throughout shift     Cardiovascular: NSR, HR: 70-90's, SBP : 100-110's,     Neurological: Afebrile, Sedated, off sedation patient able to move all extremities and follows commands     Gastrointestinal: colostomy, output ~400/shift     Genitourinary: voids via weaver 25cc / shift, CRRT UF: 300     Endocrine: ACHS, BG: WNL, sliding scale     Integumentary/Other: REFER TO FLOWSHEETS     Infusions:  Propofol @ 45, Fentanyl @ 75, Levophed @ 0.32, Vasopressin @ 0.04     POC: Continue to monitor patient hemodynamically and continue plan of care. Plan discussed with patient and wife at bedside all questions and concerns were addressed. No further questions at this time.

## 2020-06-27 NOTE — ASSESSMENT & PLAN NOTE
- lactate trend as follows: 3.6 > 5.6 > 4.3 > 8.8 > 8.4 > 8.9 > 7.5 > 5.1 > 4.0  - cultures so far have been without growth will repeat blood cultures now  - follow up prior respiratory, urine and blood cultures  - resume broad spectrum antibiotics for now vancomycin and Zosyn (renally dosed)  - resume pressor support, currently on vasopressin and epinephrine currently  - stress does steroids with fludrocortisone 100 mg daily  - serial lactates and spot ABGs  - daily CBC and RFP Q8H

## 2020-06-27 NOTE — NURSING
Sedation holiday started. Shortly after patient able to follow commands. Able to squeeze right / left hand; wiggle toes to command. Patient re-sedated to keep comfortable and sync with vent. Will continue to monitor patient.

## 2020-06-27 NOTE — ASSESSMENT & PLAN NOTE
LUCAS on 6/25:  · Mild concentric left ventricular hypertrophy.  · Normal left ventricular systolic function. The estimated ejection fraction is 55%.  · Normal LV diastolic function.  · Normal right ventricular systolic function.  · Normal central venous pressure (3 mmHg).  · The estimated PA systolic pressure is 25 mmHg.    - status post cardiac arrest on 6/26  in the setting of bradycardia requiring maximum dose of atropine, external pacing, and multiple doses of epinephrine  - troponin trend: 0.54 > 0.522

## 2020-06-27 NOTE — PROGRESS NOTES
[facility]  Critical Care Medicine  Progress Note    Patient Name: Cristo Jefferson  MRN: [id number]  Admission Date: 6/26/2020  Hospital Length of Stay: 1 days  Code Status: Full Code  Attending Provider: Mahi Hewitt MD  Primary Care Provider: Vince Perez MD   Principal Problem: Septic shock    Subjective:     HPI:  Mr. 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 Upon presentation he was noted to be hypothermic with temperature of 95.4 F in septic shock with serum lactate of 3.6. General Surgery was consulted. Patient became increasingly acidotic with rising level of serum lactate despite IVFs, NG/OG placement, and empiric antibiotics. He was ultimately taken for ex-lap for which he underwent extensive lysis of adhesions. No perforation or ischemic bowel was noted. He continued to require pressor support, became increasingly acidotic, developed COCO, anemia presumed secondary to GI bleed, and transaminitits. Cardiology, Critical Care, and Nephrology were consulted. In the AM of 6/26 patient developed bradyarrhythmia. ACLS was performed with use of atropine, bicarbonate and calcium gluconate, in addition to multiple epinephrine boluses and external pacing. Wife was contacted and wished to proceed with resuscitation attempts. Following ROSC he was noted to have elevated troponin and BNP. Prior ECHO and repeat CXR were mostly unrevealing. UOP continued to decline. Lactate acidosis advanced. Antibiotics had already been broadened so antifungals were added. He was started  on RRT. He was ultimately transferred to [facility] for higher level of care.    Hospital/ICU Course:  Patient transferred to [facility] on 6/26. He resumed on broad spectrum antibiotics with vancomycin and Zosyn and started on SLED per Nephrology with improvement in lactic acidosis. He remains on pressors at this time. Continuing supportive care at this time.     Interval History/Significant Events: Patient seen and examined this AM. NO acute events overnight. He remains anuric with only 10 mL of UOP overnight. Otherwise leukocytosis improving. Cultures remain without growth to date. Will obtain repeat TTE and wean sedation today to assess patient's mentation. Wife at bedside and informed of plan of care. All questions answered.    Review of Systems   Unable to perform ROS: Intubated     Objective:     Vital Signs (Most Recent):  Temp: 98.6 °F (37 °C) (06/27/20 1505)  Pulse: 70 (06/27/20 1600)  Resp: (!) 22 (06/27/20 1600)  BP: 99/63 (06/27/20 1600)  SpO2: 96 % (06/27/20 1600) Vital Signs (24h Range):  Temp:  [97 °F (36.1 °C)-99.1 °F (37.3 °C)] 98.6 °F (37 °C)  Pulse:  [69-82] 70  Resp:  [8-26] 22  SpO2:  [91 %-97 %] 96 %  BP: ()/(57-73) 99/63  Arterial Line BP: (107-117)/(48-54) 114/50   Weight: 109.5 kg (241 lb 6.5 oz)  Body mass index is 35.65 kg/m².      Intake/Output Summary (Last 24 hours) at 6/27/2020 1703  Last data filed at 6/27/2020 1600  Gross per 24 hour   Intake 4738.62 ml   Output 3997 ml   Net 741.62 ml       Physical Exam  Vitals signs and nursing note reviewed.   Constitutional:       Appearance: He is morbidly obese. He is ill-appearing. He is not diaphoretic.      Interventions: He is sedated and intubated.   HENT:      Head: Normocephalic and atraumatic.      Nose: Nose normal. No congestion.      Mouth/Throat:      Mouth: Mucous membranes are dry.   Eyes:      General: No scleral icterus.     Extraocular Movements: Extraocular movements intact.      Pupils: Pupils are equal, round, and reactive to  light.   Neck:      Musculoskeletal: Normal range of motion and neck supple.      Comments: CVC line to right IJ.  Cardiovascular:      Rate and Rhythm: Normal rate.      Pulses: Normal pulses.      Heart sounds: No murmur. No friction rub. No gallop.    Pulmonary:      Effort: He is intubated.      Breath sounds: No wheezing, rhonchi or rales.      Comments: Transmitted ventilatory sounds. Well healed sternotomy scar to chest. Left-sided port palpated over left chest wall. CVC to right subclavian.   Abdominal:      General: Bowel sounds are normal. There is no distension.      Comments: Obese/protuberant abdomen. Midline incision present without evidence of erythema, induration, or discharge. Ostomy with brown liquid stool to right abdominal wall.    Musculoskeletal:         General: Swelling (bilateral upper extremities) present.      Right lower leg: Edema present.      Left lower leg: Edema present.      Comments: Scar to right lower extremity from prior vein harvest.   Lymphadenopathy:      Cervical: No cervical adenopathy.   Skin:     Coloration: Skin is pale.      Findings: Bruising (upper extremities bilaterally) present.         Vents:  Vent Mode: A/C (06/27/20 1526)  Ventilator Initiated: Yes (06/26/20 1743)  Set Rate: 22 BPM (06/27/20 1526)  Vt Set: 420 mL (06/27/20 1526)  PEEP/CPAP: 10 cmH20 (06/27/20 1526)  Oxygen Concentration (%): 40 (06/27/20 1600)  Peak Airway Pressure: 31 cmH2O (06/27/20 1526)  Plateau Pressure: 20 cmH20 (06/27/20 1526)  Total Ve: 10.3 mL (06/27/20 1526)  F/VT Ratio<105 (RSBI): (!) 60.32 (06/27/20 1526)  Lines/Drains/Airways     Central Venous Catheter Line                 Port A Cath Single Lumen 05/02/18 2105 left subclavian 786 days    Percutaneous Central Line Insertion/Assessment - Triple Lumen  06/24/20 1836 right internal jugular 2 days         Hemodialysis Catheter 06/26/20 0932 1 day    Permacath 06/26/20 0932 1 day          Drain                 Colostomy 05/03/18 0715   days         Colostomy 06/24/20 RLQ 3 days         NG/OG Tube 06/24/20 1103 [place] sump 16 Fr. Right nostril 3 days         Urethral Catheter 06/26/20 1932 less than 1 day          Airway                 Airway - Non-Surgical 06/24/20 1959 Endotracheal Tube-Hi/Lo 2 days          Arterial Line                 Arterial Line 06/24/20 1852 Right Radial 2 days          Peripheral Intravenous Line                 Midline Catheter Insertion/Assessment  - Single Lumen (RETIRED) 04/03/20 1312 Right basilic vein (medial side of arm) other (see comments) 85 days         Peripheral IV - Double Lumen 06/24/20 1455 18 G Left;Posterior Hand 3 days         Peripheral IV - Single Lumen 06/24/20 0740 20 G Left Antecubital 3 days              Significant Labs:    CBC/Anemia Profile:  Recent Labs   Lab 06/26/20  0739 06/26/20  1558 06/26/20  1801 06/27/20  0315   WBC 11.40  --  11.55 11.32   HGB 10.8*  --  10.0* 9.5*   HCT 32.4* 31* 31.8* 29.7*   PLT 97*  --  69* 58*   MCV 78*  --  81* 81*   RDW 18.5*  --  18.1* 17.9*        Chemistries:  Recent Labs   Lab 06/26/20  0324 06/26/20  1801 06/26/20  2203 06/27/20  0315 06/27/20  1450   * 135* 132* 136 138   K 4.8 4.8 4.8 4.6 4.5   CL 98* 97 95 95 101   CO2 15* 21* 23 29 24   BUN 43* 43* 36* 25* 12   CREATININE 5.4* 5.1* 4.3* 3.1* 1.8*   CALCIUM 7.2* 7.0* 7.1* 7.1* 7.5*   ALBUMIN 2.0* 1.7* 1.6* 1.6* 1.6*   PROT 4.6* 5.1*  --  5.3*  --    BILITOT 1.2 1.0  --  0.9  --    ALKPHOS 72 76  --  83  --    * 188*  --  176*  --    * 188*  --  145*  --    MG 1.5*  --  1.8 1.7 2.0   PHOS  --   --  5.4* 4.1 3.0     Significant Imaging:  I have reviewed all pertinent imaging results/findings within the past 24 hours.      ABG  Recent Labs   Lab 06/26/20  1841   PH 7.412   PO2 77*   PCO2 35.7   HCO3 22.7*   BE -2     Assessment/Plan:     Pulmonary  Acute hypoxemic respiratory failure  Please see Septic shock.    Cardiac/Vascular  Cardiac arrest  LUCAS on 6/25:  · Mild concentric  left ventricular hypertrophy.  · Normal left ventricular systolic function. The estimated ejection fraction is 55%.  · Normal LV diastolic function.  · Normal right ventricular systolic function.  · Normal central venous pressure (3 mmHg).  · The estimated PA systolic pressure is 25 mmHg.    - status post cardiac arrest on 6/26  in the setting of bradycardia requiring maximum dose of atropine, external pacing, and multiple doses of epinephrine  - troponin trend: 0.54 > 0.522  - will obtain repeat TTE s/p ACLS    Renal/  High anion gap metabolic acidosis  Please see Septic shock.    Acute kidney injury  - ATN in the setting of septic shock  - serum creatinine trend as follows: 0.9 > 2.1 > 2.0 > 3.1 > 3.5 > 5.45 > 5.1 (now SLED)  - Nephrology consulted and following, getting SLED per Nephrology  - Magnesium and RFP Q8H with daily phosphorous  - strict I/Os  - daily weights  - avoid nephrotoxic agents when possible  - renally dose all medications     Lactic acidosis  Please see Septic shock.    ID  * Septic shock  - lactate trend as follows: 3.6 > 5.6 > 4.3 > 8.8 > 8.4 > 8.9 > 7.5 > 5.1 > 4.0 > 3.0 > 2.2 > 2.0  - cultures so far have been without growth including repeats here at [facility]  - will continue to follow prior respiratory, urine and blood cultures  - resume broad spectrum antibiotics for now vancomycin and Zosyn (renally dosed)  - resume pressor support, currently on vasopressin and epinephrine currently  - stress does steroids with fludrocortisone 100 mg daily and hydrocortisone 100 mg IV Q8H  - serial lactates and spot ABGs  - daily CBC and RFP Q8H  - will obtain TTE    Endocrine  Type II diabetes mellitus  - hemoglobin A1c in April was 6.3%  - glucose checks Q6H given stress dose steroids  - LDSSI for now    GI  Shock liver  Please see Septic shock.    Small bowel obstruction  - s/p ex-lap on 6/24 with extensive lysis of adhesions  - please see Septic shock    Critical secondary to Patient has a condition  that poses threat to life and bodily function: Acute Renal Failure and Shock.  Patient is currently receiving parenteral controlled substances: Fentanyl.       Critical care was time spent personally by me on the following activities: development of treatment plan with patient or surrogate and bedside caregivers, discussions with consultants, evaluation of patient's response to treatment, examination of patient, ordering and performing treatments and interventions, ordering and review of laboratory studies, ordering and review of radiographic studies, pulse oximetry, re-evaluation of patient's condition. This critical care time did not overlap with that of any other provider or involve time for any procedures.     Randell Middleton MD  Critical Care Medicine  [facility]

## 2020-06-27 NOTE — ASSESSMENT & PLAN NOTE
LUCAS on 6/25:  · Mild concentric left ventricular hypertrophy.  · Normal left ventricular systolic function. The estimated ejection fraction is 55%.  · Normal LV diastolic function.  · Normal right ventricular systolic function.  · Normal central venous pressure (3 mmHg).  · The estimated PA systolic pressure is 25 mmHg.    - status post cardiac arrest on 6/26  in the setting of bradycardia requiring maximum dose of atropine, external pacing, and multiple doses of epinephrine  - troponin trend: 0.54 > 0.522  - will obtain repeat TTE s/p ACLS

## 2020-06-27 NOTE — PROGRESS NOTES
SLED treatment initiated to right IJ trialysis. Flows were sluggish, unable to aspirate blood from red port of right IJ trialysis. Lines reversed when connected, treatment initiated. See flow sheet for details.

## 2020-06-28 NOTE — ASSESSMENT & PLAN NOTE
LUCAS on 6/25:  · Mild concentric left ventricular hypertrophy.  · Normal left ventricular systolic function. The estimated ejection fraction is 55%.  · Normal LV diastolic function.  · Normal right ventricular systolic function.  · Normal central venous pressure (3 mmHg).  · The estimated PA systolic pressure is 25 mmHg.    LUCAS on 6/28:  · Technically challenging portable study.  · Mildly decreased left ventricular systolic function. The estimated ejection fraction is 45-50%.  · Grade I (mild) left ventricular diastolic dysfunction consistent with impaired relaxation.  · Mild aortic regurgitation.  · Moderate right ventricular enlargement.  · Moderately to severely reduced right ventricular systolic function.  · Mild tricuspid regurgitation.  · The estimated PA systolic pressure is 27 mmHg.  · Normal central venous pressure (3 mmHg).  · Mild left atrial enlargement.    - status post cardiac arrest on 6/26  in the setting of bradycardia requiring maximum dose of atropine, external pacing, and multiple doses of epinephrine  - troponin trend: 0.54 > 0.522  - will obtain US lower extremities with Doppler given concern for VTE  - holding off on empiric heparin and other forms of AC in light of thrombocytopenia and anemia

## 2020-06-28 NOTE — PROGRESS NOTES
Pharmacokinetic Assessment Follow Up: IV Vancomycin    Assessment and Plan:    - Vanc AM random today is 15 mcg/mL  - On continuous SLED for COCO- dose by level  - Administer vanc 1000 mg x 1   - Draw random level with AM labs tomorrow 6/29   - Will re-dose based on level and RRT plan    Drug levels (last 3 results):  Recent Labs   Lab Result Units 06/27/20  0315 06/27/20  0949 06/28/20  0430   Vancomycin, Random ug/mL 21.6 17.7 15.0       Pharmacy will continue to follow and monitor vancomycin.    Please contact pharmacy at extension [phone] for questions regarding this assessment.    Thank you for the consult,   Yue Olivera, PharmD, [facility]  [phone]       Patient brief summary:  Cristo Jefferson is a 59 y.o. male initiated on antimicrobial therapy with IV Vancomycin for treatment of sepsis    The patient's current regimen is dosed by level    Drug Allergies:   Review of patient's allergies indicates:   Allergen Reactions    Morphine Rash       Actual Body Weight:   109.5 kg    Renal Function:   Estimated Creatinine Clearance: 59.1 mL/min (A) (based on SCr of 1.6 mg/dL (H)).,     Dialysis Method (if applicable):  SLED    CBC (last 72 hours):  Recent Labs   Lab Result Units 06/25/20  1153 06/26/20  0005 06/26/20  0739 06/26/20  1801 06/27/20  0315 06/28/20  0430   WBC K/uL 18.00* 13.50* 11.40 11.55 11.32 8.52   Hemoglobin g/dL 12.8* 11.5* 10.8* 10.0* 9.5* 9.0*   Hematocrit % 39.9* 35.2* 32.4* 31.8* 29.7* 29.7*   Platelets K/uL 172 134* 97* 69* 58* 47*   Gran% %  --  22.0* 53.0 79.2* 78.3* 75.2*   Lymph% %  --  9.0* 4.0* 12.6* 11.3* 12.2*   Mono% %  --  11.0 7.0 6.9 9.3 11.0   Eosinophil% %  --  0.0 0.0 0.1 0.1 0.5   Basophil% %  --  0.0 0.0 0.7 0.4 0.4   Differential Method   --  Manual Manual Automated Automated Automated       Metabolic Panel (last 72 hours):  Recent Labs   Lab Result Units 06/25/20  1255 06/26/20  0324 06/26/20  1801 06/26/20  2051 06/26/20  2203 06/27/20  0315 06/27/20  1450 06/27/20  2040  06/28/20  0430   Sodium mmol/L 140 135* 135*  --  132* 136 138 138 138  138   Potassium mmol/L 4.1 4.8 4.8  --  4.8 4.6 4.5 4.9 4.9  4.9   Chloride mmol/L 102 98* 97  --  95 95 101 103 103  103   CO2 mmol/L 17* 15* 21*  --  23 29 24 21* 20*  20*   Glucose mg/dL 179* 216* 173*  --  222* 146* 130* 157* 158*  158*   Glucose, UA   --   --   --  1+*  --   --   --   --   --    BUN, Bld mg/dL 30* 43* 43*  --  36* 25* 12 11 12  12   Creatinine mg/dL 3.5* 5.4* 5.1*  --  4.3* 3.1* 1.8* 1.7* 1.6*  1.6*   Albumin g/dL  --  2.0* 1.7*  --  1.6* 1.6* 1.6* 1.6* 1.7*  1.7*   Total Bilirubin mg/dL  --  1.2 1.0  --   --  0.9  --   --  1.2*   Alkaline Phosphatase U/L  --  72 76  --   --  83  --   --  104   AST U/L  --  295* 188*  --   --  145*  --   --  80*   ALT U/L  --  178* 188*  --   --  176*  --   --  133*   Magnesium mg/dL  --  1.5*  --   --  1.8 1.7 2.0 2.1 2.0  2.0   Phosphorus mg/dL  --   --   --   --  5.4* 4.1 3.0 2.7 3.5  3.5       Vancomycin Administrations:  vancomycin given in the last 96 hours                   vancomycin (VANCOCIN) 1,750 mg in dextrose 5 % 500 mL IVPB (mg) 1,750 mg New Bag 06/25/20 2257     1,750 mg New Bag  0119                Microbiologic Results:  Microbiology Results (last 7 days)     Procedure Component Value Units Date/Time    Blood Culture #1 **CANNOT BE ORDERED STAT** [[id number]] Collected: 06/26/20 3050    Order Status: Completed Specimen: Blood from Peripheral, Foot, Right Updated: 06/28/20 0613     Blood Culture, Routine No Growth to date      No Growth to date    Blood culture [[id number]] Collected: 06/26/20 6763    Order Status: Completed Specimen: Blood from Peripheral, Ankle, Right Updated: 06/28/20 0613     Blood Culture, Routine No Growth to date      No Growth to date

## 2020-06-28 NOTE — ASSESSMENT & PLAN NOTE
- hemoglobin A1c in April was 6.3%  - glucose checks Q6H given stress dose steroids  - LDSSI for now  - goal glucose 140-180 while inpatient

## 2020-06-28 NOTE — PROGRESS NOTES
[facility]  Critical Care Medicine  Progress Note    Patient Name: Cristo Jefferson  MRN: [id number]  Admission Date: 6/26/2020  Hospital Length of Stay: 2 days  Code Status: Full Code  Attending Provider: Mahi Hewitt MD  Primary Care Provider: Vince Perez MD   Principal Problem: Septic shock    Subjective:     HPI:  Mr. 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 Upon presentation he was noted to be hypothermic with temperature of 95.4 F in septic shock with serum lactate of 3.6. General Surgery was consulted. Patient became increasingly acidotic with rising level of serum lactate despite IVFs, NG/OG placement, and empiric antibiotics. He was ultimately taken for ex-lap for which he underwent extensive lysis of adhesions. No perforation or ischemic bowel was noted. He continued to require pressor support, became increasingly acidotic, developed COCO, anemia presumed secondary to GI bleed, and transaminitits. Cardiology, Critical Care, and Nephrology were consulted. In the AM of 6/26 patient developed bradyarrhythmia. ACLS was performed with use of atropine, bicarbonate and calcium gluconate, in addition to multiple epinephrine boluses and external pacing. Wife was contacted and wished to proceed with resuscitation attempts. Following ROSC he was noted to have elevated troponin and BNP. Prior ECHO and repeat CXR were mostly unrevealing. UOP continued to decline. Lactate acidosis advanced. Antibiotics had already been broadened so antifungals were added. He was started  on RRT. He was ultimately transferred to [facility] for higher level of care.    Hospital/ICU Course:  Patient transferred to [facility] on 6/26. He resumed on broad spectrum antibiotics with vancomycin and Zosyn and started on SLED per Nephrology with improvement in lactic acidosis. He remains on pressors at this time. He underwent repeat TTE on 6/28 which showed similar EF to 6/25 prior to ACLS however there was evidence of moderate to severely right ventricular systolic with enlargement. Patient becoming increasingly more thrombocytopenic and anemic this admission without and evidence of overt bleeding and not receiving anticoagulation so obtaining US lower extremities before initiating empiric treatment for VTE. Continuing supportive care at this time.     Interval History/Significant Events: Patient seen and examined this AM. Episode of hypoxia and hypotension overnight requiring brief period of bagging. Ultimately stabilized and hypoxic episode attributed to likely mucous plugging.  He remains anuric with only 30 mL of UOP in that past 24 hours. Sedation weaned today and patient with purposeful movements following commands per nursing staff. Cultures remain without growth to date. Lactate normalized. LFTs down trending. Patient becoming increasingly anemic and thrombocytopenic. HIT ordered although patient not receiving AC or heparin products given recent abdominal surgery. His pressor support has improved from day prior. TTE revealing for new decreased RV systolic function with enlargement. Will obtain US lower extremities given concern for VTE as likely culprit for ACLS. Holding off on heparin infusion given anemia and thrombocytopenia. Continuing supportive care. Wife at bedside and informed of plan of care. All questions answered.     Review of Systems   Unable to perform ROS: Intubated     Objective:     Vital Signs (Most Recent):  Temp: 99.7 °F (37.6 °C) (06/28/20 1505)  Pulse: 67 (06/28/20 1612)  Resp: (!) 22  (06/28/20 1612)  BP: 99/63 (06/28/20 1600)  SpO2: 96 % (06/28/20 1612) Vital Signs (24h Range):  Temp:  [99 °F (37.2 °C)-101.6 °F (38.7 °C)] 99.7 °F (37.6 °C)  Pulse:  [65-76] 67  Resp:  [20-38] 22  SpO2:  [77 %-99 %] 96 %  BP: ()/(51-76) 99/63  Arterial Line BP: ()/(46-66) 113/55   Weight: 109.3 kg (241 lb)  Body mass index is 35.59 kg/m².      Intake/Output Summary (Last 24 hours) at 6/28/2020 1643  Last data filed at 6/28/2020 1630  Gross per 24 hour   Intake 6554.9 ml   Output 6932 ml   Net -377.1 ml       Physical Exam  Vitals signs and nursing note reviewed.   Constitutional:       Appearance: He is morbidly obese. He is ill-appearing. He is not diaphoretic.      Interventions: He is sedated and intubated.   HENT:      Head: Normocephalic and atraumatic.      Nose: Nose normal. No congestion.      Mouth/Throat:      Mouth: Mucous membranes are dry.   Eyes:      General: No scleral icterus.     Extraocular Movements: Extraocular movements intact.      Pupils: Pupils are equal, round, and reactive to light.      Comments: Scleral edema note bilaterally.   Neck:      Musculoskeletal: Normal range of motion and neck supple.      Comments: CVC line to right IJ.  Cardiovascular:      Rate and Rhythm: Normal rate.      Pulses: Normal pulses.      Heart sounds: No murmur. No friction rub. No gallop.    Pulmonary:      Effort: He is intubated.      Breath sounds: No wheezing, rhonchi or rales.      Comments: Transmitted ventilatory sounds. Well healed sternotomy scar to chest. Left-sided port palpated over left chest wall. CVC to right subclavian.   Abdominal:      General: Bowel sounds are normal. There is no distension.      Comments: Obese/protuberant abdomen. Midline incision present without evidence of erythema, induration, or discharge. Ostomy with brown liquid stool to right abdominal wall.    Musculoskeletal:         General: Swelling (bilateral upper extremities) present.      Right lower leg:  Edema present.      Left lower leg: Edema present.      Comments: Scar to right lower extremity from prior vein harvest.   Lymphadenopathy:      Cervical: No cervical adenopathy.   Skin:     Coloration: Skin is pale.      Findings: Bruising (upper extremities bilaterally) present.         Vents:  Vent Mode: A/C (06/28/20 1612)  Ventilator Initiated: Yes (06/26/20 1743)  Set Rate: 22 BPM (06/28/20 1612)  Vt Set: 420 mL (06/28/20 1612)  PEEP/CPAP: 5 cmH20 (06/28/20 1612)  Oxygen Concentration (%): 35 (06/28/20 1612)  Peak Airway Pressure: 28 cmH2O (06/28/20 1612)  Plateau Pressure: 16 cmH20 (06/28/20 1612)  Total Ve: 9.29 mL (06/28/20 1612)  F/VT Ratio<105 (RSBI): (!) 51.76 (06/28/20 1612)  Lines/Drains/Airways     Central Venous Catheter Line                 Port A Cath Single Lumen 05/02/18 2105 left subclavian 787 days    Percutaneous Central Line Insertion/Assessment - Triple Lumen  06/24/20 1836 right internal jugular 3 days         Hemodialysis Catheter 06/26/20 0932 2 days    Permacath 06/26/20 0932 2 days          Drain                 Colostomy 05/03/18 0715  days         Colostomy 06/24/20 RLQ 4 days         NG/OG Tube 06/24/20 1103 Melvin sump 16 Fr. Right nostril 4 days         Urethral Catheter 06/26/20 1932 1 day          Airway                 Airway - Non-Surgical 06/24/20 1959 Endotracheal Tube-Hi/Lo 3 days          Arterial Line                 Arterial Line 06/24/20 1852 Right Radial 3 days          Peripheral Intravenous Line                 Midline Catheter Insertion/Assessment  - Single Lumen (RETIRED) 04/03/20 1312 Right basilic vein (medial side of arm) other (see comments) 86 days         Peripheral IV - Double Lumen 06/24/20 1455 18 G Left;Posterior Hand 4 days         Peripheral IV - Single Lumen 06/24/20 0740 20 G Left Antecubital 4 days              Significant Labs:    CBC/Anemia Profile:  Recent Labs   Lab 06/26/20  1801 06/27/20  0315 06/28/20  0430   WBC 11.55 11.32 8.52   HGB  10.0* 9.5* 9.0*   HCT 31.8* 29.7* 29.7*   PLT 69* 58* 47*   MCV 81* 81* 85   RDW 18.1* 17.9* 18.5*        Chemistries:  Recent Labs   Lab 06/26/20  1801  06/27/20  0315  06/27/20  2040 06/28/20  0430 06/28/20  1340   *   < > 136   < > 138 138  138 136  136   K 4.8   < > 4.6   < > 4.9 4.9  4.9 4.6  4.6   CL 97   < > 95   < > 103 103  103 101  101   CO2 21*   < > 29   < > 21* 20*  20* 20*  20*   BUN 43*   < > 25*   < > 11 12  12 13  13   CREATININE 5.1*   < > 3.1*   < > 1.7* 1.6*  1.6* 1.8*  1.8*   CALCIUM 7.0*   < > 7.1*   < > 7.6* 7.5*  7.5* 7.5*  7.5*   ALBUMIN 1.7*   < > 1.6*   < > 1.6* 1.7*  1.7* 1.6*  1.6*   PROT 5.1*  --  5.3*  --   --  6.2  --    BILITOT 1.0  --  0.9  --   --  1.2*  --    ALKPHOS 76  --  83  --   --  104  --    *  --  176*  --   --  133*  --    *  --  145*  --   --  80*  --    MG  --    < > 1.7   < > 2.1 2.0  2.0 2.0  2.0   PHOS  --    < > 4.1   < > 2.7 3.5  3.5 3.8  3.8    < > = values in this interval not displayed.     Significant Imaging:  I have reviewed all pertinent imaging results/findings within the past 24 hours.      ABG  Recent Labs   Lab 06/26/20  1841   PH 7.412   PO2 77*   PCO2 35.7   HCO3 22.7*   BE -2     Assessment/Plan:     Pulmonary  Acute hypoxemic respiratory failure  Please see Septic shock.    Cardiac/Vascular  Cardiac arrest  LUCAS on 6/25:  · Mild concentric left ventricular hypertrophy.  · Normal left ventricular systolic function. The estimated ejection fraction is 55%.  · Normal LV diastolic function.  · Normal right ventricular systolic function.  · Normal central venous pressure (3 mmHg).  · The estimated PA systolic pressure is 25 mmHg.    LUCAS on 6/28:  · Technically challenging portable study.  · Mildly decreased left ventricular systolic function. The estimated ejection fraction is 45-50%.  · Grade I (mild) left ventricular diastolic dysfunction consistent with impaired relaxation.  · Mild aortic regurgitation.  · Moderate  right ventricular enlargement.  · Moderately to severely reduced right ventricular systolic function.  · Mild tricuspid regurgitation.  · The estimated PA systolic pressure is 27 mmHg.  · Normal central venous pressure (3 mmHg).  · Mild left atrial enlargement.    - status post cardiac arrest on 6/26  in the setting of bradycardia requiring maximum dose of atropine, external pacing, and multiple doses of epinephrine  - troponin trend: 0.54 > 0.522  - will obtain US lower extremities with Doppler given concern for VTE  - holding off on empiric heparin and other forms of AC in light of thrombocytopenia and anemia    Renal/  High anion gap metabolic acidosis  Please see Septic shock.    Acute kidney injury  - ATN in the setting of septic shock  - serum creatinine trend as follows: 0.9 > 2.1 > 2.0 > 3.1 > 3.5 > 5.45 > 5.1 (now on SLED)  - Nephrology consulted and following, getting SLED per Nephrology  - Magnesium and RFP Q8H with daily phosphorous  - strict I/Os  - daily weights  - avoid nephrotoxic agents when possible  - renally dose all medications     Lactic acidosis  Please see Septic shock.    ID  * Septic shock  - lactate trend as follows: 3.6 > 5.6 > 4.3 > 8.8 > 8.4 > 8.9 > 7.5 > 5.1 > 4.0 > 3.0 > 2.2 > 2.0 > 1.5 > 1.3 > 1.1 (normalized)  - cultures so far have been without growth including repeats here at [facility]  - will continue to follow prior respiratory, urine and blood cultures  - resume broad spectrum antibiotics for now vancomycin and Zosyn (renally dosed)  - resume pressor support, currently on vasopressin and epinephrine  - stress does steroids with fludrocortisone 100 mg daily and hydrocortisone 100 mg IV Q8H  - daily CBC and RFP Q8H    Hematology  Thrombocytopenia  - HIT antibody ordered, not currently on anticoagulation  - patient with history of fatty liver and thrombocytopenia although platelets normal upon presentation to [facility]   - will continue with daily CBCs and transfuse  for platelets < 10K or < 50K with active bleeding    Endocrine  Type II diabetes mellitus  - hemoglobin A1c in April was 6.3%  - glucose checks Q6H given stress dose steroids  - LDSSI for now  - goal glucose 140-180 while inpatient    GI  Shock liver  Please see Septic shock.    Small bowel obstruction  - s/p ex-lap on 6/24 with extensive lysis of adhesions  - please see Septic shock    Critical secondary to Patient has a condition that poses threat to life and bodily function: Acute Renal Failure and Shock.      Critical care was time spent personally by me on the following activities: development of treatment plan with patient or surrogate and bedside caregivers, discussions with consultants, evaluation of patient's response to treatment, examination of patient, ordering and performing treatments and interventions, ordering and review of laboratory studies, ordering and review of radiographic studies, pulse oximetry, re-evaluation of patient's condition. This critical care time did not overlap with that of any other provider or involve time for any procedures.     Randell Middleton MD  Critical Care Medicine  [facility]

## 2020-06-28 NOTE — ASSESSMENT & PLAN NOTE
- HIT antibody ordered, not currently on anticoagulation  - patient with history of fatty liver and thrombocytopenia although platelets normal upon presentation to [facility]   - will continue with daily CBCs and transfuse for platelets < 10K or < 50K with active bleeding

## 2020-06-28 NOTE — PROGRESS NOTES
.  Nephrology Progress Note    Patient followed for  Dialysis dependent COCO 2/2 iATN   Patient was seen on SLED which was running for uremic toxin clearance / UF for volume management.  Wt Readings from Last 3 Encounters:   06/26/20 109.5 kg (241 lb 6.5 oz)   06/24/20 109.5 kg (241 lb 6.5 oz)   04/01/20 102.2 kg (225 lb 5 oz)     Temp Readings from Last 3 Encounters:   06/28/20 99.5 °F (37.5 °C) (Oral)   06/26/20 98.1 °F (36.7 °C) (Rectal)   04/03/20 97.7 °F (36.5 °C) (Oral)     BP Readings from Last 3 Encounters:   06/28/20 (!) 80/54   06/26/20 (!) 146/78   04/03/20 121/62     Pulse Readings from Last 3 Encounters:   06/28/20 69   06/26/20 72   04/03/20 69       CMP  Sodium   Date Value Ref Range Status   06/28/2020 138 136 - 145 mmol/L Final   06/28/2020 138 136 - 145 mmol/L Final     Potassium   Date Value Ref Range Status   06/28/2020 4.9 3.5 - 5.1 mmol/L Final   06/28/2020 4.9 3.5 - 5.1 mmol/L Final     Chloride   Date Value Ref Range Status   06/28/2020 103 95 - 110 mmol/L Final   06/28/2020 103 95 - 110 mmol/L Final     CO2   Date Value Ref Range Status   06/28/2020 20 (L) 23 - 29 mmol/L Final   06/28/2020 20 (L) 23 - 29 mmol/L Final     Glucose   Date Value Ref Range Status   06/28/2020 158 (H) 70 - 110 mg/dL Final   06/28/2020 158 (H) 70 - 110 mg/dL Final     BUN, Bld   Date Value Ref Range Status   06/28/2020 12 8 - 23 mg/dL Final   06/28/2020 12 8 - 23 mg/dL Final     Creatinine   Date Value Ref Range Status   06/28/2020 1.6 (H) 0.5 - 1.4 mg/dL Final   06/28/2020 1.6 (H) 0.5 - 1.4 mg/dL Final     Calcium   Date Value Ref Range Status   06/28/2020 7.5 (L) 8.7 - 10.5 mg/dL Final   06/28/2020 7.5 (L) 8.7 - 10.5 mg/dL Final     Total Protein   Date Value Ref Range Status   06/28/2020 6.2 6.0 - 8.4 g/dL Final     Albumin   Date Value Ref Range Status   06/28/2020 1.7 (L) 3.5 - 5.2 g/dL Final   06/28/2020 1.7 (L) 3.5 - 5.2 g/dL Final     Total Bilirubin   Date Value Ref Range Status   06/28/2020 1.2 (H) 0.1 -  1.0 mg/dL Final     Comment:     For infants and newborns, interpretation of results should be based  on gestational age, weight and in agreement with clinical  observations.  Premature Infant recommended reference ranges:  Up to 24 hours.............<8.0 mg/dL  Up to 48 hours............<12.0 mg/dL  3-5 days..................<15.0 mg/dL  6-29 days.................<15.0 mg/dL       Alkaline Phosphatase   Date Value Ref Range Status   06/28/2020 104 55 - 135 U/L Final     AST   Date Value Ref Range Status   06/28/2020 80 (H) 10 - 40 U/L Final     ALT   Date Value Ref Range Status   06/28/2020 133 (H) 10 - 44 U/L Final     Anion Gap   Date Value Ref Range Status   06/28/2020 15 8 - 16 mmol/L Final   06/28/2020 15 8 - 16 mmol/L Final     eGFR if    Date Value Ref Range Status   06/28/2020 53.0 (A) >60 mL/min/1.73 m^2 Final   06/28/2020 53.0 (A) >60 mL/min/1.73 m^2 Final     eGFR if non    Date Value Ref Range Status   06/28/2020 45.8 (A) >60 mL/min/1.73 m^2 Final     Comment:     Calculation used to obtain the estimated glomerular filtration  rate (eGFR) is the CKD-EPI equation.      06/28/2020 45.8 (A) >60 mL/min/1.73 m^2 Final     Comment:     Calculation used to obtain the estimated glomerular filtration  rate (eGFR) is the CKD-EPI equation.          CBC  Lab Results   Component Value Date    WBC 8.52 06/28/2020    HGB 9.0 (L) 06/28/2020    HCT 29.7 (L) 06/28/2020    MCV 85 06/28/2020    PLT 47 (L) 06/28/2020         Intake/Output Summary (Last 24 hours) at 6/28/2020 0925  Last data filed at 6/28/2020 0900  Gross per 24 hour   Intake 6015.95 ml   Output 6831 ml   Net -815.05 ml       Plan/Rec:  - Will continue SLED for volume and toxin clearance. Anuric, on multiple pressor requirement.   - SLED prescription and dialysate baths were reviewed and changes made according to most recent labs.   - -400 cc/hr as tolerated.    - Follow labs serially while on SLED to monitor  electrolytes and follow replacement protocol as needed.         Mario Aldrich MD  Nephrology Fellow  [facility]     ATTENDING PHYSICIAN ATTESTATION  I have personally verified the history and examined the patient. I thoroughly reviewed the demographic, clinical, laboratorial and imaging information available in medical records. I agree with the assessment and recommendations provided by the subspecialty resident who was under my supervision.     DIALYSIS (CRRT) NOTE  Patient evaluated while undergoing Slow Low Efficiency Dialysis (SLED) indicated for COCO and hemodynamic instability. No complications, tolerating session with current UFR. Will continue current support.

## 2020-06-28 NOTE — ASSESSMENT & PLAN NOTE
- lactate trend as follows: 3.6 > 5.6 > 4.3 > 8.8 > 8.4 > 8.9 > 7.5 > 5.1 > 4.0 > 3.0 > 2.2 > 2.0 > 1.5 > 1.3 > 1.1 (normalized)  - cultures so far have been without growth including repeats here at [facility]  - will continue to follow prior respiratory, urine and blood cultures  - resume broad spectrum antibiotics for now vancomycin and Zosyn (renally dosed)  - resume pressor support, currently on vasopressin and epinephrine  - stress does steroids with fludrocortisone 100 mg daily and hydrocortisone 100 mg IV Q8H  - daily CBC and RFP Q8H

## 2020-06-28 NOTE — SUBJECTIVE & OBJECTIVE
Interval History/Significant Events: Patient seen and examined this AM. Episode of hypoxia and hypotension overnight requiring brief period of bagging. Ultimately stabilized and hypoxic episode attributed to likely mucous plugging.  He remains anuric with only 30 mL of UOP in that past 24 hours. Sedation weaned today and patient with purposeful movements following commands per nursing staff. Cultures remain without growth to date. Lactate normalized. LFTs down trending. Patient becoming increasingly anemic and thrombocytopenic. HIT ordered although patient not receiving AC or heparin products given recent abdominal surgery. His pressor support has improved from day prior. TTE revealing for new decreased RV systolic function with enlargement. Will obtain US lower extremities given concern for VTE as likely culprit for ACLS. Holding off on heparin infusion given anemia and thrombocytopenia. Continuing supportive care. Wife at bedside and informed of plan of care. All questions answered.     Review of Systems   Unable to perform ROS: Intubated     Objective:     Vital Signs (Most Recent):  Temp: 99.7 °F (37.6 °C) (06/28/20 1505)  Pulse: 67 (06/28/20 1612)  Resp: (!) 22 (06/28/20 1612)  BP: 99/63 (06/28/20 1600)  SpO2: 96 % (06/28/20 1612) Vital Signs (24h Range):  Temp:  [99 °F (37.2 °C)-101.6 °F (38.7 °C)] 99.7 °F (37.6 °C)  Pulse:  [65-76] 67  Resp:  [20-38] 22  SpO2:  [77 %-99 %] 96 %  BP: ()/(51-76) 99/63  Arterial Line BP: ()/(46-66) 113/55   Weight: 109.3 kg (241 lb)  Body mass index is 35.59 kg/m².      Intake/Output Summary (Last 24 hours) at 6/28/2020 1643  Last data filed at 6/28/2020 1630  Gross per 24 hour   Intake 6554.9 ml   Output 6932 ml   Net -377.1 ml       Physical Exam  Vitals signs and nursing note reviewed.   Constitutional:       Appearance: He is morbidly obese. He is ill-appearing. He is not diaphoretic.      Interventions: He is sedated and intubated.   HENT:      Head:  Normocephalic and atraumatic.      Nose: Nose normal. No congestion.      Mouth/Throat:      Mouth: Mucous membranes are dry.   Eyes:      General: No scleral icterus.     Extraocular Movements: Extraocular movements intact.      Pupils: Pupils are equal, round, and reactive to light.      Comments: Scleral edema note bilaterally.   Neck:      Musculoskeletal: Normal range of motion and neck supple.      Comments: CVC line to right IJ.  Cardiovascular:      Rate and Rhythm: Normal rate.      Pulses: Normal pulses.      Heart sounds: No murmur. No friction rub. No gallop.    Pulmonary:      Effort: He is intubated.      Breath sounds: No wheezing, rhonchi or rales.      Comments: Transmitted ventilatory sounds. Well healed sternotomy scar to chest. Left-sided port palpated over left chest wall. CVC to right subclavian.   Abdominal:      General: Bowel sounds are normal. There is no distension.      Comments: Obese/protuberant abdomen. Midline incision present without evidence of erythema, induration, or discharge. Ostomy with brown liquid stool to right abdominal wall.    Musculoskeletal:         General: Swelling (bilateral upper extremities) present.      Right lower leg: Edema present.      Left lower leg: Edema present.      Comments: Scar to right lower extremity from prior vein harvest.   Lymphadenopathy:      Cervical: No cervical adenopathy.   Skin:     Coloration: Skin is pale.      Findings: Bruising (upper extremities bilaterally) present.         Vents:  Vent Mode: A/C (06/28/20 1612)  Ventilator Initiated: Yes (06/26/20 1743)  Set Rate: 22 BPM (06/28/20 1612)  Vt Set: 420 mL (06/28/20 1612)  PEEP/CPAP: 5 cmH20 (06/28/20 1612)  Oxygen Concentration (%): 35 (06/28/20 1612)  Peak Airway Pressure: 28 cmH2O (06/28/20 1612)  Plateau Pressure: 16 cmH20 (06/28/20 1612)  Total Ve: 9.29 mL (06/28/20 1612)  F/VT Ratio<105 (RSBI): (!) 51.76 (06/28/20 1612)  Lines/Drains/Airways     Central Venous Catheter Line                  Port A Cath Single Lumen 05/02/18 2105 left subclavian 787 days    Percutaneous Central Line Insertion/Assessment - Triple Lumen  06/24/20 1836 right internal jugular 3 days         Hemodialysis Catheter 06/26/20 0932 2 days    Permacath 06/26/20 0932 2 days          Drain                 Colostomy 05/03/18 0715  days         Colostomy 06/24/20 RLQ 4 days         NG/OG Tube 06/24/20 1103 [place] sump 16 Fr. Right nostril 4 days         Urethral Catheter 06/26/20 1932 1 day          Airway                 Airway - Non-Surgical 06/24/20 1959 Endotracheal Tube-Hi/Lo 3 days          Arterial Line                 Arterial Line 06/24/20 1852 Right Radial 3 days          Peripheral Intravenous Line                 Midline Catheter Insertion/Assessment  - Single Lumen (RETIRED) 04/03/20 1312 Right basilic vein (medial side of arm) other (see comments) 86 days         Peripheral IV - Double Lumen 06/24/20 1455 18 G Left;Posterior Hand 4 days         Peripheral IV - Single Lumen 06/24/20 0740 20 G Left Antecubital 4 days              Significant Labs:    CBC/Anemia Profile:  Recent Labs   Lab 06/26/20  1801 06/27/20  0315 06/28/20  0430   WBC 11.55 11.32 8.52   HGB 10.0* 9.5* 9.0*   HCT 31.8* 29.7* 29.7*   PLT 69* 58* 47*   MCV 81* 81* 85   RDW 18.1* 17.9* 18.5*        Chemistries:  Recent Labs   Lab 06/26/20  1801  06/27/20  0315  06/27/20  2040 06/28/20  0430 06/28/20  1340   *   < > 136   < > 138 138  138 136  136   K 4.8   < > 4.6   < > 4.9 4.9  4.9 4.6  4.6   CL 97   < > 95   < > 103 103  103 101  101   CO2 21*   < > 29   < > 21* 20*  20* 20*  20*   BUN 43*   < > 25*   < > 11 12  12 13  13   CREATININE 5.1*   < > 3.1*   < > 1.7* 1.6*  1.6* 1.8*  1.8*   CALCIUM 7.0*   < > 7.1*   < > 7.6* 7.5*  7.5* 7.5*  7.5*   ALBUMIN 1.7*   < > 1.6*   < > 1.6* 1.7*  1.7* 1.6*  1.6*   PROT 5.1*  --  5.3*  --   --  6.2  --    BILITOT 1.0  --  0.9  --   --  1.2*  --    ALKPHOS 76  --  83  --   --  104   --    *  --  176*  --   --  133*  --    *  --  145*  --   --  80*  --    MG  --    < > 1.7   < > 2.1 2.0  2.0 2.0  2.0   PHOS  --    < > 4.1   < > 2.7 3.5  3.5 3.8  3.8    < > = values in this interval not displayed.     Significant Imaging:  I have reviewed all pertinent imaging results/findings within the past 24 hours.

## 2020-06-28 NOTE — ASSESSMENT & PLAN NOTE
- ATN in the setting of septic shock  - serum creatinine trend as follows: 0.9 > 2.1 > 2.0 > 3.1 > 3.5 > 5.45 > 5.1 (now on SLED)  - Nephrology consulted and following, getting SLED per Nephrology  - Magnesium and RFP Q8H with daily phosphorous  - strict I/Os  - daily weights  - avoid nephrotoxic agents when possible  - renally dose all medications

## 2020-06-29 NOTE — PROGRESS NOTES
[facility]  Critical Care Medicine  Progress Note    Patient Name: Cristo Jefferson  MRN: [id number]  Admission Date: 6/26/2020  Hospital Length of Stay: 3 days  Code Status: Full Code  Attending Provider: Archie Mcfadden MD  Primary Care Provider: Vince Perez MD   Principal Problem: Septic shock    Subjective:     HPI:  Mr. 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 ED on 6/24 with complaints of one day of abdominal pain, nausea, and vomiting. Upon presentation he was noted to be hypothermic with temperature of 95.4 F in septic shock with serum lactate of 3.6. General Surgery was consulted. Patient became increasingly acidotic with rising level of serum lactate despite IVFs, NG/OG placement, and empiric antibiotics. He was ultimately taken for ex-lap for which he underwent extensive lysis of adhesions. No perforation or ischemic bowel was noted. He continued to require pressor support, became increasingly acidotic, developed COCO, anemia presumed secondary to GI bleed, and transaminitits. Cardiology, Critical Care, and Nephrology were consulted. In the AM of 6/26 patient developed bradyarrhythmia. ACLS was performed with use of atropine, bicarbonate and calcium gluconate, in addition to multiple epinephrine boluses and external pacing. Wife was contacted and wished to proceed with resuscitation attempts. Following ROSC he was noted to have elevated troponin and BNP. Prior ECHO and repeat CXR were mostly unrevealing. UOP continued to decline. Lactate acidosis advanced. Antibiotics had already been broadened so antifungals were added. He was  started on RRT. He was ultimately transferred to [facility] for higher level of care.    Hospital/ICU Course:  Patient transferred to [facility] on 6/26. He resumed on broad spectrum antibiotics with vancomycin and Zosyn and started on SLED per Nephrology with improvement in lactic acidosis. He remains on pressors at this time (now down to two, norepinephrine and vasopressin). He underwent repeat TTE on 6/28 which showed similar EF to 6/25 prior to ACLS however there was evidence of moderate to severely right ventricular systolic with enlargement. Patient becoming increasingly more thrombocytopenic and anemic this admission without and evidence of overt bleeding and not receiving anticoagulation. HIT panel negative.  US lower extremities on 6/29 showed no evidence of DVTs. There is concern for PE however given thrombocytopenia unable to start empiric AC. He is resumed on vancomycin, Zosyn, and Diflucan was added on 6/29. Continuing supportive care at this time.     Interval History/Significant Events: Patient seen and examined this AM. No acute events overnight. He remains anuric with only 10 mL of UOP in that past 24 hours. Cultures remain without growth to date. Will started empiric antifungal coverage with Diflucan and obtain fungal studies today with culture. Source of septic shock still unclear at this time. Patient becoming increasingly anemic although noted to have clotting with SLEP and unable to rinse back. Platelets continue to down trend however HIT negative. His pressor remains similar to day prior with 0.14 norepinephrine and 0.4 vasopressin. US lower extremities without evidence of DVTs. Holding off on therapeutic heparin infusion for possible PE given thrombocytopenia. Continuing supportive care. Wife at bedside and informed of plan of care. All questions answered.     Review of Systems   Unable to perform ROS: Intubated     Objective:     Vital Signs (Most Recent):  Temp: 99.2 °F (37.3 °C) (06/29/20 1105)  Pulse:  63 (06/29/20 1600)  Resp: (!) 22 (06/29/20 1600)  BP: (!) 95/59 (06/29/20 1600)  SpO2: 95 % (06/29/20 1600) Vital Signs (24h Range):  Temp:  [98.6 °F (37 °C)-101.1 °F (38.4 °C)] 99.2 °F (37.3 °C)  Pulse:  [60-66] 63  Resp:  [21-22] 22  SpO2:  [95 %-100 %] 95 %  BP: ()/(57-76) 95/59  Arterial Line BP: (108-138)/(52-63) 115/53   Weight: 109.3 kg (241 lb)  Body mass index is 35.59 kg/m².      Intake/Output Summary (Last 24 hours) at 6/29/2020 1655  Last data filed at 6/29/2020 1600  Gross per 24 hour   Intake 2992.08 ml   Output 6088 ml   Net -3095.92 ml       Physical Exam  Vitals signs and nursing note reviewed.   Constitutional:       Appearance: He is morbidly obese. He is ill-appearing. He is not diaphoretic.      Interventions: He is sedated and intubated.   HENT:      Head: Normocephalic and atraumatic.      Nose: Nose normal. No congestion.      Mouth/Throat:      Mouth: Mucous membranes are dry.   Eyes:      General: No scleral icterus.     Extraocular Movements: Extraocular movements intact.      Pupils: Pupils are equal, round, and reactive to light.      Comments: Scleral edema note bilaterally.   Neck:      Musculoskeletal: Normal range of motion and neck supple.      Comments: CVC line to right IJ.  Cardiovascular:      Rate and Rhythm: Normal rate.      Pulses: Normal pulses.      Heart sounds: No murmur. No friction rub. No gallop.    Pulmonary:      Effort: He is intubated.      Breath sounds: No wheezing, rhonchi or rales.      Comments: Transmitted ventilatory sounds. Well healed sternotomy scar to chest. Left-sided port palpated over left chest wall. CVC to right subclavian.   Abdominal:      General: Bowel sounds are normal. There is no distension.      Comments: Obese/protuberant abdomen. Midline incision present without evidence of erythema, induration, or discharge. Ostomy with brown liquid stool to right abdominal wall.    Musculoskeletal:         General: Swelling (bilateral upper  extremities) present.      Right lower leg: Edema present.      Left lower leg: Edema present.      Comments: Scar to right lower extremity from prior vein harvest.   Lymphadenopathy:      Cervical: No cervical adenopathy.   Skin:     Coloration: Skin is pale.      Findings: Bruising (upper extremities bilaterally) present.         Vents:  Vent Mode: A/C (06/29/20 1554)  Ventilator Initiated: Yes (06/26/20 1743)  Set Rate: 22 BPM (06/29/20 1554)  Vt Set: 420 mL (06/29/20 1554)  PEEP/CPAP: 5 cmH20 (06/29/20 1554)  Oxygen Concentration (%): 60 (06/29/20 1600)  Peak Airway Pressure: 39 cmH2O (06/29/20 1554)  Plateau Pressure: 19 cmH20 (06/29/20 1554)  Total Ve: 10.7 mL (06/29/20 1554)  F/VT Ratio<105 (RSBI): (!) 45.08 (06/29/20 1554)     Lines/Drains/Airways     Central Venous Catheter Line                 Port A Cath Single Lumen 05/02/18 2105 left subclavian 788 days    Percutaneous Central Line Insertion/Assessment - Triple Lumen  06/24/20 1836 right internal jugular 4 days         Hemodialysis Catheter 06/26/20 0932 3 days    Permacath 06/26/20 0932 3 days          Drain                 Colostomy 05/03/18 0715  days         Colostomy 06/24/20 RLQ 5 days         NG/OG Tube 06/24/20 1103 [place] sump 16 Fr. Right nostril 5 days         Urethral Catheter 06/26/20 1932 2 days          Airway                 Airway - Non-Surgical 06/24/20 1959 Endotracheal Tube-Hi/Lo 4 days          Arterial Line                 Arterial Line 06/24/20 1852 Right Radial 4 days          Peripheral Intravenous Line                 Midline Catheter Insertion/Assessment  - Single Lumen (RETIRED) 04/03/20 1312 Right basilic vein (medial side of arm) other (see comments) 87 days              Significant Labs:    CBC/Anemia Profile:  Recent Labs   Lab 06/28/20  0430 06/29/20  0324 06/29/20  1244   WBC 8.52 6.57  --    HGB 9.0* 8.2*  --    HCT 29.7* 26.8*  --    PLT 47* 32*  --    MCV 85 84  --    RDW 18.5* 18.3*  --    FERRITIN  --   --   501*        Chemistries:  Recent Labs   Lab 06/28/20  0430  06/28/20  2213 06/29/20  0324 06/29/20  1244     138   < > 137  137 135*  135*  135* 134*  134*   K 4.9  4.9   < > 5.0  5.0 4.5  4.5  4.5 4.6  4.6     103   < > 101  101 101  101  101 98  98   CO2 20*  20*   < > 18*  18* 20*  20*  20* 22*  22*   BUN 12  12   < > 19  19 15  15  15 17  17   CREATININE 1.6*  1.6*   < > 2.5*  2.5* 1.9*  1.9*  1.9* 2.2*  2.2*   CALCIUM 7.5*  7.5*   < > 7.4*  7.4* 7.1*  7.1*  7.1* 7.0*  7.0*   ALBUMIN 1.7*  1.7*   < > 1.6*  1.6* 1.6*  1.6*  1.6* 1.6*  1.6*   PROT 6.2  --   --  5.5*  --    BILITOT 1.2*  --   --  1.2*  --    ALKPHOS 104  --   --  97  --    *  --   --  93*  --    AST 80*  --   --  53*  --    MG 2.0  2.0   < > 2.1  2.1 1.9  1.9  1.9 2.2  2.2   PHOS 3.5  3.5   < > 4.0  4.0 3.5  3.5  3.5 3.5  3.5    < > = values in this interval not displayed.     Significant Imaging:  I have reviewed all pertinent imaging results/findings within the past 24 hours.      ABG  Recent Labs   Lab 06/26/20  1841   PH 7.412   PO2 77*   PCO2 35.7   HCO3 22.7*   BE -2     Assessment/Plan:     Pulmonary  Acute hypoxemic respiratory failure  Please see Septic shock.    Cardiac/Vascular  Cardiac arrest  LUCAS on 6/25:  · Mild concentric left ventricular hypertrophy.  · Normal left ventricular systolic function. The estimated ejection fraction is 55%.  · Normal LV diastolic function.  · Normal right ventricular systolic function.  · Normal central venous pressure (3 mmHg).  · The estimated PA systolic pressure is 25 mmHg.    LUCAS on 6/28:  · Technically challenging portable study.  · Mildly decreased left ventricular systolic function. The estimated ejection fraction is 45-50%.  · Grade I (mild) left ventricular diastolic dysfunction consistent with impaired relaxation.  · Mild aortic regurgitation.  · Moderate right ventricular enlargement.  · Moderately to severely reduced right  ventricular systolic function.  · Mild tricuspid regurgitation.  · The estimated PA systolic pressure is 27 mmHg.  · Normal central venous pressure (3 mmHg).  · Mild left atrial enlargement.    - status post cardiac arrest on 6/26  in the setting of bradycardia requiring maximum dose of atropine, external pacing, and multiple doses of epinephrine  - troponin trend: 0.54 > 0.522  - US lower extremities with Doppler on 6/29 without DVTs  - holding off on empiric heparin and other forms of AC in light of thrombocytopenia (will consider once platelets > 50K)     Renal/  High anion gap metabolic acidosis  Please see Septic shock.    Acute kidney injury  - ATN in the setting of septic shock  - serum creatinine trend as follows: 0.9 > 2.1 > 2.0 > 3.1 > 3.5 > 5.45 > 5.1 (now on SLED)  - Nephrology consulted and following, getting SLED per Nephrology  - Magnesium and RFP Q8H with daily phosphorous  - strict I/Os  - daily weights  - avoid nephrotoxic agents when possible  - renally dose all medications     Lactic acidosis  Please see Septic shock.    ID  * Septic shock  - lactate trend as follows: 3.6 > 5.6 > 4.3 > 8.8 > 8.4 > 8.9 > 7.5 > 5.1 > 4.0 > 3.0 > 2.2 > 2.0 > 1.5 > 1.3 > 1.1 (normalized)  - cultures so far have been without growth including repeats [facility]  - will continue to follow prior respiratory, urine and blood cultures  - serum procalcitonin 97.07, will obtain fungal cultures and beta-D-glucan  - resume broad spectrum antibiotics for now vancomycin and Zosyn (renally dosed), added Diflucan on 6/29  - resume pressor support, currently on vasopressin and epinephrine  - stress does steroids with fludrocortisone 100 mg daily and hydrocortisone 100 mg IV Q8H  - CBC Q12H and RFP Q8H    Hematology  Thrombocytopenia  - HIT negative  - patient with history of fatty liver and thrombocytopenia although platelets normal upon presentation to [facility]   - will continue with daily CBCs and transfuse  for platelets < 10K or < 50K with active bleeding  - can consider adding empiric AC for treatment of DVT once platelets greater than 50K     Endocrine  Type II diabetes mellitus  - hemoglobin A1c in April was 6.3%  - glucose checks Q6H given stress dose steroids  - LDSSI for now  - goal glucose 140-180 while inpatient    GI  Shock liver  Please see Septic shock.    Small bowel obstruction  - s/p ex-lap on 6/24 with extensive lysis of adhesions with SBO, however, no signs of perforation or ischemia  - please see Septic shock      Critical secondary to Patient has a condition that poses threat to life and bodily function: Acute Renal Failure and Septic Shock.      Critical care was time spent personally by me on the following activities: development of treatment plan with patient or surrogate and bedside caregivers, discussions with consultants, evaluation of patient's response to treatment, examination of patient, ordering and performing treatments and interventions, ordering and review of laboratory studies, ordering and review of radiographic studies, pulse oximetry, re-evaluation of patient's condition. This critical care time did not overlap with that of any other provider or involve time for any procedures.     Randell Middleton MD  Critical Care Medicine  [facility]

## 2020-06-29 NOTE — PROGRESS NOTES
Nephrology Progress Note    - Patient followed for COCO due to iATN and hemodynamic instability   - Patient was seen on  SLED which was running for uremic toxin clearance / UF for volume management.  Wt Readings from Last 3 Encounters:   06/28/20 109.3 kg (241 lb)   06/24/20 109.5 kg (241 lb 6.5 oz)   04/01/20 102.2 kg (225 lb 5 oz)     Temp Readings from Last 3 Encounters:   06/29/20 99.2 °F (37.3 °C) (Axillary)   06/26/20 98.1 °F (36.7 °C) (Rectal)   04/03/20 97.7 °F (36.5 °C) (Oral)     BP Readings from Last 3 Encounters:   06/29/20 (!) 102/59   06/26/20 (!) 146/78   04/03/20 121/62     Pulse Readings from Last 3 Encounters:   06/29/20 61   06/26/20 72   04/03/20 69       CMP  Sodium   Date Value Ref Range Status   06/29/2020 135 (L) 136 - 145 mmol/L Final   06/29/2020 135 (L) 136 - 145 mmol/L Final   06/29/2020 135 (L) 136 - 145 mmol/L Final     Potassium   Date Value Ref Range Status   06/29/2020 4.5 3.5 - 5.1 mmol/L Final   06/29/2020 4.5 3.5 - 5.1 mmol/L Final   06/29/2020 4.5 3.5 - 5.1 mmol/L Final     Chloride   Date Value Ref Range Status   06/29/2020 101 95 - 110 mmol/L Final   06/29/2020 101 95 - 110 mmol/L Final   06/29/2020 101 95 - 110 mmol/L Final     CO2   Date Value Ref Range Status   06/29/2020 20 (L) 23 - 29 mmol/L Final   06/29/2020 20 (L) 23 - 29 mmol/L Final   06/29/2020 20 (L) 23 - 29 mmol/L Final     Glucose   Date Value Ref Range Status   06/29/2020 152 (H) 70 - 110 mg/dL Final   06/29/2020 152 (H) 70 - 110 mg/dL Final   06/29/2020 152 (H) 70 - 110 mg/dL Final     BUN, Bld   Date Value Ref Range Status   06/29/2020 15 8 - 23 mg/dL Final   06/29/2020 15 8 - 23 mg/dL Final   06/29/2020 15 8 - 23 mg/dL Final     Creatinine   Date Value Ref Range Status   06/29/2020 1.9 (H) 0.5 - 1.4 mg/dL Final   06/29/2020 1.9 (H) 0.5 - 1.4 mg/dL Final   06/29/2020 1.9 (H) 0.5 - 1.4 mg/dL Final     Calcium   Date Value Ref Range Status   06/29/2020 7.1 (L) 8.7 - 10.5 mg/dL Final   06/29/2020 7.1 (L) 8.7 - 10.5  mg/dL Final   06/29/2020 7.1 (L) 8.7 - 10.5 mg/dL Final     Total Protein   Date Value Ref Range Status   06/29/2020 5.5 (L) 6.0 - 8.4 g/dL Final     Albumin   Date Value Ref Range Status   06/29/2020 1.6 (L) 3.5 - 5.2 g/dL Final   06/29/2020 1.6 (L) 3.5 - 5.2 g/dL Final   06/29/2020 1.6 (L) 3.5 - 5.2 g/dL Final     Total Bilirubin   Date Value Ref Range Status   06/29/2020 1.2 (H) 0.1 - 1.0 mg/dL Final     Comment:     For infants and newborns, interpretation of results should be based  on gestational age, weight and in agreement with clinical  observations.  Premature Infant recommended reference ranges:  Up to 24 hours.............<8.0 mg/dL  Up to 48 hours............<12.0 mg/dL  3-5 days..................<15.0 mg/dL  6-29 days.................<15.0 mg/dL       Alkaline Phosphatase   Date Value Ref Range Status   06/29/2020 97 55 - 135 U/L Final     AST   Date Value Ref Range Status   06/29/2020 53 (H) 10 - 40 U/L Final     ALT   Date Value Ref Range Status   06/29/2020 93 (H) 10 - 44 U/L Final     Anion Gap   Date Value Ref Range Status   06/29/2020 14 8 - 16 mmol/L Final   06/29/2020 14 8 - 16 mmol/L Final   06/29/2020 14 8 - 16 mmol/L Final     eGFR if    Date Value Ref Range Status   06/29/2020 43.0 (A) >60 mL/min/1.73 m^2 Final   06/29/2020 43.0 (A) >60 mL/min/1.73 m^2 Final   06/29/2020 43.0 (A) >60 mL/min/1.73 m^2 Final     eGFR if non    Date Value Ref Range Status   06/29/2020 37.2 (A) >60 mL/min/1.73 m^2 Final     Comment:     Calculation used to obtain the estimated glomerular filtration  rate (eGFR) is the CKD-EPI equation.      06/29/2020 37.2 (A) >60 mL/min/1.73 m^2 Final     Comment:     Calculation used to obtain the estimated glomerular filtration  rate (eGFR) is the CKD-EPI equation.      06/29/2020 37.2 (A) >60 mL/min/1.73 m^2 Final     Comment:     Calculation used to obtain the estimated glomerular filtration  rate (eGFR) is the CKD-EPI equation.           CBC  Lab Results   Component Value Date    WBC 6.57 06/29/2020    HGB 8.2 (L) 06/29/2020    HCT 26.8 (L) 06/29/2020    MCV 84 06/29/2020    PLT 32 (LL) 06/29/2020         Intake/Output Summary (Last 24 hours) at 6/29/2020 1337  Last data filed at 6/29/2020 1300  Gross per 24 hour   Intake 3614.04 ml   Output 6868 ml   Net -3253.96 ml       Plan:  - Cathflo ordered to be instilled on trialyis port due to clotting episodes  - Plan to restart after 2 hours of cathflo administration/aspiration   - Patient hemodynamically not stable for intermittent HD yet.  - Will continue SLED for volume and toxin clearance.  SLED prescription and dialysate baths were reviewed and changes made according to most recent labs.   - -400cc/hr as tolerated.    - Follow labs serially while on SLED to monitor electrolytes and follow replacement protocol as needed.         Mario Aldrich MD  Nephrology Fellow  [facility]

## 2020-06-29 NOTE — ASSESSMENT & PLAN NOTE
- lactate trend as follows: 3.6 > 5.6 > 4.3 > 8.8 > 8.4 > 8.9 > 7.5 > 5.1 > 4.0 > 3.0 > 2.2 > 2.0 > 1.5 > 1.3 > 1.1 (normalized)  - cultures so far have been without growth including repeats here at [facility]  - will continue to follow prior respiratory, urine and blood cultures  - serum procalcitonin 97.07, will obtain fungal cultures and beta-D-glucan  - resume broad spectrum antibiotics for now vancomycin and Zosyn (renally dosed), added Diflucan on 6/29  - resume pressor support, currently on vasopressin and epinephrine  - stress does steroids with fludrocortisone 100 mg daily and hydrocortisone 100 mg IV Q8H  - CBC Q12H and RFP Q8H

## 2020-06-29 NOTE — PROGRESS NOTES
SLED treatment clotted. Unable to rinse back.    SLED treatment restarted after machine change. Right IJ trialysis cleaned and accessed per protocol. Flows were sluggish, but worked. Lines connected, treatment initiated. See flowsheet for details.

## 2020-06-29 NOTE — PLAN OF CARE
CM at bedside to discuss discharge planning assessment.   Patient lives with his wife and son in a raised home with 3-4 steps to entrance.  Explained CM services during patient's stay in hospital.       06/29/20 1200   Discharge Assessment   Confirmed/corrected address and phone number on facesheet? Yes   Assessment information obtained from?   (spouse)   Prior to hospitilization cognitive status: Alert/Oriented   Prior to hospitalization functional status: Assistive Equipment   Current cognitive status: Unable to Assess   Current Functional Status: Needs Assistance   Facility Arrived From: [facility] ICU   Lives With spouse;child(kavon), adult   Able to Return to Prior Arrangements yes   Is patient able to care for self after discharge? Unable to determine at this time (comments)   Who are your caregiver(s) and their phone number(s)? spouse   Patient's perception of discharge disposition home health   Readmission Within the Last 30 Days current reason for admission unrelated to previous admission   Patient currently being followed by outpatient case management? No   Patient currently receives any other outside agency services? No  (Patient has been followed by [facility] for wound care)   Equipment Currently Used at Home cane, straight   Do you have any problems affording any of your prescribed medications? No   Is the patient taking medications as prescribed? yes   Does the patient have transportation home? Yes   Transportation Anticipated family or friend will provide   Does the patient receive services at the [facility]? No   Discharge Plan A Rehab   Discharge Plan B Home Health   DME Needed Upon Discharge    (TBD)   Patient/Family in Agreement with Plan yes   Readmission Questionnaire   At the time of your discharge, did someone talk to you about what your health problems were? Yes   At the time of discharge, did someone talk to you about what to watch out for regarding worsening of your health problem? Yes   At  the time of discharge, did someone talk to you about what to do if you experienced worsening of your health problem? Yes   At the time of discharge, did someone talk to you about which medication to take when you left the hospital and which ones to stop taking? Yes   At the time of discharge, did someone talk to you about when and where to follow up with a doctor after you left the hospital? Yes   Do you have problems taking your medications as prescribed? No   Do you have any problems affording any of  your prescribed medications? No   Do you have problems obtaining/receiving your medications? No   Does the patient have transportation to healthcare appointments? Yes   Living Arrangements house   Does the patient have family/friends to help with healtcare needs after discharge? yes     Vince Perez MD  [address] / [place] [address]    [facility]/pharmacy #[id number] - [place] - [address]  [address] [address]  Phone: [phone] Fax: [phone]  Extended Emergency Contact Information  Primary Emergency Contact: Rachel Jefferson  Address: [address] [place] [address] [address] [facility]  Home Phone: [phone]  Mobile Phone: [phone]  Relation: Spouse

## 2020-06-29 NOTE — ASSESSMENT & PLAN NOTE
- s/p ex-lap on 6/24 with extensive lysis of adhesions with SBO, however, no signs of perforation or ischemia  - please see Septic shock

## 2020-06-29 NOTE — NURSING
See vital signs and assessments in flowsheets. See below for updates on today's progress.         Pulmonary:  Vent, fio2: 60%, peep 5, TV:420, oxygen saturations >92% throughout shift     Cardiovascular: SB to SR, HR: 59-80's, SBP : 100-110's,      Neurological: Afebrile T max: 99.9, Sedated, Off  ofsedation patient able to move all extremities and follows commands, squeeze left and right hand, wiggle toes.     Gastrointestinal: NG tube, Colostomy, output ~__/shift     Genitourinary: Voids via weaver 10 cc's / shift, CRRT UF: 400     Endocrine: ACHS, BG: WNL, sliding scale     Integumentary/Other: REFER TO FLOWSHEETS     Infusions:  Propofol @ 35, Fentanyl @ 125, Levophed @ 0.14, Vasopressin @ 0.04     POC: Continue to monitor patient hemodynamically and continue plan of care. Plan discussed with patient and wife at bedside all questions and concerns were addressed. No further questions at this time.

## 2020-06-29 NOTE — PROGRESS NOTES
Pharmacokinetic Assessment Follow Up: IV Vancomycin    Assessment and Plan:    - Vancomycin random AM level today is 19.5 mcg/mL.  - On continuous SLED for COCO- continue to dose by level.  - Administer vanc 1000 mg x 1.   - Draw random level with AM labs tomorrow 6/30.   - Will re-dose based on level and RRT plan.    Drug levels (last 3 results):  Recent Labs   Lab Result Units 06/27/20  0949 06/28/20  0430 06/29/20  0324   Vancomycin, Random ug/mL 17.7 15.0 19.5       Pharmacy will continue to follow and monitor vancomycin.    Please contact pharmacy at extension [phone] for questions regarding this assessment.    Thank you for the consult,   Betzy Francis, PharmD, BCCCP     Patient brief summary:  Cristo Jefferson is a 59 y.o. male initiated on antimicrobial therapy with IV Vancomycin for treatment of sepsis    The patient's current regimen is dosed by level    Drug Allergies:   Review of patient's allergies indicates:   Allergen Reactions    Morphine Rash       Actual Body Weight:   109.3 kg    Renal Function:   Estimated Creatinine Clearance: 49.7 mL/min (A) (based on SCr of 1.9 mg/dL (H)).,     Dialysis Method (if applicable):  SLED    CBC (last 72 hours):  Recent Labs   Lab Result Units 06/26/20  1801 06/27/20 0315 06/28/20  0430 06/29/20  0324   WBC K/uL 11.55 11.32 8.52 6.57   Hemoglobin g/dL 10.0* 9.5* 9.0* 8.2*   Hematocrit % 31.8* 29.7* 29.7* 26.8*   Platelets K/uL 69* 58* 47* 32*   Gran% % 79.2* 78.3* 75.2* 73.0   Lymph% % 12.6* 11.3* 12.2* 20.0   Mono% % 6.9 9.3 11.0 4.0   Eosinophil% % 0.1 0.1 0.5 1.0   Basophil% % 0.7 0.4 0.4 0.0   Differential Method  Automated Automated Automated Manual       Metabolic Panel (last 72 hours):  Recent Labs   Lab Result Units 06/26/20  1801 06/26/20 2051 06/26/20 2203 06/27/20  0315 06/27/20  1450 06/27/20  2040 06/28/20  0430 06/28/20  1340 06/28/20  2213 06/29/20  0324   Sodium mmol/L 135*  --  132* 136 138 138 138  138 136  136 137  137 135*  135*  135*    Potassium mmol/L 4.8  --  4.8 4.6 4.5 4.9 4.9  4.9 4.6  4.6 5.0  5.0 4.5  4.5  4.5   Chloride mmol/L 97  --  95 95 101 103 103  103 101  101 101  101 101  101  101   CO2 mmol/L 21*  --  23 29 24 21* 20*  20* 20*  20* 18*  18* 20*  20*  20*   Glucose mg/dL 173*  --  222* 146* 130* 157* 158*  158* 163*  163* 208*  208* 152*  152*  152*   Glucose, UA   --  1+*  --   --   --   --   --   --   --   --    BUN, Bld mg/dL 43*  --  36* 25* 12 11 12  12 13  13 19  19 15  15  15   Creatinine mg/dL 5.1*  --  4.3* 3.1* 1.8* 1.7* 1.6*  1.6* 1.8*  1.8* 2.5*  2.5* 1.9*  1.9*  1.9*   Albumin g/dL 1.7*  --  1.6* 1.6* 1.6* 1.6* 1.7*  1.7* 1.6*  1.6* 1.6*  1.6* 1.6*  1.6*  1.6*   Total Bilirubin mg/dL 1.0  --   --  0.9  --   --  1.2*  --   --  1.2*   Alkaline Phosphatase U/L 76  --   --  83  --   --  104  --   --  97   AST U/L 188*  --   --  145*  --   --  80*  --   --  53*   ALT U/L 188*  --   --  176*  --   --  133*  --   --  93*   Magnesium mg/dL  --   --  1.8 1.7 2.0 2.1 2.0  2.0 2.0  2.0 2.1  2.1 1.9  1.9  1.9   Phosphorus mg/dL  --   --  5.4* 4.1 3.0 2.7 3.5  3.5 3.8  3.8 4.0  4.0 3.5  3.5  3.5       Vancomycin Administrations:  vancomycin given in the last 96 hours                   vancomycin (VANCOCIN) 1,750 mg in dextrose 5 % 500 mL IVPB (mg) 1,750 mg New Bag 06/25/20 2257     1,750 mg New Bag  0119                Microbiologic Results:  Microbiology Results (last 7 days)     Procedure Component Value Units Date/Time    Fungus culture [[id number]]     Order Status: No result Specimen: Blood     Blood Culture #1 **CANNOT BE ORDERED STAT** [[id number]] Collected: 06/26/20 2330    Order Status: Completed Specimen: Blood from Peripheral, Foot, Right Updated: 06/29/20 0613     Blood Culture, Routine No Growth to date      No Growth to date      No Growth to date    Blood culture [[id number]] Collected: 06/26/20 2353    Order Status: Completed Specimen: Blood from Peripheral, Ankle, Right  Updated: 06/29/20 0613     Blood Culture, Routine No Growth to date      No Growth to date      No Growth to date

## 2020-06-29 NOTE — PROGRESS NOTES
SLED line clotted. Unable to rinse back.    SLED treatment restarted to right IJ trialysis. Unable to aspirate blood from blue line of right IJ trialysis. Lines connected, treatment initiated. See flowsheet for details.

## 2020-06-29 NOTE — ASSESSMENT & PLAN NOTE
LUCAS on 6/25:  · Mild concentric left ventricular hypertrophy.  · Normal left ventricular systolic function. The estimated ejection fraction is 55%.  · Normal LV diastolic function.  · Normal right ventricular systolic function.  · Normal central venous pressure (3 mmHg).  · The estimated PA systolic pressure is 25 mmHg.    LUCAS on 6/28:  · Technically challenging portable study.  · Mildly decreased left ventricular systolic function. The estimated ejection fraction is 45-50%.  · Grade I (mild) left ventricular diastolic dysfunction consistent with impaired relaxation.  · Mild aortic regurgitation.  · Moderate right ventricular enlargement.  · Moderately to severely reduced right ventricular systolic function.  · Mild tricuspid regurgitation.  · The estimated PA systolic pressure is 27 mmHg.  · Normal central venous pressure (3 mmHg).  · Mild left atrial enlargement.    - status post cardiac arrest on 6/26  in the setting of bradycardia requiring maximum dose of atropine, external pacing, and multiple doses of epinephrine  - troponin trend: 0.54 > 0.522  - US lower extremities with Doppler on 6/29 without DVTs  - holding off on empiric heparin and other forms of AC in light of thrombocytopenia (will consider once platelets > 50K)

## 2020-06-29 NOTE — PROGRESS NOTES
Consult received per RN for skin tears to R forearm.     Pt was admitted on 6/26/20 with septic shock. 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    Received pt lying in bed intubated with eyes closed with respirations even and unlabored with heel lift boots noted to bilateral lower legs. Pt's nurse reports that pt has an ostomy and multiple skin tears and bruising noted to his bilateral arms. Pt's wife present at bedside.    Upon assessment of right arm: open area of partial skin thickness measuring 1 cm x 0.6 cm x 0.1 with moist pink and red tissue with bruising noted to the periphery with edema noted to the right arm. Cleansed wound bed with sterile normal saline with a border foam to cover. Blue pads in use to wick away moisture    To the left arm: intact skin with no open areas noted at this time. Bruising and edema noted to the left arm. Blue pads in use to wick away moisture.    (see assessment and photos below)    Also of note, skin intact at this time to his bilateral heels without any open areas noted at this time. Heel foams reinforced and heel lift boots reapplied. Colostomy pouch intact to the RLQ with brown liquid stool noted to the bag. Per wife bag change is not due until this Friday. Pt's wife at reports that she assists the pt with his ostomy care. Instructed pt's wife that if she needs any assistance with the ostomy or any supplies to please let the nurses know and that wound/ostomy care can assist as well.    Recommendations:  -Nursing to cleanse skin tear to right arm with sterile normal saline with a border foam to cover. Nursing to apply blue pads beneath bilateral arms to help wick away moisture. Nursing to follow skin  tear protocol as directed.   -Nursing to continue pressure prevention interventions as directed.     Plan of care discussed with pt's wife at bedside and pt's nurse. Nursing to continue care. Wound care to sign off at this time. Please reconsult if further assistance is needed. [id number]         06/29/20 1022        Wound 06/26/20 1936 Skin Tear Right anterior Arm   Date First Assessed/Time First Assessed: 06/26/20 1936   Pre-existing: Yes  Primary Wound Type: Skin Tear  Side: Right  Orientation: anterior  Location: Arm   Wound Image    Wound WDL ex   Dressing Appearance Intact;Moist drainage   Drainage Amount Moderate   Drainage Characteristics/Odor Serous   Appearance Pink;Moist   Tissue loss description Partial thickness   Periwound Area Intact;Ecchymotic;Edematous   Wound Edges Open   Wound Length (cm) 1 cm   Wound Width (cm) 0.6 cm   Wound Depth (cm) 0.1 cm   Wound Volume (cm^3) 0.06 cm^3   Wound Surface Area (cm^2) 0.6 cm^2   Care Cleansed with:;Sterile normal saline;Applied:;Skin Barrier   Dressing Applied;Changed;Foam   Periwound Care Dry periwound area maintained   Dressing Change Due 06/29/20       Left arm with intact skin with multiple scattered areas of bruising with swelling noted

## 2020-06-29 NOTE — SUBJECTIVE & OBJECTIVE
Interval History/Significant Events: Patient seen and examined this AM. No acute events overnight. He remains anuric with only 10 mL of UOP in that past 24 hours. Cultures remain without growth to date. Will started empiric antifungal coverage with Diflucan and obtain fungal studies today with culture. Source of septic shock still unclear at this time. Patient becoming increasingly anemic although noted to have clotting with SLEP and unable to rinse back. Platelets continue to down trend however HIT negative. His pressor remains similar to day prior with 0.14 norepinephrine and 0.4 vasopressin. US lower extremities without evidence of DVTs. Holding off on therapeutic heparin infusion for possible PE given thrombocytopenia. Continuing supportive care. Wife at bedside and informed of plan of care. All questions answered.     Review of Systems   Unable to perform ROS: Intubated     Objective:     Vital Signs (Most Recent):  Temp: 99.2 °F (37.3 °C) (06/29/20 1105)  Pulse: 63 (06/29/20 1600)  Resp: (!) 22 (06/29/20 1600)  BP: (!) 95/59 (06/29/20 1600)  SpO2: 95 % (06/29/20 1600) Vital Signs (24h Range):  Temp:  [98.6 °F (37 °C)-101.1 °F (38.4 °C)] 99.2 °F (37.3 °C)  Pulse:  [60-66] 63  Resp:  [21-22] 22  SpO2:  [95 %-100 %] 95 %  BP: ()/(57-76) 95/59  Arterial Line BP: (108-138)/(52-63) 115/53   Weight: 109.3 kg (241 lb)  Body mass index is 35.59 kg/m².      Intake/Output Summary (Last 24 hours) at 6/29/2020 1655  Last data filed at 6/29/2020 1600  Gross per 24 hour   Intake 2992.08 ml   Output 6088 ml   Net -3095.92 ml       Physical Exam  Vitals signs and nursing note reviewed.   Constitutional:       Appearance: He is morbidly obese. He is ill-appearing. He is not diaphoretic.      Interventions: He is sedated and intubated.   HENT:      Head: Normocephalic and atraumatic.      Nose: Nose normal. No congestion.      Mouth/Throat:      Mouth: Mucous membranes are dry.   Eyes:      General: No scleral icterus.      Extraocular Movements: Extraocular movements intact.      Pupils: Pupils are equal, round, and reactive to light.      Comments: Scleral edema note bilaterally.   Neck:      Musculoskeletal: Normal range of motion and neck supple.      Comments: CVC line to right IJ.  Cardiovascular:      Rate and Rhythm: Normal rate.      Pulses: Normal pulses.      Heart sounds: No murmur. No friction rub. No gallop.    Pulmonary:      Effort: He is intubated.      Breath sounds: No wheezing, rhonchi or rales.      Comments: Transmitted ventilatory sounds. Well healed sternotomy scar to chest. Left-sided port palpated over left chest wall. CVC to right subclavian.   Abdominal:      General: Bowel sounds are normal. There is no distension.      Comments: Obese/protuberant abdomen. Midline incision present without evidence of erythema, induration, or discharge. Ostomy with brown liquid stool to right abdominal wall.    Musculoskeletal:         General: Swelling (bilateral upper extremities) present.      Right lower leg: Edema present.      Left lower leg: Edema present.      Comments: Scar to right lower extremity from prior vein harvest.   Lymphadenopathy:      Cervical: No cervical adenopathy.   Skin:     Coloration: Skin is pale.      Findings: Bruising (upper extremities bilaterally) present.         Vents:  Vent Mode: A/C (06/29/20 1554)  Ventilator Initiated: Yes (06/26/20 1743)  Set Rate: 22 BPM (06/29/20 1554)  Vt Set: 420 mL (06/29/20 1554)  PEEP/CPAP: 5 cmH20 (06/29/20 1554)  Oxygen Concentration (%): 60 (06/29/20 1600)  Peak Airway Pressure: 39 cmH2O (06/29/20 1554)  Plateau Pressure: 19 cmH20 (06/29/20 1554)  Total Ve: 10.7 mL (06/29/20 1554)  F/VT Ratio<105 (RSBI): (!) 45.08 (06/29/20 1554)     Lines/Drains/Airways     Central Venous Catheter Line                 Port A Cath Single Lumen 05/02/18 2105 left subclavian 788 days    Percutaneous Central Line Insertion/Assessment - Triple Lumen  06/24/20 1836 right internal  jugular 4 days         Hemodialysis Catheter 06/26/20 0932 3 days    Permacath 06/26/20 0932 3 days          Drain                 Colostomy 05/03/18 0715  days         Colostomy 06/24/20 RLQ 5 days         NG/OG Tube 06/24/20 1103 [place] sump 16 Fr. Right nostril 5 days         Urethral Catheter 06/26/20 1932 2 days          Airway                 Airway - Non-Surgical 06/24/20 1959 Endotracheal Tube-Hi/Lo 4 days          Arterial Line                 Arterial Line 06/24/20 1852 Right Radial 4 days          Peripheral Intravenous Line                 Midline Catheter Insertion/Assessment  - Single Lumen (RETIRED) 04/03/20 1312 Right basilic vein (medial side of arm) other (see comments) 87 days              Significant Labs:    CBC/Anemia Profile:  Recent Labs   Lab 06/28/20  0430 06/29/20  0324 06/29/20  1244   WBC 8.52 6.57  --    HGB 9.0* 8.2*  --    HCT 29.7* 26.8*  --    PLT 47* 32*  --    MCV 85 84  --    RDW 18.5* 18.3*  --    FERRITIN  --   --  501*        Chemistries:  Recent Labs   Lab 06/28/20  0430  06/28/20  2213 06/29/20  0324 06/29/20  1244     138   < > 137  137 135*  135*  135* 134*  134*   K 4.9  4.9   < > 5.0  5.0 4.5  4.5  4.5 4.6  4.6     103   < > 101  101 101  101  101 98  98   CO2 20*  20*   < > 18*  18* 20*  20*  20* 22*  22*   BUN 12  12   < > 19  19 15  15  15 17  17   CREATININE 1.6*  1.6*   < > 2.5*  2.5* 1.9*  1.9*  1.9* 2.2*  2.2*   CALCIUM 7.5*  7.5*   < > 7.4*  7.4* 7.1*  7.1*  7.1* 7.0*  7.0*   ALBUMIN 1.7*  1.7*   < > 1.6*  1.6* 1.6*  1.6*  1.6* 1.6*  1.6*   PROT 6.2  --   --  5.5*  --    BILITOT 1.2*  --   --  1.2*  --    ALKPHOS 104  --   --  97  --    *  --   --  93*  --    AST 80*  --   --  53*  --    MG 2.0  2.0   < > 2.1  2.1 1.9  1.9  1.9 2.2  2.2   PHOS 3.5  3.5   < > 4.0  4.0 3.5  3.5  3.5 3.5  3.5    < > = values in this interval not displayed.     Significant Imaging:  I have reviewed all  pertinent imaging results/findings within the past 24 hours.

## 2020-06-29 NOTE — ASSESSMENT & PLAN NOTE
- HIT negative  - patient with history of fatty liver and thrombocytopenia although platelets normal upon presentation to [facility]   - will continue with daily CBCs and transfuse for platelets < 10K or < 50K with active bleeding  - can consider adding empiric AC for treatment of DVT once platelets greater than 50K

## 2020-06-30 NOTE — PROGRESS NOTES
Nephrology Progress Note    Patient followed for COCO due to iATN and hemodynamic instability   Patient was seen on RRT / SLED which was running for uremic toxin clearance / UF for volume management.  Wt Readings from Last 3 Encounters:   06/28/20 109.3 kg (241 lb)   06/24/20 109.5 kg (241 lb 6.5 oz)   04/01/20 102.2 kg (225 lb 5 oz)     Temp Readings from Last 3 Encounters:   06/30/20 99.8 °F (37.7 °C) (Axillary)   06/26/20 98.1 °F (36.7 °C) (Rectal)   04/03/20 97.7 °F (36.5 °C) (Oral)     BP Readings from Last 3 Encounters:   06/30/20 99/66   06/26/20 (!) 146/78   04/03/20 121/62     Pulse Readings from Last 3 Encounters:   06/30/20 61   06/26/20 72   04/03/20 69       CMP  Sodium   Date Value Ref Range Status   06/30/2020 138 136 - 145 mmol/L Final     Potassium   Date Value Ref Range Status   06/30/2020 4.5 3.5 - 5.1 mmol/L Final     Chloride   Date Value Ref Range Status   06/30/2020 97 95 - 110 mmol/L Final     CO2   Date Value Ref Range Status   06/30/2020 24 23 - 29 mmol/L Final     Glucose   Date Value Ref Range Status   06/30/2020 133 (H) 70 - 110 mg/dL Final     BUN, Bld   Date Value Ref Range Status   06/30/2020 10 8 - 23 mg/dL Final     Creatinine   Date Value Ref Range Status   06/30/2020 1.3 0.5 - 1.4 mg/dL Final     Calcium   Date Value Ref Range Status   06/30/2020 7.0 (L) 8.7 - 10.5 mg/dL Final     Total Protein   Date Value Ref Range Status   06/30/2020 5.6 (L) 6.0 - 8.4 g/dL Final     Albumin   Date Value Ref Range Status   06/30/2020 1.7 (L) 3.5 - 5.2 g/dL Final     Total Bilirubin   Date Value Ref Range Status   06/30/2020 1.1 (H) 0.1 - 1.0 mg/dL Final     Comment:     For infants and newborns, interpretation of results should be based  on gestational age, weight and in agreement with clinical  observations.  Premature Infant recommended reference ranges:  Up to 24 hours.............<8.0 mg/dL  Up to 48 hours............<12.0 mg/dL  3-5 days..................<15.0 mg/dL  6-29  days.................<15.0 mg/dL       Alkaline Phosphatase   Date Value Ref Range Status   06/30/2020 105 55 - 135 U/L Final     AST   Date Value Ref Range Status   06/30/2020 53 (H) 10 - 40 U/L Final     ALT   Date Value Ref Range Status   06/30/2020 73 (H) 10 - 44 U/L Final     Anion Gap   Date Value Ref Range Status   06/30/2020 17 (H) 8 - 16 mmol/L Final     eGFR if    Date Value Ref Range Status   06/30/2020 >60.0 >60 mL/min/1.73 m^2 Final     eGFR if non    Date Value Ref Range Status   06/30/2020 58.9 (A) >60 mL/min/1.73 m^2 Final     Comment:     Calculation used to obtain the estimated glomerular filtration  rate (eGFR) is the CKD-EPI equation.          CBC  Lab Results   Component Value Date    WBC 7.94 06/30/2020    HGB 8.3 (L) 06/30/2020    HCT 26.2 (L) 06/30/2020    MCV 83 06/30/2020    PLT 41 (L) 06/30/2020         Intake/Output Summary (Last 24 hours) at 6/30/2020 1336  Last data filed at 6/30/2020 1200  Gross per 24 hour   Intake 6167.78 ml   Output 7952 ml   Net -1784.22 ml       Plan:   - Patient hemodynamically not stable for intermittent HD yet.  - Will continue SLED for volume and toxin clearance.    - SLED prescription and dialysate baths were reviewed and changes made according to most recent labs.   - Will decrease UF to 300-350cc/hr  as tolerated. Currently with negative fluid balance ~2.3L in the past 24 hrs.    - Follow labs serially while on SLED to monitor electrolytes and follow replacement protocol as needed.         Mario Aldrich MD  Nephrology Fellow  [facility]

## 2020-06-30 NOTE — PROGRESS NOTES
[facility]  Critical Care Medicine  Progress Note    Patient Name: Cristo Jefferson  MRN: [id number]  Admission Date: 6/26/2020  Hospital Length of Stay: 4 days  Code Status: Full Code  Attending Provider: Archie Mcfadden MD  Primary Care Provider: Vince Perez MD   Principal Problem: Septic shock    Subjective:     HPI:  Mr. 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 Upon presentation he was noted to be hypothermic with temperature of 95.4 F in septic shock with serum lactate of 3.6. General Surgery was consulted. Patient became increasingly acidotic with rising level of serum lactate despite IVFs, NG/OG placement, and empiric antibiotics. He was ultimately taken for ex-lap for which he underwent extensive lysis of adhesions. No perforation or ischemic bowel was noted. He continued to require pressor support, became increasingly acidotic, developed COCO, anemia presumed secondary to GI bleed, and transaminitits. Cardiology, Critical Care, and Nephrology were consulted. In the AM of 6/26 patient developed bradyarrhythmia. ACLS was performed with use of atropine, bicarbonate and calcium gluconate, in addition to multiple epinephrine boluses and external pacing. Wife was contacted and wished to proceed with resuscitation attempts. Following ROSC he was noted to have elevated troponin and BNP. Prior ECHO and repeat CXR were mostly unrevealing. UOP continued to decline. Lactate acidosis advanced. Antibiotics had already been broadened so antifungals were added. He was  started on RRT. [place] [facility] [place].    Hospital/ICU Course:  [place] [facility] [place]. He resumed on broad spectrum antibiotics with vancomycin and Zosyn and started on SLED per Nephrology with improvement in lactic acidosis. He remains on pressors at this time (now down to two, norepinephrine and vasopressin). He underwent repeat TTE on 6/28 which showed similar EF to 6/25 prior to ACLS however there was evidence of moderate to severely right ventricular systolic with enlargement. Patient becoming increasingly more thrombocytopenic and anemic this admission without and evidence of overt bleeding and not receiving anticoagulation. HIT panel negative.  US lower extremities on 6/29 showed no evidence of DVTs. There is concern for PE however given thrombocytopenia unable to start empiric AC. He is resumed on vancomycin, Zosyn, and Diflucan was added on 6/29.    Interval History/Significant Events: Remains on CRRT, weaned off vasopressin      Review of Systems   Unable to perform ROS: Intubated     Objective:     Vital Signs (Most Recent):  Temp: 99.8 °F (37.7 °C) (06/30/20 1100)  Pulse: (!) 59 (06/30/20 1400)  Resp: (!) 22 (06/30/20 1400)  BP: 99/66 (06/30/20 1200)  SpO2: (!) 93 % (06/30/20 1400) Vital Signs (24h Range):  Temp:  [98.2 °F (36.8 °C)-101.4 °F (38.6 °C)] 99.8 °F (37.7 °C)  Pulse:  [56-65] 59  Resp:  [22-24] 22  SpO2:  [92 %-100 %] 93 %  BP: ()/(58-71) 99/66  Arterial Line BP: (109-139)/(44-58) 139/56   Weight: 109.3 kg (241 lb)  Body mass index is 35.59 kg/m².      Intake/Output Summary (Last 24 hours) at 6/30/2020 1411  Last data filed at 6/30/2020 1400  Gross per 24 hour   Intake 6689.58 ml   Output 8689 ml   Net -1999.42 ml       Physical Exam  Vitals signs and nursing note reviewed.   Constitutional:       Appearance: He is morbidly obese. He is ill-appearing. He is not diaphoretic.      Interventions: He is sedated and intubated.   HENT:       Head: Normocephalic and atraumatic.      Nose: Nose normal. No congestion.      Mouth/Throat:      Mouth: Mucous membranes are dry.   Eyes:      General: No scleral icterus.     Extraocular Movements: Extraocular movements intact.      Pupils: Pupils are equal, round, and reactive to light.      Comments: Scleral edema note bilaterally.   Neck:      Musculoskeletal: Normal range of motion and neck supple.      Comments: CVC line to right IJ.  Cardiovascular:      Rate and Rhythm: Normal rate.      Pulses: Normal pulses.      Heart sounds: No murmur. No friction rub. No gallop.    Pulmonary:      Effort: He is intubated.      Breath sounds: No wheezing, rhonchi or rales.      Comments: Transmitted ventilatory sounds. Well healed sternotomy scar to chest. Left-sided port palpated over left chest wall. CVC to right subclavian.   Abdominal:      General: Bowel sounds are normal. There is no distension.      Comments: Obese/protuberant abdomen. Midline incision present without evidence of erythema, induration, or discharge. Ostomy with brown liquid stool to right abdominal wall.    Musculoskeletal:         General: Swelling (bilateral upper extremities) present.      Right lower leg: Edema present.      Left lower leg: Edema present.      Comments: Scar to right lower extremity from prior vein harvest.   Lymphadenopathy:      Cervical: No cervical adenopathy.   Skin:     Coloration: Skin is pale.      Findings: Bruising (upper extremities bilaterally) present.         Vents:  Vent Mode: A/C (06/30/20 1235)  Ventilator Initiated: Yes (06/26/20 1743)  Set Rate: 22 BPM (06/30/20 1235)  Vt Set: 420 mL (06/30/20 1235)  PEEP/CPAP: 5 cmH20 (06/30/20 1235)  Oxygen Concentration (%): 50 (06/30/20 1400)  Peak Airway Pressure: 28 cmH2O (06/30/20 1235)  Plateau Pressure: 14 cmH20 (06/30/20 1235)  Total Ve: 10.4 mL (06/30/20 1235)  F/VT Ratio<105 (RSBI): 296.3 (06/30/20 1235)  Lines/Drains/Airways     Central Venous Catheter Line                  Port A Cath Single Lumen 05/02/18 2105 left subclavian 789 days    Percutaneous Central Line Insertion/Assessment - Triple Lumen  06/24/20 1836 right internal jugular 5 days         Hemodialysis Catheter 06/26/20 0932 4 days    Permacath 06/26/20 0932 4 days          Drain                 Colostomy 05/03/18 0715  days         Colostomy 06/24/20 RLQ 6 days         NG/OG Tube 06/24/20 1103 [place] sump 16 Fr. Right nostril 6 days          Airway                 Airway - Non-Surgical 06/24/20 1959 Endotracheal Tube-Hi/Lo 5 days          Arterial Line                 Arterial Line 06/24/20 1852 Right Radial 5 days          Peripheral Intravenous Line                 Midline Catheter Insertion/Assessment  - Single Lumen (RETIRED) 04/03/20 1312 Right basilic vein (medial side of arm) other (see comments) 88 days              Significant Labs:    CBC/Anemia Profile:  Recent Labs   Lab 06/29/20  1244 06/29/20  1708 06/30/20  0315 06/30/20  0757   WBC  --  8.87 7.99 7.94   HGB  --  8.2* 7.9* 8.3*   HCT  --  26.8* 26.4* 26.2*   PLT  --  39* 36* 41*   MCV  --  83 85 83   RDW  --  18.1* 18.0* 18.0*   FERRITIN 501*  --   --   --         Chemistries:  Recent Labs   Lab 06/29/20  0324  06/29/20  2120 06/30/20  0315 06/30/20  0757   *  135*  135*   < > 137 137  137 138   K 4.5  4.5  4.5   < > 4.3 4.3  4.3 4.5     101  101   < > 99 98  98 97   CO2 20*  20*  20*   < > 24 23  23 24   BUN 15  15  15   < > 11 11  11 10   CREATININE 1.9*  1.9*  1.9*   < > 1.5* 1.4  1.4 1.3   CALCIUM 7.1*  7.1*  7.1*   < > 6.9* 6.8*  6.8* 7.0*   ALBUMIN 1.6*  1.6*  1.6*   < > 1.6* 1.6*  1.6* 1.7*   PROT 5.5*  --   --  5.6*  --    BILITOT 1.2*  --   --  1.1*  --    ALKPHOS 97  --   --  105  --    ALT 93*  --   --  73*  --    AST 53*  --   --  53*  --    MG 1.9  1.9  1.9   < > 1.9 2.2 2.0   PHOS 3.5  3.5  3.5   < > 2.3* 4.4  4.4 4.1    < > = values in this interval not displayed.       All pertinent  labs within the past 24 hours have been reviewed.    Significant Imaging:  I have reviewed all pertinent imaging results/findings within the past 24 hours.      ABG  Recent Labs   Lab 06/30/20  0831   PH 7.433   PO2 83   PCO2 36.6   HCO3 24.5   BE 0     Assessment/Plan:     Pulmonary  Acute hypoxemic respiratory failure  Please see Septic shock.    Cardiac/Vascular  Cardiac arrest  - Suspect 2/2 sepsis as cause of cardiac arrest on 6/26  in the setting of bradycardia requiring maximum dose of atropine, external pacing, and multiple doses of epinephrine  - troponin trend: 0.54 > 0.522  - US lower extremities with Doppler on 6/29 without DVTs  - holding off on empiric heparin and other forms of AC in light of thrombocytopenia (will consider once platelets > 50K)   - opens  Eyes and follows commands     Renal/  High anion gap metabolic acidosis  Please see Septic shock.    Acute kidney injury  - ATN in the setting of septic shock  - anuric  - Continue SLED per Nephrology  - daily weights  - avoid nephrotoxic agents when possible  - renally dose all medications  - maintain net negative fluid status     Lactic acidosis  Please see Septic shock.  --Improved with RRT    ID  * Septic shock  Unclear source of infection, lactate peaked aqt 8.9 and has since normalized.   - blood cultures 06/24 negative and repeat on 06/30 NGTD  - follow up fungal blood culture and beta-D-glucan assay sent 06/29  - sputum and urine also unrevealing   - Continue Vancomycin and Zosyn (renally dosed), added Diflucan on 6/29  - Off vasopressin; wean Levo for MAP >65  - Continue RRT  - continue stress does steroids with fludrocortisone 100 mg daily and hydrocortisone 100 mg IV Q8H  - CBC Q12H and RFP Q8H    Hematology  Thrombocytopenia  Suspect secondary to infection  - HIT negative, coagulation studies normal  - patient with history of fatty liver and thrombocytopenia although platelets normal upon presentation to [facility]   -  will continue with daily CBCs and transfuse for platelets < 10K or < 50K with active bleeding  - continue holding anticoagulation     Endocrine  Type II diabetes mellitus  - hemoglobin A1c in April was 6.3%  - glucose checks Q6H given stress dose steroids  - Low Dose SSI for now  - goal glucose 140-180 while inpatient    GI  Shock liver  Please see Septic shock.    Small bowel obstruction  - s/p ex-lap on 6/24 with extensive lysis of adhesions with SBO, however, no signs of perforation or ischemia  - please see Septic shock         Critical Care Time: 65 minutes  Critical secondary to Patient has a condition that poses threat to life and bodily function: Acute Renal Failure      Critical care was time spent personally by me on the following activities: development of treatment plan with patient or surrogate and bedside caregivers, discussions with consultants, evaluation of patient's response to treatment, examination of patient, ordering and performing treatments and interventions, ordering and review of laboratory studies, ordering and review of radiographic studies, pulse oximetry, re-evaluation of patient's condition. This critical care time did not overlap with that of any other provider or involve time for any procedures.     Roverto Yoa NP  Critical Care Medicine  [facility]

## 2020-06-30 NOTE — ASSESSMENT & PLAN NOTE
Unclear source of infection, lactate peaked aqt 8.9 and has since normalized.   - blood cultures 06/24 negative and repeat on 06/30 NGTD  - follow up fungal blood culture and beta-D-glucan assay sent 06/29  - sputum and urine also unrevealing   - Continue Vancomycin and Zosyn (renally dosed), added Diflucan on 6/29  - Off vasopressin; wean Levo for MAP >65  - Continue RRT  - continue stress does steroids with fludrocortisone 100 mg daily and hydrocortisone 100 mg IV Q8H  - CBC Q12H and RFP Q8H

## 2020-06-30 NOTE — ASSESSMENT & PLAN NOTE
- ATN in the setting of septic shock  - anuric  - Continue SLED per Nephrology  - daily weights  - avoid nephrotoxic agents when possible  - renally dose all medications  - maintain net negative fluid status

## 2020-06-30 NOTE — SUBJECTIVE & OBJECTIVE
Interval History/Significant Events: Remains on CRRT, weaned off vasopressin      Review of Systems   Unable to perform ROS: Intubated     Objective:     Vital Signs (Most Recent):  Temp: 99.8 °F (37.7 °C) (06/30/20 1100)  Pulse: (!) 59 (06/30/20 1400)  Resp: (!) 22 (06/30/20 1400)  BP: 99/66 (06/30/20 1200)  SpO2: (!) 93 % (06/30/20 1400) Vital Signs (24h Range):  Temp:  [98.2 °F (36.8 °C)-101.4 °F (38.6 °C)] 99.8 °F (37.7 °C)  Pulse:  [56-65] 59  Resp:  [22-24] 22  SpO2:  [92 %-100 %] 93 %  BP: ()/(58-71) 99/66  Arterial Line BP: (109-139)/(44-58) 139/56   Weight: 109.3 kg (241 lb)  Body mass index is 35.59 kg/m².      Intake/Output Summary (Last 24 hours) at 6/30/2020 1411  Last data filed at 6/30/2020 1400  Gross per 24 hour   Intake 6689.58 ml   Output 8689 ml   Net -1999.42 ml       Physical Exam  Vitals signs and nursing note reviewed.   Constitutional:       Appearance: He is morbidly obese. He is ill-appearing. He is not diaphoretic.      Interventions: He is sedated and intubated.   HENT:      Head: Normocephalic and atraumatic.      Nose: Nose normal. No congestion.      Mouth/Throat:      Mouth: Mucous membranes are dry.   Eyes:      General: No scleral icterus.     Extraocular Movements: Extraocular movements intact.      Pupils: Pupils are equal, round, and reactive to light.      Comments: Scleral edema note bilaterally.   Neck:      Musculoskeletal: Normal range of motion and neck supple.      Comments: CVC line to right IJ.  Cardiovascular:      Rate and Rhythm: Normal rate.      Pulses: Normal pulses.      Heart sounds: No murmur. No friction rub. No gallop.    Pulmonary:      Effort: He is intubated.      Breath sounds: No wheezing, rhonchi or rales.      Comments: Transmitted ventilatory sounds. Well healed sternotomy scar to chest. Left-sided port palpated over left chest wall. CVC to right subclavian.   Abdominal:      General: Bowel sounds are normal. There is no distension.       Comments: Obese/protuberant abdomen. Midline incision present without evidence of erythema, induration, or discharge. Ostomy with brown liquid stool to right abdominal wall.    Musculoskeletal:         General: Swelling (bilateral upper extremities) present.      Right lower leg: Edema present.      Left lower leg: Edema present.      Comments: Scar to right lower extremity from prior vein harvest.   Lymphadenopathy:      Cervical: No cervical adenopathy.   Skin:     Coloration: Skin is pale.      Findings: Bruising (upper extremities bilaterally) present.         Vents:  Vent Mode: A/C (06/30/20 1235)  Ventilator Initiated: Yes (06/26/20 1743)  Set Rate: 22 BPM (06/30/20 1235)  Vt Set: 420 mL (06/30/20 1235)  PEEP/CPAP: 5 cmH20 (06/30/20 1235)  Oxygen Concentration (%): 50 (06/30/20 1400)  Peak Airway Pressure: 28 cmH2O (06/30/20 1235)  Plateau Pressure: 14 cmH20 (06/30/20 1235)  Total Ve: 10.4 mL (06/30/20 1235)  F/VT Ratio<105 (RSBI): 296.3 (06/30/20 1235)  Lines/Drains/Airways     Central Venous Catheter Line                 Port A Cath Single Lumen 05/02/18 2105 left subclavian 789 days    Percutaneous Central Line Insertion/Assessment - Triple Lumen  06/24/20 1836 right internal jugular 5 days         Hemodialysis Catheter 06/26/20 0932 4 days    Permacath 06/26/20 0932 4 days          Drain                 Colostomy 05/03/18 0715  days         Colostomy 06/24/20 RLQ 6 days         NG/OG Tube 06/24/20 1103 Mercedes sump 16 Fr. Right nostril 6 days          Airway                 Airway - Non-Surgical 06/24/20 1959 Endotracheal Tube-Hi/Lo 5 days          Arterial Line                 Arterial Line 06/24/20 1852 Right Radial 5 days          Peripheral Intravenous Line                 Midline Catheter Insertion/Assessment  - Single Lumen (RETIRED) 04/03/20 1312 Right basilic vein (medial side of arm) other (see comments) 88 days              Significant Labs:    CBC/Anemia Profile:  Recent Labs   Lab  06/29/20  1244 06/29/20  1708 06/30/20 0315 06/30/20  0757   WBC  --  8.87 7.99 7.94   HGB  --  8.2* 7.9* 8.3*   HCT  --  26.8* 26.4* 26.2*   PLT  --  39* 36* 41*   MCV  --  83 85 83   RDW  --  18.1* 18.0* 18.0*   FERRITIN 501*  --   --   --         Chemistries:  Recent Labs   Lab 06/29/20  0324 06/29/20 2120 06/30/20 0315 06/30/20 0757   *  135*  135*   < > 137 137  137 138   K 4.5  4.5  4.5   < > 4.3 4.3  4.3 4.5     101  101   < > 99 98  98 97   CO2 20*  20*  20*   < > 24 23  23 24   BUN 15  15  15   < > 11 11  11 10   CREATININE 1.9*  1.9*  1.9*   < > 1.5* 1.4  1.4 1.3   CALCIUM 7.1*  7.1*  7.1*   < > 6.9* 6.8*  6.8* 7.0*   ALBUMIN 1.6*  1.6*  1.6*   < > 1.6* 1.6*  1.6* 1.7*   PROT 5.5*  --   --  5.6*  --    BILITOT 1.2*  --   --  1.1*  --    ALKPHOS 97  --   --  105  --    ALT 93*  --   --  73*  --    AST 53*  --   --  53*  --    MG 1.9  1.9  1.9   < > 1.9 2.2 2.0   PHOS 3.5  3.5  3.5   < > 2.3* 4.4  4.4 4.1    < > = values in this interval not displayed.       All pertinent labs within the past 24 hours have been reviewed.    Significant Imaging:  I have reviewed all pertinent imaging results/findings within the past 24 hours.

## 2020-06-30 NOTE — ASSESSMENT & PLAN NOTE
- Suspect 2/2 sepsis as cause of cardiac arrest on 6/26  in the setting of bradycardia requiring maximum dose of atropine, external pacing, and multiple doses of epinephrine  - troponin trend: 0.54 > 0.522  - US lower extremities with Doppler on 6/29 without DVTs  - holding off on empiric heparin and other forms of AC in light of thrombocytopenia (will consider once platelets > 50K)   - opens  Eyes and follows commands

## 2020-06-30 NOTE — ASSESSMENT & PLAN NOTE
- hemoglobin A1c in April was 6.3%  - glucose checks Q6H given stress dose steroids  - Low Dose SSI for now  - goal glucose 140-180 while inpatient

## 2020-06-30 NOTE — ASSESSMENT & PLAN NOTE
Suspect secondary to infection  - HIT negative, coagulation studies normal  - patient with history of fatty liver and thrombocytopenia although platelets normal upon presentation to [facility]   - will continue with daily CBCs and transfuse for platelets < 10K or < 50K with active bleeding  - continue holding anticoagulation

## 2020-06-30 NOTE — PROGRESS NOTES
Pharmacokinetic Assessment Follow Up: IV Vancomycin    Assessment and Plan:    - Vancomycin random AM level today is 18.7 mcg/mL.  - On continuous SLED for COCO - continue to dose by level.  - Administer vanc 1000 mg x 1.   - Draw random level with AM labs tomorrow 7/1.   - Will re-dose based on level and RRT plan.    Drug levels (last 3 results):  Recent Labs   Lab Result Units 06/28/20  0430 06/29/20  0324 06/30/20  0315   Vancomycin, Random ug/mL 15.0 19.5 18.7       Pharmacy will continue to follow and monitor vancomycin.    Please contact pharmacy at extension [phone] for questions regarding this assessment.    Thank you for the consult,   Betzy Francis, PharmD, BCCCP     Patient brief summary:  Cristo Jefferson is a 59 y.o. male initiated on antimicrobial therapy with IV Vancomycin for treatment of intra-abdominal infection/sepsis.     The patient's current regimen is dosed by level.    Drug Allergies:   Review of patient's allergies indicates:   Allergen Reactions    Morphine Rash       Actual Body Weight:   109.3 kg    Renal Function:   Estimated Creatinine Clearance: 67.5 mL/min (based on SCr of 1.4 mg/dL).    Dialysis Method (if applicable):  SLED    CBC (last 72 hours):  Recent Labs   Lab Result Units 06/28/20  0430 06/29/20  0324 06/29/20  1708 06/30/20  0315   WBC K/uL 8.52 6.57 8.87 7.99   Hemoglobin g/dL 9.0* 8.2* 8.2* 7.9*   Hematocrit % 29.7* 26.8* 26.8* 26.4*   Platelets K/uL 47* 32* 39* 36*   Gran% % 75.2* 73.0 66.0 70.0   Lymph% % 12.2* 20.0 17.0* 9.5*   Mono% % 11.0 4.0 3.0* 5.5   Eosinophil% % 0.5 1.0 4.0 1.0   Basophil% % 0.4 0.0 0.0 0.0   Differential Method  Automated Manual Manual Manual       Metabolic Panel (last 72 hours):  Recent Labs   Lab Result Units 06/27/20  1450 06/27/20  2040 06/28/20  0430 06/28/20  1340 06/28/20  2213 06/29/20  0324 06/29/20  1244 06/29/20  1550 06/29/20  2120 06/30/20  0315   Sodium mmol/L 138 138 138  138 136  136 137  137 135*  135*  135* 134*  134*  133* 137 137  137   Potassium mmol/L 4.5 4.9 4.9  4.9 4.6  4.6 5.0  5.0 4.5  4.5  4.5 4.6  4.6 4.7 4.3 4.3  4.3   Chloride mmol/L 101 103 103  103 101  101 101  101 101  101  101 98  98 100 99 98  98   CO2 mmol/L 24 21* 20*  20* 20*  20* 18*  18* 20*  20*  20* 22*  22* 20* 24 23  23   Glucose mg/dL 130* 157* 158*  158* 163*  163* 208*  208* 152*  152*  152* 182*  182* 173* 120* 157*  157*   BUN, Bld mg/dL 12 11 12  12 13  13 19  19 15  15  15 17  17 21 11 11  11   Creatinine mg/dL 1.8* 1.7* 1.6*  1.6* 1.8*  1.8* 2.5*  2.5* 1.9*  1.9*  1.9* 2.2*  2.2* 2.5* 1.5* 1.4  1.4   Albumin g/dL 1.6* 1.6* 1.7*  1.7* 1.6*  1.6* 1.6*  1.6* 1.6*  1.6*  1.6* 1.6*  1.6* 1.5* 1.6* 1.6*  1.6*   Total Bilirubin mg/dL  --   --  1.2*  --   --  1.2*  --   --   --  1.1*   Alkaline Phosphatase U/L  --   --  104  --   --  97  --   --   --  105   AST U/L  --   --  80*  --   --  53*  --   --   --  53*   ALT U/L  --   --  133*  --   --  93*  --   --   --  73*   Magnesium mg/dL 2.0 2.1 2.0  2.0 2.0  2.0 2.1  2.1 1.9  1.9  1.9 2.2  2.2 2.1 1.9 2.2   Phosphorus mg/dL 3.0 2.7 3.5  3.5 3.8  3.8 4.0  4.0 3.5  3.5  3.5 3.5  3.5 4.1 2.3* 4.4  4.4       Vancomycin Administrations:  vancomycin given in the last 96 hours                   vancomycin (VANCOCIN) 1,750 mg in dextrose 5 % 500 mL IVPB (mg) 1,750 mg New Bag 06/25/20 2257     1,750 mg New Bag  0119                Microbiologic Results:  Microbiology Results (last 7 days)     Procedure Component Value Units Date/Time    Blood Culture #1 **CANNOT BE ORDERED STAT** [[id number]] Collected: 06/26/20 2330    Order Status: Completed Specimen: Blood from Peripheral, Foot, Right Updated: 06/30/20 0613     Blood Culture, Routine No Growth to date      No Growth to date      No Growth to date      No Growth to date    Blood culture [[id number]] Collected: 06/26/20 2353    Order Status: Completed Specimen: Blood from Peripheral, Ankle, Right  Updated: 06/30/20 0612     Blood Culture, Routine No Growth to date      No Growth to date      No Growth to date      No Growth to date    Fungus Culture, Blood or Bone Marrow [[id number]] Collected: 06/29/20 1550    Order Status: Sent Specimen: Blood Updated: 06/29/20 1559    Fungus culture [[id number]]     Order Status: Canceled Specimen: Blood

## 2020-06-30 NOTE — PROGRESS NOTES
Crrt restarted,  Good blood flow , /. uf  Rate 350.   B/P 117/21 pulse 56.. patient on vent and o2 sat 94%

## 2020-07-01 NOTE — PLAN OF CARE
CMICU DAILY GOALS       A: Awake    RASS: Goal - RASS Goal: 0-->alert and calm  Actual - RASS (Bauer Agitation-Sedation Scale): -2-->light sedation   Restraint necessity: Clinical Justification: Removing medical devices  B: Breath   SBT: Fail   C: Coordinate A & B, analgesics/sedatives   Pain: managed    SAT: Pass  D: Delirium   CAM-ICU: Overall CAM-ICU: Negative  E: Early Mobility   MOVE Screen: Fail   Activity: Activity Management: bedrest maintained per order  FAS: Feeding/Nutrition   Diet order: Diet/Nutrition Received: NPO, tube feeding,   Fluid restriction:    T: Thrombus   DVT prophylaxis: VTE Required Core Measure: (SCDs) Sequential compression device initiated/maintained  H: HOB Elevation   Head of Bed (HOB): HOB at 30-45 degrees  U: Ulcer Prophylaxis   GI: yes  G: Glucose control   managed Glycemic Management: blood glucose monitoring  S: Skin   Bundle compliance: yes   Bathing/Skin Care: shampoo Date: 6/30/20  B: Bowel Function   no issues   I: Indwelling Catheters   Garcia necessity: [REMOVED]      Urethral Catheter 06/26/20 1932-Reason for Continuing Urinary Catheterization: Critically ill in ICU requiring intensive monitoring   CVC necessity: Yes   IPAD offered: Not appropriate  D: De-escalation Antibx   No  Plan for the day   CRRT, SBT/SAT  Family/Goals of care/Code Status   Code Status: Full Code     No acute events throughout day, VS and assessment per flow sheet, patient progressing towards goals as tolerated, plan of care reviewed with Cristo Jefferson and family, all concerns addressed, will continue to monitor.

## 2020-07-01 NOTE — PLAN OF CARE
Recommendations     1. Consider changing current TF regimen to Impact Peptide @ 60 mL/hr to provide 2160 kcals, 135 g of protein, 1109 mL fluid.              - If Novasource still desired, increase as tolerated to goal rate of 45 mL/hr to provide 2160 kcals, 98 g of protein, 774 mL fluid.  2. RD to monitor & follow-up.     Goals: Meet % EEN, EPN by RD f/u date  Nutrition Goal Status: new  Communication of RD Recs: reviewed with RN

## 2020-07-01 NOTE — PROGRESS NOTES
Pharmacokinetic Assessment Follow Up: IV Vancomycin    Assessment and Plan:    - Vancomycin random AM level today is 19.7 mcg/mL.  - On continuous SLED for COCO - continue to dose by level.  - Administer vanc 750 mg x 1.   - Draw random level with AM labs tomorrow 7/2.   - Will re-dose based on level and RRT plan.    Drug levels (last 3 results):  Recent Labs   Lab Result Units 06/29/20  0324 06/30/20  0315 07/01/20  0226   Vancomycin, Random ug/mL 19.5 18.7 19.7       Pharmacy will continue to follow and monitor vancomycin.    Please contact pharmacy at extension [phone] for questions regarding this assessment.    Thank you for the consult,   Betyz Francis, PharmD, BCCCP     Patient brief summary:  Cristo Jefferson is a 59 y.o. male initiated on antimicrobial therapy with IV Vancomycin for treatment of intra-abdominal infection/sepsis.     The patient's current regimen is dosed by level.    Drug Allergies:   Review of patient's allergies indicates:   Allergen Reactions    Morphine Rash       Actual Body Weight:   109.3 kg    Renal Function:   Estimated Creatinine Clearance: 52.5 mL/min (A) (based on SCr of 1.8 mg/dL (H)).    Dialysis Method (if applicable):  SLED    CBC (last 72 hours):  Recent Labs   Lab Result Units 06/29/20  0324 06/29/20  1708 06/30/20  0315 06/30/20  0757 06/30/20  1848   WBC K/uL 6.57 8.87 7.99 7.94 9.14   Hemoglobin g/dL 8.2* 8.2* 7.9* 8.3* 8.1*   Hematocrit % 26.8* 26.8* 26.4* 26.2* 26.5*   Platelets K/uL 32* 39* 36* 41* 51*   Gran% % 73.0 66.0 70.0 71.0 73.0   Lymph% % 20.0 17.0* 9.5* 23.0 15.0*   Mono% % 4.0 3.0* 5.5 6.0 7.0   Eosinophil% % 1.0 4.0 1.0 0.0 0.0   Basophil% % 0.0 0.0 0.0 0.0 1.0   Differential Method  Manual Manual Manual Manual Manual       Metabolic Panel (last 72 hours):  Recent Labs   Lab Result Units 06/28/20  1340 06/28/20  2213 06/29/20  0324 06/29/20  1244 06/29/20  1550 06/29/20  2120 06/30/20  0315 06/30/20  0757 06/30/20  1346 06/30/20  2118 07/01/20  0226  07/01/20  0437   Sodium mmol/L 136  136 137  137 135*  135*  135* 134*  134* 133* 137 137  137 138 139 138 138 137   Potassium mmol/L 4.6  4.6 5.0  5.0 4.5  4.5  4.5 4.6  4.6 4.7 4.3 4.3  4.3 4.5 4.1 4.4 4.6 4.4   Chloride mmol/L 101  101 101  101 101  101  101 98  98 100 99 98  98 97 98 102 103 102   CO2 mmol/L 20*  20* 18*  18* 20*  20*  20* 22*  22* 20* 24 23  23 24 26 22* 20* 21*   Glucose mg/dL 163*  163* 208*  208* 152*  152*  152* 182*  182* 173* 120* 157*  157* 133* 131* 136* 151* 145*   BUN, Bld mg/dL 13  13 19  19 15  15  15 17  17 21 11 11  11 10 11 12 15 18   Creatinine mg/dL 1.8*  1.8* 2.5*  2.5* 1.9*  1.9*  1.9* 2.2*  2.2* 2.5* 1.5* 1.4  1.4 1.3 1.4 1.5* 1.6* 1.8*   Albumin g/dL 1.6*  1.6* 1.6*  1.6* 1.6*  1.6*  1.6* 1.6*  1.6* 1.5* 1.6* 1.6*  1.6* 1.7* 1.7* 1.6* 1.6* 1.6*   Total Bilirubin mg/dL  --   --  1.2*  --   --   --  1.1*  --   --   --  0.9  --    Alkaline Phosphatase U/L  --   --  97  --   --   --  105  --   --   --  102  --    AST U/L  --   --  53*  --   --   --  53*  --   --   --  51*  --    ALT U/L  --   --  93*  --   --   --  73*  --   --   --  57*  --    Magnesium mg/dL 2.0  2.0 2.1  2.1 1.9  1.9  1.9 2.2  2.2 2.1 1.9 2.2 2.0 1.9 1.9  --  2.1   Phosphorus mg/dL 3.8  3.8 4.0  4.0 3.5  3.5  3.5 3.5  3.5 4.1 2.3* 4.4  4.4 4.1 3.9 4.1 4.4 4.4       Vancomycin Administrations:  vancomycin given in the last 96 hours                   vancomycin (VANCOCIN) 1,750 mg in dextrose 5 % 500 mL IVPB (mg) 1,750 mg New Bag 06/25/20 2257     1,750 mg New Bag  0119                Microbiologic Results:  Microbiology Results (last 7 days)     Procedure Component Value Units Date/Time    Blood Culture #1 **CANNOT BE ORDERED STAT** [[id number]] Collected: 06/26/20 7390    Order Status: Completed Specimen: Blood from Peripheral, Foot, Right Updated: 07/01/20 0612     Blood Culture, Routine No Growth to date      No Growth to date      No Growth to date       No Growth to date      No Growth to date    Blood culture [[id number]] Collected: 06/26/20 2353    Order Status: Completed Specimen: Blood from Peripheral, Ankle, Right Updated: 07/01/20 0612     Blood Culture, Routine No Growth to date      No Growth to date      No Growth to date      No Growth to date      No Growth to date    Blood culture [[id number]] Collected: 06/30/20 0822    Order Status: Completed Specimen: Blood from Peripheral, Foot, Left Updated: 06/30/20 1515     Blood Culture, Routine No Growth to date    Narrative:      Blood cultures x 2 different sites. 4 bottles total. Please  draw cultures before administering antibiotics.    Blood culture [[id number]] Collected: 06/30/20 0746    Order Status: Completed Specimen: Blood from Peripheral, Antecubital, Left Updated: 06/30/20 1515     Blood Culture, Routine No Growth to date    Narrative:      Blood cultures from 2 different sites. 4 bottles total.  Please draw before starting antibiotics.    Culture, Respiratory with Gram Stain [[id number]] Collected: 06/30/20 0817    Order Status: Completed Specimen: Respiratory from Endotracheal Aspirate Updated: 06/30/20 1231     Gram Stain (Respiratory) <10 epithelial cells per low power field.     Gram Stain (Respiratory) No WBC's     Gram Stain (Respiratory) Rare yeast    Narrative:      Mini-BAL. Before antibiotics.    Fungus Culture, Blood or Bone Marrow [[id number]] Collected: 06/29/20 1550    Order Status: Completed Specimen: Blood Updated: 06/30/20 1211     Fungus Cult, blood or BM Culture in progress    Fungus culture [[id number]]     Order Status: Canceled Specimen: Blood

## 2020-07-01 NOTE — PROGRESS NOTES
Nephrology Progress Note    Patient followed for COCO 2/2 iATN  Patient was seen on RRT / SLED which was running for uremic toxin clearance / UF for volume management.  Wt Readings from Last 3 Encounters:   06/28/20 109.3 kg (241 lb)   06/24/20 109.5 kg (241 lb 6.5 oz)   04/01/20 102.2 kg (225 lb 5 oz)     Temp Readings from Last 3 Encounters:   07/01/20 98 °F (36.7 °C) (Axillary)   06/26/20 98.1 °F (36.7 °C) (Rectal)   04/03/20 97.7 °F (36.5 °C) (Oral)     BP Readings from Last 3 Encounters:   07/01/20 (!) 101/59   06/26/20 (!) 146/78   04/03/20 121/62     Pulse Readings from Last 3 Encounters:   07/01/20 (!) 55   06/26/20 72   04/03/20 69       CMP  Sodium   Date Value Ref Range Status   07/01/2020 137 136 - 145 mmol/L Final     Potassium   Date Value Ref Range Status   07/01/2020 4.4 3.5 - 5.1 mmol/L Final     Chloride   Date Value Ref Range Status   07/01/2020 102 95 - 110 mmol/L Final     CO2   Date Value Ref Range Status   07/01/2020 21 (L) 23 - 29 mmol/L Final     Glucose   Date Value Ref Range Status   07/01/2020 145 (H) 70 - 110 mg/dL Final     BUN, Bld   Date Value Ref Range Status   07/01/2020 18 8 - 23 mg/dL Final     Creatinine   Date Value Ref Range Status   07/01/2020 1.8 (H) 0.5 - 1.4 mg/dL Final     Calcium   Date Value Ref Range Status   07/01/2020 6.9 (LL) 8.7 - 10.5 mg/dL Final     Comment:     *Critical value -  Results called to and read back by:Kaila Reagan RN       Total Protein   Date Value Ref Range Status   07/01/2020 5.4 (L) 6.0 - 8.4 g/dL Final     Albumin   Date Value Ref Range Status   07/01/2020 1.6 (L) 3.5 - 5.2 g/dL Final     Total Bilirubin   Date Value Ref Range Status   07/01/2020 0.9 0.1 - 1.0 mg/dL Final     Comment:     For infants and newborns, interpretation of results should be based  on gestational age, weight and in agreement with clinical  observations.  Premature Infant recommended reference ranges:  Up to 24 hours.............<8.0 mg/dL  Up to 48  hours............<12.0 mg/dL  3-5 days..................<15.0 mg/dL  6-29 days.................<15.0 mg/dL       Alkaline Phosphatase   Date Value Ref Range Status   07/01/2020 102 55 - 135 U/L Final     AST   Date Value Ref Range Status   07/01/2020 51 (H) 10 - 40 U/L Final     ALT   Date Value Ref Range Status   07/01/2020 57 (H) 10 - 44 U/L Final     Anion Gap   Date Value Ref Range Status   07/01/2020 14 8 - 16 mmol/L Final     eGFR if    Date Value Ref Range Status   07/01/2020 45.9 (A) >60 mL/min/1.73 m^2 Final     eGFR if non    Date Value Ref Range Status   07/01/2020 39.7 (A) >60 mL/min/1.73 m^2 Final     Comment:     Calculation used to obtain the estimated glomerular filtration  rate (eGFR) is the CKD-EPI equation.          CBC  Lab Results   Component Value Date    WBC 8.25 07/01/2020    HGB 7.3 (L) 07/01/2020    HCT 23.9 (L) 07/01/2020    MCV 85 07/01/2020    PLT 41 (L) 07/01/2020         Intake/Output Summary (Last 24 hours) at 7/1/2020 1009  Last data filed at 7/1/2020 0921  Gross per 24 hour   Intake 4641.35 ml   Output 6462 ml   Net -1820.65 ml       Plan:  - Patient hemodynamically not stable for intermittent HD yet.  - Will continue SLED for volume and toxin clearance.  Will increase bicarb bath to 35 and maintain UF to a goal of 300 cc/hr. Patient currently receiving around 50 ml/hr from drips. I/O with negative fluid balance 1.6L.  - SLED prescription and dialysate baths were reviewed and changes made according to most recent labs.   - Follow labs serially while on SLED to monitor electrolytes and follow replacement protocol as needed.         Mario Aldrich MD  Nephrology Fellow  [facility]

## 2020-07-01 NOTE — PROGRESS NOTES
"  [facility]  Adult Nutrition  Consult Note    SUMMARY     Recommendations    1. Consider changing current TF regimen to Impact Peptide @ 60 mL/hr to provide 2160 kcals, 135 g of protein, 1109 mL fluid.   - If Novasource still desired, increase as tolerated to goal rate of 45 mL/hr to provide 2160 kcals, 98 g of protein, 774 mL fluid.  2. RD to monitor & follow-up.    Goals: Meet % EEN, EPN by RD f/u date  Nutrition Goal Status: new  Communication of RD Recs: reviewed with RN    Reason for Assessment    Reason For Assessment: NPO/clear liquids x 5 days, new tube feeding  Diagnosis: other (see comments)(Septic shock)  Relevant Medical History: HTN, DM  Interdisciplinary Rounds: did not attend    General Information Comments: Pt intubated/sedated, TFs initiated & pt tolerating at trickle via NGT. Pt receiving CRRT. Patient appears nourished with no physical signs of malnutrition and no weight loss PTA (UBW: 240#).  Nutrition Discharge Planning: Unable to determine    Nutrition/Diet History    Factors Affecting Nutritional Intake: NPO, on mechanical ventilation    Anthropometrics    Temp: 98.1 °F (36.7 °C)  Height: 5' 9" (175.3 cm)  Height (inches): 69 in  Weight Method: Estimated  Weight: 109.3 kg (240 lb 15.4 oz)  Weight (lb): 240.97 lb  Ideal Body Weight (IBW), Male: 160 lb  % Ideal Body Weight, Male (lb): 150.61 %  BMI (Calculated): 35.6  BMI Grade: 35 - 39.9 - obesity - grade II  Usual Body Weight (UBW), k kg  % Usual Body Weight: 100.48  % Weight Change From Usual Weight: 0.27 %    Lab/Procedures/Meds    Pertinent Labs Reviewed: reviewed  Pertinent Labs Comments: Creat 1.8, GFR 39.7  Pertinent Medications Reviewed: reviewed  Pertinent Medications Comments: Fentanyl, Levophed, Propofol    Estimated/Assessed Needs    Weight Used For Calorie Calculations: 109.3 kg (240 lb 15.4 oz)     Energy Calorie Requirements (kcal): 2161 kcal/d  Energy Need Method: [place]     Protein " Requirements: 109-146 g/d (1.5-2 g/kg IBW)  Weight Used For Protein Calculations: 72.7 kg (160 lb 4.4 oz)     Estimated Fluid Requirement Method: other (see comments)(Per MD or 1 mL/kcal)     Nutrition Prescription Ordered    Current Diet Order: NPO  Current Nutrition Support Formula Ordered: Novasource Renal  Current Nutrition Support Rate Ordered: 10 mL/hr    Evaluation of Received Nutrient/Fluid Intake    Enteral Calories (kcal): 480  Enteral Protein (gm): 22  Enteral (Free Water) Fluid (mL): 172    Energy Calories Required: not meeting needs  Protein Required: not meeting needs  Fluid Required: other (see comments)(Per MD or 1 mL/kcal)    Comments: LBM: 6/30    Tolerance: tolerating    Nutrition Risk    Level of Risk/Frequency of Follow-up: (2x/week)     Assessment and Plan    Nutrition Problem  Inadequate oral intake    Related to (etiology):   Inability to consume sufficient energy    Signs and Symptoms (as evidenced by):   NPO    Interventions(treatment strategy):  Collaboration of nutrition care w/ other providers     Nutrition Diagnosis Status:   New     Monitor and Evaluation    Food and Nutrient Intake: energy intake, food and beverage intake, enteral nutrition intake  Food and Nutrient Adminstration: diet order, enteral and parenteral nutrition administration  Physical Activity and Function: nutrition-related ADLs and IADLs  Anthropometric Measurements: weight, weight change  Biochemical Data, Medical Tests and Procedures: inflammatory profile, lipid profile, gastrointestinal profile, glucose/endocrine profile, electrolyte and renal panel  Nutrition-Focused Physical Findings: overall appearance     Malnutrition Assessment    Subcutaneous Fat (Malnutrition): other (see comments)(Well nourished)  Muscle Mass (Malnutrition): other (see comments)(Well nourished)   Orbital Region (Subcutaneous Fat Loss): well nourished  Upper Arm Region (Subcutaneous Fat Loss): well nourished  Thoracic and Lumbar Region: well  nourished   Mormon Region (Muscle Loss): well nourished  Clavicle and Acromion Bone Region (Muscle Loss): well nourished  Dorsal Hand (Muscle Loss): well nourished     Nutrition Follow-Up    RD Follow-up?: Yes

## 2020-07-01 NOTE — PLAN OF CARE
CMICU DAILY GOALS     No acute events over the night. Afebrile throughout shift, max temp 99.0. HR mid 50s, levo titrated to maintain MAP greater than 65. Pupils 2mm, equal and brisk. Withdraws from pain. CRRT running continuously, clotted twice. Alteplase instilled in both ports. Vent settings remain the same, PEEP 5 FiO2 60%. Sats 93-96%. WCTM.        A: Awake    RASS: Goal - 0  Actual - RASS (Bauer Agitation-Sedation Scale): -3-->moderate sedation   Restraint necessity: Clinical Justification: Removing medical devices  B: Breath   SBT: Not attempted   C: Coordinate A & B, analgesics/sedatives   Pain: managed    SAT: Not attempted  D: Delirium   CAM-ICU: Overall CAM-ICU: Positive  E: Early Mobility   MOVE Screen: Fail   Activity: Activity Management: bedrest maintained per order  FAS: Feeding/Nutrition   Diet order: Diet/Nutrition Received: tube feeding,   Fluid restriction:    T: Thrombus   DVT prophylaxis: VTE Required Core Measure: (SCDs) Sequential compression device initiated/maintained  H: HOB Elevation   Head of Bed (HOB): HOB at 30-45 degrees  U: Ulcer Prophylaxis   GI: yes  G: Glucose control   managed Glycemic Management: blood glucose monitoring  S: Skin   Bundle compliance: yes   Bathing/Skin Care: bath, chlorhexidine, bath, complete, linen changed, dressed/undressed Date: 6/30/20  B: Bowel Function   colostomy    I: Indwelling Catheters   Garcia necessity: [REMOVED]      Urethral Catheter 06/26/20 1932-Reason for Continuing Urinary Catheterization: Critically ill in ICU requiring intensive monitoring   CVC necessity: Yes   IPAD offered: no  D: De-escalation Antibx   Yes  Plan for the day   CRRT, wean vasopressors as tolerated. Possible SBT during the day  Family/Goals of care/Code Status    Code Status: Full Code     No acute events throughout day, VS and assessment per flow sheet, patient progressing towards goals as tolerated, plan of care reviewed with Cristo Jefferson and family, all concerns  addressed, will continue to monitor.

## 2020-07-02 NOTE — PROGRESS NOTES
CRRT restarted. SLED/Continuous. Order and machine settings verified. Arterial line has no blood return,lines reversed See flowsheet for details.

## 2020-07-02 NOTE — PROGRESS NOTES
Therapy with vancomycin complete and/or consult discontinued by provider.  Pharmacy will sign off, please re-consult as needed.    Betzy Francis, PharmD, BCCCP  [id number]

## 2020-07-02 NOTE — PLAN OF CARE
CMICU DAILY GOALS       A: Awake    RASS: Goal - RASS Goal: -1-->drowsy  Actual - RASS (Bauer Agitation-Sedation Scale): -1-->drowsy   Restraint necessity: Clinical Justification: Removing medical devices  B: Breath   SBT: Not attempted   C: Coordinate A & B, analgesics/sedatives   Pain: managed    SAT: Not attempted  D: Delirium   CAM-ICU: Overall CAM-ICU: Negative  E: Early Mobility   MOVE Screen: Fail   Activity: Activity Management: bedrest maintained per order  FAS: Feeding/Nutrition   Diet order: Diet/Nutrition Received: NPO, tube feeding,   Fluid restriction:  N/A  T: Thrombus   DVT prophylaxis: VTE Required Core Measure: (SCDs) Sequential compression device initiated/maintained  H: HOB Elevation   Head of Bed (HOB): HOB at 30 degrees  U: Ulcer Prophylaxis   GI: yes  G: Glucose control   managed Glycemic Management: blood glucose monitoring  S: Skin   Bundle compliance: yes   Bathing/Skin Care: bath, chlorhexidine, bath, complete, dressed/undressed, linen changed, shampoo Date: 7/1/20 @ 2300  B: Bowel Function   no issues   I: Indwelling Catheters   Garcia necessity: Not present   CVC necessity: Yes   IPAD offered: No  D: De-escalation Antibx   No  Plan for the day   Repeat SBT/SAT  Family/Goals of care/Code Status   Code Status: Full Code     No acute events throughout day, VS and assessment per flow sheet, patient progressing towards goals as tolerated, plan of care reviewed with Cristo Jefferson and family, all concerns addressed, will continue to monitor.

## 2020-07-02 NOTE — PROGRESS NOTES
[facility]  Critical Care Medicine  Progress Note    Patient Name: Cristo Jefferson  MRN: [id number]  Admission Date: 6/26/2020  Hospital Length of Stay: 6 days  Code Status: Full Code  Attending Provider: Archie Mcfadden MD  Primary Care Provider: Vince Perez MD   Principal Problem: Septic shock    Subjective:     HPI:  Mr. 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 ED on 6/24 with complaints of one day of abdominal pain, nausea, and vomiting. Upon presentation he was noted to be hypothermic with temperature of 95.4 F in septic shock with serum lactate of 3.6. General Surgery was consulted. Patient became increasingly acidotic with rising level of serum lactate despite IVFs, NG/OG placement, and empiric antibiotics. He was ultimately taken for ex-lap for which he underwent extensive lysis of adhesions. No perforation or ischemic bowel was noted. He continued to require pressor support, became increasingly acidotic, developed COCO, anemia presumed secondary to GI bleed, and transaminitits. Cardiology, Critical Care, and Nephrology were consulted. In the AM of 6/26 patient developed bradyarrhythmia. ACLS was performed with use of atropine, bicarbonate and calcium gluconate, in addition to multiple epinephrine boluses and external pacing. Wife was contacted and wished to proceed with resuscitation attempts. Following ROSC he was noted to have elevated troponin and BNP. Prior ECHO and repeat CXR were mostly unrevealing. UOP continued to decline. Lactate acidosis advanced. Antibiotics had already been broadened so antifungals were added. He was  started on RRT. He was ultimately transferred to [facility] for higher level of care.    Hospital/ICU Course:  Patient transferred to [facility] on 6/26. He resumed on broad spectrum antibiotics with vancomycin and Zosyn and started on SLED per Nephrology with improvement in lactic acidosis. He remains on pressors at this time (now down to two, norepinephrine and vasopressin). He underwent repeat TTE on 6/28 which showed similar EF to 6/25 prior to ACLS however there was evidence of moderate to severely right ventricular systolic with enlargement. Patient becoming increasingly more thrombocytopenic and anemic this admission without and evidence of overt bleeding and not receiving anticoagulation. HIT panel negative.  US lower extremities on 6/29 showed no evidence of DVTs. There is concern for PE however given thrombocytopenia unable to start empiric AC. He is resumed on vancomycin, Zosyn, and Diflucan was added on 6/29 while awaiting Beta-D-glucan assay and fungal culture. Sputum culture from 06/30 with Klebsiella ESBL. Changed zosyn to ertapenem and d/c'd vanc.     Interval History/Significant Events: changed abx to ertapenem for klebsiella ESBL in sputum    Review of Systems   Unable to perform ROS: Intubated     Objective:     Vital Signs (Most Recent):  Temp: 98.6 °F (37 °C) (07/02/20 1200)  Pulse: 72 (07/02/20 1400)  Resp: (!) 22 (07/02/20 1400)  BP: (!) 110/45 (07/02/20 1400)  SpO2: 95 % (07/02/20 1400) Vital Signs (24h Range):  Temp:  [98.2 °F (36.8 °C)-99.1 °F (37.3 °C)] 98.6 °F (37 °C)  Pulse:  [67-79] 72  Resp:  [21-34] 22  SpO2:  [88 %-97 %] 95 %  BP: ()/(45-76) 110/45  Arterial Line BP: (107-142)/(43-58) 110/45   Weight: 109.3 kg (240 lb 15.4 oz)  Body mass index is 35.58 kg/m².      Intake/Output Summary (Last 24 hours) at 7/2/2020 1437  Last data filed at 7/2/2020 1400  Gross per 24 hour   Intake 6414.07 ml   Output 8104 ml   Net -1689.93 ml       Physical Exam  Vitals signs and nursing note reviewed.    Constitutional:       Appearance: He is morbidly obese. He is ill-appearing. He is not diaphoretic.      Interventions: He is sedated and intubated.   HENT:      Head: Normocephalic and atraumatic.      Nose: Nose normal. No congestion.      Mouth/Throat:      Mouth: Mucous membranes are dry.   Eyes:      General: No scleral icterus.     Extraocular Movements: Extraocular movements intact.      Pupils: Pupils are equal, round, and reactive to light.      Comments: Scleral edema note bilaterally.   Neck:      Musculoskeletal: Normal range of motion and neck supple.      Comments: CVC line to right IJ.  Cardiovascular:      Rate and Rhythm: Normal rate.      Pulses: Normal pulses.      Heart sounds: No murmur. No friction rub. No gallop.    Pulmonary:      Effort: He is intubated.      Breath sounds: No wheezing, rhonchi or rales.      Comments: Transmitted ventilatory sounds. Well healed sternotomy scar to chest. Left-sided port palpated over left chest wall. CVC to right subclavian.   Abdominal:      General: Bowel sounds are normal. There is no distension.      Comments: Obese/protuberant abdomen. Midline incision present without evidence of erythema, induration, or discharge. Ostomy with brown liquid stool to right abdominal wall.    Musculoskeletal:         General: Swelling (bilateral upper extremities) present.      Right lower leg: Edema present.      Left lower leg: Edema present.      Comments: Scar to right lower extremity from prior vein harvest.   Lymphadenopathy:      Cervical: No cervical adenopathy.   Skin:     Coloration: Skin is pale.      Findings: Bruising (upper extremities bilaterally) present.         Vents:  Vent Mode: A/C (07/02/20 1145)  Ventilator Initiated: Yes(chart correction) (06/26/20 6902)  Set Rate: 22 BPM (07/02/20 1145)  Vt Set: 420 mL (07/02/20 1145)  Pressure Support: 10 cmH20 (07/01/20 1317)  PEEP/CPAP: 5 cmH20 (07/02/20 1145)  Oxygen Concentration (%): 60 (07/02/20 1400)  Peak  Airway Pressure: 30 cmH2O (07/02/20 1145)  Plateau Pressure: 16 cmH20 (07/02/20 1145)  Total Ve: 13 mL (07/02/20 1145)  F/VT Ratio<105 (RSBI): (!) 72.58 (07/02/20 1145)  Lines/Drains/Airways     Central Venous Catheter Line                 Port A Cath Single Lumen 05/02/18 2105 left subclavian 791 days    Percutaneous Central Line Insertion/Assessment - Triple Lumen  06/24/20 1836 right internal jugular 7 days         Hemodialysis Catheter 06/26/20 0932 6 days          Drain                 Colostomy 05/03/18 0715  days         Colostomy 06/24/20 RLQ 8 days         NG/OG Tube 06/24/20 1103 [place] sump 16 Fr. Right nostril 8 days          Airway                 Airway - Non-Surgical 06/24/20 1959 Endotracheal Tube-Hi/Lo 7 days          Arterial Line                 Arterial Line 06/24/20 1852 Right Radial 7 days          Peripheral Intravenous Line                 Midline Catheter Insertion/Assessment  - Single Lumen (RETIRED) 04/03/20 1312 Right basilic vein (medial side of arm) other (see comments) 90 days              Significant Labs:    CBC/Anemia Profile:  Recent Labs   Lab 07/01/20  0829 07/01/20  1753 07/02/20  0743   WBC 8.25 9.78 12.58   HGB 7.3* 7.8* 8.1*   HCT 23.9* 25.7* 26.4*   PLT 41* 57* 80*   MCV 85 83 83   RDW 18.5* 19.1* 19.7*        Chemistries:  Recent Labs   Lab 07/01/20  0226  07/01/20  2058 07/02/20  0305 07/02/20  1328      < > 137 137  137 138   K 4.6   < > 4.2 3.8  3.8 3.7      < > 102 100  100 100   CO2 20*   < > 23 25  25 25   BUN 15   < > 10 10  10 8   CREATININE 1.6*   < > 1.1 1.2  1.2 1.2   CALCIUM 7.0*   < > 6.9* 7.0*  7.0* 7.0*   ALBUMIN 1.6*   < > 1.6* 1.6*  1.6* 1.6*   PROT 5.4*  --   --  5.4*  --    BILITOT 0.9  --   --  0.8  --    ALKPHOS 102  --   --  109  --    ALT 57*  --   --  43  --    AST 51*  --   --  44*  --    MG  --    < > 2.0 2.0 1.8   PHOS 4.4   < > 2.3* 3.2  3.2 1.8*    < > = values in this interval not displayed.       All pertinent labs  within the past 24 hours have been reviewed.    Significant Imaging:  I have reviewed all pertinent imaging results/findings within the past 24 hours.      ABG  Recent Labs   Lab 07/02/20  0448   PH 7.515*   PO2 67*   PCO2 34.5*   HCO3 27.9   BE 5     Assessment/Plan:     Pulmonary  Acute hypoxemic respiratory failure  Please see Septic shock.    Cardiac/Vascular  Cardiac arrest  - Suspect 2/2 sepsis as cause of cardiac arrest on 6/26  in the setting of bradycardia requiring maximum dose of atropine, external pacing, and multiple doses of epinephrine  - troponin trend: 0.54 > 0.522  - US lower extremities with Doppler on 6/29 without DVTs  - holding off on empiric heparin and other forms of AC in light of thrombocytopenia (will consider once platelets > 50K)   - opens  Eyes and follows commands     Renal/  High anion gap metabolic acidosis  Please see Septic shock.    Acute kidney injury  - ATN in the setting of septic shock  - anuric  - Continue SLED per Nephrology  - daily weights  - avoid nephrotoxic agents when possible  - renally dose all medications  - maintain net negative fluid status     Lactic acidosis  Please see Septic shock.  --Improved with RRT    ID  * Septic shock  Unclear source of infection, lactate peaked at 8.9 and has since normalized.   - blood cultures 06/24 negative and repeat on 06/30 NGTD  - Fungal blood culture and beta-D-glucan assay sent 06/29, results still pending  - sputum 06/30 with Klebsiella ESBL  - D/C Vancomycin and change Zosyn to ertapenem for 7 day course   - added Diflucan on 6/29  - Off vasopressin; wean Levo for MAP >65  - Continue RRT for now  - Day 6/7 of stress does steroids with fludrocortisone 100 mg daily and hydrocortisone 100 mg IV Q8H (end date tomorrow)  - CBC Q12H and RFP Q8H    Hematology  Thrombocytopenia  Suspect secondary to infection  - HIT negative, coagulation studies normal  - patient with history of fatty liver and thrombocytopenia although platelets  normal upon presentation to [facility]   - will continue with daily CBCs and transfuse for platelets < 10K or < 50K with active bleeding  - continue holding anticoagulation     Endocrine  Type II diabetes mellitus  - hemoglobin A1c in April was 6.3%  - glucose checks Q6H given stress dose steroids  - Low Dose SSI for now  - goal glucose 140-180 while inpatient    GI  Shock liver  Please see Septic shock.    Small bowel obstruction  - s/p ex-lap on 6/24 with extensive lysis of adhesions with SBO, however, no signs of perforation or ischemia  - please see Septic shock    Critical Care Time: 65 minutes  Critical secondary to Patient has a condition that poses threat to life and bodily function: Acute respiratory failure      Critical care was time spent personally by me on the following activities: development of treatment plan with patient or surrogate and bedside caregivers, discussions with consultants, evaluation of patient's response to treatment, examination of patient, ordering and performing treatments and interventions, ordering and review of laboratory studies, ordering and review of radiographic studies, pulse oximetry, re-evaluation of patient's condition. This critical care time did not overlap with that of any other provider or involve time for any procedures.     Roverto Yao NP  Critical Care Medicine  [facility]

## 2020-07-02 NOTE — PROGRESS NOTES
Nephrology Progress Note    - Patient followed for COCO 2/2 ATN, Septic shock, s/p cardiac arrest, IV contrast. Currently anuric.   - On physical exam trace peripheral edema, expiratory crackles on lung auscultation  - Patient was seen on SLED which was running for uremic toxin clearance / UF for volume management.  Wt Readings from Last 3 Encounters:   07/01/20 109.3 kg (240 lb 15.4 oz)   06/24/20 109.5 kg (241 lb 6.5 oz)   04/01/20 102.2 kg (225 lb 5 oz)     Temp Readings from Last 3 Encounters:   07/02/20 98.6 °F (37 °C) (Axillary)   06/26/20 98.1 °F (36.7 °C) (Rectal)   04/03/20 97.7 °F (36.5 °C) (Oral)     BP Readings from Last 3 Encounters:   07/02/20 (!) 98/57   06/26/20 (!) 146/78   04/03/20 121/62     Pulse Readings from Last 3 Encounters:   07/02/20 71   06/26/20 72   04/03/20 69       CMP  Sodium   Date Value Ref Range Status   07/02/2020 137 136 - 145 mmol/L Final   07/02/2020 137 136 - 145 mmol/L Final     Potassium   Date Value Ref Range Status   07/02/2020 3.8 3.5 - 5.1 mmol/L Final   07/02/2020 3.8 3.5 - 5.1 mmol/L Final     Chloride   Date Value Ref Range Status   07/02/2020 100 95 - 110 mmol/L Final   07/02/2020 100 95 - 110 mmol/L Final     CO2   Date Value Ref Range Status   07/02/2020 25 23 - 29 mmol/L Final   07/02/2020 25 23 - 29 mmol/L Final     Glucose   Date Value Ref Range Status   07/02/2020 103 70 - 110 mg/dL Final   07/02/2020 103 70 - 110 mg/dL Final     BUN, Bld   Date Value Ref Range Status   07/02/2020 10 8 - 23 mg/dL Final   07/02/2020 10 8 - 23 mg/dL Final     Creatinine   Date Value Ref Range Status   07/02/2020 1.2 0.5 - 1.4 mg/dL Final   07/02/2020 1.2 0.5 - 1.4 mg/dL Final     Calcium   Date Value Ref Range Status   07/02/2020 7.0 (L) 8.7 - 10.5 mg/dL Final   07/02/2020 7.0 (L) 8.7 - 10.5 mg/dL Final     Total Protein   Date Value Ref Range Status   07/02/2020 5.4 (L) 6.0 - 8.4 g/dL Final     Albumin   Date Value Ref Range Status   07/02/2020 1.6 (L) 3.5 - 5.2 g/dL Final    07/02/2020 1.6 (L) 3.5 - 5.2 g/dL Final     Total Bilirubin   Date Value Ref Range Status   07/02/2020 0.8 0.1 - 1.0 mg/dL Final     Comment:     For infants and newborns, interpretation of results should be based  on gestational age, weight and in agreement with clinical  observations.  Premature Infant recommended reference ranges:  Up to 24 hours.............<8.0 mg/dL  Up to 48 hours............<12.0 mg/dL  3-5 days..................<15.0 mg/dL  6-29 days.................<15.0 mg/dL       Alkaline Phosphatase   Date Value Ref Range Status   07/02/2020 109 55 - 135 U/L Final     AST   Date Value Ref Range Status   07/02/2020 44 (H) 10 - 40 U/L Final     ALT   Date Value Ref Range Status   07/02/2020 43 10 - 44 U/L Final     Anion Gap   Date Value Ref Range Status   07/02/2020 12 8 - 16 mmol/L Final   07/02/2020 12 8 - 16 mmol/L Final     eGFR if    Date Value Ref Range Status   07/02/2020 >60.0 >60 mL/min/1.73 m^2 Final   07/02/2020 >60.0 >60 mL/min/1.73 m^2 Final     eGFR if non    Date Value Ref Range Status   07/02/2020 >60.0 >60 mL/min/1.73 m^2 Final     Comment:     Calculation used to obtain the estimated glomerular filtration  rate (eGFR) is the CKD-EPI equation.      07/02/2020 >60.0 >60 mL/min/1.73 m^2 Final     Comment:     Calculation used to obtain the estimated glomerular filtration  rate (eGFR) is the CKD-EPI equation.          CBC  Lab Results   Component Value Date    WBC 12.58 07/02/2020    HGB 8.1 (L) 07/02/2020    HCT 26.4 (L) 07/02/2020    MCV 83 07/02/2020    PLT 80 (L) 07/02/2020         Intake/Output Summary (Last 24 hours) at 7/2/2020 1047  Last data filed at 7/2/2020 1000  Gross per 24 hour   Intake 5368.76 ml   Output 8295 ml   Net -2926.24 ml       Plan/Rec:  - Will plan for continuous SLED with goal for negative fluid balance in the next 24 hrs of ~2L. This will be evaluated daily and adjust RRT accordingly.   - Acid base: Respiratory alkalosis.  Current RR 22, FiO2 50 %  - Patient hemodynamically not stable for intermittent HD yet.  - Will continue SLED for volume and toxin clearance.  SLED prescription and dialysate baths were reviewed and changes made according to most recent labs.   - -350 cc/hr as tolerated.    - Follow labs serially while on SLED to monitor electrolytes and follow replacement protocol as needed.         Mario Aldrich MD  Nephrology Fellow  [facility]

## 2020-07-02 NOTE — ASSESSMENT & PLAN NOTE
Unclear source of infection, lactate peaked aqt 8.9 and has since normalized.   - blood cultures 06/24 negative and repeat on 06/30 NGTD  - follow up fungal blood culture and beta-D-glucan assay sent 06/29  - sputum with Klebsiella ESBL  - D/C Vancomycin and change Zosyn to ertapenem, added Diflucan on 6/29  - Off vasopressin; wean Levo for MAP >65  - Continue RRT for now  - Day 6/7 of stress does steroids with fludrocortisone 100 mg daily and hydrocortisone 100 mg IV Q8H (end date tomorrow)  - CBC Q12H and RFP Q8H

## 2020-07-02 NOTE — ASSESSMENT & PLAN NOTE
Suspect secondary to infection  - HIT negative, coagulation studies normal  - patient with history of fatty liver and thrombocytopenia although platelets normal upon presentation to [facility]   - will continue with daily CBCs and transfuse for platelets < 10K or < 50K with active bleeding  - continue holding anticoagulation

## 2020-07-02 NOTE — PROGRESS NOTES
CRRT:    Treatment ran for nearly 18 Hours until system clotted off.  SLED restarted; with good flow on arterial port, sluggish venous port.  UF rate set to 350ml/hr

## 2020-07-03 NOTE — PLAN OF CARE
CMICU DAILY GOALS       A: Awake    RASS: Goal - RASS Goal: 0-->alert and calm  Actual - RASS (Bauer Agitation-Sedation Scale): 1-->restless   Restraint necessity: Clinical Justification: Removing medical devices  B: Breath   SBT: Not attempted   C: Coordinate A & B, analgesics/sedatives   Pain: managed    SAT: Not attempted  D: Delirium   CAM-ICU: Overall CAM-ICU: Positive  E: Early Mobility   MOVE Screen: Fail   Activity: Activity Management: bedrest maintained per order  FAS: Feeding/Nutrition   Diet order: Diet/Nutrition Received: tube feeding,   Fluid restriction:    T: Thrombus   DVT prophylaxis: VTE Required Core Measure: (SCDs) Sequential compression device initiated/maintained  H: HOB Elevation   Head of Bed (HOB): HOB at 30-45 degrees  U: Ulcer Prophylaxis   GI: yes  G: Glucose control   managed Glycemic Management: blood glucose monitoring  S: Skin   Bundle compliance: yes   Bathing/Skin Care: bath, chlorhexidine Date: 7/2  B: Bowel Function   no issues   I: Indwelling Catheters   Garcia necessity: [REMOVED]      Urethral Catheter 06/26/20 1932-Reason for Continuing Urinary Catheterization: Critically ill in ICU requiring intensive monitoring   CVC necessity: Yes   IPAD offered: Not appropriate  D: De-escalation Antibx   Yes  Plan for the day   Dialysis, Pressors, SBTs, and SATs  Family/Goals of care/Code Status   Code Status: Full Code     No acute events throughout day, VS and assessment per flow sheet, patient progressing towards goals as tolerated, plan of care reviewed with Cristo Jefferson and family, all concerns addressed, will continue to monitor.

## 2020-07-03 NOTE — PROGRESS NOTES
SLED treatment restarted to right IJ trialysis. Flows were sluggish to both lines of right IJ trialysis. Unable to aspirate blood from blue line of right IJ trialysis, but able to flush. Lines connected, treatment initiated. See flow sheet for details.

## 2020-07-03 NOTE — PROGRESS NOTES
Pt clotted CRRT. Primary RN disconnected patient. This nurse disassembled lines on machine. POC discussed with primary nurse.

## 2020-07-03 NOTE — PROGRESS NOTES
NEPHROLOGY HEMODIALYSIS NOTE    Cristo Jefferson is a 59 y.o. male currently on hemodialysis for removal of uremic toxins and volume management.     Patient seen and evaluated on hemodialysis, tolerating treatment, see HD flowsheet for vitals and assessments.    No Hypotension, chest pain, shortness of breath, cramping, nausea or vomiting.      Labs have been reviewed and the dialysate bath has been adjusted.    Labs:      Recent Labs   Lab 07/02/20  1328 07/02/20  2205 07/03/20  0238    137 135*  135*   K 3.7 4.5 4.8  4.8    102 102  102   CO2 25 23 20*  20*   BUN 8 10 9  9   CREATININE 1.2 1.6* 1.4  1.4   CALCIUM 7.0* 6.9* 6.9*  6.9*   PHOS 1.8* 5.0* 4.3  4.3       Recent Labs   Lab 07/02/20  0743 07/02/20  1700 07/03/20  0740   WBC 12.58 13.95* 13.57*   HGB 8.1* 8.1* 7.2*   HCT 26.4* 26.2* 24.3*   PLT 80* 85* 100*          Assessment/Plan:  - Seen on dialysis this morning, tolerating session with current UFR, no complications.    - Ultrafiltration goal: 300-350 cc/hr as tolerated  - cont SLED on continuous mode   - Will continue dialysis treatments while in-patient  - Continue to monitor intake and output   - Renally dose medications  - Pre/Post dialysis treatment weights  - Renal diet  - Will continue to follow closely.    Fox Levy MD  Nephrology and Hypertension Fellow

## 2020-07-03 NOTE — CARE UPDATE
Concern for possible pRBC transfusion reaction given fever 99-->101, 15 minutes after transfusion initiated. Acetaminophen given. Transfusion reaction protocol initiated.      1856 notified by blood bank that preliminary transfusion reaction panel is negative.  Final results will be available on Monday.     EVA HUMPHRIES, [facility] CenterJENAE-BC  Critical Care Medicine

## 2020-07-03 NOTE — PLAN OF CARE
CMICU DAILY GOALS       A: Awake    RASS: Goal - RASS Goal: 0-->alert and calm  Actual - RASS (Bauer Agitation-Sedation Scale): -2-->light sedation   Restraint necessity: Clinical Justification: Removing medical devices  B: Breath   SBT: Not attempted   C: Coordinate A & B, analgesics/sedatives   Pain: managed    SAT: Fail  D: Delirium   CAM-ICU: Overall CAM-ICU: Positive  E: Early Mobility   MOVE Screen: Fail   Activity: Activity Management: bedrest maintained per order  FAS: Feeding/Nutrition   Diet order: Diet/Nutrition Received: tube feeding,   Fluid restriction:    T: Thrombus   DVT prophylaxis: VTE Required Core Measure: (SCDs) Sequential compression device initiated/maintained  H: HOB Elevation   Head of Bed (HOB): HOB at 45 degrees  U: Ulcer Prophylaxis   GI: yes  G: Glucose control   managed Glycemic Management: blood glucose monitoring  S: Skin   Bundle compliance: yes   Bathing/Skin Care: back care, incontinence care Date: [unfilled]  B: Bowel Function   no issues   I: Indwelling Catheters   Garcia necessity: [REMOVED]      Urethral Catheter 06/26/20 1932-Reason for Continuing Urinary Catheterization: Critically ill in ICU requiring intensive monitoring   CVC necessity: Yes   IPAD offered: Yes  D: De-escalation Antibx   No  Plan for the day    Several issues with Dialysis catheter clotting throughout the day. If clotting continues, team will place new line tomorrow. Attempted to give one unit RBCs today. Possible transfusion reaction. MD contacted and orders placed for workup.     Family/Goals of care/Code Status   Code Status: Full Code     No acute events throughout day, VS and assessment per flow sheet, patient progressing towards goals as tolerated, plan of care reviewed with Cristo Jefferson and family, all concerns addressed, will continue to monitor.

## 2020-07-03 NOTE — PROGRESS NOTES
SLED continuous restarted. Lines reversed, Arterial side of IJ will not pull. Orders and machine settings verified. POC discussed with primary nurse.

## 2020-07-03 NOTE — ASSESSMENT & PLAN NOTE
Hemoglobin A1c in April was 6.3%  - glucose checks Q6H given stress dose steroids  - Low Dose SSI for now  - goal glucose 140-180 while inpatient

## 2020-07-03 NOTE — SUBJECTIVE & OBJECTIVE
Interval History/Significant Events: T max 100.7 overnight, remains on low dose norepinephrine infusion.  Fungitell assay negative.  Failed SBT this am. RRT ongoing.     Review of Systems   Unable to perform ROS: Intubated     Objective:     Vital Signs (Most Recent):  Temp: (!) 101.2 °F (38.4 °C) (07/03/20 1531)  Pulse: 77 (07/03/20 1600)  Resp: 20 (07/03/20 1600)  BP: (!) 105/59 (07/02/20 1901)  SpO2: (!) 91 % (07/03/20 1600) Vital Signs (24h Range):  Temp:  [98.4 °F (36.9 °C)-101.2 °F (38.4 °C)] 101.2 °F (38.4 °C)  Pulse:  [66-87] 77  Resp:  [17-43] 20  SpO2:  [87 %-100 %] 91 %  BP: (105)/(59) 105/59  Arterial Line BP: (113-157)/(43-57) 133/48   Weight: 115.7 kg (255 lb 1.2 oz)  Body mass index is 37.67 kg/m².      Intake/Output Summary (Last 24 hours) at 7/3/2020 1648  Last data filed at 7/3/2020 1646  Gross per 24 hour   Intake 4968.65 ml   Output 6589 ml   Net -1620.35 ml       Physical Exam  Vitals signs and nursing note reviewed.   Constitutional:       Appearance: He is morbidly obese. He is ill-appearing. He is not diaphoretic.      Interventions: He is sedated and intubated.   HENT:      Head: Normocephalic and atraumatic.      Nose: Nose normal. No congestion.      Mouth/Throat:      Mouth: Mucous membranes are dry.   Eyes:      General: No scleral icterus.     Extraocular Movements: Extraocular movements intact.      Pupils: Pupils are equal, round, and reactive to light.   Neck:      Comments: trialysis to RIJ, TLC to R subclavian.   Cardiovascular:      Rate and Rhythm: Normal rate.      Pulses: Normal pulses.      Heart sounds: No murmur. No friction rub. No gallop.    Pulmonary:      Effort: No accessory muscle usage. He is intubated.      Breath sounds: No wheezing, rhonchi or rales.      Comments:   Left-sided port palpated over left chest wall.  Abdominal:      General: Bowel sounds are normal. There is no distension.      Comments: Obese/protuberant abdomen. Midline incision present without  evidence of erythema, induration, or discharge. Ostomy with brown liquid stool to right abdominal wall.    Musculoskeletal:         General: Swelling (bilateral upper extremities) present.      Right lower leg: Edema present.      Left lower leg: Edema present.      Comments: Scar to right lower extremity from prior vein harvest.   Skin:     Coloration: Skin is pale.      Findings: Bruising (upper extremities bilaterally) present.   Neurological:      GCS: GCS eye subscore is 3. GCS verbal subscore is 1. GCS motor subscore is 6.      Comments: PERRL, opens eyes to verbal stimulation. Moves BLE to request. No movement to BUE on request although bilateral hands are edematous.          Vents:  Vent Mode: A/C (07/03/20 1535)  Ventilator Initiated: Yes(chart correction) (06/26/20 1743)  Set Rate: 18 BPM (07/03/20 1535)  Vt Set: 430 mL (07/03/20 0706)  Pressure Support: 10 cmH20 (07/01/20 1317)  PEEP/CPAP: 5 cmH20 (07/03/20 1535)  Oxygen Concentration (%): 40 (07/03/20 1600)  Peak Airway Pressure: 22 cmH2O (07/03/20 1535)  Plateau Pressure: 18 cmH20 (07/03/20 1535)  Total Ve: 12.9 mL (07/03/20 1535)  F/VT Ratio<105 (RSBI): (!) 35.66 (07/03/20 1535)  Lines/Drains/Airways     Central Venous Catheter Line                 Port A Cath Single Lumen 05/02/18 2105 left subclavian 792 days    Percutaneous Central Line Insertion/Assessment - Triple Lumen  06/24/20 1836 right subclavian 8 days         Hemodialysis Catheter 06/26/20 0932 right internal jugular 7 days          Drain                 Colostomy 05/03/18 0715  days         Colostomy 06/24/20 RLQ 9 days         NG/OG Tube 06/24/20 1103 [place] sump 16 Fr. Right nostril 9 days          Airway                 Airway - Non-Surgical 06/24/20 1959 Endotracheal Tube-Hi/Lo 8 days          Arterial Line                 Arterial Line 06/24/20 1852 Right Radial 8 days          Peripheral Intravenous Line                 Midline Catheter Insertion/Assessment  - Single Lumen  (RETIRED) 04/03/20 1312 Right basilic vein (medial side of arm) other (see comments) 91 days              Significant Labs:    CBC/Anemia Profile:  Recent Labs   Lab 07/02/20  1700 07/03/20  0740 07/03/20  1315   WBC 13.95* 13.57* 9.97   HGB 8.1* 7.2* 6.3*   HCT 26.2* 24.3* 20.8*   PLT 85* 100* 78*   MCV 82 85 85   RDW 19.9* 20.0* 19.6*        Chemistries:  Recent Labs   Lab 07/02/20  0305  07/02/20  2205 07/03/20  0238 07/03/20  1315     137   < > 137 135*  135* 137   K 3.8  3.8   < > 4.5 4.8  4.8 4.7     100   < > 102 102  102 104   CO2 25  25   < > 23 20*  20* 20*   BUN 10  10   < > 10 9  9 12   CREATININE 1.2  1.2   < > 1.6* 1.4  1.4 1.4   CALCIUM 7.0*  7.0*   < > 6.9* 6.9*  6.9* 6.7*   ALBUMIN 1.6*  1.6*   < > 1.6* 1.6*  1.6* 1.5*   PROT 5.4*  --   --  5.6*  --    BILITOT 0.8  --   --  0.7  --    ALKPHOS 109  --   --  106  --    ALT 43  --   --  35  --    AST 44*  --   --  38  --    MG 2.0   < > 2.4 2.2 2.0   PHOS 3.2  3.2   < > 5.0* 4.3  4.3 3.6    < > = values in this interval not displayed.       All pertinent labs within the past 24 hours have been reviewed.    Significant Imaging:  I have reviewed and interpreted all pertinent imaging results/findings within the past 24 hours.

## 2020-07-04 PROBLEM — D64.9 ANEMIA: Status: ACTIVE | Noted: 2020-01-01

## 2020-07-04 PROBLEM — E87.20 LACTIC ACIDOSIS: Status: RESOLVED | Noted: 2020-01-01 | Resolved: 2020-01-01

## 2020-07-04 PROBLEM — E87.29 HIGH ANION GAP METABOLIC ACIDOSIS: Status: RESOLVED | Noted: 2020-01-01 | Resolved: 2020-01-01

## 2020-07-04 PROBLEM — K72.00 SHOCK LIVER: Status: RESOLVED | Noted: 2020-01-01 | Resolved: 2020-01-01

## 2020-07-04 NOTE — ASSESSMENT & PLAN NOTE
Klebsiella ESBL sputum  - continue ertapenem.   - blood cultures 06/24 negative and repeat on 06/30 NGTD  - follow up fungal blood culture; beta-D-glucan assay negative.  D/c fluconazole.   - Off vasopressin; wean Levo for MAP >65  - Continue stress does steroids with fludrocortisone 100 mg daily and hydrocortisone 100 mg IV Q8H  - Repeat blood cultures given fever 102

## 2020-07-04 NOTE — SUBJECTIVE & OBJECTIVE
Interval History: Patient did not receive CRRT overnight. Clotted repeatedly, new line to be placed by ICU.   Review of patient's allergies indicates:   Allergen Reactions    Morphine Rash     Current Facility-Administered Medications   Medication Frequency    0.9%  NaCl infusion (for blood administration) Q24H PRN    0.9%  NaCl infusion (for blood administration) Q24H PRN    0.9%  NaCl infusion (for blood administration) Q24H PRN    acetaminophen tablet 650 mg Q6H PRN    albuterol-ipratropium 2.5 mg-0.5 mg/3 mL nebulizer solution 3 mL Q4H    dexmedetomidine (PRECEDEX) 400mcg/100mL 0.9% NaCL infusion Continuous    dextrose 50 % in water (D50W) injection 12.5 g PRN    ertapenem (INVANZ) 1 g in sodium chloride 0.9 % 100 mL IVPB (ready to mix system) Q24H    famotidine tablet 20 mg Daily    fentaNYL 2500 mcg in 0.9% sodium chloride 250 mL infusion premix (titrating) Continuous    glucagon (human recombinant) injection 1 mg PRN    insulin aspart U-100 pen 0-5 Units Q6H PRN    norepinephrine 16 mg in dextrose 5 % 250 mL infusion Continuous    propofol (DIPRIVAN) 10 mg/mL infusion Continuous    sodium chloride 0.9% flush 10 mL PRN    vancomycin - pharmacy to dose pharmacy to manage frequency    white petrolatum-mineral oil (LUBIFRESH P.M.) ophthalmic ointment QHS       Objective:     Vital Signs (Most Recent):  Temp: 100 °F (37.8 °C) (07/04/20 1100)  Pulse: 73 (07/04/20 1400)  Resp: 18 (07/04/20 1400)  BP: (!) 105/59 (07/02/20 1901)  SpO2: 95 % (07/04/20 1400)  O2 Device (Oxygen Therapy): ventilator (07/04/20 1200) Vital Signs (24h Range):  Temp:  [97.6 °F (36.4 °C)-102.4 °F (39.1 °C)] 100 °F (37.8 °C)  Pulse:  [66-78] 73  Resp:  [1-25] 18  SpO2:  [91 %-97 %] 95 %  Arterial Line BP: (111-157)/(45-72) 111/48     Weight: 115.7 kg (255 lb 1.2 oz) (07/03/20 0600)  Body mass index is 37.67 kg/m².  Body surface area is 2.37 meters squared.    I/O last 3 completed shifts:  In: 6442.7 [I.V.:5032.7;  "NG/GT:1410]  Out: 6303 [Urine:20; Other:6258; Stool:25]    Physical Exam  Vitals signs and nursing note reviewed.   Constitutional:       Appearance: He is morbidly obese. He is ill-appearing. He is not diaphoretic.      Interventions: He is sedated and intubated.   HENT:      Head: Normocephalic and atraumatic.      Nose: Nose normal. No congestion.      Mouth/Throat:      Comments: End of tongue with lesion concerning for pressure injury from ETT.  Eyes:      General: No scleral icterus.     Extraocular Movements: Extraocular movements intact.      Pupils: Pupils are equal, round, and reactive to light.   Neck:      Comments: trialysis to RIJ, TLC to R subclavian.   Cardiovascular:      Rate and Rhythm: Normal rate.      Pulses: Normal pulses.      Heart sounds: No friction rub. No gallop.    Pulmonary:      Effort: No tachypnea, bradypnea or accessory muscle usage. He is intubated.      Breath sounds: No wheezing, rhonchi or rales.      Comments:   Left-sided port palpated over left chest wall.  Abdominal:      General: Bowel sounds are normal. There is no distension.      Comments: Obese/protuberant abdomen. Midline incision present without evidence of erythema, induration, or discharge. Ostomy with brown liquid stool to right abdominal wall.    Musculoskeletal:         General: Swelling (bilateral upper extremities) present.      Right lower leg: Edema present.      Left lower leg: Edema present.   Skin:     Coloration: Skin is pale.      Findings: Bruising (upper extremities bilaterally) present.   Neurological:      GCS: GCS eye subscore is 3. GCS verbal subscore is 1. GCS motor subscore is 6.      Comments: PERRL, opens eyes to verbal stimulation. Moves extremities to request, extremely weak in BUE. Appears to nod appropriately to simple "yes" and "no" questions.           Significant Labs:  CBC:   Recent Labs   Lab 07/04/20  0858   WBC 9.22   RBC 2.69*   HGB 7.2*   HCT 23.3*   PLT 94*   MCV 87   MCH 26.8* "   MCHC 30.9*     CMP:   Recent Labs   Lab 07/04/20  0300   *  235*   CALCIUM 6.8*  6.8*   ALBUMIN 1.5*  1.5*   PROT 5.0*     136   K 5.2*  5.2*   CO2 21*  21*     105   BUN 29*  29*   CREATININE 2.8*  2.8*   ALKPHOS 73   ALT 24   AST 27   BILITOT 0.5     All labs within the past 24 hours have been reviewed.     Significant Imaging:  Labs: Reviewed

## 2020-07-04 NOTE — ASSESSMENT & PLAN NOTE
ATN in the setting of septic shock  - anuric  - Continue SLED per Nephrology  - daily weights  - avoid nephrotoxic agents when possible  - renally dose all medications  - maintain net negative fluid status   - Trialysis line malfunctioning.  Will likely need to replace on 7/4.

## 2020-07-04 NOTE — ASSESSMENT & PLAN NOTE
Mr. Perales is a 60 y/o M with previously described history, physical exam, laboratories and imaging studies being consulted for continuity of RRT.   - COCO most likely is 2/2 to ATN in the setting of septic shock, cardiac arrest, IV contrast and nephrotoxic medications complicated by minimal UOP and worsening HAGMA.     Plan/Rec:   - Start SLED for volume and metabolic clearance as soon as line is replaced and confirmed. Will follow trend of labs and adjust dialysate baths as necessary.   - HAGMA: 2/2 to septic shock/resp failure. Bicarb bath on SLED to be adjusted accordingly.   - Follow labs serially per CRRT protocol   - Strict I/O and chart  - Avoid nephrotoxic medications, NSAIDs, IV contrast, etc.  - Medication doses adjusted to GFR  - Maintain MAP > 65  - Hb > 7 gm/dL  - Will follow closely

## 2020-07-04 NOTE — ASSESSMENT & PLAN NOTE
Hemoglobin A1c in April was 6.3%  - glucose checks Q6H with LDSSI  - goal glucose 140-180 while inpatient

## 2020-07-04 NOTE — NURSING
Spoke with Vashti Shah NP about patient's elevated temp of 102.4, tylenol given early and ice packs placed per instruction. Discussed concern for blood transfusion reaction so instructed to hold on transfusion and not to release the ordered blood until assessed again a bit later. brady.

## 2020-07-04 NOTE — ASSESSMENT & PLAN NOTE
--failed SBT this morning. Recent TTE with new RV dysfunction.  BLE ultrasound negative. Heparin not initiated on admission due to thrombocytopenia.   --Repeat TTE to evaluate RV function  --Daily SAT/SBTs.   --Chest xray with continue LLL infiltrate/effusion.

## 2020-07-04 NOTE — PROGRESS NOTES
[facility]  Critical Care Medicine  Progress Note    Patient Name: Cristo Jefferson  MRN: [id number]  Admission Date: 6/26/2020  Hospital Length of Stay: 8 days  Code Status: Full Code  Attending Provider: Archie Mcfadden MD  Primary Care Provider: Vince Perez MD   Principal Problem: Septic shock    Subjective:     HPI:  Mr. 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 Upon presentation he was noted to be hypothermic with temperature of 95.4 F in septic shock with serum lactate of 3.6. General Surgery was consulted. Patient became increasingly acidotic with rising level of serum lactate despite IVFs, NG/OG placement, and empiric antibiotics. He was ultimately taken for ex-lap for which he underwent extensive lysis of adhesions. No perforation or ischemic bowel was noted. He continued to require pressor support, became increasingly acidotic, developed COCO, anemia presumed secondary to GI bleed, and transaminitits. Cardiology, Critical Care, and Nephrology were consulted. In the AM of 6/26 patient developed bradyarrhythmia. ACLS was performed with use of atropine, bicarbonate and calcium gluconate, in addition to multiple epinephrine boluses and external pacing. Wife was contacted and wished to proceed with resuscitation attempts. Following ROSC he was noted to have elevated troponin and BNP. Prior ECHO and repeat CXR were mostly unrevealing. UOP continued to decline. Lactate acidosis advanced. Antibiotics had already been broadened so antifungals were added. He was  started on RRT. [place] was [place] [facility] [place] level of [place].    Hospital/ICU Course:  [place] [facility] [place]. He resumed on broad spectrum antibiotics with vancomycin and Zosyn and started on SLED per Nephrology with improvement in lactic acidosis. He remains on pressors at this time (now down to two, norepinephrine and vasopressin). He underwent repeat TTE on 6/28 which showed similar EF to 6/25 prior to ACLS however there was evidence of moderate to severely right ventricular systolic with enlargement. Patient becoming increasingly more thrombocytopenic and anemic this admission without and evidence of overt bleeding and not receiving anticoagulation. HIT panel negative.  US lower extremities on 6/29 showed no evidence of DVTs. There is concern for PE however given thrombocytopenia unable to start empiric AC. He is resumed on vancomycin, Zosyn, and Diflucan was added on 6/29 while awaiting Beta-D-glucan assay and fungal culture. Sputum culture from 06/30 with Klebsiella ESBL. Changed zosyn to ertapenem and d/c'd vanc.     Interval History/Significant Events: T max 102.4.  Repeated cultures.  TLC 9 days and Trialysis 7 days indwelling.  Trialysis line not functioning, RRT clotted multiple times on 7/3 with suspected etiology of decrease in Hb.  Concern for possible transfusion reaction 7/3 afternoon given fevers following RBC initiation.  Preliminary transfusion reaction workup negative.  Hb 5.6, received 2 units pRBC this morning.     Review of Systems   Unable to perform ROS: Intubated     Objective:     Vital Signs (Most Recent):  Temp: 100.2 °F (37.9 °C) (07/04/20 0815)  Pulse: 70 (07/04/20 0815)  Resp: (!) 22 (07/04/20 0815)  BP: (!) 105/59 (07/02/20 1901)  SpO2: (!) 94 % (07/04/20 0815) Vital Signs (24h Range):  Temp:  [97.6 °F (36.4 °C)-102.4 °F (39.1 °C)] 100.2 °F (37.9 °C)  Pulse:  [66-84] 70  Resp:  [1-28] 22  SpO2:  [87 %-98 %] 94 %  Arterial Line BP: (113-157)/(43-55)  137/51   Weight: 115.7 kg (255 lb 1.2 oz)  Body mass index is 37.67 kg/m².      Intake/Output Summary (Last 24 hours) at 7/4/2020 0844  Last data filed at 7/4/2020 0800  Gross per 24 hour   Intake 3850.71 ml   Output 2459 ml   Net 1391.71 ml       Physical Exam  Vitals signs and nursing note reviewed.   Constitutional:       Appearance: He is morbidly obese. He is ill-appearing. He is not diaphoretic.      Interventions: He is sedated and intubated.   HENT:      Head: Normocephalic and atraumatic.      Nose: Nose normal. No congestion.      Mouth/Throat:      Comments: End of tongue with lesion concerning for pressure injury from ETT.  Eyes:      General: No scleral icterus.     Extraocular Movements: Extraocular movements intact.      Pupils: Pupils are equal, round, and reactive to light.   Neck:      Comments: trialysis to RIJ, TLC to R subclavian.   Cardiovascular:      Rate and Rhythm: Normal rate.      Pulses: Normal pulses.      Heart sounds: No friction rub. No gallop.    Pulmonary:      Effort: No tachypnea, bradypnea or accessory muscle usage. He is intubated.      Breath sounds: No wheezing, rhonchi or rales.      Comments:   Left-sided port palpated over left chest wall.  Abdominal:      General: Bowel sounds are normal. There is no distension.      Comments: Obese/protuberant abdomen. Midline incision present without evidence of erythema, induration, or discharge. Ostomy with brown liquid stool to right abdominal wall.    Musculoskeletal:         General: Swelling (bilateral upper extremities) present.      Right lower leg: Edema present.      Left lower leg: Edema present.   Skin:     Coloration: Skin is pale.      Findings: Bruising (upper extremities bilaterally) present.   Neurological:      GCS: GCS eye subscore is 3. GCS verbal subscore is 1. GCS motor subscore is 6.      Comments: PERRL, opens eyes to verbal stimulation. Moves extremities to request, extremely weak in BUE. Appears to nod  "appropriately to simple "yes" and "no" questions.           Vents:  Vent Mode: A/C (07/04/20 0711)  Ventilator Initiated: Yes(chart correction) (06/26/20 1743)  Set Rate: 18 BPM (07/04/20 0711)  Vt Set: 430 mL (07/03/20 0706)  Pressure Support: 10 cmH20 (07/01/20 1317)  PEEP/CPAP: 5 cmH20 (07/04/20 0711)  Oxygen Concentration (%): 40 (07/04/20 0800)  Peak Airway Pressure: 22 cmH2O (07/04/20 0711)  Plateau Pressure: 18 cmH20 (07/04/20 0711)  Total Ve: 11.1 mL (07/04/20 0711)  F/VT Ratio<105 (RSBI): (!) 33.33 (07/04/20 0711)  Lines/Drains/Airways     Central Venous Catheter Line                 Port A Cath Single Lumen 05/02/18 2105 left subclavian 793 days    Percutaneous Central Line Insertion/Assessment - Triple Lumen  06/24/20 1836 right subclavian 9 days         Hemodialysis Catheter 06/26/20 0932 right internal jugular 7 days          Drain                 Colostomy 05/03/18 0715  days         NG/OG Tube 06/24/20 1103 [place] sump 16 Fr. Right nostril 9 days         Urethral Catheter 07/03/20 1700 Latex 16 Fr. less than 1 day          Airway                 Airway - Non-Surgical 06/24/20 1959 Endotracheal Tube-Hi/Lo 9 days          Arterial Line                 Arterial Line 06/24/20 1852 Right Radial 9 days              Significant Labs:    CBC/Anemia Profile:  Recent Labs   Lab 07/03/20  1315 07/03/20  1615 07/04/20  0300   WBC 9.97 9.70 8.98   HGB 6.3* 6.1* 5.6*   HCT 20.8* 20.4* 18.5*   PLT 78* 76* 88*   MCV 85 84 85   RDW 19.6* 19.9* 19.8*        Chemistries:  Recent Labs   Lab 07/03/20  0238 07/03/20  1315 07/03/20  1615 07/04/20  0300   *  135* 137 136 136  136   K 4.8  4.8 4.7 4.9 5.2*  5.2*     102 104 105 105  105   CO2 20*  20* 20* 21* 21*  21*   BUN 9  9 12 15 29*  29*   CREATININE 1.4  1.4 1.4 1.7* 2.8*  2.8*   CALCIUM 6.9*  6.9* 6.7* 6.9* 6.8*  6.8*   ALBUMIN 1.6*  1.6* 1.5* 1.5* 1.5*  1.5*   PROT 5.6*  --   --  5.0*   BILITOT 0.7  --   --  0.5   ALKPHOS 106  --   " --  73   ALT 35  --   --  24   AST 38  --   --  27   MG 2.2 2.0 2.1 2.2   PHOS 4.3  4.3 3.6 3.8 5.0*  5.0*       All pertinent labs within the past 24 hours have been reviewed.    Significant Imaging:  I have reviewed and interpreted all pertinent imaging results/findings within the past 24 hours.      ABG  Recent Labs   Lab 07/04/20  0429   PH 7.443   PO2 78*   PCO2 32.5*   HCO3 22.3*   BE -2     Assessment/Plan:     Pulmonary  Acute hypoxemic respiratory failure  Intubated initially for exploratory laparotomy on 6/24 and remained intubated following surgery for acute hypoxemic respiratory failure. S/p Bronchoscopy on 7/2 excluded ETT and central obstruction. Concern for obstructive lung disease given smoking history.      - Failing SBT's. Recent TTE with new RV dysfunction.  BLE ultrasound negative. Heparin not initiated on admission due to thrombocytopenia.   - Repeat TTE to evaluate RV function, pending.  - Daily SAT/SBTs.   - Chest xray with continue LLL infiltrate/effusion. Continue ertapenem for Klebsiella ESBL  - Continue Duo nebs    Cardiac/Vascular  Cardiac arrest  Suspect secondary to sepsis as cause of cardiac arrest on 6/26  in the setting of bradycardia requiring maximum dose of atropine, external pacing, and multiple doses of epinephrine    - troponin trend: 0.54 > 0.522  - US lower extremities with Doppler on 6/29 without DVTs  - holding off on empiric heparin and other forms of AC in light of thrombocytopenia   - opens eyes and follows commands     Renal/  Acute kidney injury  iATN in the setting of septic shock.  Garcia inserted with ~20ml output.  RRT clotting.     - Replace Trialysis.   - Continue SLED per Nephrology  - daily weights  - avoid nephrotoxic agents when possible  - renally dose all medications      ID  * Septic shock  Klebsiella ESBL sputum on 6/30    - continue ertapenem.  Add vancomycin given fevers and concern for new infection. Plan to remove indwelling venous lines (TLC and  trialysis today)   - blood cultures 06/24 and 6/30 negative.  F/u repeat blood cultures.   - follow up fungal blood culture; beta-D-glucan assay negative.  D/c fluconazole on 7/3.  - Weaned off norepinephrine 7/4 am.  - Completed 7 day course of stress does steroids with fludrocortisone 100 mg daily and hydrocortisone 100 mg IV Q8H. Now off vasopressors. Will d/c SSD and add home prednisolone dose.         Hematology  Thrombocytopenia  Suspect secondary to infection    - HIT negative, coagulation studies normal  - History of fatty liver and thrombocytopenia although platelets normal upon presentation to [facility]   - will continue with daily CBCs and transfuse for platelets < 10K or < 50K with active bleeding  - continue holding anticoagulation     Oncology  Anemia  Suspect secondary to RRT clotting. Possible component of bone marrow suppression from critical illness. No evidence of ongoing bleeding. S/p 2 units pRBC 7/4.     - Trend CBC    Endocrine  Type II diabetes mellitus  Hemoglobin A1c in April was 6.3%  - glucose checks Q6H with LDSSI  - goal glucose 140-180 while inpatient    GI  Small bowel obstruction  - s/p ex-lap on 6/24 with extensive lysis of adhesions with SBO, however, no signs of perforation or ischemia  - please see Septic shock      VTE Risk Mitigation (From admission, onward)         Ordered     IP VTE HIGH RISK PATIENT  Once      06/26/20 1753     Place sequential compression device  Until discontinued      06/26/20 1753                Discussed plan with Dr. Mcfadden    Critical Care Time: 40 minutes  Critical secondary to Patient has a condition that poses threat to life and bodily function: Acute Respiratory Failure on mechanical ventilation, Acute Renal Failure on RRT.      Critical care was time spent personally by me on the following activities: development of treatment plan with patient or surrogate and bedside caregivers, discussions with consultants, evaluation of  patient's response to treatment, examination of patient, ordering and performing treatments and interventions, ordering and review of laboratory studies, ordering and review of radiographic studies, pulse oximetry, re-evaluation of patient's condition. This critical care time did not overlap with that of any other provider or involve time for any procedures.     Vashti Shah NP  Critical Care Medicine  [facility]

## 2020-07-04 NOTE — NURSING
Verified with Dr Mon that despite patient's hemoglobin of 6.1, continue to hold off on blood transfusion till tomorrow, after assessing am labs. WCTM.

## 2020-07-04 NOTE — ASSESSMENT & PLAN NOTE
Klebsiella ESBL sputum  - continue ertapenem.  Add vancomycin given fevers and concern for new infection. Plan to remove indwelling venous lines (TLC and trialysis today)   - blood cultures 06/24 and 6/30 negative.  F/u repeat blood cultures.   - follow up fungal blood culture; beta-D-glucan assay negative.  D/c fluconazole on 7/3.  - Weaned off norepinephrine 7/4 am.  - Completed 7 day course of stress does steroids with fludrocortisone 100 mg daily and hydrocortisone 100 mg IV Q8H. Now off vasopressors. Will d/c SSD and add home prednisolone dose.

## 2020-07-04 NOTE — SUBJECTIVE & OBJECTIVE
Interval History/Significant Events: T max 102.4.  Repeated cultures.  TLC 9 days and Trialysis 7 days indwelling.  Trialysis line not functioning, RRT clotted multiple times on 7/3 with suspected etiology of decrease in Hb.  Concern for possible transfusion reaction 7/3 afternoon given fevers following RBC initiation.  Preliminary transfusion reaction workup negative.  Hb 5.6, received 2 units pRBC this morning.     Review of Systems   Unable to perform ROS: Intubated     Objective:     Vital Signs (Most Recent):  Temp: 100.2 °F (37.9 °C) (07/04/20 0815)  Pulse: 70 (07/04/20 0815)  Resp: (!) 22 (07/04/20 0815)  BP: (!) 105/59 (07/02/20 1901)  SpO2: (!) 94 % (07/04/20 0815) Vital Signs (24h Range):  Temp:  [97.6 °F (36.4 °C)-102.4 °F (39.1 °C)] 100.2 °F (37.9 °C)  Pulse:  [66-84] 70  Resp:  [1-28] 22  SpO2:  [87 %-98 %] 94 %  Arterial Line BP: (113-157)/(43-55) 137/51   Weight: 115.7 kg (255 lb 1.2 oz)  Body mass index is 37.67 kg/m².      Intake/Output Summary (Last 24 hours) at 7/4/2020 0844  Last data filed at 7/4/2020 0800  Gross per 24 hour   Intake 3850.71 ml   Output 2459 ml   Net 1391.71 ml       Physical Exam  Vitals signs and nursing note reviewed.   Constitutional:       Appearance: He is morbidly obese. He is ill-appearing. He is not diaphoretic.      Interventions: He is sedated and intubated.   HENT:      Head: Normocephalic and atraumatic.      Nose: Nose normal. No congestion.      Mouth/Throat:      Comments: End of tongue with lesion concerning for pressure injury from ETT.  Eyes:      General: No scleral icterus.     Extraocular Movements: Extraocular movements intact.      Pupils: Pupils are equal, round, and reactive to light.   Neck:      Comments: trialysis to RIJ, TLC to R subclavian.   Cardiovascular:      Rate and Rhythm: Normal rate.      Pulses: Normal pulses.      Heart sounds: No friction rub. No gallop.    Pulmonary:      Effort: No tachypnea, bradypnea or accessory muscle usage. He is  "intubated.      Breath sounds: No wheezing, rhonchi or rales.      Comments:   Left-sided port palpated over left chest wall.  Abdominal:      General: Bowel sounds are normal. There is no distension.      Comments: Obese/protuberant abdomen. Midline incision present without evidence of erythema, induration, or discharge. Ostomy with brown liquid stool to right abdominal wall.    Musculoskeletal:         General: Swelling (bilateral upper extremities) present.      Right lower leg: Edema present.      Left lower leg: Edema present.   Skin:     Coloration: Skin is pale.      Findings: Bruising (upper extremities bilaterally) present.   Neurological:      GCS: GCS eye subscore is 3. GCS verbal subscore is 1. GCS motor subscore is 6.      Comments: PERRL, opens eyes to verbal stimulation. Moves extremities to request, extremely weak in BUE. Appears to nod appropriately to simple "yes" and "no" questions.           Vents:  Vent Mode: A/C (07/04/20 0711)  Ventilator Initiated: Yes(chart correction) (06/26/20 1743)  Set Rate: 18 BPM (07/04/20 0711)  Vt Set: 430 mL (07/03/20 0706)  Pressure Support: 10 cmH20 (07/01/20 1317)  PEEP/CPAP: 5 cmH20 (07/04/20 0711)  Oxygen Concentration (%): 40 (07/04/20 0800)  Peak Airway Pressure: 22 cmH2O (07/04/20 0711)  Plateau Pressure: 18 cmH20 (07/04/20 0711)  Total Ve: 11.1 mL (07/04/20 0711)  F/VT Ratio<105 (RSBI): (!) 33.33 (07/04/20 0711)  Lines/Drains/Airways     Central Venous Catheter Line                 Port A Cath Single Lumen 05/02/18 2105 left subclavian 793 days    Percutaneous Central Line Insertion/Assessment - Triple Lumen  06/24/20 1836 right subclavian 9 days         Hemodialysis Catheter 06/26/20 0932 right internal jugular 7 days          Drain                 Colostomy 05/03/18 0715  days         NG/OG Tube 06/24/20 1103 [place] sump 16 Fr. Right nostril 9 days         Urethral Catheter 07/03/20 1700 Latex 16 Fr. less than 1 day          Airway                 " Airway - Non-Surgical 06/24/20 1959 Endotracheal Tube-Hi/Lo 9 days          Arterial Line                 Arterial Line 06/24/20 1852 Right Radial 9 days              Significant Labs:    CBC/Anemia Profile:  Recent Labs   Lab 07/03/20  1315 07/03/20  1615 07/04/20  0300   WBC 9.97 9.70 8.98   HGB 6.3* 6.1* 5.6*   HCT 20.8* 20.4* 18.5*   PLT 78* 76* 88*   MCV 85 84 85   RDW 19.6* 19.9* 19.8*        Chemistries:  Recent Labs   Lab 07/03/20  0238 07/03/20  1315 07/03/20  1615 07/04/20  0300   *  135* 137 136 136  136   K 4.8  4.8 4.7 4.9 5.2*  5.2*     102 104 105 105  105   CO2 20*  20* 20* 21* 21*  21*   BUN 9  9 12 15 29*  29*   CREATININE 1.4  1.4 1.4 1.7* 2.8*  2.8*   CALCIUM 6.9*  6.9* 6.7* 6.9* 6.8*  6.8*   ALBUMIN 1.6*  1.6* 1.5* 1.5* 1.5*  1.5*   PROT 5.6*  --   --  5.0*   BILITOT 0.7  --   --  0.5   ALKPHOS 106  --   --  73   ALT 35  --   --  24   AST 38  --   --  27   MG 2.2 2.0 2.1 2.2   PHOS 4.3  4.3 3.6 3.8 5.0*  5.0*       All pertinent labs within the past 24 hours have been reviewed.    Significant Imaging:  I have reviewed and interpreted all pertinent imaging results/findings within the past 24 hours.

## 2020-07-04 NOTE — ASSESSMENT & PLAN NOTE
- Suspect 2/2 sepsis as cause of cardiac arrest on 6/26  in the setting of bradycardia requiring maximum dose of atropine, external pacing, and multiple doses of epinephrine  - troponin trend: 0.54 > 0.522  - US lower extremities with Doppler on 6/29 without DVTs  - holding off on empiric heparin and other forms of AC in light of thrombocytopenia   - opens eyes and follows commands

## 2020-07-04 NOTE — ASSESSMENT & PLAN NOTE
- Failing SBT's. Recent TTE with new RV dysfunction.  BLE ultrasound negative. Heparin not initiated on admission due to thrombocytopenia.   - Repeat TTE to evaluate RV function, pending.  - Daily SAT/SBTs.   - Chest xray with continue LLL infiltrate/effusion. Continue ertapenem for Klebsiella ESBL

## 2020-07-04 NOTE — PROGRESS NOTES
[facility]  Nephrology  Progress Note    Patient Name: Cristo Jefferson  MRN: [id number]  Admission Date: 6/26/2020  Hospital Length of Stay: 8 days  Attending Provider: Archie Mcfadden MD   Primary Care Physician: Vince Perez MD  Principal Problem:Septic shock    Subjective:     HPI: A 58 y/o male with PMHx of CAD s/p CABG, AAA, RA, HTN, T2DM,  Adenocarcinoma of appendix s/p resection, SBO and perforation s/p colostomy was transferred from [facility] [place] [facility] [place]. On 6/24/20 he presented to the OSH with nausea, emesis, and abdominal pain for approximately 24 hours.  He arrived to the ED hypothermic with significant lactic acidosis and CTwith IV contrast suggestive of SBO. Exploratory laparotomy was performed and no signs of ischemia were found. Had cardiac arrest 6/25, atropine given due to severe bradycardia, required transcutaneous pacing, ROSC achieved. Worsening renal function ensue with minimal UOP even with high dose diuretic trial and was started on HD 6/26. Required administration of multiple pressors and bicarb drip. sCr at admission was 0.9 and 5.4 day of first HD (6/26).     Nephrology consulted for CRRT initiation.         Interval History: Patient did not receive CRRT overnight. Clotted repeatedly, new line to be placed by ICU.   Review of patient's allergies indicates:   Allergen Reactions    Morphine Rash     Current Facility-Administered Medications   Medication Frequency    0.9%  NaCl infusion (for blood administration) Q24H PRN    0.9%  NaCl infusion (for blood administration) Q24H PRN    0.9%  NaCl infusion (for blood administration) Q24H PRN    acetaminophen tablet 650 mg Q6H PRN    albuterol-ipratropium 2.5 mg-0.5 mg/3 mL nebulizer solution 3 mL Q4H    dexmedetomidine (PRECEDEX) 400mcg/100mL 0.9% NaCL infusion Continuous    dextrose 50 % in water (D50W) injection 12.5 g PRN    ertapenem (INVANZ) 1 g in sodium chloride 0.9 % 100 mL IVPB (ready to mix  system) Q24H    famotidine tablet 20 mg Daily    fentaNYL 2500 mcg in 0.9% sodium chloride 250 mL infusion premix (titrating) Continuous    glucagon (human recombinant) injection 1 mg PRN    insulin aspart U-100 pen 0-5 Units Q6H PRN    norepinephrine 16 mg in dextrose 5 % 250 mL infusion Continuous    propofol (DIPRIVAN) 10 mg/mL infusion Continuous    sodium chloride 0.9% flush 10 mL PRN    vancomycin - pharmacy to dose pharmacy to manage frequency    white petrolatum-mineral oil (LUBIFRESH P.M.) ophthalmic ointment QHS       Objective:     Vital Signs (Most Recent):  Temp: 100 °F (37.8 °C) (07/04/20 1100)  Pulse: 73 (07/04/20 1400)  Resp: 18 (07/04/20 1400)  BP: (!) 105/59 (07/02/20 1901)  SpO2: 95 % (07/04/20 1400)  O2 Device (Oxygen Therapy): ventilator (07/04/20 1200) Vital Signs (24h Range):  Temp:  [97.6 °F (36.4 °C)-102.4 °F (39.1 °C)] 100 °F (37.8 °C)  Pulse:  [66-78] 73  Resp:  [1-25] 18  SpO2:  [91 %-97 %] 95 %  Arterial Line BP: (111-157)/(45-72) 111/48     Weight: 115.7 kg (255 lb 1.2 oz) (07/03/20 0600)  Body mass index is 37.67 kg/m².  Body surface area is 2.37 meters squared.    I/O last 3 completed shifts:  In: 6442.7 [I.V.:5032.7; NG/GT:1410]  Out: 6303 [Urine:20; Other:6258; Stool:25]    Physical Exam  Vitals signs and nursing note reviewed.   Constitutional:       Appearance: He is morbidly obese. He is ill-appearing. He is not diaphoretic.      Interventions: He is sedated and intubated.   HENT:      Head: Normocephalic and atraumatic.      Nose: Nose normal. No congestion.      Mouth/Throat:      Comments: End of tongue with lesion concerning for pressure injury from ETT.  Eyes:      General: No scleral icterus.     Extraocular Movements: Extraocular movements intact.      Pupils: Pupils are equal, round, and reactive to light.   Neck:      Comments: trialysis to RIJ, TLC to R subclavian.   Cardiovascular:      Rate and Rhythm: Normal rate.      Pulses: Normal pulses.      Heart  "sounds: No friction rub. No gallop.    Pulmonary:      Effort: No tachypnea, bradypnea or accessory muscle usage. He is intubated.      Breath sounds: No wheezing, rhonchi or rales.      Comments:   Left-sided port palpated over left chest wall.  Abdominal:      General: Bowel sounds are normal. There is no distension.      Comments: Obese/protuberant abdomen. Midline incision present without evidence of erythema, induration, or discharge. Ostomy with brown liquid stool to right abdominal wall.    Musculoskeletal:         General: Swelling (bilateral upper extremities) present.      Right lower leg: Edema present.      Left lower leg: Edema present.   Skin:     Coloration: Skin is pale.      Findings: Bruising (upper extremities bilaterally) present.   Neurological:      GCS: GCS eye subscore is 3. GCS verbal subscore is 1. GCS motor subscore is 6.      Comments: PERRL, opens eyes to verbal stimulation. Moves extremities to request, extremely weak in BUE. Appears to nod appropriately to simple "yes" and "no" questions.           Significant Labs:  CBC:   Recent Labs   Lab 07/04/20  0858   WBC 9.22   RBC 2.69*   HGB 7.2*   HCT 23.3*   PLT 94*   MCV 87   MCH 26.8*   MCHC 30.9*     CMP:   Recent Labs   Lab 07/04/20  0300   *  235*   CALCIUM 6.8*  6.8*   ALBUMIN 1.5*  1.5*   PROT 5.0*     136   K 5.2*  5.2*   CO2 21*  21*     105   BUN 29*  29*   CREATININE 2.8*  2.8*   ALKPHOS 73   ALT 24   AST 27   BILITOT 0.5     All labs within the past 24 hours have been reviewed.     Significant Imaging:  Labs: Reviewed    Assessment/Plan:     * Septic shock  - Managed by primary team     Acute kidney injury  Mr. Perales is a 58 y/o M with previously described history, physical exam, laboratories and imaging studies being consulted for continuity of RRT.   - COCO most likely is 2/2 to ATN in the setting of septic shock, cardiac arrest, IV contrast and nephrotoxic medications complicated by minimal UOP and " worsening HAGMA.     Plan/Rec:   - Start SLED for volume and metabolic clearance as soon as line is replaced and confirmed. Will follow trend of labs and adjust dialysate baths as necessary.   - HAGMA: 2/2 to septic shock/resp failure. Bicarb bath on SLED to be adjusted accordingly.   - Follow labs serially per CRRT protocol   - Strict I/O and chart  - Avoid nephrotoxic medications, NSAIDs, IV contrast, etc.  - Medication doses adjusted to GFR  - Maintain MAP > 65  - Hb > 7 gm/dL  - Will follow closely        Thank you for your consult. I will follow-up with patient. Please contact us if you have any additional questions.    Fox Hampton MD  Nephrology  [facility]

## 2020-07-04 NOTE — PROGRESS NOTES
[facility]  Critical Care Medicine  Progress Note    Patient Name: Cristo Jefferson  MRN: [id number]  Admission Date: 6/26/2020  Hospital Length of Stay: 7 days  Code Status: Full Code  Attending Provider: Archie Mcfadden MD  Primary Care Provider: Vince Perez MD   Principal Problem: Septic shock    Subjective:     HPI:  Mr. 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 Upon presentation he was noted to be hypothermic with temperature of 95.4 F in septic shock with serum lactate of 3.6. General Surgery was consulted. Patient became increasingly acidotic with rising level of serum lactate despite IVFs, NG/OG placement, and empiric antibiotics. He was ultimately taken for ex-lap for which he underwent extensive lysis of adhesions. No perforation or ischemic bowel was noted. He continued to require pressor support, became increasingly acidotic, developed COCO, anemia presumed secondary to GI bleed, and transaminitits. Cardiology, Critical Care, and Nephrology were consulted. In the AM of 6/26 patient developed bradyarrhythmia. ACLS was performed with use of atropine, bicarbonate and calcium gluconate, in addition to multiple epinephrine boluses and external pacing. Wife was contacted and wished to proceed with resuscitation attempts. Following ROSC he was noted to have elevated troponin and BNP. Prior ECHO and repeat CXR were mostly unrevealing. UOP continued to decline. Lactate acidosis advanced. Antibiotics had already been broadened so antifungals were added. He was  started on RRT. He was ultimately transferred to [facility] for higher level of care.    Hospital/ICU Course:  Patient transferred to [facility] on 6/26. He resumed on broad spectrum antibiotics with vancomycin and Zosyn and started on SLED per Nephrology with improvement in lactic acidosis. He remains on pressors at this time (now down to two, norepinephrine and vasopressin). He underwent repeat TTE on 6/28 which showed similar EF to 6/25 prior to ACLS however there was evidence of moderate to severely right ventricular systolic with enlargement. Patient becoming increasingly more thrombocytopenic and anemic this admission without and evidence of overt bleeding and not receiving anticoagulation. HIT panel negative.  US lower extremities on 6/29 showed no evidence of DVTs. There is concern for PE however given thrombocytopenia unable to start empiric AC. He is resumed on vancomycin, Zosyn, and Diflucan was added on 6/29 while awaiting Beta-D-glucan assay and fungal culture. Sputum culture from 06/30 with Klebsiella ESBL. Changed zosyn to ertapenem and d/c'd vanc.     Interval History/Significant Events: T max 100.7 overnight, remains on low dose norepinephrine infusion.  Fungitell assay negative.  Failed SBT this am. RRT ongoing.     Review of Systems   Unable to perform ROS: Intubated     Objective:     Vital Signs (Most Recent):  Temp: (!) 101.2 °F (38.4 °C) (07/03/20 1531)  Pulse: 77 (07/03/20 1600)  Resp: 20 (07/03/20 1600)  BP: (!) 105/59 (07/02/20 1901)  SpO2: (!) 91 % (07/03/20 1600) Vital Signs (24h Range):  Temp:  [98.4 °F (36.9 °C)-101.2 °F (38.4 °C)] 101.2 °F (38.4 °C)  Pulse:  [66-87] 77  Resp:  [17-43] 20  SpO2:  [87 %-100 %] 91 %  BP: (105)/(59) 105/59  Arterial Line BP: (113-157)/(43-57) 133/48   Weight: 115.7 kg (255 lb 1.2 oz)  Body mass index is 37.67 kg/m².      Intake/Output Summary (Last 24 hours) at 7/3/2020 1648  Last data filed at 7/3/2020 1646  Gross per 24 hour   Intake 4968.65 ml   Output 6589 ml   Net  -1620.35 ml       Physical Exam  Vitals signs and nursing note reviewed.   Constitutional:       Appearance: He is morbidly obese. He is ill-appearing. He is not diaphoretic.      Interventions: He is sedated and intubated.   HENT:      Head: Normocephalic and atraumatic.      Nose: Nose normal. No congestion.      Mouth/Throat:       Eyes:      General: No scleral icterus.     Extraocular Movements: Extraocular movements intact.      Pupils: Pupils are equal, round, and reactive to light.   Neck:      Comments: trialysis to RIJ, TLC to R subclavian.   Cardiovascular:      Rate and Rhythm: Normal rate.      Pulses: Normal pulses.      Heart sounds: No murmur. No friction rub. No gallop.    Pulmonary:      Effort: No accessory muscle usage. He is intubated.      Breath sounds: No wheezing, rhonchi or rales.      Comments:   Left-sided port palpated over left chest wall.  Abdominal:      General: Bowel sounds are normal. There is no distension.      Comments: Obese/protuberant abdomen. Midline incision present without evidence of erythema, induration, or discharge. Ostomy with brown liquid stool to right abdominal wall.    Musculoskeletal:         General: Swelling (bilateral upper extremities) present.      Right lower leg: Edema present.      Left lower leg: Edema present.      Comments: Scar to right lower extremity from prior vein harvest.   Skin:     Coloration: Skin is pale.      Findings: Bruising (upper extremities bilaterally) present.   Neurological:      GCS: GCS eye subscore is 3. GCS verbal subscore is 1. GCS motor subscore is 6.      Comments: PERRL, opens eyes to verbal stimulation. Moves BLE to request. No movement to BUE on request although bilateral hands are edematous.          Vents:  Vent Mode: A/C (07/03/20 1535)  Ventilator Initiated: Yes(chart correction) (06/26/20 9773)  Set Rate: 18 BPM (07/03/20 1535)  Vt Set: 430 mL (07/03/20 0706)  Pressure Support: 10 cmH20 (07/01/20 1317)  PEEP/CPAP: 5  cmH20 (07/03/20 1535)  Oxygen Concentration (%): 40 (07/03/20 1600)  Peak Airway Pressure: 22 cmH2O (07/03/20 1535)  Plateau Pressure: 18 cmH20 (07/03/20 1535)  Total Ve: 12.9 mL (07/03/20 1535)  F/VT Ratio<105 (RSBI): (!) 35.66 (07/03/20 1535)  Lines/Drains/Airways     Central Venous Catheter Line                 Port A Cath Single Lumen 05/02/18 2105 left subclavian 792 days    Percutaneous Central Line Insertion/Assessment - Triple Lumen  06/24/20 1836 right subclavian 8 days         Hemodialysis Catheter 06/26/20 0932 right internal jugular 7 days          Drain                 Colostomy 05/03/18 0715  days         Colostomy 06/24/20 RLQ 9 days         NG/OG Tube 06/24/20 1103 [place] sump 16 Fr. Right nostril 9 days          Airway                 Airway - Non-Surgical 06/24/20 1959 Endotracheal Tube-Hi/Lo 8 days          Arterial Line                 Arterial Line 06/24/20 1852 Right Radial 8 days          Peripheral Intravenous Line                 Midline Catheter Insertion/Assessment  - Single Lumen (RETIRED) 04/03/20 1312 Right basilic vein (medial side of arm) other (see comments) 91 days              Significant Labs:    CBC/Anemia Profile:  Recent Labs   Lab 07/02/20  1700 07/03/20  0740 07/03/20  1315   WBC 13.95* 13.57* 9.97   HGB 8.1* 7.2* 6.3*   HCT 26.2* 24.3* 20.8*   PLT 85* 100* 78*   MCV 82 85 85   RDW 19.9* 20.0* 19.6*        Chemistries:  Recent Labs   Lab 07/02/20  0305  07/02/20  2205 07/03/20  0238 07/03/20  1315     137   < > 137 135*  135* 137   K 3.8  3.8   < > 4.5 4.8  4.8 4.7     100   < > 102 102  102 104   CO2 25  25   < > 23 20*  20* 20*   BUN 10  10   < > 10 9  9 12   CREATININE 1.2  1.2   < > 1.6* 1.4  1.4 1.4   CALCIUM 7.0*  7.0*   < > 6.9* 6.9*  6.9* 6.7*   ALBUMIN 1.6*  1.6*   < > 1.6* 1.6*  1.6* 1.5*   PROT 5.4*  --   --  5.6*  --    BILITOT 0.8  --   --  0.7  --    ALKPHOS 109  --   --  106  --    ALT 43  --   --  35  --    AST 44*  --   --   38  --    MG 2.0   < > 2.4 2.2 2.0   PHOS 3.2  3.2   < > 5.0* 4.3  4.3 3.6    < > = values in this interval not displayed.       All pertinent labs within the past 24 hours have been reviewed.    Significant Imaging:  I have reviewed and interpreted all pertinent imaging results/findings within the past 24 hours.      ABG  Recent Labs   Lab 07/03/20  0505   PH 7.512*   PO2 67*   PCO2 28.2*   HCO3 22.6*   BE 0     Assessment/Plan:     Pulmonary  Acute hypoxemic respiratory failure  --failed SBT this morning. Recent TTE with new RV dysfunction.  BLE ultrasound negative. Heparin not initiated on admission due to thrombocytopenia.   --Repeat TTE to evaluate RV function  --Daily SAT/SBTs.   --Chest xray with continue LLL infiltrate/effusion.     Cardiac/Vascular  Cardiac arrest  - Suspect 2/2 sepsis as cause of cardiac arrest on 6/26  in the setting of bradycardia requiring maximum dose of atropine, external pacing, and multiple doses of epinephrine  - troponin trend: 0.54 > 0.522  - US lower extremities with Doppler on 6/29 without DVTs  - holding off on empiric heparin and other forms of AC in light of thrombocytopenia   - opens eyes and follows commands     Renal/  High anion gap metabolic acidosis  Please see Septic shock.    Acute kidney injury  ATN in the setting of septic shock  - anuric  - Continue SLED per Nephrology  - daily weights  - avoid nephrotoxic agents when possible  - renally dose all medications  - maintain net negative fluid status   - Trialysis line malfunctioning.  Will likely need to replace on 7/4.     Lactic acidosis  Please see Septic shock.  --Improved with RRT    ID  * Septic shock  Klebsiella ESBL sputum  - continue ertapenem.   - blood cultures 06/24 negative and repeat on 06/30 NGTD  - follow up fungal blood culture; beta-D-glucan assay negative.  D/c fluconazole.   - Off vasopressin; wean Levo for MAP >65  - Continue stress does steroids with fludrocortisone 100 mg daily and  hydrocortisone 100 mg IV Q8H  - Repeat blood cultures given fever 102       Hematology  Thrombocytopenia  Suspect secondary to infection  - HIT negative, coagulation studies normal  - History of fatty liver and thrombocytopenia although platelets normal upon presentation to [facility]   - will continue with daily CBCs and transfuse for platelets < 10K or < 50K with active bleeding  - continue holding anticoagulation     Endocrine  Type II diabetes mellitus  Hemoglobin A1c in April was 6.3%  - glucose checks Q6H given stress dose steroids  - Low Dose SSI for now  - goal glucose 140-180 while inpatient    GI  Shock liver  Resolved    Small bowel obstruction  - s/p ex-lap on 6/24 with extensive lysis of adhesions with SBO, however, no signs of perforation or ischemia  - please see Septic shock    Anemia  Suspect secondary to RRT clotting.  No visible signs of blood loss.   -Transfuse for hb <7.     VTE Risk Mitigation (From admission, onward)         Ordered     IP VTE HIGH RISK PATIENT  Once      06/26/20 1753     Place sequential compression device  Until discontinued      06/26/20 1753                Updated wife at bedside.     Discussed plan with Dr. Mcfadden    Critical Care Time:  minutes  Critical secondary to Patient has a condition that poses threat to life and bodily function: 40 minutes      Critical care was time spent personally by me on the following activities: development of treatment plan with patient or surrogate and bedside caregivers, discussions with consultants, evaluation of patient's response to treatment, examination of patient, ordering and performing treatments and interventions, ordering and review of laboratory studies, ordering and review of radiographic studies, pulse oximetry, re-evaluation of patient's condition. This critical care time did not overlap with that of any other provider or involve time for any procedures.     Vashti Shah NP  Critical Care  Medicine  [facility]

## 2020-07-04 NOTE — ASSESSMENT & PLAN NOTE
- Suspect secondary to sepsis as cause of cardiac arrest on 6/26  in the setting of bradycardia requiring maximum dose of atropine, external pacing, and multiple doses of epinephrine  - troponin trend: 0.54 > 0.522  - US lower extremities with Doppler on 6/29 without DVTs  - holding off on empiric heparin and other forms of AC in light of thrombocytopenia   - opens eyes and follows commands

## 2020-07-04 NOTE — PLAN OF CARE
CMICU DAILY GOALS       A: Awake    RASS: Goal - RASS Goal: 0-->alert and calm  Actual - RASS (Bauer Agitation-Sedation Scale): -1-->drowsy   Restraint necessity: Clinical Justification: Removing medical devices  B: Breath   SBT: Not attempted   C: Coordinate A & B, analgesics/sedatives   Pain: managed    SAT: Not attempted  D: Delirium   CAM-ICU: Overall CAM-ICU: Positive  E: Early Mobility   MOVE Screen: Fail   Activity: Activity Management: bedrest maintained per order  FAS: Feeding/Nutrition   Diet order: Diet/Nutrition Received: tube feeding,   Fluid restriction:    T: Thrombus   DVT prophylaxis: VTE Required Core Measure: Pharmacological prophylaxis initiated/maintained, (SCDs) Sequential compression device initiated/maintained  H: HOB Elevation   Head of Bed (HOB): HOB elevated  U: Ulcer Prophylaxis   GI: yes  G: Glucose control   managed Glycemic Management: blood glucose monitoring  S: Skin   Bundle compliance: yes   Bathing/Skin Care: bath, chlorhexidine, dressed/undressed Date: [unfilled]  B: Bowel Function   Colostomy in place    I: Indwelling Catheters   Garcia necessity:      Urethral Catheter 07/03/20 1700 Latex 16 Fr.-Reason for Continuing Urinary Catheterization: Critically ill in ICU requiring intensive monitoring  [REMOVED]      Urethral Catheter 06/26/20 1932-Reason for Continuing Urinary Catheterization: Critically ill in ICU requiring intensive monitoring   CVC necessity: Yes   IPAD offered: Not appropriate  D: De-escalation Antibx   Yes  Plan for the day   Line exchange to continue CRRT, SBT/SAT attempt, monitor labs, martha. H & h  Family/Goals of care/Code Status   Code Status: Full Code      VS and assessment per flow sheet, patient progressing towards goals as tolerated, plan of care reviewed with Cristo Jefferson, though intubated and sedated, can follow commands/nod appropriately. will continue to monitor.

## 2020-07-04 NOTE — ASSESSMENT & PLAN NOTE
Suspect secondary to RRT clotting. Possible component of bone marrow suppression from critical illness. No evidence of ongoing bleeding. S/p 2 units pRBC 7/4.     - Trend CBC

## 2020-07-04 NOTE — EICU
Intervention Initiated From:  Bedside   Called in to room for Time Out prior to trialysis line insertion. Vashti JURADO at bedside. /48, HR 82, SP02 93% prior to starting. Time out as documented.

## 2020-07-04 NOTE — NURSING
PRBCs transfused per MD order despite previos concern for transfusion reaction due to continuing decrease in H & H. Will monitor patient closely.

## 2020-07-04 NOTE — ASSESSMENT & PLAN NOTE
Suspect secondary to infection    - HIT negative, coagulation studies normal  - History of fatty liver and thrombocytopenia although platelets normal upon presentation to [facility]   - will continue with daily CBCs and transfuse for platelets < 10K or < 50K with active bleeding  - continue holding anticoagulation

## 2020-07-04 NOTE — ASSESSMENT & PLAN NOTE
iATN in the setting of septic shock.  Garcia inserted with ~20ml output.  RRT clotting.     - Replace Trialysis.   - Continue SLED per Nephrology  - daily weights  - avoid nephrotoxic agents when possible  - renally dose all medications

## 2020-07-04 NOTE — PROGRESS NOTES
Pharmacokinetic Initial Assessment: IV Vancomycin    Assessment/Plan:  - Initiate vancomycin with 1250 mg IV x1.   - Patient with COCO requiring RRT. Nephrology consulted, continuous SLED yesterday but with some clotting issues.  - Draw vancomycin level with morning labs tomorrow. Will re-dose as needed depending on level and RRT plans.    Pharmacy will continue to follow and monitor vancomycin.      Please contact pharmacy at extension [phone] with any questions regarding this assessment.     Thank you for the consult,   Freddy Miramontes     Patient brief summary:  Cristo Jefferson is a 59 y.o. male initiated on antimicrobial therapy with IV Vancomycin for treatment of suspected bacteremia    Drug Allergies:   Review of patient's allergies indicates:   Allergen Reactions    Morphine Rash       Actual Body Weight:   115.7 kg    Renal Function:   Estimated Creatinine Clearance: 34.8 mL/min (A) (based on SCr of 2.8 mg/dL (H)).,     Dialysis Method (if applicable):  SLED

## 2020-07-05 PROBLEM — I48.91 ATRIAL FIBRILLATION WITH RVR: Status: ACTIVE | Noted: 2020-01-01

## 2020-07-05 NOTE — PROGRESS NOTES
[facility]  Nephrology  Progress Note    Patient Name: Cristo Jefferson  MRN: [id number]  Admission Date: 6/26/2020  Hospital Length of Stay: 9 days  Attending Provider: Archie Mcfadden MD   Primary Care Physician: Vince Perez MD  Principal Problem:Septic shock    Subjective:     HPI: A 60 y/o male with PMHx of CAD s/p CABG, AAA, RA, HTN, T2DM,  Adenocarcinoma of appendix s/p resection, SBO and perforation s/p colostomy [place] [facility] [place] [place] [facility] [place] for CRRT initiation. On 6/24/20 he presented to the OSH with nausea, emesis, and abdominal pain for approximately 24 hours.  He arrived to the ED hypothermic with significant lactic acidosis and CTwith IV contrast suggestive of SBO. Exploratory laparotomy was performed and no signs of ischemia were found. Had cardiac arrest 6/25, atropine given due to severe bradycardia, required transcutaneous pacing, ROSC achieved. Worsening renal function ensue with minimal UOP even with high dose diuretic trial and was started on HD 6/26. Required administration of multiple pressors and bicarb drip. sCr at admission was 0.9 and 5.4 day of first HD (6/26).     Nephrology consulted for CRRT initiation.         Interval History: Line replaced by ICU team, CRRT restarsted but the clotted. Rinsed back for CT scan.     Review of patient's allergies indicates:   Allergen Reactions    Morphine Rash     Current Facility-Administered Medications   Medication Frequency    0.9%  NaCl infusion (CRRT USE ONLY) Continuous    0.9%  NaCl infusion (CRRT USE ONLY) Continuous    0.9%  NaCl infusion (for blood administration) Q24H PRN    0.9%  NaCl infusion (for blood administration) Q24H PRN    0.9%  NaCl infusion (for blood administration) Q24H PRN    acetaminophen tablet 650 mg Q6H PRN    albuterol-ipratropium 2.5 mg-0.5 mg/3 mL nebulizer solution 3 mL Q4H    amiodarone 360 mg/200 mL (1.8 mg/mL) infusion Continuous    amiodarone 360 mg/200 mL (1.8 mg/mL) infusion  Continuous    amiodarone in dextrose 150 mg/100 mL (1.5 mg/mL) loading dose 150 mg Once    calcium chloride 1 g in dextrose 5 % 100 mL IVPB Continuous    calcium gluconate 3,000 mg in dextrose 5 % 100 mL IVPB Continuous    dexmedetomidine (PRECEDEX) 400mcg/100mL 0.9% NaCL infusion Continuous    dextrose 50 % in water (D50W) injection 12.5 g PRN    dextrose-sod citrate-citric ac 2.45-2.2 gram- 800 mg/100 mL Soln Continuous    dextrose-sod citrate-citric ac 2.45-2.2 gram- 800 mg/100 mL Soln Continuous    ertapenem (INVANZ) 1 g in sodium chloride 0.9 % 100 mL IVPB (ready to mix system) Q24H    famotidine tablet 20 mg Daily    fentaNYL 2500 mcg in 0.9% sodium chloride 250 mL infusion premix (titrating) Continuous    fluconazole (DIFLUCAN) IVPB 400 mg 200 mL Q24H    glucagon (human recombinant) injection 1 mg PRN    heparin flush (porcine) injection 10 Units PRN    hydrocortisone sodium succinate injection 100 mg Q8H    insulin aspart U-100 pen 0-5 Units Q6H PRN    magnesium sulfate 2g in water 50mL IVPB (premix) PRN    magnesium sulfate 2g in water 50mL IVPB (premix) PRN    norepinephrine 16 mg in dextrose 5 % 250 mL infusion Continuous    phenylephrine (REID-SYNEPHRINE) 10 mg/mL injection     phenylephrine (REID-SYNEPHRINE) 10 mg/mL injection     phenylephrine (REID-SYNEPHRINE) 100 mg in sodium chloride 0.9% 250 mL infusion Continuous    propofol (DIPRIVAN) 10 mg/mL infusion Continuous    sodium chloride 0.9% flush 10 mL PRN    sodium phosphate 20.01 mmol in dextrose 5 % 250 mL IVPB PRN    sodium phosphate 20.01 mmol in dextrose 5 % 250 mL IVPB PRN    sodium phosphate 30 mmol in dextrose 5 % 250 mL IVPB PRN    sodium phosphate 30 mmol in dextrose 5 % 250 mL IVPB PRN    sodium phosphate 39.99 mmol in dextrose 5 % 250 mL IVPB PRN    sodium phosphate 39.99 mmol in dextrose 5 % 250 mL IVPB PRN    tobramycin - pharmacy to dose pharmacy to manage frequency    vancomycin - pharmacy to dose pharmacy  to manage frequency    vasopressin (PITRESSIN) 0.2 Units/mL in dextrose 5 % 100 mL infusion Continuous    white petrolatum-mineral oil (LUBIFRESH P.M.) ophthalmic ointment QHS       Objective:     Vital Signs (Most Recent):  Temp: 99 °F (37.2 °C) (07/05/20 1100)  Pulse: (!) 134 (07/05/20 1100)  Resp: (!) 26 (07/05/20 1100)  BP: (!) 105/59 (07/02/20 1901)  SpO2: (!) 91 % (07/05/20 1100)  O2 Device (Oxygen Therapy): ventilator (07/05/20 1100) Vital Signs (24h Range):  Temp:  [99 °F (37.2 °C)-101.7 °F (38.7 °C)] 99 °F (37.2 °C)  Pulse:  [] 134  Resp:  [11-26] 26  SpO2:  [89 %-95 %] 91 %  Arterial Line BP: ()/(46-65) 91/57     Weight: 115.7 kg (255 lb 1.2 oz) (07/03/20 0600)  Body mass index is 37.67 kg/m².  Body surface area is 2.37 meters squared.    I/O last 3 completed shifts:  In: 5942.3 [I.V.:3506.1; Blood:421.3; NG/GT:1815; IV Piggyback:200]  Out: 3058 [Urine:45; Drains:350; Other:2163; Stool:500]    Physical Exam  Vitals signs and nursing note reviewed.   Constitutional:       Appearance: He is morbidly obese. He is ill-appearing. He is not diaphoretic.      Interventions: He is sedated and intubated.   HENT:      Head: Normocephalic and atraumatic.      Nose: Nose normal. No congestion.      Mouth/Throat:      Comments: End of tongue with lesion concerning for pressure injury from ETT.  Eyes:      General: No scleral icterus.     Extraocular Movements: Extraocular movements intact.      Pupils: Pupils are equal, round, and reactive to light.   Neck:      Comments: trialysis to RIJ, TLC to R subclavian.   Cardiovascular:      Rate and Rhythm: Normal rate.      Pulses: Normal pulses.      Heart sounds: No friction rub. No gallop.    Pulmonary:      Effort: No tachypnea, bradypnea or accessory muscle usage. He is intubated.      Breath sounds: No wheezing, rhonchi or rales.      Comments:   Left-sided port palpated over left chest wall.  Abdominal:      General: Bowel sounds are normal. There is no  "distension.      Comments: Obese/protuberant abdomen. Midline incision present without evidence of erythema, induration, or discharge. Ostomy with brown liquid stool to right abdominal wall.    Musculoskeletal:         General: Swelling (bilateral upper extremities) present.      Right lower leg: Edema present.      Left lower leg: Edema present.   Skin:     Coloration: Skin is pale.      Findings: Bruising (upper extremities bilaterally) present.   Neurological:      GCS: GCS eye subscore is 3. GCS verbal subscore is 1. GCS motor subscore is 6.      Comments: PERRL, opens eyes to verbal stimulation. Moves extremities to request, extremely weak in BUE. Appears to nod appropriately to simple "yes" and "no" questions.           Significant Labs:  CBC:   Recent Labs   Lab 07/05/20  0837   WBC 29.59*   RBC 3.77*   HGB 10.0*   HCT 33.2*      MCV 88   MCH 26.5*   MCHC 30.1*     CMP:   Recent Labs   Lab 07/05/20  0249   *   CALCIUM 7.2*   ALBUMIN 1.6*  1.6*   PROT 5.5*  5.4*   *   K 4.9   CO2 18*      BUN 25*   CREATININE 2.6*   ALKPHOS 105  103   ALT 29  30   AST 50*  50*   BILITOT 0.8  0.8     Coagulation: No results for input(s): PT, INR, APTT in the last 168 hours.  All labs within the past 24 hours have been reviewed.     Significant Imaging:  Labs: Reviewed    Assessment/Plan:     * Septic shock  - Managed by primary team     Acute kidney injury  Mr. Perales is a 60 y/o M with previously described history, physical exam, laboratories and imaging studies being consulted for continuity of RRT.   - COCO most likely is 2/2 to ATN in the setting of septic shock, cardiac arrest, IV contrast and nephrotoxic medications complicated by minimal UOP and worsening HAGMA.     Plan/Rec:   - continue SLED for today, citrate and calcium added   - HAGMA: 2/2 to septic shock/resp failure. Bicarb bath on SLED to be adjusted accordingly.   - Follow labs serially per CRRT protocol   - Strict I/O and chart  - " Avoid nephrotoxic medications, NSAIDs, IV contrast, etc.  - Medication doses adjusted to GFR  - Maintain MAP > 65  - Hb > 7 gm/dL  - Will follow closely        Thank you for your consult. I will follow-up with patient. Please contact us if you have any additional questions.    Fox Hampton MD  Nephrology  [facility]

## 2020-07-05 NOTE — ASSESSMENT & PLAN NOTE
iATN in the setting of septic shock.  Garcia inserted with ~20ml output.  RRT clotting. Replaced trialysis 7/4 with continued clotting issues.  Citrate added.     - Continue SLED per Nephrology  - daily weights  - avoid nephrotoxic agents when possible  - renally dose all medications

## 2020-07-05 NOTE — PLAN OF CARE
CMICU DAILY GOALS       A: Awake    RASS: Goal - RASS Goal: 0-->alert and calm  Actual - RASS (Bauer Agitation-Sedation Scale): -1-->drowsy   Restraint necessity: Clinical Justification: Removing medical devices  B: Breath   SBT: Not attempted   C: Coordinate A & B, analgesics/sedatives   Pain: managed    SAT: Not attempted  D: Delirium   CAM-ICU: Overall CAM-ICU: Positive  E: Early Mobility   MOVE Screen: Fail   Activity: Activity Management: bedrest maintained per order, patient unable to perform activities  FAS: Feeding/Nutrition   Diet order: Diet/Nutrition Received: tube feeding,   Fluid restriction:    T: Thrombus   DVT prophylaxis: VTE Required Core Measure: Pharmacological prophylaxis initiated/maintained, (SCDs) Sequential compression device initiated/maintained  H: HOB Elevation   Head of Bed (HOB): HOB elevated  U: Ulcer Prophylaxis   GI: yes  G: Glucose control   managed Glycemic Management: blood glucose monitoring  S: Skin   Bundle compliance: yes   Bathing/Skin Care: bath, chlorhexidine, dressed/undressed Date: [unfilled]  B: Bowel Function   Colostomy in place    I: Indwelling Catheters   Garcia necessity:      Urethral Catheter 07/03/20 1700 Latex 16 Fr.-Reason for Continuing Urinary Catheterization: Critically ill in ICU requiring intensive monitoring  [REMOVED]      Urethral Catheter 06/26/20 1932-Reason for Continuing Urinary Catheterization: Critically ill in ICU requiring intensive monitoring   CVC necessity: Yes   IPAD offered: Not appropriate  D: De-escalation Antibx   Yes  Plan for the day   CRRT, stabilize vitals, wean pressors, sbt/sat  Family/Goals of care/Code Status   Code Status: Full Code     VS and assessment per flow sheet, patient progressing towards goals as tolerated, plan of care reviewed with Cristo Jefferson's family, all concerns addressed, will continue to monitor.

## 2020-07-05 NOTE — SUBJECTIVE & OBJECTIVE
Interval History: Line replaced by ICU team, CRRT restarsted but the clotted. Rinsed back for CT scan.     Review of patient's allergies indicates:   Allergen Reactions    Morphine Rash     Current Facility-Administered Medications   Medication Frequency    0.9%  NaCl infusion (CRRT USE ONLY) Continuous    0.9%  NaCl infusion (CRRT USE ONLY) Continuous    0.9%  NaCl infusion (for blood administration) Q24H PRN    0.9%  NaCl infusion (for blood administration) Q24H PRN    0.9%  NaCl infusion (for blood administration) Q24H PRN    acetaminophen tablet 650 mg Q6H PRN    albuterol-ipratropium 2.5 mg-0.5 mg/3 mL nebulizer solution 3 mL Q4H    amiodarone 360 mg/200 mL (1.8 mg/mL) infusion Continuous    amiodarone 360 mg/200 mL (1.8 mg/mL) infusion Continuous    amiodarone in dextrose 150 mg/100 mL (1.5 mg/mL) loading dose 150 mg Once    calcium chloride 1 g in dextrose 5 % 100 mL IVPB Continuous    calcium gluconate 3,000 mg in dextrose 5 % 100 mL IVPB Continuous    dexmedetomidine (PRECEDEX) 400mcg/100mL 0.9% NaCL infusion Continuous    dextrose 50 % in water (D50W) injection 12.5 g PRN    dextrose-sod citrate-citric ac 2.45-2.2 gram- 800 mg/100 mL Soln Continuous    dextrose-sod citrate-citric ac 2.45-2.2 gram- 800 mg/100 mL Soln Continuous    ertapenem (INVANZ) 1 g in sodium chloride 0.9 % 100 mL IVPB (ready to mix system) Q24H    famotidine tablet 20 mg Daily    fentaNYL 2500 mcg in 0.9% sodium chloride 250 mL infusion premix (titrating) Continuous    fluconazole (DIFLUCAN) IVPB 400 mg 200 mL Q24H    glucagon (human recombinant) injection 1 mg PRN    heparin flush (porcine) injection 10 Units PRN    hydrocortisone sodium succinate injection 100 mg Q8H    insulin aspart U-100 pen 0-5 Units Q6H PRN    magnesium sulfate 2g in water 50mL IVPB (premix) PRN    magnesium sulfate 2g in water 50mL IVPB (premix) PRN    norepinephrine 16 mg in dextrose 5 % 250 mL infusion Continuous    phenylephrine  (REID-SYNEPHRINE) 10 mg/mL injection     phenylephrine (REID-SYNEPHRINE) 10 mg/mL injection     phenylephrine (REID-SYNEPHRINE) 100 mg in sodium chloride 0.9% 250 mL infusion Continuous    propofol (DIPRIVAN) 10 mg/mL infusion Continuous    sodium chloride 0.9% flush 10 mL PRN    sodium phosphate 20.01 mmol in dextrose 5 % 250 mL IVPB PRN    sodium phosphate 20.01 mmol in dextrose 5 % 250 mL IVPB PRN    sodium phosphate 30 mmol in dextrose 5 % 250 mL IVPB PRN    sodium phosphate 30 mmol in dextrose 5 % 250 mL IVPB PRN    sodium phosphate 39.99 mmol in dextrose 5 % 250 mL IVPB PRN    sodium phosphate 39.99 mmol in dextrose 5 % 250 mL IVPB PRN    tobramycin - pharmacy to dose pharmacy to manage frequency    vancomycin - pharmacy to dose pharmacy to manage frequency    vasopressin (PITRESSIN) 0.2 Units/mL in dextrose 5 % 100 mL infusion Continuous    white petrolatum-mineral oil (LUBIFRESH P.M.) ophthalmic ointment QHS       Objective:     Vital Signs (Most Recent):  Temp: 99 °F (37.2 °C) (07/05/20 1100)  Pulse: (!) 134 (07/05/20 1100)  Resp: (!) 26 (07/05/20 1100)  BP: (!) 105/59 (07/02/20 1901)  SpO2: (!) 91 % (07/05/20 1100)  O2 Device (Oxygen Therapy): ventilator (07/05/20 1100) Vital Signs (24h Range):  Temp:  [99 °F (37.2 °C)-101.7 °F (38.7 °C)] 99 °F (37.2 °C)  Pulse:  [] 134  Resp:  [11-26] 26  SpO2:  [89 %-95 %] 91 %  Arterial Line BP: ()/(46-65) 91/57     Weight: 115.7 kg (255 lb 1.2 oz) (07/03/20 0600)  Body mass index is 37.67 kg/m².  Body surface area is 2.37 meters squared.    I/O last 3 completed shifts:  In: 5942.3 [I.V.:3506.1; Blood:421.3; NG/GT:1815; IV Piggyback:200]  Out: 3058 [Urine:45; Drains:350; Other:2163; Stool:500]    Physical Exam  Vitals signs and nursing note reviewed.   Constitutional:       Appearance: He is morbidly obese. He is ill-appearing. He is not diaphoretic.      Interventions: He is sedated and intubated.   HENT:      Head: Normocephalic and atraumatic.  "     Nose: Nose normal. No congestion.      Mouth/Throat:      Comments: End of tongue with lesion concerning for pressure injury from ETT.  Eyes:      General: No scleral icterus.     Extraocular Movements: Extraocular movements intact.      Pupils: Pupils are equal, round, and reactive to light.   Neck:      Comments: trialysis to RIJ, TLC to R subclavian.   Cardiovascular:      Rate and Rhythm: Normal rate.      Pulses: Normal pulses.      Heart sounds: No friction rub. No gallop.    Pulmonary:      Effort: No tachypnea, bradypnea or accessory muscle usage. He is intubated.      Breath sounds: No wheezing, rhonchi or rales.      Comments:   Left-sided port palpated over left chest wall.  Abdominal:      General: Bowel sounds are normal. There is no distension.      Comments: Obese/protuberant abdomen. Midline incision present without evidence of erythema, induration, or discharge. Ostomy with brown liquid stool to right abdominal wall.    Musculoskeletal:         General: Swelling (bilateral upper extremities) present.      Right lower leg: Edema present.      Left lower leg: Edema present.   Skin:     Coloration: Skin is pale.      Findings: Bruising (upper extremities bilaterally) present.   Neurological:      GCS: GCS eye subscore is 3. GCS verbal subscore is 1. GCS motor subscore is 6.      Comments: PERRL, opens eyes to verbal stimulation. Moves extremities to request, extremely weak in BUE. Appears to nod appropriately to simple "yes" and "no" questions.           Significant Labs:  CBC:   Recent Labs   Lab 07/05/20  0837   WBC 29.59*   RBC 3.77*   HGB 10.0*   HCT 33.2*      MCV 88   MCH 26.5*   MCHC 30.1*     CMP:   Recent Labs   Lab 07/05/20  0249   *   CALCIUM 7.2*   ALBUMIN 1.6*  1.6*   PROT 5.5*  5.4*   *   K 4.9   CO2 18*      BUN 25*   CREATININE 2.6*   ALKPHOS 105  103   ALT 29  30   AST 50*  50*   BILITOT 0.8  0.8     Coagulation: No results for input(s): PT, INR, " APTT in the last 168 hours.  All labs within the past 24 hours have been reviewed.     Significant Imaging:  Labs: Reviewed

## 2020-07-05 NOTE — ASSESSMENT & PLAN NOTE
Failing SBT's. Recent TTE with new RV dysfunction.  BLE ultrasound negative. Heparin not initiated on admission due to thrombocytopenia.     - Repeat TTE to evaluate RV function, pending.  - Daily SAT/SBTs as tolerated.  No performed today due to hemodynamic instability.    - Chest xray with continue LLL infiltrate/effusion. Continue ertapenem for Klebsiella ESBL.

## 2020-07-05 NOTE — ASSESSMENT & PLAN NOTE
Mr. Perales is a 60 y/o M with previously described history, physical exam, laboratories and imaging studies being consulted for continuity of RRT.   - COCO most likely is 2/2 to ATN in the setting of septic shock, cardiac arrest, IV contrast and nephrotoxic medications complicated by minimal UOP and worsening HAGMA.     Plan/Rec:   - continue SLED for today, citrate and calcium added   - HAGMA: 2/2 to septic shock/resp failure. Bicarb bath on SLED to be adjusted accordingly.   - Follow labs serially per CRRT protocol   - Strict I/O and chart  - Avoid nephrotoxic medications, NSAIDs, IV contrast, etc.  - Medication doses adjusted to GFR  - Maintain MAP > 65  - Hb > 7 gm/dL  - Will follow closely

## 2020-07-05 NOTE — CHAPLAIN
Responded to Code Blue and in the room with pt's wife the entire time and the subsequent death; provided prayer, presence and support. Delivered decedent ppwk to nurse and coordinating the completion of it. , in your mercy.

## 2020-07-05 NOTE — ASSESSMENT & PLAN NOTE
Suspect secondary to sepsis as cause of cardiac arrest on 6/26  in the setting of bradycardia requiring maximum dose of atropine, external pacing, and multiple doses of epinephrine    - troponin trend: 0.54 > 0.522  - US lower extremities with Doppler on 6/29 without DVTs  - holding off on empiric heparin and other forms of AC in light of thrombocytopenia

## 2020-07-05 NOTE — ASSESSMENT & PLAN NOTE
Klebsiella ESBL sputum  - continue ertapenem.  Vancomycin added on 7/4 given fevers and concern for new infection. Removed indwelling venous lines (TLC and trialysis) on 7/4. R IJ trialysis placed.  Port accessed overnight.  Norepinephrine restarted, vasopressin added overnight 7/4. Tobramycin x1 given. Fluconazole also restarted. Repeated blood and sputum cultures 7/5. CT Abdomen/Pelvis ordered.   - Blood cultures 06/24 and 6/30 negative.  7/3 blood cultures NGTD.   - D/c prednisone and started SDS.  - Consider removing port.

## 2020-07-05 NOTE — SUBJECTIVE & OBJECTIVE
Interval History/Significant Events: RRT restarted with new trialysis; subsequent clotting. RRT restarted.  Plan to add citrate this am.  Port accessed overnight. Vasopressor requirements increased significantly requiring vasopressin.  Lactate WNL.  Repeated blood cultures, sputum culture.  Added fluconazole and tobramycin x1. CT abdomen/pelvis ordered.     RRT clotted again this am, shortly after rinsing blood back; developed new afib RVR rate 140's, blood pressure unchanged.     Review of Systems   Unable to perform ROS: Intubated     Objective:     Vital Signs (Most Recent):  Temp: 99.5 °F (37.5 °C) (07/05/20 0600)  Pulse: (!) 130 (07/05/20 0900)  Resp: 11 (07/05/20 0900)  BP: (!) 105/59 (07/02/20 1901)  SpO2: (!) 89 % (07/05/20 0900) Vital Signs (24h Range):  Temp:  [99 °F (37.2 °C)-101.7 °F (38.7 °C)] 99.5 °F (37.5 °C)  Pulse:  [] 130  Resp:  [11-25] 11  SpO2:  [89 %-95 %] 89 %  Arterial Line BP: ()/(45-72) 109/61   Weight: 115.7 kg (255 lb 1.2 oz)  Body mass index is 37.67 kg/m².      Intake/Output Summary (Last 24 hours) at 7/5/2020 0951  Last data filed at 7/5/2020 0900  Gross per 24 hour   Intake 4832.73 ml   Output 4614 ml   Net 218.73 ml       Physical Exam  Vitals signs and nursing note reviewed.   Constitutional:       Appearance: He is morbidly obese. He is ill-appearing. He is not diaphoretic.      Interventions: He is sedated and intubated.   HENT:      Head: Normocephalic and atraumatic.      Nose: Nose normal. No congestion.      Mouth/Throat:      Comments: End of tongue with lesion concerning for pressure injury from ETT.  Eyes:      General: No scleral icterus.     Extraocular Movements: Extraocular movements intact.      Pupils: Pupils are equal, round, and reactive to light.   Neck:      Comments: trialysis to RIJ  Cardiovascular:      Rate and Rhythm: Normal rate.      Pulses: Normal pulses.      Heart sounds: No friction rub. No gallop.    Pulmonary:      Effort: No tachypnea,  bradypnea or accessory muscle usage. He is intubated.      Breath sounds: Rhonchi present. No wheezing or rales.      Comments:   Left-sided port palpated over left chest wall.  Small amount of thick yellow secretions from ETT  Abdominal:      General: Bowel sounds are normal. There is no distension.      Comments: Obese/protuberant abdomen. Midline incision present without evidence of erythema, induration.  Small amount of serous drainage noted. Ostomy with brown liquid stool to right abdominal wall.    Musculoskeletal:         General: Swelling (bilateral upper extremities) present.      Right lower leg: Edema present.      Left lower leg: Edema present.   Skin:     Coloration: Skin is pale.      Findings: Bruising (upper extremities bilaterally) present.      Comments: Mottling to BLEs.    Neurological:      GCS: GCS eye subscore is 3. GCS verbal subscore is 1. GCS motor subscore is 6.      Comments: PERRL, opens eyes to verbal stimulation. On sedation; sedation vacation not performed due to hemodynamic instability. .           Vents:  Vent Mode: A/C (07/05/20 0825)  Ventilator Initiated: Yes(chart correction) (06/26/20 1743)  Set Rate: 18 BPM (07/05/20 0825)  Vt Set: 430 mL (07/03/20 0706)  Pressure Support: 10 cmH20 (07/01/20 1317)  PEEP/CPAP: 5 cmH20 (07/05/20 0825)  Oxygen Concentration (%): 40 (07/05/20 0900)  Peak Airway Pressure: 21 cmH2O (07/05/20 0825)  Plateau Pressure: 18 cmH20 (07/05/20 0825)  Total Ve: 10.2 mL (07/05/20 0825)  F/VT Ratio<105 (RSBI): (!) 21.38 (07/05/20 0825)  Lines/Drains/Airways     Central Venous Catheter Line                 Port A Cath Single Lumen 05/02/18 2105 left subclavian 794 days    Trialysis (Dialysis) Catheter 07/04/20 1852 right internal jugular;right subclavian less than 1 day          Drain                 Colostomy 05/03/18 0715  days         Colostomy 06/24/20 RLQ 11 days         NG/OG Tube 06/24/20 1103 Burke sump 16 Fr. Right nostril 10 days          Urethral Catheter 07/03/20 1700 Latex 16 Fr. 1 day          Airway                 Airway - Non-Surgical 06/24/20 1959 Endotracheal Tube-Hi/Lo 10 days          Arterial Line                 Arterial Line 06/24/20 1852 Right Radial 10 days              Significant Labs:    CBC/Anemia Profile:  Recent Labs   Lab 07/04/20  2258 07/05/20  0249 07/05/20  0837   WBC 26.33* 32.78* 29.59*   HGB 10.2* 10.3* 10.0*   HCT 32.7* 33.7* 33.2*    227 166   MCV 87 87 88   RDW 19.6* 19.6* 19.5*        Chemistries:  Recent Labs   Lab 07/04/20  0300 07/04/20  1415 07/04/20  2249 07/05/20  0249     136 139 135* 134*   K 5.2*  5.2* 3.9 4.4 4.9     105 110 105 105   CO2 21*  21* 20* 20* 18*   BUN 29*  29* 35* 32* 25*   CREATININE 2.8*  2.8* 3.2* 3.1* 2.6*   CALCIUM 6.8*  6.8* 5.9* 7.0* 7.2*   ALBUMIN 1.5*  1.5* 1.4* 1.6* 1.6*  1.6*   PROT 5.0*  --   --  5.5*  5.4*   BILITOT 0.5  --   --  0.8  0.8   ALKPHOS 73  --   --  105  103   ALT 24  --   --  29  30   AST 27  --   --  50*  50*   MG 2.2 2.0 2.1 1.9   PHOS 5.0*  5.0* 4.6* 4.1 3.9       All pertinent labs within the past 24 hours have been reviewed.    Significant Imaging:  I have reviewed and interpreted all pertinent imaging results/findings within the past 24 hours.

## 2020-07-05 NOTE — PROGRESS NOTES
[facility]  Critical Care Medicine  Progress Note    Patient Name: Cristo Jefferson  MRN: [id number]  Admission Date: 6/26/2020  Hospital Length of Stay: 9 days  Code Status: Full Code  Attending Provider: Archie Mcfadden MD  Primary Care Provider: Vince Perez MD   Principal Problem: Septic shock    Subjective:     HPI:  Mr. 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 ED on 6/24 with complaints of one day of abdominal pain, nausea, and vomiting. Upon presentation he was noted to be hypothermic with temperature of 95.4 F in septic shock with serum lactate of 3.6. General Surgery was consulted. Patient became increasingly acidotic with rising level of serum lactate despite IVFs, NG/OG placement, and empiric antibiotics. He was ultimately taken for ex-lap for which he underwent extensive lysis of adhesions. No perforation or ischemic bowel was noted. He continued to require pressor support, became increasingly acidotic, developed COCO, anemia presumed secondary to GI bleed, and transaminitits. Cardiology, Critical Care, and Nephrology were consulted. In the AM of 6/26 patient developed bradyarrhythmia. ACLS was performed with use of atropine, bicarbonate and calcium gluconate, in addition to multiple epinephrine boluses and external pacing. Wife was contacted and wished to proceed with resuscitation attempts. Following ROSC he was noted to have elevated troponin and BNP. Prior ECHO and repeat CXR were mostly unrevealing. UOP continued to decline. Lactate acidosis advanced. Antibiotics had already been broadened so antifungals were added. He was  started on RRT. He was ultimately transferred to [facility] [place] higher level of care.    Hospital/ICU Course:  Patient transferred to [facility] on 6/26. He resumed on broad spectrum antibiotics with vancomycin and Zosyn and started on SLED per Nephrology with improvement in lactic acidosis. He remains on pressors at this time (now down to two, norepinephrine and vasopressin). He underwent repeat TTE on 6/28 which showed similar EF to 6/25 prior to ACLS however there was evidence of moderate to severely right ventricular systolic with enlargement. Patient becoming increasingly more thrombocytopenic and anemic this admission without and evidence of overt bleeding and not receiving anticoagulation. HIT panel negative.  US lower extremities on 6/29 showed no evidence of DVTs. There is concern for PE however given thrombocytopenia unable to start empiric AC. He is resumed on vancomycin, Zosyn, and Diflucan was added on 6/29 while awaiting Beta-D-glucan assay and fungal culture. Sputum culture from 06/30 with Klebsiella ESBL. Changed zosyn to ertapenem and d/c'd vanc.     Interval History/Significant Events: RRT restarted with new trialysis; subsequent clotting. RRT restarted.  Plan to add citrate this am.  Port accessed overnight. Vasopressor requirements increased significantly requiring vasopressin.  Lactate WNL.  Repeated blood cultures, sputum culture.  Added fluconazole and tobramycin x1. CT abdomen/pelvis ordered.     RRT clotted again this am, shortly after rinsing blood back; developed new afib RVR rate 140's, blood pressure unchanged.     Review of Systems   Unable to perform ROS: Intubated     Objective:     Vital Signs (Most Recent):  Temp: 99.5 °F (37.5 °C) (07/05/20 0600)  Pulse: (!) 130 (07/05/20 0900)  Resp: 11 (07/05/20 0900)  BP: (!) 105/59 (07/02/20 1901)  SpO2: (!) 89 % (07/05/20 0900) Vital Signs (24h Range):  Temp:  [99 °F (37.2 °C)-101.7 °F (38.7 °C)] 99.5 °F (37.5 °C)  Pulse:  [] 130  Resp:  [11-25]  11  SpO2:  [89 %-95 %] 89 %  Arterial Line BP: ()/(45-72) 109/61   Weight: 115.7 kg (255 lb 1.2 oz)  Body mass index is 37.67 kg/m².      Intake/Output Summary (Last 24 hours) at 7/5/2020 0951  Last data filed at 7/5/2020 0900  Gross per 24 hour   Intake 4832.73 ml   Output 4614 ml   Net 218.73 ml       Physical Exam  Vitals signs and nursing note reviewed.   Constitutional:       Appearance: He is morbidly obese. He is ill-appearing. He is not diaphoretic.      Interventions: He is sedated and intubated.   HENT:      Head: Normocephalic and atraumatic.      Nose: Nose normal. No congestion.      Mouth/Throat:      Comments: End of tongue with lesion concerning for pressure injury from ETT.  Eyes:      General: No scleral icterus.     Extraocular Movements: Extraocular movements intact.      Pupils: Pupils are equal, round, and reactive to light.   Neck:      Comments: trialysis to RIJ  Cardiovascular:      Rate and Rhythm: Normal rate.      Pulses: Normal pulses.      Heart sounds: No friction rub. No gallop.    Pulmonary:      Effort: No tachypnea, bradypnea or accessory muscle usage. He is intubated.      Breath sounds: Rhonchi present. No wheezing or rales.      Comments:   Left-sided port palpated over left chest wall.  Small amount of thick yellow secretions from ETT  Abdominal:      General: Bowel sounds are normal. There is no distension.      Comments: Obese/protuberant abdomen. Midline incision present without evidence of erythema, induration.  Small amount of serous drainage noted. Ostomy with brown liquid stool to right abdominal wall.    Musculoskeletal:         General: Swelling (bilateral upper extremities) present.      Right lower leg: Edema present.      Left lower leg: Edema present.   Skin:     Coloration: Skin is pale.      Findings: Bruising (upper extremities bilaterally) present.      Comments: Mottling to BLEs.    Neurological:      GCS: GCS eye subscore is 3. GCS verbal subscore is 1.  GCS motor subscore is 6.      Comments: PERRL, opens eyes to verbal stimulation. On sedation; sedation vacation not performed due to hemodynamic instability. .           Vents:  Vent Mode: A/C (07/05/20 0825)  Ventilator Initiated: Yes(chart correction) (06/26/20 1743)  Set Rate: 18 BPM (07/05/20 0825)  Vt Set: 430 mL (07/03/20 0706)  Pressure Support: 10 cmH20 (07/01/20 1317)  PEEP/CPAP: 5 cmH20 (07/05/20 0825)  Oxygen Concentration (%): 40 (07/05/20 0900)  Peak Airway Pressure: 21 cmH2O (07/05/20 0825)  Plateau Pressure: 18 cmH20 (07/05/20 0825)  Total Ve: 10.2 mL (07/05/20 0825)  F/VT Ratio<105 (RSBI): (!) 21.38 (07/05/20 0825)  Lines/Drains/Airways     Central Venous Catheter Line                 Port A Cath Single Lumen 05/02/18 2105 left subclavian 794 days    Trialysis (Dialysis) Catheter 07/04/20 1852 right internal jugular;right subclavian less than 1 day          Drain                 Colostomy 05/03/18 0715  days         Colostomy 06/24/20 RLQ 11 days         NG/OG Tube 06/24/20 1103 [place] sump 16 Fr. Right nostril 10 days         Urethral Catheter 07/03/20 1700 [place] 16 Fr. 1 day          Airway                 Airway - Non-Surgical 06/24/20 1959 Endotracheal Tube-Hi/Lo 10 days          Arterial Line                 Arterial Line 06/24/20 1852 Right Radial 10 days              Significant Labs:    CBC/Anemia Profile:  Recent Labs   Lab 07/04/20  2258 07/05/20  0249 07/05/20  0837   WBC 26.33* 32.78* 29.59*   HGB 10.2* 10.3* 10.0*   HCT 32.7* 33.7* 33.2*    227 166   MCV 87 87 88   RDW 19.6* 19.6* 19.5*        Chemistries:  Recent Labs   Lab 07/04/20  0300 07/04/20  1415 07/04/20  2249 07/05/20  0249     136 139 135* 134*   K 5.2*  5.2* 3.9 4.4 4.9     105 110 105 105   CO2 21*  21* 20* 20* 18*   BUN 29*  29* 35* 32* 25*   CREATININE 2.8*  2.8* 3.2* 3.1* 2.6*   CALCIUM 6.8*  6.8* 5.9* 7.0* 7.2*   ALBUMIN 1.5*  1.5* 1.4* 1.6* 1.6*  1.6*   PROT 5.0*  --   --  5.5*  5.4*    BILITOT 0.5  --   --  0.8  0.8   ALKPHOS 73  --   --  105  103   ALT 24  --   --  29  30   AST 27  --   --  50*  50*   MG 2.2 2.0 2.1 1.9   PHOS 5.0*  5.0* 4.6* 4.1 3.9       All pertinent labs within the past 24 hours have been reviewed.    Significant Imaging:  I have reviewed and interpreted all pertinent imaging results/findings within the past 24 hours.      ABG  Recent Labs   Lab 20  0340   PH 7.362   PO2 67*   PCO2 34.2*   HCO3 19.4*   BE -6     Assessment/Plan:     Pulmonary  Acute hypoxemic respiratory failure  Failing SBT's. Recent TTE with new RV dysfunction.  BLE ultrasound negative. Heparin not initiated on admission due to thrombocytopenia.     - Repeat TTE to evaluate RV function, pending.  - Daily SAT/SBTs as tolerated.  No performed today due to hemodynamic instability.    - Chest xray with continue LLL infiltrate/effusion. Continue ertapenem for Klebsiella ESBL.    Cardiac/Vascular  Atrial fibrillation with RVR  New onset of atrial fibrillation with RVR. Review of EKGs without previous atrial fibrillation.  Noted shortly after returning blood from RRT machine.  Remains on vasopressin and norepinephrine; requirements did not increase with RVR. BB contraindicated given profound shock. Added neosynephrine in an effort to  norepinephrine requirements without improvement in HR.  Recent TTE without thrombus.      - Start amiodarone infusion.     Cardiac arrest  Suspect secondary to sepsis as cause of cardiac arrest on   in the setting of bradycardia requiring maximum dose of atropine, external pacing, and multiple doses of epinephrine    - troponin trend: 0.54 > 0.522  - US lower extremities with Doppler on  without DVTs  - holding off on empiric heparin and other forms of AC in light of thrombocytopenia        Renal/  Acute kidney injury  iATN in the setting of septic shock.  Agrcia inserted with ~20ml output.  RRT clotting. Replaced trialysis  with continued clotting  issues.  Citrate added.     - Continue SLED per Nephrology  - daily weights  - avoid nephrotoxic agents when possible  - renally dose all medications      ID  * Septic shock  Klebsiella ESBL sputum  - continue ertapenem.  Vancomycin added on 7/4 given fevers and concern for new infection. Removed indwelling venous lines (TLC and trialysis) on 7/4. R IJ trialysis placed.  Port accessed overnight.  Norepinephrine restarted, vasopressin added overnight 7/4. Tobramycin x1 given. Fluconazole also restarted. Repeated blood and sputum cultures 7/5. CT Abdomen/Pelvis ordered.   - Blood cultures 06/24 and 6/30 negative.  7/3 blood cultures NGTD.   - D/c prednisone and started SDS.  - Consider removing port.                 Hematology  Thrombocytopenia  Suspect secondary to infection    - HIT negative, coagulation studies normal  - History of fatty liver and thrombocytopenia although platelets normal upon presentation to [facility]   - will continue with daily CBCs and transfuse for platelets < 10K or < 50K with active bleeding  - continue holding anticoagulation     Oncology  Anemia  Suspect secondary to RRT clotting. Possible component of bone marrow suppression from critical illness. No evidence of ongoing bleeding. S/p 2 units pRBC 7/4.     - Trend CBC    Endocrine  Type II diabetes mellitus  Hemoglobin A1c in April was 6.3%  - glucose checks Q6H with LDSSI  - goal glucose 140-180 while inpatient    GI  Small bowel obstruction  - s/p ex-lap on 6/24 with extensive lysis of adhesions with SBO, however, no signs of perforation or ischemia  - please see Septic shock    VTE Risk Mitigation (From admission, onward)         Ordered     heparin flush (porcine) injection 10 Units  As needed (PRN)      07/05/20 0022     IP VTE HIGH RISK PATIENT  Once      06/26/20 1753     Place sequential compression device  Until discontinued      06/26/20 1753                Updated wife at bedside.     Discussed plan with   Lesa.       Critical Care Time: 60 minutes  Critical secondary to Patient has a condition that poses threat to life and bodily function: Acute hypoxemic respiratory failure on mechanical ventilation, shock on vasopressors, acute renal failure on RRT, afib RVR.       Critical care was time spent personally by me on the following activities: development of treatment plan with patient or surrogate and bedside caregivers, discussions with consultants, evaluation of patient's response to treatment, examination of patient, ordering and performing treatments and interventions, ordering and review of laboratory studies, ordering and review of radiographic studies, pulse oximetry, re-evaluation of patient's condition. This critical care time did not overlap with that of any other provider or involve time for any procedures.     Vashti Shah NP  Critical Care Medicine  [facility]

## 2020-07-05 NOTE — PROGRESS NOTES
SLED venous pressure elevated. Rinsed back, all blood returned. Per primary nurse, do not restart now related to pt going to CT. Machine taken down and new lines. Machine is ready to start when pt comes back from CT.

## 2020-07-05 NOTE — NURSING
Notified Dr Key that lab results for pt where in, wbc count up and procalcitonin 12.41, mag replace. md said she would look through labs. WCTM

## 2020-07-05 NOTE — ASSESSMENT & PLAN NOTE
Suspect secondary to infection    - HIT negative, coagulation studies normal  - History of fatty liver and thrombocytopenia although platelets normal upon presentation to [facility]   - will continue with daily CBCs and transfuse for platelets < 10K or < 50K with active bleeding  - continue holding anticoagulation

## 2020-07-05 NOTE — ASSESSMENT & PLAN NOTE
New onset of atrial fibrillation with RVR. Review of EKGs without previous atrial fibrillation.  Noted shortly after returning blood from RRT machine.  Remains on vasopressin and norepinephrine; requirements did not increase with RVR. BB contraindicated given profound shock. Added neosynephrine in an effort to  norepinephrine requirements without improvement in HR.  Recent TTE without thrombus.      - Start amiodarone infusion.

## 2020-07-05 NOTE — PROCEDURES
"Cristo Jefferson is a 59 y.o. male patient.    Temp: 99 °F (37.2 °C) (07/04/20 1800)  Pulse: 80 (07/04/20 1905)  Resp: (!) 25 (07/04/20 1905)  BP: (!) 105/59 (07/02/20 1901)  SpO2: (!) 93 % (07/04/20 1905)  Weight: 115.7 kg (255 lb 1.2 oz) (07/03/20 0600)  Height: 5' 9" (175.3 cm) (07/04/20 1530)       Central Line    Date/Time: 7/4/2020 7:34 PM  Location procedure was performed: [facility] CARDIAC MEDICAL ICU ([facility]ICU)  Performed by: Vashti Shah NP  Pre-operative Diagnosis: acute renal failure  Post-operative diagnosis: acute renal failure  Time out: Immediately prior to procedure a "time out" was called to verify the correct patient, procedure, equipment, support staff and site/side marked as required.  Indications: hemodialysis  Anesthesia: local infiltration    Anesthesia:  Local Anesthetic: lidocaine 1% without epinephrine  Anesthetic total: 3 mL  Preparation: skin prepped with ChloraPrep  Skin prep agent dried: skin prep agent completely dried prior to procedure  Sterile barriers: all five maximum sterile barriers used - cap, mask, sterile gown, sterile gloves, and large sterile sheet  Hand hygiene: hand hygiene performed prior to central venous catheter insertion  Location details: right internal jugular  Catheter type: trialysis  Catheter size: 12 Fr  Catheter Length: 16cm    Ultrasound guidance: yes  Vessel Caliber: medium, patent, compressibility normal  Needle advanced into vessel with real time Ultrasound guidance.  Guidewire confirmed in vessel.  Sterile sheath used.  Manometry: Yes  Number of attempts: 1  Assessment: placement verified by x-ray,  no pneumothorax on x-ray and successful placement  Technical procedures used: Ultrasound guided  Complications: none  Estimated blood loss (mL): 3  Post-procedure: line sutured,  chlorhexidine patch,  sterile dressing applied and blood return through all ports  Complications: No          Vashti Shah  7/4/2020  "

## 2020-07-05 NOTE — NURSING
Notified JOSE Cruz woth dialysis as well as critical care team that patient's crrt clotted of and patient was rinsed back. Dialysis RN will restart CRRT as soon as able. WCTM.

## 2020-07-05 NOTE — PROGRESS NOTES
Pharmacokinetic Assessment Follow Up: IV Vancomycin    Vancomycin Regimen Assessment & Plan:  - Vancomycin level collected with morning labs today resulted as 18.5 ug/mL.  - Patient with COCO requiring RRT. Nephrology consulted, continuous SLED yesterday after line placement.  - Administer vancomycin 750 mg IV x1 dose since level is <20. Draw vancomycin level with morning labs tomorrow. Will re-dose as needed depending on level and renal function.    Drug levels (last 3 results):  Recent Labs   Lab Result Units 07/05/20  0249   Vancomycin, Random ug/mL 18.5     -----------------------------------------------------------------------------------------------    Pharmacokinetic Initial Assessment: Tobramycin    Tobramycin Regimen Plan:   - Initiate tobramycin with 265 mg (3 mg/kg x Adj BW) IV x1 dose.  - Renal function/RRT plans as above.  - Draw tobramycin level with morning labs tomorrow. Will plan to re-dose when level <1.    Drug levels (last 3 results):  No results for input(s): TOBRA8, TOBRA10, TOBRA12, TOBRARND, TOBRAMYCIN, TOBRAPEAK, TOBRATROUGH, TOBRAMYCINPE, TOBRAMYCINRA, TOBRAMYCINTR in the last 72 hours.    Pharmacy will continue to follow and monitor vancomycin & tobramycin.    Please contact pharmacy at extension [phone] for questions regarding this assessment.    Thank you for the consult,   Freddy Miramontes     Patient brief summary:  Cristo Jefferson is a 59 y.o. male initiated on antimicrobial therapy with IV vancomycin & tobramycin for treatment of bacteremia    The patient's current regimen is pulse dosing.    Drug Allergies:   Review of patient's allergies indicates:   Allergen Reactions    Morphine Rash     Actual Body Weight:   115.7 kg    Adjusted Body Weight:   88.7 kg    Renal Function:   Estimated Creatinine Clearance: 37.4 mL/min (A) (based on SCr of 2.6 mg/dL (H)).,     Dialysis Method (if applicable):  SLED

## 2020-07-06 LAB
PATHOLOGIST INTERPRETATION TRANSF REACTION: NORMAL
SARS-COV-2 RNA RESP QL NAA+PROBE: NOT DETECTED

## 2020-07-06 NOTE — PHYSICIAN QUERY
PT Name: Cristo Jefferson  MR #: [id number]     PRESENT ON ADMISSION (POA) DIAGNOSIS CLARIFICATION     CDS: Kathy Esparza RN, CCDS         Contact information :ext [phone] [email or web address]     This form is a permanent document in the medical record.     Query Date: July 6, 2020    By submitting this query, we are merely seeking further clarification of documentation.  Please utilize your independent clinical judgment when addressing the question(s) below.     The Medical Record contains following diagnoses documented:  Clinical Information Location in Medical Record     Klebsiella pneumonia - ESBL - ertapenem    Dependent atelectatic changes are noted at the lung bases    there is mild increasing opacity at the left base that may relate to atelectasis, infiltrate or component of pleural fluid    Pulmonary edema, pneumonia, aspiration, or sepsis    Respiratory culture  KLEBSIELLA PNEUMONIAE ESBL   Moderate      Critical Care PN 7/3/20    CT 6/24/20      CXR 6/25/20        CXR 6/30/20    Lab 6/30/20      Present on admission (POA) is defined as present at the time inpatient admission occurs. Conditions that develop during an outpatient encounter, including emergency department, observation or outpatient surgery, are considered as present on admission. [facility] 4th Quarter 2008      Please clarify the Present on Admission (POA) status of the diagnosis: Klebsiella pneumonia    Present on admission (POA) status:   [ x   ] Yes (Y)               [  ] Clinically Undetermined (W)        [    ] No (N)                 [    ] Documentation insufficient to determine if condition is POA (U)

## 2020-07-06 NOTE — DISCHARGE SUMMARY
Death Note  Critical Care Medicine      Admit Date: 2020    Date of Death: 2020    Time of Death: 1755     Attending Physician: Archie Mcfadden MD    Principal Diagnoses: Septic shock    Preliminary Cause of Death: Septic shock    Secondary Diagnoses:   Active Hospital Problems    Diagnosis  POA    *Septic shock [A41.9, R65.21]  Yes    Atrial fibrillation with RVR [I48.91]  No    Anemia [D64.9]  Unknown    Acute renal failure with tubular necrosis [N17.0]  Yes    Cardiac arrest [I46.9]  Yes     LUCAS on :  · Mild concentric left ventricular hypertrophy.  · Normal left ventricular systolic function. The estimated ejection fraction is 55%.  · Normal LV diastolic function.  · Normal right ventricular systolic function.  · Normal central venous pressure (3 mmHg).  · The estimated PA systolic pressure is 25 mmHg.    LUCAS on :  · Technically challenging portable study.  · Mildly decreased left ventricular systolic function. The estimated ejection fraction is 45-50%.  · Grade I (mild) left ventricular diastolic dysfunction consistent with impaired relaxation.  · Mild aortic regurgitation.  · Moderate right ventricular enlargement.  · Moderately to severely reduced right ventricular systolic function.  · Mild tricuspid regurgitation.  · The estimated PA systolic pressure is 27 mmHg.  · Normal central venous pressure (3 mmHg).  · Mild left atrial enlargement.      Thrombocytopenia [D69.6]  Yes    Acute kidney injury [N17.9]  Yes    Acute hypoxemic respiratory failure [J96.01]  Yes    Small bowel obstruction [K56.609]  Yes    Type II diabetes mellitus [E11.9]  Yes      Resolved Hospital Problems    Diagnosis Date Resolved POA    Shock liver [K72.00] 2020 Yes    High anion gap metabolic acidosis [E87.2] 2020 Yes    Lactic acidosis [E87.2] 2020 Yes        Discharged Condition:     HPI:  Mr. 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 ED on 6/24 with complaints of one day of abdominal pain, nausea, and vomiting. Upon presentation he was noted to be hypothermic with temperature of 95.4 F in septic shock with serum lactate of 3.6. General Surgery was consulted. Patient became increasingly acidotic with rising level of serum lactate despite IVFs, NG/OG placement, and empiric antibiotics. He was ultimately taken for ex-lap for which he underwent extensive lysis of adhesions. No perforation or ischemic bowel was noted. He continued to require pressor support, became increasingly acidotic, developed COCO, anemia presumed secondary to GI bleed, and transaminitits. Cardiology, Critical Care, and Nephrology were consulted. In the AM of 6/26 patient developed bradyarrhythmia. ACLS was performed with use of atropine, bicarbonate and calcium gluconate, in addition to multiple epinephrine boluses and external pacing. Wife was contacted and wished to proceed with resuscitation attempts. Following ROSC he was noted to have elevated troponin and BNP. Prior ECHO and repeat CXR were mostly unrevealing. UOP continued to decline. Antibiotics had already been broadened so antifungals were added. He was started on RRT. He was ultimately transferred to [facility] for higher level of care.    Hospital/ICU Course:  Mr. Jefferson was transferred to [facility] on 6/26. He was continued on broad spectrum antibiotics with vancomycin and pip/tazo, and started on SLED per Nephrology with improvement in lactic acidosis. He remained on pressors at this time (now down to two, norepinephrine and vasopressin). He underwent repeat TTE on 6/28 which showed similar EF to 6/25 prior to ACLS  however there was evidence of moderate to severely right ventricular systolic with enlargement. Patient becoming increasingly more thrombocytopenic and anemic this admission without and evidence of overt bleeding and not receiving anticoagulation. HIT panel negative.  US lower extremities on  showed no evidence of DVTs. There is concern for PE however given thrombocytopenia unable to start empiric anticoagulation.  Diflucan was added on  while awaiting Beta-D-glucan assay and fungal culture. Sputum culture from  with Klebsiella ESBL. Changed zosyn to ertapenem and d/c'd vancomycin.  Diflucan discontinued 7/3 with negative Beta-D-glucan assay. Norepinephrine requirements decreasing and now on low dose norepinephrine.  He  developed fevers of 102.4.  Repeated cultures.  TLC 9 days and Trialysis 7 days indwelling.  Trialysis line not functioning, RRT clotted multiple times on 7/3 with suspected etiology of decrease in Hb.  Concern for possible transfusion reaction 7/3 afternoon given fevers following RBC initiation.  Preliminary transfusion reaction workup negative.  Hb 5.6, received 2 units pRBC.  RRT restarted with new trialysis on ; however developed subsequent clotting. Port accessed overnight on  am.  Vasopressor requirements increased significantly requiring vasopressin.  Lactate WNL.  Repeated blood cultures, sputum culture.  Added fluconazole and tobramycin. CT abdomen/pelvis ordered. RRT clotted again the morning of , shortly after rinsing blood back; developed new afib RVR rate 140's.  Neosynephrine added in an attempt to decrease norepinephrine requirements; however, with decrease norepinephrine infusion, RVR remained grossly unchanged.  Amiodarone added.  Repeat TTE obtained however results not available until after patient is . TTE showed LVEF 65%, indeterminate LV diastolic function, mildly reduced RV systolic function, normal RV, and significant sclerotic density on the  right coronary cusp of the aortic valve with mobility of the right cusp moderately reduced;  vegetation not being excluded.  Around 1643, Mr. Jefferson developed PEA, ACLS started immediately. Wife present at bedside during code. Prolonged resuscitation efforts were unsuccessful, and code was terminated at 1739 with acceptance of wife. Mr. Jefferson was pronounced dead at 1755.  Autopsy paperwork completed per request of wife.       EVA HUMPHRIES, Sout[facility] Arizona Behavioral Health ServicesP-BC  Critical Care Medicine

## 2020-07-06 NOTE — SIGNIFICANT EVENT
Death Note  Hospital Medicine  [facility]            I was called to bedside on 7/5/2020 at 1752. Nursing at beside, nursing supervisor notified.  has been notified. Family at bedside.     Patient is not responding to verbal or tactile stimuli. No heart or breath sounds on auscultation. No respirations. No palpable pulses. Patient does not have a pupillary or corneal reflex. Patient's pupils are fixed and dilated.      Time of death is 7/5/2020  at 1755        Cause of Death: Septic Shock        Signing Physician:    Efe Chiu MD   [facility] Contact  Email: [email or web address]  Pager: [phone]

## 2020-07-07 LAB
BACTERIA SPEC AEROBE CULT: ABNORMAL
BACTERIA SPEC AEROBE CULT: ABNORMAL
GRAM STN SPEC: ABNORMAL

## 2020-07-07 NOTE — PLAN OF CARE
Time of death is 2020  at 1755         Cause of Death: Septic Shock per MD note       20 1011   Final Note   Assessment Type Final Discharge Note   Anticipated Discharge Disposition    Hospital Follow Up  Appt(s) scheduled? No   Discharge plans and expectations educations in teach back method with documentation complete? No   Right Care Referral Info   Post Acute Recommendation Other   Referral Type n/a    Facility Name n/a     Adolph Durant RN MSN  Critical Care-   Ext. [phone]

## 2020-07-08 LAB
BACTERIA BLD CULT: NORMAL
BACTERIA BLD CULT: NORMAL

## 2020-07-10 LAB
BACTERIA BLD CULT: NORMAL
BACTERIA BLD CULT: NORMAL

## 2020-08-05 LAB — FUNGUS BLD CULT: NORMAL

## 2020-08-25 LAB
FINAL PATHOLOGIC DIAGNOSIS: NORMAL
Lab: NORMAL

## 2021-07-23 LAB — SYMPTOMS P TRANSF RX PATIENT-IMP: NORMAL

## 2023-07-16 NOTE — PLAN OF CARE
End of Shift Note:  Med/Surg CV Patients    Procedure:  7/13/2023 3 Days Post-Op Mitral valve repair  Pain Management: Last Pain Score: 0/10   Cardiac:  SA - EKG obtained to confirm.   Labs Supplemented:  Mag and Potassium  Diet: regular  Bowel Function:Diarrhea  LBM: 7/16/2023  Weight:    Wt Readings from Last 1 Encounters:   07/16/23 66.2 kg (146 lb)       Weight change: -1.075 kg (-2 lb 5.9 oz)   Incision/Dressing:  Incision clean, dry, and intact  .  Kevin Figueroa NP changed the chest dressing during removal of epicardial wires d/t saturation.  Dressing reapplied.   Activity:Stand by assist, gait belt.   Number of times ambulated:  5.  Distance:  500ft.  Significant Events: Patient noted to be coughing during his late breakfast.  He ordered Cheerios without milk and was attempting to eat them dry.  Patient began coughing frequently.  Speech contacted in regards to coughing while eating and come to bedside for reevaluation.  Patient's diet order was subsequently changed to soft, small bites instead of regular. Discussion of possible video swallow per Speech.   LDAs: peripheral IV  Discharge Plan: Anticipated discharge date:                       Disposition:   Home with Home Services       TRAMAINE learned in morning huddle that Pt is anticipated to be ready to discharge home on Wednesday and will need a home wound vac and home health for new ostomy care. TRAMAINE placed [employer] wound vac form on the chart and called Surgery Resident, Dr. Villegas, to inform him it was there and needed to be completed. TRAMAINE also asked Dr. Villegas for home health orders. TRAMAINE placed call to [facility] to see whether they accept Pt's insurance, [employer] Medicare HMO. Tran with [facility] stated she thought they should, but asked TRAMAINE to send it to confirm. TRAMAINE sent face sheet, H&P and Op note via [facility] and will send orders once received. TRAMAINE to continue to follow.     Anastasia Fay LCSW

## 2023-08-14 NOTE — SUBJECTIVE & OBJECTIVE
Interval History/Significant Events: changed abx to ertapenem for klebsiella ESBL in sputum    Review of Systems   Unable to perform ROS: Intubated     Objective:     Vital Signs (Most Recent):  Temp: 98.6 °F (37 °C) (07/02/20 1200)  Pulse: 72 (07/02/20 1400)  Resp: (!) 22 (07/02/20 1400)  BP: (!) 110/45 (07/02/20 1400)  SpO2: 95 % (07/02/20 1400) Vital Signs (24h Range):  Temp:  [98.2 °F (36.8 °C)-99.1 °F (37.3 °C)] 98.6 °F (37 °C)  Pulse:  [67-79] 72  Resp:  [21-34] 22  SpO2:  [88 %-97 %] 95 %  BP: ()/(45-76) 110/45  Arterial Line BP: (107-142)/(43-58) 110/45   Weight: 109.3 kg (240 lb 15.4 oz)  Body mass index is 35.58 kg/m².      Intake/Output Summary (Last 24 hours) at 7/2/2020 1437  Last data filed at 7/2/2020 1400  Gross per 24 hour   Intake 6414.07 ml   Output 8104 ml   Net -1689.93 ml       Physical Exam  Vitals signs and nursing note reviewed.   Constitutional:       Appearance: He is morbidly obese. He is ill-appearing. He is not diaphoretic.      Interventions: He is sedated and intubated.   HENT:      Head: Normocephalic and atraumatic.      Nose: Nose normal. No congestion.      Mouth/Throat:      Mouth: Mucous membranes are dry.   Eyes:      General: No scleral icterus.     Extraocular Movements: Extraocular movements intact.      Pupils: Pupils are equal, round, and reactive to light.      Comments: Scleral edema note bilaterally.   Neck:      Musculoskeletal: Normal range of motion and neck supple.      Comments: CVC line to right IJ.  Cardiovascular:      Rate and Rhythm: Normal rate.      Pulses: Normal pulses.      Heart sounds: No murmur. No friction rub. No gallop.    Pulmonary:      Effort: He is intubated.      Breath sounds: No wheezing, rhonchi or rales.      Comments: Transmitted ventilatory sounds. Well healed sternotomy scar to chest. Left-sided port palpated over left chest wall. CVC to right subclavian.   Abdominal:      General: Bowel sounds are normal. There is no distension.  Last Appointment:  6/8/2023  Future Appointments   Date Time Provider [address]   9/22/2023  8:15 AM Radha Draper MD COL SURG [facility]      Comments: Obese/protuberant abdomen. Midline incision present without evidence of erythema, induration, or discharge. Ostomy with brown liquid stool to right abdominal wall.    Musculoskeletal:         General: Swelling (bilateral upper extremities) present.      Right lower leg: Edema present.      Left lower leg: Edema present.      Comments: Scar to right lower extremity from prior vein harvest.   Lymphadenopathy:      Cervical: No cervical adenopathy.   Skin:     Coloration: Skin is pale.      Findings: Bruising (upper extremities bilaterally) present.         Vents:  Vent Mode: A/C (07/02/20 1145)  Ventilator Initiated: Yes(chart correction) (06/26/20 1743)  Set Rate: 22 BPM (07/02/20 1145)  Vt Set: 420 mL (07/02/20 1145)  Pressure Support: 10 cmH20 (07/01/20 1317)  PEEP/CPAP: 5 cmH20 (07/02/20 1145)  Oxygen Concentration (%): 60 (07/02/20 1400)  Peak Airway Pressure: 30 cmH2O (07/02/20 1145)  Plateau Pressure: 16 cmH20 (07/02/20 1145)  Total Ve: 13 mL (07/02/20 1145)  F/VT Ratio<105 (RSBI): (!) 72.58 (07/02/20 1145)  Lines/Drains/Airways     Central Venous Catheter Line                 Port A Cath Single Lumen 05/02/18 2105 left subclavian 791 days    Percutaneous Central Line Insertion/Assessment - Triple Lumen  06/24/20 1836 right internal jugular 7 days         Hemodialysis Catheter 06/26/20 0932 6 days          Drain                 Colostomy 05/03/18 0715  days         Colostomy 06/24/20 RLQ 8 days         NG/OG Tube 06/24/20 1103 [place] sump 16 Fr. Right nostril 8 days          Airway                 Airway - Non-Surgical 06/24/20 1959 Endotracheal Tube-Hi/Lo 7 days          Arterial Line                 Arterial Line 06/24/20 1852 Right Radial 7 days          Peripheral Intravenous Line                 Midline Catheter Insertion/Assessment  - Single Lumen (RETIRED) 04/03/20 1312 Right basilic vein (medial side of arm) other (see comments) 90 days              Significant Labs:    CBC/Anemia  Profile:  Recent Labs   Lab 07/01/20  0829 07/01/20  1753 07/02/20  0743   WBC 8.25 9.78 12.58   HGB 7.3* 7.8* 8.1*   HCT 23.9* 25.7* 26.4*   PLT 41* 57* 80*   MCV 85 83 83   RDW 18.5* 19.1* 19.7*        Chemistries:  Recent Labs   Lab 07/01/20  0226  07/01/20 2058 07/02/20  0305 07/02/20  1328      < > 137 137  137 138   K 4.6   < > 4.2 3.8  3.8 3.7      < > 102 100  100 100   CO2 20*   < > 23 25  25 25   BUN 15   < > 10 10  10 8   CREATININE 1.6*   < > 1.1 1.2  1.2 1.2   CALCIUM 7.0*   < > 6.9* 7.0*  7.0* 7.0*   ALBUMIN 1.6*   < > 1.6* 1.6*  1.6* 1.6*   PROT 5.4*  --   --  5.4*  --    BILITOT 0.9  --   --  0.8  --    ALKPHOS 102  --   --  109  --    ALT 57*  --   --  43  --    AST 51*  --   --  44*  --    MG  --    < > 2.0 2.0 1.8   PHOS 4.4   < > 2.3* 3.2  3.2 1.8*    < > = values in this interval not displayed.       All pertinent labs within the past 24 hours have been reviewed.    Significant Imaging:  I have reviewed all pertinent imaging results/findings within the past 24 hours.

## 2024-01-04 NOTE — SUBJECTIVE & OBJECTIVE
[facility] and arrange follow up with neurology and cardiology for symptoms of lightheadedness and dizziness   Interval History/Significant Events: No acute events overnight. Stepped down from ICU overnight. TPN infusing. Pain well controlled on dilaudid pushes. Voiding. One dose lasix yesterday. Tolerating CLD. No nausea/vomiting.    Follow-up For: Procedure(s) (LRB):  EXPLORATORY-LAPAROTOMY (N/A)  WASHOUT-ABDOMINAL (N/A)  CLOSURE-WOUND (N/A)    Post-Operative Day: 3 Days Post-Op    Objective:     Vital Signs (Most Recent):  Temp: 98.1 °F (36.7 °C) (05/09/18 0436)  Pulse: 61 (05/09/18 0436)  Resp: 18 (05/09/18 0436)  BP: 132/75 (05/09/18 0436)  SpO2: 99 % (05/09/18 0436) Vital Signs (24h Range):  Temp:  [96.8 °F (36 °C)-98.1 °F (36.7 °C)] 98.1 °F (36.7 °C)  Pulse:  [55-87] 61  Resp:  [18-20] 18  SpO2:  [92 %-99 %] 99 %  BP: (127-139)/(69-76) 132/75     Weight: 117.1 kg (258 lb 2.5 oz)  Body mass index is 38.12 kg/m² (pended).      Intake/Output Summary (Last 24 hours) at 05/09/18 0619  Last data filed at 05/09/18 0420   Gross per 24 hour   Intake          3698.77 ml   Output             4140 ml   Net          -441.23 ml       Physical Exam   Constitutional: He is oriented to person, place, and time. He appears well-developed and well-nourished.   HENT:   Head: Normocephalic and atraumatic.   Nose: Nose normal.   Eyes: Conjunctivae are normal. Pupils are equal, round, and reactive to light. No scleral icterus.   Cardiovascular: Normal rate and regular rhythm.  Exam reveals no gallop and no friction rub.    No murmur heard.  Pulmonary/Chest: Effort normal and breath sounds normal.   Abdominal: Soft. There is tenderness.   Midline incision c/d/i  Transverse colostomy pink   Musculoskeletal: Normal range of motion.   Neurological: He is alert and oriented to person, place, and time.   Skin: Skin is warm and dry. No rash noted.   Nursing note and vitals reviewed.      Lines/Drains/Airways     Central Venous Catheter Line                 Port A Cath Single Lumen 05/02/18 2105 left subclavian 6 days         Percutaneous Central  Line Insertion/Assessment - triple lumen  05/03/18 1030 right internal jugular 5 days          Drain                 Drain/Device  Right -- days         Colostomy 05/03/18 0715 RUQ 5 days         Closed/Suction Drain 05/04/18 0834 Abdomen Bulb 15 Fr. 4 days         Closed/Suction Drain 05/04/18 0834 Anterior Abdomen Bulb 15 Fr. 4 days         Closed/Suction Drain 05/04/18 0854 Medial Abdomen Other (Comment) 4 days          Pressure Ulcer                 Negative Pressure Wound Therapy  4 days          Surgical Packing                 Surgical Packing 5 days                Significant Labs:    CBC/Anemia Profile:    Recent Labs  Lab 05/08/18  0500 05/09/18  0420   WBC 6.85 5.02   HGB 8.4* 8.0*   HCT 26.5* 25.8*   PLT 84* 81*   MCV 85 86   RDW 18.2* 18.6*        Chemistries:    Recent Labs  Lab 05/08/18  0500 05/08/18  1248 05/09/18  0420   * 145 146*   K 3.1* 3.5 3.2*    109 109   CO2 30* 29 28   BUN 24* 24* 22*   CREATININE 0.7 0.6 0.6   CALCIUM 7.7* 7.7* 7.6*   ALBUMIN 1.8*  --  1.8*   PROT 5.0*  --  4.7*   BILITOT 0.6  --  0.5   ALKPHOS 116  --  103   ALT 17  --  15   AST 25  --  23   MG 1.9  --  1.8   PHOS 1.9*  --  2.9       Coagulation:   No results for input(s): PT, INR, APTT in the last 48 hours.  Lactic Acid:   No results for input(s): LACTATE in the last 48 hours.    Significant Imaging:  I have reviewed and interpreted all pertinent imaging results/findings within the past 24 hours.

## (undated) DEVICE — DRAPE ABDOMINAL TIBURON 14X11

## (undated) DEVICE — ELECTRODE REM PLYHSV RETURN 9

## (undated) DEVICE — RELOAD PROXIMATE CUT BLUE 75MM

## (undated) DEVICE — COVER LIGHT HANDLE 80/CA

## (undated) DEVICE — DRAIN CHANNEL ROUND 19FR

## (undated) DEVICE — DRESSING INFOVAC LARGE BLK

## (undated) DEVICE — BLADE 4 INCH EDGE UN-INS

## (undated) DEVICE — SUT SILK 3-0 SH 18IN BLACK

## (undated) DEVICE — TOWEL OR XRAY WHITE 17X26IN

## (undated) DEVICE — DRAPE INCISE IOBAN 2 23X17IN

## (undated) DEVICE — SUT PDS II 1 TP-1 VIL

## (undated) DEVICE — SEE MEDLINE ITEM 146417

## (undated) DEVICE — EVACUATOR WOUND BULB 100CC

## (undated) DEVICE — SEE MEDLINE ITEM 157117

## (undated) DEVICE — CUTTER PROXIMATE BLUE 75MM

## (undated) DEVICE — ELECTRODE EXTENDED BLADE

## (undated) DEVICE — DRESSING ADH ISLAND 3.6 X 14

## (undated) DEVICE — SEE MEDLINE ITEM 156902

## (undated) DEVICE — SUT 2-0 12-18IN SILK

## (undated) DEVICE — SUT 2/0 30IN SILK BLK BRAI

## (undated) DEVICE — DRESSING GAUZE 6PLY 4X4

## (undated) DEVICE — SUT 1 48IN PDS II VIO MONO

## (undated) DEVICE — TRAY FOLEY 16FR INFECTION CONT

## (undated) DEVICE — SPONGE IV DRAIN 4X4 STERILE

## (undated) DEVICE — TAPE SURG MEDIPORE 6X72IN

## (undated) DEVICE — SPONGE LAP 18X18 PREWASHED

## (undated) DEVICE — STAPLER SKIN PROXIMATE WIDE

## (undated) DEVICE — SUT 3-0 12-18IN SILK

## (undated) DEVICE — CONTAINER SPECIMEN STRL 4OZ

## (undated) DEVICE — CANISTER INFOV.A.C WOUND 500ML

## (undated) DEVICE — SEE MEDLINE ITEM 152622

## (undated) DEVICE — POUCH SENSURA 1PC EXT W/FILTER

## (undated) DEVICE — Device